# Patient Record
Sex: FEMALE | Race: ASIAN | NOT HISPANIC OR LATINO | URBAN - METROPOLITAN AREA
[De-identification: names, ages, dates, MRNs, and addresses within clinical notes are randomized per-mention and may not be internally consistent; named-entity substitution may affect disease eponyms.]

---

## 2017-02-03 ENCOUNTER — APPOINTMENT (OUTPATIENT)
Age: 47
Setting detail: DERMATOLOGY
End: 2017-02-15

## 2017-02-03 DIAGNOSIS — L81.8 OTHER SPECIFIED DISORDERS OF PIGMENTATION: ICD-10-CM

## 2017-02-03 PROBLEM — L81.9 DISORDER OF PIGMENTATION, UNSPECIFIED: Status: ACTIVE | Noted: 2017-02-03

## 2017-02-03 PROCEDURE — OTHER COUNSELING: OTHER

## 2017-02-03 PROCEDURE — 99201: CPT | Mod: 25

## 2017-02-03 PROCEDURE — 11100: CPT

## 2017-02-03 PROCEDURE — OTHER BIOPSY BY PUNCH METHOD: OTHER

## 2017-02-03 ASSESSMENT — LOCATION SIMPLE DESCRIPTION DERM: LOCATION SIMPLE: LEFT UPPER BACK

## 2017-02-03 ASSESSMENT — LOCATION ZONE DERM: LOCATION ZONE: TRUNK

## 2017-02-03 ASSESSMENT — LOCATION DETAILED DESCRIPTION DERM: LOCATION DETAILED: LEFT SUPERIOR UPPER BACK

## 2017-02-03 NOTE — PROCEDURE: BIOPSY BY PUNCH METHOD
Notification Instructions: Patient will be notified of biopsy results. However, patient instructed to call the office if not contacted within 2 weeks.
Billing Type: Third-Party Bill
Bill 68249 For Specimen Handling/Conveyance To Laboratory?: no
Epidermal Sutures: 5-0 Monosof
Additional Anesthesia Volume In Cc (Will Not Render If 0): 0
Suture Removal: 14 days
Dressing: bandage
Post-Care Instructions: I reviewed with the patient in detail post-care instructions. Patient is to keep the biopsy site dry overnight, and then apply bacitracin twice daily until healed. Patient may apply hydrogen peroxide soaks to remove any crusting.
Hemostasis: None
Wound Care: Vaseline
Body Location Override (Optional - Billing Will Still Be Based On Selected Body Map Location If Applicable): left upper back/posterior neck
Detail Level: Detailed
Home Suture Removal Text: Patient was provided a home suture removal kit and will remove their sutures at home.  If they have any questions or difficulties they will call the office.
Anesthesia Type: 1% lidocaine with epinephrine and a 1:10 solution of 8.4% sodium bicarbonate
Consent: Written consent was obtained and risks were reviewed including but not limited to scarring, infection, bleeding, scabbing, incomplete removal, nerve damage and allergy to anesthesia.
Biopsy Type: H and E
Punch Size In Mm: 3
Anesthesia Volume In Cc (Will Not Render If 0): 0.5

## 2017-02-15 ENCOUNTER — APPOINTMENT (OUTPATIENT)
Age: 47
Setting detail: DERMATOLOGY
End: 2017-02-16

## 2017-02-15 DIAGNOSIS — Z48.02 ENCOUNTER FOR REMOVAL OF SUTURES: ICD-10-CM

## 2017-02-15 DIAGNOSIS — L81.0 POSTINFLAMMATORY HYPERPIGMENTATION: ICD-10-CM

## 2017-02-15 PROCEDURE — OTHER SUTURE REMOVAL (GLOBAL PERIOD): OTHER

## 2017-02-15 PROCEDURE — 99213 OFFICE O/P EST LOW 20 MIN: CPT

## 2017-02-15 PROCEDURE — OTHER COUNSELING: OTHER

## 2017-02-15 ASSESSMENT — LOCATION DETAILED DESCRIPTION DERM
LOCATION DETAILED: LEFT SUPERIOR MEDIAL UPPER BACK
LOCATION DETAILED: LEFT SUPERIOR UPPER BACK
LOCATION DETAILED: LEFT MEDIAL TRAPEZIAL NECK

## 2017-02-15 ASSESSMENT — LOCATION SIMPLE DESCRIPTION DERM
LOCATION SIMPLE: LEFT UPPER BACK
LOCATION SIMPLE: POSTERIOR NECK

## 2017-02-15 ASSESSMENT — LOCATION ZONE DERM
LOCATION ZONE: NECK
LOCATION ZONE: TRUNK

## 2017-02-15 NOTE — PROCEDURE: SUTURE REMOVAL (GLOBAL PERIOD)
Body Location Override (Optional - Billing Will Still Be Based On Selected Body Map Location If Applicable): Left upper back
Detail Level: Detailed
Add 43994 Cpt? (Important Note: In 2017 The Use Of 10330 Is Being Tracked By Cms To Determine Future Global Period Reimbursement For Global Periods): no

## 2018-12-05 ENCOUNTER — TRANSFERRED RECORDS (OUTPATIENT)
Dept: HEALTH INFORMATION MANAGEMENT | Facility: CLINIC | Age: 48
End: 2018-12-05

## 2019-01-05 ENCOUNTER — TRANSFERRED RECORDS (OUTPATIENT)
Dept: HEALTH INFORMATION MANAGEMENT | Facility: CLINIC | Age: 49
End: 2019-01-05

## 2019-01-31 ENCOUNTER — TRANSFERRED RECORDS (OUTPATIENT)
Dept: HEALTH INFORMATION MANAGEMENT | Facility: CLINIC | Age: 49
End: 2019-01-31

## 2019-03-01 ENCOUNTER — TRANSFERRED RECORDS (OUTPATIENT)
Dept: HEALTH INFORMATION MANAGEMENT | Facility: CLINIC | Age: 49
End: 2019-03-01

## 2019-03-06 ENCOUNTER — TRANSFERRED RECORDS (OUTPATIENT)
Dept: HEALTH INFORMATION MANAGEMENT | Facility: CLINIC | Age: 49
End: 2019-03-06

## 2019-03-15 ENCOUNTER — TRANSFERRED RECORDS (OUTPATIENT)
Dept: HEALTH INFORMATION MANAGEMENT | Facility: CLINIC | Age: 49
End: 2019-03-15

## 2019-03-22 ENCOUNTER — TRANSFERRED RECORDS (OUTPATIENT)
Dept: HEALTH INFORMATION MANAGEMENT | Facility: CLINIC | Age: 49
End: 2019-03-22

## 2019-04-25 NOTE — TELEPHONE ENCOUNTER
RECORDS RECEIVED FROM: External - [facility]   DATE RECEIVED: 5/20/19   NOTES (FOR ALL VISITS) STATUS DETAILS   OFFICE NOTE from referring provider In process    OFFICE NOTE from other specialist N/A    DISCHARGE SUMMARY from hospital N/A    DISCHARGE REPORT from the ER N/A    OPERATIVE REPORT N/A    MEDICATION LIST In process    IMAGING  (FOR ALL VISITS)     EMG N/A    EEG N/A    ECT N/A    MRI (HEAD, NECK, SPINE) N/A    CT (HEAD, NECK, SPINE) N/A      Action    Action Taken Records request faxed to [facility]

## 2019-05-10 ENCOUNTER — TRANSFERRED RECORDS (OUTPATIENT)
Dept: HEALTH INFORMATION MANAGEMENT | Facility: CLINIC | Age: 49
End: 2019-05-10

## 2019-05-13 NOTE — TELEPHONE ENCOUNTER
Action    Action Taken Records received from [facility] via fax.     Images were sent on 5/8/19 per [facility], however I do not have documentation that the CD was received. Will request to re-send imaging.          [facility]

## 2019-05-20 ENCOUNTER — PRE VISIT (OUTPATIENT)
Dept: NEUROLOGY | Facility: CLINIC | Age: 49
End: 2019-05-20

## 2019-06-12 ENCOUNTER — TRANSFERRED RECORDS (OUTPATIENT)
Dept: HEALTH INFORMATION MANAGEMENT | Facility: CLINIC | Age: 49
End: 2019-06-12

## 2019-08-19 ENCOUNTER — TRANSFERRED RECORDS (OUTPATIENT)
Dept: HEALTH INFORMATION MANAGEMENT | Facility: CLINIC | Age: 49
End: 2019-08-19

## 2019-09-18 ENCOUNTER — TRANSFERRED RECORDS (OUTPATIENT)
Dept: HEALTH INFORMATION MANAGEMENT | Facility: CLINIC | Age: 49
End: 2019-09-18

## 2019-11-12 ENCOUNTER — TRANSFERRED RECORDS (OUTPATIENT)
Dept: HEALTH INFORMATION MANAGEMENT | Facility: CLINIC | Age: 49
End: 2019-11-12

## 2019-12-09 ENCOUNTER — TRANSFERRED RECORDS (OUTPATIENT)
Dept: HEALTH INFORMATION MANAGEMENT | Facility: CLINIC | Age: 49
End: 2019-12-09

## 2019-12-12 ENCOUNTER — TRANSFERRED RECORDS (OUTPATIENT)
Dept: HEALTH INFORMATION MANAGEMENT | Facility: CLINIC | Age: 49
End: 2019-12-12

## 2019-12-12 LAB
ALT SERPL-CCNC: 17 U/L (ref 0–55)
AST SERPL-CCNC: 26 U/L (ref 10–40)
CHOLEST SERPL-MCNC: 272 MG/DL (ref 0–199)
HDLC SERPL-MCNC: 64 MG/DL
LDLC SERPL CALC-MCNC: 179 MG/DL
NONHDLC SERPL-MCNC: 208 MG/DL
TRIGL SERPL-MCNC: 144 MG/DL
TSH SERPL-ACNC: 1.44 UIU/ML (ref 0.3–4.5)

## 2019-12-18 ENCOUNTER — TRANSFERRED RECORDS (OUTPATIENT)
Dept: HEALTH INFORMATION MANAGEMENT | Facility: CLINIC | Age: 49
End: 2019-12-18

## 2019-12-18 LAB
EJECTION FRACTION: 60 %
GLUCOSE SERPL-MCNC: 87 MG/DL (ref 70–100)
POTASSIUM SERPL-SCNC: 4.3 MMOL/L (ref 3.5–5.1)

## 2019-12-20 ENCOUNTER — TRANSFERRED RECORDS (OUTPATIENT)
Dept: HEALTH INFORMATION MANAGEMENT | Facility: CLINIC | Age: 49
End: 2019-12-20

## 2019-12-20 LAB
HPV ABSTRACT: NORMAL
PAP-ABSTRACT: NORMAL

## 2020-01-10 ENCOUNTER — TRANSFERRED RECORDS (OUTPATIENT)
Dept: HEALTH INFORMATION MANAGEMENT | Facility: CLINIC | Age: 50
End: 2020-01-10

## 2020-02-06 ENCOUNTER — TRANSFERRED RECORDS (OUTPATIENT)
Dept: HEALTH INFORMATION MANAGEMENT | Facility: CLINIC | Age: 50
End: 2020-02-06

## 2020-02-06 ENCOUNTER — TRANSFERRED RECORDS (OUTPATIENT)
Dept: MULTI SPECIALTY CLINIC | Facility: CLINIC | Age: 50
End: 2020-02-06

## 2020-02-06 LAB
CREAT SERPL-MCNC: 0.4 MG/DL (ref 0.55–1.02)
EJECTION FRACTION: 59 %
GFR SERPL CREATININE-BSD FRML MDRD: >60 ML/MIN/1.73M2

## 2020-02-21 NOTE — TELEPHONE ENCOUNTER
RECORDS RECEIVED FROM: Self   Date of Appt: 6/8/20   NOTES (FOR ALL VISITS) STATUS DETAILS   OFFICE NOTE from referring provider N/A    OFFICE NOTE from other specialist Received Dr Daniel Arroyo @ [facility]:  2/5/20 1/9/20 12/18/19 11/4/19    Rod Vivas JEREMIE @ [facility]:  11/4/19    Dr Vlad Leos @ [facility]:  3/15/19  2/20/19    Dr Austin Norris @ [facility]:  2/4/19 12/20/19 12/18/18   DISCHARGE SUMMARY from hospital N/A    DISCHARGE REPORT from the ER N/A    OPERATIVE REPORT N/A [facility]:  Muscle Bx R Arm 3/1/19   MEDICATION LIST Received    IMAGING  (FOR ALL VISITS)     EMG Received [facility]:  12/9/19    [facility]:  1/31/19   EEG N/A    MRI (HEAD, NECK, SPINE) Received [facility]:  MRI Cervical Spine 1/5/19   LUMBAR PUNCTURE N/A    [facility] Scan N/A    CT (HEAD, NECK, SPINE) N/A       Action 2/21/20 MV 12.08pm   Action Taken EMG request faxed to [facility]     Action 2/25/20 MV 2.27pm   Action Taken EMG report received via fax from      Action 06/01/20 2:04 PM - Talya   Action Taken  Imaging disc and reports received from [facility] and sent to [facility] to be uploaded into PACs.  Reports sent to [facility] to be scanned into the chart.

## 2020-06-08 ENCOUNTER — PRE VISIT (OUTPATIENT)
Dept: NEUROLOGY | Facility: CLINIC | Age: 50
End: 2020-06-08

## 2020-06-08 ENCOUNTER — OFFICE VISIT (OUTPATIENT)
Dept: NEUROLOGY | Facility: CLINIC | Age: 50
End: 2020-06-08
Payer: COMMERCIAL

## 2020-06-08 VITALS
RESPIRATION RATE: 16 BRPM | OXYGEN SATURATION: 98 % | DIASTOLIC BLOOD PRESSURE: 83 MMHG | HEART RATE: 88 BPM | SYSTOLIC BLOOD PRESSURE: 126 MMHG

## 2020-06-08 DIAGNOSIS — G72.9 MYOPATHY: Primary | ICD-10-CM

## 2020-06-08 DIAGNOSIS — G24.8 FOCAL DYSTONIA: ICD-10-CM

## 2020-06-08 RX ORDER — DOXYCYCLINE 50 MG/1
50 CAPSULE ORAL
COMMUNITY
Start: 2020-01-03 | End: 2021-09-22 | Stop reason: ALTCHOICE

## 2020-06-08 RX ORDER — MOMETASONE FUROATE MONOHYDRATE 50 UG/1
2 SPRAY, METERED NASAL DAILY
COMMUNITY
Start: 2020-04-13 | End: 2023-05-04

## 2020-06-08 RX ORDER — MIRABEGRON 25 MG/1
25 TABLET, FILM COATED, EXTENDED RELEASE ORAL
COMMUNITY
Start: 2020-03-12 | End: 2021-09-22

## 2020-06-08 RX ORDER — GABAPENTIN 300 MG/1
300 CAPSULE ORAL PRN
COMMUNITY
Start: 2018-12-05 | End: 2022-06-27

## 2020-06-08 RX ORDER — NAPROXEN 500 MG/1
TABLET ORAL
COMMUNITY
Start: 2020-02-10 | End: 2023-02-13

## 2020-06-08 RX ORDER — VALACYCLOVIR HYDROCHLORIDE 1 G/1
2000 TABLET, FILM COATED ORAL PRN
COMMUNITY
Start: 2019-12-20

## 2020-06-08 RX ORDER — LIFITEGRAST 50 MG/ML
SOLUTION/ DROPS OPHTHALMIC
COMMUNITY
Start: 2020-03-14 | End: 2022-01-04

## 2020-06-08 RX ORDER — NALTREXONE HYDROCHLORIDE 50 MG/1
TABLET, FILM COATED ORAL
COMMUNITY
Start: 2020-05-18 | End: 2021-09-22

## 2020-06-08 ASSESSMENT — PAIN SCALES - GENERAL: PAINLEVEL: MILD PAIN (3)

## 2020-06-08 NOTE — LETTER
6/8/2020     RE: Britt Park  [address] [facility] [address]     Dear Colleague,    Thank you for referring your patient, Britt Park, to the [facility] NEUROLOGY at [facility]. Please see a copy of my visit note below.    Referral:   Vlad Leos MD  [facility]  [address] [address]    Chief Complaint:  Myopathy 3rd opinion    History of Present Illness:    51yo RH F female with a past medical history of peripheral neuropathy, anemia, chronically dry eyes, anxiety, PVCs (sees cardiology), MAYA on cpap, chronic neck pain (see pain/palliative care at [facility]) who was referred to the pain clinic for evaluation of myopathy.     History was obtained by patient and family interview, chart [facility] and [facility]. >60 pages of records were reviewed.     Ms. Park has a somewhat difficulty past couple of years.   She has been utilizing physical therapy for 4+ years for neck pain and stiffness and in the last few years she has had more difficulty with weakness in her shoulders and neck, developing to the point of tautness which had become so uncomfortable that her physical therapist looked at her more closely, noticed scapular winging and suggested she find referral to a neurologist.  The timeline is below but essentially she began a workup thru [facility] for myopath.  Her disease process progressed to the point in the last year that she could no longer lift her arms above 90 degrees and could no longer perform ADLs.  She also began to have numbness along the medial aspects of her arms when she would lie down for sleep.  Additionally she has been seen several times for left jaw numbness.  She has seen multiple providers for these problems including pain/neurology/physical therapy/orthopedics/PNB.  Her workup has included biopsy, EMG, and extensive genetic testing, much of this is shown below.  She had a 99 gene LGMD  panel which was negative except for hetero PLEC gene She then had FSHD  Panel - negative She then had SMA panel - negative She then had whole exome sequencing which was not remarkable    Does have falls, last in December, when holding backpack, couldn't get up from that position.  Fell at steps getting into house within last 6 months.    In the last 6-8 months she has begun to right hand deformity essentially 5th digit hyper extension, somewhat involuntary, and slowed movements of the right hand.  She believes here is loss of bulk in both of her UE including in the hands.    Myositis Functional Scale 28  1. Swallowing  - 4 Normal  - 3 Early eating problems-occasional choking  - 2 Dietary consistency changes  - 1 Frequent choking  - 0 Needs tube feeding    2. Handwriting (with dominant hand prior to IBM onset)  - 4 Normal  - 3 Slow or sloppy; all words are legible  - 2 Not all words are legible  - 1 Able to  pen but unable to write  - 0 unable to  pen    3. Cutting food and handling utensils  - 4 Normal  - 3 Somewhat slow and clumsy, but no help needed  - 2 Can cut most foods, although clumsy and slow; some help needed  - 1 Food must be cut by someone, but can still feed slowly  - 0 Needs to be fed    4. Fine motor tasks (opening doors, using keys, picking up small objects)  - 4 Independent  - 3 Slow or clumsy in completing task  - 2 Independent but requires modified techniques or assistive devices  - 1 Frequently requires assistance from caregiver  - 0 Unable    5. Dressing  - 4 Normal  - 3 Independent but with increased effort or decreased efficiency  - 2 Independent but requires assistive devices or modified techniques (Velcro snaps, shirts without buttons, etc)  - 1 Requires assistance from caregiver for some clothing items  - 0 total dependence    6. Hygiene (bathing and toileting)  - 4 Normal  - 3 Independent but with increased effort or decreased activity  - 2 Independent but requires use of assistive  "devices (shower chair, raised toilet seat, etc)  - 1 Requires occasional assistance from caregiver  - 0 Completely dependent    7. Turning in bed and adjusting covers  - 4 Normal  - 3 Somewhat slow and clumsy but no help needed  - 2 Can turn alone or adjust sheets, but with great difficulty  - 1 Can initiate, but not turn or adjust sheets alone  - 0 Unable or requires total assistance    8. Sit to stand  - 4 Independent (without use of arms)  - 3 Performs with substitute motions (leaning forward, rocking) but without use of arms  - 2 Requires use of arms  - 1 requires assistance from a device or person  - 0 Unable to stand    9. Walking  - 4 Normal  - 3 Slow or mild unsteadiness  - 2 Intermittent use of an assistive device (ankle-foot orthosis, cane, walker)  - 1 Dependent on assistive device  - 0 Wheelchair dependent    10. Climbing stairs  - 4 Normal  - 3 Slow with hesitation or increased effort; uses hand rail intermittently  - 2 Dependent on hand rail  - 1 Dependent on hand rail and additional support (cane or person)  - 0 Cannot climb stairs        Summary of prior neurology visits:  2016: Muscle spasms and cramps in left trapezius and shoulders and neck. Initially saw primary care, sports medicine and underwent physical and occupational therapy - did not seem to improve that much. Burst of steroids initially helped. Per Dr. Cruz at [facility]: \"Some neck perispinous muscle spasm and trapezius also and possible nerve impingement. \"    [id number]: CK reported to be elevated less than 1000. Started integrative therapies. Rheumatology did not find systemic inflammation. She continued intermittent PT. Some of these therapies hurt; occupational therapy hurt \"body work.\" Saw pain doctor at [facility]. Noted to have some shoulder weakness, particularly on LEFT by physical therapy in 2018, and then seen by Dr. Norris of neurology. Initial thought neuropathy or myopathy.    Feb 2019: CK elevated to 300. Aldolase normal. B12 low. " "Inflammatory myopathy considered. Saw Dr. Leos for subspecialty consultation. Proximal arm weakness noted - difficulty raising mm above head.    March 2019: Saw Dr. Leos. CK elevated to 434. Aldolase elevated 9.7. Muscle biopsy abnormal with endomysial infiltration.     Spring-Fall 2019:  1. only sleep on her left side. Feels that have damaged neck muscles that have never healed. \"Trapezoid on left side stiff.\" Did massage last week that helped neck.   2. Headaches - feels from neck. Chronic. Worse this year.  3. Tingling and numbness along left jaw. 2-3 months.  4. Right thigh - burning sensation underneath skin - feels under muscles. 4-6 weeks.  5. Tough reaching down on floor. Cannot get up herself. Sept 2019 - at [employer] [facility]awn - could not lock locker.  6. \"Brain Fog\" - difficulty concentrating. Memory loss. Not getting lost going to places. Affecting productivity at work. Cannot get in the zone.    Robaxin - not as helpful. Early on took Ibuprofen - did not work as well. Naproxen - 500 mg bid as needed. Meloxicam - out of it - did help at first. Taking Gabapentin 300 mg once a day to twice a day.    2/5/2020 States she continues to experience neck pain, but is trying a new medication to address this. Reports she is having significant difficulty getting off of the floor, which is required at times for tasks around the house. States she struggles with bending over to  objects. States she is having difficulty standing from low surfaces such as seats in the car, the couch, or the airplane. Reports she knows what the strategy is for standing up from the floor but it is more an issue of strength. Has been riding a stationary bike at the gym when able; is walking when she has time. Continues to go to [facility] for independent pool therapy. Reports she has limited time to attend outpatient visits.     Reports L sided neck pain that is now causing some tingling in the L side of her jaw, pins and " "needles. States she was seen at [facility] and therapy was recommended but it was a vigorous program and the MD wanted cardiac clearance before moving forward. Pt has not heard back on this yet.     Right hand  difficult.  - Cervical facet joint injection was helpful C2/C3 on 01/30/20 - left-sided.  - Memory is average. Difficulty concentrating, remembering things with her job. Still feels like \"fog\" - not on her A game at work and has demanding job. Some anxiety but feels this is not the issue.  - Taking Gabapentin 300 mg in AM for nerve/mm pain. Stopped sertraline - didn't like SE. Meloxicam stopped due to heart. Does feel low dose naltrexone is helpful.        She denies hypesthesias, parashesias, dysesthesias, or allodynia aside from what was mentioned.  She has not experienced changes in sweating, anhidrosis, lightheadedness, syncope, or early satiety and has not noticed changes in bowel or bladder function. She denies frequent cramps, fasciculations, she does have difficulty with muscle relaxation as described above.  Speech and swallowing are normal. Appetite is good and weight is relatively stable. She endorses more recently breathing difficulties with exertion but not necessarily when lying flat. Mood and affect are appropriate. He is independent, has no assistive devices, able to ambulate independently, and is not falling. She endorses being diagnosed with MAYA and is now on CPAP    Dr Daniel Arroyo @ [facility]:  2/5/20 1/9/20 12/18/19 11/4/19     Rod Vivas GC @ [facility]:  11/4/19     Dr Vlad Leos @ [facility]:  3/15/19  2/20/19     Dr Austin Norris @ [facility]:  2/4/19 12/20/19 12/18/18    [facility]:  Muscle Bx R Arm 3/1/19    EMG  [facility]:  12/9/19     [facility]:  1/31/19    MRI C   [facility]:  MRI Cervical Spine 1/5/19      Prior pertinent laboratory work-up:  No results found for: A1C    3/2019  Myositis AB panel negative.    3/2019    Aldolase 8.7  Glucose tolerence test " normal  IF normal    Gluc 77    3/22/2019  Anti Sm Ab neg  RNP Ab neg  MMA 0.26  Homocysteine 10.2  ANCA neg    EMG 1/31/2019  1. Fibrillations left deltoid and left leg muscles consistent with inflammatory myopathy.   2. Mild generalized polyneuropathy.     1/31/2019 Lab workup  ESR neg  CBC normal  SARAH, CRP, BRITNEY 1 ab, SM Ab, TSH unremarkable  Aldolase unremarkable    Ach Ab normal    Electron Microscopy 3/1/2019   Ultrastructural study unremarkable. Myofibrils and sarcomere patterns normal. Mitochondria morphologically intact. Rare myelin noted.     [facility] Neuromuscular Panel 8/2019  FSHD 1 or 2 8/2019           Prior pertinent radiology work-up:  Interface, In Rad Results - 11/12/2019  1:34 PM CST  [facility]  MR CERVICAL SPINE WO IV CONT, MR BRAIN WO IV CONT  11/12/2019 7:56 AM    INDICATION: Altered mental status. Memory changes. Unknown muscular dystrophy/myopathy (adult onset). Cervical radiculopathy. Myopathy. Idiopathic peripheral neuropathy. Neck pain.  TECHNIQUE: Performed without IV contrast.  SEDATION: None.  COMPARISON: None.    FINDINGS:  MRI brain:  INTRACRANIAL CONTENTS: No areas of abnormally restricted diffusion to suggest acute or subacute infarct. No areas of abnormal parenchymal susceptibility.    Ventricles are normal in size for age. Mild prominence of the sulci. There are a few very small foci of FLAIR hyperintensity within the frontal white matter, nonspecific but compatible with small foci of gliosis and/or chronic myelin loss of doubtful clinical significance. Otherwise normal gray-white matter differentiation. No mass effect or midline shift.    Cerebellar tonsils are normally positioned. Sella is unremarkable. Corpus callosum is normally formed. Major skull base vascular flow-voids are preserved.    OSSEOUS STRUCTURES/SOFT TISSUES: Visualized marrow space is unremarkable.     ORBITS: Orbits are normal in appearance.     SINUSES/MASTOIDS: Paranasal sinuses, middle  ear cavities, and mastoid air cells are essentially clear.    MRI cervical spine:   Lateral alignment is normal. There is nonspecific straightening of the normal cervical lordosis. Craniocervical junction is intact. Anterior C1-C2 articulation is unremarkable.    There is mild chronic inferior endplate height loss at C3-C7. Vertebral body heights are otherwise preserved. Visualized marrow space is unremarkable.    Dorsal paraspinal soft tissues are unremarkable. No prevertebral soft tissue swelling. Cervical vertebral artery flow voids are preserved.    Cervical and visualized upper thoracic cord demonstrate grossly normal signal characteristics and morphology.    C2-C3: Normal disc height. Shallow posterior disc bulge. Moderate left-sided facet arthropathy with shallow left-sided facet effusion. No spinal canal stenosis. No right neural foraminal stenosis. Mild left neural foraminal stenosis.    C3-C4: Normal disc height. Shallow posterior disc bulge. No facet arthropathy. No spinal canal stenosis. No right neural foraminal stenosis. No left neural foraminal stenosis.    C4-C5: Normal disc height. Trace posterior disc bulge. No facet arthropathy. No spinal canal stenosis. No right neural foraminal stenosis. No left neural foraminal stenosis.    C5-C6: Normal disc height. Shallow posterior disc bulge. No facet arthropathy. No spinal canal stenosis. No right neural foraminal stenosis. No left neural foraminal stenosis.    C6-C7: Normal disc height. Trace posterior disc bulge. No facet arthropathy. No spinal canal stenosis. No right neural foraminal stenosis. No left neural foraminal stenosis.    C7-T1: Normal disc height. No herniation. Mild facet arthropathy. No spinal canal stenosis. No right neural foraminal stenosis. No left neural foraminal stenosis.    Visualized upper thoracic levels (T1-T2 through T3-T4) are without significant disc bulge, facet arthropathy or canal or foraminal  stenosis.    IMPRESSION:    MRI BRAIN:  1. No findings for intracranial hemorrhage, mass, or acute infarct.  2. There are a few scattered small foci of FLAIR hyperintensity within the frontal white matter, nonspecific but compatible with small foci of gliosis and/or chronic myelin loss of doubtful clinical significance.    MRI cervical spine:  1. Nonspecific straightening of the normal cervical lordosis.  2. Mild cervical spondylosis characterized by shallow posterior disc herniations at C2-C3 through C6-C7. No significant canal stenosis.  3. Moderate left-sided hypertrophic facet arthropathy at C2-C3 with associated facet effusion. Mild associated left C2-C3 foraminal stenosis.    Prior electrophysiologic work-up:  Nerve conduction studies/EMG           Past Medical History:  High Cholesterol    Social History: reports that she has never smoked. She has never used smokeless tobacco. She reports that she does not drink alcohol or use drugs. 3 children. Lives in [facility]. .  There is no known exposure to toxins or heavy metals.     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 She is required to complete 8 steps to enter building and large step in the parking garage.   Patient is limited in daily activities of: Bathing, increased difficulty with washing hair   Dressing: Overhead shirt and Jacket, patient will tip head down and pull jacket over   Household chores: Vacuum, Sweeping, Cooking, Laundry (can complete at counter leave), Dishes, Yard work, Snow removal and Making bed  Patient is limited in recreational activities: Health club activities  Patient is limited to walking 60 minutes  Patient is limited in static postures: sitting 120 minutes and standing 60 minutes  Patient is limited in office activities, patient did work with ergonomic team  for desk set up   Patient is limited in stair negotiation: Step - to pattern and Uses 1 railing(s)   Sleep is limited by 25 %  Regular exercise is limited to walking, 3 x per week 30 minutes each   She does go to [employer] for gentle mobility and neck range of motion once weekly     Previous Therapy for This Condition: No      Family History: family history includes Cancer in her birth father; Cancer (age of onset: 67) in her birth mother. There is no history of Cataract, Glaucoma, Macular Degeneration, Amblyopia/Strabismus, Blindness, Diabetes, Hypertension, Retinal Detachment, Stroke, or Thyroid Disorder. She was adopted. Do not know family history. [place] descent.            There is no known family history of hereditary neuropathies or other neuromuscular disorders.        Medical Allergies:       Allergies   Allergen Reactions     Influenza Vaccines Hives     Diphenhydramine Other (See Comments)     SHAKY       Sulfa Drugs Swelling       Current Medications:      Current Outpatient Medications   Medication     doxycycline monohydrate (MONODOX) 50 MG capsule     gabapentin (NEURONTIN) 300 MG capsule     mirabegron (MYRBETRIQ) 25 MG 24 hr tablet     mometasone (NASONEX) 50 MCG/ACT nasal spray     naltrexone (DEPADE/REVIA) 50 MG tablet     naproxen (NAPROSYN) 500 MG tablet     sertraline (ZOLOFT) 50 MG tablet     valACYclovir (VALTREX) 1000 mg tablet     XIIDRA 5 % opthalmic solution     No current facility-administered medications for this visit.        Review of Systems:   CONSTITUTIONAL REVIEW OF SYSTEMS:  Negative across 10 systems except for HPI and:  Dry eyes and dry mouth  Chronic neck pain  Some chronic back pain  No ptosis or double vision    Physical examination:    There were no vitals taken for this visit.    There are no carotid bruits.       General Appearance: NAD    Skin: There are no rashes or other skin lesions.    Musculoskeletal:  There is no scoliosis, but some modest lordosis,  and  significant kyphosis, no pes cavus, or hammertoes.  She has very marked scapular winging with extended arms on both sides, maybe more on the right but both very impressive    Neurologic examination:    Mental status:  Patient is alert, attentive, and oriented x 3.  Language is coherent and fluent without dysarthria or aphasia.  Memory, comprehension and ability to follow commands were intact.       Cranial nerves:  Optic discs were sharp.  Pupils were round and reacted to light.  Extraocular movements were full. There was no face, jaw, palate or tongue weakness or atrophy. Hearing was grossly intact.  Shoulder shrug was normal.       Motor exam: Some atrophy in upper extremities, no fasciculations.  No action or percussion myotonia or paramyotonia.    Manual muscle testing revealed the following MRC grade muscle power:         Right Left   Neck flexion 4    Neck extension: 4    Shoulder external rot 3- 3-   Shoulder abduction:  3 3   Elbow Flexion: 4 4+   Elbow Extension:  3 3   Wrist Extension:  4- 4+   Finger Extension:  3, dystonic? 4+   FDI 3 5   APB 4 5   Finger Flexion 5- 5-   Wrist Flexion 4- 4+   Hip abduction 4 4   Hip Flexion 4 4   Hip Extension 5* 5*   Knee Extension 5 5   Knee Flexion 5 5   Dorsiflexion 5 5   Plantar flexion 5 5   Foot Inversion 5 5   Foot Eversion 5 5     * Patient is able to rise out of the chair with ease and without using arms.     Complex motor skills revealed normal coordination.  Finger-nose- finger and heel to shin were intact.  However, she has a very odd posture of her right hand.  She has slowed finger tapping on both sides but much more on the right and very slow hand open/close.  She cannot quickly do alternating movements of the right.  Involuntary spontaneous extension of right digit V was observed.     Sensory exam revealed normal perception of vibration, proprioception, light touch, pin, and temperature proximally and distally in the arms and legs bilaterally. Romberg  sign was absent.    Gait was slow and posture was a bit stooped but a bit of this is the kyphosis.  Was able to walk on his heels, toes and tandem with modest difficulty.       Deep tendon reflexes:   Right Left   Triceps 0 0   Biceps 0 1   Brachioradialis 0 1   Knee jerk 0 0   Ankle jerk 0 0   Plantar responses were flexor bilaterally.       [Reflex Spread] negative  Starks's Response] negative  [Jaw Jerk] negative        Assessment:      51yo RH F female with a past medical history of peripheral neuropathy, anemia, chronically dry eyes, anxiety, PVCs (sees cardiology), MAYA on cpap, chronic neck pain (see pain/palliative care at [facility]) who was referred to our Clinic for evaluation of myopathy.     She undoubtedly has a myopathy clinically, with predominant affliction of the shoulder girdle and periscapular muscles, along with pelvic muscles. 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 Her CK has been elevated in the past, up to 1000.  She has had muscle biopsy revealing endomysial inflammation with normal EM. Her genetic testing, which has been extensive and includes whole exome and FSHD deletion screening, has been negative. 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 Myositis antibodies were tested and were negative, but HMGCR IgG antibodies were not tested. A few months ago, I saw a similar case of a middle aged  woman with almost identical pattern of weakness, including scapular winging, who was HMGCR IgG positive; therefore I feel this should be tested, because positive results can have important therapeutic implications.     At this point patient is having significant ADL issues. Patient reporting  increased difficulty with daily mobility, over head tasks, functional strength, and gait at this time. She is frustrated with recent changes in her strength and range of motion with reports of multiple falls. When patient does fall she is unable to get off floor independently, requires assist. She certainly needs more physical therapy services. Patient demonstrates impaired lower extremity strength, impaired lower extremity range of motion, impaired gait, and impaired posture contributing to functional limitations with mobility.    The dysfunction of her right hand is more difficult to explain. I don't think this can be explained purely by muscle weakness; my impression is that there is superimposed dystonia/movement disorder.      Overall her case is difficult and perplexing.      Plan:      1. Labs:  HMGCR IgG test  2. Nerve conduction studies/EMG: not indicated currently, it might be interesting to see in future though given there has been somewhat fast progression of her process  3. Physical therapy can continue thru multi-disciplinary clinic she sees  4. Symptomatic management of pain: continue with pain clinic and likely would benefit from further injections into facet  5. Consultation, in person, with movement disorder specialist, re: possible right upper extremity dystonia and etiology  6. Follow up in 2-3 months    Patient was seen and evaluated with Neuromuscular Attending, Dr. Kelsie Lloyd MD/PhD  [facility]  [phone]    ATTENDING ADDENDUM: Patient seen and examined with resident Dr Lloyd at the [facility]. Agree with his assessment and plan as above which I personally reviewed and edited. TT spent for patient care 45 minutes; more than half was counseling. Ramiro Iglesias MD    Again, thank you for allowing me to participate in the care of your patient.      Sincerely,    Ramiro Iglesias MD

## 2020-06-08 NOTE — PROGRESS NOTES
Referral:   [facility]  [facility] [address] [place] [address]    Chief Complaint:  Myopathy 3rd opinion    History of Present Illness:    51yo RH F female with a past medical history of peripheral neuropathy, anemia, chronically dry eyes, anxiety, PVCs (sees cardiology), MAYA on cpap, chronic neck pain (see pain/palliative care at [facility]) who was referred to the pain clinic for evaluation of myopathy.     History was obtained by patient and family interview, chart review and CareEverywhere review. >60 pages of records were reviewed.     Ms. Park has a somewhat difficulty past couple of years.   She has been utilizing physical therapy for 4+ years for neck pain and stiffness and in the last few years she has had more difficulty with weakness in her shoulders and neck, developing to the point of tautness which had become so uncomfortable that her physical therapist looked at her more closely, noticed scapular winging and suggested she find referral to a neurologist.  The timeline is below but essentially she began a workup thru [facility] for myopath.  Her disease process progressed to the point in the last year that she could no longer lift her arms above 90 degrees and could no longer perform ADLs.  She also began to have numbness along the medial aspects of her arms when she would lie down for sleep.  Additionally she has been seen several times for left jaw numbness.  She has seen multiple providers for these problems including pain/neurology/physical therapy/orthopedics/PNB.  Her workup has included biopsy, EMG, and extensive genetic testing, much of this is shown below.  She had a 99 gene LGMD panel which was negative except for hetero PLEC gene She then had FSHD  Panel - negative She then had SMA panel - negative She then had whole exome sequencing which was not remarkable    Does have falls, last in December, when holding backpack, couldn't get up from  that position.  Fell at steps getting into house within last 6 months.    In the last 6-8 months she has begun to right hand deformity essentially 5th digit hyper extension, somewhat involuntary, and slowed movements of the right hand.  She believes here is loss of bulk in both of her UE including in the hands.    Myositis Functional Scale 28  1. Swallowing  - 4 Normal  - 3 Early eating problems-occasional choking  - 2 Dietary consistency changes  - 1 Frequent choking  - 0 Needs tube feeding    2. Handwriting (with dominant hand prior to IBM onset)  - 4 Normal  - 3 Slow or sloppy; all words are legible  - 2 Not all words are legible  - 1 Able to  pen but unable to write  - 0 unable to  pen    3. Cutting food and handling utensils  - 4 Normal  - 3 Somewhat slow and clumsy, but no help needed  - 2 Can cut most foods, although clumsy and slow; some help needed  - 1 Food must be cut by someone, but can still feed slowly  - 0 Needs to be fed    4. Fine motor tasks (opening doors, using keys, picking up small objects)  - 4 Independent  - 3 Slow or clumsy in completing task  - 2 Independent but requires modified techniques or assistive devices  - 1 Frequently requires assistance from caregiver  - 0 Unable    5. Dressing  - 4 Normal  - 3 Independent but with increased effort or decreased efficiency  - 2 Independent but requires assistive devices or modified techniques (Velcro snaps, shirts without buttons, etc)  - 1 Requires assistance from caregiver for some clothing items  - 0 total dependence    6. Hygiene (bathing and toileting)  - 4 Normal  - 3 Independent but with increased effort or decreased activity  - 2 Independent but requires use of assistive devices (shower chair, raised toilet seat, etc)  - 1 Requires occasional assistance from caregiver  - 0 Completely dependent    7. Turning in bed and adjusting covers  - 4 Normal  - 3 Somewhat slow and clumsy but no help needed  - 2 Can turn alone or adjust sheets,  "but with great difficulty  - 1 Can initiate, but not turn or adjust sheets alone  - 0 Unable or requires total assistance    8. Sit to stand  - 4 Independent (without use of arms)  - 3 Performs with substitute motions (leaning forward, rocking) but without use of arms  - 2 Requires use of arms  - 1 requires assistance from a device or person  - 0 Unable to stand    9. Walking  - 4 Normal  - 3 Slow or mild unsteadiness  - 2 Intermittent use of an assistive device (ankle-foot orthosis, cane, walker)  - 1 Dependent on assistive device  - 0 Wheelchair dependent    10. Climbing stairs  - 4 Normal  - 3 Slow with hesitation or increased effort; uses hand rail intermittently  - 2 Dependent on hand rail  - 1 Dependent on hand rail and additional support (cane or person)  - 0 Cannot climb stairs        Summary of prior neurology visits:  2016: Muscle spasms and cramps in left trapezius and shoulders and neck. Initially saw primary care, sports medicine and underwent physical and occupational therapy - did not seem to improve that much. Burst of steroids initially helped. Per Dr. Cruz at [facility]: \"Some neck perispinous muscle spasm and trapezius also and possible nerve impingement. \"    9248-2051: CK reported to be elevated less than 1000. Started integrative therapies. Rheumatology did not find systemic inflammation. She continued intermittent PT. Some of these therapies hurt; occupational therapy hurt \"body work.\" Saw pain doctor at [facility]. Noted to have some shoulder weakness, particularly on LEFT by physical therapy in 2018, and then seen by Dr. Norris of neurology. Initial thought neuropathy or myopathy.    Feb 2019: CK elevated to 300. Aldolase normal. B12 low. 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 Saw Dr. Leos for subspecialty consultation. Proximal arm weakness noted - difficulty raising mm above head.    March 2019: Saw Dr. Leos. CK elevated to 434. Aldolase elevated 9.7. Muscle biopsy abnormal with endomysial infiltration. " "    Spring-Fall 2019:  1. only sleep on her left side. Feels that have damaged neck muscles that have never healed. \"Trapezoid on left side stiff.\" Did massage last week that helped neck.   2. Headaches - feels from neck. Chronic. Worse this year.  3. Tingling and numbness along left jaw. 2-3 months.  4. Right thigh - burning sensation underneath skin - feels under muscles. 4-6 weeks.  5. Tough reaching down on floor. Cannot get up herself. Sept 2019 - at [employer] [facility]awn - could not lock locker.  6. \"Brain Fog\" - difficulty concentrating. Memory loss. Not getting lost going to places. Affecting productivity at work. Cannot get in the zone.    Robaxin - not as helpful. Early on took Ibuprofen - did not work as well. Naproxen - 500 mg bid as needed. Meloxicam - out of it - did help at first. Taking Gabapentin 300 mg once a day to twice a day.    2/5/2020 States she continues to experience neck pain, but is trying a new medication to address this. Reports she is having significant difficulty getting off of the floor, which is required at times for tasks around the house. States she struggles with bending over to  objects. States she is having difficulty standing from low surfaces such as seats in the car, the couch, or the airplane. Reports she knows what the strategy is for standing up from the floor but it is more an issue of strength. Has been riding a stationary bike at the gym when able; is walking when she has time. Continues to go to [facility] for independent pool therapy. Reports she has limited time to attend outpatient visits.     Reports L sided neck pain that is now causing some tingling in the L side of her jaw, pins and needles. States she was seen at [facility] and therapy was recommended but it was a vigorous program and the MD wanted cardiac clearance before moving forward. Pt has not heard back on this yet.     Right hand  difficult.  - Cervical facet joint injection was helpful " "C2/C3 on 01/30/20 - left-sided.  - Memory is average. Difficulty concentrating, remembering things with her job. Still feels like \"fog\" - not on her A game at work and has demanding job. Some anxiety but feels this is not the issue.  - Taking Gabapentin 300 mg in AM for nerve/mm pain. Stopped sertraline - didn't like SE. Meloxicam stopped due to heart. Does feel low dose naltrexone is helpful.        She denies hypesthesias, parashesias, dysesthesias, or allodynia aside from what was mentioned.  She has not experienced changes in sweating, anhidrosis, lightheadedness, syncope, or early satiety and has not noticed changes in bowel or bladder function. She denies frequent cramps, fasciculations, she does have difficulty with muscle relaxation as described above.  Speech and swallowing are normal. Appetite is good and weight is relatively stable. She endorses more recently breathing difficulties with exertion but not necessarily when lying flat. Mood and affect are appropriate. He is independent, has no assistive devices, able to ambulate independently, and is not falling. She endorses being diagnosed with MAYA and is now on CPAP    Dr Daniel Arroyo @ [facility]:  2/5/20 1/9/20 12/18/19 11/4/19     Rod Vivas GC @ [facility]:  11/4/19     Dr Vlad Leos @ [facility]:  3/15/19  2/20/19     Dr Austin Norris @ [facility]:  2/4/19 12/20/19 12/18/18    [facility]:  Muscle Bx R Arm 3/1/19    EMG  [facility]:  12/9/19     [facility]:  1/31/19    MRI C   [facility]:  MRI Cervical Spine 1/5/19      Prior pertinent laboratory work-up:  No results found for: A1C    3/2019  Myositis AB panel negative.    3/2019    Aldolase 8.7  Glucose tolerence test normal  IF normal    Gluc 77    3/22/2019  Anti Sm Ab neg  RNP Ab neg  MMA 0.26  Homocysteine 10.2  ANCA neg    EMG 1/31/2019  1. Fibrillations left deltoid and left leg muscles consistent with inflammatory myopathy.   2. Mild generalized polyneuropathy. "     1/31/2019 Lab workup  ESR neg  CBC normal  SARAH, CRP, BRITNEY 1 ab, SM Ab, TSH unremarkable  Aldolase unremarkable    Ach Ab normal    Electron Microscopy 3/1/2019   Ultrastructural study unremarkable. Myofibrils and sarcomere patterns normal. Mitochondria morphologically intact. Rare myelin noted.     [facility] Neuromuscular Panel 8/2019  FSHD 1 or 2 8/2019           Prior pertinent radiology work-up:  Interface, In Rad Results - 11/12/2019  1:34 PM CST  [facility]  MR CERVICAL SPINE WO IV CONT, MR BRAIN WO IV CONT  11/12/2019 7:56 AM    INDICATION: Altered mental status. Memory changes. Unknown muscular dystrophy/myopathy (adult onset). Cervical radiculopathy. Myopathy. Idiopathic peripheral neuropathy. Neck pain.  TECHNIQUE: Performed without IV contrast.  SEDATION: None.  COMPARISON: None.    FINDINGS:  MRI brain:  INTRACRANIAL CONTENTS: No areas of abnormally restricted diffusion to suggest acute or subacute infarct. No areas of abnormal parenchymal susceptibility.    Ventricles are normal in size for age. Mild prominence of the sulci. There are a few very small foci of FLAIR hyperintensity within the frontal white matter, nonspecific but compatible with small foci of gliosis and/or chronic myelin loss of doubtful clinical significance. Otherwise normal gray-white matter differentiation. No mass effect or midline shift.    Cerebellar tonsils are normally positioned. Sella is unremarkable. Corpus callosum is normally formed. Major skull base vascular flow-voids are preserved.    OSSEOUS STRUCTURES/SOFT TISSUES: Visualized marrow space is unremarkable.     ORBITS: Orbits are normal in appearance.     SINUSES/MASTOIDS: Paranasal sinuses, middle ear cavities, and mastoid air cells are essentially clear.    MRI cervical spine:   Lateral alignment is normal. There is nonspecific straightening of the normal cervical lordosis. Craniocervical junction is intact. Anterior C1-C2 articulation is  unremarkable.    There is mild chronic inferior endplate height loss at C3-C7. Vertebral body heights are otherwise preserved. Visualized marrow space is unremarkable.    Dorsal paraspinal soft tissues are unremarkable. No prevertebral soft tissue swelling. Cervical vertebral artery flow voids are preserved.    Cervical and visualized upper thoracic cord demonstrate grossly normal signal characteristics and morphology.    C2-C3: Normal disc height. Shallow posterior disc bulge. Moderate left-sided facet arthropathy with shallow left-sided facet effusion. No spinal canal stenosis. No right neural foraminal stenosis. Mild left neural foraminal stenosis.    C3-C4: Normal disc height. Shallow posterior disc bulge. No facet arthropathy. No spinal canal stenosis. No right neural foraminal stenosis. No left neural foraminal stenosis.    C4-C5: Normal disc height. Trace posterior disc bulge. No facet arthropathy. No spinal canal stenosis. No right neural foraminal stenosis. No left neural foraminal stenosis.    C5-C6: Normal disc height. Shallow posterior disc bulge. No facet arthropathy. No spinal canal stenosis. No right neural foraminal stenosis. No left neural foraminal stenosis.    C6-C7: Normal disc height. Trace posterior disc bulge. No facet arthropathy. No spinal canal stenosis. No right neural foraminal stenosis. No left neural foraminal stenosis.    C7-T1: Normal disc height. No herniation. Mild facet arthropathy. No spinal canal stenosis. No right neural foraminal stenosis. No left neural foraminal stenosis.    Visualized upper thoracic levels (T1-T2 through T3-T4) are without significant disc bulge, facet arthropathy or canal or foraminal stenosis.    IMPRESSION:    MRI BRAIN:  1. No findings for intracranial hemorrhage, mass, or acute infarct.  2. There are a few scattered small foci of FLAIR hyperintensity within the frontal white matter, nonspecific but compatible with small foci of gliosis and/or chronic  myelin loss of doubtful clinical significance.    MRI cervical spine:  1. Nonspecific straightening of the normal cervical lordosis.  2. Mild cervical spondylosis characterized by shallow posterior disc herniations at C2-C3 through C6-C7. No significant canal stenosis.  3. Moderate left-sided hypertrophic facet arthropathy at C2-C3 with associated facet effusion. Mild associated left C2-C3 foraminal stenosis.    Prior electrophysiologic work-up:  Nerve conduction studies/EMG           Past Medical History:  High Cholesterol    Social History: reports that she has never smoked. She has never used smokeless tobacco. She reports that she does not drink alcohol or use drugs. 3 children. Lives in [place] [place]. .  There is no known exposure to toxins or heavy metals.     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 She is required to complete 8 steps to enter building and large step in the parking garage.   Patient is limited in daily activities of: Bathing, increased difficulty with washing hair   Dressing: Overhead shirt and Jacket, patient will tip head down and pull jacket over   Household chores: Vacuum, Sweeping, Cooking, Laundry (can complete at counter leave), Dishes, Yard work, Snow removal and Making bed  Patient is limited in recreational activities: Health club activities  Patient is limited to walking 60 minutes  Patient is limited in static postures: sitting 120 minutes and standing 60 minutes  Patient is limited in office activities, patient did work with ergonomic team for desk set up   Patient is limited in stair negotiation: Step - to pattern and Uses 1 railing(s)   Sleep is limited by 25 %  Regular exercise is limited to walking, 3 x per week 30 minutes each   She does go to [facility] for gentle mobility and neck range of  motion once weekly     Previous Therapy for This Condition: No      Family History: family history includes Cancer in her birth father; Cancer (age of onset: 67) in her birth mother. There is no history of Cataract, Glaucoma, Macular Degeneration, Amblyopia/Strabismus, Blindness, Diabetes, Hypertension, Retinal Detachment, Stroke, or Thyroid Disorder. She was adopted. Do not know family history. [place] descent.            There is no known family history of hereditary neuropathies or other neuromuscular disorders.        Medical Allergies:       Allergies   Allergen Reactions     Influenza Vaccines Hives     Diphenhydramine Other (See Comments)     SHAKY       Sulfa Drugs Swelling       Current Medications:      Current Outpatient Medications   Medication     doxycycline monohydrate (MONODOX) 50 MG capsule     gabapentin (NEURONTIN) 300 MG capsule     mirabegron (MYRBETRIQ) 25 MG 24 hr tablet     mometasone (NASONEX) 50 MCG/ACT nasal spray     naltrexone (DEPADE/REVIA) 50 MG tablet     naproxen (NAPROSYN) 500 MG tablet     sertraline (ZOLOFT) 50 MG tablet     valACYclovir (VALTREX) 1000 mg tablet     XIIDRA 5 % opthalmic solution     No current facility-administered medications for this visit.        Review of Systems:   CONSTITUTIONAL REVIEW OF SYSTEMS:  Negative across 10 systems except for HPI and:  Dry eyes and dry mouth  Chronic neck pain  Some chronic back pain  No ptosis or double vision    Physical examination:    There were no vitals taken for this visit.    There are no carotid bruits.       General Appearance: NAD    Skin: There are no rashes or other skin lesions.    Musculoskeletal:  There is no scoliosis, but some modest lordosis,  and significant kyphosis, no pes cavus, or hammertoes.  She has very marked scapular winging with extended arms on both sides, maybe more on the right but both very impressive    Neurologic examination:    Mental status:  Patient is alert, attentive, and oriented x 3.   Language is coherent and fluent without dysarthria or aphasia.  Memory, comprehension and ability to follow commands were intact.       Cranial nerves:  Optic discs were sharp.  Pupils were round and reacted to light.  Extraocular movements were full. There was no face, jaw, palate or tongue weakness or atrophy. Hearing was grossly intact.  Shoulder shrug was normal.       Motor exam: Some atrophy in upper extremities, no fasciculations.  No action or percussion myotonia or paramyotonia.    Manual muscle testing revealed the following MRC grade muscle power:         Right Left   Neck flexion 4    Neck extension: 4    Shoulder external rot 3- 3-   Shoulder abduction:  3 3   Elbow Flexion: 4 4+   Elbow Extension:  3 3   Wrist Extension:  4- 4+   Finger Extension:  3, dystonic? 4+   FDI 3 5   APB 4 5   Finger Flexion 5- 5-   Wrist Flexion 4- 4+   Hip abduction 4 4   Hip Flexion 4 4   Hip Extension 5* 5*   Knee Extension 5 5   Knee Flexion 5 5   Dorsiflexion 5 5   Plantar flexion 5 5   Foot Inversion 5 5   Foot Eversion 5 5     * Patient is able to rise out of the chair with ease and without using arms.     Complex motor skills revealed normal coordination.  Finger-nose- finger and heel to shin were intact.  However, she has a very odd posture of her right hand.  She has slowed finger tapping on both sides but much more on the right and very slow hand open/close.  She cannot quickly do alternating movements of the right.  Involuntary spontaneous extension of right digit V was observed.     Sensory exam revealed normal perception of vibration, proprioception, light touch, pin, and temperature proximally and distally in the arms and legs bilaterally. Romberg sign was absent.    Gait was slow and posture was a bit stooped but a bit of this is the kyphosis.  Was able to walk on his heels, toes and tandem with modest difficulty.       Deep tendon reflexes:   Right Left   Triceps 0 0   Biceps 0 1   Brachioradialis 0 1   Knee  jerk 0 0   Ankle jerk 0 0   Plantar responses were flexor bilaterally.       [Reflex Spread] negative  Starks's Response] negative  [Jaw Jerk] negative        Assessment:      51yo RH F female with a past medical history of peripheral neuropathy, anemia, chronically dry eyes, anxiety, PVCs (sees cardiology), MAYA on cpap, chronic neck pain (see pain/palliative care at [facility]) who was referred to our Clinic for evaluation of myopathy.     She undoubtedly has a myopathy clinically, with predominant affliction of the shoulder girdle and periscapular muscles, along with pelvic muscles. 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 Her CK has been elevated in the past, up to 1000.  She has had muscle biopsy revealing endomysial inflammation with normal EM. Her genetic testing, which has been extensive and includes whole exome and FSHD deletion screening, has been negative. 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 Myositis antibodies were tested and were negative, but HMGCR IgG antibodies were not tested. A few months ago, I saw a similar case of a middle aged  woman with almost identical pattern of weakness, including scapular winging, who was HMGCR IgG positive; therefore I feel this should be tested, because positive results can have important therapeutic implications.     At this point patient is having significant ADL issues. Patient reporting increased difficulty with daily mobility, over head tasks, functional strength, and gait at this time. She is frustrated with recent changes in her strength and range of motion with reports of multiple falls. When patient does fall she is unable to get off floor  independently, requires assist. She certainly needs more physical therapy services. Patient demonstrates impaired lower extremity strength, impaired lower extremity range of motion, impaired gait, and impaired posture contributing to functional limitations with mobility.    The dysfunction of her right hand is more difficult to explain. I don't think this can be explained purely by muscle weakness; my impression is that there is superimposed dystonia/movement disorder.      Overall her case is difficult and perplexing.      Plan:      1. Labs:  HMGCR IgG test  2. Nerve conduction studies/EMG: not indicated currently, it might be interesting to see in future though given there has been somewhat fast progression of her process  3. Physical therapy can continue thru multi-disciplinary clinic she sees  4. Symptomatic management of pain: continue with pain clinic and likely would benefit from further injections into facet  5. Consultation, in person, with movement disorder specialist, re: possible right upper extremity dystonia and etiology  6. Follow up in 2-3 months    Patient was seen and evaluated with Neuromuscular Attending, Dr. Kelsie Lloyd MD/PhD  [facility]  [phone]    ATTENDING ADDENDUM: Patient seen and examined with resident Dr Lloyd at the [facility]. Agree with his assessment and plan as above which I personally reviewed and edited. TT spent for patient care 45 minutes; more than half was counseling. Ramiro Iglesias MD

## 2020-06-08 NOTE — NURSING NOTE
Chief Complaint   Patient presents with     Consult     P NEW MUSCULAR DYSTROPHY       Brett Heath

## 2020-06-09 ENCOUNTER — HOSPITAL ENCOUNTER (OUTPATIENT)
Dept: LAB | Facility: CLINIC | Age: 50
Discharge: HOME | End: 2020-06-09
Attending: PSYCHIATRY & NEUROLOGY | Admitting: PSYCHIATRY & NEUROLOGY
Payer: COMMERCIAL

## 2020-06-09 DIAGNOSIS — G72.9 MYOPATHY: ICD-10-CM

## 2020-06-09 LAB — MISCELLANEOUS TEST: NORMAL

## 2020-06-09 PROCEDURE — 83516 IMMUNOASSAY NONANTIBODY: CPT | Performed by: PSYCHIATRY & NEUROLOGY

## 2020-06-09 PROCEDURE — 36415 COLL VENOUS BLD VENIPUNCTURE: CPT | Performed by: PSYCHIATRY & NEUROLOGY

## 2020-06-09 PROCEDURE — 84999 UNLISTED CHEMISTRY PROCEDURE: CPT | Performed by: PSYCHIATRY & NEUROLOGY

## 2020-06-12 LAB
RESULT: NORMAL
SEND OUTS MISC TEST CODE: NORMAL
SEND OUTS MISC TEST SPECIMEN: NORMAL
TEST NAME: NORMAL

## 2020-06-19 ASSESSMENT — ENCOUNTER SYMPTOMS
SEIZURES: 0
TREMORS: 1
PARALYSIS: 0
LOSS OF CONSCIOUSNESS: 0
ARTHRALGIAS: 0
HEADACHES: 1
MUSCLE CRAMPS: 0
DISTURBANCES IN COORDINATION: 1
BACK PAIN: 0
EYE PAIN: 1
MUSCLE WEAKNESS: 1
NECK PAIN: 1
SPEECH CHANGE: 0
JOINT SWELLING: 1
EYE REDNESS: 0
STIFFNESS: 0
MEMORY LOSS: 1
EYE WATERING: 0
DOUBLE VISION: 0
TINGLING: 1
MYALGIAS: 1
NUMBNESS: 1
EYE IRRITATION: 1
WEAKNESS: 1
DIZZINESS: 0

## 2020-06-19 NOTE — TELEPHONE ENCOUNTER
RECORDS RECEIVED FROM: Internal   Date of Appt: 6/25/20   NOTES (FOR ALL VISITS) STATUS DETAILS   OFFICE NOTE from referring provider Internal Dr Iglesias @ [facility]:  6/8/20   OFFICE NOTE from other specialist Received Dr Daniel Arroyo @ [facility]:  2/5/20 1/9/20 12/18/19 11/4/19     Rod Vivas GC @ [facility]:  11/4/19     Dr Vlad Leos @ [facility]:  3/15/19  2/20/19     [facility]  2/4/19 12/20/19 12/18/18   DISCHARGE SUMMARY from hospital N/A    DISCHARGE REPORT from the ER N/A    OPERATIVE REPORT Received [facility] [place]:  Muscle Bx R Arm 3/1/19   MEDICATION LIST Internal    IMAGING  (FOR ALL VISITS)     EMG Received [facility]:  12/9/19     [facility]:  1/31/19   EEG N/A    MRI (HEAD, NECK, SPINE) Received [facility]:  MRI Cervical Spine 1/5/19   LUMBAR PUNCTURE N/A    [facility] Scan N/A    CT (HEAD, NECK, SPINE) N/A

## 2020-06-23 ENCOUNTER — MYC MEDICAL ADVICE (OUTPATIENT)
Dept: NEUROLOGY | Facility: CLINIC | Age: 50
End: 2020-06-23

## 2020-06-23 DIAGNOSIS — G72.9 MYOPATHY: Primary | ICD-10-CM

## 2020-06-25 ENCOUNTER — PRE VISIT (OUTPATIENT)
Dept: NEUROLOGY | Facility: CLINIC | Age: 50
End: 2020-06-25

## 2020-06-25 ENCOUNTER — OFFICE VISIT (OUTPATIENT)
Dept: NEUROLOGY | Facility: CLINIC | Age: 50
End: 2020-06-25
Attending: PSYCHIATRY & NEUROLOGY
Payer: COMMERCIAL

## 2020-06-25 VITALS — DIASTOLIC BLOOD PRESSURE: 79 MMHG | HEART RATE: 80 BPM | SYSTOLIC BLOOD PRESSURE: 117 MMHG | OXYGEN SATURATION: 99 %

## 2020-06-25 DIAGNOSIS — G24.9 DYSTONIA: Primary | ICD-10-CM

## 2020-06-25 DIAGNOSIS — G24.9 DYSTONIA: ICD-10-CM

## 2020-06-25 LAB
BASOPHILS # BLD AUTO: 0 10E9/L (ref 0–0.2)
BASOPHILS NFR BLD AUTO: 0.8 %
CERULOPLASMIN SERPL-MCNC: 35 MG/DL (ref 20–60)
COPATH REPORT: NORMAL
DIFFERENTIAL METHOD BLD: ABNORMAL
EOSINOPHIL # BLD AUTO: 0.1 10E9/L (ref 0–0.7)
EOSINOPHIL NFR BLD AUTO: 1.6 %
ERYTHROCYTE [DISTWIDTH] IN BLOOD BY AUTOMATED COUNT: 13 % (ref 10–15)
HCT VFR BLD AUTO: 40.9 % (ref 35–47)
HGB BLD-MCNC: 13.2 G/DL (ref 11.7–15.7)
IMM GRANULOCYTES # BLD: 0 10E9/L (ref 0–0.4)
IMM GRANULOCYTES NFR BLD: 0 %
LACTATE BLD-SCNC: 0.5 MMOL/L (ref 0.7–2)
LYMPHOCYTES # BLD AUTO: 2.1 10E9/L (ref 0.8–5.3)
LYMPHOCYTES NFR BLD AUTO: 56.2 %
MCH RBC QN AUTO: 31.9 PG (ref 26.5–33)
MCHC RBC AUTO-ENTMCNC: 32.3 G/DL (ref 31.5–36.5)
MCV RBC AUTO: 99 FL (ref 78–100)
MONOCYTES # BLD AUTO: 0.4 10E9/L (ref 0–1.3)
MONOCYTES NFR BLD AUTO: 10.2 %
NEUTROPHILS # BLD AUTO: 1.2 10E9/L (ref 1.6–8.3)
NEUTROPHILS NFR BLD AUTO: 31.2 %
NRBC # BLD AUTO: 0 10*3/UL
NRBC BLD AUTO-RTO: 0 /100
PLATELET # BLD AUTO: 278 10E9/L (ref 150–450)
RBC # BLD AUTO: 4.14 10E12/L (ref 3.8–5.2)
RETICS # AUTO: 46 10E9/L (ref 25–95)
RETICS/RBC NFR AUTO: 1.1 % (ref 0.5–2)
WBC # BLD AUTO: 3.7 10E9/L (ref 4–11)

## 2020-06-25 ASSESSMENT — PAIN SCALES - GENERAL: PAINLEVEL: NO PAIN (0)

## 2020-06-25 NOTE — NURSING NOTE
Chief Complaint   Patient presents with     Consult     [facility] NEW - MOVEMENT DISORDER      Chrissy Juarez, EMT

## 2020-06-25 NOTE — PATIENT INSTRUCTIONS
1.  You have dystonia in your arms.  This is an abnormal, involuntary contraction reflecting abnormal brain signals.  I do not know the cause of this.  There are many causes and some are benign  2.  We will check bloodwork to see if we can identify a cause of your dystonia  3.  I think your dystonia must be related to your muscle problem, but I am not sure how.  4.  I will let you know about your blood tests.  Results may take 2 weeks

## 2020-06-25 NOTE — LETTER
2020       RE: Britt Park  [address] [facility] [address]     Dear Colleague,    Thank you for referring your patient, Britt Park, to the [facility] NEUROLOGY at [facility]. Please see a copy of my visit note below.    Department of Neurology  Movement Disorders Division   Initial Clinic Evaluation     Patient: Britt Park   MRN: [id number]   : 1970   Date of Visit: 2020     Referring Physician: Kelsie    Reason for Referral: Possible dystonia    Impression:  1.  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  Progressive weakness, cause uncertain.  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  Chronic left neck pain    I agree with Dr. Iglesias that the patient's posturing is consistent with dystonia.  I believe it is also present in the left arm taking it out of the range of focal arm dystonia or writer's cramp.  She also has some dystonia of her neck.  I suspect that she has a mild generalized dystonia.  One would have to think that the dystonia is related to the muscle weakness and presumed myopathy as they have occurred concurrently.  The only abnormality that I can think of that would commonly cause myopathy and dystonia would be a mitochondrial disorder.  She has no identified mitochondrial genetic defects either in mitochondrial or somatic DNA.  This would not rule out a mitochondrial cytopathy but would make it less likely.    Recommendations:  1.  Review MRI brain scan.  She will have this entered into our records.  2.  Blood work for ceruloplasmin, smear for acanthocytes, whole blood lactate level and paraneoplastic panel  3.  I will let the patient know of the blood work results in 2 weeks when they return through my chart  4.  I will continue to search the literature for causes of both myopathy and dystonia    I appreciate the opportunity to see this most pleasant lady.      Please call or write with questions or  concerns,    Sincerely yours,    Myron Kay MD , MD      History of Present Illness  Ms. Park is a 50 year old female who is right-handed.  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  She has 3 healthy children in their teens.    She was adopted from [place] at the age of 11 months.  She has no knowledge of her pregnancy or delivery.  She had normal developmental milestones.  She was active and played tennis and did dance.    She has a long history of chronic dry eyes.  SSA and SSB antibodies are negative.    In about 2015 she developed left trapezius spasm.  She did not notice any abnormal head position with this.  This was treated with gabapentin and now naltrexone with some relief.    In October 2018 she noticed that she had reduced range of motion.  She could not raise her arms up above her head.  She could not use a hair dryer.  She had difficulty using her arms and often had to push one arm with the other to make it do things.  She went to occupational therapy and during that session noticed that she could not push herself up from the ground.  Her arms and neck were weak.  This weakness has been progressive.  She saw several neurologists and 1 noticed scapular winging.  The patient states that she had never noticed this previously.  Serial ascertainment's of her muscle enzymes indicated elevations of the CK in the  range.  Aldolase was also elevated on 1 ascertainment.  She underwent a muscle biopsy of her left biceps I believe.  This showed mild endomysial inflammatory infrafiltration.  This was thought to be nonspecific and nondiagnostic.  A number of diagnostic possibilities were raised.  The patient has had extensive testing predominantly genetic testing.  She is negative for the FSH 1 or FSH 2 genetic abnormalities.  She had mitochondrial DNA testing which was negative.  She has had a 99 gene LG MD panel all negative.  She has had an SMA panel negative.  She has had a whole exam sequencing which  is negative.  MRI scans of the head and neck are reported essentially normal.  We do not have the MRI scan of the head here to review.  EMGs have shown some fibrillation in proximal muscles with short duration polyphasic MUPs.  Her examination shows progressive proximal muscle weakness without marked atrophy.    More recently she is noticed this spring in May 2019 that she could not walk up steps.  She is now having trouble getting out of a chair.  She cannot raise her head off the bed.  She is noticed some hand weakness and finding it hard to open jars or pill bottles.  Her hands are numb at nighttime.  An EMG did not show evidence of carpal tunnel syndrome.  The patient denies any ptosis or diplopia.  There is no speech or swallowing difficulties.  She did have migraine with pregnancy but none since.  She has had no seizures or confusional spells.  She does not have ataxia.  She does complain of brain fog but this seems better after taking gabapentin at bedtime rather than during the daytime.    On a recent exam Dr. Mayberry and Dr. Iglesias noted that the patient was posturing her right hand.  The patient states that this is been present since February 2020.  When she moves her right hand especially with writing or waving the right fifth digit extends involuntarily and may twitch.  When she holds a pen the right fifth digit extends.  When she types it does so as well.  She does not notice in the left hand.  She does not notice posturing of the legs.  She has no blepharospasm jaw dystonia or lingual dystonia.  She has not noticed marked posturing of her neck.    Answers for HPI/ROS submitted by the patient on 6/19/2020   General Symptoms: No  Skin Symptoms: No  HENT Symptoms: No  EYE SYMPTOMS: Yes  HEART SYMPTOMS: No  LUNG SYMPTOMS: No  INTESTINAL SYMPTOMS: No  URINARY SYMPTOMS: No  GYNECOLOGIC SYMPTOMS: No  BREAST SYMPTOMS: No  SKELETAL SYMPTOMS: Yes  BLOOD SYMPTOMS: No  NERVOUS SYSTEM SYMPTOMS: Yes  MENTAL HEALTH  SYMPTOMS: No  Eye pain: Yes  Vision loss: Yes  Dry eyes: Yes  Watery eyes: No  Eye bulging: No  Double vision: No  Flashing of lights: No  Spots: No  Floaters: Yes  Redness: No  Crossed eyes: No  Tunnel Vision: No  Yellowing of eyes: No  Eye irritation: Yes  Back pain: No  Muscle aches: Yes  Neck pain: Yes  Swollen joints: Yes  Joint pain: No  Bone pain: No  Muscle cramps: No  Muscle weakness: Yes  Joint stiffness: No  Bone fracture: No  Trouble with coordination: Yes  Dizziness or trouble with balance: No  Fainting or black-out spells: No  Memory loss: Yes  Headache: Yes  Seizures: No  Speech problems: No  Tingling: Yes  Tremor: Yes  Weakness: Yes  Difficulty walking: No  Paralysis: No  Numbness: Yes       Past Medical History:   No past medical history on file.    Past Surgical History:   No past surgical history on file.    Medications:  Current Outpatient Medications   Medication Sig Dispense Refill     doxycycline monohydrate (MONODOX) 50 MG capsule Take 50 mg by mouth       gabapentin (NEURONTIN) 300 MG capsule Take 300 mg by mouth       mirabegron (MYRBETRIQ) 25 MG 24 hr tablet Take 25 mg by mouth       mometasone (NASONEX) 50 MCG/ACT nasal spray 2 sprays       naltrexone (DEPADE/REVIA) 50 MG tablet Compounded low dose naltrexone. 4.5mg daily. OK for liquid or capsules.       naproxen (NAPROSYN) 500 MG tablet TAKE 1 TABLET BY MOUTH TWICE DAILY WITH MEALS AS NEEDED       valACYclovir (VALTREX) 1000 mg tablet Take 2,000 mg by mouth       XIIDRA 5 % opthalmic solution        sertraline (ZOLOFT) 50 MG tablet             Movement Disorder-related Medications                                                                                                                                                             Allergies: is allergic to influenza vaccines; diphenhydramine; and sulfa drugs.    Social History:   Social History     Socioeconomic History     Marital status:      Spouse name: None     Number of  children: None     Years of education: None     Highest education level: None   Occupational History     None   Social Needs     Financial resource strain: None     Food insecurity     Worry: None     Inability: None     Transportation needs     Medical: None     Non-medical: None   Tobacco Use     Smoking status: Never Smoker     Smokeless tobacco: Never Used   Substance and Sexual Activity     Alcohol use: Not Currently     Drug use: None     Sexual activity: None   Lifestyle     Physical activity     Days per week: None     Minutes per session: None     Stress: None   Relationships     Social connections     Talks on phone: None     Gets together: None     Attends Taoist service: None     Active member of club or organization: None     Attends meetings of clubs or organizations: None     Relationship status: None     Intimate partner violence     Fear of current or ex partner: None     Emotionally abused: None     Physically abused: None     Forced sexual activity: None   Other Topics Concern     None   Social History Narrative     None       Family History:  No family history on file.    ROS:  General:  Fever : no, Chills: no, Sweats: no, Fatigue: no, ,Weight loss: no  Cardiovascular  Chest Pains: no, Palpitations: YES, Edema: no, Shortness of breath: no  Respiratory  Cough: no  Gastrointestinal  Nausea: no, Vomiting: no, Diarrhea: no, Constipation: no  Genitourinary  Dysuria: no, Frequency: no, Urgency: no,  Nocturia: no, Incontinence: no Women:  Abnormal vaginal bleeding: no  Musculoskeletal  Back pain: no, Neck Pain: no  Joint pain, swelling, redness: no, Stiffness: no  Skin  Rash: no, Itching: no,  Suspicious lesions: no  Psychiatric  Depression: no, Anxiety/Panic: no  Endocrine  Cold intolerance: no, Heat intolerance: no, Excessive thirst: no  Hematologic/LymphatiAbnormal bruising, bleeding: no ,Enlarged lymph nodes: {[id number]  Allergic/Immunologic  Hives (Urticaria): no, HIV exposure:  no    Neurologic  Visual loss: no, Double vision: no, Headache: YES, Loss of consciousness:  no, Seizure: no, Fainting (syncope): no, Dysarthria: no, Dysphagia:  no, Vertigo:  no, Weakness: YES, Atrophy: no, Twitches: YES, Lhermitte's: no, Numbness or tingling: YES, Handwriting change: no, Tremors: no, Involuntary movements: YES, Imbalance: no, Abnormal gait:  no, Falls :no, Memory loss: no, RBD: no, Sleep disorder: no, Hallucination: no, Loss of concentration: no,  Behavioral change: no,  Loss of motivation: no      Comprehensive Neurologic Exam    Mental Status Exam   The patient is alert, oriented and exhibits no difficulty with cognition or memory.  A formal short test of mental status was performed.     Neurovascular         Speech and Language   Right Left     Carotid Bruit Absent Absent  Speech is normal.   Dorsalis Pedis Pulse Normal Normal  Description   Posterior Tibial Pulse Normal Normal  Language is normal.     Cranial Nerve Exam                 Right Left   Right Left   II                                        Normal Normal  Hearing (VIII) Normal Normal      III, IV, V!                   Normal Normal  Nystagmus (VIII) Normal Normal   EOM description                              Gag (IX, X) Normal Normal   Facial Sensation (V) Normal Normal  SCM (XI) Normal Normal   Muscles of Mastication (V) Normal Normal  Trapezius (XI) Normal Normal   Facial Strength (VII) Normal Normal  Tongue (XII) Normal Normal     Motor Exam  Strength (*/5 grading)  Muscle                  Right Left  Muscle Right Left   Frontalis                                           Normal Normal  Iliopsoas 4    4          Orbicularis Oculi                     Normal Normal  Quadrideps Normal    Normal        Orbicularis Oris                         Normal Normal  Anterior Tibial Normal Normal      Deltoid 3 3  Gastrocnemius Normal    Normal   Biceps 4 4  Extensor Hallucis Longus Normal    Normal   Triceps 4 4  Toe Extensors Normal     Normal   EDC Normal Normal  Toe Flexor Normal    Normal   Finger Flexors Normal Normal  Other Normal Normal   First Dorsal Interosseous 4 4  Other Normal Normal   Hypothenar 4 4  Other Normal Normal   Thenar Normal Normal  Other Normal Normal   Bilateral severe scapular winging      Reflexes      Tone   Right Left   Right Left   Biceps -2 -2  Spasticity (S)  Rigidity (R)     Triceps -2 -2  Neck     Brachioradialis -2 -2  Arm     Quadriceps -2 -2  Leg     Ankle -2 -2       Babkinski Flexor Flexor         AMR       Coordination   Right Left   Right Left   Fingers Normal Normal  Finger nose finger Normal Normal   Hand Normal Normal  Drift Normal Normal   Leg Normal Normal  Heel De La Cruz Normal Normal   Foot Normal Normal  Other     Other               Involuntary Movements    Gait and Station  None            Right Left  Stand & Sit Normal   (Movement type) Normal Normal  Gait Normal   (Movement type) Normal Normal  Tandem Normal   (Movement type) Normal Normal  Romberg Absent   Dystonic posturing of right fifth digit.  This extends with skilled movements of the right hand.  When the patient walks the left hand postures and internal rotation and finger flexion.  When she walks her neck is slightly turned to the left with some lateral Yadi.  No mild clonus seen.  No leg dystonia seen.    Sensory Exam          Right Left    Pin Normal Normal    Vibration Normal Normal    Joint position Normal Normal    Other             Coding statement:     Duration of  Services: face-to-face 90 min.   Greater than 50% of this visit was spent in counseling and coordination of care.    Medical decision making is high due to the following components:    Number of diagnoses:[id number]  Management:Diagnostic tests orders or reviewed.Yes  Medications were prescribed.No Medications were adjusted.No  Complexity of medical data:Outside records reviewed.Yes Radiology images reviewed by myself.Yes Review/order clinical lab tests. Yes  Review/order radiologyYes Discussed tests with performing physician. No Called outside records.Yes Discussed patient with another provider.No Took collateral history.NoRiskOne or more chronic illnesses with severe exacerbation, progression, or side effects of treatment.YesAcute or chronic illness or injury that poses a threat to life or bodily function.Yes  Abrupt change in neurologic status.No

## 2020-07-01 LAB — PNP ABY SERUM: NORMAL

## 2020-07-08 ENCOUNTER — HOSPITAL ENCOUNTER (OUTPATIENT)
Dept: MRI IMAGING | Facility: CLINIC | Age: 50
End: 2020-07-08
Attending: PSYCHIATRY & NEUROLOGY
Payer: COMMERCIAL

## 2020-07-08 DIAGNOSIS — G72.9 MYOPATHY: ICD-10-CM

## 2020-07-08 PROCEDURE — 25500064 ZZH RX 255 OP 636: Performed by: PSYCHIATRY & NEUROLOGY

## 2020-07-08 PROCEDURE — A9585 GADOBUTROL INJECTION: HCPCS | Performed by: PSYCHIATRY & NEUROLOGY

## 2020-07-08 PROCEDURE — 73220 MRI UPPR EXTREMITY W/O&W/DYE: CPT | Mod: RT

## 2020-07-08 PROCEDURE — 73720 MRI LWR EXTREMITY W/O&W/DYE: CPT | Mod: RT

## 2020-07-08 RX ORDER — GADOBUTROL 604.72 MG/ML
5 INJECTION INTRAVENOUS ONCE
Status: COMPLETED | OUTPATIENT
Start: 2020-07-08 | End: 2020-07-08

## 2020-07-08 RX ADMIN — GADOBUTROL 5 ML: 604.72 INJECTION INTRAVENOUS at 12:59

## 2020-07-28 ENCOUNTER — OFFICE VISIT (OUTPATIENT)
Dept: NEUROLOGY | Facility: CLINIC | Age: 50
End: 2020-07-28
Payer: COMMERCIAL

## 2020-07-28 DIAGNOSIS — R94.131 ABNORMAL SPONTANEOUS ACTIVITY ON EMG: ICD-10-CM

## 2020-07-28 DIAGNOSIS — G56.21 ULNAR NEUROPATHY AT ELBOW OF RIGHT UPPER EXTREMITY: ICD-10-CM

## 2020-07-28 DIAGNOSIS — G72.9 MYOPATHY: Primary | ICD-10-CM

## 2020-07-28 NOTE — PROGRESS NOTES
[facility]  Electrodiagnostic Laboratory    Nerve Conduction & EMG Report          Patient:       Britt Park  Patient ID:    [id number]  Gender:        Female  YOB: 1970  Age:           50 Years 3 Months      History & Examination: Ms. Park is a 50-year-old woman with a history of myopathy of unclear etiology.  NCS/EMG performed today to evaluate for progression of disease.    Techniques: Motor and sensory conduction studies were done with surface recording electrodes. EMG was done with a concentric needle electrode.     Results:    Sensory NCS    Right median, ulnar, sural, and superficial peroneal sensory nerve conduction studies were normal.    Motor NCS  Right median (APB), deep peroneal (EDB), and tibial (AH) motor nerve conduction studies were normal.   Right ulnar motor nerve conduction study (ADM) showed normal distal latency and CMAP amplitudes, normal conduction velocities at the forearm and upper arm segments, and slow conduction velocity across the elbow.     EMG    Abnormal spontaneous activity was detected at the right triceps (3+ fibs/PSWs), vastus lateralis, gluteus medius and EDC (2+), and the right FDI (1+).  Small, short duration, polyphasic MUPs were detected at the right vastus lateralis, triceps, and EDC, with early recruitment in all muscles but the vastus, where recruitment was normal. A mixture of large, with small amplitude, markedly polyphasic, and occasionally short duration, MUPs was noted at the right gluteus medius, and deltoid, with early recruitment in both muscles. The right FDI showed large, long duration, polyphasic MUPs with normal recruitment. EMG of right TA, medial gastrocnemius, and pronator teres was normal.     Interpretation:    This is an abnormal study. There is electrophysiologic evidence of the followin) An irritable myopathy, affecting proximal and distal upper, and proximal lower extremities.   2) A mild right ulnar  neuropathy at the elbow    EMG Physician:     Manan Sweeney MD  Neuromuscular Fellow    ATTENDING ADDENDUM: I was present during the entire study and directly supervised Fellow Dr Sweeney who performed it. I agree with the analysis of results and interpretation as above, which I personally reviewed and edited. Ramiro Iglesias MD       Sensory NCS      Nerve / Sites Rec. Site Onset Peak NP Amp Ref. PP Amp Dist Maverick Ref. Temp     ms ms  V  V  V cm m/s m/s  C   R MEDIAN - Dig II Anti      Wrist Dig II 2.40 3.59 27.9 10.0 42.8 14 58.4 48.0 32   R ULNAR - Dig V Anti      Wrist Dig V 2.55 3.54 17.9 8.0 35.3 12.5 49.0 48.0 32   R SURAL - Lat Mall      Calf Ankle 2.81 3.91 8.4 8.0 10.1 14 49.8 38.0 31   R SUP PERONEAL      Lat Leg Shin 2.92 3.80 7.8  10.0 12.5 42.9 38.0 31.1       Motor NCS      Nerve / Sites Rec. Site Lat Ref. Amp Ref. Rel Amp Dist Maverick Ref. Dur. Area Temp.     ms ms mV mV % cm m/s m/s ms %  C   R MEDIAN - APB      Wrist APB 3.59 4.40 8.5 5.0 100 8   5.52 100 32      Elbow APB 7.66  8.4  98.7 20 49.2 48.0 5.73 95.6 32   R ULNAR - ADM      Wrist ADM 2.92 3.50 8.4 5.0 100 8   6.61 100 32      B.Elbow ADM 6.04  7.6  90.5 17.5 56.0 48.0 7.66 105 32      A.Elbow ADM 8.96  7.1  84.5 11.5 39.4 48.0 7.55 87.9 32      Axilla ADM 10.31  7.0  83.1 8 59.1 48.0 7.40 89.4 32   R DEEP PERONEAL - EDB      Ankle EDB 4.27 6.00 3.1 2.5 100 8   5.73 100 31      FibHead EDB 10.47  3.0  95.6 25.5 41.1 38.0 6.93 99.4 31      Pop Fos EDB 12.60  2.8  90.7 10.5 49.2 38.0 7.03 98.9 31   R TIBIAL - AH      Ankle AH 4.95 6.00 10.3 4.0 100 8   4.84 100 32      Pop Fos AH 13.28  7.9  76.6 33 39.6 38.0 6.04 90.4 32       EMG Summary Table     Spontaneous MUAP Recruitment    IA Fib/PSW Fasc H.F. Amp Dur. PPP Pattern   R. TIB ANTERIOR N None None None N N N N   R. GASTROCN (MED) N None None None N N N N   R. VAST LATERALIS N 2+ None None 1- N 2+ N   R. GLUTEUS MED Decreased 2+ None None 1+ 1- 2+ Early   R. DELTOID N None None None 1+  N 2+ Early   R. TRICEPS N 3+ None None 1- 1- 2+ Early   R. EXT DIG COMM N 2+ None None 2- 2- 2+ Early   R. FIRST D INTEROSS N 1+ None None 2+ 2+ 2+ N   R. PRON TERES N None None None N N N N

## 2020-07-28 NOTE — LETTER
7/28/2020       RE: Britt Park  [address] [facility] [address]     Dear Colleague,    Thank you for referring your patient, Britt Park, to the [facility] EMG at [facility]. Please see a copy of my visit note below.        [facility]  Electrodiagnostic Laboratory    Nerve Conduction & EMG Report          Patient:       Britt Park  Patient ID:    [id number]  Gender:        Female  YOB: 1970  Age:           50 Years 3 Months      History & Examination: Ms. Park is a 50-year-old woman with a history of myopathy of unclear etiology.  NCS/EMG performed today to evaluate for progression of disease.    Techniques: Motor and sensory conduction studies were done with surface recording electrodes. EMG was done with a concentric needle electrode.     Results:    Sensory NCS    Right median, ulnar, sural, and superficial peroneal sensory nerve conduction studies were normal.    Motor NCS  Right median (APB), deep peroneal (EDB), and tibial (AH) motor nerve conduction studies were normal.   Right ulnar motor nerve conduction study (ADM) showed normal distal latency and CMAP amplitudes, normal conduction velocities at the forearm and upper arm segments, and slow conduction velocity across the elbow.     EMG    Abnormal spontaneous activity was detected at the right triceps (3+ fibs/PSWs), vastus lateralis, gluteus medius and EDC (2+), and the right FDI (1+).  Small, short duration, polyphasic MUPs were detected at the right vastus lateralis, triceps, and EDC, with early recruitment in all muscles but the vastus, where recruitment was normal. A mixture of large, with small amplitude, markedly polyphasic, and occasionally short duration, MUPs was noted at the right gluteus medius, and deltoid, with early recruitment in both muscles. The right FDI showed large, long duration, polyphasic MUPs with normal recruitment. EMG of right TA, medial gastrocnemius,  and pronator teres was normal.     Interpretation:    This is an abnormal study. There is electrophysiologic evidence of the followin) An irritable myopathy, affecting proximal and distal upper, and proximal lower extremities.   2) A mild right ulnar neuropathy at the elbow    EMG Physician:     Manan Sweeney MD  Neuromuscular Fellow    ATTENDING ADDENDUM: I was present during the entire study and directly supervised Fellow Dr Sweeney who performed it. I agree with the analysis of results and interpretation as above, which I personally reviewed and edited. Ramiro Iglesias MD       Sensory NCS      Nerve / Sites Rec. Site Onset Peak NP Amp Ref. PP Amp Dist Maverick Ref. Temp     ms ms  V  V  V cm m/s m/s  C   R MEDIAN - Dig II Anti      Wrist Dig II 2.40 3.59 27.9 10.0 42.8 14 58.4 48.0 32   R ULNAR - Dig V Anti      Wrist Dig V 2.55 3.54 17.9 8.0 35.3 12.5 49.0 48.0 32   R SURAL - Lat Mall      Calf Ankle 2.81 3.91 8.4 8.0 10.1 14 49.8 38.0 31   R SUP PERONEAL      Lat Leg Shin 2.92 3.80 7.8  10.0 12.5 42.9 38.0 31.1       Motor NCS      Nerve / Sites Rec. Site Lat Ref. Amp Ref. Rel Amp Dist Maverick Ref. Dur. Area Temp.     ms ms mV mV % cm m/s m/s ms %  C   R MEDIAN - APB      Wrist APB 3.59 4.40 8.5 5.0 100 8   5.52 100 32      Elbow APB 7.66  8.4  98.7 20 49.2 48.0 5.73 95.6 32   R ULNAR - ADM      Wrist ADM 2.92 3.50 8.4 5.0 100 8   6.61 100 32      B.Elbow ADM 6.04  7.6  90.5 17.5 56.0 48.0 7.66 105 32      A.Elbow ADM 8.96  7.1  84.5 11.5 39.4 48.0 7.55 87.9 32      Axilla ADM 10.31  7.0  83.1 8 59.1 48.0 7.40 89.4 32   R DEEP PERONEAL - EDB      Ankle EDB 4.27 6.00 3.1 2.5 100 8   5.73 100 31      FibHead EDB 10.47  3.0  95.6 25.5 41.1 38.0 6.93 99.4 31      Pop Fos EDB 12.60  2.8  90.7 10.5 49.2 38.0 7.03 98.9 31   R TIBIAL - AH      Ankle AH 4.95 6.00 10.3 4.0 100 8   4.84 100 32      Pop Fos AH 13.28  7.9  76.6 33 39.6 38.0 6.04 90.4 32       EMG Summary Table     Spontaneous MUAP Recruitment    IA  Fib/PSW Fasc H.F. Amp Dur. PPP Pattern   R. TIB ANTERIOR N None None None N N N N   R. GASTROCN (MED) N None None None N N N N   R. VAST LATERALIS N 2+ None None 1- N 2+ N   R. GLUTEUS MED Decreased 2+ None None 1+ 1- 2+ Early   R. DELTOID N None None None 1+ N 2+ Early   R. TRICEPS N 3+ None None 1- 1- 2+ Early   R. EXT DIG COMM N 2+ None None 2- 2- 2+ Early   R. FIRST D INTEROSS N 1+ None None 2+ 2+ 2+ N   R. PRON TERES N None None None N N N N                            Again, thank you for allowing me to participate in the care of your patient.      Sincerely,    Ramiro Iglesias MD

## 2020-08-06 NOTE — PROGRESS NOTES
Department of Neurology  Movement Disorders Division   Initial Clinic Evaluation     Patient: Britt Park   MRN: [id number]   : 1970   Date of Visit: 2020     Referring Physician: Kelsie    Reason for Referral: Possible dystonia    Impression:  1.  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  Progressive weakness, cause uncertain.  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  Chronic left neck pain    I agree with Dr. Iglesias that the patient's posturing is consistent with dystonia.  I believe it is also present in the left arm taking it out of the range of focal arm dystonia or writer's cramp.  She also has some dystonia of her neck.  I suspect that she has a mild generalized dystonia.  One would have to think that the dystonia is related to the muscle weakness and presumed myopathy as they have occurred concurrently.  The only abnormality that I can think of that would commonly cause myopathy and dystonia would be a mitochondrial disorder.  She has no identified mitochondrial genetic defects either in mitochondrial or somatic DNA.  This would not rule out a mitochondrial cytopathy but would make it less likely.    Recommendations:  1.  Review MRI brain scan.  She will have this entered into our records.  2.  Blood work for ceruloplasmin, smear for acanthocytes, whole blood lactate level and paraneoplastic panel  3.  I will let the patient know of the blood work results in 2 weeks when they return through my chart  4.  I will continue to search the literature for causes of both myopathy and dystonia    I appreciate the opportunity to see this most pleasant lady.      Please call or write with questions or concerns,    Sincerely yours,    Myron Kay MD , MD      History of Present Illness  Ms. Park is a 50 year old female who is right-handed.  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  She has 3 healthy children in their teens.    She was adopted from [place] at the age of 12  RF sent 7/29/2020. Will sent ZOZI message to patient. months.  She has no knowledge of her pregnancy or delivery.  She had normal developmental milestones.  She was active and played tennis and did dance.    She has a long history of chronic dry eyes.  SSA and SSB antibodies are negative.    In about 2015 she developed left trapezius spasm.  She did not notice any abnormal head position with this.  This was treated with gabapentin and now naltrexone with some relief.    In October 2018 she noticed that she had reduced range of motion.  She could not raise her arms up above her head.  She could not use a hair dryer.  She had difficulty using her arms and often had to push one arm with the other to make it do things.  She went to occupational therapy and during that session noticed that she could not push herself up from the ground.  Her arms and neck were weak.  This weakness has been progressive.  She saw several neurologists and 1 noticed scapular winging.  The patient states that she had never noticed this previously.  Serial ascertainment's of her muscle enzymes indicated elevations of the CK in the  range.  Aldolase was also elevated on 1 ascertainment.  She underwent a muscle biopsy of her left biceps I believe.  This showed mild endomysial inflammatory infrafiltration.  This was thought to be nonspecific and nondiagnostic.  A number of diagnostic possibilities were raised.  The patient has had extensive testing predominantly genetic testing.  She is negative for the FSH 1 or FSH 2 genetic abnormalities.  She had mitochondrial DNA testing which was negative.  She has had a 99 gene LG MD panel all negative.  She has had an SMA panel negative.  She has had a whole exam sequencing which is negative.  MRI scans of the head and neck are reported essentially normal.  We do not have the MRI scan of the head here to review.  EMGs have shown some fibrillation in proximal muscles with short duration polyphasic MUPs.  Her examination shows progressive proximal muscle  weakness without marked atrophy.    More recently she is noticed this spring in May 2019 that she could not walk up steps.  She is now having trouble getting out of a chair.  She cannot raise her head off the bed.  She is noticed some hand weakness and finding it hard to open jars or pill bottles.  Her hands are numb at nighttime.  An EMG did not show evidence of carpal tunnel syndrome.  The patient denies any ptosis or diplopia.  There is no speech or swallowing difficulties.  She did have migraine with pregnancy but none since.  She has had no seizures or confusional spells.  She does not have ataxia.  She does complain of brain fog but this seems better after taking gabapentin at bedtime rather than during the daytime.    On a recent exam Dr. Mayberry and Dr. Iglesias noted that the patient was posturing her right hand.  The patient states that this is been present since February 2020.  When she moves her right hand especially with writing or waving the right fifth digit extends involuntarily and may twitch.  When she holds a pen the right fifth digit extends.  When she types it does so as well.  She does not notice in the left hand.  She does not notice posturing of the legs.  She has no blepharospasm jaw dystonia or lingual dystonia.  She has not noticed marked posturing of her neck.    Answers for HPI/ROS submitted by the patient on 6/19/2020   General Symptoms: No  Skin Symptoms: No  HENT Symptoms: No  EYE SYMPTOMS: Yes  HEART SYMPTOMS: No  LUNG SYMPTOMS: No  INTESTINAL SYMPTOMS: No  URINARY SYMPTOMS: No  GYNECOLOGIC SYMPTOMS: No  BREAST SYMPTOMS: No  SKELETAL SYMPTOMS: Yes  BLOOD SYMPTOMS: No  NERVOUS SYSTEM SYMPTOMS: Yes  MENTAL HEALTH SYMPTOMS: No  Eye pain: Yes  Vision loss: Yes  Dry eyes: Yes  Watery eyes: No  Eye bulging: No  Double vision: No  Flashing of lights: No  Spots: No  Floaters: Yes  Redness: No  Crossed eyes: No  Tunnel Vision: No  Yellowing of eyes: No  Eye irritation: Yes  Back pain:  No  Muscle aches: Yes  Neck pain: Yes  Swollen joints: Yes  Joint pain: No  Bone pain: No  Muscle cramps: No  Muscle weakness: Yes  Joint stiffness: No  Bone fracture: No  Trouble with coordination: Yes  Dizziness or trouble with balance: No  Fainting or black-out spells: No  Memory loss: Yes  Headache: Yes  Seizures: No  Speech problems: No  Tingling: Yes  Tremor: Yes  Weakness: Yes  Difficulty walking: No  Paralysis: No  Numbness: Yes       Past Medical History:   No past medical history on file.    Past Surgical History:   No past surgical history on file.    Medications:  Current Outpatient Medications   Medication Sig Dispense Refill     doxycycline monohydrate (MONODOX) 50 MG capsule Take 50 mg by mouth       gabapentin (NEURONTIN) 300 MG capsule Take 300 mg by mouth       mirabegron (MYRBETRIQ) 25 MG 24 hr tablet Take 25 mg by mouth       mometasone (NASONEX) 50 MCG/ACT nasal spray 2 sprays       naltrexone (DEPADE/REVIA) 50 MG tablet Compounded low dose naltrexone. 4.5mg daily. OK for liquid or capsules.       naproxen (NAPROSYN) 500 MG tablet TAKE 1 TABLET BY MOUTH TWICE DAILY WITH MEALS AS NEEDED       valACYclovir (VALTREX) 1000 mg tablet Take 2,000 mg by mouth       XIIDRA 5 % opthalmic solution        sertraline (ZOLOFT) 50 MG tablet             Movement Disorder-related Medications                                                                                                                                                             Allergies: is allergic to influenza vaccines; diphenhydramine; and sulfa drugs.    Social History:   Social History     Socioeconomic History     Marital status:      Spouse name: None     Number of children: None     Years of education: None     Highest education level: None   Occupational History     None   Social Needs     Financial resource strain: None     Food insecurity     Worry: None     Inability: None     Transportation needs     Medical: None      Non-medical: None   Tobacco Use     Smoking status: Never Smoker     Smokeless tobacco: Never Used   Substance and Sexual Activity     Alcohol use: Not Currently     Drug use: None     Sexual activity: None   Lifestyle     Physical activity     Days per week: None     Minutes per session: None     Stress: None   Relationships     Social connections     Talks on phone: None     Gets together: None     Attends Sikh service: None     Active member of club or organization: None     Attends meetings of clubs or organizations: None     Relationship status: None     Intimate partner violence     Fear of current or ex partner: None     Emotionally abused: None     Physically abused: None     Forced sexual activity: None   Other Topics Concern     None   Social History Narrative     None       Family History:  No family history on file.    ROS:  General:  Fever : no, Chills: no, Sweats: no, Fatigue: no, ,Weight loss: no  Cardiovascular  Chest Pains: no, Palpitations: YES, Edema: no, Shortness of breath: no  Respiratory  Cough: no  Gastrointestinal  Nausea: no, Vomiting: no, Diarrhea: no, Constipation: no  Genitourinary  Dysuria: no, Frequency: no, Urgency: no,  Nocturia: no, Incontinence: no Women:  Abnormal vaginal bleeding: no  Musculoskeletal  Back pain: no, Neck Pain: no  Joint pain, swelling, redness: no, Stiffness: no  Skin  Rash: no, Itching: no,  Suspicious lesions: no  Psychiatric  Depression: no, Anxiety/Panic: no  Endocrine  Cold intolerance: no, Heat intolerance: no, Excessive thirst: no  Hematologic/LymphatiAbnormal bruising, bleeding: no ,Enlarged lymph nodes: {[id number]  Allergic/Immunologic  Hives (Urticaria): no, HIV exposure: no    Neurologic  Visual loss: no, Double vision: no, Headache: YES, Loss of consciousness:  no, Seizure: no, Fainting (syncope): no, Dysarthria: no, Dysphagia:  no, Vertigo:  no, Weakness: YES, Atrophy: no, Twitches: YES, Lhermitte's: no, Numbness or tingling: YES, Handwriting  change: no, Tremors: no, Involuntary movements: YES, Imbalance: no, Abnormal gait:  no, Falls :no, Memory loss: no, RBD: no, Sleep disorder: no, Hallucination: no, Loss of concentration: no,  Behavioral change: no,  Loss of motivation: no      Comprehensive Neurologic Exam    Mental Status Exam   The patient is alert, oriented and exhibits no difficulty with cognition or memory.  A formal short test of mental status was performed.     Neurovascular         Speech and Language   Right Left     Carotid Bruit Absent Absent  Speech is normal.   Dorsalis Pedis Pulse Normal Normal  Description   Posterior Tibial Pulse Normal Normal  Language is normal.     Cranial Nerve Exam                 Right Left   Right Left   II                                        Normal Normal  Hearing (VIII) Normal Normal      III, IV, V!                   Normal Normal  Nystagmus (VIII) Normal Normal   EOM description                              Gag (IX, X) Normal Normal   Facial Sensation (V) Normal Normal  SCM (XI) Normal Normal   Muscles of Mastication (V) Normal Normal  Trapezius (XI) Normal Normal   Facial Strength (VII) Normal Normal  Tongue (XII) Normal Normal     Motor Exam  Strength (*/5 grading)  Muscle                  Right Left  Muscle Right Left   Frontalis                                           Normal Normal  Iliopsoas 4    4          Orbicularis Oculi                     Normal Normal  Quadrideps Normal    Normal        Orbicularis Oris                         Normal Normal  Anterior Tibial Normal Normal      Deltoid 3 3  Gastrocnemius Normal    Normal   Biceps 4 4  Extensor Hallucis Longus Normal    Normal   Triceps 4 4  Toe Extensors Normal    Normal   EDC Normal Normal  Toe Flexor Normal    Normal   Finger Flexors Normal Normal  Other Normal Normal   First Dorsal Interosseous 4 4  Other Normal Normal   Hypothenar 4 4  Other Normal Normal   Thenar Normal Normal  Other Normal Normal   Bilateral severe scapular  winging      Reflexes      Tone   Right Left   Right Left   Biceps -2 -2  Spasticity (S)  Rigidity (R)     Triceps -2 -2  Neck     Brachioradialis -2 -2  Arm     Quadriceps -2 -2  Leg     Ankle -2 -2       Babkinski Flexor Flexor         AMR       Coordination   Right Left   Right Left   Fingers Normal Normal  Finger nose finger Normal Normal   Hand Normal Normal  Drift Normal Normal   Leg Normal Normal  Heel De La Cruz Normal Normal   Foot Normal Normal  Other     Other               Involuntary Movements    Gait and Station  None            Right Left  Stand & Sit Normal   (Movement type) Normal Normal  Gait Normal   (Movement type) Normal Normal  Tandem Normal   (Movement type) Normal Normal  Romberg Absent   Dystonic posturing of right fifth digit.  This extends with skilled movements of the right hand.  When the patient walks the left hand postures and internal rotation and finger flexion.  When she walks her neck is slightly turned to the left with some lateral Yadi.  No mild clonus seen.  No leg dystonia seen.    Sensory Exam          Right Left    Pin Normal Normal    Vibration Normal Normal    Joint position Normal Normal    Other             Coding statement:     Duration of  Services: face-to-face 90 min.   Greater than 50% of this visit was spent in counseling and coordination of care.    Medical decision making is high due to the following components:    Number of diagnoses:[id number]  Management:Diagnostic tests orders or reviewed.Yes  Medications were prescribed.No Medications were adjusted.No  Complexity of medical data:Outside records reviewed.Yes Radiology images reviewed by myself.Yes Review/order clinical lab tests. Yes Review/order radiologyYes Discussed tests with performing physician. No Called outside records.Yes Discussed patient with another provider.No Took collateral history.NoRiskOne or more chronic illnesses with severe exacerbation, progression, or side effects of treatment.YesAcute  or chronic illness or injury that poses a threat to life or bodily function.Yes  Abrupt change in neurologic status.No

## 2020-08-14 NOTE — PROGRESS NOTES
"Hand Therapy Initial Evaluation  Current Date:  8/17/2020  \"[place]\"    Diagnosis: Right Hand Weakness  DOI: MD order 8/7/2020   Post:  2 years since initial onset    Next MD follow up: As needed, patient may be part of a research study at [place], vs muscle biopsy of right leg mm.    Precautions: Myopathy of unclear etiology    Subjective:  Britt Park is a 50 year old female.    Patient reports symptoms of the bilateral hands (R>L) which occurred due to unknown. In October 2018 patient noticed it was hard to lift her arms to wash hair, etc... and started OT to help with daily tasks. They referred her to neurology based on her symptomology where they completed genetic testing, MRI, and EMG. Patient reports in 05/2019 she noticed that it was getting hard to walk up stairs and now she struggles with standing from chairs, getting out of bed. She is referred to hand therapy today due to dystonia of the hands, primarily the right. Patient has been working with multiple doctors to find out cause of weakness, numbness and tingling but does not have clear diagnosis yet. Since onset symptoms are Gradually getting worse.  General health as reported by patient is fair.  Pertinent medical history includes:Heart Problems, Incontinence, Numbness/Tingling, Sleep Disorder/Apnea, Pain at Night/Rest, Significant Weakness  Medical allergies:sulfa, benedryl, flu shot.  Surgical history: none.  Medication history: Naproxen as needed.    Current occupation is  at [employer]  Job Tasks: Computer Work, Prolonged Sitting    Occupational Profile Information:  Right hand dominant  Prior functional level:  independent-have help in some areas  Patient reports symptoms of pain, weakness/loss of strength, numbness and tingling   Special tests:  EMG (1) An irritable myopathy, affecting proximal and distal upper, and proximal lower extremities.   2) A mild right ulnar neuropathy at the elbow), MRI and genetic testing.    Previous " treatment: OT/PT  Barriers include:none  Mobility: Fall risk (concerned about falling, recent history of falling)  Transportation: drives  Currently working in normal job without restrictions  Other: Lives with  and 3 teenagers who are available to help     Functional Outcome Measure:   Upper Extremity Functional Index Score:  SCORE:   Column Totals: /80: 34   (A lower score indicates greater disability.)    Objective:    Pain Level (Scale 0-10):   8/17/2020   At Rest 0   With Use 5     Pain Description:  Date 8/17/2020   Location B Forearms/hands   patient reports pain in left upper trap    Pain Quality Intense numbness/tingling, Aching in upper trap   Frequency Intermittent with forearms, constant in upper trap   Pain is worst At night when trying to sleep, Same all the time in upper trap   Exacerbated by Sleeping position for forearms, Same all the time in upper trap    Relieved by N/A   Progression Gradually worsening with forearm/hand discomfort     ROM: Shoulder:   Patient able to abduct shoulders bilaterally to 60 degrees    ROM  Pain Report: - none  + mild    ++ moderate    +++ severe   Elbow 8/17/20 8/17/20   AROM (PROM) Left Right   Extension -15 -15   Flexion 140 140   Supination 85 85   Pronation 85 85     ROM  Pain Report: - none  + mild    ++ moderate    +++ severe   Wrist 8/17/20 8/17/20   AROM (PROM) Left Right   Extension 80 65   Flexion 75 79   RD 20 20   UD 45 45     ROM  Hand 8/17/2020 8/17/2020   AROM(PROM) Left Right*   Index MP /92 -75/75   PIP /104 -15/95   DIP /77 -5/60   MONTEIRO     Long MP /97 -67/85   PIP /105 -30/100   DIP /70 -5/50   MONTEIRO     Ring MP /90 -40/85   PIP /103 -30/105   DIP /50 -5/40   MONTEIRO     Small MP /85 -5/30   PIP /100 -20/96   DIP /50 0/60   MONTEIRO     *Fulll passive finger extension IF-SF, apparent dystonia of R hand - SF positions in extreme hyper extension with most hand movements    Strength   (Measured in pounds)  Pain Report: - none  + mild    ++ moderate    +++  "severe    8/17/2020 8/17/2020   Trials Left Right*   1  2  3 23- 18-   Average 23 18   * Wrist moved into position of flexion with exerted     Lat Pinch 8/17/2020 8/17/2020   Trials Left Right   1  2  3 11- 9-   Average 11 9     3 Pt Pinch 8/17/2020 8/17/2020   Trials Left Right   1  2  3 11- 8-   Average 11 8     Edema:  None    Scar/Wound:  None    Sensation: Intact light touch sensation thumb through SF bilaterally, ulnar/radial hand and volar/dorsal forearm    Appearance: Apparent dystonia vs MM imbalance noted with movement of the right hand. She states that it feels like the \"hand is deformed and has a mind of its own.\" With most movements SF positions in extreme hyperextension and abduction which gets in the way of functional tasks.    Special Tests  Pain Report: - none  + mild    ++ moderate    +++ severe    8/17/20   Tinels at cubital tunnel R: -  L: -   Tinel's at [place] R: -  L: -   Elbow Flexion Test R: -  L: -   Ulnar nerve subluxation at elbow R:   L:    Froment's Test R: -  L: -     Functional Position:  Patient laid supine on plynth with pillow from home for 10 minutes when onset of bilateral numbness/tingling in hands up through forearms to elbow occurred. Patient reports this happens nightly and impacts ability to sleep.    Special Tests:  Cyriax Release: Neg but difficult to maintain proper position due to shoulder tightness/discomfort    Assessment:  Patient presents with symptoms consistent with diagnosis as listed above with non-surgical intervention.     Patient's limitations or Problem List includes:  Pain, Decreased ROM/motion, Weakness, Sensory disturbance, Decreased , Decreased pinch, Decreased coordination and Decreased dexterity of the bilateral elbow, wrist and hand (R>L) which interferes with the patient's ability to perform Self Care Tasks (dressing, eating, bathing, hygiene/toileting), Work Tasks, Sleep Patterns, Recreational Activities and Household Chores as " compared to previous level of function.    Rehab Potential:  Good - Return to full activity, some limitations    Patient will benefit from skilled Occupational Therapy to increase  strength, pinch strength, coordination, dexterity and sensation and decrease pain to return to previous activity level and resume normal daily tasks and to reach their rehab potential.    Barriers to Learning:  No barrier    Communication Issues:  Patient appears to be able to clearly communicate and understand verbal and written communication and follow directions correctly.    Chart Review: Chart Review and Detailed history review with patient    Identified Performance Deficits: bathing/showering, toileting, dressing, hygiene and grooming, home establishment and management, meal preparation and cleanup, sleep and work    Assessment of Occupational Performance:  5 or more Performance Deficits    Clinical Decision Making (Complexity): Moderate complexity    Treatment Explanation:  The following has been discussed with the patient:  RX ordered/plan of care  Anticipated outcomes  Possible risks and side effects    Plan:  Frequency:  1 X week, once daily  Duration:  for 8 weeks    Treatment Plan:  Therapeutic Exercise:  AROM, AAROM, PROM, Tendon Gliding, Isotonics and Isometrics  Neuromuscular re-education:  Coordination/Dexterity, Sensory re-education and Kinesiotaping  Orthotic Fabrication:  Finger based orthosis, Hand based orthosis and Forearm based orthosis  Self Care:  Self Care Tasks    Home Program:  HB relative motion orthosis to position SF in line with all other fingers/to prevent hyper extension/abduction with functional tasks    Next visit:   Check splint  See if PT responded regarding sleeping position/numbness in arms  Focal Dystonia tasks - modified  on pen, other suggestions per Latonya    Discharge Plan:  Achieve all LTG.  Independent in home treatment program.  Reach maximal therapeutic benefit.    Please see daily  flow sheet for treatment and 1:1 time provided today.

## 2020-08-17 ENCOUNTER — THERAPY VISIT (OUTPATIENT)
Dept: OCCUPATIONAL THERAPY | Facility: CLINIC | Age: 50
End: 2020-08-17
Payer: COMMERCIAL

## 2020-08-17 DIAGNOSIS — G72.9 MYOPATHY: ICD-10-CM

## 2020-08-17 DIAGNOSIS — R29.898 RIGHT HAND WEAKNESS: ICD-10-CM

## 2020-08-17 PROCEDURE — 97760 ORTHOTIC MGMT&TRAING 1ST ENC: CPT | Mod: GO | Performed by: OCCUPATIONAL THERAPIST

## 2020-08-17 PROCEDURE — 97166 OT EVAL MOD COMPLEX 45 MIN: CPT | Mod: GO | Performed by: OCCUPATIONAL THERAPIST

## 2020-08-20 NOTE — PROGRESS NOTES
Please refer to the daily flowsheet for treatment today, total treatment time and time spent performing 1:1 timed codes.       Home Program:  HB relative motion orthosis to position SF in line with all other fingers/to prevent hyper extension/abduction with functional tasks  Cem strap for functional use  JAIME resting hand splint for night  STM to FA extensors  FA extensor stretch  Trial of towel roll under neck while sleeping    Next visit:   Check splint and response to towel roll under neck  Focal Dystonia tasks?  Set up with PT for cervical spine

## 2020-08-24 ENCOUNTER — THERAPY VISIT (OUTPATIENT)
Dept: OCCUPATIONAL THERAPY | Facility: CLINIC | Age: 50
End: 2020-08-24
Payer: COMMERCIAL

## 2020-08-24 DIAGNOSIS — R29.898 RIGHT HAND WEAKNESS: Primary | ICD-10-CM

## 2020-08-24 DIAGNOSIS — G72.9 MYOPATHY: ICD-10-CM

## 2020-08-24 PROCEDURE — 97763 ORTHC/PROSTC MGMT SBSQ ENC: CPT | Mod: GO | Performed by: OCCUPATIONAL THERAPIST

## 2020-08-24 PROCEDURE — 97110 THERAPEUTIC EXERCISES: CPT | Mod: GO | Performed by: OCCUPATIONAL THERAPIST

## 2020-08-28 NOTE — PROGRESS NOTES
Please refer to the daily flowsheet for treatment today, total treatment time and time spent performing 1:1 timed codes.       Home Program:  HB relative motion orthosis to position SF in line with all other fingers/to prevent hyper extension/abduction with functional tasks  Cem strap for functional use  JAIME resting hand splint for night  STM to FA extensors  FA extensor stretch  Trial of towel roll under neck while sleeping   strengthening with foam wedge  Table top intrinsic strength    Next visit:   Check response to strengthening - progress intrinsic strengthening  See if patient was able to schedule to see PT

## 2020-08-31 ENCOUNTER — THERAPY VISIT (OUTPATIENT)
Dept: OCCUPATIONAL THERAPY | Facility: CLINIC | Age: 50
End: 2020-08-31
Payer: COMMERCIAL

## 2020-08-31 DIAGNOSIS — R29.898 RIGHT HAND WEAKNESS: ICD-10-CM

## 2020-08-31 DIAGNOSIS — G72.9 MYOPATHY: ICD-10-CM

## 2020-08-31 PROCEDURE — 97140 MANUAL THERAPY 1/> REGIONS: CPT | Mod: GO | Performed by: OCCUPATIONAL THERAPIST

## 2020-08-31 PROCEDURE — 97110 THERAPEUTIC EXERCISES: CPT | Mod: GO | Performed by: OCCUPATIONAL THERAPIST

## 2020-09-04 ENCOUNTER — THERAPY VISIT (OUTPATIENT)
Dept: PHYSICAL THERAPY | Facility: CLINIC | Age: 50
End: 2020-09-04
Payer: COMMERCIAL

## 2020-09-04 DIAGNOSIS — M54.2 CERVICAL PAIN: ICD-10-CM

## 2020-09-04 PROCEDURE — 97162 PT EVAL MOD COMPLEX 30 MIN: CPT | Mod: GP | Performed by: PHYSICAL THERAPIST

## 2020-09-04 PROCEDURE — 97140 MANUAL THERAPY 1/> REGIONS: CPT | Mod: GP | Performed by: PHYSICAL THERAPIST

## 2020-09-04 PROCEDURE — 97112 NEUROMUSCULAR REEDUCATION: CPT | Mod: GP | Performed by: PHYSICAL THERAPIST

## 2020-09-05 PROBLEM — M54.2 CERVICAL PAIN: Status: ACTIVE | Noted: 2020-09-05

## 2020-09-05 NOTE — PROGRESS NOTES
[facility] Initial Evaluation    Physical Therapy Initial Examination/Evaluation  September 4, 2020    Britt Park  is a 50 year old  female referred to physical therapy as a self referral for treatment of cervical pain.      DOI/onset 5-4-20  Mechanism of injury Patient has had an onset of L sided neck pain as well as an onset of B UE numbness and tingling with sleeping on her back starting in May.  DOS n/a  Prior treatment is currently in hand therapy. Effect of prior treatment helpful    Chief Complaint:   Numbness and tingling in her UE's.   Pain location: L cervical area/UT along with frequent tingling in her L jaw and the B UE numbness and tingling into her hands.  Quality: aching pain along with numbness and tingling  Constant/Intermittent: intermittent  Time of day: the UE numbness and tingling is only at night with sleeping supine  Symptoms have worsened since onset.    Current pain 3/10.  Pain at best 1/10.  Pain at worst 4/10.    Symptoms aggravated by sleeping supine, prolonged computer work.    Symptoms improved with movement, walking.     Social history:  .    Occupation: desk job.  Job duties:  Computer work.    Patient having difficulty with ADLs: hair grooming, washing dishes and other household tasks.    Patient's goals are to reduce the neck pain and B UE sx's.    Patient reports general health as fair.  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    Surgical History:  none.    Imaging: MRI of neck in June showed small disc herniations from C2-3 to C6-7.    Medications:  None for pain.           Clinical Impression: Patient presents with decreased cervical ROM and increased MF tightness in L cervical/scapular musculature. She seemed to respond well to some manual cervical traction today. We will progress with cervical traction, manual therapy, and  exercise in PT. If there is little or no progress after 4-6 weeks we will have check in with her MD.    Subjective:  HPI                    Objective:  Standing Alignment:    Cervical/Thoracic:  Forward head and cervical lordosis decreased                    Flexibility/Screens:   Positive screens:  Cervical      Spine:  Decreased left spine flexibility:  Upper Trap and Levator                    Cervical/Thoracic Evaluation    AROM:  AROM Cervical:    Flexion:            Normal  Extension:       90%  Rotation:         Left: normal     Right: 60%  Side Bend:      Left: normal     Right:  75%    Strength: Marked B shoulder weakness due to myopathy  Headaches: cervical  Cervical Myotomes:  Cervical myotomes: difficult to assess due to myopathy.                  DTR's:    C5 (Biceps):  Left:  1  Right:  1     C6 (Brachioradialis):  Left:  1  Right:  1     C7 (Triceps):  Left:  1  Right:  1    Cervical Dermatomes:  normal                    Cervical Palpation:  : MF tightness/tenderness in L cervical/scapular musculature.        Cervical Stability/Joint Clearing:      Left negative at: VAT    Right negative at:  VAT  Negative:ALAR Ligament and TLA AP  Spinal Segmental Conclusions:  No pain with P/A glide cervical spine  Level:  Hypo at C5, C6, C7 and T1                                                General     ROS    Assessment/Plan:    Patient is a 50 year old female with cervical complaints.    Patient has the following significant findings with corresponding treatment plan.                Diagnosis 1:  Cervical pain  Pain -  mechanical traction, manual therapy, self management and education  Decreased ROM/flexibility - manual therapy and therapeutic exercise  Decreased strength - therapeutic exercise and therapeutic activities  Impaired muscle performance - neuro re-education  Decreased function - therapeutic activities  Impaired posture - neuro re-education    Therapy Evaluation Codes:   1) History comprised  of:   Personal factors that impact the plan of care:      None.    Comorbidity factors that impact the plan of care are:      Weakness and myopathy.     Medications impacting care: None.  2) Examination of Body Systems comprised of:   Body structures and functions that impact the plan of care:      Cervical spine.   Activity limitations that impact the plan of care are:      Cooking and Reading/Computer work.  3) Clinical presentation characteristics are:   Evolving/Changing.  4) Decision-Making    Moderate complexity using standardized patient assessment instrument and/or measureable assessment of functional outcome.  Cumulative Therapy Evaluation is: Moderate complexity.    Previous and current functional limitations:  (See Goal Flow Sheet for this information)    Short term and Long term goals: (See Goal Flow Sheet for this information)     Communication ability:  Patient appears to be able to clearly communicate and understand verbal and written communication and follow directions correctly.  Treatment Explanation - The following has been discussed with the patient:   RX ordered/plan of care  Anticipated outcomes  Possible risks and side effects  This patient would benefit from PT intervention to resume normal activities.   Rehab potential is good.    Frequency:  1 X week, once daily  Duration:  for 6 weeks  Discharge Plan:  Achieve all LTG.  Independent in home treatment program.  Reach maximal therapeutic benefit.    Please refer to the daily flowsheet for treatment today, total treatment time and time spent performing 1:1 timed codes.

## 2020-09-10 NOTE — PROGRESS NOTES
Please refer to the daily flowsheet for treatment today, total treatment time and time spent performing 1:1 timed codes.       Home Program:  HB relative motion orthosis to position SF in line with all other fingers/to prevent hyper extension/abduction with functional tasks  Cem strap for functional use  JAIME resting hand splint for night  STM to FA extensors  FA extensor stretch  Trial of towel roll under neck while sleeping   strengthening with foam wedge  Table top intrinsic strength - hold for now   Finger ADD     Next visit:   See if MD responded to inbasket  Check response to strengthening - progress intrinsic strengthening

## 2020-09-11 ENCOUNTER — THERAPY VISIT (OUTPATIENT)
Dept: PHYSICAL THERAPY | Facility: CLINIC | Age: 50
End: 2020-09-11
Payer: COMMERCIAL

## 2020-09-11 DIAGNOSIS — M54.2 CERVICAL PAIN: ICD-10-CM

## 2020-09-11 PROCEDURE — 97110 THERAPEUTIC EXERCISES: CPT | Mod: GP | Performed by: PHYSICAL THERAPIST

## 2020-09-11 PROCEDURE — 97140 MANUAL THERAPY 1/> REGIONS: CPT | Mod: GP | Performed by: PHYSICAL THERAPIST

## 2020-09-11 PROCEDURE — 97112 NEUROMUSCULAR REEDUCATION: CPT | Mod: GP | Performed by: PHYSICAL THERAPIST

## 2020-09-14 ENCOUNTER — THERAPY VISIT (OUTPATIENT)
Dept: OCCUPATIONAL THERAPY | Facility: CLINIC | Age: 50
End: 2020-09-14
Payer: COMMERCIAL

## 2020-09-14 DIAGNOSIS — G72.9 MYOPATHY: ICD-10-CM

## 2020-09-14 DIAGNOSIS — R29.898 RIGHT HAND WEAKNESS: ICD-10-CM

## 2020-09-14 PROCEDURE — 97110 THERAPEUTIC EXERCISES: CPT | Mod: GO | Performed by: OCCUPATIONAL THERAPIST

## 2020-09-14 PROCEDURE — 97140 MANUAL THERAPY 1/> REGIONS: CPT | Mod: GO | Performed by: OCCUPATIONAL THERAPIST

## 2020-09-18 ENCOUNTER — THERAPY VISIT (OUTPATIENT)
Dept: PHYSICAL THERAPY | Facility: CLINIC | Age: 50
End: 2020-09-18
Payer: COMMERCIAL

## 2020-09-18 DIAGNOSIS — M54.2 CERVICAL PAIN: ICD-10-CM

## 2020-09-18 PROCEDURE — 97140 MANUAL THERAPY 1/> REGIONS: CPT | Mod: GP | Performed by: PHYSICAL THERAPIST

## 2020-09-18 PROCEDURE — 97530 THERAPEUTIC ACTIVITIES: CPT | Mod: GP | Performed by: PHYSICAL THERAPIST

## 2020-09-18 PROCEDURE — 97110 THERAPEUTIC EXERCISES: CPT | Mod: GP | Performed by: PHYSICAL THERAPIST

## 2020-09-18 NOTE — PROGRESS NOTES
Subjective:  HPI  Physical Exam                    Objective:  System    Physical Exam    General     ROS    Assessment/Plan:    SUBJECTIVE  Subjective changes as noted by pt:   Pt. has had only 1 night of B UE numbness/tingling into hands with sleeping in the past 10 days. She still experiences intermittent L jaw/head tingling some days but not all. She has had 3-4 days with this the past week. It usually comes on in the afternoon while working on her computer. It goes away upon getting up and moving around.    Current pain level:  3/10   Changes in function:  Yes (See Goal flowsheet attached for changes in current functional level)     Adverse reaction to treatment or activity:  None    OBJECTIVE  Changes in objective findings:  ROM cerv ext 80%; R SB 80%; R Rot 90%.      ASSESSMENT  Sun continues to require intervention to meet STG and LTG's: PT  Patient is progressing as expected.  Response to therapy has shown an improvement in  pain level, radicular symptoms and ROM   Progress made towards STG/LTG?  Yes (See Goal flowsheet attached for updates on achievement of STG and LTG)    PLAN  Current treatment program is being advanced to more complex exercises.    PTA/ATC plan:  N/A    Please refer to the daily flowsheet for treatment today, total treatment time and time spent performing 1:1 timed codes.

## 2020-09-22 NOTE — PROGRESS NOTES
Hand Therapy Discharge Note  Reporting Period:  8/17/2020 through 9/28/2020    Diagnosis: Right Hand Weakness  DOI: MD order 8/7/2020   Post:  2 years since initial onset    Next MD follow up: As needed, patient may be part of a research study at [place], vs muscle biopsy of right leg mm.    Precautions: Myopathy of unclear etiology    Initial History:  Patient reports symptoms of the bilateral hands (R>L) which occurred due to unknown. In October 2018 patient noticed it was hard to lift her arms to wash hair, etc... and started OT to help with daily tasks. They referred her to neurology based on her symptomology where they completed genetic testing, MRI, and EMG. Patient reports in 05/2019 she noticed that it was getting hard to walk up stairs and now she struggles with standing from chairs, getting out of bed. She is referred to hand therapy today due to dystonia of the hands, primarily the right. Patient has been working with multiple doctors to find out cause of weakness, numbness and tingling but does not have clear diagnosis yet. Since onset symptoms are Gradually getting worse.  General health as reported by patient is fair.  Pertinent medical history includes:Heart Problems, Incontinence, Numbness/Tingling, Sleep Disorder/Apnea, Pain at Night/Rest, Significant Weakness  Medical allergies:sulfa, benedryl, flu shot.  Surgical history: none.  Medication history: Naproxen as needed.    Current occupation is  at [employer]  Job Tasks: Computer Work, Prolonged Sitting    Occupational Profile Information:  Right hand dominant  Prior functional level:  independent-have help in some areas  Patient reports symptoms of pain, weakness/loss of strength, numbness and tingling   Special tests:  EMG (1) An irritable myopathy, affecting proximal and distal upper, and proximal lower extremities.   2) A mild right ulnar neuropathy at the elbow), MRI and genetic testing.    Previous treatment: OT/PT  Barriers  include:none  Mobility: Fall risk (concerned about falling, recent history of falling)  Transportation: drives  Currently working in normal job without restrictions  Other: Lives with  and 3 teenagers who are available to help     Subjective:  Subjective changes as noted by patient: Things are feeling pretty much the same, although things are getting more difficult for me to do.   Functional changes noted by patient: increased difficulty drinking from a cup, opening gas cap  Response to previous treatment: good  Patient has noted adverse reaction to: None    Upper Extremity Functional Index Score:  SCORE:   Column Totals: /80: 41   (A lower score indicates greater disability.)    Objective:    Pain Level (Scale 0-10):   8/17/2020 9/28/20   At Rest 0 0   With Use 5 3     Pain Description:  Date 8/17/2020 9/28/20   Location B Forearms/hands   patient reports pain in left upper trap  R dorsal hand   Pain Quality Intense numbness/tingling, Aching in upper trap aching   Frequency Intermittent with forearms, constant in upper trap intermittent   Pain is worst At night when trying to sleep, Same all the time in upper trap With overuse   Exacerbated by Sleeping position for forearms, Same all the time in upper trap  Overuse of hand   Relieved by N/A rest   Progression Gradually worsening with forearm/hand discomfort fluctuating     ROM: Shoulder:   Patient able to abduct shoulders bilaterally to 60 degrees    ROM  Pain Report: - none  + mild    ++ moderate    +++ severe   Elbow 8/17/20 8/17/20   AROM (PROM) Left Right   Extension -15 -15   Flexion 140 140   Supination 85 85   Pronation 85 85     ROM  Pain Report: - none  + mild    ++ moderate    +++ severe   Wrist 8/17/20 8/17/20   AROM (PROM) Left Right   Extension 80 65   Flexion 75 79   RD 20 20   UD 45 45     ROM  Hand 8/17/2020 8/17/2020 9/28/20   AROM(PROM) Left Right* Right   Index MP /92 -75/75 -75/   PIP /104 -15/95 -15/   DIP /77 -5/60 -10/   MONTEIRO      Long  "MP /97 -67/85 -70   PIP /105 -30/100 -20/   DIP /70 -5/50 -10/   MONTEIRO      Ring MP /90 -40/85 -50/   PIP /103 -30/105 -35/   DIP /50 -5/40 -5/   MONTEIRO      Small MP /85 -5/30 -20/   PIP /100 -20/96 0/   DIP /50 0/60 0/   MONTEIRO      *Fulll passive finger extension IF-SF, apparent dystonia of R hand - SF positions in extreme hyper extension with most hand movements    Strength   (Measured in pounds)  Pain Report: - none  + mild    ++ moderate    +++ severe    8/17/2020 8/17/2020 9/28/20   Trials Left Right* Right   1  2  3 23- 18- 18-   Average 23 18 18   * Wrist moved into position of flexion with exerted     Lat Pinch 8/17/2020 8/17/2020 9/28/20   Trials Left Right Right   1  2  3 11- 9- 9-   Average 11 9 9     3 Pt Pinch 8/17/2020 8/17/2020 9/28/20   Trials Left Right Right   1  2  3 11- 8- 8-   Average 11 8 8     Edema:  None    Scar/Wound:  None    Sensation: Intact light touch sensation thumb through SF bilaterally, ulnar/radial hand and volar/dorsal forearm    Appearance: Apparent dystonia vs MM imbalance noted with movement of the right hand. She states that it feels like the \"hand is deformed and has a mind of its own.\" With most movements SF positions in extreme hyperextension and abduction which gets in the way of functional tasks.    Special Tests  Pain Report: - none  + mild    ++ moderate    +++ severe    8/17/20   Tinels at cubital tunnel R: -  L: -   Tinel's at [place] R: -  L: -   Elbow Flexion Test R: -  L: -   Ulnar nerve subluxation at elbow R:   L:    Froment's Test R: -  L: -     Functional Position:  8/17/20 - Patient laid supine on plynth with pillow from home for 10 minutes when onset of bilateral numbness/tingling in hands up through forearms to elbow occurred. Patient reports this happens nightly and impacts ability to sleep.  9/28/20 - Neck/sleeping position being addressed by PT    Special Tests:  8/17/20 - Cyriax Release: Neg but difficult to maintain proper position due to " shoulder tightness/discomfort    Assessment:  Response to therapy has been lack of progress in:  Strength:   and pinch  Self Care Skills:  Reduced performance of self care tasks and IADL's due to muscle weakness.  Overall Assessment:  Patient is ready to discharge to independent home exercise program.  STG/LTG:  See goal sheet for details and updates of remaining functional limitations.     Plan:  Discharge to home exercise program    Home Program:  HB relative motion orthosis to position SF in line with all other fingers/to prevent hyper extension/abduction with functional tasks  Cem strap for functional use  JAIME resting hand splint for night  STM to FA extensors  FA extensor stretch   and pinch strengthening with foam wedge  Table top intrinsic strength - hold for now   Finger intrinsic strengthening with foam wedge  AE recommendations: jar openers, electric can opener, gas cap opener, long handled sponge for bathing, modified handled knife, zipper pulls    Please refer to the daily flowsheet for treatment today, total treatment time and time spent performing 1:1 timed codes.

## 2020-09-25 ENCOUNTER — THERAPY VISIT (OUTPATIENT)
Dept: PHYSICAL THERAPY | Facility: CLINIC | Age: 50
End: 2020-09-25
Payer: COMMERCIAL

## 2020-09-25 DIAGNOSIS — M54.2 CERVICAL PAIN: ICD-10-CM

## 2020-09-25 PROCEDURE — 97530 THERAPEUTIC ACTIVITIES: CPT | Mod: GP | Performed by: PHYSICAL THERAPIST

## 2020-09-25 PROCEDURE — 97110 THERAPEUTIC EXERCISES: CPT | Mod: GP | Performed by: PHYSICAL THERAPIST

## 2020-09-25 PROCEDURE — 97140 MANUAL THERAPY 1/> REGIONS: CPT | Mod: GP | Performed by: PHYSICAL THERAPIST

## 2020-09-28 ENCOUNTER — THERAPY VISIT (OUTPATIENT)
Dept: OCCUPATIONAL THERAPY | Facility: CLINIC | Age: 50
End: 2020-09-28
Payer: COMMERCIAL

## 2020-09-28 DIAGNOSIS — R29.898 RIGHT HAND WEAKNESS: ICD-10-CM

## 2020-09-28 DIAGNOSIS — G72.9 MYOPATHY: ICD-10-CM

## 2020-09-28 PROCEDURE — 97110 THERAPEUTIC EXERCISES: CPT | Mod: GO | Performed by: OCCUPATIONAL THERAPIST

## 2020-09-28 PROCEDURE — 97530 THERAPEUTIC ACTIVITIES: CPT | Mod: GO | Performed by: OCCUPATIONAL THERAPIST

## 2020-10-02 ENCOUNTER — THERAPY VISIT (OUTPATIENT)
Dept: PHYSICAL THERAPY | Facility: CLINIC | Age: 50
End: 2020-10-02
Payer: COMMERCIAL

## 2020-10-02 DIAGNOSIS — M54.2 CERVICAL PAIN: ICD-10-CM

## 2020-10-02 PROCEDURE — 97140 MANUAL THERAPY 1/> REGIONS: CPT | Mod: GP | Performed by: PHYSICAL THERAPIST

## 2020-10-02 PROCEDURE — 97110 THERAPEUTIC EXERCISES: CPT | Mod: GP | Performed by: PHYSICAL THERAPIST

## 2020-10-02 PROCEDURE — 97530 THERAPEUTIC ACTIVITIES: CPT | Mod: GP | Performed by: PHYSICAL THERAPIST

## 2020-10-02 NOTE — PROGRESS NOTES
Subjective:  HPI  Physical Exam                    Objective:  System    Physical Exam    General     ROS    Assessment/Plan:    SUBJECTIVE  Subjective changes as noted by pt:   Pt. cont to have frequent numbness and tingling intermittently on the L side of her head/face but has little to no neck/upper back pain anymore as well as waking with UE tingling. She has only awoke with the UE tingling 1 x in the past 3 weeks.   Current pain level: {PA 1/10   Changes in function:  Yes (See Goal flowsheet attached for changes in current functional level)     Adverse reaction to treatment or activity:  None    OBJECTIVE  Changes in objective findings:  ROM cerv ext 90%; R SB normal; R Rot normal. Palpation - significant atrophy in her UT's and scap musculature due to myopathy     ASSESSMENT  Sun continues to require intervention to meet STG and LTG's: PT  Patient's symptoms are resolving.  Response to therapy has shown an improvement in  pain level, ROM  and posture  Progress made towards STG/LTG?  Yes (See Goal flowsheet attached for updates on achievement of STG and LTG)    PLAN  Current treatment program is being advanced to more complex exercises.    PTA/ATC plan:  N/A    Please refer to the daily flowsheet for treatment today, total treatment time and time spent performing 1:1 timed codes.

## 2020-10-19 ENCOUNTER — THERAPY VISIT (OUTPATIENT)
Dept: PHYSICAL THERAPY | Facility: CLINIC | Age: 50
End: 2020-10-19
Payer: COMMERCIAL

## 2020-10-19 DIAGNOSIS — M54.2 CERVICAL PAIN: ICD-10-CM

## 2020-10-19 PROCEDURE — 97140 MANUAL THERAPY 1/> REGIONS: CPT | Mod: GP | Performed by: PHYSICAL THERAPIST

## 2020-10-19 PROCEDURE — 97530 THERAPEUTIC ACTIVITIES: CPT | Mod: GP | Performed by: PHYSICAL THERAPIST

## 2020-10-19 PROCEDURE — 97110 THERAPEUTIC EXERCISES: CPT | Mod: GP | Performed by: PHYSICAL THERAPIST

## 2020-10-19 NOTE — PROGRESS NOTES
Subjective:  HPI  Physical Exam                    Objective:  System    Physical Exam    General     ROS    Assessment/Plan:    PROGRESS  REPORT    Progress reporting period is from 9-4-20 to 10-19-20.       SUBJECTIVE  Subjective changes noted by patient:   Pt. has made good progress in PT with her original sx's os neck pain and B UE numbness and tingling at night with sleeping. She no longer has these sx's anymore. However, the one sx that still persists is L facial pain and tingling intermittently throughout the day. She plans to pursue this further with her neurologist.       Current pain level is  1/10.     Previous pain level was  4/10.   Changes in function:  Yes (See Goal flowsheet attached for changes in current functional level)  Adverse reaction to treatment or activity: None    OBJECTIVE  Changes noted in objective findings:  ROM cervical ext 90%. B SB normal; B Rot normal.      ASSESSMENT/PLAN  Updated problem list and treatment plan: Diagnosis 1:  Cervical radiculopathy  Pain -  manual therapy, self management, education  Decreased ROM/flexibility - manual therapy and therapeutic exercise  Decreased strength - therapeutic exercise and therapeutic activities  Impaired muscle performance - neuro re-education  Decreased function - therapeutic activities  Impaired posture - neuro re-education  STG/LTGs have been met or progress has been made towards goals:  Yes (See Goal flow sheet completed today.)  Assessment of Progress: The patient has met all of their long term goals.  Self Management Plans:  Patient has been instructed in a home treatment program.  Patient  has been instructed in self management of symptoms.  I have re-evaluated this patient and find that the nature, scope, duration and intensity of the therapy is appropriate for the medical condition of the patient.  Sun continues to require the following intervention to meet STG and LTG's:  PT intervention is no longer required to meet  STG/LTG.    Recommendations:  This patient is ready to be discharged from therapy and continue their home treatment program.    Please refer to the daily flowsheet for treatment today, total treatment time and time spent performing 1:1 timed codes.

## 2021-01-03 ENCOUNTER — HEALTH MAINTENANCE LETTER (OUTPATIENT)
Age: 51
End: 2021-01-03

## 2021-03-07 ENCOUNTER — HEALTH MAINTENANCE LETTER (OUTPATIENT)
Age: 51
End: 2021-03-07

## 2021-03-11 ENCOUNTER — IMMUNIZATION (OUTPATIENT)
Dept: NURSING | Facility: CLINIC | Age: 51
End: 2021-03-11
Payer: COMMERCIAL

## 2021-03-11 PROCEDURE — 0001A PR COVID VAC PFIZER DIL RECON 30 MCG/0.3 ML IM: CPT

## 2021-03-11 PROCEDURE — 91300 PR COVID VAC PFIZER DIL RECON 30 MCG/0.3 ML IM: CPT

## 2021-04-01 ENCOUNTER — IMMUNIZATION (OUTPATIENT)
Dept: NURSING | Facility: CLINIC | Age: 51
End: 2021-04-01
Attending: FAMILY MEDICINE
Payer: COMMERCIAL

## 2021-04-01 PROCEDURE — 0002A PR COVID VAC PFIZER DIL RECON 30 MCG/0.3 ML IM: CPT

## 2021-04-01 PROCEDURE — 91300 PR COVID VAC PFIZER DIL RECON 30 MCG/0.3 ML IM: CPT

## 2021-06-18 PROBLEM — M54.2 CERVICAL PAIN: Status: RESOLVED | Noted: 2020-09-05 | Resolved: 2021-06-18

## 2021-07-20 PROBLEM — R29.898 RIGHT HAND WEAKNESS: Status: RESOLVED | Noted: 2020-08-17 | Resolved: 2021-07-20

## 2021-07-20 PROBLEM — G72.9 MYOPATHY: Status: RESOLVED | Noted: 2020-08-17 | Resolved: 2021-07-20

## 2021-09-22 ENCOUNTER — TELEPHONE (OUTPATIENT)
Dept: FAMILY MEDICINE | Facility: CLINIC | Age: 51
End: 2021-09-22

## 2021-09-22 ENCOUNTER — OFFICE VISIT (OUTPATIENT)
Dept: FAMILY MEDICINE | Facility: CLINIC | Age: 51
End: 2021-09-22
Payer: COMMERCIAL

## 2021-09-22 VITALS
WEIGHT: 113 LBS | TEMPERATURE: 99 F | DIASTOLIC BLOOD PRESSURE: 83 MMHG | HEART RATE: 78 BPM | SYSTOLIC BLOOD PRESSURE: 118 MMHG | OXYGEN SATURATION: 99 % | RESPIRATION RATE: 16 BRPM

## 2021-09-22 DIAGNOSIS — Z79.52 ENCOUNTER FOR MONITORING OF SYSTEMIC STEROID THERAPY: ICD-10-CM

## 2021-09-22 DIAGNOSIS — G71.3 MYOPATHY, MITOCHONDRIAL: Primary | ICD-10-CM

## 2021-09-22 DIAGNOSIS — N32.81 OVERACTIVE BLADDER: ICD-10-CM

## 2021-09-22 DIAGNOSIS — H04.123 DRY EYES: ICD-10-CM

## 2021-09-22 DIAGNOSIS — Z51.81 ENCOUNTER FOR MONITORING OF SYSTEMIC STEROID THERAPY: ICD-10-CM

## 2021-09-22 DIAGNOSIS — Z23 NEED FOR VACCINATION: ICD-10-CM

## 2021-09-22 PROCEDURE — 90750 HZV VACC RECOMBINANT IM: CPT | Performed by: INTERNAL MEDICINE

## 2021-09-22 PROCEDURE — 90472 IMMUNIZATION ADMIN EACH ADD: CPT | Performed by: INTERNAL MEDICINE

## 2021-09-22 PROCEDURE — 90471 IMMUNIZATION ADMIN: CPT | Performed by: INTERNAL MEDICINE

## 2021-09-22 PROCEDURE — 90682 RIV4 VACC RECOMBINANT DNA IM: CPT | Performed by: INTERNAL MEDICINE

## 2021-09-22 PROCEDURE — 99205 OFFICE O/P NEW HI 60 MIN: CPT | Mod: 25 | Performed by: INTERNAL MEDICINE

## 2021-09-22 RX ORDER — PREDNISONE 50 MG/1
50 TABLET ORAL DAILY
Qty: 60 TABLET | Refills: 3 | Status: SHIPPED | OUTPATIENT
Start: 2021-09-22 | End: 2021-12-01

## 2021-09-22 RX ORDER — NALTREXONE HYDROCHLORIDE 50 MG/1
50 TABLET, FILM COATED ORAL DAILY
Qty: 60 TABLET | Refills: 3 | Status: SHIPPED | OUTPATIENT
Start: 2021-09-22 | End: 2021-09-22

## 2021-09-22 RX ORDER — MIRABEGRON 25 MG/1
25 TABLET, FILM COATED, EXTENDED RELEASE ORAL DAILY
Qty: 60 TABLET | Refills: 4 | Status: SHIPPED | OUTPATIENT
Start: 2021-09-22 | End: 2022-06-27

## 2021-09-22 RX ORDER — NALTREXONE HYDROCHLORIDE 50 MG/1
50 TABLET, FILM COATED ORAL DAILY
Qty: 60 TABLET | Refills: 3 | Status: SHIPPED | OUTPATIENT
Start: 2021-09-22 | End: 2023-02-02 | Stop reason: SINTOL

## 2021-09-22 ASSESSMENT — ENCOUNTER SYMPTOMS: MYALGIAS: 1

## 2021-09-22 NOTE — TELEPHONE ENCOUNTER
PCP: Need to send the compound  medication  Naltrexone to compound pharmacy per pharmacist.     Does the [facility] pharmacy compound medications?     Edel Qiu RN  [facility]

## 2021-09-22 NOTE — PROGRESS NOTES
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  She has symptoms in all her upper and lower extremities.  The muscle specialist at [place] Dr. Albarran recommended prednisone.  I will prescribe prednisone 50 mg daily for 1 month, taper to 40 mg for the second month and 30 mg for the third month.  She takes naltrexone 4.5 mg compound daily for muscle pain.  - predniSONE (DELTASONE) 50 MG tablet; Take 1 tablet (50 mg) by mouth daily  - naltrexone (DEPADE/REVIA) 50 MG tablet; Take 1 tablet (50 mg) by mouth daily    Overactive bladder  - mirabegron (MYRBETRIQ) 25 MG 24 hr tablet; Take 1 tablet (25 mg) by mouth daily    Dry eyes  She has history of dry eyes and currently takes doxycycline 50 mg daily.  I discussed with her to stop this medication and use eyedrops.  We will get an opinion from ophthalmologist for treatment of dry eyes.  - Adult Eye Referral; Future    Encounter for monitoring of systemic steroid therapy  As part of monitoring systemic steroid therapy, I will prescribe PPI daily.  We will check fasting glucose 2 weeks after starting steroids.  Discussed with her to start taking vitamin D and calcium supplements.  Patient will follow up in 4 weeks and will go over screening for osteoporosis, screening or treatment for opportunistic infection, blood pressure.  - omeprazole (PRILOSEC) 20 MG DR capsule; Take 1 capsule (20 mg) by mouth daily  - Glucose; Future    Need for vaccination  She will receive second dose of Shingrix today.  First dose was given on July 15, 2021.  - SHINGRIX [[id number]]  - INFLUENZA QUAD, RECOMBINANT, P-FREE (RIV4) (FLUBLOK)       See Patient Instructions    > 70 min spent on the date of this encounter doing chart review, documentation, history/exam, and further activities as noted above    Return in about 4 weeks (around 10/20/2021) for Follow up, with me.    REYNOLD RAMOS MD  [facility]  CLINIC [facility]    Catrina Whitehead is a 51 year old who presents for the following health issues     HPI     New Patient/Transfer of Care  Chief Complaint   Patient presents with     Establish Care     discuss treatment plan suggested by [facility] doctor; discuss getting flu shot; overall health     Ventura is a 51-year-old lady who presented to the clinic to establish care.  In 2019 she developed difficulty walking up stairs, raising arms overhead and getting up from seated positions.  She was started on OT and saw multiple neurologists.  She had genetic testing which was negative and muscle biopsy of the right biceps with out getting a diagnosis.  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  She was given the option to choose between IVIG and high-dose prednisone  and she wants to try prednisone 50 mg daily for a month and decrease to 40 mg daily for second month and 30 mg daily for the third month.  She will follow up with Dr. Albarran at 3 months.    She is due for shingles vaccine dose to and flu shot.  She gets B12 injections every week and will help her set up an appointment with nurse.      Review of Systems   Musculoskeletal: Positive for myalgias.            Objective    /83 (BP Location: Right arm, Cuff Size: Adult Regular)   Pulse 78   Temp 99  F (37.2  C) (Tympanic)   Resp 16   Wt 51.3 kg (113 lb)   LMP 07/22/2021 (Approximate)   SpO2 99%   There is no height or weight on file to calculate BMI.  Physical Exam

## 2021-09-22 NOTE — NURSING NOTE
Prior to immunization administration, verified patients identity using patient s name and date of birth. Please see Immunization Activity for additional information.     Screening Questionnaire for Adult Immunization    Are you sick today?   No   Do you have allergies to medications, food, a vaccine component or latex?   No   Have you ever had a serious reaction after receiving a vaccination?   No   Do you have a long-term health problem with heart, lung, kidney, or metabolic disease (e.g., diabetes), asthma, a blood disorder, no spleen, complement component deficiency, a cochlear implant, or a spinal fluid leak?  Are you on long-term aspirin therapy?   No   Do you have cancer, leukemia, HIV/AIDS, or any other immune system problem?   No   Do you have a parent, brother, or sister with an immune system problem?   No   In the past 3 months, have you taken medications that affect  your immune system, such as prednisone, other steroids, or anticancer drugs; drugs for the treatment of rheumatoid arthritis, Crohn s disease, or psoriasis; or have you had radiation treatments?   No   Have you had a seizure, or a brain or other nervous system problem?   No   During the past year, have you received a transfusion of blood or blood    products, or been given immune (gamma) globulin or antiviral drug?   No   For women: Are you pregnant or is there a chance you could become       pregnant during the next month?   No   Have you received any vaccinations in the past 4 weeks?   No     Immunization questionnaire answers were all negative.        Per orders of Dr. Metz, injection of Shingrix given by Shauna Coon CMA. Patient instructed to remain in clinic for 15 minutes afterwards, and to report any adverse reaction to me immediately.       Screening performed by Shauna Coon CMA on 9/22/2021 at 11:29 AM.

## 2021-09-23 NOTE — TELEPHONE ENCOUNTER
I sent naltrexone to [facility]. Patient has been getting this compound medication from this pharmacy.

## 2021-09-23 NOTE — TELEPHONE ENCOUNTER
Called patient to notify her - no answer. Left message asking patient to call back or review MyChart     Sent message on MyChart     Darya LEE RN

## 2021-09-28 ENCOUNTER — ALLIED HEALTH/NURSE VISIT (OUTPATIENT)
Dept: FAMILY MEDICINE | Facility: CLINIC | Age: 51
End: 2021-09-28
Payer: COMMERCIAL

## 2021-09-28 DIAGNOSIS — E53.8 VITAMIN B12 DEFICIENCY (NON ANEMIC): Primary | ICD-10-CM

## 2021-09-28 PROCEDURE — 99207 PR NO CHARGE NURSE ONLY: CPT

## 2021-09-28 PROCEDURE — 96372 THER/PROPH/DIAG INJ SC/IM: CPT | Performed by: INTERNAL MEDICINE

## 2021-09-28 RX ORDER — CYANOCOBALAMIN 1000 UG/ML
1000 INJECTION, SOLUTION INTRAMUSCULAR; SUBCUTANEOUS ONCE
Status: COMPLETED | OUTPATIENT
Start: 2021-09-28 | End: 2021-09-28

## 2021-09-28 RX ADMIN — CYANOCOBALAMIN 1000 MCG: 1000 INJECTION, SOLUTION INTRAMUSCULAR; SUBCUTANEOUS at 11:11

## 2021-09-28 NOTE — PROGRESS NOTES
Prednisone prescription needs to be done as a taper over the next couple of months, decreasing by 10 mg monthly.

## 2021-09-28 NOTE — PROGRESS NOTES
Clinic Administered Medication Documentation    Administrations This Visit     cyanocobalamin injection 1,000 mcg     Admin Date  09/28/2021 Action  Given Dose  1,000 mcg Route  Intramuscular Site  Left Deltoid Administered By  Vidal Andrade CMA    Ordering Provider: Gina Lunsford, DO    Patient Supplied?: No                  Injectable Medication Documentation    Patient was given Cyanocobalamin (B-12). Prior to medication administration, verified patients identity using patient s name and date of birth. Please see MAR and medication order for additional information. Patient instructed to report any adverse reaction to staff immediately .      Was entire vial of medication used? Yes  Vial/Syringe: Single dose vial  Expiration Date:  03/23  Was this medication supplied by the patient? No    Vidal Andrade CMA on 9/28/2021 at 5:09 PM

## 2021-10-08 ENCOUNTER — LAB (OUTPATIENT)
Dept: LAB | Facility: CLINIC | Age: 51
End: 2021-10-08
Payer: COMMERCIAL

## 2021-10-08 DIAGNOSIS — Z11.59 NEED FOR HEPATITIS C SCREENING TEST: ICD-10-CM

## 2021-10-08 DIAGNOSIS — Z51.81 ENCOUNTER FOR MONITORING OF SYSTEMIC STEROID THERAPY: ICD-10-CM

## 2021-10-08 DIAGNOSIS — Z79.52 ENCOUNTER FOR MONITORING OF SYSTEMIC STEROID THERAPY: ICD-10-CM

## 2021-10-08 DIAGNOSIS — Z11.4 SCREENING FOR HIV (HUMAN IMMUNODEFICIENCY VIRUS): ICD-10-CM

## 2021-10-08 PROCEDURE — 86803 HEPATITIS C AB TEST: CPT

## 2021-10-08 PROCEDURE — 82947 ASSAY GLUCOSE BLOOD QUANT: CPT

## 2021-10-08 PROCEDURE — 36415 COLL VENOUS BLD VENIPUNCTURE: CPT

## 2021-10-08 PROCEDURE — 87389 HIV-1 AG W/HIV-1&-2 AB AG IA: CPT

## 2021-10-09 LAB
FASTING STATUS PATIENT QL REPORTED: NO
GLUCOSE BLD-MCNC: 112 MG/DL (ref 70–99)
HCV AB SERPL QL IA: NONREACTIVE
HIV 1+2 AB+HIV1 P24 AG SERPL QL IA: NONREACTIVE

## 2021-10-19 ENCOUNTER — OFFICE VISIT (OUTPATIENT)
Dept: URGENT CARE | Facility: URGENT CARE | Age: 51
End: 2021-10-19
Payer: COMMERCIAL

## 2021-10-19 VITALS
OXYGEN SATURATION: 98 % | DIASTOLIC BLOOD PRESSURE: 96 MMHG | TEMPERATURE: 97.6 F | HEART RATE: 80 BPM | SYSTOLIC BLOOD PRESSURE: 148 MMHG | WEIGHT: 119 LBS

## 2021-10-19 DIAGNOSIS — L01.00 IMPETIGO: Primary | ICD-10-CM

## 2021-10-19 PROCEDURE — 99213 OFFICE O/P EST LOW 20 MIN: CPT

## 2021-10-19 RX ORDER — MUPIROCIN 20 MG/G
OINTMENT TOPICAL 3 TIMES DAILY
Qty: 15 G | Refills: 0 | Status: SHIPPED | OUTPATIENT
Start: 2021-10-19 | End: 2021-11-02

## 2021-10-19 RX ORDER — CEPHALEXIN 500 MG/1
500 CAPSULE ORAL 3 TIMES DAILY
Qty: 30 CAPSULE | Refills: 0 | Status: SHIPPED | OUTPATIENT
Start: 2021-10-19 | End: 2021-11-02

## 2021-10-20 ENCOUNTER — NURSE TRIAGE (OUTPATIENT)
Dept: NURSING | Facility: CLINIC | Age: 51
End: 2021-10-20

## 2021-10-20 NOTE — PROGRESS NOTES
SUBJECTIVE:  Britt Park is a 51 year old female who presents to the clinic today for a rash.Has cpap machine and developed sores on nose and upper lip.  Associated symptoms include: nothing.    History reviewed. No pertinent past medical history.  Current Outpatient Medications   Medication Sig Dispense Refill     cephALEXin (KEFLEX) 500 MG capsule Take 1 capsule (500 mg) by mouth 3 times daily 30 capsule 0     gabapentin (NEURONTIN) 300 MG capsule Take 300 mg by mouth       mirabegron (MYRBETRIQ) 25 MG 24 hr tablet Take 1 tablet (25 mg) by mouth daily 60 tablet 4     mometasone (NASONEX) 50 MCG/ACT nasal spray 2 sprays       mupirocin (BACTROBAN) 2 % external ointment Apply topically 3 times daily 15 g 0     naltrexone (DEPADE/REVIA) 50 MG tablet Take 1 tablet (50 mg) by mouth daily 60 tablet 3     naproxen (NAPROSYN) 500 MG tablet TAKE 1 TABLET BY MOUTH TWICE DAILY WITH MEALS AS NEEDED       omeprazole (PRILOSEC) 20 MG DR capsule Take 1 capsule (20 mg) by mouth daily 60 capsule 3     predniSONE (DELTASONE) 50 MG tablet Take 1 tablet (50 mg) by mouth daily 60 tablet 3     valACYclovir (VALTREX) 1000 mg tablet Take 2,000 mg by mouth       XIIDRA 5 % opthalmic solution        History   Smoking Status     Never Smoker   Smokeless Tobacco     Never Used       ROS:  Review of systems negative except as stated above.    EXAM:   BP (!) 148/96 (BP Location: Right arm, Patient Position: Sitting, Cuff Size: Adult Regular)   Pulse 80   Temp 97.6  F (36.4  C) (Oral)   Wt 54 kg (119 lb)   SpO2 98%   Breastfeeding No   GENERAL: alert, no acute distress.  SKIN: Rash description:    Distribution: sore red lesion both nostrils with mild tenderness and scale  ASSESSMENT:  Impetigo    PLAN:  Cephalexin and bactroban  Follow up with primary care provider if no improvement.

## 2021-10-21 NOTE — TELEPHONE ENCOUNTER
"Seen in  yesterday. \"After having a cold, I developed an infection outside of my nose and above my lip. I use a CPAP. The Dr prescribed an antibiotic Cephalexin and an ointment Mupirocin to apply 3 times per day. I started the medications last night. I have an appointment for PT in the pool tomorrow. I was diagnosed with impetigo. Should I cancel my pool appointment tomorrow?. I have new raised blisters that are really uncomfortable. How long before I should start to feel better? I have been on the antibiotic for 1 day. I am on a steroid x 3+1/2 weeks that reduces my immunity (high dose of 50mg x 30 days of prednisone).   The spots look like whitish pus filled blisters in a cluster, x4, and a few scabs under the nose that are dried.   Hx of cold sores, this looks different. It does not hurt like a cold sore does.\" She states the sores have gotten worse than yesterday when she was seen.   \"I feel super congested and it is super painful to blow my nose. I just took some Advil (400mg). Yesterday I took Sudafed for the nasal congestion. I take Zyrtec antihistamine for seasonal allergies\".   [facility]. Dr Metz.   Triaged to a disposition of see PCP within 24 hrs. Patient will send message and photos to Dr Metz via My Chart tonight. She will find out if provider would like her to be seen again, and any change in treatment, or ?    Zuri Torres RN Triage Nurse Advisor 10:07 PM 10/20/2021    Reason for Disposition    Several sores (3 or more)    Additional Information    Negative: Looks like ringworm    Negative: Doesn't match the SYMPTOMS of impetigo    Negative: Impetigo is part of a wound infection    Negative: [1] Red streak runs from impetigo AND [2] fever    Negative: [1] Red spreading area around 1 sore AND [2] fever    Negative: [1] Red streak or bright red area around 1 sore AND [2] no fever    Protocols used: IMPETIGO (INFECTED SORE)-A-  COVID 19 Nurse Triage Plan/Patient Instructions    Please be aware " that novel coronavirus (COVID-19) may be circulating in the community. If you develop symptoms such as fever, cough, or SOB or if you have concerns about the presence of another infection including coronavirus (COVID-19), please contact your health care provider or visit [email or web address].     Disposition/Instructions    In-Person Visit with provider recommended. Reference Visit Selection Guide.    Thank you for taking steps to prevent the spread of this virus.  o Limit your contact with others.  o Wear a simple mask to cover your cough.  o Wash your hands well and often.    Resources    [facility]: About COVID-19: [email or web address]    CDC: What to Do If You're Sick: www.cdc.gov/coronavirus/2019-ncov/about/steps-when-sick.html    CDC: Ending Home Isolation: www.cdc.gov/coronavirus/2019-ncov/hcp/disposition-in-home-patients.html     CDC: Caring for Someone: www.cdc.gov/coronavirus/2019-ncov/if-you-are-sick/care-for-someone.html     [facility]: Interim Guidance for Hospital Discharge to Home: www.health.[facility].[place]/diseases/coronavirus/hcp/hospdischarge.pdf    [facility] clinical trials (COVID-19 research studies): [email or web address]-trials     Below are the COVID-19 hotlines at the [place] Department of [facility] ([facility]). Interpreters are available.   o For health questions: Call 916-200-2260 or 1-531.541.7420 (7 a.m. to 7 p.m.)  o For questions about schools and childcare: Call 037-261-3992 or 1-854.282.6496 (7 a.m. to 7 p.m.)

## 2021-11-02 ENCOUNTER — OFFICE VISIT (OUTPATIENT)
Dept: FAMILY MEDICINE | Facility: CLINIC | Age: 51
End: 2021-11-02
Payer: COMMERCIAL

## 2021-11-02 VITALS
SYSTOLIC BLOOD PRESSURE: 147 MMHG | HEART RATE: 86 BPM | RESPIRATION RATE: 14 BRPM | BODY MASS INDEX: 22.63 KG/M2 | DIASTOLIC BLOOD PRESSURE: 89 MMHG | WEIGHT: 123 LBS | OXYGEN SATURATION: 100 % | HEIGHT: 62 IN | TEMPERATURE: 97.8 F

## 2021-11-02 DIAGNOSIS — N32.81 OVERACTIVE BLADDER: ICD-10-CM

## 2021-11-02 DIAGNOSIS — Z79.52 ON PREDNISONE THERAPY: ICD-10-CM

## 2021-11-02 DIAGNOSIS — H04.123 DRY EYES: ICD-10-CM

## 2021-11-02 DIAGNOSIS — Z79.52 ENCOUNTER FOR MONITORING OF SYSTEMIC STEROID THERAPY: ICD-10-CM

## 2021-11-02 DIAGNOSIS — Z23 NEED FOR VACCINATION: ICD-10-CM

## 2021-11-02 DIAGNOSIS — E53.8 VITAMIN B 12 DEFICIENCY: ICD-10-CM

## 2021-11-02 DIAGNOSIS — G71.3 MYOPATHY, MITOCHONDRIAL: Primary | ICD-10-CM

## 2021-11-02 DIAGNOSIS — L01.00 IMPETIGO: ICD-10-CM

## 2021-11-02 DIAGNOSIS — I15.9 SECONDARY HYPERTENSION: ICD-10-CM

## 2021-11-02 DIAGNOSIS — Z51.81 ENCOUNTER FOR MONITORING OF SYSTEMIC STEROID THERAPY: ICD-10-CM

## 2021-11-02 LAB — VIT B12 SERPL-MCNC: >6000 PG/ML (ref 193–986)

## 2021-11-02 PROCEDURE — 82607 VITAMIN B-12: CPT | Performed by: INTERNAL MEDICINE

## 2021-11-02 PROCEDURE — 80048 BASIC METABOLIC PNL TOTAL CA: CPT | Performed by: INTERNAL MEDICINE

## 2021-11-02 PROCEDURE — 99215 OFFICE O/P EST HI 40 MIN: CPT | Mod: 25 | Performed by: INTERNAL MEDICINE

## 2021-11-02 PROCEDURE — 90670 PCV13 VACCINE IM: CPT | Performed by: INTERNAL MEDICINE

## 2021-11-02 PROCEDURE — 96372 THER/PROPH/DIAG INJ SC/IM: CPT | Performed by: INTERNAL MEDICINE

## 2021-11-02 PROCEDURE — 90471 IMMUNIZATION ADMIN: CPT | Performed by: INTERNAL MEDICINE

## 2021-11-02 PROCEDURE — 36415 COLL VENOUS BLD VENIPUNCTURE: CPT | Performed by: INTERNAL MEDICINE

## 2021-11-02 RX ORDER — MUPIROCIN 20 MG/G
OINTMENT TOPICAL 3 TIMES DAILY
Qty: 15 G | Refills: 0 | Status: SHIPPED | OUTPATIENT
Start: 2021-11-02 | End: 2023-08-03

## 2021-11-02 RX ORDER — LOSARTAN POTASSIUM 25 MG/1
25 TABLET ORAL DAILY
Qty: 60 TABLET | Refills: 0 | Status: SHIPPED | OUTPATIENT
Start: 2021-11-02 | End: 2021-12-21

## 2021-11-02 RX ORDER — CYANOCOBALAMIN 1000 UG/ML
1000 INJECTION, SOLUTION INTRAMUSCULAR; SUBCUTANEOUS
Status: DISCONTINUED | OUTPATIENT
Start: 2021-11-02 | End: 2021-12-01

## 2021-11-02 RX ORDER — PREDNISONE 20 MG/1
40 TABLET ORAL DAILY
Qty: 40 TABLET | Refills: 0 | Status: SHIPPED | OUTPATIENT
Start: 2021-11-02 | End: 2021-11-17

## 2021-11-02 RX ADMIN — CYANOCOBALAMIN 1000 MCG: 1000 INJECTION, SOLUTION INTRAMUSCULAR; SUBCUTANEOUS at 08:27

## 2021-11-02 ASSESSMENT — MIFFLIN-ST. JEOR: SCORE: 1131.17

## 2021-11-02 ASSESSMENT — ENCOUNTER SYMPTOMS
FEVER: 0
CHILLS: 0

## 2021-11-02 NOTE — PROGRESS NOTES
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  Currently sees Dr. Albarran at [facility].  Second month of therapy with prednisone.  We will decrease the dose to 40 mg daily.  - predniSONE (DELTASONE) 20 MG tablet; Take 2 tablets (40 mg) by mouth daily    Impetigo  Impetigo on skin between nose and upper lip.  From CPAP use.  Completed 10 days of cephalexin.  Continue to use mupirocin cream.  Discussed with the patient to avoid CPAP, she is having disturbed sleep because of that.  If the lesion does not improve in 1 week she will inform me.  - mupirocin (BACTROBAN) 2 % external ointment; Apply topically 3 times daily    Secondary hypertension  Elevated blood pressure readings at home and in office due to prednisone therapy.  Will check electrolytes and kidney function.  Start losartan 25 mg daily.  Recheck electrolytes and kidney function in 2 weeks.  Continue to check blood pressure at home.  - losartan (COZAAR) 25 MG tablet; Take 1 tablet (25 mg) by mouth daily  - Basic metabolic panel  (Ca, Cl, CO2, Creat, Gluc, K, Na, BUN); Future    On prednisone therapy  Continue medication at 40 mg daily.    Vitamin B 12 deficiency  Will check B12 levels. B12 shot given today.  - Vitamin B12; Future  - cyanocobalamin injection 1,000 mcg    Need for vaccination  For immunocompromised, no vaccination before, PCV 13 followed by PPSV 23 after > 8 weeks.   - Pneumococcal vaccine 13 valent PCV13 IM (Prevnar) [[id number]]    Encounter for monitoring of systemic steroid therapy  Pneumonia shot: PCV13 and 8 weeks later PPSV23  Referral to ophthalmology for dry eyes and checking for glaucoma/cataract  Dexa scan to screen for osteoporosis from high-dose prednisone  Elevated blood pressure: start taking Losartan 25 mg daily       Dry eyes  Continue to use artificial tears.  Referral to ophthalmology    Overactive bladder  Stable.  Continue to use mirabegron      See  Patient Instructions    Return in about 4 weeks (around 11/30/2021) for Follow up, with me, in person.    > 45 min spent on the date of this encounter doing chart review, documentation, history/exam, and further activities as noted above    REYNOLD RAMOS MD  [facility] ELO Whitehead is a 51 year old who presents for the following health issues     HPI   Ventura is a 51-year-old lady who presented to the clinic for follow-up.  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  She is on prednisone, initially started with 50 mg daily for 1 month, this is second month of therapy and will decrease the dose to 40 mg daily.  She is reporting high blood pressure readings at home.  In clinic blood pressure is 147/89.  She is due for a B12 shot.  She wants to talk about her bladder medication, she takes mirabegron 25 mg daily and wants to know if this is the right medication for overactive bladder.  She has history of dry eyes and was previously on doxycycline 50 mg daily.  We stopped this medication and she has been doing well without artificial tears as well.  10 days ago she developed a sore between her nose and lips.  She was diagnosed to have impetigo and was started on cephalexin for 10 days.  The sore has almost healed, but some pink tissue remaining.  She is also using mupirocin ointment.  About her muscle condition, since starting prednisone her neck pain has resolved, she is still not able to raise her arms more than 120 degrees in abduction.  She does have muscle cramps since starting prednisone.  She is asking for applying for disability for parking spot.  She recently started a new job 1 month ago.      Review of Systems   Constitutional: Negative for chills and fever.   Genitourinary: Positive for urgency.   Skin: Positive for rash.         Objective    BP (!) 147/89 (BP Location: Right arm, Cuff Size: Adult Regular)   Pulse 86   Temp 97.8  F (36.6  C)  "(Temporal)   Resp 14   Ht 1.575 m (5' 2\")   Wt 55.8 kg (123 lb)   SpO2 100%   Breastfeeding No   BMI 22.50 kg/m    Body mass index is 22.5 kg/m .  Physical Exam  HENT:      Head:     Musculoskeletal:      Comments: Upper arms: Not able to raise arms more than 120 degrees in abduction.            "

## 2021-11-02 NOTE — NURSING NOTE
Prior to immunization administration, verified patients identity using patient s name and date of birth. Please see Immunization Activity for additional information.     Screening Questionnaire for Adult Immunization    Are you sick today?   No   Do you have allergies to medications, food, a vaccine component or latex?   No   Have you ever had a serious reaction after receiving a vaccination?   No   Do you have a long-term health problem with heart, lung, kidney, or metabolic disease (e.g., diabetes), asthma, a blood disorder, no spleen, complement component deficiency, a cochlear implant, or a spinal fluid leak?  Are you on long-term aspirin therapy?   No   Do you have cancer, leukemia, HIV/AIDS, or any other immune system problem?   No   Do you have a parent, brother, or sister with an immune system problem?   No   In the past 3 months, have you taken medications that affect  your immune system, such as prednisone, other steroids, or anticancer drugs; drugs for the treatment of rheumatoid arthritis, Crohn s disease, or psoriasis; or have you had radiation treatments?   Yes   Have you had a seizure, or a brain or other nervous system problem?   No   During the past year, have you received a transfusion of blood or blood    products, or been given immune (gamma) globulin or antiviral drug?   No   For women: Are you pregnant or is there a chance you could become       pregnant during the next month?   No   Have you received any vaccinations in the past 4 weeks?   No     Immunization questionnaire was positive for at least one answer.  Notified Dr. Metz.        Per orders of Dr. Metz, injection of Prevnar given by Shauna Coon CMA. Patient instructed to remain in clinic for 15 minutes afterwards, and to report any adverse reaction to me immediately.       Screening performed by Shauna Coon CMA on 11/2/2021 at 8:28 AM.Clinic Administered Medication Documentation    Administrations This Visit      cyanocobalamin injection 1,000 mcg     Admin Date  11/02/2021 Action  Given Dose  1,000 mcg Route  Intramuscular Site  Right Deltoid Administered By  Shauna Ken CMA    Ordering Provider: Vi Metz MD    Patient Supplied?: No                  Injectable Medication Documentation    Patient was given Cyanocobalamin (B-12). Prior to medication administration, verified patients identity using patient s name and date of birth. Please see MAR and medication order for additional information. Patient instructed to remain in clinic for 15 minutes.      Was entire vial of medication used? Yes  Vial/Syringe: Single dose vial  Expiration Date:  05/2023  Was this medication supplied by the patient? No     Shauna Ken MA

## 2021-11-02 NOTE — PATIENT INSTRUCTIONS
Vitamin B12 shot     Pneumonia shot: PCV13 and 8 weeks later PPSV23    See Eye doctor for dry eyes and checking for glaucoma/cataract    Dexa scan today: Please call the following number to make appointment : [phone]. It is located in [address]    Elevated blood pressure: start taking Losartan 25 mg daily     Lab work: B 12 levels, BMP    Refill: mupirocin and prednisone 40 gm daily for 1 month

## 2021-11-03 LAB
ANION GAP SERPL CALCULATED.3IONS-SCNC: 8 MMOL/L (ref 3–14)
BUN SERPL-MCNC: 22 MG/DL (ref 7–30)
CALCIUM SERPL-MCNC: 9.4 MG/DL (ref 8.5–10.1)
CHLORIDE BLD-SCNC: 104 MMOL/L (ref 94–109)
CO2 SERPL-SCNC: 27 MMOL/L (ref 20–32)
CREAT SERPL-MCNC: 0.44 MG/DL (ref 0.52–1.04)
GFR SERPL CREATININE-BSD FRML MDRD: >90 ML/MIN/1.73M2
GLUCOSE BLD-MCNC: 72 MG/DL (ref 70–99)
POTASSIUM BLD-SCNC: 4.1 MMOL/L (ref 3.4–5.3)
SODIUM SERPL-SCNC: 139 MMOL/L (ref 133–144)

## 2021-11-09 ENCOUNTER — E-VISIT (OUTPATIENT)
Dept: URGENT CARE | Facility: URGENT CARE | Age: 51
End: 2021-11-09
Payer: COMMERCIAL

## 2021-11-09 DIAGNOSIS — J01.90 ACUTE SINUSITIS, RECURRENCE NOT SPECIFIED, UNSPECIFIED LOCATION: Primary | ICD-10-CM

## 2021-11-09 PROCEDURE — 99421 OL DIG E/M SVC 5-10 MIN: CPT | Performed by: NURSE PRACTITIONER

## 2021-11-09 NOTE — PATIENT INSTRUCTIONS
The symptoms you describe suggest a viral cause, which is much more common than a bacterial cause. Antibiotics will treat bacterial infections, but have no effect on viral infections. If possible, especially if improving, start with symptom care for the first 7-10 days, then consider seeking further treatment or taking an antibiotic. Bacterial infections generally are more severe, including symptoms such as pus, fever over 101degrees F, or rapidly worsening.    When to Use Antibiotics    Antibiotics are medicines used to treat infections caused by bacteria. They don t work for an illness caused by a virus. And they don't work for an allergic reaction. In fact, taking antibiotics for reasons other than an infection by bacteria can cause problems. You may have side effects from the medicine. And if you need an antibiotic in the future, it may not work well. This is because the bacteria can become immune to the medicine. You can also get a type of diarrhea that's hard to treat. This diarrhea is called C. diff.   When antibiotics likely won t help  Your healthcare provider won t usually give you antibiotics for the conditions listed below. You can help by not asking for them if you have:     A cold. This type of illness is caused by a virus. It can cause a runny nose, stuffed-up nose, sneezing, coughing, and headache. You may also have mild body aches and low fever. A cold gets better on its own in a few days to a week.    The flu (influenza). This is a respiratory illness caused by a virus. The flu usually goes away on its own in a week or so. It can cause fever, body aches, sore throat, and tiredness.    Bronchitis. This is an infection in the lungs. It is most often caused by a virus. You may have coughing, phlegm, body aches, and a low fever. A common type of bronchitis is known as a chest cold. This is called acute bronchitis. This often happens after you have a respiratory infection like a cold. Bronchitis can  take weeks to go away. Antibiotics often don t help.    Most sore throats. Sore throats are most often caused by viruses. Your throat may feel scratchy or achy. It may hurt to swallow. You may also have a low fever and body aches. A sore throat usually gets better in a few days.    Most outer ear infections. An ear infection may be caused by a virus or bacteria. It causes pain in the ear. Antibiotics by mouth usually don t help. Low-dose antibiotic ear drops work much better.    Some inner ear infections. An inner ear infection (otitis media) can be caused by a virus in the ear. It can also cause pain and a high fever. Most older children with low-grade fever don't need to be treated with antibiotics.    Most sinus infections. This is also known as sinusitis. This kind of infection causes sinus pain and swelling, and a runny nose. In most cases, it goes away on its own. Antibiotics don t make recovery quicker.    Allergic rhinitis. This is a set of symptoms caused by an allergic reaction. You may have sneezing, a runny nose, itchy or watery eyes, or a sore throat. Allergies are not treated with antibiotics.    Low fever. A mild fever that s less than 100.4 F (38 C) most likely doesn t need to be treated with antibiotics.   When antibiotics can help  Antibiotics can be used to treat:                                                       Strep throat. This is a throat infection caused by a certain type of bacteria. Symptoms of strep throat include a sore throat, white patches on the tonsils, red spots on the roof of the mouth, fever, body aches, and nausea and vomiting. Strep throat almost never causes a cough.    Urinary tract infection (UTI). This is an infection of the bladder and the tube that takes urine out of the body. It is caused by bacteria. It can cause burning pain and urine that s cloudy or tinted with blood. UTIs are very common. Antibiotics usually help treat them.    Some outer ear infections. In some  cases, a healthcare provider may prescribe antibiotics by mouth for an ear infection. You may need a test to show the cause of the ear infection.    Some sinus infections. In some cases, your healthcare provider may give you antibiotics. He or she may first need to make sure your symptoms aren t caused by something else. This may be a virus, fungus, allergies, or air pollutants such as smoke.   Your healthcare provider may give you antibiotics if you have a condition that can affect your immune system. This includes diabetes or cancer.  Self-care at home  If your infection can t be treated with antibiotics, you can take other steps to feel better. Try the remedies below. In general:     Rest and sleep as much as needed.    Drink water and other clear fluids.    Don t smoke. Stay away from smoke from other people.    Use over-the-counter medicine such as acetaminophen or ibuprofen to ease pain or fever, as directed by your healthcare provider.  To treat sinus pain or nasal stuffiness:    Put a warm, moist cloth on your face where you feel sinus pain or pressure.    Try a nasal spray with medicine or saline. Use as directed by your healthcare provider.    Breathe in steam from a hot shower.    Use a humidifier or cool mist vaporizer.   To quiet a cough:     Use a humidifier or cool mist vaporizer.    Breathe in steam from a hot shower.    Suck on cough lozenges.   To sooth a sore throat:     Suck on ice chips, frozen ice pops, or lozenges.    Use a sore throat spray.    Use a humidifier or cool mist vaporizer.    Gargle with saltwater.    Drink warm liquids.    Take ibuprofen to reduce swelling and pain.  To ease ear pain:     Hold a warm, moist washcloth on the ear for 10 minutes at a time.  [employer] last reviewed this educational content on 12/1/2019 2000-2021 [employer]. All rights reserved. This information is not intended as a substitute for professional medical care. Always follow your  healthcare professional's instructions.        Dear Britt Park    After reviewing your responses, I've been able to diagnose you with?a sinus infection caused by a virus.?     It is also important to stay well hydrated, get lots of rest and take over-the-counter decongestants,?tylenol?or ibuprofen if you?are able to?take those medications per your primary care provider to help relieve discomfort.?     If your symptoms worsen, you develop severe headache, vomiting, high fever (>102), or are not improving in 7 days, please contact your primary care provider for an appointment or visit any of our convenient Walk-in Care or Urgent Care Centers to be seen which can be found on our website?here.?     Thanks again for choosing?us?as your health care partner,?   ?  ROSANNE Glez CNP?

## 2021-11-10 ENCOUNTER — IMMUNIZATION (OUTPATIENT)
Dept: NURSING | Facility: CLINIC | Age: 51
End: 2021-11-10
Payer: COMMERCIAL

## 2021-11-10 PROCEDURE — 0004A PR COVID VAC PFIZER DIL RECON 30 MCG/0.3 ML IM: CPT

## 2021-11-10 PROCEDURE — 91300 PR COVID VAC PFIZER DIL RECON 30 MCG/0.3 ML IM: CPT

## 2021-11-16 ENCOUNTER — MYC MEDICAL ADVICE (OUTPATIENT)
Dept: FAMILY MEDICINE | Facility: CLINIC | Age: 51
End: 2021-11-16
Payer: COMMERCIAL

## 2021-11-16 DIAGNOSIS — G71.3 MYOPATHY, MITOCHONDRIAL: ICD-10-CM

## 2021-11-16 NOTE — LETTER
[facility]  [address] [facility], [address]  [place] [address] [phone]      To Whom It May Concern      Miss Britt Park is under my care at [facility]. She needs over the counter calcium supplements for her health condition. She has an autoimmune disease which requires her to take prednisone. This medication can cause bone thinning and Britt needs to take calcium supplements for good bone health.       Let me know if you have any questions           REYNOLDMARVA RAMOS MD  [facility]

## 2021-11-17 ENCOUNTER — LAB (OUTPATIENT)
Dept: LAB | Facility: CLINIC | Age: 51
End: 2021-11-17
Payer: COMMERCIAL

## 2021-11-17 DIAGNOSIS — I15.9 SECONDARY HYPERTENSION: ICD-10-CM

## 2021-11-17 LAB
ANION GAP SERPL CALCULATED.3IONS-SCNC: 4 MMOL/L (ref 3–14)
BUN SERPL-MCNC: 15 MG/DL (ref 7–30)
CALCIUM SERPL-MCNC: 8.9 MG/DL (ref 8.5–10.1)
CHLORIDE BLD-SCNC: 105 MMOL/L (ref 94–109)
CO2 SERPL-SCNC: 31 MMOL/L (ref 20–32)
CREAT SERPL-MCNC: 0.42 MG/DL (ref 0.52–1.04)
GFR SERPL CREATININE-BSD FRML MDRD: >90 ML/MIN/1.73M2
GLUCOSE BLD-MCNC: 84 MG/DL (ref 70–99)
POTASSIUM BLD-SCNC: 3.5 MMOL/L (ref 3.4–5.3)
SODIUM SERPL-SCNC: 140 MMOL/L (ref 133–144)

## 2021-11-17 PROCEDURE — 36415 COLL VENOUS BLD VENIPUNCTURE: CPT

## 2021-11-17 PROCEDURE — 80048 BASIC METABOLIC PNL TOTAL CA: CPT

## 2021-11-17 RX ORDER — PREDNISONE 20 MG/1
40 TABLET ORAL DAILY
Qty: 40 TABLET | Refills: 0 | Status: SHIPPED | OUTPATIENT
Start: 2021-11-17 | End: 2021-12-07

## 2021-11-17 NOTE — TELEPHONE ENCOUNTER
predniSONE (DELTASONE) 20 MG tablet 40 tablet 0 11/2/2021  --   Sig - Route: Take 2 tablets (40 mg) by mouth daily - Oral   Sent to pharmacy as: predniSONE 20 MG Oral Tablet (DELTASONE)   Class: E-Prescribe   Order: [id number]   E-Prescribing Status: Receipt confirmed by pharmacy (11/2/2021  8:09 AM CDT)       Printout Tracking    External Result Report     Pharmacy    [id number] [place], [place] - [address]     Associated Diagnoses    Myopathy, mitochondrial [G71.3]  - Primary         TO PCP:     See below message from patient - requesting additional refill on Prednisone     Please advise     Vane Pappas RN  [facility]

## 2021-12-01 ENCOUNTER — OFFICE VISIT (OUTPATIENT)
Dept: FAMILY MEDICINE | Facility: CLINIC | Age: 51
End: 2021-12-01
Payer: COMMERCIAL

## 2021-12-01 VITALS
SYSTOLIC BLOOD PRESSURE: 121 MMHG | BODY MASS INDEX: 23.23 KG/M2 | DIASTOLIC BLOOD PRESSURE: 86 MMHG | WEIGHT: 127 LBS | TEMPERATURE: 97.1 F | HEART RATE: 91 BPM | OXYGEN SATURATION: 98 %

## 2021-12-01 DIAGNOSIS — Z79.52 ON PREDNISONE THERAPY: ICD-10-CM

## 2021-12-01 DIAGNOSIS — Z79.52 ENCOUNTER FOR MONITORING OF SYSTEMIC STEROID THERAPY: ICD-10-CM

## 2021-12-01 DIAGNOSIS — H04.123 DRY EYES: ICD-10-CM

## 2021-12-01 DIAGNOSIS — G71.3 MYOPATHY, MITOCHONDRIAL: Primary | ICD-10-CM

## 2021-12-01 DIAGNOSIS — I15.9 SECONDARY HYPERTENSION: ICD-10-CM

## 2021-12-01 DIAGNOSIS — R07.89 CHEST DISCOMFORT: ICD-10-CM

## 2021-12-01 DIAGNOSIS — Z51.81 ENCOUNTER FOR MONITORING OF SYSTEMIC STEROID THERAPY: ICD-10-CM

## 2021-12-01 PROCEDURE — 99215 OFFICE O/P EST HI 40 MIN: CPT | Performed by: INTERNAL MEDICINE

## 2021-12-01 RX ORDER — PREDNISONE 10 MG/1
30 TABLET ORAL DAILY
Qty: 90 TABLET | Refills: 0 | Status: SHIPPED | OUTPATIENT
Start: 2021-12-01 | End: 2021-12-31

## 2021-12-01 ASSESSMENT — PAIN SCALES - GENERAL: PAINLEVEL: NO PAIN (0)

## 2021-12-01 NOTE — PATIENT INSTRUCTIONS
Continue blood pressure medication.  Continue to check blood pressure two times a day  Vaccine: pneumonia vaccine PPSV23 after January 2nd

## 2021-12-01 NOTE — PROGRESS NOTES
Assessment & Plan     Myopathy, mitochondrial  Start 30 mg prednisone.   - predniSONE (DELTASONE) 10 MG tablet; Take 3 tablets (30 mg) by mouth daily    Secondary hypertension  Continue losartan. Check BP daily.    On prednisone therapy  Decreased dose to 30 mg.    Dry eyes  Continue artificial tears.    Encounter for monitoring of systemic steroid therapy  Screen for osteoporosis: Dexa - scheduled for next week  Pneumonia vaccine, done with PCV13 on 11/02. PPSV23 due after 1/2/2022.      Return if symptoms worsen or fail to improve.    REYNOLD RAMOS MD  [facility]AMIL[facility] Whitehead is a 51 year old who presents for the following health issues     HPI     Medication Followup of Prednisone    Taking Medication as prescribed: yes    Side Effects:  Muscle cramps in feet and hands at night    Medication Helping Symptoms:  Yes, but having the tingling again since starting the lower dose     Ventura is a very pleasant 51 year old lady who presented to the clinic for follow up.  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  She is on prednisone, initially started with 50 mg daily for 1 month, 40 mg for 2nd month, this is the 3rd month of therapy and will decrease the dose to 30 mg daily. She will be following up with Dr Albarran in a few weeks. Her symptoms have slightly improved since prednisone was started. She will discuss the next steps about continuing prednisone vs IVIG.  2 weeks ago, she had chest discomfort at night time, one time episdoe, she drank water, took gaviscon and prilosec and the pain went away after a while, no cp on exertion. She does pool therapy, stretches/PT.  She is not checking blood pressure at home, continues to take losartan.   She visited ophthalmologist and was prescribed artificial tears.   Mammogram in July 2021 normal  Pap smear in 2019 normal  She started a new job 2 months ago    Review of Systems         Objective    /86 (BP  Location: Left arm, Cuff Size: Adult Regular)   Pulse 91   Temp 97.1  F (36.2  C) (Temporal)   Wt 57.6 kg (127 lb)   LMP  (LMP Unknown)   SpO2 98%   BMI 23.23 kg/m    Body mass index is 23.23 kg/m .  Physical Exam  Vitals reviewed.

## 2021-12-08 ENCOUNTER — HOSPITAL ENCOUNTER (OUTPATIENT)
Dept: BONE DENSITY | Facility: CLINIC | Age: 51
Discharge: HOME | End: 2021-12-08
Attending: INTERNAL MEDICINE | Admitting: INTERNAL MEDICINE
Payer: COMMERCIAL

## 2021-12-08 DIAGNOSIS — Z79.52 ON PREDNISONE THERAPY: ICD-10-CM

## 2021-12-08 PROCEDURE — 77080 DXA BONE DENSITY AXIAL: CPT

## 2021-12-14 ENCOUNTER — MYC MEDICAL ADVICE (OUTPATIENT)
Dept: FAMILY MEDICINE | Facility: CLINIC | Age: 51
End: 2021-12-14
Payer: COMMERCIAL

## 2021-12-14 DIAGNOSIS — G71.3 MYOPATHY, MITOCHONDRIAL: Primary | ICD-10-CM

## 2021-12-15 NOTE — TELEPHONE ENCOUNTER
Please see patient's mychart.    Please reply back to patient, route to triage follow up, or route to team coordinators to have patient schedule an appointment.     Ranjan Elizabeth RN  [facility]

## 2021-12-20 DIAGNOSIS — I15.9 SECONDARY HYPERTENSION: ICD-10-CM

## 2021-12-22 RX ORDER — LOSARTAN POTASSIUM 25 MG/1
25 TABLET ORAL DAILY
Qty: 90 TABLET | Refills: 1 | Status: SHIPPED | OUTPATIENT
Start: 2021-12-22 | End: 2022-06-17

## 2021-12-22 NOTE — TELEPHONE ENCOUNTER
Routing refill request to provider for review/approval because:  Labs out of range:  TSH  Radha RAMOS RN

## 2021-12-29 ENCOUNTER — MYC MEDICAL ADVICE (OUTPATIENT)
Dept: FAMILY MEDICINE | Facility: CLINIC | Age: 51
End: 2021-12-29
Payer: COMMERCIAL

## 2021-12-29 NOTE — LETTER
[facility]  [address], [address]  [place] [address]  Phone: [phone]           Britt Park  1970      To Whom It May Concern    Miss Britt Park is under my care at [facility].     She will benefit from an independent pool pass for independent pool therapy.    She has a muscle condition and it helps with her symptoms.     Please do not hesitate to call me if you have any questions or concerns          Dr Vi Metz    [facility]  [phone]

## 2021-12-29 NOTE — TELEPHONE ENCOUNTER
Please see patient's [employer] message.  She will be able to access the letter on mychart when you sign it.    Shauna eKn MA

## 2022-01-04 ENCOUNTER — VIRTUAL VISIT (OUTPATIENT)
Dept: FAMILY MEDICINE | Facility: CLINIC | Age: 52
End: 2022-01-04
Payer: COMMERCIAL

## 2022-01-04 DIAGNOSIS — G71.3 MYOPATHY, MITOCHONDRIAL: Primary | ICD-10-CM

## 2022-01-04 DIAGNOSIS — Z29.89 NEED FOR PNEUMOCYSTIS PROPHYLAXIS: ICD-10-CM

## 2022-01-04 DIAGNOSIS — I15.9 SECONDARY HYPERTENSION: ICD-10-CM

## 2022-01-04 DIAGNOSIS — Z79.52 ON PREDNISONE THERAPY: ICD-10-CM

## 2022-01-04 DIAGNOSIS — Z79.52 ENCOUNTER FOR MONITORING OF SYSTEMIC STEROID THERAPY: ICD-10-CM

## 2022-01-04 DIAGNOSIS — Z51.81 ENCOUNTER FOR MONITORING OF SYSTEMIC STEROID THERAPY: ICD-10-CM

## 2022-01-04 PROCEDURE — 99215 OFFICE O/P EST HI 40 MIN: CPT | Mod: GT | Performed by: INTERNAL MEDICINE

## 2022-01-04 RX ORDER — DAPSONE 100 MG/1
100 TABLET ORAL DAILY
Qty: 60 TABLET | Refills: 1 | Status: SHIPPED | OUTPATIENT
Start: 2022-01-04 | End: 2022-04-19

## 2022-01-04 RX ORDER — PREDNISONE 10 MG/1
30 TABLET ORAL DAILY
Qty: 270 TABLET | Refills: 0 | Status: SHIPPED | OUTPATIENT
Start: 2022-01-04 | End: 2022-04-04

## 2022-01-04 NOTE — PROGRESS NOTES
Ventura is a 51 year old who is being evaluated via a billable video visit.      How would you like to obtain your AVS? Bailey  If the video visit is dropped, the invitation should be resent by: Text to cell phone: BAILEY  Will anyone else be joining your video visit? No      Video Start Time: 11:30 am    Assessment & Plan     Myopathy, mitochondrial  Refer to Rheum for methotrexate management.   Continue prednisone 30 mg daily.   - Rheumatology Referral; Future  - predniSONE (DELTASONE) 10 MG tablet; Take 3 tablets (30 mg) by mouth daily    On prednisone therapy  On prednisone and starting methotrexate, which is the criteria for starting prophylaxis for pneumocystis pneumonia.   - dapsone (ACZONE) 100 MG tablet; Take 1 tablet (100 mg) by mouth daily    Encounter for monitoring of systemic steroid therapy  Takes omeprazole  On BP medication  Takes calcium 1500 gm daily  Up to date with vaccination      Secondary hypertension  BP readings stable. Continue medication.    Need for pneumocystis prophylaxis:  Starting dapsone 100 gm daily   Will check cbc and LFT now and every month.    > 45 min spent on the date of this encounter doing chart review, documentation, history/exam, and further activities as noted above      No follow-ups on file.    REYNOLD RAMOS MD  [facility] ELO Whitehead is a 51 year old who presents for the following health issues   HPI   Ventura is a very pleasant 51 year old lady who had a video visit for follow up.  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   She is currently on prednisone 30 mg daily.  She had a recent follow-up with Dr Albarran at [facility] who manages her myositis.  After discussion with her she decided to start methotrexate therapy.  He prescribed methotrexate 15 mg subcutaneously once a week along with monitoring of CBC, liver enzymes and  I discussed with the patient that we will refer her to rheumatology for methotrexate  management as this is not available in our clinic.  Starting her on dapsone for pneumocystic pneumonia prophylaxis: indication for prophylaxis is. patients receiving a glucocorticoid dose equivalent to ?20 mg of prednisone daily for one month or longer who also have another cause of immunocompromise (eg, certain hematologic malignancies or a second immunosuppressive drug). As she is going to start methotrexate. This is per uptodate.   Her BP readings are within normal range.       Chief Complaint   Patient presents with     hospitals Care     Review of Systems         Objective         Vitals:  No vitals were obtained today due to virtual visit.    Physical Exam   GEN: No acute distress  RESP: No audible increased work of breathing. Patient speaking in full sentences without distress.  PSYCH: pleasant  Exam otherwise limited due to virtual platform          Video-Visit Details    Type of service:  Video Visit    Video End Time:12:10    Originating Location (pt. Location): Home    Distant Location (provider location):  [facility]     Platform used for Video Visit: [place]

## 2022-01-04 NOTE — TELEPHONE ENCOUNTER
This mychart is being routed as an FYI to provider.  Please let triage know if any follow up is needed.  Jhonathan Phililps RN

## 2022-01-06 ENCOUNTER — ALLIED HEALTH/NURSE VISIT (OUTPATIENT)
Dept: FAMILY MEDICINE | Facility: CLINIC | Age: 52
End: 2022-01-06
Payer: COMMERCIAL

## 2022-01-06 ENCOUNTER — MYC MEDICAL ADVICE (OUTPATIENT)
Dept: RHEUMATOLOGY | Facility: CLINIC | Age: 52
End: 2022-01-06

## 2022-01-06 DIAGNOSIS — Z23 NEED FOR VACCINATION: Primary | ICD-10-CM

## 2022-01-06 PROCEDURE — 90471 IMMUNIZATION ADMIN: CPT

## 2022-01-06 PROCEDURE — 99207 PR NO CHARGE NURSE ONLY: CPT

## 2022-01-06 PROCEDURE — 90732 PPSV23 VACC 2 YRS+ SUBQ/IM: CPT

## 2022-01-07 ENCOUNTER — E-VISIT (OUTPATIENT)
Dept: URGENT CARE | Facility: CLINIC | Age: 52
End: 2022-01-07

## 2022-01-07 ENCOUNTER — LAB (OUTPATIENT)
Dept: LAB | Facility: CLINIC | Age: 52
End: 2022-01-07
Payer: COMMERCIAL

## 2022-01-07 DIAGNOSIS — L01.00 IMPETIGO: Primary | ICD-10-CM

## 2022-01-07 DIAGNOSIS — Z29.89 NEED FOR PNEUMOCYSTIS PROPHYLAXIS: ICD-10-CM

## 2022-01-07 LAB
ERYTHROCYTE [DISTWIDTH] IN BLOOD BY AUTOMATED COUNT: 14.5 % (ref 10–15)
HCT VFR BLD AUTO: 42.8 % (ref 35–47)
HGB BLD-MCNC: 13.4 G/DL (ref 11.7–15.7)
MCH RBC QN AUTO: 32.9 PG (ref 26.5–33)
MCHC RBC AUTO-ENTMCNC: 31.3 G/DL (ref 31.5–36.5)
MCV RBC AUTO: 105 FL (ref 78–100)
PLATELET # BLD AUTO: 339 10E3/UL (ref 150–450)
RBC # BLD AUTO: 4.07 10E6/UL (ref 3.8–5.2)
WBC # BLD AUTO: 10.9 10E3/UL (ref 4–11)

## 2022-01-07 PROCEDURE — 85027 COMPLETE CBC AUTOMATED: CPT

## 2022-01-07 PROCEDURE — 80076 HEPATIC FUNCTION PANEL: CPT

## 2022-01-07 PROCEDURE — 36415 COLL VENOUS BLD VENIPUNCTURE: CPT

## 2022-01-07 PROCEDURE — 99421 OL DIG E/M SVC 5-10 MIN: CPT | Performed by: PHYSICIAN ASSISTANT

## 2022-01-07 RX ORDER — MUPIROCIN 20 MG/G
OINTMENT TOPICAL 3 TIMES DAILY
Qty: 15 G | Refills: 0 | Status: SHIPPED | OUTPATIENT
Start: 2022-01-07 | End: 2022-01-14

## 2022-01-07 NOTE — PATIENT INSTRUCTIONS
Dear Britt Park    I will treat for impetigo with a topical antibiotic. Follow up if worsening or not improving.    Thanks for choosing us as your health care partner,    Jinny Pierre PA-C

## 2022-01-08 LAB
ALBUMIN SERPL-MCNC: 4 G/DL (ref 3.4–5)
ALP SERPL-CCNC: 54 U/L (ref 40–150)
ALT SERPL W P-5'-P-CCNC: 20 U/L (ref 0–50)
AST SERPL W P-5'-P-CCNC: 10 U/L (ref 0–45)
BILIRUB DIRECT SERPL-MCNC: <0.1 MG/DL (ref 0–0.2)
BILIRUB SERPL-MCNC: 0.4 MG/DL (ref 0.2–1.3)
PROT SERPL-MCNC: 7.7 G/DL (ref 6.8–8.8)

## 2022-01-11 ENCOUNTER — TELEPHONE (OUTPATIENT)
Dept: RHEUMATOLOGY | Facility: CLINIC | Age: 52
End: 2022-01-11
Payer: COMMERCIAL

## 2022-01-11 DIAGNOSIS — Z79.52 ENCOUNTER FOR MONITORING OF SYSTEMIC STEROID THERAPY: Primary | ICD-10-CM

## 2022-01-11 DIAGNOSIS — G71.3 MYOPATHY, MITOCHONDRIAL: ICD-10-CM

## 2022-01-11 DIAGNOSIS — Z51.81 ENCOUNTER FOR MONITORING OF SYSTEMIC STEROID THERAPY: Primary | ICD-10-CM

## 2022-01-11 DIAGNOSIS — Z79.631 LONG TERM METHOTREXATE USER: ICD-10-CM

## 2022-01-11 NOTE — TELEPHONE ENCOUNTER
[facility]    Phone Message    May a detailed message be left on voicemail: yes     Reason for Call: Other: Pt would like to talk to someone as she has some questions about the methotrexate that was prescribed. Please call pt at [phone]    Action Taken: Message routed to:  [facility]     Travel Screening: Not Applicable

## 2022-01-11 NOTE — TELEPHONE ENCOUNTER
Called and spoke with the patient and discuss the methotrexate and the side effects and would prefer not to take the oral methotrexate . Pt wondering if she can get the injection on Thursday , recommended to start the Folic Acid and the Dapsone...Effie Kuo RN

## 2022-01-12 RX ORDER — METHOTREXATE 25 MG/ML
20 INJECTION, SOLUTION INTRA-ARTERIAL; INTRAMUSCULAR; INTRAVENOUS
Qty: 4 ML | Refills: 4 | Status: SHIPPED | OUTPATIENT
Start: 2022-01-12 | End: 2022-01-20

## 2022-01-12 NOTE — TELEPHONE ENCOUNTER
Mary, I was laboring under the impression that Dr. Metz's office had requested Rheum assistance with prescribing and monitoring a new methotrexate prescription. I have never seen the patient, and I did not realize that patient had already started injectable methotrexate, as Ana's brief message implies.    If she is taking injectable methotrexate, then she may have 20 mg (0.8 ml) subcutaneous once weekly X 4, 3 RF. I sent this Rx to the pharmacy at the top of patient's list of pharmacies, since I could not find direction about where to send it in the request for medication. While taking the drug:    --q 3 mo AST/ALT, Albumin, CBC with plts  --Limit EtOH intake to 2 drinks weekly; use folate 1 mg daily.  --Tylenol 500 mg tid prn nausea/HA associated with dosing.    The Rheumatology appointment can go ahead as scheduled.

## 2022-01-12 NOTE — TELEPHONE ENCOUNTER
"My understanding is that the reason for consultation with Rheumatology is that another provider (Neurology) had recommended methotrexate to treat myositis. Primary care was not comfortable prescribing and monitoring the drug, and Dr Metz requested that Rheumatology carry out the monitoring.  If patient will not use the recommended medication, I urge her to return to the treating provider (Neurology at [place], and discuss alternative medications. Rheumatology will be happy to assist with monitoring immunomodulatory medications if another drug is identified, but the upcoming appointment in Rheumatology to discuss methotrexate can be cancelled.    Please identify the \"injection\" that is mentioned in the message.  thanks  "

## 2022-01-12 NOTE — TELEPHONE ENCOUNTER
Yes patient can plan to start subcutaneous methotrexate after the planned appointment, and yes an education session at the time of the appoitnment would be fine. Does that answer the questions?

## 2022-01-18 ENCOUNTER — MYC MEDICAL ADVICE (OUTPATIENT)
Dept: RHEUMATOLOGY | Facility: CLINIC | Age: 52
End: 2022-01-18
Payer: COMMERCIAL

## 2022-01-18 NOTE — PROGRESS NOTES
"Rheumatology Clinic Visit  [facility]  Husam Barnett M.D.     Britt Park MRN# [id number]   YOB: 1970 Age: 51 year old     Date of Visit: 01/20/2022  Primary care provider: Vi Metz          Assessment and Plan:     # Idiopathic inflammatory myositis: Diagnosis of \"granulomatous myositis\" established by [place] neurologist Dr. Albarran.  Recommendation given in December 2021 was to institute steroid sparing therapy with either methotrexate or with IVIG.  I agree with the diagnostic construct of a systemic inflammatory disease partially responsive to corticosteroid treatment, and with the proposal to expand immunomodulatory therapy, now 3 months since the start of moderate dose corticosteroids.    Patient's primary provider requests assistance with instituting and monitoring immunomodulatory treatment.    We had a good discussion about risks and benefits of intravenous immunoglobulin and methotrexate.  Patient expressed significant concerns about safety, risk of malignancy, risk of infection, and side effects associated with methotrexate.  I explained that immunosuppression associated with low-dose methotrexate use is mild, and of less significance than is moderate dose corticosteroids (patient current dose is 30 mg prednisone daily) with respect to immune responses protective against agents such as coronavirus SARS Co. V2.  Greater than 90% of methotrexate users report mild side effects including mucositis and transient dyspepsia/nausea.  Such side effects are typically counteracted with use of folic acid and with antiemetics/acetaminophen.    After thorough discussion, patient and spouse (participating by telephone during the clinic visit) indicated a decision to begin methotrexate treatment.  I am comfortable with use of oral methotrexate, but also endorse patient's preference for methotrexate injectable preparation, in order to minimize any potential loss of bioavailability " associated with differential absorption through the gut.    Plan:  1.  Start subcutaneous methotrexate by injection: Inject 0.4 mL (10 mg) today.  If medication is well-tolerated, inject 0.8 mL (20 mg) starting 1 week from now, and every week for 3 months.  Expect gradual effect of the medication on myositis over 3 to 6 weeks.    While on methotrexate:   -- Check labs every 3 months (AST/ALT, Albumin, CBC with platelets). First labs about 1 month after starting methotrexate  -- Limit alcohol intake to 2 drinks weekly; use folate 1 mg daily.  --Tylenol 500-1000 mg can be used as needed up to three times daily for nausea/headache associated with dosing.    I recommended that patient follow-up with [facility], as recommended, for all diagnostic assessments designed to  medication effectiveness.  Rheumatolgoy service will focus on medication dosing and monitoring, and on implementation of recommended adjustment to complex immunomodulatory therapy.    RTC 3 mos    Husam Barnett MD  [facility] [place]         Active Problem List:     Patient Active Problem List    Diagnosis Date Noted     Need for pneumocystis prophylaxis 01/04/2022     Priority: Medium     Impetigo 11/02/2021     Priority: Medium     Secondary hypertension 11/02/2021     Priority: Medium     On prednisone therapy 11/02/2021     Priority: Medium     Vitamin B 12 deficiency 11/02/2021     Priority: Medium     Myopathy, mitochondrial 09/22/2021     Priority: Medium     Overactive bladder 09/22/2021     Priority: Medium     Dry eyes 09/22/2021     Priority: Medium     Encounter for monitoring of systemic steroid therapy 09/22/2021     Priority: Medium     Need for vaccination 09/22/2021     Priority: Medium            History of Present Illness:   Britt Park presents for establishment of care for medication monitoring.  She is referred by Dr. Vega in family practice for assistance with prescribing and monitoring  "methotrexate.    Patient has been following with Dr. Albarran in neurology at [facility] since mid 2021.  I excerpt historical information included in Dr. Albarran's note from December 13, 2021    \" ...Last seen was on September 16, 2021. At that time, we establish the diagnosis of granulomatous myositis and recommended treatment with prednisone. The patient started treatment toward the end of September. She was originally of 50 mg of prednisone for a month, then 40 mg for a month, then last week she decrease the dose to 30 mg per day. She was also started on omeprazole and vitamin-D/calcium. She was not started on Bactrim.  The patient noticed immediate improvement in her neck pain. Her muscle falling and stiffness resolved so she stopped naltrexone. Her range of motion has not changed much. She still unable to fully abduct her shoulders. She still struggles a standing up from a low seat. She still has difficulty with chairs. But she felt overall her performance has improved. One time she was able to get into her 's truck without any help which is unusual for her. She also used to have intermittent tingling and numbness in her left lower face, along her jaw line, which has resolved on prednisone. Once she cut back down on prednisone to 30 mg, she has been feeling as if her muscle stiffness is coming back, she is again having some intermittent face numbness and tingling, and she feels some discomfort and instability in her right ankle. Overall, she feels her strength itself has not changed after cutting down on prednisone.    New test results  NCS/EMG: There continues to be an electrodiagnostic evidence of a proximal upper limb predominant myopathy with electrophysiologic correlates of necrotic fibers, fiber splitting or vacuolization of muscle fibers. Compared to the prior study in July 2021, there is an interval improvement of the myopathic process based on: 1. less fibrillation potentials and milder degree of " motor unit potential changes observed in proximal upper limb muscles and 2. absence of myopathic motor unit potentials in tensor fascia latae.     Muscle MRI: no significant change in the diffuse inflammatory intramuscular edema throughout the bilateral pelvis and thigh musculature since 07/08/2020 suggestive of myositis. There has been mild interval progression of areas of moderate and advanced volume loss about both thighs. Areas of advanced atrophy include the bilateral vastus lateralis, bilateral adductor Rogelio and the right upper hamstring musculature....    ....ASSESSMENT / PLAN   #1 Granulomatous myositis, mildly positive mitochondrial antibodies  After three months of oral prednisone treatment, Mrs. Park has experienced some improvement in muscle pain and overall function with relatively unchanged strength. Neurological examination is relatively unchanged. There is some improvement in the electrodiagnostic findings but as they were mostly confined to the tensor fascia corry, they could also be due to sampling error. Muscle MRI compared to the one from August 2020 showed relatively unchanged T2 hyperintensity with more advanced muscle atrophy.  Given the lack of clear improvement in 's strength, it is possible that her myositis is indeed responsive to prednisone but we just need to give it more time, or that is how far we going to get with prednisone. It is also possible that her underlying myositis is not responsive to immunotherapy as can be seen in inclusion body myositis (IBM) or sometimes sarcoidosis, however, we cannot determine that at this time especially that the muscle biopsy showed no features of IBM.  The patient was seen by Dr. Martinez from GI regarding her positive AMA. I appreciate his help. There is no evidence of liver disease at this time. The plan is to follow up in a year.  After discussing several options (including waiting to see if we get more improvement on prednisone  "or add methotrexate), we decided the followin. Start IVIG 2g/kg over 5 days on week 1, then start 0.4 g/kg once a week on week 2. The patient should remain on this weekly dose for at least 3 months.   2. Continue prednisone 30 mg per day for now. Once IVIG is started, a month later, the patient will let me know how she is doing at that time. If she is doing ok, prednisone dose can be decreased to 25 mg per day for a month, then 20 mg per day and stay on that dose until follow up.   3. Please start Trimethoprim/sulfamethoxazole (Bactrim) DS one tablet three times per week for Pneumocystis carinii prophylaxis.  For patients who are allergic to sulfa, alternatives include dapsone 50 mg PO daily plus pyrimethamine 50 mg PO weekly plus leucovorin 25 mg PO weekly, or aerosolized pentamidine 300 mg every four weeks. The patient should remain in that until she is on less than 20 mg of prednisone per day.   4. We will add TDP43 and p62 immunostains on her muscle biopsy.\"      Today 2022, she confirms muscle weakness going on since 2019. +difficulty with drying hair, grooming, going upstairs. She was eventually diagnosed vith myositis (although Noran-rec'd muscle biopsy did not show characteristic findings) by [facility] in 2021. L brii biopsy showed \"granulomatous myositis\". She was treated with prednisone 50 mg starting in . She has noted improvement in chronic neck pain, and has noted slight improvement in weakness since then. However, she still has difficculty getting up from chairs and from the floor.She has tapered prednisone to 30 mg daily.     F/u MRI and EMG at [facility] in  showed \"little improvement\" by report. Dr. Albarran recommended either methotrexate or IVIg as a steroid-sparing agent.     She reports that she is lenaning toward use methotrexate due to its lower expense and tolerability, compared with IVIg.  She has questions about safety of methotrexate, and of IVIG.  She has " "acquired liquid methotrexate, but has not started the drug yet.    She reports having sleep apnea, and has frequent nasal skin irritation due to CPAP. She has been given bacterial cream for \"Impetigo\", and skin is improved, but she is concerned about having a \"cold\" right now.           Review of Systems:       Constitutional: negative  Skin: negative  Eyes: negative  Ears/Nose/Throat: negative  Respiratory: No shortness of breath, dyspnea on exertion, cough, or hemoptysis  Cardiovascular: negative  Gastrointestinal: negative  Genitourinary: negative  Musculoskeletal: negative  Neurologic: negative  Psychiatric: negative  Hematologic/Lymphatic/Immunologic: negative  Endocrine: negative          Past Medical History:     Past Medical History:   Diagnosis Date     Secondary hypertension 2021     Past Surgical History:   Procedure Laterality Date     ABDOMEN SURGERY  2007         BIOPSY  2019 & 2021    Right bicep, result abnormal results, & left tricep biopsy     COLONOSCOPY  21     GYN SURGERY  07     for twins     # Positive AMA antibodies : The patient was seen by Dr. Martinez from [facility] . There is no evidence of liver disease; annual f/u in GI planned.       Social History:     Social History     Occupational History     Not on file   Tobacco Use     Smoking status: Never Smoker     Smokeless tobacco: Never Used   Substance and Sexual Activity     Alcohol use: Not Currently     Drug use: Never     Sexual activity: Yes     Partners: Male     Birth control/protection: Male Surgical      she started a new job in [employer] at the [employer] in 2021       Family History:     Family History   Problem Relation Age of Onset     Unknown/Adopted Other             Allergies:     Allergies   Allergen Reactions     Influenza Vaccines Hives     Diphenhydramine Other (See Comments)     SHAKY       Sulfa Drugs Swelling            Medications:     Current Outpatient " "Medications   Medication Sig Dispense Refill     dapsone (ACZONE) 100 MG tablet Take 1 tablet (100 mg) by mouth daily 60 tablet 1     folic acid (FOLVITE) 1 MG tablet Take 1 tablet (1 mg) by mouth daily 90 tablet 0     gabapentin (NEURONTIN) 300 MG capsule Take 300 mg by mouth       losartan (COZAAR) 25 MG tablet Take 1 tablet (25 mg) by mouth daily 90 tablet 1     methotrexate 50 MG/2ML injection Inject 0.8 mLs (20 mg) Subcutaneous every 7 days 4 mL 4     mirabegron (MYRBETRIQ) 25 MG 24 hr tablet Take 1 tablet (25 mg) by mouth daily 60 tablet 4     mometasone (NASONEX) 50 MCG/ACT nasal spray 2 sprays       mupirocin (BACTROBAN) 2 % external ointment Apply topically 3 times daily 15 g 0     naltrexone (DEPADE/REVIA) 50 MG tablet Take 1 tablet (50 mg) by mouth daily 60 tablet 3     naproxen (NAPROSYN) 500 MG tablet TAKE 1 TABLET BY MOUTH TWICE DAILY WITH MEALS AS NEEDED       omeprazole (PRILOSEC) 20 MG DR capsule Take 1 capsule (20 mg) by mouth daily 60 capsule 3     predniSONE (DELTASONE) 10 MG tablet Take 3 tablets (30 mg) by mouth daily 270 tablet 0     valACYclovir (VALTREX) 1000 mg tablet Take 2,000 mg by mouth              Physical Exam:   Blood pressure 124/71, pulse 96, temperature 98.1  F (36.7  C), temperature source Oral, height 1.575 m (5' 2\"), weight 59.6 kg (131 lb 4.8 oz), SpO2 100 %, not currently breastfeeding.  Wt Readings from Last 6 Encounters:   01/20/22 59.6 kg (131 lb 4.8 oz)   12/01/21 57.6 kg (127 lb)   11/02/21 55.8 kg (123 lb)   10/19/21 54 kg (119 lb)   09/22/21 51.3 kg (113 lb)       Constitutional: well-developed, appearing stated age; cooperative  Psych: nl judgement, orientation, memory, affect.         Data:     @  RHEUM RESULTS Latest Ref Rng & Units 12/12/2019 2/6/2020 6/25/2020   ALBUMIN 3.4 - 5.0 g/dL - - -   ALT 0 - 50 U/L 17 - -   AST 0 - 45 U/L 26 - -   CREATININE 0.52 - 1.04 mg/dL - 0.40(L) -   GFR ESTIMATE, IF BLACK >60 ml/min/1.73m2 - >60 -   GFR ESTIMATE >60 mL/min/1.73m2 " - >60 -   HEMATOCRIT 35.0 - 47.0 % - - 40.9   HEMOGLOBIN 11.7 - 15.7 g/dL - - 13.2   HCVAB Nonreactive - - -   WBC 4.0 - 11.0 10e3/uL - - 3.7(L)   RBC 3.80 - 5.20 10e6/uL - - 4.14   RDW 10.0 - 15.0 % - - 13.0   MCHC 31.5 - 36.5 g/dL - - 32.3   MCV 78 - 100 fL - - 99   PLATELET COUNT 150 - 450 10e3/uL - - 278

## 2022-01-19 NOTE — TELEPHONE ENCOUNTER
Thank you for the update.  Here is a recent science study finding that methotrexate does not impair ability to fight COVID19:    Https://pubmed.ncbi.nlm.nih.gov/27650726/    Looking forward to 1-20 visit.    to prevent further admission Follow up with your PMD

## 2022-01-20 ENCOUNTER — OFFICE VISIT (OUTPATIENT)
Dept: RHEUMATOLOGY | Facility: CLINIC | Age: 52
End: 2022-01-20
Payer: COMMERCIAL

## 2022-01-20 VITALS
HEART RATE: 96 BPM | WEIGHT: 131.3 LBS | TEMPERATURE: 98.1 F | DIASTOLIC BLOOD PRESSURE: 71 MMHG | BODY MASS INDEX: 24.16 KG/M2 | OXYGEN SATURATION: 100 % | SYSTOLIC BLOOD PRESSURE: 124 MMHG | HEIGHT: 62 IN

## 2022-01-20 DIAGNOSIS — Z79.631 LONG TERM METHOTREXATE USER: ICD-10-CM

## 2022-01-20 DIAGNOSIS — G71.3 MYOPATHY, MITOCHONDRIAL: ICD-10-CM

## 2022-01-20 PROCEDURE — 99203 OFFICE O/P NEW LOW 30 MIN: CPT | Performed by: INTERNAL MEDICINE

## 2022-01-20 RX ORDER — METHOTREXATE 25 MG/ML
20 INJECTION, SOLUTION INTRA-ARTERIAL; INTRAMUSCULAR; INTRAVENOUS
Qty: 4 ML | Refills: 4 | Status: SHIPPED | OUTPATIENT
Start: 2022-01-20 | End: 2022-05-19

## 2022-01-20 ASSESSMENT — MIFFLIN-ST. JEOR: SCORE: 1168.82

## 2022-01-20 ASSESSMENT — PAIN SCALES - GENERAL: PAINLEVEL: NO PAIN (1)

## 2022-01-20 NOTE — PATIENT INSTRUCTIONS
Dx:  1. 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 Either medication has a chance of reducing need for prednisone and reducing myositis activity over time.    Plan:  1.  Start subcutaneous methotrexate by injection: Inject 0.4 mL (10 mg) today.  If medication is well-tolerated, inject 0.8 mL (20 mg) starting 1 week from now, and every week for 3 months.  Expect gradual effect of the medication on myositis over 3 to 6 weeks.    While on methotrexate:   -- Check labs every 3 months (AST/ALT, Albumin, CBC with platelets). First labs about 1 month after starting methotrexate  -- Limit alcohol intake to 2 drinks weekly; use folate 1 mg daily.  --Tylenol 500-1000 mg can be used as needed up to three times daily for nausea/headache associated with dosing.

## 2022-01-20 NOTE — NURSING NOTE
"Chief Complaint   Patient presents with     Myopathy, mitochondrial       Vitals:    01/20/22 0921   BP: 124/71   BP Location: Left arm   Patient Position: Sitting   Cuff Size: Adult Regular   Pulse: 96   Temp: 98.1  F (36.7  C)   TempSrc: Oral   SpO2: 100%   Weight: 59.6 kg (131 lb 4.8 oz)   Height: 1.575 m (5' 2\")       Body mass index is 24.02 kg/m .    Gregoria Farmer MA    "

## 2022-01-29 ENCOUNTER — HEALTH MAINTENANCE LETTER (OUTPATIENT)
Age: 52
End: 2022-01-29

## 2022-02-02 ENCOUNTER — MYC MEDICAL ADVICE (OUTPATIENT)
Dept: RHEUMATOLOGY | Facility: CLINIC | Age: 52
End: 2022-02-02
Payer: COMMERCIAL

## 2022-02-02 DIAGNOSIS — G71.3 MYOPATHY, MITOCHONDRIAL: Primary | ICD-10-CM

## 2022-02-04 NOTE — TELEPHONE ENCOUNTER
The pt wanted these added on for her [facility] MD do you want to sign these to be added onto the existing labs ?.Effie Kuo RN

## 2022-02-09 ENCOUNTER — MYC MEDICAL ADVICE (OUTPATIENT)
Dept: RHEUMATOLOGY | Facility: CLINIC | Age: 52
End: 2022-02-09
Payer: COMMERCIAL

## 2022-02-09 NOTE — TELEPHONE ENCOUNTER
Dr. Barnett can you please look at the my chart message and recommend if pt should hold her injection.Effie Kuo RN

## 2022-02-11 NOTE — TELEPHONE ENCOUNTER
No, I do not recommend holding methotrexate unless fever greater than 100.5, or worsening cough with shortness of breath.

## 2022-02-14 ENCOUNTER — MYC MEDICAL ADVICE (OUTPATIENT)
Dept: FAMILY MEDICINE | Facility: CLINIC | Age: 52
End: 2022-02-14
Payer: COMMERCIAL

## 2022-02-14 DIAGNOSIS — E53.8 VITAMIN B 12 DEFICIENCY: Primary | ICD-10-CM

## 2022-02-15 NOTE — TELEPHONE ENCOUNTER
[place]se[place] message sent to patient     Will need order for B12 lab- pended    Darya LEE, Triage RN  [facility]

## 2022-02-21 ENCOUNTER — LAB (OUTPATIENT)
Dept: LAB | Facility: CLINIC | Age: 52
End: 2022-02-21
Payer: COMMERCIAL

## 2022-02-21 DIAGNOSIS — Z79.631 LONG TERM METHOTREXATE USER: ICD-10-CM

## 2022-02-21 DIAGNOSIS — E53.8 VITAMIN B 12 DEFICIENCY: ICD-10-CM

## 2022-02-21 DIAGNOSIS — G71.3 MYOPATHY, MITOCHONDRIAL: ICD-10-CM

## 2022-02-21 LAB
ALBUMIN SERPL-MCNC: 3.5 G/DL (ref 3.4–5)
ALT SERPL W P-5'-P-CCNC: 20 U/L (ref 0–50)
ANION GAP SERPL CALCULATED.3IONS-SCNC: 9 MMOL/L (ref 3–14)
AST SERPL W P-5'-P-CCNC: 13 U/L (ref 0–45)
BUN SERPL-MCNC: 17 MG/DL (ref 7–30)
CALCIUM SERPL-MCNC: 8.5 MG/DL (ref 8.5–10.1)
CHLORIDE BLD-SCNC: 108 MMOL/L (ref 94–109)
CK SERPL-CCNC: 111 U/L (ref 30–225)
CO2 SERPL-SCNC: 24 MMOL/L (ref 20–32)
CREAT SERPL-MCNC: 0.48 MG/DL (ref 0.52–1.04)
ERYTHROCYTE [DISTWIDTH] IN BLOOD BY AUTOMATED COUNT: 20.2 % (ref 10–15)
GFR SERPL CREATININE-BSD FRML MDRD: >90 ML/MIN/1.73M2
GLUCOSE BLD-MCNC: 86 MG/DL (ref 70–99)
HCT VFR BLD AUTO: 30.8 % (ref 35–47)
HGB BLD-MCNC: 9.4 G/DL (ref 11.7–15.7)
MCH RBC QN AUTO: 36.6 PG (ref 26.5–33)
MCHC RBC AUTO-ENTMCNC: 30.5 G/DL (ref 31.5–36.5)
MCV RBC AUTO: 120 FL (ref 78–100)
PLATELET # BLD AUTO: 473 10E3/UL (ref 150–450)
POTASSIUM BLD-SCNC: 3.8 MMOL/L (ref 3.4–5.3)
RBC # BLD AUTO: 2.57 10E6/UL (ref 3.8–5.2)
SODIUM SERPL-SCNC: 141 MMOL/L (ref 133–144)
VIT B12 SERPL-MCNC: 401 PG/ML (ref 193–986)
WBC # BLD AUTO: 10.7 10E3/UL (ref 4–11)

## 2022-02-21 PROCEDURE — 84450 TRANSFERASE (AST) (SGOT): CPT

## 2022-02-21 PROCEDURE — 82040 ASSAY OF SERUM ALBUMIN: CPT

## 2022-02-21 PROCEDURE — 80048 BASIC METABOLIC PNL TOTAL CA: CPT

## 2022-02-21 PROCEDURE — 82607 VITAMIN B-12: CPT

## 2022-02-21 PROCEDURE — 36415 COLL VENOUS BLD VENIPUNCTURE: CPT

## 2022-02-21 PROCEDURE — 82550 ASSAY OF CK (CPK): CPT

## 2022-02-21 PROCEDURE — 84460 ALANINE AMINO (ALT) (SGPT): CPT

## 2022-02-21 PROCEDURE — 85027 COMPLETE CBC AUTOMATED: CPT

## 2022-02-26 ENCOUNTER — MYC MEDICAL ADVICE (OUTPATIENT)
Dept: FAMILY MEDICINE | Facility: CLINIC | Age: 52
End: 2022-02-26
Payer: COMMERCIAL

## 2022-03-26 ENCOUNTER — HEALTH MAINTENANCE LETTER (OUTPATIENT)
Age: 52
End: 2022-03-26

## 2022-04-08 ENCOUNTER — IMMUNIZATION (OUTPATIENT)
Dept: NURSING | Facility: CLINIC | Age: 52
End: 2022-04-08
Payer: COMMERCIAL

## 2022-04-08 PROCEDURE — 91305 COVID-19,PF,PFIZER (12+ YRS): CPT

## 2022-04-08 PROCEDURE — 0054A COVID-19,PF,PFIZER (12+ YRS): CPT

## 2022-04-15 ENCOUNTER — MYC MEDICAL ADVICE (OUTPATIENT)
Dept: FAMILY MEDICINE | Facility: CLINIC | Age: 52
End: 2022-04-15
Payer: COMMERCIAL

## 2022-04-17 DIAGNOSIS — G71.3 MYOPATHY, MITOCHONDRIAL: ICD-10-CM

## 2022-04-18 ENCOUNTER — OFFICE VISIT (OUTPATIENT)
Dept: FAMILY MEDICINE | Facility: CLINIC | Age: 52
End: 2022-04-18
Payer: COMMERCIAL

## 2022-04-18 VITALS
SYSTOLIC BLOOD PRESSURE: 117 MMHG | RESPIRATION RATE: 16 BRPM | BODY MASS INDEX: 24.66 KG/M2 | TEMPERATURE: 97.8 F | HEART RATE: 78 BPM | DIASTOLIC BLOOD PRESSURE: 73 MMHG | WEIGHT: 134 LBS | HEIGHT: 62 IN | OXYGEN SATURATION: 99 %

## 2022-04-18 DIAGNOSIS — J30.2 SEASONAL ALLERGIC RHINITIS, UNSPECIFIED TRIGGER: ICD-10-CM

## 2022-04-18 DIAGNOSIS — D64.9 ANEMIA, UNSPECIFIED TYPE: Primary | ICD-10-CM

## 2022-04-18 DIAGNOSIS — Z12.31 VISIT FOR SCREENING MAMMOGRAM: ICD-10-CM

## 2022-04-18 DIAGNOSIS — Z79.52 ON PREDNISONE THERAPY: ICD-10-CM

## 2022-04-18 DIAGNOSIS — G71.3 MYOPATHY, MITOCHONDRIAL: ICD-10-CM

## 2022-04-18 LAB
ERYTHROCYTE [DISTWIDTH] IN BLOOD BY AUTOMATED COUNT: 17.7 % (ref 10–15)
FERRITIN SERPL-MCNC: 189 NG/ML (ref 8–252)
FOLATE SERPL-MCNC: 12.4 NG/ML
HCT VFR BLD AUTO: 31.3 % (ref 35–47)
HGB BLD-MCNC: 9.6 G/DL (ref 11.7–15.7)
IRON SATN MFR SERPL: 37 % (ref 15–46)
IRON SERPL-MCNC: 100 UG/DL (ref 35–180)
MCH RBC QN AUTO: 36.8 PG (ref 26.5–33)
MCHC RBC AUTO-ENTMCNC: 30.7 G/DL (ref 31.5–36.5)
MCV RBC AUTO: 120 FL (ref 78–100)
PLATELET # BLD AUTO: 484 10E3/UL (ref 150–450)
RBC # BLD AUTO: 2.61 10E6/UL (ref 3.8–5.2)
TIBC SERPL-MCNC: 270 UG/DL (ref 240–430)
WBC # BLD AUTO: 6.9 10E3/UL (ref 4–11)

## 2022-04-18 PROCEDURE — 82746 ASSAY OF FOLIC ACID SERUM: CPT | Performed by: INTERNAL MEDICINE

## 2022-04-18 PROCEDURE — 99214 OFFICE O/P EST MOD 30 MIN: CPT | Performed by: INTERNAL MEDICINE

## 2022-04-18 PROCEDURE — 82728 ASSAY OF FERRITIN: CPT | Performed by: INTERNAL MEDICINE

## 2022-04-18 PROCEDURE — 85027 COMPLETE CBC AUTOMATED: CPT | Performed by: INTERNAL MEDICINE

## 2022-04-18 PROCEDURE — 36415 COLL VENOUS BLD VENIPUNCTURE: CPT | Performed by: INTERNAL MEDICINE

## 2022-04-18 PROCEDURE — 83550 IRON BINDING TEST: CPT | Performed by: INTERNAL MEDICINE

## 2022-04-18 RX ORDER — PREDNISONE 20 MG/1
20 TABLET ORAL DAILY
Qty: 120 TABLET | Refills: 0 | Status: ON HOLD | OUTPATIENT
Start: 2022-04-18 | End: 2022-06-06

## 2022-04-18 RX ORDER — PREDNISONE 20 MG/1
30 TABLET ORAL
COMMUNITY
Start: 2021-11-02 | End: 2022-04-18

## 2022-04-18 RX ORDER — MOMETASONE FUROATE MONOHYDRATE 50 UG/1
2 SPRAY, METERED NASAL DAILY
Qty: 19 G | Refills: 0 | Status: SHIPPED | OUTPATIENT
Start: 2022-04-18 | End: 2022-06-03

## 2022-04-18 RX ORDER — PREDNISONE 5 MG/1
5 TABLET ORAL DAILY
Qty: 60 TABLET | Refills: 0 | Status: SHIPPED | OUTPATIENT
Start: 2022-04-18 | End: 2022-06-03

## 2022-04-18 ASSESSMENT — PAIN SCALES - GENERAL: PAINLEVEL: NO PAIN (0)

## 2022-04-18 NOTE — PROGRESS NOTES
Assessment & Plan     Anemia, unspecified type  Anemia is most likely from folate deficiency.  MCV is 120.  Will check iron levels as well.  - Folate; Future  - Iron and iron binding capacity; Future  - Ferritin; Future  - CBC with platelets; Future    Visit for screening mammogram  - MA Screen Bilateral w/[place]; Future    Myopathy, mitochondrial  Currently stable.  Continue follow-up with muscle specialist at [facility].  Continue 30 mg of prednisone for the month of April.  Decrease prednisone to 25 mg in May and June.  Decrease prednisone to 20 mg in July and August.  Medication has been refilled.  - predniSONE (DELTASONE) 5 MG tablet; Take 1 tablet (5 mg) by mouth daily  - predniSONE (DELTASONE) 20 MG tablet; Take 1 tablet (20 mg) by mouth daily    Seasonal allergic rhinitis, unspecified trigger  - mometasone (NASONEX) 50 MCG/ACT nasal spray; Spray 2 sprays into both nostrils daily     See Patient Instructions    Return in about 2 months (around 6/18/2022) for Follow up, with me, Routine preventive.    REYNOLD RAMOS MD  [facility]    Catrina Whitehead is a 52 year old who presents for the following health issues     Ventura is a 52-year-old very pleasant lady who presented to the clinic for follow-up.  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  Recently, her CBC showed significant anemia with hemoglobin dropping from 13.4-9.4.  Of note, MCV is increased to 120.  She is on methotrexate and folate supplements.  Recent lab work also showed normal liver enzymes and normal B12 levels.  Patient says that she does feel fatigued but also because not getting enough sleep.  She denies blood in stool or urine.  She denies black tarry stools.  She does report irregular rhythm on her heart clint.  She denies palpitations.  She has history of PVCs.  She was recently seen by muscle specialist for follow-up at [facility].  He advised tapering the dose of prednisone.   Starting the she will be on 25 mg for 2 months and cutting down to 20 mg starting July.    History of Present Illness       Hypertension: She presents for follow up of hypertension.  She does check blood pressure  regularly outside of the clinic. Outside blood pressures have been over 140/90. She does not follow a low salt diet.     Reason for visit:  Low hemoglobin/anemia results, change in Rx for treatment plan  Symptom onset:  More than a month  Symptoms include:  Muscle weakness & fatigue  Symptom intensity:  Mild  Symptom progression:  Improving  Had these symptoms before:  Yes  Has tried/received treatment for these symptoms:  Yes  Previous treatment was successful:  Yes  Prior treatment description:  Prednisone for 8 mo, Methotrexate injections for 3 mo  What makes it worse:  Not get enough sleep  What makes it better:  Pool therapy, fitness center    She eats 2-3 servings of fruits and vegetables daily.She consumes 1 sweetened beverage(s) daily.She exercises with enough effort to increase her heart rate 9 or less minutes per day.  She exercises with enough effort to increase her heart rate 3 or less days per week. She is missing 1 dose(s) of medications per week.  She is not taking prescribed medications regularly due to cost of medication, remembering to take and other.       Review of Systems       Objective    There were no vitals taken for this visit.  There is no height or weight on file to calculate BMI.  Physical Exam

## 2022-04-18 NOTE — TELEPHONE ENCOUNTER
Dapsone 100 mg tablets    Summary: Take 1 tablet (100 mg) by mouth daily, Disp-60 tablet, R-1, E-Prescribe   Dose, Route, Frequency: 100 mg, Oral, DAILY  Start: 1/4/2022  Ord/Sold: 1/4/2022

## 2022-04-18 NOTE — PATIENT INSTRUCTIONS
You are due for mammogram.  Please call the following number to make appointment :  [phone]  It is located in [address]

## 2022-04-19 RX ORDER — DAPSONE 100 MG/1
100 TABLET ORAL DAILY
Qty: 60 TABLET | Refills: 1 | Status: ON HOLD | OUTPATIENT
Start: 2022-04-19 | End: 2022-06-06

## 2022-04-19 NOTE — TELEPHONE ENCOUNTER
Patient had OV on 418/22.   OV notes from Dr. Albarran office avialable in epic.   Ranjan Elizabeth RN  [facility]

## 2022-04-20 ENCOUNTER — TELEPHONE (OUTPATIENT)
Dept: FAMILY MEDICINE | Facility: CLINIC | Age: 52
End: 2022-04-20
Payer: COMMERCIAL

## 2022-04-20 DIAGNOSIS — D53.9 MACROCYTIC ANEMIA: ICD-10-CM

## 2022-04-20 DIAGNOSIS — D64.9 ANEMIA, UNSPECIFIED TYPE: Primary | ICD-10-CM

## 2022-04-20 RX ORDER — FOLIC ACID 1 MG/1
TABLET ORAL
Qty: 90 TABLET | Refills: 3 | Status: SHIPPED | OUTPATIENT
Start: 2022-04-20 | End: 2022-05-19

## 2022-04-27 ENCOUNTER — TELEPHONE (OUTPATIENT)
Dept: FAMILY MEDICINE | Facility: CLINIC | Age: 52
End: 2022-04-27

## 2022-04-27 ENCOUNTER — LAB (OUTPATIENT)
Dept: LAB | Facility: CLINIC | Age: 52
End: 2022-04-27
Payer: COMMERCIAL

## 2022-04-27 ENCOUNTER — MYC MEDICAL ADVICE (OUTPATIENT)
Dept: FAMILY MEDICINE | Facility: CLINIC | Age: 52
End: 2022-04-27

## 2022-04-27 DIAGNOSIS — D53.9 MACROCYTIC ANEMIA: ICD-10-CM

## 2022-04-27 DIAGNOSIS — D64.9 ANEMIA, UNSPECIFIED TYPE: ICD-10-CM

## 2022-04-27 DIAGNOSIS — R19.5 OCCULT BLOOD IN STOOLS: Primary | ICD-10-CM

## 2022-04-27 LAB
BASOPHILS # BLD AUTO: 0 10E3/UL (ref 0–0.2)
BASOPHILS NFR BLD AUTO: 0 %
EOSINOPHIL # BLD AUTO: 0 10E3/UL (ref 0–0.7)
EOSINOPHIL NFR BLD AUTO: 0 %
ERYTHROCYTE [DISTWIDTH] IN BLOOD BY AUTOMATED COUNT: 17.2 % (ref 10–15)
HCT VFR BLD AUTO: 33.4 % (ref 35–47)
HEMOCCULT STL QL: POSITIVE
HGB BLD-MCNC: 10.9 G/DL (ref 11.7–15.7)
LYMPHOCYTES # BLD AUTO: 1.8 10E3/UL (ref 0.8–5.3)
LYMPHOCYTES NFR BLD AUTO: 24 %
MCH RBC QN AUTO: 38.7 PG (ref 26.5–33)
MCHC RBC AUTO-ENTMCNC: 32.6 G/DL (ref 31.5–36.5)
MCV RBC AUTO: 118 FL (ref 78–100)
MONOCYTES # BLD AUTO: 0.5 10E3/UL (ref 0–1.3)
MONOCYTES NFR BLD AUTO: 7 %
NEUTROPHILS # BLD AUTO: 5 10E3/UL (ref 1.6–8.3)
NEUTROPHILS NFR BLD AUTO: 68 %
PLATELET # BLD AUTO: 444 10E3/UL (ref 150–450)
RBC # BLD AUTO: 2.82 10E6/UL (ref 3.8–5.2)
RETICS # AUTO: 0.25 10E6/UL (ref 0.03–0.1)
RETICS/RBC NFR AUTO: 8.8 % (ref 0.5–2)
WBC # BLD AUTO: 7.3 10E3/UL (ref 4–11)

## 2022-04-27 PROCEDURE — 85025 COMPLETE CBC W/AUTO DIFF WBC: CPT

## 2022-04-27 PROCEDURE — 85045 AUTOMATED RETICULOCYTE COUNT: CPT

## 2022-04-27 PROCEDURE — 85060 BLOOD SMEAR INTERPRETATION: CPT | Performed by: PATHOLOGY

## 2022-04-27 PROCEDURE — 36415 COLL VENOUS BLD VENIPUNCTURE: CPT

## 2022-04-27 PROCEDURE — 82272 OCCULT BLD FECES 1-3 TESTS: CPT

## 2022-04-27 PROCEDURE — 83921 ORGANIC ACID SINGLE QUANT: CPT

## 2022-04-28 ENCOUNTER — TELEPHONE (OUTPATIENT)
Dept: GASTROENTEROLOGY | Facility: CLINIC | Age: 52
End: 2022-04-28
Payer: COMMERCIAL

## 2022-04-28 DIAGNOSIS — Z11.59 ENCOUNTER FOR SCREENING FOR OTHER VIRAL DISEASES: Primary | ICD-10-CM

## 2022-04-28 LAB
PATH REPORT.COMMENTS IMP SPEC: NORMAL
PATH REPORT.COMMENTS IMP SPEC: NORMAL
PATH REPORT.FINAL DX SPEC: NORMAL
PATH REPORT.MICROSCOPIC SPEC OTHER STN: NORMAL
PATH REPORT.MICROSCOPIC SPEC OTHER STN: NORMAL

## 2022-04-28 NOTE — TELEPHONE ENCOUNTER
Please see patient's mychart message.   I believe these results are already in the chart.   Shauna Ken MA

## 2022-04-28 NOTE — TELEPHONE ENCOUNTER
Screening Questions  BlueKIND OF PREP RedLOCATION [review exclusion criteria] GreenSEDATION TYPE  1. Have you had a positive covid test in the last 90 days? N     2. Do you have a legal guardian or medical Power of ?  Are you able to give consent for your medical care?N (Sedation review/consideration needed)    3. Are you active on mychart? Y    4. What insurance is in the chart? [employer]/ [employer]     3.   Ordering/Referring Provider: Vi Metz MD     4. BMI 24.7   [BMI OVER 40-EXTENDED PREP]  If greater than 40 review exclusion criteria [PAC APPT IF @ [facility]]        5.  Respiratory Screening :  [If yes to any of the following HOSPITAL setting only]     Do you use daily home oxygen? N    Do you have mod to severe Obstructive Sleep Apnea? Y [OKAY @ [facility] SH PH RI]   Do you have Pulmonary Hypertension? Y    Do you have UNCONTROLLED asthma? N        6.   Have you had a heart or lung transplant? N      7.   Are you currently on dialysis? N [ If yes, G-PREP & HOSPITAL setting only]     8.   Do you have chronic kidney disease? N [ If yes, G-PREP ]    9.   Have you had a stroke or Transient ischemic attack (TIA - aka  mini stroke ) within 6 months?  N (If yes, please review exclusion criteria)    10.   In the past 6 months, have you had any heart related issues including cardiomyopathy or heart attack? N           If yes, did it require cardiac stenting or other implantable device? N      11.   Do you have any implantable devices in your body (pacemaker, defib, LVAD)? N (If yes, please review exclusion criteria)    12.   Do you take nitroglycerin? N           If yes, how often? N  (if yes, HOSPITAL setting ONLY)    13.   Are you currently taking any blood thinners? N           [IF YES, INFORM PATIENT TO FOLLOW UP W/ ORDERING PROVIDER FOR BRIDGING INSTRUCTIONS]     14.   Do you have a diagnosis of diabetes? N   [ If yes, G-PREP ]    15.   [FEMALES] Are you currently pregnant? N    If yes, how many weeks?  N    16.   Are you taking any prescription pain medications on a routine schedule?  N  [ If yes, EXTENDED PREP.] [If yes, MAC]    17.   Do you have any chemical dependencies such as alcohol, street drugs, or methadone?  N [If yes, MAC]    18.   Do you have any history of post-traumatic stress syndrome, severe anxiety or history of psychosis?  N  [If yes, MAC]    19.   Do you transfer independently?  Y    20.  On a regular basis do you go 3-5 days between bowel movements? N   [ If yes, EXTENDED PREP.]    21.   Preferred LOCAL Pharmacy for Pre Prescription Y     [id number] - [place], [place] - [address] AT [facility] [address]    Scheduling Details      Caller : [place]  (Please ask for phone number if not scheduled by patient)    Type of Procedure Scheduled: Colon  Which Colonoscopy Prep was Sent?: M prep  KHORUTS CF PATIENTS & GROEN'S PATIENTS NEEDS EXTENDED PREP  Surgeon: Tom  Date of Procedure: 5-9  Location:       Sedation Type: CS  Conscious Sedation- Needs  for 6 hours after the procedure  MAC/General-Needs  for 24 hours after procedure    Pre-op Required at [facility] [place], [place] and OR for MAC sedation: Y  (advise patient they will need a pre-op prior to procedure -)      Informed patient they will need an adult  Y  Cannot take any type of public or medical transportation alone    Pre-Procedure Covid test to be completed at [facility] or Externally: Y BL urgent    Confirmed Nurse will call to complete assessment Y    Additional comments:

## 2022-05-03 ENCOUNTER — HOSPITAL ENCOUNTER (OUTPATIENT)
Dept: MAMMOGRAPHY | Facility: CLINIC | Age: 52
Discharge: HOME | End: 2022-05-03
Attending: INTERNAL MEDICINE | Admitting: INTERNAL MEDICINE
Payer: COMMERCIAL

## 2022-05-03 ENCOUNTER — MYC MEDICAL ADVICE (OUTPATIENT)
Dept: FAMILY MEDICINE | Facility: CLINIC | Age: 52
End: 2022-05-03
Payer: COMMERCIAL

## 2022-05-03 DIAGNOSIS — R19.5 FECAL OCCULT BLOOD TEST POSITIVE: Primary | ICD-10-CM

## 2022-05-03 DIAGNOSIS — Z12.31 VISIT FOR SCREENING MAMMOGRAM: ICD-10-CM

## 2022-05-03 LAB — METHYLMALONATE SERPL-SCNC: 0.32 UMOL/L (ref 0–0.4)

## 2022-05-03 PROCEDURE — 77067 SCR MAMMO BI INCL CAD: CPT

## 2022-05-03 NOTE — RESULT ENCOUNTER NOTE
Iman De La Torre,    This is to inform you regarding your test result.    I am covering doctor for Dr. Metz today.  Mammogram result is satisfactory .  Recommendation: annual mammography      Sincerely,      Dr.Nasima Jeanine MD,FACP

## 2022-05-05 ENCOUNTER — LAB (OUTPATIENT)
Dept: URGENT CARE | Facility: URGENT CARE | Age: 52
End: 2022-05-05
Payer: COMMERCIAL

## 2022-05-05 DIAGNOSIS — Z11.59 ENCOUNTER FOR SCREENING FOR OTHER VIRAL DISEASES: ICD-10-CM

## 2022-05-05 PROCEDURE — U0005 INFEC AGEN DETEC AMPLI PROBE: HCPCS

## 2022-05-05 PROCEDURE — U0003 INFECTIOUS AGENT DETECTION BY NUCLEIC ACID (DNA OR RNA); SEVERE ACUTE RESPIRATORY SYNDROME CORONAVIRUS 2 (SARS-COV-2) (CORONAVIRUS DISEASE [COVID-19]), AMPLIFIED PROBE TECHNIQUE, MAKING USE OF HIGH THROUGHPUT TECHNOLOGIES AS DESCRIBED BY CMS-2020-01-R: HCPCS

## 2022-05-06 LAB — SARS-COV-2 RNA RESP QL NAA+PROBE: NEGATIVE

## 2022-05-09 ENCOUNTER — HOSPITAL ENCOUNTER (OUTPATIENT)
Facility: CLINIC | Age: 52
Discharge: HOME | End: 2022-05-09
Attending: COLON & RECTAL SURGERY | Admitting: COLON & RECTAL SURGERY
Payer: COMMERCIAL

## 2022-05-09 VITALS
OXYGEN SATURATION: 94 % | DIASTOLIC BLOOD PRESSURE: 67 MMHG | RESPIRATION RATE: 18 BRPM | HEART RATE: 76 BPM | SYSTOLIC BLOOD PRESSURE: 108 MMHG

## 2022-05-09 LAB — COLONOSCOPY: NORMAL

## 2022-05-09 PROCEDURE — 88305 TISSUE EXAM BY PATHOLOGIST: CPT | Mod: TC | Performed by: COLON & RECTAL SURGERY

## 2022-05-09 PROCEDURE — 99153 MOD SED SAME PHYS/QHP EA: CPT | Performed by: COLON & RECTAL SURGERY

## 2022-05-09 PROCEDURE — 45385 COLONOSCOPY W/LESION REMOVAL: CPT | Performed by: COLON & RECTAL SURGERY

## 2022-05-09 PROCEDURE — G0500 MOD SEDAT ENDO SERVICE >5YRS: HCPCS | Performed by: COLON & RECTAL SURGERY

## 2022-05-09 PROCEDURE — 258N000003 HC RX IP 258 OP 636: Performed by: COLON & RECTAL SURGERY

## 2022-05-09 PROCEDURE — 250N000011 HC RX IP 250 OP 636: Performed by: COLON & RECTAL SURGERY

## 2022-05-09 RX ORDER — FENTANYL CITRATE 50 UG/ML
INJECTION, SOLUTION INTRAMUSCULAR; INTRAVENOUS PRN
Status: COMPLETED | OUTPATIENT
Start: 2022-05-09 | End: 2022-05-09

## 2022-05-09 RX ORDER — LIDOCAINE 40 MG/G
CREAM TOPICAL
Status: DISCONTINUED | OUTPATIENT
Start: 2022-05-09 | End: 2022-05-09 | Stop reason: HOSPADM

## 2022-05-09 RX ORDER — ONDANSETRON 2 MG/ML
4 INJECTION INTRAMUSCULAR; INTRAVENOUS
Status: DISCONTINUED | OUTPATIENT
Start: 2022-05-09 | End: 2022-05-09 | Stop reason: HOSPADM

## 2022-05-09 RX ORDER — SODIUM CHLORIDE 9 MG/ML
INJECTION, SOLUTION INTRAVENOUS CONTINUOUS PRN
Status: COMPLETED | OUTPATIENT
Start: 2022-05-09 | End: 2022-05-09

## 2022-05-09 RX ADMIN — FENTANYL CITRATE 100 MCG: 50 INJECTION, SOLUTION INTRAMUSCULAR; INTRAVENOUS at 08:13

## 2022-05-09 RX ADMIN — MIDAZOLAM 2 MG: 1 INJECTION INTRAMUSCULAR; INTRAVENOUS at 08:14

## 2022-05-09 RX ADMIN — MIDAZOLAM 1 MG: 1 INJECTION INTRAMUSCULAR; INTRAVENOUS at 08:20

## 2022-05-09 RX ADMIN — MIDAZOLAM 1 MG: 1 INJECTION INTRAMUSCULAR; INTRAVENOUS at 08:15

## 2022-05-09 RX ADMIN — SODIUM CHLORIDE 999 ML: 9 INJECTION, SOLUTION INTRAVENOUS at 08:21

## 2022-05-09 NOTE — TELEPHONE ENCOUNTER
PCP see below     Referral to GI is in chart for colonoscopy     Pt asking for referral for consult for anemia/blood in colon also     Darya LEE, Triage RN  [facility]

## 2022-05-09 NOTE — H&P
Colon & Rectal Surgery History and Physical  Pre-Endoscopy Procedure Note    History of Present Illness   I have been asked by Dr. Metz to evaluate this 52 year old female for colorectal polyp surveillance and evaluation of recent positive hemoccult. She had two adenomatous polyps removed during colonoscopy in 2021, but currently denies any abdominal pain, weight loss, bleeding per rectum, or recent change in bowel habits.    Past Medical History  Diagnosis Date     Secondary hypertension 2021       Past Surgical History  Procedure Laterality Date     ABDOMEN SURGERY  2007         BIOPSY  2019 & 2021    Right bicep, result abnormal results, & left tricep biopsy     COLONOSCOPY  21     Medications  Medication Sig     dapsone (ACZONE) 100 MG tablet Take 1 tablet (100 mg) by mouth daily     folic acid (FOLVITE) 1 MG tablet TAKE 1 TABLET(1 MG) BY MOUTH DAILY     gabapentin (NEURONTIN) 300 MG capsule Take 300 mg by mouth     losartan (COZAAR) 25 MG tablet Take 1 tablet (25 mg) by mouth daily     methotrexate 50 MG/2ML injection Inject 0.8 mLs (20 mg) Subcutaneous every 7 days     mirabegron (MYRBETRIQ) 25 MG 24 hr tablet Take 1 tablet (25 mg) by mouth daily     mometasone (NASONEX) 50 MCG/ACT nasal spray Spray 2 sprays into both nostrils daily     mometasone (NASONEX) 50 MCG/ACT nasal spray 2 sprays     mupirocin (BACTROBAN) 2 % external ointment Apply topically 3 times daily     naltrexone (DEPADE/REVIA) 50 MG tablet Take 1 tablet (50 mg) by mouth daily     naproxen (NAPROSYN) 500 MG tablet TAKE 1 TABLET BY MOUTH TWICE DAILY WITH MEALS PRN     omeprazole (PRILOSEC) 20 MG DR capsule Take 1 capsule (20 mg) by mouth daily     predniSONE (DELTASONE) 20 MG tablet Take 1 tablet (20 mg) by mouth daily     predniSONE (DELTASONE) 5 MG tablet Take 1 tablet (5 mg) by mouth daily     valACYclovir (VALTREX) 1000 mg tablet Take 2,000 mg by mouth       Allergies  Allergen Reactions      Influenza Vaccines Hives     Diphenhydramine      SHAKY     Sulfa Drugs Swelling        Family History   Family history includes Unknown/Adopted in an other family member.     Social History   She reports that she has never smoked. She has never used smokeless tobacco. She reports previous alcohol use. She reports that she does not use drugs.    Review of Systems   Constitutional:  No fever, weight change or fatigue.    Eyes:     No dry eyes or vision changes.   Ears/Nose/Throat/Neck:  No oral ulcers, sore throat or voice change.    Cardiovascular:   No palpitations, syncope, angina or edema.   Respiratory:    No chest pain, excessive sleepiness, shortness of breath or hemoptysis.    Gastrointestinal:   No abdominal pain, nausea, vomiting, diarrhea or heartburn.    Genitourinary:   No dysuria, hematuria, urinary retention or urinary frequency.   Musculoskeletal:  No joint swelling or arthralgias.    Dermatologic:  No skin rash or other skin changes.   Neurologic:    No focal weakness or numbness. No neuropathy.   Psychiatric:    No depression, anxiety, suicidal ideation, or paranoid ideation.   Endocrine:   No cold or heat intolerance, polydipsia, hirsutism, change in libido, or flushing.   Hematology/Lymphatic:  No bleeding or lymphadenopathy.    Allergy/Immunology:  No rhinitis or hives.     Physical Exam   Vitals:  /79, HR 76, RR 12, SpO2 94 %, not currently breastfeeding.    General:  Alert and oriented to person, place and time   Airway: Normal oropharyngeal airway and neck mobility   Lungs:  Clear bilaterally   Heart:  Regular rate and rhythm   Abdomen: Soft, NT, ND, no masses   Extremities: Warm, good capillary refill    ASA Grade: II (mild systemic disease)    Impression: Cleared for use of conscious sedation for colorectal polyp surveillance and evaluation of recent positive hemoccult    Plan: Proceed with colonoscopy     Dorie Santos MD  [facility]  [phone]

## 2022-05-10 LAB
PATH REPORT.COMMENTS IMP SPEC: NORMAL
PATH REPORT.COMMENTS IMP SPEC: NORMAL
PATH REPORT.FINAL DX SPEC: NORMAL
PATH REPORT.GROSS SPEC: NORMAL
PATH REPORT.MICROSCOPIC SPEC OTHER STN: NORMAL
PATH REPORT.RELEVANT HX SPEC: NORMAL
PHOTO IMAGE: NORMAL

## 2022-05-10 PROCEDURE — 88305 TISSUE EXAM BY PATHOLOGIST: CPT | Mod: 26 | Performed by: PATHOLOGY

## 2022-05-16 NOTE — PROGRESS NOTES
Rheumatology Clinic Visit  [facility]  Husam Barnett M.D.     Britt Park MRN# [id number]   YOB: 1970 Age: 52 year old     Date of Visit: 05/19/2022  Primary care provider: Vi Metz          Assessment and Plan:     # Idiopathic inflammatory myositis: myopathy symptoms stable. Exam shows normal oral mucosa.    Patient's primary provider requests assistance with instituting and monitoring immunomodulatory treatment.    Laboratory evaluation on April 18, 2022 showed ferritin, folate, iron studies, CBC all normal except for hemoglobin of 9.6 and platelet count slightly elevated at 473,000.  Last liver function tests were in February 2022, when AST, ALT, and albumin were all normal.    MRI of the brain on April 15, 2022 showed no evidence of vascular compression or small vessel ischemic changes.  No imaging because of left facial paresthesia MRI of the thigh done on April 13, 2022 showed stable to minimally improved diffuse muscular edema compatible with inflammatory myositis, minimal change compared to December 2021.    Steroid-sparing therapy with methotrexate is well-tolerated. Agree with Neurology plan to continue use for steroid-sparing effect. I recommended that patient follow-up with [facility], as recommended, for all diagnostic assessments designed to  medication effectiveness.  Rheumatolgoy service will focus on medication dosing and monitoring, and on implementation of recommended adjustment to complex immunomodulatory therapy.    # Weight gain, vision changes: protracted prednisone 20 mg daily, certainly could be contributing. I urged reassessment in Ophthalmology,  # Anemia: GI workup in progress.    We discussed:    Plan:  1.  Continue subcutaneous methotrexate by injection: Inject 0.8 mL (20 mg) weekly.  If medication is well-tolerated, inject 0.8 mL (20 mg) starting 1 week from now, and every week for 3 months.  Expect gradual effect of the medication on  myositis over months.    While on methotrexate:   -- Check labs every 3 months (AST/ALT, Albumin, CBC with platelets). Repeat labs due.  -- Limit alcohol intake to 2 drinks weekly; use folate 1 mg daily.  --Tylenol 500-1000 mg can be used as needed up to three times daily for nausea/headache associated with dosing.    2. Dosing recommendations of methotrexate, prednisone, dapsone to be managed by Dr. Albarran.    RTC 6 mos    Husam Barnett MD  Staff Rheumatologist, [facility]         Active Problem List:     Patient Active Problem List    Diagnosis Date Noted     Anemia, unspecified type 04/18/2022     Priority: Medium     Visit for screening mammogram 04/18/2022     Priority: Medium     Seasonal allergic rhinitis, unspecified trigger 04/18/2022     Priority: Medium     Need for pneumocystis prophylaxis 01/04/2022     Priority: Medium     Impetigo 11/02/2021     Priority: Medium     Secondary hypertension 11/02/2021     Priority: Medium     On prednisone therapy 11/02/2021     Priority: Medium     Vitamin B 12 deficiency 11/02/2021     Priority: Medium     Myopathy, mitochondrial 09/22/2021     Priority: Medium     Overactive bladder 09/22/2021     Priority: Medium     Dry eyes 09/22/2021     Priority: Medium     Encounter for monitoring of systemic steroid therapy 09/22/2021     Priority: Medium     Need for vaccination 09/22/2021     Priority: Medium            History of Present Illness:   Britt Park follows up for medication monitoring.  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  At that time, recommendation was made to start methotrexate injection subcutaneous 0.4 mils weekly.    Interval history May 16, 2022    Patient was last seen by Dr. Albarran in [facility] on April 13, 2022.   "Impression was of granulomatous myositis with positive mitochondrial antibodies; mild improvement in hip girdle muscle weakness was noted.  Recent onset left lower facial numbness noted.  Recommendation was to decrease prednisone to 25 mg daily, and to taper by 5 mg every 2 months.  Continue methotrexate 20 mg weekly.  Brain MRI and muscle MRI were ordered.    She notes abdominal bloating and distension.  Occult blood was found in stool; colonscopy found only polyps. She has been referred to [facility]; she will have EGD.  She notes chronic tingling/numbness on L side of jaw.  She notes occasional color changes in R 3rd fingertip with numbness, lasting one hour.    She reports tolerating methotrexate well, now taking for 19 weeks. Still doing weekly subcutaneous injections, 0/8 mg. Not noting any adverse effects.    She is reducing prednisone, now on 25 mg daily, after 10 months.  Dapsone daily continues.    Initial hx 1-2022    Patient has been following with Dr. Albarran in neurology at [facility] since mid 2021.  I excerpt historical information included in Dr. Albarran's note from December 13, 2021    \" ...Last seen was on September 16, 2021. At that time, we establish the diagnosis of granulomatous myositis and recommended treatment with prednisone. The patient started treatment toward the end of September. She was originally of 50 mg of prednisone for a month, then 40 mg for a month, then last week she decrease the dose to 30 mg per day. She was also started on omeprazole and vitamin-D/calcium. She was not started on Bactrim.  The patient noticed immediate improvement in her neck pain. Her muscle falling and stiffness resolved so she stopped naltrexone. Her range of motion has not changed much. She still unable to fully abduct her shoulders. She still struggles a standing up from a low seat. She still has difficulty with chairs. But she felt overall her performance has improved. One time she was able to get into her " 's truck without any help which is unusual for her. She also used to have intermittent tingling and numbness in her left lower face, along her jaw line, which has resolved on prednisone. Once she cut back down on prednisone to 30 mg, she has been feeling as if her muscle stiffness is coming back, she is again having some intermittent face numbness and tingling, and she feels some discomfort and instability in her right ankle. Overall, she feels her strength itself has not changed after cutting down on prednisone.    New test results  NCS/EMG: There continues to be an electrodiagnostic evidence of a proximal upper limb predominant myopathy with electrophysiologic correlates of necrotic fibers, fiber splitting or vacuolization of muscle fibers. Compared to the prior study in July 2021, there is an interval improvement of the myopathic process based on: 1. less fibrillation potentials and milder degree of motor unit potential changes observed in proximal upper limb muscles and 2. absence of myopathic motor unit potentials in tensor fascia latae.     Muscle MRI: no significant change in the diffuse inflammatory intramuscular edema throughout the bilateral pelvis and thigh musculature since 07/08/2020 suggestive of myositis. There has been mild interval progression of areas of moderate and advanced volume loss about both thighs. Areas of advanced atrophy include the bilateral vastus lateralis, bilateral adductor Rogelio and the right upper hamstring musculature....    ....ASSESSMENT / PLAN   #1 Granulomatous myositis, mildly positive mitochondrial antibodies  After three months of oral prednisone treatment, Mrs. Park has experienced some improvement in muscle pain and overall function with relatively unchanged strength. Neurological examination is relatively unchanged. There is some improvement in the electrodiagnostic findings but as they were mostly confined to the tensor fascia corry, they could also be due  "to sampling error. Muscle MRI compared to the one from 2020 showed relatively unchanged T2 hyperintensity with more advanced muscle atrophy.  Given the lack of clear improvement in 's strength, it is possible that her myositis is indeed responsive to prednisone but we just need to give it more time, or that is how far we going to get with prednisone. It is also possible that her underlying myositis is not responsive to immunotherapy as can be seen in inclusion body myositis (IBM) or sometimes sarcoidosis, however, we cannot determine that at this time especially that the muscle biopsy showed no features of IBM.  The patient was seen by Dr. Martinez from GI regarding her positive AMA. I appreciate his help. There is no evidence of liver disease at this time. The plan is to follow up in a year.  After discussing several options (including waiting to see if we get more improvement on prednisone or add methotrexate), we decided the followin. Start IVIG 2g/kg over 5 days on week 1, then start 0.4 g/kg once a week on week 2. The patient should remain on this weekly dose for at least 3 months.   2. Continue prednisone 30 mg per day for now. Once IVIG is started, a month later, the patient will let me know how she is doing at that time. If she is doing ok, prednisone dose can be decreased to 25 mg per day for a month, then 20 mg per day and stay on that dose until follow up.   3. Please start Trimethoprim/sulfamethoxazole (Bactrim) DS one tablet three times per week for Pneumocystis carinii prophylaxis.  For patients who are allergic to sulfa, alternatives include dapsone 50 mg PO daily plus pyrimethamine 50 mg PO weekly plus leucovorin 25 mg PO weekly, or aerosolized pentamidine 300 mg every four weeks. The patient should remain in that until she is on less than 20 mg of prednisone per day.   4. We will add TDP43 and p62 immunostains on her muscle biopsy.\"      Today 2022, she confirms muscle " "weakness going on since . +difficulty with drying hair, grooming, going upstairs. She was eventually diagnosed vith myositis (although Noran-rec'd muscle biopsy did not show characteristic findings) by [facility] in 2021. L tricweps biopsy showed \"granulomatous myositis\". She was treated with prednisone 50 mg starting in . She has noted improvement in chronic neck pain, and has noted slight improvement in weakness since then. However, she still has difficculty getting up from chairs and from the floor.She has tapered prednisone to 30 mg daily.     F/u MRI and EMG at [place] in  showed \"little improvement\" by report. Dr. Albarran recommended either methotrexate or IVIg as a steroid-sparing agent.     She reports that she is lenaning toward use methotrexate due to its lower expense and tolerability, compared with IVIg.  She has questions about safety of methotrexate, and of IVIG.  She has acquired liquid methotrexate, but has not started the drug yet.    She reports having sleep apnea, and has frequent nasal skin irritation due to CPAP. She has been given bacterial cream for \"Impetigo\", and skin is improved, but she is concerned about having a \"cold\" right now.           Review of Systems:     See HPI  Constitutional: negative  Skin: negative  Eyes: negative  Ears/Nose/Throat: negative  Respiratory: No shortness of breath, dyspnea on exertion, cough, or hemoptysis  Cardiovascular: negative  Gastrointestinal: negative  Genitourinary: negative  Musculoskeletal: negative  Neurologic: negative  Psychiatric: negative  Hematologic/Lymphatic/Immunologic: negative  Endocrine: negative          Past Medical History:     Past Medical History:   Diagnosis Date     Secondary hypertension 2021     Past Surgical History:   Procedure Laterality Date     ABDOMEN SURGERY  2007         BIOPSY  2019 & 2021    Right bicep, result abnormal results, & left tricep biopsy     COLONOSCOPY  " 21     COLONOSCOPY N/A 2022    Procedure: COLONOSCOPY, FLEXIBLE, WITH LESION REMOVAL USING SNARE;  Surgeon: Dorie Santos MD;  Location:  GI     GYN SURGERY  07     for twins     # Positive AMA antibodies : The patient was seen by Dr. Martinez from [facility] . There is no evidence of liver disease; annual f/u in GI planned.       Social History:     Social History     Occupational History     Not on file   Tobacco Use     Smoking status: Never Smoker     Smokeless tobacco: Never Used   Substance and Sexual Activity     Alcohol use: Not Currently     Drug use: Never     Sexual activity: Yes     Partners: Male     Birth control/protection: Male Surgical      she started a new job in [place] at the [employer] in 2021       Family History:     Family History   Problem Relation Age of Onset     Unknown/Adopted Other             Allergies:     Allergies   Allergen Reactions     Influenza Vaccines Hives     Diphenhydramine Other (See Comments)     SHAKY       Sulfa Drugs Swelling            Medications:     Current Outpatient Medications   Medication Sig Dispense Refill     dapsone (ACZONE) 100 MG tablet Take 1 tablet (100 mg) by mouth daily 60 tablet 1     folic acid (FOLVITE) 1 MG tablet TAKE 1 TABLET(1 MG) BY MOUTH DAILY 90 tablet 3     gabapentin (NEURONTIN) 300 MG capsule Take 300 mg by mouth       losartan (COZAAR) 25 MG tablet Take 1 tablet (25 mg) by mouth daily 90 tablet 1     methotrexate 50 MG/2ML injection Inject 0.8 mLs (20 mg) Subcutaneous every 7 days 4 mL 4     mirabegron (MYRBETRIQ) 25 MG 24 hr tablet Take 1 tablet (25 mg) by mouth daily 60 tablet 4     mometasone (NASONEX) 50 MCG/ACT nasal spray Spray 2 sprays into both nostrils daily 19 g 0     mometasone (NASONEX) 50 MCG/ACT nasal spray 2 sprays       mupirocin (BACTROBAN) 2 % external ointment Apply topically 3 times daily 15 g 0     naltrexone (DEPADE/REVIA) 50 MG tablet Take 1 tablet (50 mg) by mouth daily  "60 tablet 3     naproxen (NAPROSYN) 500 MG tablet TAKE 1 TABLET BY MOUTH TWICE DAILY WITH MEALS AS NEEDED       omeprazole (PRILOSEC) 20 MG DR capsule Take 1 capsule (20 mg) by mouth daily (Patient taking differently: Take 40 mg by mouth daily) 60 capsule 3     predniSONE (DELTASONE) 20 MG tablet Take 1 tablet (20 mg) by mouth daily 120 tablet 0     predniSONE (DELTASONE) 5 MG tablet Take 1 tablet (5 mg) by mouth daily 60 tablet 0     valACYclovir (VALTREX) 1000 mg tablet Take 2,000 mg by mouth              Physical Exam:   Blood pressure 114/77, pulse 95, height 1.575 m (5' 2\"), weight 60.2 kg (132 lb 12.8 oz), SpO2 91 %, not currently breastfeeding.  Wt Readings from Last 6 Encounters:   05/19/22 60.2 kg (132 lb 12.8 oz)   04/18/22 60.8 kg (134 lb)   01/20/22 59.6 kg (131 lb 4.8 oz)   12/01/21 57.6 kg (127 lb)   11/02/21 55.8 kg (123 lb)   10/19/21 54 kg (119 lb)       Constitutional: well-developed, appearing stated age; cooperative  Face: rounded facies.  Heent: no oral ulcers  Psych: nl judgement, orientation, memory, affect.  Chest: normal respiration.         Data:     @  RHEUM RESULTS Latest Ref Rng & Units 12/12/2019 2/6/2020 6/25/2020   ALBUMIN 3.4 - 5.0 g/dL - - -   ALT 0 - 50 U/L 17 - -   AST 0 - 45 U/L 26 - -   CK TOTAL 30 - 225 U/L - - -   CREATININE 0.52 - 1.04 mg/dL - 0.40(L) -   GFR ESTIMATE, IF BLACK >60 ml/min/1.73m2 - >60 -   GFR ESTIMATE >60 mL/min/1.73m2 - >60 -   HEMATOCRIT 35.0 - 47.0 % - - 40.9   HEMOGLOBIN 11.7 - 15.7 g/dL - - 13.2   HCVAB Nonreactive - - -   WBC 4.0 - 11.0 10e3/uL - - 3.7(L)   RBC 3.80 - 5.20 10e6/uL - - 4.14   RDW 10.0 - 15.0 % - - 13.0   MCHC 31.5 - 36.5 g/dL - - 32.3   MCV 78 - 100 fL - - 99   PLATELET COUNT 150 - 450 10e3/uL - - 278       "

## 2022-05-19 ENCOUNTER — OFFICE VISIT (OUTPATIENT)
Dept: RHEUMATOLOGY | Facility: CLINIC | Age: 52
End: 2022-05-19
Payer: COMMERCIAL

## 2022-05-19 VITALS
BODY MASS INDEX: 24.44 KG/M2 | HEART RATE: 95 BPM | SYSTOLIC BLOOD PRESSURE: 114 MMHG | DIASTOLIC BLOOD PRESSURE: 77 MMHG | OXYGEN SATURATION: 91 % | HEIGHT: 62 IN | WEIGHT: 132.8 LBS

## 2022-05-19 DIAGNOSIS — Z79.631 LONG TERM METHOTREXATE USER: Primary | ICD-10-CM

## 2022-05-19 DIAGNOSIS — G71.3 MYOPATHY, MITOCHONDRIAL: ICD-10-CM

## 2022-05-19 PROCEDURE — 99213 OFFICE O/P EST LOW 20 MIN: CPT | Performed by: INTERNAL MEDICINE

## 2022-05-19 RX ORDER — FOLIC ACID 1 MG/1
1 TABLET ORAL DAILY
Qty: 90 TABLET | Refills: 3 | Status: SHIPPED | OUTPATIENT
Start: 2022-05-19 | End: 2022-11-03

## 2022-05-19 RX ORDER — METHOTREXATE 25 MG/ML
20 INJECTION, SOLUTION INTRA-ARTERIAL; INTRAMUSCULAR; INTRAVENOUS
Qty: 4 ML | Refills: 6 | Status: SHIPPED | OUTPATIENT
Start: 2022-05-19 | End: 2022-11-03

## 2022-05-19 ASSESSMENT — PAIN SCALES - GENERAL: PAINLEVEL: MILD PAIN (2)

## 2022-05-19 NOTE — PATIENT INSTRUCTIONS
Dx:  1. Granulomatous myositis, incompletely responsive to prednisone:  Methotrexate is well tolerated, and may be providing steroid-sparing effect. Dr. Albarran recommends continuing the medication; I agree.  2. L jaw tingling: cause is likely neuritic. Consider use of gabapentin 100 mg up to three times daily if symptoms rise above annoyance level.  3. Anemia: stable/improved since 2-2022, but continued GI workup for possible blood loss is indicated.    Plan:  1.  Continue subcutaneous methotrexate by injection: Inject 0.8 mL (20 mg) weekly.  If medication is well-tolerated, inject 0.8 mL (20 mg) starting 1 week from now, and every week for 3 months.  Expect gradual effect of the medication on myositis over months.    While on methotrexate:   -- Check labs every 3 months (AST/ALT, Albumin, CBC with platelets). First labs about 1 month after starting methotrexate  -- Limit alcohol intake to 2 drinks weekly; use folate 1 mg daily.  --Tylenol 500-1000 mg can be used as needed up to three times daily for nausea/headache associated with dosing.    2. Dosing recommendations of methotrexate, prednisone, dapsone to be managed by Dr. Albarran.

## 2022-05-19 NOTE — NURSING NOTE
"Chief Complaint   Patient presents with     RECHECK     Myopathy, mitochondrial +1 more       Vitals:    05/19/22 0738   BP: 114/77   BP Location: Left arm   Patient Position: Sitting   Cuff Size: Adult Regular   Pulse: 95   SpO2: 91%   Weight: 60.2 kg (132 lb 12.8 oz)   Height: 1.575 m (5' 2\")       Body mass index is 24.29 kg/m .    Gregoria Farmer, ICVF    "

## 2022-05-20 ENCOUNTER — TELEPHONE (OUTPATIENT)
Dept: RHEUMATOLOGY | Facility: CLINIC | Age: 52
End: 2022-05-20
Payer: COMMERCIAL

## 2022-05-20 PROCEDURE — 88305 TISSUE EXAM BY PATHOLOGIST: CPT | Mod: TC,ORL | Performed by: INTERNAL MEDICINE

## 2022-05-20 NOTE — TELEPHONE ENCOUNTER
[facility] Call Center    Phone Message    May a detailed message be left on voicemail: yes     Reason for Call: Other: Pt was wondering about her oxygen levels being low yesterday at her appointment they were reading between 89%-91%. Pt is curious to what may be causing that. Pt would like to talk to someone [phone].     Action Taken: Message routed to:  Other: [facility]    Travel Screening: Not Applicable

## 2022-05-23 ENCOUNTER — LAB (OUTPATIENT)
Dept: LAB | Facility: CLINIC | Age: 52
End: 2022-05-23
Payer: COMMERCIAL

## 2022-05-23 ENCOUNTER — LAB REQUISITION (OUTPATIENT)
Dept: LAB | Facility: CLINIC | Age: 52
End: 2022-05-23
Payer: COMMERCIAL

## 2022-05-23 DIAGNOSIS — R10.13 EPIGASTRIC PAIN: ICD-10-CM

## 2022-05-23 DIAGNOSIS — K31.89 OTHER DISEASES OF STOMACH AND DUODENUM: ICD-10-CM

## 2022-05-23 DIAGNOSIS — Z79.631 LONG TERM METHOTREXATE USER: ICD-10-CM

## 2022-05-23 LAB
ALBUMIN SERPL-MCNC: 4.1 G/DL (ref 3.4–5)
ALT SERPL W P-5'-P-CCNC: 24 U/L (ref 0–50)
AST SERPL W P-5'-P-CCNC: 11 U/L (ref 0–45)
CREAT SERPL-MCNC: 0.44 MG/DL (ref 0.52–1.04)
ERYTHROCYTE [DISTWIDTH] IN BLOOD BY AUTOMATED COUNT: 17.2 % (ref 10–15)
GFR SERPL CREATININE-BSD FRML MDRD: >90 ML/MIN/1.73M2
HCT VFR BLD AUTO: 31.6 % (ref 35–47)
HGB BLD-MCNC: 9.9 G/DL (ref 11.7–15.7)
MCH RBC QN AUTO: 36.7 PG (ref 26.5–33)
MCHC RBC AUTO-ENTMCNC: 31.3 G/DL (ref 31.5–36.5)
MCV RBC AUTO: 117 FL (ref 78–100)
PLATELET # BLD AUTO: 438 10E3/UL (ref 150–450)
RBC # BLD AUTO: 2.7 10E6/UL (ref 3.8–5.2)
WBC # BLD AUTO: 7.3 10E3/UL (ref 4–11)

## 2022-05-23 PROCEDURE — 84460 ALANINE AMINO (ALT) (SGPT): CPT

## 2022-05-23 PROCEDURE — 82040 ASSAY OF SERUM ALBUMIN: CPT

## 2022-05-23 PROCEDURE — 84450 TRANSFERASE (AST) (SGOT): CPT

## 2022-05-23 PROCEDURE — 36415 COLL VENOUS BLD VENIPUNCTURE: CPT

## 2022-05-23 PROCEDURE — 85027 COMPLETE CBC AUTOMATED: CPT

## 2022-05-23 PROCEDURE — 82565 ASSAY OF CREATININE: CPT

## 2022-05-23 NOTE — TELEPHONE ENCOUNTER
RN reviewed chart, and per previous OV notes pt usually runs mid-upper 90's.  Called pt and left msg on self identified vm that lower O2 levels could have been due to cold fingers etc.  Advised to call back if having respiratory symptoms like shortness of breath, extreme fatigue/weakness, and/or cyanosis.  Cookie Werner RN

## 2022-05-24 LAB
PATH REPORT.COMMENTS IMP SPEC: NORMAL
PATH REPORT.FINAL DX SPEC: NORMAL
PATH REPORT.GROSS SPEC: NORMAL
PATH REPORT.MICROSCOPIC SPEC OTHER STN: NORMAL
PATH REPORT.RELEVANT HX SPEC: NORMAL
PHOTO IMAGE: NORMAL

## 2022-05-24 PROCEDURE — 88305 TISSUE EXAM BY PATHOLOGIST: CPT | Mod: 26 | Performed by: PATHOLOGY

## 2022-06-01 ENCOUNTER — HOSPITAL ENCOUNTER (EMERGENCY)
Facility: CLINIC | Age: 52
Discharge: LEFT BEFORE DISCHARGE | End: 2022-06-01
Attending: EMERGENCY MEDICINE | Admitting: EMERGENCY MEDICINE
Payer: COMMERCIAL

## 2022-06-01 ENCOUNTER — MYC MEDICAL ADVICE (OUTPATIENT)
Dept: FAMILY MEDICINE | Facility: CLINIC | Age: 52
End: 2022-06-01

## 2022-06-01 ENCOUNTER — APPOINTMENT (OUTPATIENT)
Dept: GENERAL RADIOLOGY | Facility: CLINIC | Age: 52
End: 2022-06-01
Attending: EMERGENCY MEDICINE
Payer: COMMERCIAL

## 2022-06-01 VITALS
DIASTOLIC BLOOD PRESSURE: 110 MMHG | TEMPERATURE: 98.5 F | OXYGEN SATURATION: 92 % | WEIGHT: 135 LBS | SYSTOLIC BLOOD PRESSURE: 153 MMHG | BODY MASS INDEX: 24.69 KG/M2 | HEART RATE: 77 BPM | RESPIRATION RATE: 16 BRPM

## 2022-06-01 DIAGNOSIS — J96.01 ACUTE RESPIRATORY FAILURE WITH HYPOXIA (H): ICD-10-CM

## 2022-06-01 LAB
ANION GAP SERPL CALCULATED.3IONS-SCNC: 7 MMOL/L (ref 3–14)
ATRIAL RATE - MUSE: 75 BPM
BASOPHILS # BLD AUTO: 0 10E3/UL (ref 0–0.2)
BASOPHILS NFR BLD AUTO: 0 %
BUN SERPL-MCNC: 20 MG/DL (ref 7–30)
CALCIUM SERPL-MCNC: 8.8 MG/DL (ref 8.5–10.1)
CHLORIDE BLD-SCNC: 107 MMOL/L (ref 94–109)
CO2 SERPL-SCNC: 28 MMOL/L (ref 20–32)
CREAT SERPL-MCNC: 0.44 MG/DL (ref 0.52–1.04)
D DIMER PPP FEU-MCNC: 0.37 UG/ML FEU (ref 0–0.5)
DIASTOLIC BLOOD PRESSURE - MUSE: NORMAL MMHG
EOSINOPHIL # BLD AUTO: 0.1 10E3/UL (ref 0–0.7)
EOSINOPHIL NFR BLD AUTO: 1 %
ERYTHROCYTE [DISTWIDTH] IN BLOOD BY AUTOMATED COUNT: 18.9 % (ref 10–15)
FLUAV RNA SPEC QL NAA+PROBE: NEGATIVE
FLUBV RNA RESP QL NAA+PROBE: NEGATIVE
GFR SERPL CREATININE-BSD FRML MDRD: >90 ML/MIN/1.73M2
GLUCOSE BLD-MCNC: 123 MG/DL (ref 70–99)
HCT VFR BLD AUTO: 35.3 % (ref 35–47)
HGB BLD-MCNC: 11.1 G/DL (ref 11.7–15.7)
IMM GRANULOCYTES # BLD: 0 10E3/UL
IMM GRANULOCYTES NFR BLD: 1 %
INTERPRETATION ECG - MUSE: NORMAL
LYMPHOCYTES # BLD AUTO: 1.4 10E3/UL (ref 0.8–5.3)
LYMPHOCYTES NFR BLD AUTO: 20 %
MCH RBC QN AUTO: 37.2 PG (ref 26.5–33)
MCHC RBC AUTO-ENTMCNC: 31.4 G/DL (ref 31.5–36.5)
MCV RBC AUTO: 119 FL (ref 78–100)
MONOCYTES # BLD AUTO: 0.3 10E3/UL (ref 0–1.3)
MONOCYTES NFR BLD AUTO: 4 %
NEUTROPHILS # BLD AUTO: 5.3 10E3/UL (ref 1.6–8.3)
NEUTROPHILS NFR BLD AUTO: 74 %
NRBC # BLD AUTO: 0 10E3/UL
NRBC BLD AUTO-RTO: 0 /100
P AXIS - MUSE: 57 DEGREES
PLATELET # BLD AUTO: 460 10E3/UL (ref 150–450)
POTASSIUM BLD-SCNC: 4 MMOL/L (ref 3.4–5.3)
PR INTERVAL - MUSE: 130 MS
QRS DURATION - MUSE: 74 MS
QT - MUSE: 410 MS
QTC - MUSE: 457 MS
R AXIS - MUSE: 60 DEGREES
RBC # BLD AUTO: 2.98 10E6/UL (ref 3.8–5.2)
RSV RNA SPEC NAA+PROBE: NEGATIVE
SARS-COV-2 RNA RESP QL NAA+PROBE: NEGATIVE
SODIUM SERPL-SCNC: 142 MMOL/L (ref 133–144)
SYSTOLIC BLOOD PRESSURE - MUSE: NORMAL MMHG
T AXIS - MUSE: 51 DEGREES
TROPONIN I SERPL HS-MCNC: 3 NG/L
VENTRICULAR RATE- MUSE: 75 BPM
WBC # BLD AUTO: 7.1 10E3/UL (ref 4–11)

## 2022-06-01 PROCEDURE — 36415 COLL VENOUS BLD VENIPUNCTURE: CPT | Performed by: EMERGENCY MEDICINE

## 2022-06-01 PROCEDURE — 85025 COMPLETE CBC W/AUTO DIFF WBC: CPT | Performed by: EMERGENCY MEDICINE

## 2022-06-01 PROCEDURE — 93005 ELECTROCARDIOGRAM TRACING: CPT

## 2022-06-01 PROCEDURE — 84484 ASSAY OF TROPONIN QUANT: CPT | Performed by: EMERGENCY MEDICINE

## 2022-06-01 PROCEDURE — 99285 EMERGENCY DEPT VISIT HI MDM: CPT | Mod: 25

## 2022-06-01 PROCEDURE — 87637 SARSCOV2&INF A&B&RSV AMP PRB: CPT | Performed by: EMERGENCY MEDICINE

## 2022-06-01 PROCEDURE — 82310 ASSAY OF CALCIUM: CPT | Performed by: EMERGENCY MEDICINE

## 2022-06-01 PROCEDURE — C9803 HOPD COVID-19 SPEC COLLECT: HCPCS

## 2022-06-01 PROCEDURE — 85379 FIBRIN DEGRADATION QUANT: CPT | Performed by: EMERGENCY MEDICINE

## 2022-06-01 PROCEDURE — 71046 X-RAY EXAM CHEST 2 VIEWS: CPT

## 2022-06-01 ASSESSMENT — ENCOUNTER SYMPTOMS
BLOOD IN STOOL: 0
SHORTNESS OF BREATH: 1
ABDOMINAL PAIN: 0
FEVER: 0

## 2022-06-01 NOTE — ED TRIAGE NOTES
Pt has been feeling short of breath and bought an oximeter for home, she reports O2 saturation was 79% at home.  Went to [facility] urgent care today and they sent her here for an O2 saturation 85%.  Pt work of breathing is normal.     Triage Assessment     Row Name 06/01/22 8127       Triage Assessment (Adult)    Airway WDL WDL       Respiratory WDL    Respiratory WDL X  low O2/SOB       Skin Circulation/Temperature WDL    Skin Circulation/Temperature WDL WDL       Cardiac WDL    Cardiac WDL WDL       Peripheral/Neurovascular WDL    Peripheral Neurovascular WDL WDL       Cognitive/Neuro/Behavioral WDL    Cognitive/Neuro/Behavioral WDL WDL

## 2022-06-01 NOTE — ED PROVIDER NOTES
History     Chief Complaint:  Shortness of Breath       HPI   Britt Park is a 52 year old female who presents with shortness of breath.  For the last 2 weeks patient is noted progressively worsening shortness of breath.  Patient reports that she has a home oxygen monitor and she is typically been in the low 90s though at times is dipped down into the upper 70s per report.  Exertion worsens shortness of breath.  Denies chest pain, cough, and fever.  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  Also reports history of past GI bleed; has undergone recent EGD and colonoscopy.  Patient satting 87% on arrival and is now on 4 L nasal cannula oxygen with normalization; this is new oxygen requirement.    Colonoscopy, 5/9/2022  Impression:               - One 5 mm polyp at the hepatic flexure, removed                             with a cold snare. Resected and retrieved.                             - One 2 mm polyp in the cecum, removed with a cold                             snare. Resected and retrieved.   ROS:  Review of Systems   Constitutional: Negative for fever.   Respiratory: Positive for shortness of breath.    Cardiovascular: Negative for chest pain.   Gastrointestinal: Negative for abdominal pain and blood in stool.   All other systems reviewed and are negative.      Allergies:  Influenza Vaccines  Diphenhydramine  Sulfa Drugs     Medications:    dapsone (ACZONE) 100 MG tablet  folic acid (FOLVITE) 1 MG tablet  gabapentin (NEURONTIN) 300 MG capsule  losartan (COZAAR) 25 MG tablet  methotrexate 50 MG/2ML injection  mirabegron (MYRBETRIQ) 25 MG 24 hr tablet  mometasone (NASONEX) 50 MCG/ACT nasal spray  mometasone (NASONEX) 50 MCG/ACT nasal spray  mupirocin (BACTROBAN) 2 % external ointment  naltrexone (DEPADE/REVIA) 50 MG tablet  naproxen (NAPROSYN) 500 MG tablet  omeprazole (PRILOSEC) 20 MG DR capsule  predniSONE (DELTASONE) 20 MG tablet  predniSONE (DELTASONE) 5 MG  tablet  valACYclovir (VALTREX) 1000 mg tablet        Past Medical History:    Past Medical History:   Diagnosis Date     Secondary hypertension 2021   Myopathy  Myositis  Hyperlipidemia    Past Surgical History:    Past Surgical History:   Procedure Laterality Date     ABDOMEN SURGERY  2007         BIOPSY  2019 & 2021    Right bicep, result abnormal results, & left tricep biopsy     COLONOSCOPY  21     COLONOSCOPY N/A 2022    Procedure: COLONOSCOPY, FLEXIBLE, WITH LESION REMOVAL USING SNARE;  Surgeon: Dorie Santos MD;  Location:  GI     GYN SURGERY  07     for twins        Family History:    family history includes Unknown/Adopted in an other family member.      Social History:   reports that she has never smoked. She has never used smokeless tobacco. She reports previous alcohol use. She reports that she does not use drugs.  PCP: Vi Metz     Physical Exam     Patient Vitals for the past 24 hrs:   BP Temp Temp src Pulse Resp SpO2 Weight   22 2300 -- -- -- 77 -- 92 % --   22 2230 -- -- -- 89 -- 92 % --   22 2200 (!) 153/110 -- -- 92 -- 93 % --   22 2145 -- -- -- -- -- 94 % --   22 2100 137/71 -- -- 97 -- 91 % --   22 2030 (!) 133/103 -- -- 82 -- 90 % --   22 129/84 -- -- 79 -- 91 % --   22 1900 127/70 -- -- 75 -- 92 % --   22 1712 -- -- -- -- -- 94 % --   22 1710 131/71 -- -- -- 16 (!) 87 % --   22 1330 127/58 98.5  F (36.9  C) Oral 94 16 91 % 61.2 kg (135 lb)        Physical Exam  General: Alert and cooperative with exam. Patient in mild distress. Normal mentation.  Head:  Scalp is NC/AT  Eyes:  No scleral icterus, PERRL  ENT:  The external nose and ears are normal. The oropharynx is normal and without erythema; mucus membranes are moist. Uvula midline, no evidence of deep space infection.  Neck:  Normal range of motion without rigidity.  CV:  Regular rate and rhythm    No  pathologic murmur   Resp:  Breath sounds are clear bilaterally    Non-labored, no retractions or accessory muscle use  GI:  Abdomen is soft, no distension, no tenderness. No peritoneal signs  MS:  No lower extremity edema   Skin:  Warm and dry, No rash or lesions noted.  Neuro: Oriented x 3. No gross motor deficits.        Emergency Department Course   ECG:  ECG results from 06/01/22   EKG 12-lead, tracing only     Value    Systolic Blood Pressure     Diastolic Blood Pressure     Ventricular Rate 75    Atrial Rate 75    MI Interval 130    QRS Duration 74        QTc 457    P Axis 57    R AXIS 60    T Axis 51    Interpretation ECG      Sinus rhythm  Possible Left atrial enlargement  Borderline ECG  No previous ECGs available  Confirmed by GENERATED REPORT, COMPUTER ([id number]),  MARIELY CARMICHAEL ([id number]) on 6/1/2022 6:03:12 PM         Imaging:  Chest XR,  PA & LAT   Final Result   IMPRESSION: Negative chest.         Report per radiology    Laboratory:  Labs Ordered and Resulted from Time of ED Arrival to Time of ED Departure   BASIC METABOLIC PANEL - Abnormal       Result Value    Sodium 142      Potassium 4.0      Chloride 107      Carbon Dioxide (CO2) 28      Anion Gap 7      Urea Nitrogen 20      Creatinine 0.44 (*)     Calcium 8.8      Glucose 123 (*)     GFR Estimate >90     CBC WITH PLATELETS AND DIFFERENTIAL - Abnormal    WBC Count 7.1      RBC Count 2.98 (*)     Hemoglobin 11.1 (*)     Hematocrit 35.3       (*)     MCH 37.2 (*)     MCHC 31.4 (*)     RDW 18.9 (*)     Platelet Count 460 (*)     % Neutrophils 74      % Lymphocytes 20      % Monocytes 4      % Eosinophils 1      % Basophils 0      % Immature Granulocytes 1      NRBCs per 100 WBC 0      Absolute Neutrophils 5.3      Absolute Lymphocytes 1.4      Absolute Monocytes 0.3      Absolute Eosinophils 0.1      Absolute Basophils 0.0      Absolute Immature Granulocytes 0.0      Absolute NRBCs 0.0     D DIMER QUANTITATIVE - Normal    D-Dimer  Quantitative 0.37     TROPONIN I - Normal    Troponin I High Sensitivity 3     INFLUENZA A/B & SARS-COV2 PCR MULTIPLEX - Normal    Influenza A PCR Negative      Influenza B PCR Negative      RSV PCR Negative      SARS CoV2 PCR Negative          Emergency Department Course:    Reviewed:  I reviewed nursing notes, vitals and past medical history    Interventions:  Medications - No data to display     Disposition:  The patient left AMA.     Impression & Plan      Medical Decision Making:  Patient is a 52-year-old female who presents with 2-week history of progressively worsening exertional shortness of breath.  Patient's medical history and records were reviewed.  Initial consideration for, not limited to, ACS/MI, infectious process, PE, pneumothorax, metabolic disturbance, arrhythmia, anemia, symptoms secondary to myositis/myopathy, among others.  Labs, EKG, and imaging was obtained.  Chest x-ray unremarkable.  Patient without fever or cough or other infectious symptoms.  EKG demonstrates normal sinus rhythm without evidence of acute ischemia, infarction, or significant arrhythmia.  Patient's troponin is not significantly elevated; ACS felt to be unlikely.  D-dimer is normal; PE felt to be unlikely.  There is no sign of volume overload on exam.  COVID and influenza testing are negative.  Patient was noted to be hypoxic on room air requiring supplemental oxygen; 4L.  I discussed admitting patient to [facility] with Dr. Mckeon of our hospitalist service who was uncomfortable excepting the admission due to history of myositis/myopathy and concerns that this may be contributing factor to current respiratory failure.  She reportedly discussed the patient's presentation with the neurology service who also did not have experience treating these conditions.  Recommendation from hospitalist was to transfer to [place] or hospital with rheumatology/neurology coverage.  I contacted both [facility] and [place], both of whom  "had no beds available for transfer.  I offered hospital observation at [facility] until appropriate transfer could be arranged.  Patient then requested discharge from the ED against medical advice stating that they had contacted their  and would \"like to take it from here\".  Patient was informed of risks of discharge including, but not limited to, increased risk of morbidity/mortality.  Patient had decision-making capacity at time of discharge.  Oxygen saturations were 88-90% on room air at time of discharge.  She was informed that she could return to the emergency department at any time if she changed her mind or would like to be reevaluated; recommended that she seek reevaluation as soon as possible.    Diagnosis:    ICD-10-CM    1. Acute respiratory failure with hypoxia (H)  J96.01          Remi Turpin, DO  06/02/22 0028    "

## 2022-06-02 ENCOUNTER — APPOINTMENT (OUTPATIENT)
Dept: CT IMAGING | Facility: CLINIC | Age: 52
DRG: 205 | End: 2022-06-02
Attending: EMERGENCY MEDICINE
Payer: COMMERCIAL

## 2022-06-02 ENCOUNTER — HOSPITAL ENCOUNTER (INPATIENT)
Facility: CLINIC | Age: 52
LOS: 4 days | Discharge: HOME | DRG: 205 | End: 2022-06-06
Attending: EMERGENCY MEDICINE | Admitting: STUDENT IN AN ORGANIZED HEALTH CARE EDUCATION/TRAINING PROGRAM
Payer: COMMERCIAL

## 2022-06-02 ENCOUNTER — APPOINTMENT (OUTPATIENT)
Dept: GENERAL RADIOLOGY | Facility: CLINIC | Age: 52
DRG: 205 | End: 2022-06-02
Attending: EMERGENCY MEDICINE
Payer: COMMERCIAL

## 2022-06-02 DIAGNOSIS — Z29.89 NEED FOR PNEUMOCYSTIS PROPHYLAXIS: Primary | ICD-10-CM

## 2022-06-02 DIAGNOSIS — M60.80 NEUROMYOSITIS: ICD-10-CM

## 2022-06-02 DIAGNOSIS — Z11.52 ENCOUNTER FOR SCREENING LABORATORY TESTING FOR SEVERE ACUTE RESPIRATORY SYNDROME CORONAVIRUS 2 (SARS-COV-2): ICD-10-CM

## 2022-06-02 DIAGNOSIS — M60.80 OTHER MYOSITIS, UNSPECIFIED SITE: ICD-10-CM

## 2022-06-02 DIAGNOSIS — J96.01 ACUTE RESPIRATORY FAILURE WITH HYPOXIA (H): ICD-10-CM

## 2022-06-02 LAB
ALBUMIN SERPL-MCNC: 4.6 G/DL (ref 3.4–5)
ALBUMIN UR-MCNC: NEGATIVE MG/DL
ALP SERPL-CCNC: 49 U/L (ref 40–150)
ALT SERPL W P-5'-P-CCNC: 29 U/L (ref 0–50)
ANION GAP SERPL CALCULATED.3IONS-SCNC: 8 MMOL/L (ref 3–14)
APPEARANCE UR: CLEAR
AST SERPL W P-5'-P-CCNC: 21 U/L (ref 0–45)
BASE EXCESS BLDV CALC-SCNC: 3.9 MMOL/L (ref -7.7–1.9)
BASOPHILS # BLD MANUAL: 0 10E3/UL (ref 0–0.2)
BASOPHILS NFR BLD MANUAL: 0 %
BILIRUB SERPL-MCNC: 1.1 MG/DL (ref 0.2–1.3)
BILIRUB UR QL STRIP: NEGATIVE
BUN SERPL-MCNC: 21 MG/DL (ref 7–30)
CALCIUM SERPL-MCNC: 9.7 MG/DL (ref 8.5–10.1)
CHLORIDE BLD-SCNC: 105 MMOL/L (ref 94–109)
CK SERPL-CCNC: 155 U/L (ref 30–225)
CO2 SERPL-SCNC: 28 MMOL/L (ref 20–32)
COLOR UR AUTO: NORMAL
CREAT SERPL-MCNC: 0.46 MG/DL (ref 0.52–1.04)
EOSINOPHIL # BLD MANUAL: 0 10E3/UL (ref 0–0.7)
EOSINOPHIL NFR BLD MANUAL: 0 %
ERYTHROCYTE [DISTWIDTH] IN BLOOD BY AUTOMATED COUNT: 19 % (ref 10–15)
FLUAV RNA SPEC QL NAA+PROBE: NEGATIVE
FLUBV RNA RESP QL NAA+PROBE: NEGATIVE
GFR SERPL CREATININE-BSD FRML MDRD: >90 ML/MIN/1.73M2
GLUCOSE BLD-MCNC: 85 MG/DL (ref 70–99)
GLUCOSE UR STRIP-MCNC: NEGATIVE MG/DL
HCO3 BLDV-SCNC: 29 MMOL/L (ref 21–28)
HCT VFR BLD AUTO: 36.1 % (ref 35–47)
HGB BLD-MCNC: 11.7 G/DL (ref 11.7–15.7)
HGB UR QL STRIP: NEGATIVE
HOLD SPECIMEN: NORMAL
KETONES UR STRIP-MCNC: NEGATIVE MG/DL
LEUKOCYTE ESTERASE UR QL STRIP: NEGATIVE
LYMPHOCYTES # BLD MANUAL: 2.2 10E3/UL (ref 0.8–5.3)
LYMPHOCYTES NFR BLD MANUAL: 29 %
MCH RBC QN AUTO: 37.5 PG (ref 26.5–33)
MCHC RBC AUTO-ENTMCNC: 32.4 G/DL (ref 31.5–36.5)
MCV RBC AUTO: 116 FL (ref 78–100)
MONOCYTES # BLD MANUAL: 0.4 10E3/UL (ref 0–1.3)
MONOCYTES NFR BLD MANUAL: 5 %
NEUTROPHILS # BLD MANUAL: 5 10E3/UL (ref 1.6–8.3)
NEUTROPHILS NFR BLD MANUAL: 66 %
NITRATE UR QL: NEGATIVE
NRBC # BLD AUTO: 0.1 10E3/UL
NRBC BLD MANUAL-RTO: 1 %
O2/TOTAL GAS SETTING VFR VENT: 30 %
PCO2 BLDV: 46 MM HG (ref 40–50)
PH BLDV: 7.41 [PH] (ref 7.32–7.43)
PH UR STRIP: 7 [PH] (ref 5–7)
PLAT MORPH BLD: ABNORMAL
PLATELET # BLD AUTO: 419 10E3/UL (ref 150–450)
PO2 BLDV: 23 MM HG (ref 25–47)
POLYCHROMASIA BLD QL SMEAR: SLIGHT
POTASSIUM BLD-SCNC: 3.9 MMOL/L (ref 3.4–5.3)
PROT SERPL-MCNC: 8.1 G/DL (ref 6.8–8.8)
RBC # BLD AUTO: 3.12 10E6/UL (ref 3.8–5.2)
RBC MORPH BLD: ABNORMAL
RBC URINE: <1 /HPF
RSV RNA SPEC NAA+PROBE: NEGATIVE
SARS-COV-2 RNA RESP QL NAA+PROBE: NEGATIVE
SODIUM SERPL-SCNC: 141 MMOL/L (ref 133–144)
SP GR UR STRIP: 1.01 (ref 1–1.03)
TROPONIN I SERPL HS-MCNC: 4 NG/L
UROBILINOGEN UR STRIP-MCNC: NORMAL MG/DL
WBC # BLD AUTO: 7.6 10E3/UL (ref 4–11)
WBC URINE: <1 /HPF

## 2022-06-02 PROCEDURE — 71275 CT ANGIOGRAPHY CHEST: CPT | Mod: 26 | Performed by: STUDENT IN AN ORGANIZED HEALTH CARE EDUCATION/TRAINING PROGRAM

## 2022-06-02 PROCEDURE — 120N000002 HC R&B MED SURG/OB UMMC

## 2022-06-02 PROCEDURE — 87637 SARSCOV2&INF A&B&RSV AMP PRB: CPT | Performed by: EMERGENCY MEDICINE

## 2022-06-02 PROCEDURE — 36415 COLL VENOUS BLD VENIPUNCTURE: CPT | Performed by: EMERGENCY MEDICINE

## 2022-06-02 PROCEDURE — 71275 CT ANGIOGRAPHY CHEST: CPT

## 2022-06-02 PROCEDURE — 93010 ELECTROCARDIOGRAM REPORT: CPT | Performed by: EMERGENCY MEDICINE

## 2022-06-02 PROCEDURE — 85007 BL SMEAR W/DIFF WBC COUNT: CPT | Performed by: EMERGENCY MEDICINE

## 2022-06-02 PROCEDURE — 250N000011 HC RX IP 250 OP 636: Performed by: EMERGENCY MEDICINE

## 2022-06-02 PROCEDURE — 93005 ELECTROCARDIOGRAM TRACING: CPT | Performed by: EMERGENCY MEDICINE

## 2022-06-02 PROCEDURE — 84484 ASSAY OF TROPONIN QUANT: CPT | Performed by: EMERGENCY MEDICINE

## 2022-06-02 PROCEDURE — 999N000157 HC STATISTIC RCP TIME EA 10 MIN

## 2022-06-02 PROCEDURE — 80053 COMPREHEN METABOLIC PANEL: CPT | Performed by: EMERGENCY MEDICINE

## 2022-06-02 PROCEDURE — 99285 EMERGENCY DEPT VISIT HI MDM: CPT | Mod: CS,25 | Performed by: EMERGENCY MEDICINE

## 2022-06-02 PROCEDURE — 71046 X-RAY EXAM CHEST 2 VIEWS: CPT

## 2022-06-02 PROCEDURE — 85027 COMPLETE CBC AUTOMATED: CPT | Performed by: EMERGENCY MEDICINE

## 2022-06-02 PROCEDURE — 71046 X-RAY EXAM CHEST 2 VIEWS: CPT | Mod: 26 | Performed by: STUDENT IN AN ORGANIZED HEALTH CARE EDUCATION/TRAINING PROGRAM

## 2022-06-02 PROCEDURE — 81001 URINALYSIS AUTO W/SCOPE: CPT | Performed by: EMERGENCY MEDICINE

## 2022-06-02 PROCEDURE — 94150 VITAL CAPACITY TEST: CPT

## 2022-06-02 PROCEDURE — 82550 ASSAY OF CK (CPK): CPT | Performed by: EMERGENCY MEDICINE

## 2022-06-02 PROCEDURE — 82803 BLOOD GASES ANY COMBINATION: CPT | Performed by: EMERGENCY MEDICINE

## 2022-06-02 PROCEDURE — C9803 HOPD COVID-19 SPEC COLLECT: HCPCS | Performed by: EMERGENCY MEDICINE

## 2022-06-02 PROCEDURE — 99285 EMERGENCY DEPT VISIT HI MDM: CPT | Mod: CS | Performed by: EMERGENCY MEDICINE

## 2022-06-02 PROCEDURE — 83880 ASSAY OF NATRIURETIC PEPTIDE: CPT

## 2022-06-02 RX ORDER — IOPAMIDOL 755 MG/ML
55 INJECTION, SOLUTION INTRAVASCULAR ONCE
Status: COMPLETED | OUTPATIENT
Start: 2022-06-02 | End: 2022-06-02

## 2022-06-02 RX ADMIN — IOPAMIDOL 55 ML: 755 INJECTION, SOLUTION INTRAVENOUS at 19:24

## 2022-06-02 ASSESSMENT — ENCOUNTER SYMPTOMS
VOMITING: 0
DIFFICULTY URINATING: 0
EYE REDNESS: 0
BACK PAIN: 0
CONFUSION: 0
COUGH: 0
ABDOMINAL PAIN: 0
SEIZURES: 0
NAUSEA: 0
DIARRHEA: 0
HEADACHES: 0
SHORTNESS OF BREATH: 1
WEAKNESS: 1
FEVER: 0

## 2022-06-02 ASSESSMENT — ACTIVITIES OF DAILY LIVING (ADL): ADLS_ACUITY_SCORE: 35

## 2022-06-02 NOTE — ED NOTES
[facility]  ED Nurse Handoff Report    ED Chief complaint: Shortness of Breath      ED Diagnosis:   Final diagnoses:   None       Code Status: To be determined by admitting provider.    Allergies:   Allergies   Allergen Reactions     Influenza Vaccines Hives     Diphenhydramine Other (See Comments)     SHAKY       Sulfa Drugs Swelling       Patient Story:  Patient had been having shortness of breath at home, bough and oximeter and it was 79%% at home, 85% at urgent care. Covid negative, no increased WOB.     Focused Assessment:    Patient is A&Ox4. Patient denies chest pain or cardiac concern. Patient states SOB. O2 sats are 92% on 6L NC. Patient states SOB. No increased WOB.     Labs Ordered and Resulted from Time of ED Arrival to Time of ED Departure   BASIC METABOLIC PANEL - Abnormal       Result Value    Sodium 142      Potassium 4.0      Chloride 107      Carbon Dioxide (CO2) 28      Anion Gap 7      Urea Nitrogen 20      Creatinine 0.44 (*)     Calcium 8.8      Glucose 123 (*)     GFR Estimate >90     CBC WITH PLATELETS AND DIFFERENTIAL - Abnormal    WBC Count 7.1      RBC Count 2.98 (*)     Hemoglobin 11.1 (*)     Hematocrit 35.3       (*)     MCH 37.2 (*)     MCHC 31.4 (*)     RDW 18.9 (*)     Platelet Count 460 (*)     % Neutrophils 74      % Lymphocytes 20      % Monocytes 4      % Eosinophils 1      % Basophils 0      % Immature Granulocytes 1      NRBCs per 100 WBC 0      Absolute Neutrophils 5.3      Absolute Lymphocytes 1.4      Absolute Monocytes 0.3      Absolute Eosinophils 0.1      Absolute Basophils 0.0      Absolute Immature Granulocytes 0.0      Absolute NRBCs 0.0     D DIMER QUANTITATIVE - Normal    D-Dimer Quantitative 0.37     TROPONIN I - Normal    Troponin I High Sensitivity 3     INFLUENZA A/B & SARS-COV2 PCR MULTIPLEX - Normal    Influenza A PCR Negative      Influenza B PCR Negative      RSV PCR Negative      SARS CoV2 PCR Negative         Chest XR,  PA & LAT    Final Result   IMPRESSION: Negative chest.            Treatments and/or interventions provided:  Medications - No data to display    Patient's response to treatments and/or interventions:  Patient remains stable on 6L NC oxygen.    To be done/followed up on inpatient unit:   See any in-patient orders. Monitor oxygen sats.    Does this patient have any cognitive concerns?: N/A    Activity level - Baseline/Home:    Independent    Activity Level - Current:    Stand with Assist    Patient's Preferred language: English     Needed?: No    Isolation: None  Infection: Not Applicable  Patient tested for COVID 19 prior to admission: YES    Bariatric?: No    Vital Signs:   Vitals:    06/01/22 1330 06/01/22 1710 06/01/22 1712   BP: 127/58 131/71    Pulse: 94     Resp: 16 16    Temp: 98.5  F (36.9  C)     TempSrc: Oral     SpO2: 91% (!) 87% 94%   Weight: 61.2 kg (135 lb)         Cardiac Rhythm:     Was the PSS-3 completed:   Yes  What interventions are required if any?                 Family Comments: Daughter at bedside.    OBS brochure/video discussed/provided to patient/family: N/A              Name of person given brochure if not patient: N/A              Relationship to patient: N/A    For the majority of the shift this patient's behavior was Green.  Behavioral interventions performed were N/A.    ED NURSE PHONE NUMBER: *[phone]

## 2022-06-02 NOTE — TELEPHONE ENCOUNTER
"Dr Metz,    Please see MyChart message from patient and advise. Per ED notes, \"Oxygen saturations were 88-90% on room air at time of discharge.\"    LAINA PlummerN, RN  [facility]        "

## 2022-06-02 NOTE — TELEPHONE ENCOUNTER
Called [facility] to discuss ER visit and the reason for leaving AMA.     Discussed with her to go to [facility] ER so she can get O2 for low saturation and get a bed for further work up.  She is interested in going to [facility].     Agree with her. Discussed with her to keep checking O2 saturation.   She was asked to stop Dapsone per her rheumatologist at [facility] as it can cause hemolysis.

## 2022-06-02 NOTE — ED TRIAGE NOTES
Pt was at [facility] last night for SOB where she was on 4 L NC for 5 hours. Pt states they were going to admit here, but did not have the providers there to give her the care she needed, so they told her to come to [facility] for her myopathy.     She has been experiencing SOB and dizziness x 2 weeks.    BP (!) 159/104   Pulse 82   Temp 99.3  F (37.4  C) (Oral)   Resp 16   Wt 61.2 kg (135 lb)   SpO2 94%   BMI 24.69 kg/m         Triage Assessment     Row Name 06/02/22 1517       Triage Assessment (Adult)    Airway WDL WDL       Respiratory WDL    Respiratory WDL WDL       Skin Circulation/Temperature WDL    Skin Circulation/Temperature WDL WDL       Cardiac WDL    Cardiac WDL WDL       Peripheral/Neurovascular WDL    Peripheral Neurovascular WDL WDL       Cognitive/Neuro/Behavioral WDL    Cognitive/Neuro/Behavioral WDL WDL

## 2022-06-02 NOTE — PLAN OF CARE
I was called to admit Britt Park who has a history of granulomatous myositis, recently started on therapy with methotrexate and prednisone, who presents with SOB / hypoxic respiratory failure currently requiring 6L of O2. No clear cause identified by thorough workup of cardiopulmonary cause in the ED. Therefore, I am concerned this could be related to her myositis. We do not have rheumatology at [facility], so I discussed with neurology on call. She recommended transfer to a [facility] or other facility with speciality service familiar with her condition.

## 2022-06-03 ENCOUNTER — APPOINTMENT (OUTPATIENT)
Dept: GENERAL RADIOLOGY | Facility: CLINIC | Age: 52
DRG: 205 | End: 2022-06-03
Payer: COMMERCIAL

## 2022-06-03 ENCOUNTER — APPOINTMENT (OUTPATIENT)
Dept: CARDIOLOGY | Facility: CLINIC | Age: 52
DRG: 205 | End: 2022-06-03
Payer: COMMERCIAL

## 2022-06-03 LAB
ANION GAP SERPL CALCULATED.3IONS-SCNC: 5 MMOL/L (ref 3–14)
ATRIAL RATE - MUSE: 77 BPM
BUN SERPL-MCNC: 19 MG/DL (ref 7–30)
CALCIUM SERPL-MCNC: 8.3 MG/DL (ref 8.5–10.1)
CHLORIDE BLD-SCNC: 109 MMOL/L (ref 94–109)
CO2 SERPL-SCNC: 28 MMOL/L (ref 20–32)
CREAT SERPL-MCNC: 0.46 MG/DL (ref 0.52–1.04)
DIASTOLIC BLOOD PRESSURE - MUSE: NORMAL MMHG
ERYTHROCYTE [DISTWIDTH] IN BLOOD BY AUTOMATED COUNT: 18.7 % (ref 10–15)
GFR SERPL CREATININE-BSD FRML MDRD: >90 ML/MIN/1.73M2
GLUCOSE BLD-MCNC: 100 MG/DL (ref 70–99)
HCT VFR BLD AUTO: 31.5 % (ref 35–47)
HGB BLD-MCNC: 9.9 G/DL (ref 11.7–15.7)
INTERPRETATION ECG - MUSE: NORMAL
LVEF ECHO: NORMAL
MCH RBC QN AUTO: 37.1 PG (ref 26.5–33)
MCHC RBC AUTO-ENTMCNC: 31.4 G/DL (ref 31.5–36.5)
MCV RBC AUTO: 118 FL (ref 78–100)
NT-PROBNP SERPL-MCNC: 25 PG/ML (ref 0–900)
P AXIS - MUSE: 47 DEGREES
PLATELET # BLD AUTO: 399 10E3/UL (ref 150–450)
POTASSIUM BLD-SCNC: 3.9 MMOL/L (ref 3.4–5.3)
PR INTERVAL - MUSE: 136 MS
QRS DURATION - MUSE: 68 MS
QT - MUSE: 376 MS
QTC - MUSE: 425 MS
R AXIS - MUSE: 42 DEGREES
RBC # BLD AUTO: 2.67 10E6/UL (ref 3.8–5.2)
SODIUM SERPL-SCNC: 142 MMOL/L (ref 133–144)
SYSTOLIC BLOOD PRESSURE - MUSE: NORMAL MMHG
T AXIS - MUSE: 36 DEGREES
VENTRICULAR RATE- MUSE: 77 BPM
WBC # BLD AUTO: 5.1 10E3/UL (ref 4–11)

## 2022-06-03 PROCEDURE — 74018 RADEX ABDOMEN 1 VIEW: CPT | Mod: 26 | Performed by: RADIOLOGY

## 2022-06-03 PROCEDURE — 250N000013 HC RX MED GY IP 250 OP 250 PS 637

## 2022-06-03 PROCEDURE — 93306 TTE W/DOPPLER COMPLETE: CPT | Mod: 26 | Performed by: INTERNAL MEDICINE

## 2022-06-03 PROCEDURE — 93306 TTE W/DOPPLER COMPLETE: CPT

## 2022-06-03 PROCEDURE — 99207 PR APP CREDIT; MD BILLING SHARED VISIT: CPT | Performed by: HOSPITALIST

## 2022-06-03 PROCEDURE — 99222 1ST HOSP IP/OBS MODERATE 55: CPT | Performed by: PSYCHIATRY & NEUROLOGY

## 2022-06-03 PROCEDURE — 85014 HEMATOCRIT: CPT

## 2022-06-03 PROCEDURE — 120N000002 HC R&B MED SURG/OB UMMC

## 2022-06-03 PROCEDURE — 36415 COLL VENOUS BLD VENIPUNCTURE: CPT

## 2022-06-03 PROCEDURE — 250N000012 HC RX MED GY IP 250 OP 636 PS 637

## 2022-06-03 PROCEDURE — 80048 BASIC METABOLIC PNL TOTAL CA: CPT

## 2022-06-03 PROCEDURE — 99223 1ST HOSP IP/OBS HIGH 75: CPT | Mod: AI | Performed by: STUDENT IN AN ORGANIZED HEALTH CARE EDUCATION/TRAINING PROGRAM

## 2022-06-03 PROCEDURE — 74018 RADEX ABDOMEN 1 VIEW: CPT

## 2022-06-03 PROCEDURE — 999N000128 HC STATISTIC PERIPHERAL IV START W/O US GUIDANCE

## 2022-06-03 PROCEDURE — 999N000157 HC STATISTIC RCP TIME EA 10 MIN

## 2022-06-03 PROCEDURE — 94060 EVALUATION OF WHEEZING: CPT

## 2022-06-03 PROCEDURE — 99222 1ST HOSP IP/OBS MODERATE 55: CPT | Mod: GC | Performed by: INTERNAL MEDICINE

## 2022-06-03 PROCEDURE — 5A09357 ASSISTANCE WITH RESPIRATORY VENTILATION, LESS THAN 24 CONSECUTIVE HOURS, CONTINUOUS POSITIVE AIRWAY PRESSURE: ICD-10-PCS | Performed by: HOSPITALIST

## 2022-06-03 RX ORDER — FOLIC ACID 1 MG/1
1 TABLET ORAL DAILY
Status: DISCONTINUED | OUTPATIENT
Start: 2022-06-03 | End: 2022-06-06 | Stop reason: HOSPADM

## 2022-06-03 RX ORDER — AMOXICILLIN 250 MG
1 CAPSULE ORAL 2 TIMES DAILY PRN
Status: DISCONTINUED | OUTPATIENT
Start: 2022-06-03 | End: 2022-06-06 | Stop reason: HOSPADM

## 2022-06-03 RX ORDER — GABAPENTIN 300 MG/1
300 CAPSULE ORAL DAILY PRN
Status: DISCONTINUED | OUTPATIENT
Start: 2022-06-03 | End: 2022-06-06 | Stop reason: HOSPADM

## 2022-06-03 RX ORDER — FLUTICASONE PROPIONATE 50 MCG
2 SPRAY, SUSPENSION (ML) NASAL DAILY
Status: DISCONTINUED | OUTPATIENT
Start: 2022-06-03 | End: 2022-06-06 | Stop reason: HOSPADM

## 2022-06-03 RX ORDER — ACETAMINOPHEN 325 MG/1
650 TABLET ORAL EVERY 4 HOURS PRN
Status: DISCONTINUED | OUTPATIENT
Start: 2022-06-03 | End: 2022-06-06 | Stop reason: HOSPADM

## 2022-06-03 RX ORDER — AMOXICILLIN 250 MG
2 CAPSULE ORAL 2 TIMES DAILY PRN
Status: DISCONTINUED | OUTPATIENT
Start: 2022-06-03 | End: 2022-06-06 | Stop reason: HOSPADM

## 2022-06-03 RX ORDER — PANTOPRAZOLE SODIUM 40 MG/1
40 TABLET, DELAYED RELEASE ORAL DAILY
Status: DISCONTINUED | OUTPATIENT
Start: 2022-06-03 | End: 2022-06-06 | Stop reason: HOSPADM

## 2022-06-03 RX ORDER — POLYETHYLENE GLYCOL 3350 17 G/17G
17 POWDER, FOR SOLUTION ORAL DAILY
Status: DISCONTINUED | OUTPATIENT
Start: 2022-06-04 | End: 2022-06-06 | Stop reason: HOSPADM

## 2022-06-03 RX ORDER — GABAPENTIN 300 MG/1
300 CAPSULE ORAL AT BEDTIME
Status: DISCONTINUED | OUTPATIENT
Start: 2022-06-03 | End: 2022-06-03

## 2022-06-03 RX ORDER — SENNOSIDES 8.6 MG
8.6 TABLET ORAL 2 TIMES DAILY PRN
Status: DISCONTINUED | OUTPATIENT
Start: 2022-06-03 | End: 2022-06-03

## 2022-06-03 RX ORDER — LOSARTAN POTASSIUM 25 MG/1
25 TABLET ORAL DAILY
Status: DISCONTINUED | OUTPATIENT
Start: 2022-06-03 | End: 2022-06-06 | Stop reason: HOSPADM

## 2022-06-03 RX ORDER — MIRABEGRON 25 MG/1
25 TABLET, FILM COATED, EXTENDED RELEASE ORAL DAILY
Status: DISCONTINUED | OUTPATIENT
Start: 2022-06-03 | End: 2022-06-03

## 2022-06-03 RX ORDER — LIDOCAINE 40 MG/G
CREAM TOPICAL
Status: DISCONTINUED | OUTPATIENT
Start: 2022-06-03 | End: 2022-06-06 | Stop reason: HOSPADM

## 2022-06-03 RX ADMIN — FLUTICASONE PROPIONATE 2 SPRAY: 50 SPRAY, METERED NASAL at 09:44

## 2022-06-03 RX ADMIN — LOSARTAN POTASSIUM 25 MG: 25 TABLET, FILM COATED ORAL at 09:43

## 2022-06-03 RX ADMIN — PREDNISONE 25 MG: 20 TABLET ORAL at 09:43

## 2022-06-03 RX ADMIN — GABAPENTIN 300 MG: 300 CAPSULE ORAL at 00:56

## 2022-06-03 RX ADMIN — FOLIC ACID 1 MG: 1 TABLET ORAL at 09:43

## 2022-06-03 RX ADMIN — PANTOPRAZOLE SODIUM 40 MG: 40 TABLET, DELAYED RELEASE ORAL at 09:42

## 2022-06-03 ASSESSMENT — ACTIVITIES OF DAILY LIVING (ADL)
ADLS_ACUITY_SCORE: 35
TRANSFERRING: 0-->INDEPENDENT
ADLS_ACUITY_SCORE: 24
WEAR_GLASSES_OR_BLIND: YES
DRESSING/BATHING_DIFFICULTY: NO
DIFFICULTY_EATING/SWALLOWING: NO
FALL_HISTORY_WITHIN_LAST_SIX_MONTHS: NO
WALKING_OR_CLIMBING_STAIRS: OTHER (SEE COMMENTS)
WALKING_OR_CLIMBING_STAIRS_DIFFICULTY: YES
ADLS_ACUITY_SCORE: 21
CONCENTRATING,_REMEMBERING_OR_MAKING_DECISIONS_DIFFICULTY: YES
ADLS_ACUITY_SCORE: 35
TRANSFERRING: 0-->INDEPENDENT
ADLS_ACUITY_SCORE: 35
ADLS_ACUITY_SCORE: 35
CHANGE_IN_FUNCTIONAL_STATUS_SINCE_ONSET_OF_CURRENT_ILLNESS/INJURY: YES
ADLS_ACUITY_SCORE: 24
ADLS_ACUITY_SCORE: 35
TOILETING_ISSUES: NO
ADLS_ACUITY_SCORE: 35
ADLS_ACUITY_SCORE: 24
DOING_ERRANDS_INDEPENDENTLY_DIFFICULTY: NO
ADLS_ACUITY_SCORE: 35
ADLS_ACUITY_SCORE: 35

## 2022-06-03 NOTE — CONSULTS
[facility]   Pulmonary   Consult Note 6/3/2022  Britt Park MRN: [id number]    We were consulted for evaluation of hypoxia and possible ILD in the setting of known myositis.     Assessment & Plan      Ms. 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    While there are subtle ground glass changes on her bilateral posterior lung fields sparing the subpleural space, this constellation of findings is not consistent with interstitial lung disease as the etiology of her symptoms or hypoxia.    Diagnostic considerations include respiratory neuromuscular disease (increased risk due to her myositis), methemoglobinemia (given symptom onset after starting dapsone), bronchiolitis obliterans (could explain evidence of air trapping with high RV), pulmonary HTN, or shunt physiology with its own list of differential diagnoses.    Recommendations:    - Check methemoglobin levels.  If elevated, patient will need to switch from dapsone to an alternative PJP prophylactic agent   - Ambulatory oxygen testing   - Echo with bubble study   - ABG on room air   - MIP/MEP to evaluate for respiratory neuromuscular compromise   - High Res CT with inspiratory and expiratory films    Thank you for allowing us to care for this patient.  We will continue to follow.  Please don't hesitate to page or call with any questions or concerns.    Patient seen & discussed w/  Dr. Perlman, who agrees with the above assessment and plan.    Catrina Burton M.D.  Pulmonary and Critical Care Medicine Fellow  6/3/2022           History of Present Illness:   Britt Park is a 52 year old female who presented to [facility] on 6/2/2022 with hypoxia on home oxygen monitoring.  On admission patient was diagnosed w/ acute hypoxia of  "unclear etiology.    Patient reports that for the past several weeks, her  has noticed her work of breathing increased when going for walks together.  She would occasionally notice difficulty getting a deep breath, but didn't feel like it was overt dyspnea.  She has had frequent occasions at doctor's offices and procedures where she has been told that her oxygen levels were low.  In fact, her recent endoscopy was nearly aborted due to it, but she states that she went forward with the procedure without difficulty.    She has noticed her oxygen sats getting into the low 80s at home while working.  She works at a computer doing HR work, so it isn't strenuous.    She endorses increased abdominal girth and tightness over the past several weeks, increased indigiestion, \"brain fog\" with unclear thinking on occasion, intermittent lightheadedness.  She denies syncope.    She is currently on 25 mg prednisone.  She has been on higher doses since before January and has been tapering very slowly.  She started methotrexate in January and will be stepping down her prednisone by 5 mg each month until this fall when they will make decisions on whether she can tolerate being off entirely.    Patient is a never smoker.          Review of Symptoms:   10-point ROS reviewed, & found negative w/ exceptions noted in the HPI.          Past Medical History:     Past Medical History:   Diagnosis Date     Secondary hypertension 2021       Past Surgical History:   Procedure Laterality Date     ABDOMEN SURGERY  2007         BIOPSY  2019 & 2021    Right bicep, result abnormal results, & left tricep biopsy     COLONOSCOPY  21     COLONOSCOPY N/A 2022    Procedure: COLONOSCOPY, FLEXIBLE, WITH LESION REMOVAL USING SNARE;  Surgeon: Dorie Santos MD;  Location:  GI     GYN SURGERY  07     for twins            Allergies:     Allergies   Allergen Reactions     Influenza Vaccines " Hives     Diphenhydramine Other (See Comments)     SHAKY       Sulfa Drugs Swelling             Outpatient Medications:     No current facility-administered medications on file prior to encounter.  dapsone (ACZONE) 100 MG tablet, Take 1 tablet (100 mg) by mouth daily  folic acid (FOLVITE) 1 MG tablet, Take 1 tablet (1 mg) by mouth daily  gabapentin (NEURONTIN) 300 MG capsule, Take 300 mg by mouth as needed (neck numbness and tingling)  losartan (COZAAR) 25 MG tablet, Take 1 tablet (25 mg) by mouth daily  methotrexate 50 MG/2ML injection, Inject 0.8 mLs (20 mg) Subcutaneous every 7 days (Patient taking differently: Inject 20 mg Subcutaneous every 7 days On Wednesdays)  mirabegron (MYRBETRIQ) 25 MG 24 hr tablet, Take 1 tablet (25 mg) by mouth daily  mometasone (NASONEX) 50 MCG/ACT nasal spray, 2 sprays daily  mupirocin (BACTROBAN) 2 % external ointment, Apply topically 3 times daily (Patient taking differently: Apply topically 3 times daily as needed (for sores associated with CPAP use))  naltrexone (DEPADE/REVIA) 50 MG tablet, Take 1 tablet (50 mg) by mouth daily (Patient taking differently: Take 50 mg by mouth daily Compounded to 4.5mg)  naproxen (NAPROSYN) 500 MG tablet, TAKE 1 TABLET BY MOUTH TWICE DAILY WITH MEALS AS NEEDED  omeprazole (PRILOSEC) 20 MG DR capsule, Take 1 capsule (20 mg) by mouth daily (Patient taking differently: Take 40 mg by mouth daily)  predniSONE (DELTASONE) 20 MG tablet, Take 1 tablet (20 mg) by mouth daily (Patient taking differently: Take 25 mg by mouth daily)  valACYclovir (VALTREX) 1000 mg tablet, Take 2,000 mg by mouth as needed              Family History:     Family History   Problem Relation Age of Onset     Unknown/Adopted Other                Social History:     Social History     Tobacco Use     Smoking status: Never Smoker     Smokeless tobacco: Never Used   Substance Use Topics     Alcohol use: Not Currently     Drug use: Never             Physical Exam:   /76   Pulse 88    Temp 97.9  F (36.6  C) (Axillary)   Resp 18   Wt 61.2 kg (135 lb)   SpO2 93%   BMI 24.69 kg/m      General: middle age female in no acute distress, appears younger than stated age  HENT: external ears without visible abnormalities, no rhinorrhea, no epistaxis  Lungs: CTAB, on room air, some accessory muscle use with belly breathing  Heart: RRR, no murmurs  Abdomen: firm, distended, bowel tones present, non-tympanic, no rebound or guarding  Extremities: no pitting edema, no clubbing or cyanosis  Skin: no visible rashes, no mottling  Neurologic: moving all 4 extremities spontaneously, awake and alert, gait without overt deficit  Psych: full affect, appropriate insight, appropriate judgment          Data:   Labs (all laboratory studies reviewed by me): notable labs in HPI above.    Imaging and other diagnostic testing (all imaging studies reviewed by me)    CT chest 6/2: no PE, faint ground glass opacities in bilateral posterior lower lobes sparing the subpleural space    PFTs 7/2021: F/F 0.88, FEV1 2.23 (89%), FVC 2.53 (82%), DLCOc 18.39 (95%), TLC 5.41 (119%), RV 3.12 (191%)    Transthoracic echocardiogram 6/3: EF 65%, normal, RVSP not reported

## 2022-06-03 NOTE — PROGRESS NOTES
Admission/Transfer from: ED  2 RN skin assessment completed. Yes janey BLOUNT, RN  Significant findings include: none  WOC Nurse Consult Ordered? No

## 2022-06-03 NOTE — CONSULTS
[facility]  Neurology Consultation    Patient Name:  Britt Park  MRN:  [id number]    :  1970  Date of Service:  2022  Primary care provider:  Vi Metz      Neurology consultation service was asked to see Britt Park by Dr. Bernal to evaluate hx of granulomatosis myositis with new SOB.    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    Patient reports that over the last two week she has been experiencing shortness of breathing in the setting of activity. Patient notes that prior to 2 week ago she was able to walk 1-2 miles daily without SOB, however more recently is only able to tolerate walks up to 15 minutes before she feels as though she cannot catch her breath. She notes that it takes increased effort to take deep breaths at that time. Reports that she has been checking her O2 at home and has been running in the high 80's to low 90's recently, when previously she would always be at 100%. No chest pain, palpitations, dizziness, lightheadedness.     She does not report any new muscle weakness, pain, or sensory changes. Reports that at baseline she is weak at her proximal muscles. Notable it is difficult for her to reach her arms above her head, as well as step up on curbs or stairs without assistance.     Patient was also evaluated at [facility] yesterday with similar symptoms, and noted to be requiring 6L NC to maintain oxygen saturations in the 90's. Workup for cardiopulmonary causes was completed without identifiable source. Admission was offered for observation and possible transfer to [facility] when open beds were  available, however patient declined. Today she is speaking in full sentences without SOB and is currently on RA.     Patient currently follows with Dr. Albarran at the [facility] for management of her granulomatosis myositis.     Data from [facility]:  NCS/EMG: There continues to be an electrodiagnostic evidence of a proximal upper limb predominant myopathy with electrophysiologic correlates of necrotic fibers, fiber splitting or vacuolization of muscle fibers. Compared to the prior study in 2021, there is an interval improvement of the myopathic process based on: 1. less fibrillation potentials and milder degree of motor unit potential changes observed in proximal upper limb muscles and 2. absence of myopathic motor unit potentials in tensor fascia latae.     Muscle MRI: no significant change in the diffuse inflammatory intramuscular edema throughout the bilateral pelvis and thigh musculature since 2020 suggestive of myositis. There has been mild interval progression of areas of moderate and advanced volume loss about both thighs. Areas of advanced atrophy include the bilateral vastus lateralis, bilateral adductor Rogelio and the right upper hamstring musculature.      ROS  A comprehensive ROS was performed and pertinent findings were included in HPI.     PMH  Past Medical History:   Diagnosis Date     Secondary hypertension 2021     Past Surgical History:   Procedure Laterality Date     ABDOMEN SURGERY  2007         BIOPSY  2019 & 2021    Right bicep, result abnormal results, & left tricep biopsy     COLONOSCOPY  21     COLONOSCOPY N/A 2022    Procedure: COLONOSCOPY, FLEXIBLE, WITH LESION REMOVAL USING SNARE;  Surgeon: Dorie Santos MD;  Location:  GI     GYN SURGERY  07     for twins       Medications   I have personally reviewed the patient's medication list.     Allergies  I have personally reviewed the patient's allergy list.     Social  History  Social History     Socioeconomic History     Marital status:    Tobacco Use     Smoking status: Never Smoker     Smokeless tobacco: Never Used   Substance and Sexual Activity     Alcohol use: Not Currently     Drug use: Never     Sexual activity: Yes     Partners: Male     Birth control/protection: Male Surgical   Other Topics Concern     Parent/sibling w/ CABG, MI or angioplasty before 65F 55M? No       Family History    Family History   Problem Relation Age of Onset     Unknown/Adopted Other        Physical Examination   Vitals: /63   Pulse 81   Temp 99.3  F (37.4  C) (Oral)   Resp 16   Wt 61.2 kg (135 lb)   SpO2 90%   BMI 24.69 kg/m    General: sitting up in bed, no acute distress  Head: normocephalic  Eyes: no icterus, op pink and moist  Cardiac: regular rate  Respiratory: non-labored on RA, no increased work of breathing, speaking in full sentances without SOB  GI: soft, non-tender  Skin: No rash or lesion on exposed skin  Psych: Mood pleasant, affect congruent    Neuro:  Mental status: Awake, alert, attentive, oriented to self, time, place, and circumstance. Language is fluent and coherent with intact comprehension of complex commands, naming and repetition.  Cranial nerves: VFF, PERRL, conjugate gaze, EOMI, facial sensation intact, face symmetric, shoulder shrug strong, tongue/uvula midline, no dysarthria.   Motor: Normal bulk. Dystonic movements in bilateral hands R>L.      Right Left   Arm abduction 4-/5 4-/5   Elbow Flex 4+/5 4+/5   Elbow Extension 4-/5 4-/5   Wrist Extension 4+/5 4+/5   FDI  4/5 4/5   APB 4/5 4/5     5/5 5/5   Hip Flexion 4-/5 4-/5   Knee Flexion 4/5 4/5   Knee Extension 4/5 4/5   Dorsiflexion  5/5 5/5   Plantarflexion 5/5 5/5   Neck flexion and extension both 5/5.    Reflexes: Normo-reflexic and symmetric biceps, brachioradialis, triceps, patellae, and achilles.   Sensory: Intact to light touch in proximal and distal aspects of all 4 extremities    Coordination: FNF without ataxia or dysmetria.    Gait: deferred    Investigations   I have personally reviewed pertinent labs, tests, and radiological imaging. Discussion of notable findings is included under Impression.     CT chest PE w contrast  Impression:  1. No pulmonary embolism identified.  2. No other worrisome findings in the chest and upper abdomen.  3. Single sub-6 mm pulmonary nodules as described above. According to  the Fleischner Society criteria none of these nodules require  follow-up although if the patient is considered high risk for lung  cancer can consider a optional low-dose CT of the chest in 12 months.    Was patient transferred from outside hospital?   No    Impression  Britt Park is a 52 year old female with history of HTN, granulomatosis myositis diagnosed at [facility] currently on weekly Methotrexate and daily prednisone 25 mg. Presented to the ED with shortness of breathing and decreased O2 sats, for which neurology was consulted to comment on whether this was related to myositis diagnosis. Neurologic exam is with proximal muscles weakness although patient notes this is at her baseline. Able to speak in full sentences without shortness of breath, without increased work of breathing on room air. NIF/FVC was -35 and 3450 mL respectively. Blood gas was with normal pH and CO2. PO2 was low however taken from VBG therefore not totally reliable marker of oxygenation. CK was normal.    I have a low suspicion for patients shortness of breath and low SpO2 being a primary muscle process. In the event this was a flare of underlying myositis, would expect her to have elevated CK. In addition there would be evidence of CO2 retention on blood gas in neuromuscular weakness rather than low oxygenation. Her NIF is very mildly reduced however with reassuring exam there is low concern for this value worsening. Would consider alternative process at this time.      Recommendations  -  Consideration for further pulmonary work up as etiology of symptoms.  - Would be reasonable to repeat NIF/FVC to monitor stability.  - Continue on pta dosing of methotrexate and prednisone as this does not appear to be an acute flare.  - Follow up with Neurologist at [facility].    Thank you for involving Neurology in the care of Britt Park.  Please do not hesitate to call with questions/concerns (consult pager [phone]).      Patient was discussed with Dr. Velazco.    Gregoria Gay MD  Neurology Resident

## 2022-06-03 NOTE — PROGRESS NOTES
"[facility]  Neurology Consultation - Progress Note    Patient Name:  Britt Park  Date of Service:  Mitra 3, 2022    Subjective:    Pt is unsure when respiratory problems began. During clinic visits was told that O2 sats were \"low\" because they were in the high 80s and low 90s. In the past few weeks, new onset difficulty with walking (can only walk 15 minutes) whereas used to be able to walk for a mile. Hard to take a deep breath. Dizzy and light headed spells. Feels like some improvement with strength after she started doing some leg presses. Was having difficulty getting up from sitting position which has improved.      Objective:    Vitals: BP 99/65   Pulse 69   Temp 99.3  F (37.4  C) (Oral)   Resp 18   Wt 61.2 kg (135 lb)   SpO2 93%   BMI 24.69 kg/m    General: Lying in bed, NAD  Head: Atraumatic, normocephalic   Cardiac: no lower extremity edema  Neurologic:  Mental Status: Fully alert, attentive and oriented. Speech clear and fluent.   Cranial Nerves: EOM intact, without nystagmus. Facial movements symmetric at rest and with activation.   Motor: No abnormal movements.  Moving all 4 extremities antigravity.   Sensory: intact to light touch  Station/Gait: able to sit upright      Pertinent Investigations:    I have personally reviewed most recent and pertinent labs, tests, and radiological images.     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 She was found to have neurologic exam is with proximal muscles weakness although patient notes this is at her baseline. Able to speak in full sentences without shortness of breath, without increased work of breathing on room air. NIF/FVC was -35 and 3450 mL respectively. Blood gas " was with normal pH and CO2. At this time, her granulomatosis myositis appears stable and unlikely to be the cause of her recent respiratory issues. There were some concerns that there might be some vagal nerve pathology contributing though that is less likely because that is usually associated with widespread autonomic dysfunction (which is not seen) and is more likely to cause orthostatic hypotension. Recommend workup by pulmonology.    Recommendations:  - granulomatosis myositis appears stable; low index of suspicion that current respiratory issues are of neurologic origin  - pulmonology consult  - neuro will sign off    Thank you for involving Neurology in the care of Britt Park.  Please do not hesitate to call with questions/concerns (consult pager [phone]).      Patient was seen and discussed with Dr. Franchesca Hooks.    Nilay Saul, MS4    Resident/Fellow Attestation   I,  Franchesca Hooks, was present with the medical/LUIS student who participated in the service and in the documentation of the note.  I have verified the history and personally performed the physical exam and medical decision making.  I agree with the assessment and plan of care as documented in the note.

## 2022-06-03 NOTE — PROGRESS NOTES
[facility]    Progress Note - Medicine Service, MARIELLA TEAM 5       Date of Admission:  6/2/2022    Assessment & Plan          Britt Park is a 52 year old female with a history of granulomatous myositis and hypertension admitted for workup of acute hypoxemic respiratory failure and dyspnea on exertion.     Updates:  - Pulmonology consult: not c/w ILD               > Check methemoglobin given dapsone use               > Walking test               > ABG, MIP/MEP, High Res CT, TTE with bubble    - Neuro: stable granulomatosis myositis, low suspicion for neuro cause of TORO. Signed off  - TTE: normal  - Incentive spirometry  - FEV1/FVC testing: normal  - Abd Xray: moderate stool burden, no obstruction  - Schedule senna/Miralax      # Dyspnea on exertion  # New hypoxia  # Hx of MAYA  Progressive TORO for 1-2 weeks. No O2 need at baseline. Not cardiac cause given no h/o CAD, no signs of ischemia or CHF with normal TTE. Not PNA or PE per CT PE. PFT wnl. Low suspicion for granulomatous myositis flare causing current TORO given normal CK, no CO2 retention, unremarkable NIF/FVC and exam per Neurology. Not ILD per pulm based on CT findings. Anemia may contribute, however, Hb is at baseline (9.5-11.7 since 2/2022). Dapsone for PJP ppx stopped on 6/2 per pt's neurologist at [place] given the risk of hemolysis and methemoglobinemia contributing to TORO though normal bili not c/w hemolysis. Constipation may contribute.  - Pulmonology consult: not c/w ILD               > Check methemoglobin given dapsone use               > Walking test               > ABG, MIP/MEP, High Res CT, TTE with bubble    - Neuro: stable granulomatosis myositis, low suspicion for neuro cause of TORO. Signed off  - Incentive spirometry  - PFT  - Supplemental oxygen via NC, wean as tolerated  - Continue CPAP at night  - Consider hemolysis w/u  - Schedule senna/Miralax     # Granulomatous myositis,  "stable  Diagnosed on biopsy 9/2021, follows with [facility]. Pt has some proximal muscle weakness at baseline, feels this has not changed since symptoms began. CK normal. Outpatient regimen includes prednisone and weekly methotrexate.   - Continue prednisone 25mg daily  - Methotrexate once weekly (next due 6/8)  - PT/OT  - Dapsone discontinued 6/2 per rheumatologist at [place] for anemia  - Bactrim contraindicated due to sulfa allergy  - Continue PPI    # Constipation  Pt reports \"hard\" feeling of abdomen over the last 2 weeks since TORO began. Cannot remember when she had her last bowel movement. Has gained 15 pounds in the last several months. No evidence of ascites, fluid overload with no evidence of HF as above. Weight gain could be related to extended prednisone use w/ hx of granulomatous myositis. Abdominal XR showed at least moderate stool burden.   - Schedule senna/Miralax    # Macrocytic anemia  Hb 11.7, down to 9.9 when rechecked later in the am. Recent baseline 9-10 per chart review. . Pt's neurologist at [place] held dapsone for PJP prophylaxis for concern of hemolytic anemia, though bilirubin was normal, macrocytic anemia less likely w/ hemolyis. Outpatient workup in 4/2022 negative for LOUIE, B12/folate deficiency, fecal occult was positive but no melena or signs of bleeding, endoscopy negative. Methotrexate could contribute to macrocytic anemia. Will continue to monitor.  - continue folic acid  - daily CBC  - Consider hemolysis w/u     # R calf cramping with exertion  Symptoms seem consistent with claudication, consider outpatient workup at discharge.     # HTN   - Continue PTA losartan 25mg      # Neuropathic pain  Continue PRN gabapentin 300mg      Diet: Combination Diet Regular Diet Adult    DVT Prophylaxis: Low Risk/Ambulatory with no VTE prophylaxis indicated  Mercer Catheter: Not present  Central Lines: None  Cardiac Monitoring: None  Code Status: Full Code      Disposition Plan   Expected " Discharge: 06/04/2022     [facility]tic[facility]  Delays:         The patient's care was discussed with the Patient.    Navid Mo  Medical Student  Medicine Service, MAROON TEAM 5  [facility]  Securely message with the Vocera Web Console (learn more here)  Text page via [facility] Paging/Directory   Please see signed in provider for up to date coverage information      Clinically Significant Risk Factors Present on Admission          # Hypocalcemia: Ca = 8.3 mg/dL (Ref range: 8.5 - 10.1 mg/dL) and/or iCa = N/A on admission, will replace as needed             Resident/Fellow Attestation   I, Carola Lemus MD, was present with the medical/LUIS student who participated in the service and in the documentation of the note.  I have verified the history and personally performed the physical exam and medical decision making.  I agree with the assessment and plan of care as documented in the note.      Carola Lemus MD  PGY2  Date of Service (when I saw the patient): 06/03/22  ______________________________________________________________________    Interval History   Pt reports symptoms have been stable since arriving to the ED. Feels like she cannot get a full breath. This has been going on for the last two weeks. Generally associated with exertion, though she notes one instance where she felt profoundly short of breath while driving. Denies cough, orthopnea, LE swelling. SOB has impacted her ability to walk around, noting that she feels short of breath after walking for ~15, which is significantly lower than baseline. Has weakness in the extremities at baseline but thinks this has not changed over the last 2 weeks since sx began. No new concerns since arriving to the ED.     Data reviewed today: I reviewed all medications, new labs and imaging results over the last 24 hours. I personally reviewed the abdominal x-ray image(s) showing mild  to moderate stool burden and the chest CT image(s) showing No evidence of PE, several likely benign pulmonary nodules per radiology.    Physical Exam   Vital Signs: Temp: 97.9  F (36.6  C) Temp src: Axillary (pt drining coffee) BP: 112/76 Pulse: 88   Resp: 18 SpO2: 93 % O2 Device: Nasal cannula Oxygen Delivery: 2 LPM  Weight: 135 lbs 0 oz  General Appearance: Comfortable appearing, not in acute distress  Respiratory: CTAB, no wheezes, rales, rhonchi, no increased WOB  Cardiovascular: RRR, no murmurs, rubs, gallops  GI: Abdomen soft, nontender, nondistended, no guarding, rigidity, bowel sounds normoactive.   Neuro: A&Ox3, no focal deficits      Data   Recent Labs   Lab 06/03/22  0642 06/02/22  1724 06/01/22  1731   WBC 5.1 7.6 7.1   HGB 9.9* 11.7 11.1*   * 116* 119*    419 460*    141 142   POTASSIUM 3.9 3.9 4.0   CHLORIDE 109 105 107   CO2 28 28 28   BUN 19 21 20   CR 0.46* 0.46* 0.44*   ANIONGAP 5 8 7   LASHONDA 8.3* 9.7 8.8   * 85 123*   ALBUMIN  --  4.6  --    PROTTOTAL  --  8.1  --    BILITOTAL  --  1.1  --    ALKPHOS  --  49  --    ALT  --  29  --    AST  --  21  --

## 2022-06-03 NOTE — ED PROVIDER NOTES
ED Provider Note  [facility]      History     Chief Complaint   Patient presents with     Shortness of Breath     X 2 weeks     Dizziness     HPI  Britt Park is a 52 year old female who presents to the emergency department with shortness of breath, borderline hypoxia to the low 90s, at times into the 80s over the past approximately 2 weeks.  She reports that she has noticed over that timeframe more difficulty with activities of daily living.  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  She has been on prednisone and has been tapering down, currently at 25 mg daily as well as methotrexate.  She has previously been managed by physicians at the [facility].  She states that she was noted to have borderline hypoxic oxygen saturations within the past couple of weeks during a screening by anesthesia to undergo an upper endoscopy.  They proceeded with the procedure without complication.  He denies any chest pain, cough.  She reports some muscle group weakness and some difficulty standing up from chairs.  States that she was recently at an outside hospital and was advised to be admitted but then transferred because they would not be able to care for her there.    Past Medical History  Past Medical History:   Diagnosis Date     Secondary hypertension 2021     Past Surgical History:   Procedure Laterality Date     ABDOMEN SURGERY  2007         BIOPSY  2019 & 2021    Right bicep, result abnormal results, & left tricep biopsy     COLONOSCOPY  21     COLONOSCOPY N/A 2022    Procedure: COLONOSCOPY, FLEXIBLE, WITH LESION REMOVAL USING SNARE;  Surgeon: Dorie Santos MD;  Location:  GI     GYN SURGERY  07     for twins     dapsone (ACZONE) 100 MG tablet  folic acid (FOLVITE) 1 MG tablet  gabapentin (NEURONTIN) 300 MG capsule  losartan (COZAAR) 25 MG tablet  methotrexate 50 MG/2ML  injection  mirabegron (MYRBETRIQ) 25 MG 24 hr tablet  mometasone (NASONEX) 50 MCG/ACT nasal spray  mometasone (NASONEX) 50 MCG/ACT nasal spray  mupirocin (BACTROBAN) 2 % external ointment  naltrexone (DEPADE/REVIA) 50 MG tablet  naproxen (NAPROSYN) 500 MG tablet  omeprazole (PRILOSEC) 20 MG DR capsule  predniSONE (DELTASONE) 20 MG tablet  predniSONE (DELTASONE) 5 MG tablet  valACYclovir (VALTREX) 1000 mg tablet      Allergies   Allergen Reactions     Influenza Vaccines Hives     Diphenhydramine Other (See Comments)     SHAKY       Sulfa Drugs Swelling     Family History  Family History   Problem Relation Age of Onset     Unknown/Adopted Other      Social History   Social History     Tobacco Use     Smoking status: Never Smoker     Smokeless tobacco: Never Used   Substance Use Topics     Alcohol use: Not Currently     Drug use: Never      Past medical history, past surgical history, medications, allergies, family history, and social history were reviewed with the patient. No additional pertinent items.       Review of Systems   Constitutional: Negative for fever.   HENT: Negative for congestion.    Eyes: Negative for redness.   Respiratory: Positive for shortness of breath. Negative for cough.    Cardiovascular: Negative for chest pain and leg swelling.   Gastrointestinal: Negative for abdominal pain, diarrhea, nausea and vomiting.   Genitourinary: Negative for difficulty urinating.   Musculoskeletal: Negative for back pain.   Skin: Negative for rash.   Neurological: Positive for weakness. Negative for seizures and headaches.   Psychiatric/Behavioral: Negative for confusion.     A complete review of systems was performed with pertinent positives and negatives noted in the HPI, and all other systems negative.    Physical Exam   BP: (!) 159/104  Pulse: 82  Temp: 99.3  F (37.4  C)  Resp: 16  Weight: 61.2 kg (135 lb)  SpO2: 94 %  Physical Exam  Constitutional:       General: She is awake. She is not in acute distress.      Appearance: Normal appearance. She is well-developed. She is not ill-appearing or toxic-appearing.   HENT:      Head: Atraumatic.      Mouth/Throat:      Pharynx: No oropharyngeal exudate.   Eyes:      General: No scleral icterus.     Pupils: Pupils are equal, round, and reactive to light.   Cardiovascular:      Rate and Rhythm: Normal rate and regular rhythm.      Heart sounds: Normal heart sounds, S1 normal and S2 normal.   Pulmonary:      Effort: No respiratory distress.      Breath sounds: Normal breath sounds.   Abdominal:      General: Bowel sounds are normal.      Palpations: Abdomen is soft.      Tenderness: There is no abdominal tenderness.   Musculoskeletal:         General: No tenderness.   Skin:     General: Skin is warm.      Findings: No rash.   Neurological:      Mental Status: She is alert and oriented to person, place, and time.   Psychiatric:         Attention and Perception: Attention normal.         Mood and Affect: Mood normal.         Speech: Speech normal.         Behavior: Behavior normal. Behavior is cooperative.         ED Course      Procedures            EKG Interpretation:      Interpreted by BRANDON SCHUSTER MD  Time reviewed: 2028  Symptoms at time of EKG: Dyspnea   Rhythm: normal sinus   Rate: normal  Axis: normal  Ectopy: none  Conduction: normal  ST Segments/ T Waves: No ST-T wave changes  Q Waves: none  Comparison to prior: Unchanged    Clinical Impression: normal EKG                    Results for orders placed or performed during the hospital encounter of 06/02/22   XR Chest 2 Views     Status: None (Preliminary result)    Impression    RESIDENT PRELIMINARY INTERPRETATION  Impression: Clear chest.   CT Chest Pulmonary Embolism w Contrast     Status: None (Preliminary result)    Impression    RESIDENT PRELIMINARY INTERPRETATION  Impression:  1. No pulmonary embolism identified.  2. No other worrisome findings in the chest and upper abdomen.  3. Single sub-6 mm pulmonary nodules as  described above. According to  the Fleischner Society criteria none of these nodules require  follow-up although if the patient is considered high risk for lung  cancer can consider a optional low-dose CT of the chest in 12 months.     [place] Draw     Status: None    Narrative    The following orders were created for panel order [place] Draw.  Procedure                               Abnormality         Status                     ---------                               -----------         ------                     Extra Blue Top Tube[[id number]]                              Final result               Extra Red Top Tube[[id number]]                               Final result               Extra Green Top (Lithium...[[id number]]                      Final result               Extra Purple Top Tube[[id number]]                            Final result                 Please view results for these tests on the individual orders.   Comprehensive metabolic panel     Status: Abnormal   Result Value Ref Range    Sodium 141 133 - 144 mmol/L    Potassium 3.9 3.4 - 5.3 mmol/L    Chloride 105 94 - 109 mmol/L    Carbon Dioxide (CO2) 28 20 - 32 mmol/L    Anion Gap 8 3 - 14 mmol/L    Urea Nitrogen 21 7 - 30 mg/dL    Creatinine 0.46 (L) 0.52 - 1.04 mg/dL    Calcium 9.7 8.5 - 10.1 mg/dL    Glucose 85 70 - 99 mg/dL    Alkaline Phosphatase 49 40 - 150 U/L    AST 21 0 - 45 U/L    ALT 29 0 - 50 U/L    Protein Total 8.1 6.8 - 8.8 g/dL    Albumin 4.6 3.4 - 5.0 g/dL    Bilirubin Total 1.1 0.2 - 1.3 mg/dL    GFR Estimate >90 >60 mL/min/1.73m2   Blood gas venous     Status: Abnormal   Result Value Ref Range    pH Venous 7.41 7.32 - 7.43    pCO2 Venous 46 40 - 50 mm Hg    pO2 Venous 23 (L) 25 - 47 mm Hg    Bicarbonate Venous 29 (H) 21 - 28 mmol/L    Base Excess/Deficit (+/-) 3.9 (H) -7.7 - 1.9 mmol/L    FIO2 30    Symptomatic; Unknown Influenza A/B & SARS-CoV2 (COVID-19) Virus PCR Multiplex Nasopharyngeal     Status: Normal    Specimen:  Nasopharyngeal; Swab   Result Value Ref Range    Influenza A PCR Negative Negative    Influenza B PCR Negative Negative    RSV PCR Negative Negative    SARS CoV2 PCR Negative Negative, Testing sent to reference lab. Results will be returned via unsolicited result    Narrative    Testing was performed using the Xpert Xpress CoV2/Flu/RSV Assay on the [employer] Gene[employer]pert Instrument. This test should be ordered for the detection of SARS-CoV-2 and influenza viruses in individuals who meet clinical and/or epidemiological criteria. Test performance is unknown in asymptomatic patients. This test is for in vitro diagnostic use under the FDA EUA for laboratories certified under CLIA to perform high or moderate complexity testing. This test has not been FDA cleared or approved. A negative result does not rule out the presence of PCR inhibitors in the specimen or target RNA in concentration below the limit of detection for the assay. If only one viral target is positive but coinfection with multiple targets is suspected, the sample should be re-tested with another FDA cleared, approved, or authorized test, if coinfection would change clinical management. This test was validated by the [facility]. These laboratories are certified under the Clinical  Laboratory Improvement Amendments of 1988 (CLIA-88) as qualified to perform high complexity laboratory testing.   Troponin I     Status: Normal   Result Value Ref Range    Troponin I High Sensitivity 4 <54 ng/L   Extra Blue Top Tube     Status: None   Result Value Ref Range    Hold Specimen JIC    Extra Red Top Tube     Status: None   Result Value Ref Range    Hold Specimen JIC    Extra Green Top (Lithium Heparin) Tube     Status: None   Result Value Ref Range    Hold Specimen JIC    Extra Purple Top Tube     Status: None   Result Value Ref Range    Hold Specimen JIC    CBC with platelets and differential     Status: Abnormal   Result Value Ref Range    WBC Count  7.6 4.0 - 11.0 10e3/uL    RBC Count 3.12 (L) 3.80 - 5.20 10e6/uL    Hemoglobin 11.7 11.7 - 15.7 g/dL    Hematocrit 36.1 35.0 - 47.0 %     (H) 78 - 100 fL    MCH 37.5 (H) 26.5 - 33.0 pg    MCHC 32.4 31.5 - 36.5 g/dL    RDW 19.0 (H) 10.0 - 15.0 %    Platelet Count 419 150 - 450 10e3/uL   CK total     Status: Normal   Result Value Ref Range     30 - 225 U/L   UA with Microscopic reflex to Culture     Status: Normal    Specimen: Urine, Clean Catch   Result Value Ref Range    Color Urine Light Yellow Colorless, Straw, Light Yellow, Yellow    Appearance Urine Clear Clear    Glucose Urine Negative Negative mg/dL    Bilirubin Urine Negative Negative    Ketones Urine Negative Negative mg/dL    Specific Gravity Urine 1.014 1.003 - 1.035    Blood Urine Negative Negative    pH Urine 7.0 5.0 - 7.0    Protein Albumin Urine Negative Negative mg/dL    Urobilinogen Urine Normal Normal, 2.0 mg/dL    Nitrite Urine Negative Negative    Leukocyte Esterase Urine Negative Negative    RBC Urine <1 <=2 /HPF    WBC Urine <1 <=5 /HPF    Narrative    Urine Culture not indicated   Manual Differential     Status: Abnormal   Result Value Ref Range    % Neutrophils 66 %    % Lymphocytes 29 %    % Monocytes 5 %    % Eosinophils 0 %    % Basophils 0 %    NRBCs per 100 WBC 1 (H) <=0 %    Absolute Neutrophils 5.0 1.6 - 8.3 10e3/uL    Absolute Lymphocytes 2.2 0.8 - 5.3 10e3/uL    Absolute Monocytes 0.4 0.0 - 1.3 10e3/uL    Absolute Eosinophils 0.0 0.0 - 0.7 10e3/uL    Absolute Basophils 0.0 0.0 - 0.2 10e3/uL    Absolute NRBCs 0.1 (H) <=0.0 10e3/uL    RBC Morphology Confirmed RBC Indices     Platelet Assessment  Automated Count Confirmed. Platelet morphology is normal.     Automated Count Confirmed. Platelet morphology is normal.    Polychromasia Slight (A) None Seen   EKG 12-lead, tracing only     Status: None (Preliminary result)   Result Value Ref Range    Systolic Blood Pressure  mmHg    Diastolic Blood Pressure  mmHg    Ventricular  Rate 77 BPM    Atrial Rate 77 BPM    NC Interval 136 ms    QRS Duration 68 ms     ms    QTc 425 ms    P Axis 47 degrees    R AXIS 42 degrees    T Axis 36 degrees    Interpretation ECG       Sinus rhythm  Possible Left atrial enlargement  Borderline ECG     CBC with platelets differential     Status: Abnormal    Narrative    The following orders were created for panel order CBC with platelets differential.  Procedure                               Abnormality         Status                     ---------                               -----------         ------                     CBC with platelets and d...[[id number]]  Abnormal            Final result               Manual Differential[[id number]]          Abnormal            Final result                 Please view results for these tests on the individual orders.     Medications   iopamidol (ISOVUE-370) solution 55 mL (55 mLs Intravenous Given 6/2/22 1924)   sodium chloride (PF) 0.9% PF flush 84 mL (84 mLs Intravenous Given 6/2/22 1924)        Assessments & Plan (with Medical Decision Making)   Britt Park is a 52 year old female who presents to the emergency department with shortness of breath, borderline hypoxia to the low 90s, at times into the 80s over the past approximately 2 weeks.  Most concerning for respiratory muscle weakness leading to hypoxia, however differential for hypoxia includes anemia, PE, lung disease although my suspicion for diagnoses other than muscle weakness is rather low.  We will send screening labs, obtain chest x-ray, CT PE as well.    I have reviewed the nursing notes. I have reviewed the findings, diagnosis, plan and need for follow up with the patient.  Labs unrevealing and reassuring including CT PE.  Nif and forced vital capacity obtained demonstrating that the patient's NIF is borderline at -35.  I suspect that this is the etiology of the patient's symptoms.  Neurology consulted, patient will require admission.   Appreciate their recommendations.        New Prescriptions    No medications on file       Final diagnoses:   Acute respiratory failure with hypoxia (H)   Other myositis, unspecified site       --  Humberto Bernal MD PhD  [facility] EMERGENCY DEPARTMENT  6/2/2022     Humberto Bernal MD  06/02/22 1140

## 2022-06-03 NOTE — H&P
[facility]    History and Physical - Medicine Service, MAROON TEAM        Date of Admission:  6/2/2022    Assessment & Plan    Britt Park is a 52 year old female with a history of granulomatous myositis and hypertension admitted for workup of acute hypoxemic respiratory failure and dyspnea on exertion.     #Hypoxemic Respiratory Failure  Cause of hypoxemia unclear, but PE ruled out, less likely infectious without signs/symptoms of infection and no consolidation on CXR, patient on dapsone for PJP prophylaxis. Possibly some component of hypoventilation with known granulomatous myositis and sensation of not being able to take a deep breath, but neurology consulted, felt less likely to be a neuromuscular weakness without CO2 retention, NIF/FVC mildly reduced, no evidence of flare of her myositis. No clear evidence of ILD on CT PE, but with granulomatous myositis this raises the possibility of sarcoidosis, could consider further evaluating with high resolution CT. Considered heart failure with dyspnea and weight gain, though this may be related to prednisone, but no pulmonary edema on CXR, will obtain TTE to evaluate for heart failure and possible pulmonary hypertension with history of MAYA. No evidence of ACS with negative troponin and no ischemic EKG changes.   - Neurology consulted, appreciate recs   - F/u final CT chest   - TTE  - Add on BNP  - Repeat NIF/FVC to monitor stability  - supplemental O2 wean as able  - Continue CPAP at night    #Granulomatous myositis  Diagnosed on biopsy 9/2021, follows with [facility]. No current change in symptoms of upper extremity weakness, CK normal. Outpatient regimen includes prednisone and weekly methotrexate.   - Continue prednisone 25mg daily  - Methotrexate once weekly (next due 6/8)  - Dapsone discontinued today per rheumatologist at [place] for anemia  - Continue PPI for GI protection  - Discuss starting bactrim PJP  prophylaxis on discharge now that dapsone has been discontinued     #R calf cramping with exertion  Symptoms seem consistent with claudication, consider outpatient workup at discharge.     #Macrocytic anemia  Hb normal today 11.7 but recent baseline 9-10. Unlikely source of dyspnea or hypoxemia. Outpatient workup in 4/2022 negative for iron deficiency anemia, B12/folate deficiency, fecal occult was positive but no melena or signs of bleeding, endoscopy negative. Patient on methotrexate and dapsone (just discontinued) which may be contributing. Will continue to monitor, if worsening would consider further evaluate for hemolysis.   - continue folic acid  - daily CBC    #HTN   - Continue PTA losartan 25mg     #Neuropathic pain  Continue PRN gabapentin 300mg        Diet: Combination Diet Regular Diet Adult    DVT Prophylaxis: Low Risk/Ambulatory with no VTE prophylaxis indicated  Mercer Catheter: Not present  Central Lines: None  Cardiac Monitoring: None  Code Status: Full Code         The patient's care was discussed with the Attending Physician, Dr. Price.    Aicha Strong MD PGY1  Medicine Service, [facility]  Securely message with the Vocera Web Console (learn more here)  Text page via [facility] Paging/Directory   Please see signed in provider for up to date coverage information  ______________________________________________________________________    Chief Complaint   Shortness of breath    History is obtained from the patient    History of Present Illness   Britt Park is a 52 year old female with a history of granulomatous myositis, hypertension, MAYA on CPAP who presents with progressive dyspnea on exertion and hypoxemia on home O2 reading. Describes O2 readings at home for the last several days in 80s occasionally 70s, was evaluated in ED 6/2 at [facility] ER requiring up to 4L O2 but left after no beds available for admission, now back with  persistent low O2 sats. Also reports 2 weeks ago O2 was 89-91% when she went for her upper endoscopy and they considered postponing her procedure. Describes dyspnea on exertion that has been gradual but worsening over several months, she used to be able to walk up to 3 miles limited only by ankle pain but this week could only walk a half mile. Also reports a 15lb weight gain in the last 3 months mostly noticed in her abdomen with distention, occasional lower extremity edema, denies orthopnea. Is compliant with CPAP for MAYA.    Also has had days to one week of light headedness/dizziness and describes feeling woozy and not quite right. Had one episode of chest pain about 2-3 week ago when climbing the stairs that was a sharp central chest pain that felt like heart burn and resolved. Reports eating and drinking well, and eating perhaps more than usual. Also reports some R calf cramping with prolonged walking that resolves with rest. Denies any other chest pain, fevers/chills, cough, palpitations, syncope, focal numbness/weakness of arms legs (chronic weakness with myositis unchanged), nausea/vomiting, abdominal pain, diarrhea, but has had occasional dysuria, denies new oral ulcers, skin rashes.     ED Course:   VS: AF 99.3, /104-> 120/60s, HR 80-90s, RR 16, SpO2 90 RA 95% on 1.5L NC  Workup: CTPE negative, EKG with TWI in V1 unchanged from yesterday, no anemia or leukocytosis, COVID/Flu negative, CXR negative, BMP unremarkable, VBG 7.41/46/23  Interventions: Supplemental O2      Review of Systems    The 10 point Review of Systems is negative other than noted in the HPI     Past Medical History    I have reviewed this patient's medical history and updated it with pertinent information if needed.   Past Medical History:   Diagnosis Date     Secondary hypertension 11/2/2021       Past Surgical History   I have reviewed this patient's surgical history and updated it with pertinent information if needed.  Past  Surgical History:   Procedure Laterality Date     ABDOMEN SURGERY  2007         BIOPSY  2019 & 2021    Right bicep, result abnormal results, & left tricep biopsy     COLONOSCOPY  21     COLONOSCOPY N/A 2022    Procedure: COLONOSCOPY, FLEXIBLE, WITH LESION REMOVAL USING SNARE;  Surgeon: Dorie Santos MD;  Location:  GI     GYN SURGERY  07     for twins       Social History    Tobacco: denies  Alcohol: 1 glass of wine per week  Illicit drugs: none  Herbal supplements: none  Living situation: lives in house in [place] with  and kids     Family History   I have reviewed this patient's family history and updated it with pertinent information if needed.  Family History   Problem Relation Age of Onset     Unknown/Adopted Other        Prior to Admission Medications   Prior to Admission Medications   Prescriptions Last Dose Informant Patient Reported? Taking?   dapsone (ACZONE) 100 MG tablet   No No   Sig: Take 1 tablet (100 mg) by mouth daily   folic acid (FOLVITE) 1 MG tablet   No No   Sig: Take 1 tablet (1 mg) by mouth daily   gabapentin (NEURONTIN) 300 MG capsule   Yes No   Sig: Take 300 mg by mouth   losartan (COZAAR) 25 MG tablet   No No   Sig: Take 1 tablet (25 mg) by mouth daily   methotrexate 50 MG/2ML injection   No No   Sig: Inject 0.8 mLs (20 mg) Subcutaneous every 7 days   mirabegron (MYRBETRIQ) 25 MG 24 hr tablet   No No   Sig: Take 1 tablet (25 mg) by mouth daily   mometasone (NASONEX) 50 MCG/ACT nasal spray   Yes No   Si sprays   mometasone (NASONEX) 50 MCG/ACT nasal spray   No No   Sig: Spray 2 sprays into both nostrils daily   mupirocin (BACTROBAN) 2 % external ointment   No No   Sig: Apply topically 3 times daily   naltrexone (DEPADE/REVIA) 50 MG tablet   No No   Sig: Take 1 tablet (50 mg) by mouth daily   naproxen (NAPROSYN) 500 MG tablet   Yes No   Sig: TAKE 1 TABLET BY MOUTH TWICE DAILY WITH MEALS AS NEEDED   omeprazole (PRILOSEC) 20  MG DR capsule   No No   Sig: Take 1 capsule (20 mg) by mouth daily   Patient taking differently: Take 40 mg by mouth daily   predniSONE (DELTASONE) 20 MG tablet   No No   Sig: Take 1 tablet (20 mg) by mouth daily   predniSONE (DELTASONE) 5 MG tablet   No No   Sig: Take 1 tablet (5 mg) by mouth daily   valACYclovir (VALTREX) 1000 mg tablet   Yes No   Sig: Take 2,000 mg by mouth      Facility-Administered Medications: None     Allergies   Allergies   Allergen Reactions     Influenza Vaccines Hives     Diphenhydramine Other (See Comments)     SHAKY       Sulfa Drugs Swelling       Physical Exam   Vital Signs: Temp: 99.3  F (37.4  C) Temp src: Oral BP: 122/63 Pulse: 81   Resp: 16 SpO2: 90 % O2 Device: None (Room air)    Weight: 135 lbs 0 oz    Constitutional: awake, alert, cooperative, NAD  HEENT: normocephalic, anicteric sclerae, MMM   Pulmonary: no increased work of breathing on 1.5L O2 NC, lungs clear to auscultation bilaterally without wheezes or rales  Cardiovascular: RRR, normal S1 and S2, no murmur appreciated   GI: BS+, soft, non-tender, non-distended, without guarding or rigidity  Skin: warm, dry  MSK: no peripheral edema bilaterally   Neuro: AAOx3, no gross focal neurologic deficits     Data   Data reviewed today: I reviewed all medications, new labs and imaging results over the last 24 hours.     Recent Labs   Lab 06/02/22  1724 06/01/22  1731   WBC 7.6 7.1   HGB 11.7 11.1*   * 119*    460*    142   POTASSIUM 3.9 4.0   CHLORIDE 105 107   CO2 28 28   BUN 21 20   CR 0.46* 0.44*   ANIONGAP 8 7   LASHONDA 9.7 8.8   GLC 85 123*   ALBUMIN 4.6  --    PROTTOTAL 8.1  --    BILITOTAL 1.1  --    ALKPHOS 49  --    ALT 29  --    AST 21  --

## 2022-06-03 NOTE — PROGRESS NOTES
Patient YOB: 1970    Patient age: 52    Patient gender :F    Patient height: 157.5          Predicted values:     FEV1 :2.33  FVC:2.74    PEF: 6.0    FEV1/FVC: 85%    Patient values:    FEV1: 2.19    FVC: 2.48    PEF: 5.54    FEV1/FVC: 88%          Patient effort:  Good

## 2022-06-03 NOTE — PHARMACY-ADMISSION MEDICATION HISTORY
Admission Medication History Completed by Pharmacy    See [facility] Admission Navigator for allergy information, preferred outpatient pharmacy, prior to admission medications and immunization status.     Medication History Sources:     Patient     Dispense History    Changes made to PTA medication list (reason):    Added: None    Deleted: Duplicate mometasone    Changed: Prednisone 20mg > 25mg    Additional Information:    Patient reported the following  o Instructed to stop taking dapsone yesterday  o Takes gabapentin as needed for neck tingling  o Methotrexate injection administered on Wednesdays  o Not taking mirabegron due to financial reasons but would like to leave on list  o Uses mupirocin for sores that develop with CPAP use  o Not currently using naltrexone but takes 4.5mg daily when using for neck pain  o Currently takes prednisone 25mg right now, this dose will be decreased to 20mg starting July 1st    Prior to Admission medications    Medication Sig Last Dose Taking? Auth Provider   dapsone (ACZONE) 100 MG tablet Take 1 tablet (100 mg) by mouth daily   Vi Metz MD   folic acid (FOLVITE) 1 MG tablet Take 1 tablet (1 mg) by mouth daily   Husam Barnett MD   gabapentin (NEURONTIN) 300 MG capsule Take 300 mg by mouth as needed (neck numbness and tingling)   Reported, Patient   losartan (COZAAR) 25 MG tablet Take 1 tablet (25 mg) by mouth daily   Vi Metz MD   methotrexate 50 MG/2ML injection Inject 0.8 mLs (20 mg) Subcutaneous every 7 days  Patient taking differently: Inject 20 mg Subcutaneous every 7 days On Wednesdays   Husam Barnett MD   mirabegron (MYRBETRIQ) 25 MG 24 hr tablet Take 1 tablet (25 mg) by mouth daily   Vi Metz MD   mometasone (NASONEX) 50 MCG/ACT nasal spray 2 sprays daily   Reported, Patient   mupirocin (BACTROBAN) 2 % external ointment Apply topically 3 times daily  Patient taking differently: Apply topically 3 times daily as needed (for sores associated with  CPAP use)   Vi Metz MD   naltrexone (DEPADE/REVIA) 50 MG tablet Take 1 tablet (50 mg) by mouth daily  Patient taking differently: Take 50 mg by mouth daily Compounded to 4.5mg   Vi Metz MD   naproxen (NAPROSYN) 500 MG tablet TAKE 1 TABLET BY MOUTH TWICE DAILY WITH MEALS AS NEEDED   Reported, Patient   omeprazole (PRILOSEC) 20 MG DR capsule Take 1 capsule (20 mg) by mouth daily  Patient taking differently: Take 40 mg by mouth daily   Vi Metz MD   predniSONE (DELTASONE) 20 MG tablet Take 1 tablet (20 mg) by mouth daily  Patient taking differently: Take 25 mg by mouth daily   Vi Metz MD   valACYclovir (VALTREX) 1000 mg tablet Take 2,000 mg by mouth as needed   Reported, Patient       Date completed: 06/03/22    Medication history completed by: EBER BOYD [facility]

## 2022-06-04 ENCOUNTER — APPOINTMENT (OUTPATIENT)
Dept: PHYSICAL THERAPY | Facility: CLINIC | Age: 52
DRG: 205 | End: 2022-06-04
Payer: COMMERCIAL

## 2022-06-04 ENCOUNTER — APPOINTMENT (OUTPATIENT)
Dept: CT IMAGING | Facility: CLINIC | Age: 52
DRG: 205 | End: 2022-06-04
Attending: HOSPITALIST
Payer: COMMERCIAL

## 2022-06-04 LAB
ALBUMIN SERPL-MCNC: 4.2 G/DL (ref 3.4–5)
ALP SERPL-CCNC: 48 U/L (ref 40–150)
ALT SERPL W P-5'-P-CCNC: 26 U/L (ref 0–50)
ANION GAP SERPL CALCULATED.3IONS-SCNC: 8 MMOL/L (ref 3–14)
AST SERPL W P-5'-P-CCNC: 21 U/L (ref 0–45)
BILIRUB SERPL-MCNC: 1.5 MG/DL (ref 0.2–1.3)
BUN SERPL-MCNC: 20 MG/DL (ref 7–30)
CALCIUM SERPL-MCNC: 9.2 MG/DL (ref 8.5–10.1)
CHLORIDE BLD-SCNC: 106 MMOL/L (ref 94–109)
CO2 SERPL-SCNC: 26 MMOL/L (ref 20–32)
CREAT SERPL-MCNC: 0.49 MG/DL (ref 0.52–1.04)
ERYTHROCYTE [DISTWIDTH] IN BLOOD BY AUTOMATED COUNT: 18.1 % (ref 10–15)
GFR SERPL CREATININE-BSD FRML MDRD: >90 ML/MIN/1.73M2
GLUCOSE BLD-MCNC: 94 MG/DL (ref 70–99)
HCT VFR BLD AUTO: 36.7 % (ref 35–47)
HGB BLD-MCNC: 11.8 G/DL (ref 11.7–15.7)
MCH RBC QN AUTO: 37.8 PG (ref 26.5–33)
MCHC RBC AUTO-ENTMCNC: 32.2 G/DL (ref 31.5–36.5)
MCV RBC AUTO: 118 FL (ref 78–100)
METHGB MFR BLD: 7.5 % (ref 0–3)
PLATELET # BLD AUTO: 439 10E3/UL (ref 150–450)
POTASSIUM BLD-SCNC: 3.8 MMOL/L (ref 3.4–5.3)
PROT SERPL-MCNC: 7.7 G/DL (ref 6.8–8.8)
RBC # BLD AUTO: 3.12 10E6/UL (ref 3.8–5.2)
SODIUM SERPL-SCNC: 140 MMOL/L (ref 133–144)
WBC # BLD AUTO: 7.9 10E3/UL (ref 4–11)

## 2022-06-04 PROCEDURE — 250N000013 HC RX MED GY IP 250 OP 250 PS 637

## 2022-06-04 PROCEDURE — 85027 COMPLETE CBC AUTOMATED: CPT | Performed by: STUDENT IN AN ORGANIZED HEALTH CARE EDUCATION/TRAINING PROGRAM

## 2022-06-04 PROCEDURE — 97161 PT EVAL LOW COMPLEX 20 MIN: CPT | Mod: GP | Performed by: PHYSICAL THERAPIST

## 2022-06-04 PROCEDURE — 250N000013 HC RX MED GY IP 250 OP 250 PS 637: Performed by: STUDENT IN AN ORGANIZED HEALTH CARE EDUCATION/TRAINING PROGRAM

## 2022-06-04 PROCEDURE — 36415 COLL VENOUS BLD VENIPUNCTURE: CPT | Performed by: STUDENT IN AN ORGANIZED HEALTH CARE EDUCATION/TRAINING PROGRAM

## 2022-06-04 PROCEDURE — 999N000157 HC STATISTIC RCP TIME EA 10 MIN

## 2022-06-04 PROCEDURE — 99233 SBSQ HOSP IP/OBS HIGH 50: CPT | Performed by: HOSPITALIST

## 2022-06-04 PROCEDURE — 86140 C-REACTIVE PROTEIN: CPT | Performed by: STUDENT IN AN ORGANIZED HEALTH CARE EDUCATION/TRAINING PROGRAM

## 2022-06-04 PROCEDURE — 80053 COMPREHEN METABOLIC PANEL: CPT | Performed by: STUDENT IN AN ORGANIZED HEALTH CARE EDUCATION/TRAINING PROGRAM

## 2022-06-04 PROCEDURE — 82040 ASSAY OF SERUM ALBUMIN: CPT | Performed by: STUDENT IN AN ORGANIZED HEALTH CARE EDUCATION/TRAINING PROGRAM

## 2022-06-04 PROCEDURE — 94150 VITAL CAPACITY TEST: CPT

## 2022-06-04 PROCEDURE — 71250 CT THORAX DX C-: CPT | Mod: 26 | Performed by: RADIOLOGY

## 2022-06-04 PROCEDURE — 71250 CT THORAX DX C-: CPT

## 2022-06-04 PROCEDURE — 250N000012 HC RX MED GY IP 250 OP 636 PS 637

## 2022-06-04 PROCEDURE — 97530 THERAPEUTIC ACTIVITIES: CPT | Mod: GP | Performed by: PHYSICAL THERAPIST

## 2022-06-04 PROCEDURE — 83050 HGB METHEMOGLOBIN QUAN: CPT | Performed by: STUDENT IN AN ORGANIZED HEALTH CARE EDUCATION/TRAINING PROGRAM

## 2022-06-04 PROCEDURE — 120N000002 HC R&B MED SURG/OB UMMC

## 2022-06-04 PROCEDURE — 97110 THERAPEUTIC EXERCISES: CPT | Mod: GP | Performed by: PHYSICAL THERAPIST

## 2022-06-04 PROCEDURE — 97116 GAIT TRAINING THERAPY: CPT | Mod: GP | Performed by: PHYSICAL THERAPIST

## 2022-06-04 PROCEDURE — 82955 ASSAY OF G6PD ENZYME: CPT | Performed by: STUDENT IN AN ORGANIZED HEALTH CARE EDUCATION/TRAINING PROGRAM

## 2022-06-04 RX ADMIN — POLYETHYLENE GLYCOL 3350 17 G: 17 POWDER, FOR SOLUTION ORAL at 07:53

## 2022-06-04 RX ADMIN — FOLIC ACID 1 MG: 1 TABLET ORAL at 08:13

## 2022-06-04 RX ADMIN — PANTOPRAZOLE SODIUM 40 MG: 40 TABLET, DELAYED RELEASE ORAL at 08:13

## 2022-06-04 RX ADMIN — ACETAMINOPHEN 650 MG: 325 TABLET, FILM COATED ORAL at 08:13

## 2022-06-04 RX ADMIN — FLUTICASONE PROPIONATE 2 SPRAY: 50 SPRAY, METERED NASAL at 13:36

## 2022-06-04 RX ADMIN — PREDNISONE 25 MG: 20 TABLET ORAL at 08:13

## 2022-06-04 RX ADMIN — LOSARTAN POTASSIUM 25 MG: 25 TABLET, FILM COATED ORAL at 08:13

## 2022-06-04 ASSESSMENT — ACTIVITIES OF DAILY LIVING (ADL)
ADLS_ACUITY_SCORE: 23
ADLS_ACUITY_SCORE: 24
ADLS_ACUITY_SCORE: 23
ADLS_ACUITY_SCORE: 23
ADLS_ACUITY_SCORE: 24
ADLS_ACUITY_SCORE: 23
ADLS_ACUITY_SCORE: 24
ADLS_ACUITY_SCORE: 23

## 2022-06-04 NOTE — PLAN OF CARE
Goal Outcome Evaluation:    Plan of Care Reviewed With: patient     Overall Patient Progress: improving    Outcome Evaluation: A&Ox4.  VSS on 2L when awake & 3L while sleeping.  Up SBA to bathroom.  [place]y on feet.  PIV SL.  Denied N/V. Continue to monitor & follow POC.

## 2022-06-04 NOTE — PLAN OF CARE
Goal Outcome Evaluation: no change    Plan of Care Reviewed With: patient     Overall Patient Progress: no change    Temp: 98  F (36.7  C) Temp src: Oral BP: 107/66 Pulse: 83   Resp: 16 SpO2: 91 % O2 Device: Nasal cannula Oxygen Delivery: 1.5 LPM     AVSS A &O x4. TORO. On 1.5- 2L NC sating 91-94%. Tylenol given for headache pain with relief. Tolerating diet with fair appetite. Ambulates in halls with Therapy. Voiding, not saving. Report 2 BM's today. PIV SL. Will continue to monitor.

## 2022-06-04 NOTE — PLAN OF CARE
Goal Outcome Evaluation:    Plan of Care Reviewed With: patient     Overall Patient Progress: no change    Outcome Evaluation: Arrived on 5A this afternoon from ED.  Pt is pleasant and cooperative with cares.  A&O x 4.  Admitted for increasing SOB.  Currently on 2L via NC.  Continuous pulse ox.  L PIV saline locked.  Denies pain, nausea/vomiting.  Independent with ambulation.  Limited ROM in upper extremities but this is baseline for pt.  Skin assessment done and no concerns.  Continue to follow POC.

## 2022-06-04 NOTE — PROGRESS NOTES
Physician Attestation   I, Roel Hutson MD, was present with the medical/LUIS student who participated in the service and in the documentation of the note.  I have verified the history and personally performed the physical exam and medical decision making.  I agree with the assessment and plan of care as documented in the note.      I personally reviewed vital signs, medications, labs and imaging.    Alert, awake, mild tachypnea, mild fatigue, no edema  On suppl oxygen  Elevated Methemoglobin, pulm updated  Continue full workup as may not fully explain the level of dyspnea  Continue to HOLD Dapsone and workup as ordered.  Pt aware of the assessment and plan.  Saw and discussed patient with medical student.     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    Updates:  - Methemoglobin elevated at 7.5  - Pulmonology consult: agree methemoglobin contributing to SOB. Still would like to proceed with prior recs of further workup to r/o other contributing factors.               > Hold dapsone               > Walking test               > ABG, MIP/MEP, High Res CT, TTE with bubble    - Continuing scheduled bowel regimen    #Methemoglobinemia  # Dyspnea on exertion  #Acute hypoxic respiratory failure, stable  # Hx of MAYA  Progressive TORO for 1-2 weeks. No O2 need at baseline. Not cardiac cause given no h/o CAD, no signs of ischemia or CHF with normal TTE. Not PNA or PE per CT PE. PFT wnl. Low suspicion for granulomatous myositis flare causing current TORO given normal CK,  "no CO2 retention, unremarkable NIF/FVC and exam per Neurology. Not ILD per pulm based on CT findings. Anemia may contribute, however, Hb is at baseline (9.5-11.7 since 2/2022). PT on dapsone for PJP ppx, stopped on 6/2 by pt's neurologist at [place] due to c/f methemoglobinemia, now found to have elevated methemoglobin at 7.5%. No signs of hemolysis. Pulm agrees this is contributing to dyspnea, still would like to continue with prior recs to r/o other contributing etiologies.   - Pulmonology consult, appreciate recs               > Hold dapsone due to methemoglobinemia               > Walking test               > ABG, MIP/MEP, High Res CT, TTE with bubble    - Incentive spirometry  - Supplemental oxygen via NC, wean as tolerated  - Continue CPAP at night     # Granulomatous myositis, stable  Diagnosed on biopsy 9/2021, follows with [facility]. Pt has some proximal muscle weakness at baseline, feels this has not changed since symptoms began. CK normal. Outpatient regimen includes prednisone and weekly methotrexate.   - Continue prednisone 25mg daily  - Methotrexate once weekly (next due 6/8)  - PT/OT  - Hold dapsone d/t methemoglobinemia  - Bactrim contraindicated due to sulfa allergy  - Continue PPI    # Constipation, improving  Pt reports \"hard\" feeling of abdomen over the last 2 weeks since TORO began. Has gained 15 pounds in the last several months. Abdominal XR 6/3 shows moderate stool burden, had BM later that day. Will continue w/ bowel regimen. Constipation possibly contributing to SOB (pt reports difficulty taking in full breath), but above etiologies (methemoglobinemia) likely more responsible.   - Continue Miralax    # Macrocytic anemia, improving  Recent baseline 9-10 per chart review. . No evidence of hemolysis on labwork since admission with normal bilirubin. Dapsone held for concern of methemoglobinemia 6/2. Outpatient workup in 4/2022 negative for LOUIE, B12/folate deficiency, fecal occult was " positive but no melena or signs of bleeding, endoscopy negative. Methotrexate could contribute to macrocytic anemia. Hgb increase to 11.7 on 6/4.  - continue folic acid  - daily CBC     # R calf cramping with exertion  Symptoms seem consistent with claudication, consider outpatient workup at discharge.     # HTN   - Continue PTA losartan 25mg      # Neuropathic pain  Continue PRN gabapentin 300mg      Diet: Combination Diet Regular Diet Adult    DVT Prophylaxis: Low Risk/Ambulatory with no VTE prophylaxis indicated  Mercer Catheter: Not present  Central Lines: None  Cardiac Monitoring: None  Code Status: Full Code      Disposition Plan   Expected Discharge: 06/04/2022     Anticipated discharge location:  Awaiting care coordination huddle  Delays:         The patient's care was discussed with the Patient.    Navid Mo  Medical Student  Medicine Service, [facility] TEAM [address] [place] [address]  Securely message with the Vocera Web Console (learn more here)  Text page via [facility] Paging/Directory   Please see signed in provider for up to date coverage information      Clinically Significant Risk Factors Present on Admission                  ______________________________________________________________________    Interval History   Pt's oxygen was increased to 3L NC overnight. This morning she feels about the same as yesterday. Still feels unable to take full breath, currently feeling slightly short of breath at rest while standing stationary in her room. Worse with exertion with some associated dizziness, lightheadedness. Had a BM yesterday. Slept reasonably well through the night, was unaware of her oxygen being increased overnight. No changes from her perspective.     Data reviewed today: I reviewed all medications, new labs and imaging results over the last 24 hours. I personally reviewed the abdominal x-ray image(s) showing mild to moderate stool burden and the chest CT image(s)  showing No evidence of PE, several likely benign pulmonary nodules per radiology.    Physical Exam   Vital Signs: Temp: 98.3  F (36.8  C) Temp src: Oral BP: 115/63 Pulse: 72   Resp: 16 SpO2: 92 % O2 Device: Nasal cannula Oxygen Delivery: 2 LPM  Weight: 135 lbs 8 oz  General Appearance: Comfortable appearing, not in acute distress, standing up in room.  Respiratory: CTAB, no wheezes, rales, rhonchi, no increased WOB  Cardiovascular: RRR, no murmurs, rubs, gallops  GI: Abdomen soft, nontender, nondistended, no guarding, no rigidity, bowel sounds normoactive.   Neuro: A&Ox3, no focal deficits      Data   Recent Labs   Lab 06/04/22  0830 06/03/22  0642 06/02/22  1724   WBC 7.9 5.1 7.6   HGB 11.8 9.9* 11.7   * 118* 116*    399 419    142 141   POTASSIUM 3.8 3.9 3.9   CHLORIDE 106 109 105   CO2 26 28 28   BUN 20 19 21   CR 0.49* 0.46* 0.46*   ANIONGAP 8 5 8   LASHONDA 9.2 8.3* 9.7   GLC 94 100* 85   ALBUMIN 4.2  --  4.6   PROTTOTAL 7.7  --  8.1   BILITOTAL 1.5*  --  1.1   ALKPHOS 48  --  49   ALT 26  --  29   AST 21  --  21

## 2022-06-04 NOTE — PROGRESS NOTES
Predicted values:   FEV1: 2.33   FVC: 2.74   PEF: 6.00   FEV1/FVC: 85%    Patient Values:   FEV1: 2.17   FVC: 2.64   PEF: 5.68   FEV1/FVC: 82%    Patient Effort: Good

## 2022-06-04 NOTE — PROGRESS NOTES
06/04/22 0910   Quick Adds   Type of Visit Initial PT Evaluation   Living Environment   People in Home spouse  (children)   Current Living Arrangements house   Home Accessibility stairs within home   Number of Stairs, Within Home, Primary greater than 10 stairs  (14 stairs to bedroom; pt does not use basement stairs)   Living Environment Comments stairs to reach bedrooms, has tub/shower combo   Self-Care   Usual Activity Tolerance moderate   Current Activity Tolerance moderate   Equipment Currently Used at Home grab bar, tub/shower   Activity/Exercise/Self-Care Comment notes proximal UE and LE baseline weakness related to granulomatous myositis. Pt can have a hard time standing up, needs to adjust stair technique (1 step at a time) to be safe at home at baseline. Also has some concerns of R ankle weakess and injuries walking longer distances. Was provided a brace but does not use much. Notes history of scapular winging, needs to stabilize under elbow to use her arms because it is hard to lift them up for ADLs. Has seen OT and PT in the past, feels well prepped by prior therapies.   General Information   Onset of Illness/Injury or Date of Surgery 06/02/22   Referring Physician Carola Lemus MD   Patient/Family Therapy Goals Statement (PT) To go home.   Pertinent History of Current Problem (include personal factors and/or comorbidities that impact the POC) Britt Park is a 52 year old female with a history of granulomatous myositis and hypertension admitted for workup of acute hypoxemic respiratory failure and dyspnea on exertion.   General Observations activity: ambulate   Cognition   Cognitive Status Comments no concerns   Posture    Posture Comments intact   Range of Motion (ROM)   ROM Comment unable to raise UEs flex shoulders > 80 deg, reduced hip flexion L LE AROM   Strength (Manual Muscle Testing)   Strength Comments hip flex R 4-/5 L 3-/5, knee flex 5/5, ankle DF 5/5; shoulder flex 3-/5, elbow  flex/ext 4/5 B,  4/5   Bed Mobility   Comment, (Bed Mobility) IND   Transfers   Comment, (Transfers) IND sit<>stand   Gait/Stairs (Locomotion)   Comment, (Gait/Stairs) overall stable on gait, stairs. needs step-to gait on stairs and B rail support. Some deficits noted due to proximal UE and LE weakness which is baseline   Balance   Balance Comments stable tub/shower transfer with grab bar use per home   Clinical Impression   Criteria for Skilled Therapeutic Intervention Yes, treatment indicated   PT Diagnosis (PT) mildly deconditioned   Influenced by the following impairments reduced recent activity   Functional limitations due to impairments more fatigue and SOB with walking, stairs   Clinical Presentation (PT Evaluation Complexity) Stable/Uncomplicated   Clinical Presentation Rationale clinical judgement, [facility]   Clinical Decision Making (Complexity) low complexity   Planned Therapy Interventions (PT) balance training;bed mobility training;gait training;home exercise program;ROM (range of motion);postural re-education;neuromuscular re-education;strengthening;stair training;stretching;transfer training;risk factor education;home program guidelines   Risk & Benefits of therapy have been explained evaluation/treatment results reviewed;care plan/treatment goals reviewed;risks/benefits reviewed;current/potential barriers reviewed;participants voiced agreement with care plan;participants included;patient   PT Discharge Planning   PT Discharge Recommendation (DC Rec) home with assist   PT Rationale for DC Rec Pt appears at/near baseline functional status with baseline deficits to proximal UE and LE strength pt has learned to adjust around. Educated pt on means to reduce hospital related deconditioning and improve safety here and at home.   Total Evaluation Time   Total Evaluation Time (Minutes) 7   Physical Therapy Goals   PT Frequency One time eval and treatment only   PT Predicted Duration/Target Date for Goal  Attainment 06/04/22   PT Goals PT Goal 1;PT Goal 2;Stairs   PT: Stairs Modified independent;Greater than 10 stairs  (goal met)   PT: Goal 1 Pt will be educated on fall prevention.  (goal met)   PT: Goal 2 Pt will achieve > or = 10 min aerobic exercise to reduce deconditioning.  (goal met)

## 2022-06-05 ENCOUNTER — APPOINTMENT (OUTPATIENT)
Dept: CARDIOLOGY | Facility: CLINIC | Age: 52
DRG: 205 | End: 2022-06-05
Attending: HOSPITALIST
Payer: COMMERCIAL

## 2022-06-05 LAB
CRP SERPL-MCNC: <2.9 MG/L (ref 0–8)
LVEF ECHO: NORMAL
METHGB MFR BLD: 4.8 % (ref 0–3)

## 2022-06-05 PROCEDURE — 999N000157 HC STATISTIC RCP TIME EA 10 MIN

## 2022-06-05 PROCEDURE — 93308 TTE F-UP OR LMTD: CPT | Mod: 26 | Performed by: INTERNAL MEDICINE

## 2022-06-05 PROCEDURE — 93321 DOPPLER ECHO F-UP/LMTD STD: CPT | Mod: 26 | Performed by: INTERNAL MEDICINE

## 2022-06-05 PROCEDURE — 94150 VITAL CAPACITY TEST: CPT

## 2022-06-05 PROCEDURE — 93325 DOPPLER ECHO COLOR FLOW MAPG: CPT | Mod: 26 | Performed by: INTERNAL MEDICINE

## 2022-06-05 PROCEDURE — 36415 COLL VENOUS BLD VENIPUNCTURE: CPT | Performed by: INTERNAL MEDICINE

## 2022-06-05 PROCEDURE — 83050 HGB METHEMOGLOBIN QUAN: CPT | Performed by: INTERNAL MEDICINE

## 2022-06-05 PROCEDURE — 94642 AEROSOL INHALATION TREATMENT: CPT

## 2022-06-05 PROCEDURE — 93325 DOPPLER ECHO COLOR FLOW MAPG: CPT

## 2022-06-05 PROCEDURE — 250N000013 HC RX MED GY IP 250 OP 250 PS 637

## 2022-06-05 PROCEDURE — 120N000002 HC R&B MED SURG/OB UMMC

## 2022-06-05 PROCEDURE — 250N000009 HC RX 250: Performed by: STUDENT IN AN ORGANIZED HEALTH CARE EDUCATION/TRAINING PROGRAM

## 2022-06-05 PROCEDURE — 94640 AIRWAY INHALATION TREATMENT: CPT

## 2022-06-05 PROCEDURE — 99233 SBSQ HOSP IP/OBS HIGH 50: CPT | Mod: GC | Performed by: INTERNAL MEDICINE

## 2022-06-05 PROCEDURE — 250N000013 HC RX MED GY IP 250 OP 250 PS 637: Performed by: STUDENT IN AN ORGANIZED HEALTH CARE EDUCATION/TRAINING PROGRAM

## 2022-06-05 PROCEDURE — 250N000009 HC RX 250

## 2022-06-05 PROCEDURE — 99233 SBSQ HOSP IP/OBS HIGH 50: CPT | Mod: GC | Performed by: HOSPITALIST

## 2022-06-05 PROCEDURE — 999N000147 HC STATISTIC PT IP EVAL DEFER: Performed by: PHYSICAL THERAPIST

## 2022-06-05 PROCEDURE — 93308 TTE F-UP OR LMTD: CPT

## 2022-06-05 PROCEDURE — 250N000012 HC RX MED GY IP 250 OP 636 PS 637

## 2022-06-05 RX ORDER — PENTAMIDINE ISETHIONATE 300 MG/300MG
300 INHALANT RESPIRATORY (INHALATION)
Status: DISCONTINUED | OUTPATIENT
Start: 2022-06-05 | End: 2022-06-06 | Stop reason: HOSPADM

## 2022-06-05 RX ORDER — ALBUTEROL SULFATE 0.83 MG/ML
SOLUTION RESPIRATORY (INHALATION)
Status: COMPLETED
Start: 2022-06-05 | End: 2022-06-05

## 2022-06-05 RX ADMIN — FOLIC ACID 1 MG: 1 TABLET ORAL at 09:02

## 2022-06-05 RX ADMIN — FLUTICASONE PROPIONATE 2 SPRAY: 50 SPRAY, METERED NASAL at 09:02

## 2022-06-05 RX ADMIN — PANTOPRAZOLE SODIUM 40 MG: 40 TABLET, DELAYED RELEASE ORAL at 09:02

## 2022-06-05 RX ADMIN — PENTAMIDINE ISETHIONATE 300 MG: 300 INHALANT RESPIRATORY (INHALATION) at 17:05

## 2022-06-05 RX ADMIN — LOSARTAN POTASSIUM 25 MG: 25 TABLET, FILM COATED ORAL at 09:02

## 2022-06-05 RX ADMIN — POLYETHYLENE GLYCOL 3350 17 G: 17 POWDER, FOR SOLUTION ORAL at 09:02

## 2022-06-05 RX ADMIN — ALBUTEROL SULFATE 2.5 MG: 2.5 SOLUTION RESPIRATORY (INHALATION) at 17:06

## 2022-06-05 RX ADMIN — PREDNISONE 25 MG: 20 TABLET ORAL at 09:02

## 2022-06-05 ASSESSMENT — ACTIVITIES OF DAILY LIVING (ADL)
ADLS_ACUITY_SCORE: 23

## 2022-06-05 NOTE — PROGRESS NOTES
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    Updates:  - Patient on RA today  - Repeat Methemoglobin still elevated at 4.8  - Discussed with pulmonary, plan for methylene blue treatment today  - PCP prophylaxis with pentamidine nebs    # Methemoglobinemia  # Acute hypoxic respiratory failure, stable  # Hx of MAYA  Progressive TORO for 1-2 weeks. No O2 need at baseline. Not cardiac cause given no h/o CAD, no signs of ischemia or CHF with normal TTE. Not PNA or PE per CT PE. PFT wnl. Low suspicion for granulomatous myositis flare causing current TORO given normal CK, no CO2 retention, unremarkable NIF/FVC and exam per Neurology. Not ILD per pulm based on CT findings. Anemia may contribute, however, Hb is at baseline (9.5-11.7 since 2/2022). PT on dapsone for PJP ppx, stopped on 6/2 by pt's neurologist at [place] due to c/f methemoglobinemia, now found to have elevated methemoglobin at 7.5%. No signs of hemolysis. HRCT (6/4) with likely atelectasis, no other acute findings. TTE with bubble study (6/5) unremarkable. Weaned to RA as of 6/5.  - Pulmonology consult, appreciate recs               > Hold dapsone due to methemoglobinemia. Switched to pentamidine neb for PCP prophylaxis (6/5)  - Incentive spirometry  - Supplemental oxygen via NC, wean as tolerated  - Continue CPAP at night     # Granulomatous myositis, stable  Diagnosed on biopsy 9/2021, follows with [facility]. Pt has some proximal muscle weakness at baseline, feels this has not changed since symptoms began. CK normal. Outpatient regimen includes prednisone and weekly methotrexate.  "  - Continue prednisone 25mg daily  - Methotrexate once weekly (next due 6/8)  - PT/OT  - Pentamidine neb for PCP prophylaxis (6/5)    # Constipation, improving  Pt reports \"hard\" feeling of abdomen over the last 2 weeks since TORO began. Has gained 15 pounds in the last several months. Abdominal XR 6/3 shows moderate stool burden, had BM later that day. Will continue w/ bowel regimen. Constipation possibly contributing to SOB (pt reports difficulty taking in full breath), but above etiologies (methemoglobinemia) likely more responsible.   - Continue Miralax    # Macrocytic anemia, improving  Recent baseline 9-10 per chart review. . No evidence of hemolysis on labwork since admission with normal bilirubin. Dapsone held for concern of methemoglobinemia 6/2. Outpatient workup in 4/2022 negative for LOUIE, B12/folate deficiency, fecal occult was positive but no melena or signs of bleeding, endoscopy negative. Methotrexate could contribute to macrocytic anemia. Hgb increase to 11.7 on 6/4.  - continue folic acid  - daily CBC     # R calf cramping with exertion  Symptoms seem consistent with claudication, consider outpatient workup at discharge.     # HTN   - Continue PTA losartan 25mg      # Neuropathic pain  Continue PRN gabapentin 300mg      Diet: Combination Diet Regular Diet Adult    DVT Prophylaxis: Low Risk/Ambulatory with no VTE prophylaxis indicated  Mercer Catheter: Not present  Central Lines: None  Cardiac Monitoring: None  Code Status: Full Code      Disposition Plan   Expected Discharge: 6/6/2022   Anticipated discharge location: Home  Delays: None     The patient's care was discussed with the Attending Physician, Dr. Hutson, Bedside Nurse and Patient.    Patria Brown MD  Internal Medicine PGY-3  P: [phone]  Medicine Service, [facility] TEAM [address] [place] [address]  Securely message with the Vocera Web Console (learn more here)  Text page via [employer] " Paging/Directory   Please see signed in provider for up to date coverage information    Clinically Significant Risk Factors Present on Admission                ____________________________________________________________________    Interval History   Feeling better this morning. SOB with activities but can tolerate off of O2. No cough or sputum production.    Data reviewed today: I reviewed all medications, new labs and imaging results over the last 24 hours.    Physical Exam   Vital Signs: Temp: 97.3  F (36.3  C) Temp src: Oral BP: 100/62 Pulse: 69   Resp: 16 SpO2: 93 % O2 Device: BiPAP/CPAP Oxygen Delivery: 1.5 LPM  Weight: 135 lbs 8 oz  General Appearance: Comfortable appearing, not in acute distress, standing up in room.  Respiratory: CTAB, no wheezes, rales, rhonchi, no increased WOB  Cardiovascular: RRR, no murmurs, rubs, gallops  GI: Abdomen soft, nontender, nondistended, no guarding, no rigidity, bowel sounds normoactive.   Neuro: A&Ox3, no focal deficits      Data   Recent Labs   Lab 06/04/22  0830 06/03/22  0642 06/02/22  1724   WBC 7.9 5.1 7.6   HGB 11.8 9.9* 11.7   * 118* 116*    399 419    142 141   POTASSIUM 3.8 3.9 3.9   CHLORIDE 106 109 105   CO2 26 28 28   BUN 20 19 21   CR 0.49* 0.46* 0.46*   ANIONGAP 8 5 8   LASHONDA 9.2 8.3* 9.7   GLC 94 100* 85   ALBUMIN 4.2  --  4.6   PROTTOTAL 7.7  --  8.1   BILITOTAL 1.5*  --  1.1   ALKPHOS 48  --  49   ALT 26  --  29   AST 21  --  21

## 2022-06-05 NOTE — CARE PLAN
19:30 - 23:30    Afebrile, VSS on 2L NC.  Pt. Currently has C-pap and 2L 02 for sleeping.  Alert and oriented x4.  Denies pain and nausea.  Voiding but not saving.  Up independently in room.  Continue plan of care.

## 2022-06-05 NOTE — PLAN OF CARE
PT: Orders received for 6MWT. Chart reviewed and discussed with care team.  Talked with pt's RN, paged team. Home O2 testing complete yesterday by RN. O2 sats with ambulation on O2 and RA also documented also by PT in vitals flowsheet. Anticipate no need for 6MWT/order error. Paged team regarding. Plan to complete order. Also no additional therapy (PT or OT) needs present.

## 2022-06-05 NOTE — PROGRESS NOTES
[facility]   Pulmonary Progress Note  Britt Park MRN: [id number]  1970  Date of Admission:6/2/2022  Date of Service: 06/05/2022  ___________________________________  Main Plans for Today    -Recommend methylene blue treatment 1 sanna per keg IV once, can check at hemoglobin tomorrow.   -Recommend to stop the dapsone-start patient on pentamadine for PJP prophylaxis, this will be done monthly, can start before discharge.   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  The patient underlying myositis is under good control per the patient while on methotrexate and prednisone so she is unlikely to have significant interstitial lung disease related to the, also her myositis is unlikely to explain her symptoms given stable muscle strength on exam.  Other work-up included methemoglobin level which was elevated at 7.5, this is most likely from the dapsone and likely to explain her shortness of breath and symptoms.    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 Mac M.D., who is in agreement.    Shravan Matta MD  Pulmonary  & Critical Care Fellow  [phone]    Interval History    - Nursing notes reviewed.   - Patient is feeling better today but she is still on O2 supplement with activity    - No cough or fever     Physical Exam Temp:  [97.3  F (36.3  C)-98.3  F (36.8  C)] 97.3  F (36.3  C)  Pulse:  [] 69  Resp:  [16-18] 16  BP: (100-131)/(53-85) 100/62  SpO2:  [89 %-94 %] 93 %  I/O last 3 completed shifts:  In: 240 [P.O.:240]  Out: 600 [Urine:600]  Wt Readings from Last 1 Encounters:   06/03/22 61.5 kg (135 lb 8 oz)    Body mass index is 24.78 kg/m . Resp: 16      Exam:  General: NAD  HEENT: MMM  CV: RRR, no murmur, click, rub  Resp: Equal b/l air entry, clear with no wheezing/crackles, no cough  Abd: Soft, ND  Extremities: WWP, no LE edema  Neuro: AAOx3, no focal deficits    Data   Labs: reviewed in EMR       Imaging: reviewed in EMR and notable imaging listed below.  HRCT 6/4/2022  IMPRESSION:    1. Mild subpleural groundglass opacities and reticulation in the lung  bases is most likely due to dependent atelectasis but early  interstitial lung disease cannot be completely excluded. Prone imaging  would be helpful to differentiate between the two.   2. Unchanged 4 mm pleural-based solid nodule in the posterior aspect  of the left upper lobe. Consider follow-up chest CT at 12 months if  patient is at high risk for lung cancer.  3. Diffuse heterogeneous sclerotic appearance of the vertebrae is  nonspecific, but can be seen in red marrow reconversion due to anemia,  or marrow infiltrative diseases.    TTE 6/5/2022  Interpretation Summary  There was no shunt at the atrial septal level as assessed by color Doppler and  agitated saline bubble study at rest and with Valsalva maneuver.  Global and regional left ventricular function is normal with an EF of 60-65%.  Left ventricular diastolic function is normal.  Global right ventricular function is normal.  The inferior vena cava was normal in size with preserved  respiratory  variability.  No significant valvular abnormalities present.    Medications     fluticasone  2 spray Both Nostrils Daily     folic acid  1 mg Oral Daily     losartan  25 mg Oral Daily     pantoprazole  40 mg Oral Daily     polyethylene glycol  17 g Oral Daily     predniSONE  25 mg Oral Daily     sodium chloride (PF)  3 mL Intracatheter Q8H

## 2022-06-05 NOTE — PROGRESS NOTES
Patient has been assessed for Home Oxygen needs.  Oxygen readings:    RA - at rest  Pulse oximetry SPO2 90 %   RA - during activity/with exercise SPO2 87 %   O2 at 2 liters/minute (at rest) ...SPO2 92 %   O2 at 2 liters/minute (during activity/with exercise) ...SPO2 94 %

## 2022-06-05 NOTE — PLAN OF CARE
VSS. Tmax 99.5. O2 sating mid 90s on room air. Some TORO still present. Alert & oriented x4. Up independently in room. Denies pain, nausea, vomiting, or diarrhea. Showered & linens changed. Eating well. Will continue plan of care.    Goal: Absence of Hospital-Acquired Illness or Injury  6/5/2022 1423 by Robbi Aguilar, RN  Outcome: Ongoing, Progressing  Goal: Optimal Comfort and Wellbeing  6/5/2022 1423 by Robbi Aguilar, RN  Outcome: Ongoing, Progressing  Goal: Readiness for Transition of Care  6/5/2022 1423 by Robbi Aguilar, RN  Outcome: Ongoing, Progressing    Problem: Gas Exchange Impaired  Goal: Optimal Gas Exchange  6/5/2022 1423 by Robbi Aguilar, RN  Outcome: Ongoing, Progressing    Goal Outcome Evaluation:

## 2022-06-05 NOTE — PLAN OF CARE
Problem: Plan of Care - These are the overarching goals to be used throughout the patient stay.    Goal: Plan of Care Review/Shift Note  Description: The Plan of Care Review/Shift note should be completed every shift.  The Outcome Evaluation is a brief statement about your assessment that the patient is improving, declining, or no change.  This information will be displayed automatically on your shift note.  Outcome: Ongoing, Progressing  Flowsheets (Taken 6/5/2022 0519)  Plan of Care Reviewed With: patient  Overall Patient Progress: improving    /53 (BP Location: Right arm)   Pulse 74   Temp 98.2  F (36.8  C) (Oral)   Resp 18   Wt 61.5 kg (135 lb 8 oz)   SpO2 94%   BMI 24.78 kg/m      Neuro: A&Ox4.   Cardiac: Afebrile, VSS.   Respiratory: home cpap on during hs at 1.5 lpm. TORO present   GI/: Voiding spontaneously. No BM this shift.   Diet/appetite: Tolerating diet. Denies nausea   Activity: Up independently  Pain: . Denies   Skin: No new deficits noted.  Lines: piv sl'd    Rested btwn cares. Able to make needs known. Continue to monitor. Notify MD of changes/concerns.    Problem: Gas Exchange Impaired  Goal: Optimal Gas Exchange  Outcome: Ongoing, Progressing

## 2022-06-05 NOTE — PROVIDER NOTIFICATION
Pt requiring ICU for methylene blue medication d/t increased monitoring needs, per resource RN & ICU pharmacist.

## 2022-06-06 VITALS
RESPIRATION RATE: 18 BRPM | HEART RATE: 80 BPM | DIASTOLIC BLOOD PRESSURE: 67 MMHG | SYSTOLIC BLOOD PRESSURE: 130 MMHG | OXYGEN SATURATION: 95 % | WEIGHT: 135.5 LBS | TEMPERATURE: 99.1 F | BODY MASS INDEX: 24.78 KG/M2

## 2022-06-06 LAB
ANION GAP SERPL CALCULATED.3IONS-SCNC: 6 MMOL/L (ref 3–14)
BUN SERPL-MCNC: 16 MG/DL (ref 7–30)
CALCIUM SERPL-MCNC: 8.5 MG/DL (ref 8.5–10.1)
CHLORIDE BLD-SCNC: 108 MMOL/L (ref 94–109)
CO2 SERPL-SCNC: 28 MMOL/L (ref 20–32)
CREAT SERPL-MCNC: 0.47 MG/DL (ref 0.52–1.04)
CRP SERPL-MCNC: <2.9 MG/L (ref 0–8)
ERYTHROCYTE [DISTWIDTH] IN BLOOD BY AUTOMATED COUNT: 17.4 % (ref 10–15)
ERYTHROCYTE [SEDIMENTATION RATE] IN BLOOD BY WESTERGREN METHOD: 10 MM/HR (ref 0–30)
GFR SERPL CREATININE-BSD FRML MDRD: >90 ML/MIN/1.73M2
GLUCOSE BLD-MCNC: 93 MG/DL (ref 70–99)
HCT VFR BLD AUTO: 29.8 % (ref 35–47)
HGB BLD-MCNC: 9.3 G/DL (ref 11.7–15.7)
MCH RBC QN AUTO: 36.9 PG (ref 26.5–33)
MCHC RBC AUTO-ENTMCNC: 31.2 G/DL (ref 31.5–36.5)
MCV RBC AUTO: 118 FL (ref 78–100)
METHGB MFR BLD: 3.7 % (ref 0–3)
PLATELET # BLD AUTO: 411 10E3/UL (ref 150–450)
POTASSIUM BLD-SCNC: 4.2 MMOL/L (ref 3.4–5.3)
RBC # BLD AUTO: 2.52 10E6/UL (ref 3.8–5.2)
SODIUM SERPL-SCNC: 142 MMOL/L (ref 133–144)
WBC # BLD AUTO: 7.9 10E3/UL (ref 4–11)

## 2022-06-06 PROCEDURE — 250N000012 HC RX MED GY IP 250 OP 636 PS 637

## 2022-06-06 PROCEDURE — 83050 HGB METHEMOGLOBIN QUAN: CPT | Performed by: STUDENT IN AN ORGANIZED HEALTH CARE EDUCATION/TRAINING PROGRAM

## 2022-06-06 PROCEDURE — 250N000013 HC RX MED GY IP 250 OP 250 PS 637

## 2022-06-06 PROCEDURE — 85652 RBC SED RATE AUTOMATED: CPT | Performed by: STUDENT IN AN ORGANIZED HEALTH CARE EDUCATION/TRAINING PROGRAM

## 2022-06-06 PROCEDURE — 85027 COMPLETE CBC AUTOMATED: CPT | Performed by: STUDENT IN AN ORGANIZED HEALTH CARE EDUCATION/TRAINING PROGRAM

## 2022-06-06 PROCEDURE — 99239 HOSP IP/OBS DSCHRG MGMT >30: CPT | Mod: GC | Performed by: HOSPITALIST

## 2022-06-06 PROCEDURE — 82310 ASSAY OF CALCIUM: CPT | Performed by: STUDENT IN AN ORGANIZED HEALTH CARE EDUCATION/TRAINING PROGRAM

## 2022-06-06 PROCEDURE — 36415 COLL VENOUS BLD VENIPUNCTURE: CPT | Performed by: STUDENT IN AN ORGANIZED HEALTH CARE EDUCATION/TRAINING PROGRAM

## 2022-06-06 PROCEDURE — 86140 C-REACTIVE PROTEIN: CPT | Performed by: STUDENT IN AN ORGANIZED HEALTH CARE EDUCATION/TRAINING PROGRAM

## 2022-06-06 RX ORDER — PENTAMIDINE ISETHIONATE 300 MG/300MG
300 INHALANT RESPIRATORY (INHALATION)
Start: 2022-07-03 | End: 2023-01-04

## 2022-06-06 RX ORDER — PREDNISONE 5 MG/1
25 TABLET ORAL DAILY
COMMUNITY
Start: 2022-06-07 | End: 2022-08-25

## 2022-06-06 RX ADMIN — PREDNISONE 25 MG: 20 TABLET ORAL at 08:05

## 2022-06-06 RX ADMIN — FLUTICASONE PROPIONATE 2 SPRAY: 50 SPRAY, METERED NASAL at 08:11

## 2022-06-06 RX ADMIN — LOSARTAN POTASSIUM 25 MG: 25 TABLET, FILM COATED ORAL at 08:05

## 2022-06-06 RX ADMIN — PANTOPRAZOLE SODIUM 40 MG: 40 TABLET, DELAYED RELEASE ORAL at 08:05

## 2022-06-06 RX ADMIN — FOLIC ACID 1 MG: 1 TABLET ORAL at 08:05

## 2022-06-06 ASSESSMENT — ACTIVITIES OF DAILY LIVING (ADL)
ADLS_ACUITY_SCORE: 23

## 2022-06-06 NOTE — DISCHARGE SUMMARY
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 The following labs/tests are recommended: None.  - Rheumatology follow-up scheduled 11/3/2022  - Continue to follow-up with primary neurologist at [facility] for myositis  - Serial PFT at 6 to 12 months    Unresulted Labs Ordered in the Past 30 Days of this Admission     Date and Time Order Name Status Description    6/5/2022  2:07 PM Glucose 6 phosphate dehydrogenase In process       These results will be followed up by PCP    Discharge Disposition   Discharged to home  Condition at discharge: Stable    Hospital Course   Britt Park is a 52 year old female with a history of granulomatous myositis on prednisone, methotrexate, dapsone for PJP prophylaxis and HTN admitted for workup of acute hypoxemic respiratory failure and dyspnea on exertion, FTH methemoglobinemia.    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 Work-up negative for cardiac etiology with unremarkable TTE with bubble study. Normal PFT. Negative for PE on CTA. HRCT (6/4) with atelectasis, no other acute or  "chronic findings. Unremarkable NIF/FVC and exam per Neurology. Finally found to have elevated methemoglobin at 7.5%, most likely secondary to dapsone. Able to wean-off O2 to RA (on exertion) prior to discharge with MetHb level 3.7% at discharge. Did not receive treatment with methylene blue.  - Discontinue dapsone due to methemoglobinemia. 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 Pt has some proximal muscle weakness at baseline, feels this has not changed since symptoms began. CK normal. Outpatient regimen includes prednisone and weekly methotrexate.   - Continue prednisone 25mg daily  - Methotrexate once weekly (next due 6/8)  - Rheumatology follow-up scheduled 11/3/2022     Constipation  Pt reports \"hard\" feeling of abdomen over the last 2 weeks since TORO began. Abdominal XR 6/3 shows moderate stool burden, improved with BM regimen.     Macrocytic anemia secondary to methotrexate  Could contribute to TORO and respiratory symptoms. Recent baseline 9-10 per chart review. . No evidence of hemolysis on labwork since admission with normal bilirubin. Outpatient workup in 4/2022 negative for LOUIE, B12/folate deficiency, fecal occult was positive but no melena or signs of bleeding, endoscopy negative. Methotrexate likely a contributor to macrocytic anemia.   - Continue folic acid     HTN   - Continue PTA losartan 25mg      Neuropathic pain  - Continue PRN gabapentin 300mg     Consultations This Hospital Stay   NEUROLOGY GENERAL ADULT IP CONSULT  PHYSICAL THERAPY ADULT IP CONSULT  OCCUPATIONAL THERAPY ADULT IP CONSULT  PULMONARY GENERAL ADULT IP CONSULT  NURSING TO CONSULT FOR VASCULAR ACCESS CARE IP CONSULT    Code Status   Full Code     The patient was discussed with Dr. Hutson.    Patria " MD Cristian Brown [address] [place] [address] [address] [address]  Phone: [phone]  ______________________________________________________________________    Physical Exam   Vital Signs: Temp: 99.1  F (37.3  C) Temp src: Oral BP: 130/67 Pulse: 80   Resp: 18 SpO2: 95 % O2 Device: None (Room air) Oxygen Delivery: RA.  Weight: 135 lbs 8 oz  Constitutional: Awake, alert, cooperative, no apparent distress  Eyes: Lids and lashes normal, pupils equal, round and reactive to light, extra ocular muscles intact, sclera clear, conjunctiva normal  Respiratory: No increased work of breathing, good air exchange, clear to auscultation bilaterally, no crackles or wheezing  Cardiovascular: Regular rate and rhythm, normal S1 and S2, and no murmur noted  GI: Normal bowel sounds, soft, non-distended, non-tender  Skin: No bruising or bleeding  Musculoskeletal: No lower extremity pitting edema present  Neurologic: Awake, alert, oriented to name, place and time. No focal deficit.      Primary Care Physician   REYNOLD RAMOS    Discharge Orders      Primary Care - Care Coordination Referral      Reason for your hospital stay    Methemoglobinemia as a side-effect from dapsone     Activity    Your activity upon discharge: activity as tolerated     Adult [facility] Follow-up and recommended labs and tests    Follow up with primary care provider, REYNOLD RAMOS, within 7 days for hospital follow- up.  The following labs/tests are recommended: None.      Appointments on [place] and/or [facility] (with [facility] or [facility] provider or service). Call [phone] if you haven't heard regarding these appointments within 7 days of discharge.     Monitor and record    Oxygen saturation     When to contact your care team    Call your primary doctor if you have any of the following: temperature greater than 100.4F, increased shortness of breath, or oxygen saturation persistently below 88%..     Discharge  Instructions    You were admitted for     Full Code     General PFT Lab (Please always keep checked)     Diet    Follow this diet upon discharge: Orders Placed This Encounter      Combination Diet Regular Diet Adult     Significant Results and Procedures   Most Recent 3 CBC's:Recent Labs   Lab Test 06/06/22  0607 06/04/22  0830 06/03/22  0642   WBC 7.9 7.9 5.1   HGB 9.3* 11.8 9.9*   * 118* 118*    439 399     Most Recent 3 BMP's:Recent Labs   Lab Test 06/06/22  0607 06/04/22  0830 06/03/22  0642    140 142   POTASSIUM 4.2 3.8 3.9   CHLORIDE 108 106 109   CO2 28 26 28   BUN 16 20 19   CR 0.47* 0.49* 0.46*   ANIONGAP 6 8 5   LASHONDA 8.5 9.2 8.3*   GLC 93 94 100*     Most Recent 2 LFT's:Recent Labs   Lab Test 06/04/22  0830 06/02/22  1724   AST 21 21   ALT 26 29   ALKPHOS 48 49   BILITOTAL 1.5* 1.1   ,   Results for orders placed or performed during the hospital encounter of 06/02/22   XR Chest 2 Views    Narrative    Exam: XR CHEST 2 VW, 6/2/2022 5:10 PM    Comparison: 6/1/22    History: shortness of breath, hypoxia    Findings:  Upper PA and lateral views of the chest are obtained.    Trachea is midline. Mediastinum is within normal limits.  Cardiopulmonary silhouette is within normal limits. No acute airspace  disease. There is no pneumothorax or pleural effusion. The upper  abdomen is unremarkable.      Impression    Impression: No acute airspace opacity.    I have personally reviewed the examination and initial interpretation  and I agree with the findings.    PRIYANK BOLAÑOS MD         SYSTEM ID:  [id number]   CT Chest Pulmonary Embolism w Contrast    Narrative    CT CHEST PULMONARY EMBOLISM W CONTRAST, 6/2/2022 7:39 PM    History: PE suspected, high prob    Comparison: Chest x-ray same date    Technique: Helical acquisition of CT images of the chest from the lung  apices to the kidneys were acquired after the administration of  intravenous contrast according to the CT pulmonary angiogram  protocol.  Axial images were reconstructed in 1 and 3 mm slice thickness. Coronal  reconstructions performed. Three-dimensional (3D) post-processed  angiographic images were reconstructed, archived to PACS and used in  the interpretation of this study.    Contrast dose: 55 mL of Isovue-370    Findings:   The contrast bolus is adequate.  There is no pulmonary embolism.  Nonspecific posterior mild groundglass density, posterior subpleural,  possibly subsegmental atelectasis. 4 mm subpleural nodule within the  left upper lobe (series 7 image 46).  No pleural effusion or  pneumothorax.    Mediastinum: Heart size is normal. No pericardial effusion. Normal  caliber the great vessels. Soft tissue is unremarkable. Thyroid is  within normal limits. There is no mediastinal or hilar lymphadenopathy  by size criteria.    Upper abdomen: Sliding-type hiatal hernia. Indeterminate subcentimeter  splenic hypodensity (series 4, image 89), may possibly represent cyst,  hemangioma versus perfusion related pseudolesion. Otherwise, the  appearance of the upper abdomen is unremarkable.    Bones and soft tissues: No suspicious osseous lesion       Impression    Impression:    1. No pulmonary embolism identified.  2. Sub-6 mm pulmonary nodules as described above. Consider follow-up  CT chest at 12 months, if patient is at risk for lung cancer.  3. Indeterminate subcentimeter splenic hypodensity, may possibly  represent cyst, hemangioma, inflammatory/infective sequelae versus  perfusion related pseudolesion.  May consider correlation with prior imaging.    I have personally reviewed the examination and initial interpretation  and I agree with the findings.    PRIYANK BOLAÑOS MD         SYSTEM ID:  [id number]   XR Abdomen Port 1 View    Narrative    XR ABDOMEN PORT 1 VIEWS  6/3/2022 10:42 AM      HISTORY: STOOL BURDEN. TORO, bloated abd    COMPARISON: CT chest PE 6/2/2022    FINDINGS:   Supine views of the abdomen and pelvis. No cardiomegaly.  Clear lung  fields. Nonobstructive bowel gas pattern. At least moderate colonic  stool burden. No pneumatosis or portal venous gas. No acute osseous  abnormality.      Impression    IMPRESSION:   Nonobstructive bowel gas pattern with at least moderate colonic stool  burden.    I have personally reviewed the examination and initial interpretation  and I agree with the findings.    COREY RAINEY MD (Joe)         SYSTEM ID:  [id number]   CT Chest Hi-Resolution wo Contrast    Narrative    EXAMINATION: CT CHEST HI-RESOLUTION WO CONTRAST  6/4/2022 5:50 PM      CLINICAL HISTORY: 53 y/o female, hx of granulomatous myositis, here w/  worsening TORO for 2 weeks    COMPARISON: CT 6/2/2022    PROCEDURE COMMENTS: CT imaging obtained through the chest  withoutintravenous contrast. Coronal and axial MIP reformatted images  produced.  Both inspiratory and expiratory images obtained.    FINDINGS:   Support devices: None.    Mild subpleural groundglass opacities and reticulation in the lung  bases. Mild biapical scarring. No suspicious pulmonary nodules.  Unchanged pleural-based solid 4 mm nodule in the left upper lobe  (series 7 image 38).  The trachea and central airways are clear. The peripheral bronchi are  normal. No significant air trapping on expiratory views.    The thyroid is normal. There are no abnormally sized axillary, hilar,  or mediastinal lymph nodes.  The heart and great vessels are normal in appearance. No pericardial  effusion.    Osseous structures: Diffuse heterogeneous sclerotic appearance of the  vertebrae. Upper abdomen: Unchanged nonspecific hypoattenuation of the  anterior superior aspect of the spleen (series 3 image 55).      Impression    IMPRESSION:    1. Mild subpleural groundglass opacities and reticulation in the lung  bases is most likely due to dependent atelectasis but early  interstitial lung disease cannot be completely excluded. Prone imaging  would be helpful to differentiate between the  two.   2. Unchanged 4 mm pleural-based solid nodule in the posterior aspect  of the left upper lobe. Consider follow-up chest CT at 12 months if  patient is at high risk for lung cancer.  3. Diffuse heterogeneous sclerotic appearance of the vertebrae is  nonspecific, but can be seen in red marrow reconversion due to anemia,  or marrow infiltrative diseases.    I have personally reviewed the examination and initial interpretation  and I agree with the findings.    FRANCI YOUNG MD         SYSTEM ID:  [id number]   Echo Complete     Value    LVEF  60-65%    Narrative    [id number]  [id number]  [id number]  [id number]^ROCIO^DADA     [facility],[place]  Echocardiography Laboratory  [address] [place] [address]     Name: JACQUELYN LANGE  MRN: [id number]  : 1970  Study Date: 2022 08:41 AM  Age: 52 yrs  Gender: Female  Patient Location: [facility]  Reason For Study: Dyspnea  Ordering Physician: DADA CHAN  Performed By: Jeanna Holley RDCS     BSA: 1.6 m2  Height: 62 in  Weight: 135 lb  HR: 71  BP: 101/58 mmHg  ______________________________________________________________________________  Procedure  Complete Portable Echo Adult.  ______________________________________________________________________________  Interpretation Summary  Global and regional left ventricular function is normal with an EF of 60-65%.  Left ventricular diastolic function is normal.  Global right ventricular function is normal.  The inferior vena cava was normal in size with preserved respiratory  variability.  No significant valvular abnormalities present.     Previous study not available for comparison.     Based on this study filling pressure of both chmabers are normal. No cardiac  cause of dyspnea identified.  ______________________________________________________________________________  Left Ventricle  Left ventricular size is normal. Global and regional left ventricular function  is normal  with an EF of 60-65%. Relative wall thickness is increased  consistent with concentric remodeling. Left ventricular diastolic function is  normal.     Right Ventricle  The right ventricle is normal size. Global right ventricular function is  normal.     Atria  Both atria appear normal.     Mitral Valve  The mitral valve is normal. Trace mitral insufficiency is present.     Aortic Valve  Aortic valve is normal in structure and function.     Tricuspid Valve  The tricuspid valve is normal.     Pulmonic Valve  The pulmonic valve is normal.     Vessels  The aorta root is normal. The thoracic aorta is normal. The inferior vena cava  was normal in size with preserved respiratory variability.     Pericardium  No pericardial effusion is present.     Miscellaneous  No significant valvular abnormalities present.     Compared to Previous Study  Previous study not available for comparison.  ______________________________________________________________________________  MMode/2D Measurements & Calculations  IVSd: 0.75 cm  LVIDd: 4.6 cm  LVIDs: 3.0 cm  LVPWd: 1.0 cm  FS: 34.3 %  LV mass(C)d: 138.5 grams  LV mass(C)dI: 85.6 grams/m2  Ao root diam: 3.3 cm  asc Aorta Diam: 3.2 cm  LVOT diam: 2.0 cm  LVOT area: 3.1 cm2  LA Volume (BP): 27.9 ml     LA Volume Index (BP): 17.2 ml/m2  RWT: 0.45     Doppler Measurements & Calculations  MV E max adelina: 91.3 cm/sec  MV A max adelina: 72.4 cm/sec  MV E/A: 1.3  MV dec slope: 508.0 cm/sec2  MV dec time: 0.18 sec  PA acc time: 0.09 sec  E/E' av.7  Lateral E/e': 7.7  Medial E/e': 7.8     ______________________________________________________________________________  Report approved by: Apryl TOLEDO 2022 10:22 AM         Echo Limited with Bubble Study     Value    LVEF  60-65%    Narrative    [id number]  [id number]  [id number]  [id number]^DIAN^JOHANNA     [facility],[place]  Echocardiography Laboratory  [address] [place] [address]     Name: JACQUELYN LANGE  LORENZO  MRN: [id number]  : 1970  Study Date: 2022 10:00 AM  Age: 52 yrs  Gender: Female  [facility]  Reason For Study: Dyspnea  Ordering Physician: JOHANNA BIGGS  Performed By: Leia Argueta     BSA: 1.6 m2  Height: 62 in  Weight: 135 lb  HR: 71  BP: 107/66 mmHg  ______________________________________________________________________________  Procedure  Limited Portable Echo Adult. Bubble Echocardiogram with two-dimensional, color  and spectral Doppler performed. Adequate quality two-dimensional was performed  and interpreted.  ______________________________________________________________________________  Interpretation Summary  There was no shunt at the atrial septal level as assessed by color Doppler and  agitated saline bubble study at rest and with Valsalva maneuver.  ______________________________________________________________________________  Left Ventricle  Global and regional left ventricular function is normal with an EF of 60-65%.  Left ventricular size is normal.     Right Ventricle  Global right ventricular function is normal.     Atria  There was no shunt at the atrial septal level as assessed by color Doppler and  agitated saline bubble study at rest and with Valsalva maneuver.     Pericardium  No pericardial effusion is present.     ______________________________________________________________________________  Report approved by: Apryl TOLEDO 2022 12:20 PM     ______________________________________________________________________________        Discharge Medications   Discharge Medication List as of 2022 11:21 AM      START taking these medications    Details   pentamidine (NEBUPENT) 300 MG neb solution Inhale 300 mg into the lungs every 28 days, No Print Out         CONTINUE these medications which have CHANGED    Details   predniSONE (DELTASONE) 5 MG tablet Take 5 tablets (25 mg) by mouth daily, Historical         CONTINUE these medications which have  NOT CHANGED    Details   folic acid (FOLVITE) 1 MG tablet Take 1 tablet (1 mg) by mouth daily, Disp-90 tablet, R-3, E-Prescribe      gabapentin (NEURONTIN) 300 MG capsule Take 300 mg by mouth as needed (neck numbness and tingling), Historical      losartan (COZAAR) 25 MG tablet Take 1 tablet (25 mg) by mouth daily, Disp-90 tablet, R-1, E-Prescribe      methotrexate 50 MG/2ML injection Inject 0.8 mLs (20 mg) Subcutaneous every 7 days, Disp-4 mL, R-6, E-Prescribe      mirabegron (MYRBETRIQ) 25 MG 24 hr tablet Take 1 tablet (25 mg) by mouth daily, Disp-60 tablet, R-4, E-Prescribe      mometasone (NASONEX) 50 MCG/ACT nasal spray 2 sprays daily, Historical      mupirocin (BACTROBAN) 2 % external ointment Apply topically 3 times dailyDisp-15 g, [id number]      naltrexone (DEPADE/REVIA) 50 MG tablet Take 1 tablet (50 mg) by mouth daily, Disp-60 tablet, R-3, E-PrescribeCompounded low dose naltrexone. 4.5 mg daily. OK for liquid or capsules      naproxen (NAPROSYN) 500 MG tablet TAKE 1 TABLET BY MOUTH TWICE DAILY WITH MEALS AS NEEDED, Historical      omeprazole (PRILOSEC) 20 MG DR capsule Take 1 capsule (20 mg) by mouth daily, Disp-60 capsule, R-3, E-Prescribe      valACYclovir (VALTREX) 1000 mg tablet Take 2,000 mg by mouth as needed, Historical         STOP taking these medications       dapsone (ACZONE) 100 MG tablet Comments:   Reason for Stopping:             Allergies   Allergies   Allergen Reactions     Influenza Vaccines Hives     Diphenhydramine Other (See Comments)     SHAKY       Sulfa Drugs Swelling

## 2022-06-06 NOTE — PLAN OF CARE
Goal Outcome Evaluation:    Plan of Care Reviewed With: patient     Overall Patient Progress: improving    Outcome Evaluation: A&Ox4. VSS  on room air. up independent. regular diet.  PIV removed. discharged to home at 1135.

## 2022-06-06 NOTE — CARE PLAN
Patient has been assessed for Home Oxygen needs. Oxygen readings:    *Pulse oximetry (SpO2) = 96% on room air at rest while awake.    *SpO2 improved to N/A% on N/Aliters/minute at rest.    *SpO2 = 90-95% on room air during activity/with exercise.    *SpO2 improved to N/A % on N/A liters/minute during activity/with exercise.

## 2022-06-06 NOTE — PLAN OF CARE
Goal Outcome Evaluation:    Plan of Care Reviewed With: patient     Overall Patient Progress: improving    Outcome Evaluation: A&O x 4.  VSS and maintaining sats on room air.  Ambulates independently with no assistive device.  PIV SL.  Denies pain, nausea and vomiting.  CPAP at [facility].  Plan is for discharge possibly tomorrow per pt.

## 2022-06-06 NOTE — PLAN OF CARE
Problem: Plan of Care - These are the overarching goals to be used throughout the patient stay.    Goal: Plan of Care Review/Shift Note  Description: The Plan of Care Review/Shift note should be completed every shift.  The Outcome Evaluation is a brief statement about your assessment that the patient is improving, declining, or no change.  This information will be displayed automatically on your shift note.  Outcome: Ongoing, Progressing  Flowsheets (Taken 6/6/2022 0553)  Plan of Care Reviewed With: patient  Overall Patient Progress: improving      Problem: Gas Exchange Impaired  Goal: Optimal Gas Exchange  Outcome: Ongoing, Progressing      /72 (BP Location: Right arm)   Pulse 85   Temp 98.5  F (36.9  C) (Oral)   Resp 18   Wt 61.5 kg (135 lb 8 oz)   SpO2 97%   BMI 24.78 kg/m      Neuro: A&Ox4.   Cardiac: Afebrile, VSS.   Respiratory: RA when awake; intermittent desats to upper 80% with activty. Home cpap at 2 lpm nc during sleep with sats > 96%  GI/: Voiding spontaneously. No BM this shift.   Diet/appetite: Tolerating diet. Denies nausea   Activity: Up independently   Pain: . Denies   Skin: No new deficits noted.  Lines: piv sl'd    Rested btwn cares. Able to make needs known. Continue to monitor. Notify MD of changes/concerns. Hopeful can discontinue today.

## 2022-06-07 ENCOUNTER — PATIENT OUTREACH (OUTPATIENT)
Dept: CARE COORDINATION | Facility: CLINIC | Age: 52
End: 2022-06-07
Payer: COMMERCIAL

## 2022-06-07 ENCOUNTER — PATIENT OUTREACH (OUTPATIENT)
Dept: FAMILY MEDICINE | Facility: CLINIC | Age: 52
End: 2022-06-07
Payer: COMMERCIAL

## 2022-06-07 NOTE — PROGRESS NOTES
Clinic Care Coordination Contact  [facility]/Voicemail    Referral Source: IP Report  Clinical Data: Care Coordinator Outreach  Outreach attempted x 1.  Left message on patient's voicemail with call back information and requested return call.    Plan: Care Coordinator will try to reach patient again in 1-2 business days.     Anabell Chaidez RN, BSN, PHN  Primary Care / Care Coordinator   [facility]  E-mail [email or web address]   [phone]

## 2022-06-07 NOTE — TELEPHONE ENCOUNTER
What type of discharge? Inpatient  Risk of Hospital admission or ED visit: 9%  Is a TCM episode required? Yes  When should the patient follow up with PCP? 14 days of discharge.    Darya Villegas RN  [facility]

## 2022-06-08 ENCOUNTER — PATIENT OUTREACH (OUTPATIENT)
Dept: NURSING | Facility: CLINIC | Age: 52
End: 2022-06-08
Payer: COMMERCIAL

## 2022-06-08 LAB — G6PD RBC-CCNT: 17 U/G HB

## 2022-06-08 ASSESSMENT — ACTIVITIES OF DAILY LIVING (ADL): DEPENDENT_IADLS:: INDEPENDENT

## 2022-06-08 NOTE — PROGRESS NOTES
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, acute hypoxic respiratory failure secondary to pulmonary atelectasis)    Chief Complaint   Patient presents with     Clinic Care Coordination - Initial     Clinic Care Coordination - Post Hospital      [place] Utilization:   [facility]  Clinic Utilization:  [facility]   Difficulty keeping appointments:: No  Compliance Concerns: No  No-Show Concerns: No  No PCP office visit in Past Year: No  Utilization    Hospital Admissions  2             ED Visits  2             No Show Count (past year)  0                Current as of: 6/7/2022  7:03 PM            Clinical Concerns:  Current Medical Concerns:  Recent discharge from hospital    Current Behavioral Concerns: None    Education Provided to patient:   RNCC called and spoke with patient; introduced self, discussed role of Care Coordination and explained reason for call     Pain  Pain (GOAL):: No  Health Maintenance Reviewed: Due/Overdue   Health Maintenance Due   Topic Date Due     PREVENTIVE CARE VISIT  Never done     ADVANCE CARE PLANNING  Never done     Clinical Pathway: None    Admission:    Admission Date: 06/02/22   Reason for Admission: 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 (Red Flag symptoms escalate to triage hotline per guidelines): Improved  Do you feel your condition is stable enough to be safe at home until your provider visit?: Yes  Does the patient have their discharge instructions? : Yes  Does the patient have questions regarding their discharge instructions? : No  Were you started on any new medications or were there changes to any of your previous medications? : Yes  Does the patient have all of their medications?: Yes  Do you have questions regarding any of your medications? : No  Do you have all of your needed medical supplies or equipment (DME)?  (i.e. oxygen tank, CPAP, cane, etc.): Yes  Discharge follow-up appointment scheduled within 14 calendar days? : Yes  Discharge Follow Up Appointment Date: 06/27/22  Discharge Follow Up Appointment Scheduled with?: Primary Care Provider    Post-op (Clinicians Only)  Did the patient have surgery or a procedure: No  Fever: No  Chills: No  Eating & Drinking: eating and drinking without complaints/concerns  PO Intake: regular diet  Additional Symptoms:  (Denies)  Bowel Function: normal  Date of last BM: 06/08/22  Urinary Status: voiding without complaint/concerns    Medication Management:  Medication review status: Medications reviewed and no changes reported per patient.        Current Outpatient Medications   Medication     folic acid (FOLVITE) 1 MG tablet     gabapentin (NEURONTIN) 300 MG capsule     losartan (COZAAR) 25 MG tablet     methotrexate 50 MG/2ML injection     mirabegron (MYRBETRIQ) 25 MG 24 hr tablet     mometasone (NASONEX) 50 MCG/ACT nasal spray     mupirocin (BACTROBAN) 2 % external ointment     naltrexone (DEPADE/REVIA) 50 MG tablet     naproxen (NAPROSYN) 500 MG tablet     omeprazole (PRILOSEC) 20 MG DR capsule     [START ON 7/3/2022] pentamidine (NEBUPENT) 300 MG neb solution     predniSONE (DELTASONE) 5 MG tablet     valACYclovir (VALTREX) 1000 mg tablet     No current " facility-administered medications for this visit.     Functional Status:  Dependent ADLs:: Independent  Dependent IADLs:: Independent  Bed or wheelchair confined:: No  Mobility Status: Independent    Living Situation:  Current living arrangement:: I live in a private home with spouse  Type of residence:: Private home - stairs    Lifestyle & Psychosocial Needs:    Social Determinants of Health     Tobacco Use: Low Risk      Smoking Tobacco Use: Never Smoker     Smokeless Tobacco Use: Never Used   Alcohol Use: Not on file   Financial Resource Strain: Not on file   Food Insecurity: Not on file   Transportation Needs: Not on file   Physical Activity: Not on file   Stress: Not on file   Social Connections: Not on file   Intimate Partner Violence: Not on file   Depression: Not at risk     PHQ-2 Score: 1   Housing Stability: Not on file     Diet:: Regular  Inadequate nutrition (GOAL):: No  Tube Feeding: No  Inadequate activity/exercise (GOAL):: No  Significant changes in sleep pattern (GOAL): No  Transportation means:: Regular car     Judaism or spiritual beliefs that impact treatment:: No  Mental health DX:: No  Mental health management concern (GOAL):: No  Chemical Dependency Status: No Current Concerns  Chemical Dependency Management:  (NA)  Informal Support system:: Children, Spouse      Resources and Interventions:  Current Resources:   Community Resources: None  Supplies Currently Used at Home: None  Equipment Currently Used at Home: grab bar, tub/shower  Employment Status: employed full-time    Advance Care Plan/Directive  Advanced Care Plans/Directives on file:: No  Type Advanced Care Plans/Directives:  (Full Code)  Advanced Care Plan/Directive Status: Considering Options    Referrals Placed: None     Goals:     Patient/Caregiver understanding: Yes    Future Appointments              In 2 days EMILY [facility]    In 2 weeks Vi Metz MD [facility]  [place]    In 4 months Husam Barnett MD [facility] [place]        Plan:   -Patient will contact the care team with questions, concerns, support needs   -Patient will use the clinic as a resource and understands (s)he can contact the [place] clinic with 24/7 after hours services available  -Care Coordinator will remain available as needed  -No further care coordination outreaches at this time     Anabell Chaidez RN, BSN, PHN  Primary Care / Care Coordinator   [facility]  E-mail [email or web address]   [phone]

## 2022-06-08 NOTE — LETTER
[facility] CARE COORDINATION  [address]  [facility] [address]    June 8, 2022    Britt Gaspare Vivian  [address] [facility] [address]      Dear Britt,    I am a clinic care coordinator who works with REYNOLD RAMOS MD with the [facility]. I wanted to thank you for spending the time to talk with me.  Below is a description of clinic care coordination and how I can further assist you.       The clinic care coordination team is made up of a registered nurse, , financial resource worker and community health worker who understand the health care system. The goal of clinic care coordination is to help you manage your health and improve access to the health care system. Our team works alongside your provider to assist you in determining your health and social needs. We can help you obtain health care and community resources, providing you with necessary information and education. We can work with you through any barriers and develop a care plan that helps coordinate and strengthen the communication between you and your care team.    Please feel free to contact me with any questions or concerns regarding care coordination and what we can offer.      We are focused on providing you with the highest-quality healthcare experience possible.    Sincerely,      Anabell Chaidez RN, BSN, PHN  Primary Care / Care Coordinator   [facility]  E-mail [email or web address]   [phone]

## 2022-06-16 DIAGNOSIS — I15.9 SECONDARY HYPERTENSION: ICD-10-CM

## 2022-06-17 RX ORDER — LOSARTAN POTASSIUM 25 MG/1
25 TABLET ORAL DAILY
Qty: 90 TABLET | Refills: 2 | Status: SHIPPED | OUTPATIENT
Start: 2022-06-17 | End: 2022-12-19

## 2022-06-17 NOTE — TELEPHONE ENCOUNTER
Routing refill request to provider for review/approval because:  Creatinine   Date Value Ref Range Status   06/06/2022 0.47 (L) 0.52 - 1.04 mg/dL Final   02/06/2020 0.40 (L) 0.55 - 1.02 mg/dL Final         Keshawn Sanderson RN  [facility] Triage Nurse

## 2022-06-23 ENCOUNTER — TELEPHONE (OUTPATIENT)
Dept: RHEUMATOLOGY | Facility: CLINIC | Age: 52
End: 2022-06-23

## 2022-06-24 RX ORDER — DIPHENHYDRAMINE HYDROCHLORIDE 50 MG/ML
50 INJECTION INTRAMUSCULAR; INTRAVENOUS
Status: CANCELLED
Start: 2022-06-24

## 2022-06-24 RX ORDER — METHYLPREDNISOLONE SODIUM SUCCINATE 125 MG/2ML
125 INJECTION, POWDER, LYOPHILIZED, FOR SOLUTION INTRAMUSCULAR; INTRAVENOUS
Status: CANCELLED
Start: 2022-06-24

## 2022-06-24 RX ORDER — ALBUTEROL SULFATE 0.83 MG/ML
2.5 SOLUTION RESPIRATORY (INHALATION) ONCE
Status: CANCELLED
Start: 2022-06-24 | End: 2022-06-24

## 2022-06-24 RX ORDER — NALOXONE HYDROCHLORIDE 0.4 MG/ML
0.2 INJECTION, SOLUTION INTRAMUSCULAR; INTRAVENOUS; SUBCUTANEOUS
Status: CANCELLED | OUTPATIENT
Start: 2022-06-24

## 2022-06-24 RX ORDER — ALBUTEROL SULFATE 0.83 MG/ML
2.5 SOLUTION RESPIRATORY (INHALATION)
Status: CANCELLED | OUTPATIENT
Start: 2022-06-24

## 2022-06-24 RX ORDER — ALBUTEROL SULFATE 90 UG/1
1-2 AEROSOL, METERED RESPIRATORY (INHALATION)
Status: CANCELLED
Start: 2022-06-24

## 2022-06-24 RX ORDER — MEPERIDINE HYDROCHLORIDE 25 MG/ML
25 INJECTION INTRAMUSCULAR; INTRAVENOUS; SUBCUTANEOUS EVERY 30 MIN PRN
Status: CANCELLED | OUTPATIENT
Start: 2022-06-24

## 2022-06-24 RX ORDER — EPINEPHRINE 1 MG/ML
0.3 INJECTION, SOLUTION, CONCENTRATE INTRAVENOUS EVERY 5 MIN PRN
Status: CANCELLED | OUTPATIENT
Start: 2022-06-24

## 2022-06-24 RX ORDER — PENTAMIDINE ISETHIONATE 300 MG/300MG
300 INHALANT RESPIRATORY (INHALATION) ONCE
Status: CANCELLED
Start: 2022-06-24 | End: 2022-06-24

## 2022-06-24 ASSESSMENT — ENCOUNTER SYMPTOMS
FREQUENCY: 1
SORE THROAT: 0
ARTHRALGIAS: 1
PALPITATIONS: 0
BREAST MASS: 0
NERVOUS/ANXIOUS: 1
WEAKNESS: 1
PARESTHESIAS: 0
HEADACHES: 0
NAUSEA: 0
JOINT SWELLING: 0
EYE PAIN: 0
MYALGIAS: 1
DYSURIA: 0
HEMATOCHEZIA: 0
COUGH: 0
FEVER: 0
SHORTNESS OF BREATH: 0
CONSTIPATION: 0
DIZZINESS: 0
ABDOMINAL PAIN: 0
CHILLS: 0
HEARTBURN: 1
HEMATURIA: 0
DIARRHEA: 0

## 2022-06-27 ENCOUNTER — OFFICE VISIT (OUTPATIENT)
Dept: FAMILY MEDICINE | Facility: CLINIC | Age: 52
End: 2022-06-27
Payer: COMMERCIAL

## 2022-06-27 VITALS
OXYGEN SATURATION: 99 % | WEIGHT: 138 LBS | BODY MASS INDEX: 25.4 KG/M2 | SYSTOLIC BLOOD PRESSURE: 124 MMHG | DIASTOLIC BLOOD PRESSURE: 83 MMHG | TEMPERATURE: 96.8 F | HEIGHT: 62 IN | HEART RATE: 79 BPM | RESPIRATION RATE: 18 BRPM

## 2022-06-27 DIAGNOSIS — N32.81 OVERACTIVE BLADDER: ICD-10-CM

## 2022-06-27 DIAGNOSIS — R20.2 FACIAL PARESTHESIA: Primary | ICD-10-CM

## 2022-06-27 DIAGNOSIS — N89.8 VAGINAL DRYNESS: ICD-10-CM

## 2022-06-27 DIAGNOSIS — D74.9 METHEMOGLOBINEMIA: ICD-10-CM

## 2022-06-27 DIAGNOSIS — R63.5 WEIGHT GAIN: ICD-10-CM

## 2022-06-27 DIAGNOSIS — E78.5 HYPERLIPIDEMIA LDL GOAL <100: ICD-10-CM

## 2022-06-27 DIAGNOSIS — Z00.00 ANNUAL PHYSICAL EXAM: ICD-10-CM

## 2022-06-27 LAB
CHOLEST SERPL-MCNC: 280 MG/DL
FASTING STATUS PATIENT QL REPORTED: YES
HDLC SERPL-MCNC: 80 MG/DL
LDLC SERPL CALC-MCNC: 155 MG/DL
METHGB MFR BLD: 0.8 % (ref 0–3)
NONHDLC SERPL-MCNC: 200 MG/DL
TRIGL SERPL-MCNC: 224 MG/DL
TSH SERPL DL<=0.005 MIU/L-ACNC: 0.64 MU/L (ref 0.4–4)
VIT B12 SERPL-MCNC: 250 PG/ML (ref 232–1245)

## 2022-06-27 PROCEDURE — 99214 OFFICE O/P EST MOD 30 MIN: CPT | Mod: 25 | Performed by: INTERNAL MEDICINE

## 2022-06-27 PROCEDURE — 82607 VITAMIN B-12: CPT | Performed by: INTERNAL MEDICINE

## 2022-06-27 PROCEDURE — 36415 COLL VENOUS BLD VENIPUNCTURE: CPT | Performed by: INTERNAL MEDICINE

## 2022-06-27 PROCEDURE — 99396 PREV VISIT EST AGE 40-64: CPT | Performed by: INTERNAL MEDICINE

## 2022-06-27 PROCEDURE — 84443 ASSAY THYROID STIM HORMONE: CPT | Performed by: INTERNAL MEDICINE

## 2022-06-27 PROCEDURE — 83050 HGB METHEMOGLOBIN QUAN: CPT | Performed by: INTERNAL MEDICINE

## 2022-06-27 PROCEDURE — 80061 LIPID PANEL: CPT | Performed by: INTERNAL MEDICINE

## 2022-06-27 RX ORDER — GABAPENTIN 300 MG/1
300 CAPSULE ORAL PRN
Qty: 60 CAPSULE | Refills: 1 | Status: SHIPPED | OUTPATIENT
Start: 2022-06-27 | End: 2023-01-04

## 2022-06-27 RX ORDER — ESTRADIOL 0.1 MG/G
2 CREAM VAGINAL
Qty: 42.5 G | Refills: 1 | Status: SHIPPED | OUTPATIENT
Start: 2022-06-27 | End: 2022-12-12

## 2022-06-27 RX ORDER — PANTOPRAZOLE SODIUM 40 MG/1
40 TABLET, DELAYED RELEASE ORAL DAILY
COMMUNITY
End: 2023-01-04

## 2022-06-27 RX ORDER — ATORVASTATIN CALCIUM 10 MG/1
10 TABLET, FILM COATED ORAL DAILY
Qty: 60 TABLET | Refills: 1 | Status: SHIPPED | OUTPATIENT
Start: 2022-06-27 | End: 2022-10-14

## 2022-06-27 ASSESSMENT — ENCOUNTER SYMPTOMS
BREAST MASS: 0
WEAKNESS: 1
NAUSEA: 0
CONSTIPATION: 0
DIZZINESS: 0
JOINT SWELLING: 0
ABDOMINAL PAIN: 0
HEMATOCHEZIA: 0
PALPITATIONS: 0
COUGH: 0
NERVOUS/ANXIOUS: 1
DYSURIA: 0
SHORTNESS OF BREATH: 0
HEARTBURN: 1
HEMATURIA: 0
DIARRHEA: 0
PARESTHESIAS: 0
EYE PAIN: 0
CHILLS: 0
MYALGIAS: 1
SORE THROAT: 0
ARTHRALGIAS: 1
HEADACHES: 0
FREQUENCY: 1
FEVER: 0

## 2022-06-27 ASSESSMENT — PAIN SCALES - GENERAL: PAINLEVEL: NO PAIN (0)

## 2022-06-27 NOTE — LETTER
July 1, 2022      [place]  [address] [place] [id number]        Dear ,    We are writing to inform you of your test results.    Enclosed are your results.    Resulted Orders   Vitamin B12   Result Value Ref Range    Vitamin B12 250 232 - 1,245 pg/mL       If you have any questions or concerns, please call the clinic at the number listed above.       Sincerely,      Vi Metz MD

## 2022-06-27 NOTE — PROGRESS NOTES
SUBJECTIVE:   CC: Britt Park is an 52 year old woman who presents for preventive health visit.       Patient has been advised of split billing requirements and indicates understanding: Yes  Healthy Habits:     Getting at least 3 servings of Calcium per day:  Yes    Bi-annual eye exam:  Yes    Dental care twice a year:  Yes    Sleep apnea or symptoms of sleep apnea:  Sleep apnea    Diet:  Other    Frequency of exercise:  4-5 days/week    Duration of exercise:  30-45 minutes    Taking medications regularly:  Yes    PHQ-2 Total Score: 0    Additional concerns today:  Yes      {Outside tests to abstract? :068970}    {additional problems to add (Optional):038513}    Today's PHQ-2 Score:   PHQ-2 ( 1999 Pfizer) 6/24/2022   Q1: Little interest or pleasure in doing things 0   Q2: Feeling down, depressed or hopeless 0   PHQ-2 Score 0   PHQ-2 Total Score (12-17 Years)- Positive if 3 or more points; Administer PHQ-A if positive -   Q1: Little interest or pleasure in doing things Not at all   Q2: Feeling down, depressed or hopeless Not at all   PHQ-2 Score 0       Abuse: Current or Past (Physical, Sexual or Emotional) - { :899784}  Do you feel safe in your environment? { :144218}    Have you ever done Advance Care Planning? (For example, a Health Directive, POLST, or a discussion with a medical provider or your loved ones about your wishes): { :308324}    Social History     Tobacco Use     Smoking status: Never Smoker     Smokeless tobacco: Never Used   Substance Use Topics     Alcohol use: Not Currently     {Rooming Staff- Complete this question if Prescreen response is not shown below for today's visit. If you drink alcohol do you typically have >3 drinks per day or >7 drinks per week? (Optional):249140}    Alcohol Use 6/27/2022   Prescreen: >3 drinks/day or >7 drinks/week? -   Prescreen: >3 drinks/day or >7 drinks/week? {AUDIT responses all:TXT,82960}   {add AUDIT responses (Optional) (A score of 7 for adult men  "is an indication of hazardous drinking; a score of 8 or more is an indication of an alcohol use disorder.  A score of 7 or more for adult women is an indication of hazardous drinking or an alchohol use disorder):036455}    Reviewed orders with patient.  Reviewed health maintenance and updated orders accordingly - { :957554::\"Yes\"}  {Chronicprobdata (optional):669037}    Breast Cancer Screening:    FHS-7:   Breast CA Risk Assessment (FHS-7) 5/3/2022   Did any of your first-degree relatives have breast or ovarian cancer? No   Did any of your relatives have bilateral breast cancer? No   Did any man in your family have breast cancer? No   Did any woman in your family have breast and ovarian cancer? No   Did any woman in your family have breast cancer before age 50 y? No   Do you have 2 or more relatives with breast and/or ovarian cancer? No   Do you have 2 or more relatives with breast and/or bowel cancer? No     {If any of the questions to the BCRA (FHS-7) are answered yes, consider ordering referral for genetic counseling (Optional) :109741::\"click delete button to remove this line now\"}  {AMB Mammogram Decision Support (Optional) :251219}  Pertinent mammograms are reviewed under the imaging tab.    History of abnormal Pap smear: { :719055}     Reviewed and updated as needed this visit by clinical staff                    Reviewed and updated as needed this visit by Provider                   {HISTORY OPTIONS (Optional):214654}    Review of Systems   Constitutional: Negative for chills and fever.   HENT: Negative for congestion, ear pain, hearing loss and sore throat.    Eyes: Positive for visual disturbance. Negative for pain.   Respiratory: Negative for cough and shortness of breath.    Cardiovascular: Negative for chest pain, palpitations and peripheral edema.   Gastrointestinal: Positive for heartburn. Negative for abdominal pain, constipation, diarrhea, hematochezia and nausea.   Breasts:  Negative for " "tenderness, breast mass and discharge.   Genitourinary: Positive for frequency, pelvic pain and urgency. Negative for dysuria, genital sores, hematuria, vaginal bleeding and vaginal discharge.   Musculoskeletal: Positive for arthralgias and myalgias. Negative for joint swelling.   Skin: Negative for rash.   Neurological: Positive for weakness. Negative for dizziness, headaches and paresthesias.   Psychiatric/Behavioral: Negative for mood changes. The patient is nervous/anxious.      {FEMALE ROS (Optional):211708}     OBJECTIVE:   There were no vitals taken for this visit.  Physical Exam  {Exam Choices (Optional):157816}    {Diagnostic Test Results (Optional):594392::\"Diagnostic Test Results:\",\"Labs reviewed in Epic\"}    ASSESSMENT/PLAN:   {Diag Picklist:549828}    {Patient advised of split billing (Optional):735023}    COUNSELING:  {FEMALE COUNSELING MESSAGES:716475::\"Reviewed preventive health counseling, as reflected in patient instructions\"}    Estimated body mass index is 24.78 kg/m  as calculated from the following:    Height as of 5/19/22: 1.575 m (5' 2\").    Weight as of 6/3/22: 61.5 kg (135 lb 8 oz).    {Weight Management Plan (ACO) Complete if BMI is abnormal-  Ages 18-64  BMI >24.9.  Age 65+ with BMI <23 or >30 (Optional):981564}    She reports that she has never smoked. She has never used smokeless tobacco.      Counseling Resources:  ATP IV Guidelines  Pooled Cohorts Equation Calculator  Breast Cancer Risk Calculator  BRCA-Related Cancer Risk Assessment: FHS-7 Tool  FRAX Risk Assessment  ICSI Preventive Guidelines  Dietary Guidelines for Americans, 2010  USDA's MyPlate  ASA Prophylaxis  Lung CA Screening    REYNOLD RAMOS MD  [facility]  "

## 2022-06-27 NOTE — PATIENT INSTRUCTIONS
You were seen in [facility] today for an annual physical (preventive) visit. I ordered blood work which includes blood count, electrolytes, kidney function, thyroid function, lipid profile and some screening tests. 8-12 hours of fasting is required for checking lipids. You will get a mychart message or a call about test results.

## 2022-06-27 NOTE — PROGRESS NOTES
SUBJECTIVE:   CC: Britt Park is an 52 year old woman who presents for preventive health visit.       Patient has been advised of split billing requirements and indicates understanding: Yes  Healthy Habits:     Getting at least 3 servings of Calcium per day:  Yes    Bi-annual eye exam:  Yes    Dental care twice a year:  Yes    Sleep apnea or symptoms of sleep apnea:  Sleep apnea    Diet:  Other    Frequency of exercise:  4-5 days/week    Duration of exercise:  30-45 minutes    Taking medications regularly:  Yes    Barriers to taking medications:  None    Medication side effects:  None    PHQ-2 Total Score: 0    Additional concerns today:  Yes    {Add if <65 person on Medicare  - Required Questions (Optional):156284}  {Outside tests to abstract? :672992}    {additional problems to add (Optional):143000}    Today's PHQ-2 Score:   PHQ-2 ( 1999 Pfizer) 6/24/2022   Q1: Little interest or pleasure in doing things 0   Q2: Feeling down, depressed or hopeless 0   PHQ-2 Score 0   PHQ-2 Total Score (12-17 Years)- Positive if 3 or more points; Administer PHQ-A if positive -   Q1: Little interest or pleasure in doing things Not at all   Q2: Feeling down, depressed or hopeless Not at all   PHQ-2 Score 0       Abuse: Current or Past (Physical, Sexual or Emotional) - { :880947}  Do you feel safe in your environment? { :309056}    Have you ever done Advance Care Planning? (For example, a Health Directive, POLST, or a discussion with a medical provider or your loved ones about your wishes): { :923649}    Social History     Tobacco Use     Smoking status: Never Smoker     Smokeless tobacco: Never Used   Substance Use Topics     Alcohol use: Not Currently     If you drink alcohol do you typically have >3 drinks per day or >7 drinks per week? No    Alcohol Use 6/24/2022   Prescreen: >3 drinks/day or >7 drinks/week? No     Reviewed orders with patient.  Reviewed health maintenance and updated orders accordingly - {  ":388459::\"Yes\"}  {Chronicprobdata (optional):830853}    Breast Cancer Screening:    FHS-7:   Breast CA Risk Assessment (FHS-7) 5/3/2022   Did any of your first-degree relatives have breast or ovarian cancer? No   Did any of your relatives have bilateral breast cancer? No   Did any man in your family have breast cancer? No   Did any woman in your family have breast and ovarian cancer? No   Did any woman in your family have breast cancer before age 50 y? No   Do you have 2 or more relatives with breast and/or ovarian cancer? No   Do you have 2 or more relatives with breast and/or bowel cancer? No     {If any of the questions to the BCRA (FHS-7) are answered yes, consider ordering referral for genetic counseling (Optional) :768908::\"click delete button to remove this line now\"}  {AMB Mammogram Decision Support (Optional) :149506}  Pertinent mammograms are reviewed under the imaging tab.    History of abnormal Pap smear: { :111648}     Reviewed and updated as needed this visit by clinical staff                    Reviewed and updated as needed this visit by Provider                   {HISTORY OPTIONS (Optional):339723}    Review of Systems  {FEMALE ROS (Optional):205409}     OBJECTIVE:   There were no vitals taken for this visit.  Physical Exam  {Exam Choices (Optional):334094}    {Diagnostic Test Results (Optional):406795::\"Diagnostic Test Results:\",\"Labs reviewed in Epic\"}    ASSESSMENT/PLAN:   {Diag Picklist:735569}    {Patient advised of split billing (Optional):941915}    COUNSELING:  {FEMALE COUNSELING MESSAGES:573960::\"Reviewed preventive health counseling, as reflected in patient instructions\"}    Estimated body mass index is 24.78 kg/m  as calculated from the following:    Height as of 5/19/22: 1.575 m (5' 2\").    Weight as of 6/3/22: 61.5 kg (135 lb 8 oz).    {Weight Management Plan (ACO) Complete if BMI is abnormal-  Ages 18-64  BMI >24.9.  Age 65+ with BMI <23 or >30 (Optional):676080}    She reports that " she has never smoked. She has never used smokeless tobacco.      Counseling Resources:  ATP IV Guidelines  Pooled Cohorts Equation Calculator  Breast Cancer Risk Calculator  BRCA-Related Cancer Risk Assessment: FHS-7 Tool  FRAX Risk Assessment  ICSI Preventive Guidelines  Dietary Guidelines for Americans, 2010  USDA's MyPlate  ASA Prophylaxis  Lung CA Screening    REYNOLD RAMOS MD  [facility]

## 2022-06-27 NOTE — PROGRESS NOTES
SUBJECTIVE:   CC: Britt Park is an 52 year old woman who presents for preventive health visit.     Gaining weight, neck hump, moon face  Post menopausal - July 2021 no period.  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       Patient has been advised of split billing requirements and indicates understanding: Yes  Healthy Habits:     Getting at least 3 servings of Calcium per day:  Yes    Bi-annual eye exam:  Yes    Dental care twice a year:  Yes    Sleep apnea or symptoms of sleep apnea:  Sleep apnea    Diet:  Other    Frequency of exercise:  4-5 days/week    Duration of exercise:  30-45 minutes    Taking medications regularly:  Yes    Barriers to taking medications:  None    Medication side effects:  None    PHQ-2 Total Score: 0    Additional concerns today:  Yes    Ability to successfully perform activities of daily living: Yes, no assistance needed  Home safety:  none identified   Hearing impairment: Yes    Today's PHQ-2 Score:   PHQ-2 ( 1999 [employer]) 6/24/2022   Q1: Little interest or pleasure in doing things 0   Q2: Feeling down, depressed or hopeless 0   PHQ-2 Score 0   PHQ-2 Total Score (12-17 Years)- Positive if 3 or more points; Administer PHQ-A if positive -   Q1: Little interest or pleasure in doing things Not at all   Q2: Feeling down, depressed or hopeless Not at all   PHQ-2 Score 0     Abuse: Current or Past (Physical, Sexual or Emotional) - No  Do you feel safe in your environment? Yes    Have you ever done Advance Care Planning? (For example, a Health Directive, POLST, or a discussion with a medical provider or your loved ones about your wishes): No, advance care planning information given to patient to review.  Patient plans to discuss their wishes with loved ones or provider.      Social History     Tobacco Use     Smoking status: Never Smoker     Smokeless tobacco: Never Used   Substance Use Topics     Alcohol use: Not Currently     If you drink alcohol do you typically have >3  drinks per day or >7 drinks per week? No    Alcohol Use 6/24/2022   Prescreen: >3 drinks/day or >7 drinks/week? No     Reviewed orders with patient.  Reviewed health maintenance and updated orders accordingly - Yes  Lab work is in process    Breast Cancer Screening:    FHS-7:   Breast CA Risk Assessment (FHS-7) 5/3/2022   Did any of your first-degree relatives have breast or ovarian cancer? No   Did any of your relatives have bilateral breast cancer? No   Did any man in your family have breast cancer? No   Did any woman in your family have breast and ovarian cancer? No   Did any woman in your family have breast cancer before age 50 y? No   Do you have 2 or more relatives with breast and/or ovarian cancer? No   Do you have 2 or more relatives with breast and/or bowel cancer? No     Mammogram Screening: Recommended annual mammography  Pertinent mammograms are reviewed under the imaging tab.    History of abnormal Pap smear: NO - age 30-65 PAP every 5 years with negative HPV co-testing recommended     Reviewed and updated as needed this visit by clinical staff     Meds              Reviewed and updated as needed this visit by Provider                   Review of Systems   Constitutional: Negative for chills and fever.   HENT: Negative for congestion, ear pain, hearing loss and sore throat.    Eyes: Positive for visual disturbance. Negative for pain.   Respiratory: Negative for cough and shortness of breath.    Cardiovascular: Negative for chest pain, palpitations and peripheral edema.   Gastrointestinal: Positive for heartburn. Negative for abdominal pain, constipation, diarrhea, hematochezia and nausea.   Breasts:  Negative for tenderness, breast mass and discharge.   Genitourinary: Positive for frequency, pelvic pain and urgency. Negative for dysuria, genital sores, hematuria, vaginal bleeding and vaginal discharge.   Musculoskeletal: Positive for arthralgias and myalgias. Negative for joint swelling.   Skin:  Negative for rash.   Neurological: Positive for weakness. Negative for dizziness, headaches and paresthesias.   Psychiatric/Behavioral: Negative for mood changes. The patient is nervous/anxious.        OBJECTIVE:   There were no vitals taken for this visit.  Physical Exam  Vitals reviewed.   Constitutional:       Appearance: Normal appearance.   HENT:      Right Ear: Tympanic membrane normal. There is no impacted cerumen.      Left Ear: Tympanic membrane normal.      Mouth/Throat:      Mouth: Mucous membranes are moist.      Pharynx: Oropharynx is clear. No oropharyngeal exudate or posterior oropharyngeal erythema.   Cardiovascular:      Rate and Rhythm: Normal rate and regular rhythm.      Heart sounds: Normal heart sounds. No murmur heard.    No gallop.   Pulmonary:      Effort: Pulmonary effort is normal. No respiratory distress.      Breath sounds: Normal breath sounds. No stridor. No wheezing, rhonchi or rales.   Chest:   Breasts: Breasts are symmetrical.      Right: No swelling, bleeding, inverted nipple, mass, nipple discharge, skin change or tenderness.      Left: No swelling, bleeding, inverted nipple, mass, nipple discharge, skin change or tenderness.       Abdominal:      General: Abdomen is flat. Bowel sounds are normal. There is no distension.      Palpations: Abdomen is soft. There is no mass.      Tenderness: There is no abdominal tenderness. There is no guarding.      Hernia: No hernia is present.   Musculoskeletal:         General: Normal range of motion.      Cervical back: Normal range of motion and neck supple. No rigidity or tenderness.      Right lower leg: No edema.      Left lower leg: No edema.   Lymphadenopathy:      Cervical: No cervical adenopathy.   Skin:     General: Skin is warm and dry.   Neurological:      General: No focal deficit present.      Mental Status: She is alert.   Psychiatric:         Mood and Affect: Mood normal.         Behavior: Behavior normal.       ASSESSMENT/PLAN:  "  Sun was seen today for physical.    Diagnoses and all orders for this visit:    Facial paresthesia  Follows with neurology, no formal diagnosis, MRI brain normal.   -     gabapentin (NEURONTIN) 300 MG capsule; Take 1 capsule (300 mg) by mouth as needed (neck numbness and tingling)    Weight gain  This is most likely from prednisone. Will check thyroid function.  -     TSH with free T4 reflex    Overactive bladder  She is not able to afford Mirabegron due to very high cost ($1400 for 3 month supply) discussed about scheduled voiding every 2-3 hours. If symptoms are bothersome, will refer to urology.    Vaginal dryness  Postmenopausal. Estrace 2 times weekly, can increase to 3 times a week if needed.   -     estradiol (ESTRACE) 0.1 MG/GM vaginal cream; Place 2 g vaginally twice a week    Methemoglobinemia  -     Methemoglobin    Annual physical exam  Up to date with all screening tests and vaccinations.  -     Lipid Profile  -     Vitamin B12    Patient has been advised of split billing requirements and indicates understanding: Yes    COUNSELING:  Reviewed preventive health counseling, as reflected in patient instructions       Healthy diet/nutrition    Estimated body mass index is 24.78 kg/m  as calculated from the following:    Height as of 5/19/22: 1.575 m (5' 2\").    Weight as of 6/3/22: 61.5 kg (135 lb 8 oz).    She reports that she has never smoked. She has never used smokeless tobacco.      REYNOLD RAMOS MD  [facility]  "

## 2022-07-05 ENCOUNTER — OFFICE VISIT (OUTPATIENT)
Dept: PULMONOLOGY | Facility: CLINIC | Age: 52
End: 2022-07-05
Payer: COMMERCIAL

## 2022-07-05 DIAGNOSIS — Z29.89 NEED FOR PNEUMOCYSTIS PROPHYLAXIS: Primary | ICD-10-CM

## 2022-07-05 PROCEDURE — 99207 PR PENTAMIDINE TREATMENT: CPT | Performed by: INTERNAL MEDICINE

## 2022-07-05 RX ORDER — METHYLPREDNISOLONE SODIUM SUCCINATE 125 MG/2ML
125 INJECTION, POWDER, LYOPHILIZED, FOR SOLUTION INTRAMUSCULAR; INTRAVENOUS
Status: CANCELLED
Start: 2022-08-02

## 2022-07-05 RX ORDER — ALBUTEROL SULFATE 90 UG/1
1-2 AEROSOL, METERED RESPIRATORY (INHALATION)
Status: CANCELLED
Start: 2022-08-02

## 2022-07-05 RX ORDER — PENTAMIDINE ISETHIONATE 300 MG/300MG
300 INHALANT RESPIRATORY (INHALATION) ONCE
Status: CANCELLED
Start: 2022-08-02 | End: 2022-08-02

## 2022-07-05 RX ORDER — EPINEPHRINE 1 MG/ML
0.3 INJECTION, SOLUTION, CONCENTRATE INTRAVENOUS EVERY 5 MIN PRN
Status: CANCELLED | OUTPATIENT
Start: 2022-08-02

## 2022-07-05 RX ORDER — ALBUTEROL SULFATE 0.83 MG/ML
2.5 SOLUTION RESPIRATORY (INHALATION) ONCE
Status: CANCELLED
Start: 2022-08-02 | End: 2022-08-02

## 2022-07-05 RX ORDER — ALBUTEROL SULFATE 0.83 MG/ML
2.5 SOLUTION RESPIRATORY (INHALATION)
Status: CANCELLED | OUTPATIENT
Start: 2022-08-02

## 2022-07-05 RX ORDER — PENTAMIDINE ISETHIONATE 300 MG/300MG
300 INHALANT RESPIRATORY (INHALATION) ONCE
Status: COMPLETED | OUTPATIENT
Start: 2022-07-05 | End: 2022-07-05

## 2022-07-05 RX ORDER — ALBUTEROL SULFATE 0.83 MG/ML
2.5 SOLUTION RESPIRATORY (INHALATION) ONCE
Status: COMPLETED | OUTPATIENT
Start: 2022-07-05 | End: 2022-07-05

## 2022-07-05 RX ORDER — NALOXONE HYDROCHLORIDE 0.4 MG/ML
0.2 INJECTION, SOLUTION INTRAMUSCULAR; INTRAVENOUS; SUBCUTANEOUS
Status: CANCELLED | OUTPATIENT
Start: 2022-08-02

## 2022-07-05 RX ORDER — DIPHENHYDRAMINE HYDROCHLORIDE 50 MG/ML
50 INJECTION INTRAMUSCULAR; INTRAVENOUS
Status: CANCELLED
Start: 2022-08-02

## 2022-07-05 RX ORDER — MEPERIDINE HYDROCHLORIDE 25 MG/ML
25 INJECTION INTRAMUSCULAR; INTRAVENOUS; SUBCUTANEOUS EVERY 30 MIN PRN
Status: CANCELLED | OUTPATIENT
Start: 2022-08-02

## 2022-07-05 RX ADMIN — ALBUTEROL SULFATE 2.5 MG: 0.83 SOLUTION RESPIRATORY (INHALATION) at 07:23

## 2022-07-05 RX ADMIN — PENTAMIDINE ISETHIONATE 300 MG: 300 INHALANT RESPIRATORY (INHALATION) at 07:36

## 2022-07-05 NOTE — PROGRESS NOTES
Britt Park Patient was seen today for a Pentamidine nebulizer tx ordered by Dr. Chad Barnett.    Patient was first given 2.5 mg of Albuterol nebulizer, after which Pentamidine 300 mg (Lot # [id number]) mixed with 6cc Sterile H20 was administered through a filtered nebulizer.    Pre-treatment: SpO2 = 100%   HR = 89   BBS = clear   Post-treatment: SpO2 = 100%  HR = 91  BBS = clear    No adverse side effects noted by the patient.      Procedure was completed by Juliana Valencia.

## 2022-08-15 ENCOUNTER — TELEPHONE (OUTPATIENT)
Dept: FAMILY MEDICINE | Facility: CLINIC | Age: 52
End: 2022-08-15

## 2022-08-15 NOTE — TELEPHONE ENCOUNTER
Fax from pharmacy - requesting refill of:  Prednisone 5mg, SIG take 5mg daily    Chart has historical entry of 25mg daily    Unclear what dose patient is taking, last entry is historical from hospital stay.    Needs triage.  Dose taking?    LOV 6- Isaías    Appointments in Next Year    Nov 03, 2022  7:30 AM  Return Visit with Husam Barnett MD  [facility] [phone]        RT Tyrell (R)

## 2022-08-18 NOTE — TELEPHONE ENCOUNTER
Triage Patient Outreach    Attempt # 1    Was call answered?  No.  Left voicemail to return call to Triage at [facility]    Kimmy Johnson RN

## 2022-08-23 NOTE — TELEPHONE ENCOUNTER
Patient Contact     Attempt #: 2     Was call answered?  No.  Left message on voicemail with information to call clinic back or to review and respond to [email or web address] message.     Writer sent [email or web address] message to verify what dose of prednisone patient is taking.      Ranjan Elizabeth RN  [facility]

## 2022-09-09 ENCOUNTER — HOSPITAL ENCOUNTER (OUTPATIENT)
Dept: CT IMAGING | Facility: CLINIC | Age: 52
Discharge: HOME | End: 2022-09-09
Attending: INTERNAL MEDICINE | Admitting: INTERNAL MEDICINE
Payer: COMMERCIAL

## 2022-09-09 DIAGNOSIS — R10.9 ABDOMINAL PAIN: ICD-10-CM

## 2022-09-09 PROCEDURE — 250N000009 HC RX 250: Performed by: INTERNAL MEDICINE

## 2022-09-09 PROCEDURE — 74177 CT ABD & PELVIS W/CONTRAST: CPT

## 2022-09-09 PROCEDURE — 250N000011 HC RX IP 250 OP 636: Performed by: INTERNAL MEDICINE

## 2022-09-09 RX ORDER — IOPAMIDOL 755 MG/ML
68 INJECTION, SOLUTION INTRAVASCULAR ONCE
Status: COMPLETED | OUTPATIENT
Start: 2022-09-09 | End: 2022-09-09

## 2022-09-09 RX ADMIN — IOPAMIDOL 68 ML: 755 INJECTION, SOLUTION INTRAVENOUS at 07:42

## 2022-09-09 RX ADMIN — SODIUM CHLORIDE 59 ML: 9 INJECTION, SOLUTION INTRAVENOUS at 07:42

## 2022-09-14 ENCOUNTER — MYC MEDICAL ADVICE (OUTPATIENT)
Dept: FAMILY MEDICINE | Facility: CLINIC | Age: 52
End: 2022-09-14

## 2022-09-15 ENCOUNTER — MYC MEDICAL ADVICE (OUTPATIENT)
Dept: FAMILY MEDICINE | Facility: CLINIC | Age: 52
End: 2022-09-15

## 2022-09-15 NOTE — LETTER
Britt Park  [id number]      To Whom It May Concern      Britt Park is under my care at [facility]. 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           Dr Vi Metz  [facility]    October 10, 2022

## 2022-09-16 NOTE — TELEPHONE ENCOUNTER
Can you please  Help her schedule a virtual appt on one of these days 9/19, 9/20 or 9/21 I have many openings

## 2022-09-16 NOTE — TELEPHONE ENCOUNTER
PCP- please see mychart:    Please see patient update     Please respond back to patient or send to triage to follow up. If patient needs to be scheduled, please route to team coordinators.    Leanna Calles RN  [facility]

## 2022-09-18 ENCOUNTER — HEALTH MAINTENANCE LETTER (OUTPATIENT)
Age: 52
End: 2022-09-18

## 2022-09-21 ENCOUNTER — VIRTUAL VISIT (OUTPATIENT)
Dept: FAMILY MEDICINE | Facility: CLINIC | Age: 52
End: 2022-09-21
Payer: COMMERCIAL

## 2022-09-21 DIAGNOSIS — K76.0 HEPATIC STEATOSIS: ICD-10-CM

## 2022-09-21 DIAGNOSIS — G71.3 MYOPATHY, MITOCHONDRIAL: ICD-10-CM

## 2022-09-21 DIAGNOSIS — N83.201 CYST OF RIGHT OVARY: Primary | ICD-10-CM

## 2022-09-21 PROCEDURE — 99214 OFFICE O/P EST MOD 30 MIN: CPT | Mod: GT | Performed by: INTERNAL MEDICINE

## 2022-09-21 NOTE — PROGRESS NOTES
"Ventura is a 52 year old who is being evaluated via a billable video visit.      How would you like to obtain your AVS? MyChart  If the video visit is dropped, the invitation should be resent by: Text to cell phone: [phone]  Will anyone else be joining your video visit? No          Assessment & Plan     Cyst of right ovary  - US Pelvic Complete with Transvaginal; Future    Hepatic steatosis  This could be from methotrexate   Also a possibility that its her diet or increased BMI.   Discussed with patient about switching to another medication for myopathy. She will contact Dr Ablarran at [place] about it. She does see a rheumatologist in .   - Nutrition Referral; Future    Myopathy, mitochondrial  Follow up with Dr Albarran at [place] and discuss about IVIG. This could be via rheum at .   Miscellaneous order for pillows done.   - Miscellaneous Order for DME - ONLY FOR DME  - Miscellaneous Order for DME - ONLY FOR DME  - Miscellaneous Order for DME - ONLY FOR DME       See Patient Instructions    Return in about 6 months (around 3/21/2023) for Follow up, with me.    REYNOLD RAMOS MD  [facility]    Catrina Whitehead is a 52 year old, presenting for the following health issues:  Results    HPI   Ventura is a very pleasant 52 year old female who had a video visit for follow up.     She has been on FODMAP diet for 2 weeks since appointment with GI. She feels bloated. She says \"I look pregnant\". She was found to have hepatic steatosis on CT abdomen.   She also wants to discuss about incidental cyst in right adnexa.       Review of Systems       Objective       Vitals:  No vitals were obtained today due to virtual visit.    Physical Exam   GEN: No acute distress  RESP: No audible increased work of breathing. Patient speaking in full sentences without distress.  PSYCH: pleasant  Exam otherwise limited due to virtual platform          Video-Visit Details    Video Start Time: 4:30 pm    Type of service:  Video " Visit    Video End Time:5:00 pm    Originating Location (pt. Location): Home    Distant Location (provider location):  [facility]     Platform used for Video Visit: Leonarda

## 2022-09-22 ENCOUNTER — MYC MEDICAL ADVICE (OUTPATIENT)
Dept: RHEUMATOLOGY | Facility: CLINIC | Age: 52
End: 2022-09-22

## 2022-09-23 ENCOUNTER — TELEPHONE (OUTPATIENT)
Dept: FAMILY MEDICINE | Facility: CLINIC | Age: 52
End: 2022-09-23

## 2022-09-23 NOTE — TELEPHONE ENCOUNTER
Nutrition Education Scheduling Outreach #1:    Call to patient to schedule. Left message with phone number to call to schedule.    Plan for 2nd outreach attempt within 1 week.    Dawn Mendez OnCall  Diabetes and Nutrition Scheduling

## 2022-09-25 ENCOUNTER — MYC MEDICAL ADVICE (OUTPATIENT)
Dept: RHEUMATOLOGY | Facility: CLINIC | Age: 52
End: 2022-09-25

## 2022-09-25 DIAGNOSIS — Z79.631 LONG TERM METHOTREXATE USER: Primary | ICD-10-CM

## 2022-09-27 PROBLEM — N83.201 CYST OF RIGHT OVARY: Status: ACTIVE | Noted: 2022-09-27

## 2022-09-27 PROBLEM — K76.0 HEPATIC STEATOSIS: Status: ACTIVE | Noted: 2022-09-27

## 2022-09-27 NOTE — TELEPHONE ENCOUNTER
Thank you for the questions.  Methotrexate does not cause fatty liver or weight gain or ovarian cysts.  However, presence of fatty liver can sometimes complicate interpretation of liver function test results when someone is taking methotrexate.  If [facility] neurology is impressed that methotrexate is beneficial, I recommend continuing the drug.    I will order new LFTs to monitor for methotrexate.

## 2022-09-28 ENCOUNTER — ANCILLARY PROCEDURE (OUTPATIENT)
Dept: ULTRASOUND IMAGING | Facility: CLINIC | Age: 52
End: 2022-09-28
Attending: INTERNAL MEDICINE
Payer: COMMERCIAL

## 2022-09-28 DIAGNOSIS — N83.201 CYST OF RIGHT OVARY: ICD-10-CM

## 2022-09-28 PROCEDURE — 76830 TRANSVAGINAL US NON-OB: CPT

## 2022-09-28 NOTE — PROGRESS NOTES
OUTPATIENT CLINICAL NUTRITION SERVICES ASSESSMENT    REASON FOR ASSESSMENT  Britt Park referred by Vi Cooney MD for MNT related to Heart Health (cholesterol, triglycerides), Hepatic steatosis [K76.0]     Patient accompanied by: N/A    ASSESSMENT   -PMH: hepatic steatosis, mitochondrial myopathy, secondary HTN, anemia, acute respiratory failure with hypoxia, facial paraesthesia, cyst of right ovary, impetigo, vitamin b12 deficiency, myositis, methemoglobinemia    What brought you in to the appointment? Have you ever been to see a RD before?  Pt reports never seeing a dietitian before.   Recently had a CT scan done of abdomen, hepatic steatosis - liver, fatty liver    Health-related issues: have a rare muscle condition - diagnosed about 1-1 1/2 years ago, doing injections of methotrexate. A high dose of prednisone previously - 50 mg then 40 then 30 mg to see if that would help to strengthen muscles. Didn't see much progress so started methotrexate, weaning off of prednisone. Noticed and told doctors that she has gained significant weight - was 150 lbs on 9/1, used to be 117 lbs. Feels she used to eat smaller portions naturally than now, was told appetite would increase. 15 mg of prednisone currently, will reduce to 10 mg for 2 months. Hopes to stop it soon. GI issues - did have heital hernia    Endoscopy: Fri morning 11/4 to evaluate GI issues    GI physician instructed her to follow a low FODMAP diet. Currently on week 4 of 6 weeks, recommended she see a RD. Hasn't seen any changes, maybe just slightly. Feels it as worsened the past 2 months. She is really bloated, is not able to button pants that usually fit her. Face has gained weight.    Completed a food diary for three days - no dairy, no gluten free. Did not track calories, just foods. Hard change so substitutes for food she used to eat.     What are you looking to learn from our session today or specific things you would like to work on  together?  Interested in fixing the fatty infiltrates that are in her liver (hepatic steatosis)  Haven't seen much improvement following low FODMAP diet. Wonders if she should continue.    Previously completed an education class - general - low fat and low cholesterol diet. Information broadly covered.     Past dietary changes/weight loss attempts?  Doesn't follow a specific diet previously, maintained lower weight and gained recently.     Nutritional Details:   -Food allergies: N/A  -Food sensitivities: N/A  -GI concerns: Bloating  -Appetite: N/A  -Pace of eating: N/A  -Role of cooking: lives with family, , N/A  -Role of food shopping: lives with family, , N/A    Any recent weight changes? Gained weight overall  UBW? 117 lbs  Desire to lose weight? If so, goal weight? Discussed losing 5-10 lbs to help with weight loss, did not discuss weight loss goals but pt appears uncomfortable at current weight    GI Symptoms: Started in February, past 2 months has gotten worse. Can't button pants, sit on edge of bed at night and see stomach bloated, feels defeated. Has cut out ice cream and Reeces, follows diet. No high fructose corn syrup, reduced calorie intake, feels her snacks seem healthier, work from home 3 days a week but doesn't snack as much as she used to.     Ground beef: 85%  Eggs: white from a carton  Yogurt: dairy free, silk almond milk brand  Oats: oatmeal    Diet Recall: pt will send me 3 day dietary recall via email of her low FODMAP food diary  Breakfast: oatmeal, sweet potato crackers, coffee with vanilla almond milk  Lunch: chicken pad tie: gluten free, salad with chicken, mixed greens  Dinner: baked cod fillet, typically has quesadillas with taco meat and cheese, corn tortilla shells  Snack: vanilla sandwich cookies, banana, yogurt, starbursts, iqra chips, skinny pop pop corn, kettle chips  Beverages: mostly water?  Dining out: Valeria, got a gluten free, dairy free salad    Clear liquid diet,  "jello and broth and white grape juice for endoscopy starting soon    Physical Activity: did not discuss  Days per week: N/A  Duration: N/A  Activity type: N/A  Limitations: N/A    NUTRITION FOCUSED PHYSICAL ASSESSMENT (NFPA) FOR DIAGNOSING MALNUTRITION  No: video visit         Observed:   No nutrition-related physical findings observed    Obtained from Chart/Interdisciplinary Team: none noted    LABS  Labs reviewed   Latest Reference Range & Units 06/27/22 08:24   Cholesterol <200 mg/dL 280 (H)   Patient Fasting > 8hrs?  Yes   HDL Cholesterol >=50 mg/dL 80   LDL Cholesterol Calculated <=100 mg/dL 155 (H)   Non HDL Cholesterol <130 mg/dL 200 (H)   Triglycerides <150 mg/dL 224 (H)   (H): Data is abnormally high    Bilirubin is slightly elevated    Latest Reference Range & Units 06/04/22 08:30   Bilirubin Total 0.2 - 1.3 mg/dL 1.5 (H)   (H): Data is abnormally high    MEDICATIONS  Medications reviewed    ANTHROPOMETRICS   Height: 5' 1.7\"   Weight: 138 lbs (62.6 kg)  BMI (kg/m2): 25.5  Weight Status:  Overweight BMI 25-29.9  IBW: 109 lbs  ADJ BW: 116 lbs  %IBW: 127  Weight History:  Wt Readings from Last 10 Encounters:   06/27/22 62.6 kg (138 lb)   06/03/22 61.5 kg (135 lb 8 oz)   06/01/22 61.2 kg (135 lb)   05/19/22 60.2 kg (132 lb 12.8 oz)   04/18/22 60.8 kg (134 lb)   01/20/22 59.6 kg (131 lb 4.8 oz)   12/01/21 57.6 kg (127 lb)   11/02/21 55.8 kg (123 lb)   10/19/21 54 kg (119 lb)   09/22/21 51.3 kg (113 lb)   Slowly been gaining weight since 2021, has been in the 130 lb range for the past year    Dosing weight: 52.7 kg - ADJ BW    ASSESSED NUTRITION NEEDS  Estimated Energy Needs: 1,054-1,318 kcals/day (20-25 Kcal/Kg)  Justification: (overweight)  [place] St. Jeor: 1,546 kcals per day to maintain current weight, ambulatory/mod active  Estimated Protein Needs: 42-53 grams protein/day (0.8-1 g pro/Kg)  Justification: (preservation of lean body mass)  Estimated Fluid Needs: 1,581 mL/day (30 mL/kg)    ASSESSED " MALNUTRITION STATUS  % Weight Loss: None noted  % Intake: Decreased intake does not meet criteria for malnutrition - any decrease would be intentional from nutrition changes desired  Subcutaneous Fat Loss: None observed  Loss of Muscle Mass: None observed  Fluid Retention: None noted    Malnutrition Diagnosis:  Patient does not meet two of the above criteria necessary for diagnosing malnutrition    DIAGNOSIS   Nutrition Diagnosis:  Food and nutrition-related knowledge deficit related to lack of previous formal education on FODMAP and heart healthy diet as evidenced by pt self report    INTERVENTIONS   Nutrition Prescription: general, healthy eating. Low fat, low FODMAP    IMPLEMENTATION   Assessed learning needs and learning preference: N/A  Teaching Method(s) used: Booklet / Handout  Explanation    Nutrition Education (Content):              a)  Discussed: general, healthy eating following a heart healthy diet plan and MyPlate guidance. Limiting overall fat. Consuming plenty of fruits, vegetables, lean meats, fish. Healthy oils or margarine instead of butter. Avoiding high FODMAP foods, choosing foods low FODMAP. Swapping out current choices for foods low in FODMAP, lower in fat.              b)  Provided the following handouts: Eat Right With MyPlate, Low-FODMAP Nutrition Therapy (2017), Heart-Healthy Nutrition Therapy, High Triglycerides Nutrition Therapy    Nutrition Education (Application):              a)  Discussed current eating plans / recommended alternative food choices              b)  Patient verbalizes understanding of diet by offering no further questions or concerns and creating goals tailored towards diet specifics.     Anticipate good compliance   Stage of Change: action  Additional: confusion surrounding diet plan and how low fat, heart healthy and low FODMAP go together    GOALS  1. Eat salmon twice per week, start purchasing lower fat ground beef  2. Add more fruits and vegetables to meals - to  one meal a day. Corn, tomatoes, peppers possibly, salad - lettuce with spinach, zucchini, carrots, cooked usually     FOLLOW UP/MONITORING   Progress towards goals will be monitored and evaluated per protocol and Practice Guidelines    Time Spent with Patient  Pt was slightly late so went over approx. 15 minutes.  70 minutes total approx. spent with pt    Ladonna Holloway RD, LD  Clinical Dietitian  Office: [phone]  Weekend pager: [phone]

## 2022-09-29 ENCOUNTER — HOSPITAL ENCOUNTER (OUTPATIENT)
Dept: NUTRITION | Facility: CLINIC | Age: 52
Discharge: HOME | End: 2022-09-29
Attending: INTERNAL MEDICINE | Admitting: INTERNAL MEDICINE
Payer: COMMERCIAL

## 2022-09-29 DIAGNOSIS — K76.0 HEPATIC STEATOSIS: ICD-10-CM

## 2022-09-29 PROCEDURE — 97802 MEDICAL NUTRITION INDIV IN: CPT | Mod: 95

## 2022-10-06 NOTE — TELEPHONE ENCOUNTER
PCP patient had VV 9/21/22, please advise on mychart.    Ranjan Elizabeth RN  [employer]       Pt aaox3, abd. Dsg intact, moderate bleeding. Poc reviewed, family at bedside, questions answered and understanding verbalized

## 2022-10-10 ENCOUNTER — LAB (OUTPATIENT)
Dept: LAB | Facility: CLINIC | Age: 52
End: 2022-10-10
Payer: COMMERCIAL

## 2022-10-10 DIAGNOSIS — E78.5 HYPERLIPIDEMIA LDL GOAL <100: ICD-10-CM

## 2022-10-10 DIAGNOSIS — Z79.631 LONG TERM METHOTREXATE USER: ICD-10-CM

## 2022-10-10 LAB
ALBUMIN SERPL-MCNC: 3.8 G/DL (ref 3.4–5)
ALT SERPL W P-5'-P-CCNC: 26 U/L (ref 0–50)
AST SERPL W P-5'-P-CCNC: 17 U/L (ref 0–45)
CHOLEST SERPL-MCNC: 176 MG/DL
CREAT SERPL-MCNC: 0.5 MG/DL (ref 0.52–1.04)
CRP SERPL-MCNC: <3 MG/L
ERYTHROCYTE [DISTWIDTH] IN BLOOD BY AUTOMATED COUNT: 15.2 % (ref 10–15)
FASTING STATUS PATIENT QL REPORTED: YES
GFR SERPL CREATININE-BSD FRML MDRD: >90 ML/MIN/1.73M2
HCT VFR BLD AUTO: 35.7 % (ref 35–47)
HDLC SERPL-MCNC: 69 MG/DL
HGB BLD-MCNC: 11.4 G/DL (ref 11.7–15.7)
LDLC SERPL CALC-MCNC: 66 MG/DL
MCH RBC QN AUTO: 33.8 PG (ref 26.5–33)
MCHC RBC AUTO-ENTMCNC: 31.9 G/DL (ref 31.5–36.5)
MCV RBC AUTO: 106 FL (ref 78–100)
NONHDLC SERPL-MCNC: 107 MG/DL
PLATELET # BLD AUTO: 341 10E3/UL (ref 150–450)
RBC # BLD AUTO: 3.37 10E6/UL (ref 3.8–5.2)
TRIGL SERPL-MCNC: 206 MG/DL
WBC # BLD AUTO: 5.8 10E3/UL (ref 4–11)

## 2022-10-10 PROCEDURE — 86140 C-REACTIVE PROTEIN: CPT

## 2022-10-10 PROCEDURE — 84450 TRANSFERASE (AST) (SGOT): CPT

## 2022-10-10 PROCEDURE — 82565 ASSAY OF CREATININE: CPT

## 2022-10-10 PROCEDURE — 84460 ALANINE AMINO (ALT) (SGPT): CPT

## 2022-10-10 PROCEDURE — 85027 COMPLETE CBC AUTOMATED: CPT

## 2022-10-10 PROCEDURE — 36415 COLL VENOUS BLD VENIPUNCTURE: CPT

## 2022-10-10 PROCEDURE — 82040 ASSAY OF SERUM ALBUMIN: CPT

## 2022-10-10 PROCEDURE — 80061 LIPID PANEL: CPT

## 2022-10-11 DIAGNOSIS — E78.5 HYPERLIPIDEMIA LDL GOAL <100: ICD-10-CM

## 2022-10-14 RX ORDER — ATORVASTATIN CALCIUM 10 MG/1
TABLET, FILM COATED ORAL
Qty: 90 TABLET | Refills: 3 | Status: SHIPPED | OUTPATIENT
Start: 2022-10-14 | End: 2023-08-03

## 2022-11-01 ENCOUNTER — MYC MEDICAL ADVICE (OUTPATIENT)
Dept: FAMILY MEDICINE | Facility: CLINIC | Age: 52
End: 2022-11-01

## 2022-11-01 DIAGNOSIS — R68.82 DECREASED LIBIDO: Primary | ICD-10-CM

## 2022-11-02 NOTE — PROGRESS NOTES
"Rheumatology Clinic Visit  [facility]  Husam Barnett M.D.     Britt Park MRN# [id number]   YOB: 1970 Age: 52 year old     Date of Visit: 11/03/2022  Primary care provider: Vi Metz          Assessment and Plan:     # \"Granulomatous myositis\" (+anti-Mi2, dx 2021, started prednisone 2021, methotrexate 4-22)\": myopathy symptoms stable. Exam shows normal oral mucosa, 15# wt gain since 4-22.  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    Laboratory evaluation on October 10, 2022 showed creatinine, albumin, transaminases, CRP all normal; CBC showed hemoglobin of 11.4, improved from 9.3 noted in September 2022.  CT of the abdomen and pelvis on September 9 showed 5 cm right ovarian mass and mild hepatic steatosis.  In August 2021, antimitochondrial M2 antibodies were positive; myomarker panel, SARAH, and extractable nuclear antigen panel were negative.    MRI of the brain on April 15, 2022 showed no evidence of vascular compression or small vessel ischemic changes.  No imaging because of left facial paresthesia MRI of the thigh done on April 13, 2022 showed stable to minimally improved diffuse muscular edema compatible with inflammatory myositis, minimal change compared to December 2021.    Steroid-sparing therapy with methotrexate is well-tolerated. Agree with Neurology plan to continue use for steroid-sparing effect. I recommended that patient follow-up with [facility], as recommended, for all diagnostic assessments designed to  medication effectiveness.  Rheumatologgy service will focus on medication dosing and monitoring, and on implementation of recommended adjustment to complex immunomodulatory therapy.    # Weight gain, vision changes: protracted prednisone 20 mg daily, certainly could be contributing. I urged reassessment in Ophthalmology,      We discussed:    Dx:  1. Granulomatous myositis, symptomatically " improved:  Methotrexate is well tolerated, and may be providing steroid-sparing effect. Dr. Albarran recommends continuing the medication; I agree.  2. 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 Anemia: stable/improved since 2-2022.    Plan:  1.  Continue subcutaneous methotrexate by injection: Inject 0.8 mL (20 mg) weekly.  While on methotrexate:   -- Check labs every 3 months (AST/ALT, Albumin, CBC with platelets). First labs about 1 month after starting methotrexate  -- Limit alcohol intake to 2 drinks weekly; use folate 1 mg daily.  --Tylenol 500-1000 mg can be used as needed up to three times daily for nausea/headache associated with dosing.    2. Dosing recommendations of methotrexate, prednisone, dapsone to be managed by Dr. Albarran, [facility]. Rheumatology role is monitoring of long-term medication use.    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     Priority: Medium     Acute respiratory failure with hypoxia (H) 06/02/2022     Priority: Medium     Other myositis, unspecified site 06/02/2022     Priority: Medium     Anemia, unspecified type 04/18/2022     Priority: Medium     Visit for screening mammogram 04/18/2022     Priority: Medium     Seasonal allergic rhinitis, unspecified trigger 04/18/2022     Priority: Medium     Need for pneumocystis prophylaxis 01/04/2022     Priority: Medium     Impetigo 11/02/2021     Priority: Medium     Secondary hypertension 11/02/2021  "    Priority: Medium     On prednisone therapy 11/02/2021     Priority: Medium     Vitamin B 12 deficiency 11/02/2021     Priority: Medium     Myopathy, mitochondrial 09/22/2021     Priority: Medium     Overactive bladder 09/22/2021     Priority: Medium     Dry eyes 09/22/2021     Priority: Medium     Encounter for monitoring of systemic steroid therapy 09/22/2021     Priority: Medium     Need for vaccination 09/22/2021     Priority: Medium            History of Present Illness:   Britt Park follows up for medication monitoring.  She was last seen in May 2022, when she presented for followup with goal of prescribing and monitoring immunomodulatory therapy associated with a diagnosis of granulomatous myositis established by neurology at [facility].  At that time, recommendation was made to continue methotrexate injection subcutaneous 0.8 mils weekly.    Interval history November 3, 2022    Today, she notes overall improved muscle weakness. Getting out of chair is easier, moving shoulders for dressing/lifting activities is not.  She is tolerating subcutaneous methotrexate without difficulty; she thinks it is helping her.  She is tapering prednisone per [place] suggestions: she moved from 15 mg daily to 10 mg as of 11-1.  She noted recurrence of neck pain after lowering prednisone dose several days. Pain is only on the left, 8/10, constant. Pain formerly worked up in 2019. S/p cervical \"injections\"    She is doing weekly resistance training for the legs.  She is concerned about CT findings of fatty liver. She does note \"digestion\" issues; she relates to weight gain.    Patient contacted Dr. Albarran in early June 2022 with reports of low oxygen saturation and respiratory symptoms, subacute to chronic.  Recommendation was to come to the emergency room or see internal medicine if home oxygen saturations continue.  Macrocytic anemia was considered possible effect of methotrexate; patient was recommended to stop " "dapsone and continue methotrexate and prednisone. She has not restarted dapsone.    Patient was seen by primary care on September 21, 2022.  Impression was of newly recognized right ovarian cyst and hepatic steatosis.  Consideration of alternative to methotrexate for treatment of myopathy was deferred to [facility].  Pelvic ultrasound was ordered.    Interval history May 16, 2022    Patient was last seen by Dr. Albarran in [facility] on April 13, 2022.  Impression was of granulomatous myositis with positive mitochondrial antibodies; mild improvement in hip girdle muscle weakness was noted.  Recent onset left lower facial numbness noted.  Recommendation was to decrease prednisone to 25 mg daily, and to taper by 5 mg every 2 months.  Continue methotrexate 20 mg weekly.  Brain MRI and muscle MRI were ordered.    She notes abdominal bloating and distension.  Occult blood was found in stool; colonscopy found only polyps. She has been referred to [facility]; she will have EGD.  She notes chronic tingling/numbness on L side of jaw.  She notes occasional color changes in R 3rd fingertip with numbness, lasting one hour.    She reports tolerating methotrexate well, now taking for 19 weeks. Still doing weekly subcutaneous injections, 0/8 mg. Not noting any adverse effects.    She is reducing prednisone, now on 25 mg daily, after 10 months.  Dapsone daily continues.    Initial hx 1-2022    Patient has been following with Dr. Albarran in neurology at [facility] since mid 2021.  I excerpt historical information included in Dr. Albarran's note from December 13, 2021    \" ...Last seen was on September 16, 2021. At that time, we establish the diagnosis of granulomatous myositis and recommended treatment with prednisone. The patient started treatment toward the end of September. She was originally of 50 mg of prednisone for a month, then 40 mg for a month, then last week she decrease the dose to 30 mg per day. She was also started on " "omeprazole and vitamin-D/calcium. She was not started on Bactrim.  The patient noticed immediate improvement in her neck pain. Her muscle falling and stiffness resolved so she stopped naltrexone. Her range of motion has not changed much. She still unable to fully abduct her shoulders. She still struggles a standing up from a low seat. She still has difficulty with chairs. But she felt overall her performance has improved. One time she was able to get into her 's truck without any help which is unusual for her. She also used to have intermittent tingling and numbness in her left lower face, along her jaw line, which has resolved on prednisone. Once she cut back down on prednisone to 30 mg, she has been feeling as if her muscle stiffness is coming back, she is again having some intermittent face numbness and tingling, and she feels some discomfort and instability in her right ankle. Overall, she feels her strength itself has not changed after cutting down on prednisone.\"    New test results  NCS/EMG: There continues to be an electrodiagnostic evidence of a proximal upper limb predominant myopathy with electrophysiologic correlates of necrotic fibers, fiber splitting or vacuolization of muscle fibers. Compared to the prior study in July 2021, there is an interval improvement of the myopathic process based on: 1. less fibrillation potentials and milder degree of motor unit potential changes observed in proximal upper limb muscles and 2. absence of myopathic motor unit potentials in tensor fascia latae.     Muscle MRI: no significant change in the diffuse inflammatory intramuscular edema throughout the bilateral pelvis and thigh musculature since 07/08/2020 suggestive of myositis. There has been mild interval progression of areas of moderate and advanced volume loss about both thighs. Areas of advanced atrophy include the bilateral vastus lateralis, bilateral adductor Rogelio and the right upper hamstring " musculature....    ....ASSESSMENT / PLAN   #1 Granulomatous myositis, mildly positive mitochondrial antibodies  After three months of oral prednisone treatment, Mrs. Park has experienced some improvement in muscle pain and overall function with relatively unchanged strength. Neurological examination is relatively unchanged. There is some improvement in the electrodiagnostic findings but as they were mostly confined to the tensor fascia corry, they could also be due to sampling error. Muscle MRI compared to the one from 2020 showed relatively unchanged T2 hyperintensity with more advanced muscle atrophy.  Given the lack of clear improvement in 's strength, it is possible that her myositis is indeed responsive to prednisone but we just need to give it more time, or that is how far we going to get with prednisone. It is also possible that her underlying myositis is not responsive to immunotherapy as can be seen in inclusion body myositis (IBM) or sometimes sarcoidosis, however, we cannot determine that at this time especially that the muscle biopsy showed no features of IBM.  The patient was seen by Dr. Martinez from GI regarding her positive AMA. I appreciate his help. There is no evidence of liver disease at this time. The plan is to follow up in a year.  After discussing several options (including waiting to see if we get more improvement on prednisone or add methotrexate), we decided the followin. Start IVIG 2g/kg over 5 days on week 1, then start 0.4 g/kg once a week on week 2. The patient should remain on this weekly dose for at least 3 months.   2. Continue prednisone 30 mg per day for now. Once IVIG is started, a month later, the patient will let me know how she is doing at that time. If she is doing ok, prednisone dose can be decreased to 25 mg per day for a month, then 20 mg per day and stay on that dose until follow up.   3. Please start Trimethoprim/sulfamethoxazole (Bactrim) DS  "one tablet three times per week for Pneumocystis carinii prophylaxis.  For patients who are allergic to sulfa, alternatives include dapsone 50 mg PO daily plus pyrimethamine 50 mg PO weekly plus leucovorin 25 mg PO weekly, or aerosolized pentamidine 300 mg every four weeks. The patient should remain in that until she is on less than 20 mg of prednisone per day.   4. We will add TDP43 and p62 immunostains on her muscle biopsy.\"      Today 1-, she confirms muscle weakness going on since 2019. +difficulty with drying hair, grooming, going upstairs. She was eventually diagnosed vith myositis (although Noran-rec'd muscle biopsy did not show characteristic findings) by [facility] in July 2021. L tricweps biopsy showed \"granulomatous myositis\". She was treated with prednisone 50 mg starting in . She has noted improvement in chronic neck pain, and has noted slight improvement in weakness since then. However, she still has difficculty getting up from chairs and from the floor.She has tapered prednisone to 30 mg daily.     F/u MRI and EMG at [facility] in  showed \"little improvement\" by report. Dr. Albarran recommended either methotrexate or IVIg as a steroid-sparing agent.     She reports that she is lenaning toward use methotrexate due to its lower expense and tolerability, compared with IVIg.  She has questions about safety of methotrexate, and of IVIG.  She has acquired liquid methotrexate, but has not started the drug yet.    She reports having sleep apnea, and has frequent nasal skin irritation due to CPAP. She has been given bacterial cream for \"Impetigo\", and skin is improved, but she is concerned about having a \"cold\" right now.           Review of Systems:     See HPI  Constitutional: negative  Skin: negative  Eyes: negative  Ears/Nose/Throat: negative  Respiratory: No shortness of breath, dyspnea on exertion, cough, or hemoptysis  Cardiovascular: negative  Gastrointestinal: negative  Genitourinary: " negative  Musculoskeletal: negative  Neurologic: negative  Psychiatric: negative  Hematologic/Lymphatic/Immunologic: negative  Endocrine: negative          Past Medical History:     Past Medical History:   Diagnosis Date     Secondary hypertension 2021     Past Surgical History:   Procedure Laterality Date     ABDOMEN SURGERY  2007         BIOPSY  2019 & 2021    Right bicep, result abnormal results, & left tricep biopsy     COLONOSCOPY  21     COLONOSCOPY N/A 2022    Procedure: COLONOSCOPY, FLEXIBLE, WITH LESION REMOVAL USING SNARE;  Surgeon: Dorie Santos MD;  Location:  GI     GYN SURGERY  07     for twins     # Positive AMA antibodies : The patient was seen by Dr. Martinez from [facility] . There is no evidence of liver disease; annual f/u in GI planned.       Social History:     Social History     Occupational History     Not on file   Tobacco Use     Smoking status: Never     Smokeless tobacco: Never   Substance and Sexual Activity     Alcohol use: Not Currently     Drug use: Never     Sexual activity: Yes     Partners: Male     Birth control/protection: Male Surgical      she started a new job in [employer] at the [employer] in 2021       Family History:     Family History   Problem Relation Age of Onset     Unknown/Adopted Other             Allergies:     Allergies   Allergen Reactions     Influenza Vaccines Hives     Diphenhydramine Other (See Comments)     SHAKY       Sulfa Drugs Swelling            Medications:     Current Outpatient Medications   Medication Sig Dispense Refill     atorvastatin (LIPITOR) 10 MG tablet TAKE 1 TABLET(10 MG) BY MOUTH DAILY 90 tablet 3     estradiol (ESTRACE) 0.1 MG/GM vaginal cream Place 2 g vaginally twice a week 42.5 g 1     folic acid (FOLVITE) 1 MG tablet Take 1 tablet (1 mg) by mouth daily 90 tablet 3     gabapentin (NEURONTIN) 300 MG capsule Take 1 capsule (300 mg) by mouth as needed (neck numbness  "and tingling) 60 capsule 1     losartan (COZAAR) 25 MG tablet Take 1 tablet (25 mg) by mouth daily 90 tablet 2     methotrexate 50 MG/2ML injection Inject 0.8 mLs (20 mg) Subcutaneous every 7 days (Patient taking differently: Inject 20 mg Subcutaneous every 7 days On Wednesdays) 4 mL 6     naltrexone (DEPADE/REVIA) 50 MG tablet Take 1 tablet (50 mg) by mouth daily (Patient taking differently: Take 50 mg by mouth daily Compounded to 4.5mg) 60 tablet 3     naproxen (NAPROSYN) 500 MG tablet TAKE 1 TABLET BY MOUTH TWICE DAILY WITH MEALS AS NEEDED       pantoprazole (PROTONIX) 40 MG EC tablet Take 40 mg by mouth daily       predniSONE (DELTASONE) 5 MG tablet Take 4 tablets (20 mg) by mouth daily for 90 days (Patient taking differently: Take 20 mg by mouth daily Patient currently taking 10 mg as of Nov 1, 2022) 360 tablet 0     valACYclovir (VALTREX) 1000 mg tablet Take 2,000 mg by mouth as needed       mometasone (NASONEX) 50 MCG/ACT nasal spray 2 sprays daily (Patient not taking: Reported on 11/3/2022)       mupirocin (BACTROBAN) 2 % external ointment Apply topically 3 times daily (Patient not taking: Reported on 11/3/2022) 15 g 0     pentamidine (NEBUPENT) 300 MG neb solution Inhale 300 mg into the lungs every 28 days (Patient not taking: Reported on 11/3/2022)              Physical Exam:   Blood pressure 117/59, pulse 77, height 1.567 m (5' 1.7\"), weight 66.7 kg (147 lb), SpO2 100 %, not currently breastfeeding.  Wt Readings from Last 6 Encounters:   11/03/22 66.7 kg (147 lb)   06/27/22 62.6 kg (138 lb)   06/03/22 61.5 kg (135 lb 8 oz)   06/01/22 61.2 kg (135 lb)   05/19/22 60.2 kg (132 lb 12.8 oz)   04/18/22 60.8 kg (134 lb)       Constitutional: well-developed, appearing stated age; cooperative  Face: rounded facies.  Heent: no oral ulcers  Psych: nl judgement, orientation, memory, affect.  Neck ROM normal, dose not elicit pain.  Neuro: DTRs symmetrcal at biceps  Chest: normal respiration.         Data:     @  RHEUM " RESULTS Latest Ref Rng & Units 12/12/2019 2/6/2020 6/25/2020   ALBUMIN 3.4 - 5.0 g/dL - - -   ALT 0 - 50 U/L 17 - -   AST 0 - 45 U/L 26 - -   CK TOTAL 30 - 225 U/L - - -   CREATININE 0.52 - 1.04 mg/dL - 0.40(L) -   CRP <5.00 mg/L - - -   GFR ESTIMATE, IF BLACK >60 ml/min/1.73m2 - >60 -   GFR ESTIMATE >60 mL/min/1.73m2 - >60 -   HEMATOCRIT 35.0 - 47.0 % - - 40.9   HEMOGLOBIN 11.7 - 15.7 g/dL - - 13.2   HCVAB Nonreactive - - -   WBC 4.0 - 11.0 10e3/uL - - 3.7(L)   RBC 3.80 - 5.20 10e6/uL - - 4.14   RDW 10.0 - 15.0 % - - 13.0   MCHC 31.5 - 36.5 g/dL - - 32.3   MCV 78 - 100 fL - - 99   PLATELET COUNT 150 - 450 10e3/uL - - 278   ESR 0 - 30 mm/hr - - -

## 2022-11-03 ENCOUNTER — OFFICE VISIT (OUTPATIENT)
Dept: RHEUMATOLOGY | Facility: CLINIC | Age: 52
End: 2022-11-03
Payer: COMMERCIAL

## 2022-11-03 VITALS
HEIGHT: 62 IN | DIASTOLIC BLOOD PRESSURE: 59 MMHG | BODY MASS INDEX: 27.05 KG/M2 | WEIGHT: 147 LBS | SYSTOLIC BLOOD PRESSURE: 117 MMHG | HEART RATE: 77 BPM | OXYGEN SATURATION: 100 %

## 2022-11-03 DIAGNOSIS — Z79.631 LONG TERM METHOTREXATE USER: ICD-10-CM

## 2022-11-03 DIAGNOSIS — G71.3 MYOPATHY, MITOCHONDRIAL: ICD-10-CM

## 2022-11-03 PROCEDURE — 99214 OFFICE O/P EST MOD 30 MIN: CPT | Performed by: INTERNAL MEDICINE

## 2022-11-03 RX ORDER — METHOTREXATE 25 MG/ML
20 INJECTION, SOLUTION INTRA-ARTERIAL; INTRAMUSCULAR; INTRAVENOUS
Qty: 4 ML | Refills: 6 | Status: SHIPPED | OUTPATIENT
Start: 2022-11-03 | End: 2023-08-03

## 2022-11-03 RX ORDER — FOLIC ACID 1 MG/1
1 TABLET ORAL DAILY
Qty: 90 TABLET | Refills: 3 | Status: SHIPPED | OUTPATIENT
Start: 2022-11-03 | End: 2023-12-18

## 2022-11-03 ASSESSMENT — PAIN SCALES - GENERAL: PAINLEVEL: MODERATE PAIN (4)

## 2022-11-03 NOTE — PATIENT INSTRUCTIONS
Dx:  1. Granulomatous myositis, incompletely responsive to prednisone:  Methotrexate is well tolerated, and may be providing steroid-sparing effect. Dr. Albarran recommends continuing the medication; I agree.  2. L neck pain: likely neck arthritis (2019 MRI) caused; plan return to Pain service versus Ortho Spine service  3. Anemia: stable/improved since 2-2022.    Plan:  1.  Continue subcutaneous methotrexate by injection: Inject 0.8 mL (20 mg) weekly.  While on methotrexate:   -- Check labs every 3 months (AST/ALT, Albumin, CBC with platelets). First labs about 1 month after starting methotrexate  -- Limit alcohol intake to 2 drinks weekly; use folate 1 mg daily.  --Tylenol 500-1000 mg can be used as needed up to three times daily for nausea/headache associated with dosing.    2. Dosing recommendations of methotrexate, prednisone, dapsone to be managed by Dr. Albarran.

## 2022-11-03 NOTE — NURSING NOTE
"Chief Complaint   Patient presents with     RECHECK     Long term methotrexate user +1 more       Vitals:    11/03/22 0727   BP: 117/59   BP Location: Left arm   Patient Position: Sitting   Cuff Size: Adult Regular   Pulse: 77   SpO2: 100%   Weight: 66.7 kg (147 lb)   Height: 1.567 m (5' 1.7\")       Body mass index is 27.15 kg/m .    Gregoria Farmer, AMINTAF    "

## 2022-11-03 NOTE — TELEPHONE ENCOUNTER
I generally do not recommend these types of supplements as they're not regulated by the FDA and there can be a lot of issues with purity and drug interactions.  Will defer to Dr. Metz as she knows the patient/previous conversations.    Cornelia FontenotD, [facility]  Medication Therapy Management Provider  Pager: [phone]

## 2022-11-04 NOTE — TELEPHONE ENCOUNTER
PCP- please see mychart:    Please advise - would appointment be appropriate to further discuss?    Please respond back to patient or send to triage to follow up. If patient needs to be scheduled, please route to team coordinators.    Leanna Calles RN  [facility]

## 2022-12-10 ENCOUNTER — APPOINTMENT (OUTPATIENT)
Dept: GENERAL RADIOLOGY | Facility: CLINIC | Age: 52
DRG: 481 | End: 2022-12-10
Attending: EMERGENCY MEDICINE
Payer: COMMERCIAL

## 2022-12-10 ENCOUNTER — MEDICAL CORRESPONDENCE (OUTPATIENT)
Dept: MEDSURG UNIT | Facility: CLINIC | Age: 52
End: 2022-12-10

## 2022-12-10 ENCOUNTER — HOSPITAL ENCOUNTER (INPATIENT)
Facility: CLINIC | Age: 52
LOS: 7 days | Discharge: NURSING, REHAB OR LONG-TERM CARE | DRG: 481 | End: 2022-12-17
Attending: EMERGENCY MEDICINE | Admitting: INTERNAL MEDICINE
Payer: COMMERCIAL

## 2022-12-10 DIAGNOSIS — S72.91XA CLOSED FRACTURE OF RIGHT FEMUR, UNSPECIFIED FRACTURE MORPHOLOGY, UNSPECIFIED PORTION OF FEMUR, INITIAL ENCOUNTER (H): ICD-10-CM

## 2022-12-10 DIAGNOSIS — N89.8 VAGINAL DRYNESS: ICD-10-CM

## 2022-12-10 LAB
ABO/RH(D): NORMAL
ANION GAP SERPL CALCULATED.3IONS-SCNC: 4 MMOL/L (ref 3–14)
ANTIBODY SCREEN: NEGATIVE
BASOPHILS # BLD AUTO: 0.1 10E3/UL (ref 0–0.2)
BASOPHILS NFR BLD AUTO: 1 %
BUN SERPL-MCNC: 19 MG/DL (ref 7–30)
CALCIUM SERPL-MCNC: 8.7 MG/DL (ref 8.5–10.1)
CHLORIDE BLD-SCNC: 104 MMOL/L (ref 94–109)
CO2 SERPL-SCNC: 30 MMOL/L (ref 20–32)
CREAT SERPL-MCNC: 0.59 MG/DL (ref 0.52–1.04)
EOSINOPHIL # BLD AUTO: 0 10E3/UL (ref 0–0.7)
EOSINOPHIL NFR BLD AUTO: 0 %
ERYTHROCYTE [DISTWIDTH] IN BLOOD BY AUTOMATED COUNT: 14.1 % (ref 10–15)
GFR SERPL CREATININE-BSD FRML MDRD: >90 ML/MIN/1.73M2
GLUCOSE BLD-MCNC: 89 MG/DL (ref 70–99)
HCT VFR BLD AUTO: 37.3 % (ref 35–47)
HGB BLD-MCNC: 12 G/DL (ref 11.7–15.7)
IMM GRANULOCYTES # BLD: 0 10E3/UL
IMM GRANULOCYTES NFR BLD: 0 %
INR PPP: 0.97 (ref 0.85–1.15)
LYMPHOCYTES # BLD AUTO: 3.5 10E3/UL (ref 0.8–5.3)
LYMPHOCYTES NFR BLD AUTO: 47 %
MCH RBC QN AUTO: 34.3 PG (ref 26.5–33)
MCHC RBC AUTO-ENTMCNC: 32.2 G/DL (ref 31.5–36.5)
MCV RBC AUTO: 107 FL (ref 78–100)
MONOCYTES # BLD AUTO: 0.6 10E3/UL (ref 0–1.3)
MONOCYTES NFR BLD AUTO: 9 %
NEUTROPHILS # BLD AUTO: 3.2 10E3/UL (ref 1.6–8.3)
NEUTROPHILS NFR BLD AUTO: 43 %
NRBC # BLD AUTO: 0 10E3/UL
NRBC BLD AUTO-RTO: 0 /100
PLATELET # BLD AUTO: 314 10E3/UL (ref 150–450)
POTASSIUM BLD-SCNC: 3.6 MMOL/L (ref 3.4–5.3)
RBC # BLD AUTO: 3.5 10E6/UL (ref 3.8–5.2)
SARS-COV-2 RNA RESP QL NAA+PROBE: NEGATIVE
SODIUM SERPL-SCNC: 138 MMOL/L (ref 133–144)
SPECIMEN EXPIRATION DATE: NORMAL
WBC # BLD AUTO: 7.4 10E3/UL (ref 4–11)

## 2022-12-10 PROCEDURE — 96376 TX/PRO/DX INJ SAME DRUG ADON: CPT

## 2022-12-10 PROCEDURE — C9803 HOPD COVID-19 SPEC COLLECT: HCPCS

## 2022-12-10 PROCEDURE — 82248 BILIRUBIN DIRECT: CPT | Performed by: INTERNAL MEDICINE

## 2022-12-10 PROCEDURE — 250N000011 HC RX IP 250 OP 636: Performed by: INTERNAL MEDICINE

## 2022-12-10 PROCEDURE — 99285 EMERGENCY DEPT VISIT HI MDM: CPT | Mod: 25

## 2022-12-10 PROCEDURE — 80053 COMPREHEN METABOLIC PANEL: CPT | Performed by: EMERGENCY MEDICINE

## 2022-12-10 PROCEDURE — 36415 COLL VENOUS BLD VENIPUNCTURE: CPT | Performed by: EMERGENCY MEDICINE

## 2022-12-10 PROCEDURE — 85610 PROTHROMBIN TIME: CPT | Performed by: EMERGENCY MEDICINE

## 2022-12-10 PROCEDURE — 73560 X-RAY EXAM OF KNEE 1 OR 2: CPT | Mod: RT

## 2022-12-10 PROCEDURE — 29505 APPLICATION LONG LEG SPLINT: CPT | Mod: RT

## 2022-12-10 PROCEDURE — U0005 INFEC AGEN DETEC AMPLI PROBE: HCPCS | Performed by: EMERGENCY MEDICINE

## 2022-12-10 PROCEDURE — 120N000001 HC R&B MED SURG/OB

## 2022-12-10 PROCEDURE — 73552 X-RAY EXAM OF FEMUR 2/>: CPT | Mod: RT

## 2022-12-10 PROCEDURE — 96374 THER/PROPH/DIAG INJ IV PUSH: CPT

## 2022-12-10 PROCEDURE — 85025 COMPLETE CBC W/AUTO DIFF WBC: CPT | Performed by: EMERGENCY MEDICINE

## 2022-12-10 PROCEDURE — 86850 RBC ANTIBODY SCREEN: CPT | Performed by: EMERGENCY MEDICINE

## 2022-12-10 PROCEDURE — 250N000011 HC RX IP 250 OP 636: Performed by: EMERGENCY MEDICINE

## 2022-12-10 PROCEDURE — 86901 BLOOD TYPING SEROLOGIC RH(D): CPT | Performed by: EMERGENCY MEDICINE

## 2022-12-10 PROCEDURE — 99222 1ST HOSP IP/OBS MODERATE 55: CPT | Mod: AI | Performed by: INTERNAL MEDICINE

## 2022-12-10 RX ORDER — PANTOPRAZOLE SODIUM 40 MG/1
40 TABLET, DELAYED RELEASE ORAL DAILY
COMMUNITY
End: 2023-09-19

## 2022-12-10 RX ORDER — FOLIC ACID 1 MG/1
1 TABLET ORAL DAILY
COMMUNITY
End: 2023-01-04

## 2022-12-10 RX ORDER — HYDROMORPHONE HYDROCHLORIDE 1 MG/ML
.5-1 INJECTION, SOLUTION INTRAMUSCULAR; INTRAVENOUS; SUBCUTANEOUS
Status: DISCONTINUED | OUTPATIENT
Start: 2022-12-10 | End: 2022-12-10

## 2022-12-10 RX ORDER — ACETAMINOPHEN 500 MG
1000 TABLET ORAL EVERY 8 HOURS PRN
Status: DISCONTINUED | OUTPATIENT
Start: 2022-12-10 | End: 2022-12-11 | Stop reason: DRUGHIGH

## 2022-12-10 RX ORDER — MORPHINE SULFATE 4 MG/ML
4-8 INJECTION, SOLUTION INTRAMUSCULAR; INTRAVENOUS
Status: DISCONTINUED | OUTPATIENT
Start: 2022-12-10 | End: 2022-12-11 | Stop reason: ALTCHOICE

## 2022-12-10 RX ORDER — ATORVASTATIN CALCIUM 10 MG/1
10 TABLET, FILM COATED ORAL DAILY
COMMUNITY
End: 2023-01-04

## 2022-12-10 RX ORDER — PREDNISONE 5 MG/1
10 TABLET ORAL DAILY
Status: DISCONTINUED | OUTPATIENT
Start: 2022-12-12 | End: 2022-12-11

## 2022-12-10 RX ORDER — ONDANSETRON 2 MG/ML
4 INJECTION INTRAMUSCULAR; INTRAVENOUS ONCE
Status: COMPLETED | OUTPATIENT
Start: 2022-12-10 | End: 2022-12-10

## 2022-12-10 RX ORDER — MORPHINE SULFATE 4 MG/ML
4 INJECTION, SOLUTION INTRAMUSCULAR; INTRAVENOUS
Status: DISCONTINUED | OUTPATIENT
Start: 2022-12-10 | End: 2022-12-11

## 2022-12-10 RX ORDER — PREDNISONE 5 MG/1
10 TABLET ORAL DAILY
COMMUNITY
End: 2023-08-03

## 2022-12-10 RX ORDER — SODIUM CHLORIDE 9 MG/ML
INJECTION, SOLUTION INTRAVENOUS CONTINUOUS
Status: DISCONTINUED | OUTPATIENT
Start: 2022-12-10 | End: 2022-12-11

## 2022-12-10 RX ORDER — LOSARTAN POTASSIUM 25 MG/1
25 TABLET ORAL DAILY
Status: ON HOLD | COMMUNITY
End: 2022-12-30

## 2022-12-10 RX ORDER — ESTRADIOL 0.1 MG/G
2 CREAM VAGINAL
COMMUNITY
End: 2023-01-04

## 2022-12-10 RX ADMIN — MORPHINE SULFATE 8 MG: 4 INJECTION, SOLUTION INTRAMUSCULAR; INTRAVENOUS at 23:26

## 2022-12-10 RX ADMIN — ONDANSETRON 4 MG: 2 INJECTION INTRAMUSCULAR; INTRAVENOUS at 22:59

## 2022-12-10 RX ADMIN — MORPHINE SULFATE 4 MG: 4 INJECTION, SOLUTION INTRAMUSCULAR; INTRAVENOUS at 22:14

## 2022-12-10 RX ADMIN — HYDROMORPHONE HYDROCHLORIDE 1 MG: 1 INJECTION, SOLUTION INTRAMUSCULAR; INTRAVENOUS; SUBCUTANEOUS at 21:04

## 2022-12-10 RX ADMIN — HYDROMORPHONE HYDROCHLORIDE 1 MG: 1 INJECTION, SOLUTION INTRAMUSCULAR; INTRAVENOUS; SUBCUTANEOUS at 21:59

## 2022-12-10 RX ADMIN — HYDROMORPHONE HYDROCHLORIDE 1 MG: 1 INJECTION, SOLUTION INTRAMUSCULAR; INTRAVENOUS; SUBCUTANEOUS at 21:21

## 2022-12-10 ASSESSMENT — ACTIVITIES OF DAILY LIVING (ADL)
ADLS_ACUITY_SCORE: 35
ADLS_ACUITY_SCORE: 35

## 2022-12-11 ENCOUNTER — MYC MEDICAL ADVICE (OUTPATIENT)
Dept: FAMILY MEDICINE | Facility: CLINIC | Age: 52
End: 2022-12-11

## 2022-12-11 ENCOUNTER — TRANSFERRED RECORDS (OUTPATIENT)
Dept: HEALTH INFORMATION MANAGEMENT | Facility: CLINIC | Age: 52
End: 2022-12-11

## 2022-12-11 ENCOUNTER — APPOINTMENT (OUTPATIENT)
Dept: GENERAL RADIOLOGY | Facility: CLINIC | Age: 52
DRG: 481 | End: 2022-12-11
Attending: ORTHOPAEDIC SURGERY
Payer: COMMERCIAL

## 2022-12-11 ENCOUNTER — ANESTHESIA (OUTPATIENT)
Dept: SURGERY | Facility: CLINIC | Age: 52
DRG: 481 | End: 2022-12-11
Payer: COMMERCIAL

## 2022-12-11 ENCOUNTER — ANESTHESIA EVENT (OUTPATIENT)
Dept: SURGERY | Facility: CLINIC | Age: 52
DRG: 481 | End: 2022-12-11
Payer: COMMERCIAL

## 2022-12-11 ENCOUNTER — APPOINTMENT (OUTPATIENT)
Dept: CT IMAGING | Facility: CLINIC | Age: 52
DRG: 481 | End: 2022-12-11
Attending: ORTHOPAEDIC SURGERY
Payer: COMMERCIAL

## 2022-12-11 DIAGNOSIS — M80.859D: Primary | ICD-10-CM

## 2022-12-11 LAB
ALBUMIN SERPL-MCNC: 3.7 G/DL (ref 3.4–5)
ALP SERPL-CCNC: 45 U/L (ref 40–150)
ALT SERPL W P-5'-P-CCNC: 25 U/L (ref 0–50)
AST SERPL W P-5'-P-CCNC: 20 U/L (ref 0–45)
BILIRUB DIRECT SERPL-MCNC: <0.1 MG/DL (ref 0–0.2)
BILIRUB SERPL-MCNC: 0.3 MG/DL (ref 0.2–1.3)
PROT SERPL-MCNC: 6.6 G/DL (ref 6.8–8.8)

## 2022-12-11 PROCEDURE — 250N000013 HC RX MED GY IP 250 OP 250 PS 637: Performed by: PHYSICIAN ASSISTANT

## 2022-12-11 PROCEDURE — 258N000003 HC RX IP 258 OP 636: Performed by: PHYSICIAN ASSISTANT

## 2022-12-11 PROCEDURE — 360N000084 HC SURGERY LEVEL 4 W/ FLUORO, PER MIN: Performed by: ORTHOPAEDIC SURGERY

## 2022-12-11 PROCEDURE — 93005 ELECTROCARDIOGRAM TRACING: CPT

## 2022-12-11 PROCEDURE — 710N000009 HC RECOVERY PHASE 1, LEVEL 1, PER MIN: Performed by: ORTHOPAEDIC SURGERY

## 2022-12-11 PROCEDURE — 73700 CT LOWER EXTREMITY W/O DYE: CPT | Mod: RT

## 2022-12-11 PROCEDURE — 250N000009 HC RX 250: Performed by: NURSE ANESTHETIST, CERTIFIED REGISTERED

## 2022-12-11 PROCEDURE — 258N000003 HC RX IP 258 OP 636: Performed by: ANESTHESIOLOGY

## 2022-12-11 PROCEDURE — C1713 ANCHOR/SCREW BN/BN,TIS/BN: HCPCS | Performed by: ORTHOPAEDIC SURGERY

## 2022-12-11 PROCEDURE — 250N000025 HC SEVOFLURANE, PER MIN: Performed by: ORTHOPAEDIC SURGERY

## 2022-12-11 PROCEDURE — 250N000011 HC RX IP 250 OP 636: Performed by: ANESTHESIOLOGY

## 2022-12-11 PROCEDURE — 999N000179 XR SURGERY CARM FLUORO LESS THAN 5 MIN W STILLS

## 2022-12-11 PROCEDURE — 120N000001 HC R&B MED SURG/OB

## 2022-12-11 PROCEDURE — 250N000009 HC RX 250: Performed by: ANESTHESIOLOGY

## 2022-12-11 PROCEDURE — 272N000001 HC OR GENERAL SUPPLY STERILE: Performed by: ORTHOPAEDIC SURGERY

## 2022-12-11 PROCEDURE — 250N000011 HC RX IP 250 OP 636: Performed by: NURSE ANESTHETIST, CERTIFIED REGISTERED

## 2022-12-11 PROCEDURE — 999N000141 HC STATISTIC PRE-PROCEDURE NURSING ASSESSMENT: Performed by: ORTHOPAEDIC SURGERY

## 2022-12-11 PROCEDURE — 99233 SBSQ HOSP IP/OBS HIGH 50: CPT | Performed by: INTERNAL MEDICINE

## 2022-12-11 PROCEDURE — 0QSB06Z REPOSITION RIGHT LOWER FEMUR WITH INTRAMEDULLARY INTERNAL FIXATION DEVICE, OPEN APPROACH: ICD-10-PCS | Performed by: ORTHOPAEDIC SURGERY

## 2022-12-11 PROCEDURE — 250N000009 HC RX 250: Performed by: ORTHOPAEDIC SURGERY

## 2022-12-11 PROCEDURE — 250N000011 HC RX IP 250 OP 636: Performed by: INTERNAL MEDICINE

## 2022-12-11 PROCEDURE — 93010 ELECTROCARDIOGRAM REPORT: CPT | Performed by: INTERNAL MEDICINE

## 2022-12-11 PROCEDURE — 258N000003 HC RX IP 258 OP 636: Performed by: NURSE ANESTHETIST, CERTIFIED REGISTERED

## 2022-12-11 PROCEDURE — 250N000011 HC RX IP 250 OP 636: Performed by: PHYSICIAN ASSISTANT

## 2022-12-11 PROCEDURE — C1769 GUIDE WIRE: HCPCS | Performed by: ORTHOPAEDIC SURGERY

## 2022-12-11 PROCEDURE — 250N000011 HC RX IP 250 OP 636: Performed by: ORTHOPAEDIC SURGERY

## 2022-12-11 PROCEDURE — 258N000003 HC RX IP 258 OP 636: Performed by: INTERNAL MEDICINE

## 2022-12-11 PROCEDURE — 370N000017 HC ANESTHESIA TECHNICAL FEE, PER MIN: Performed by: ORTHOPAEDIC SURGERY

## 2022-12-11 DEVICE — IMPLANTABLE DEVICE: Type: IMPLANTABLE DEVICE | Site: LEG | Status: FUNCTIONAL

## 2022-12-11 DEVICE — SCREW BN 32MM 5MM LCK X25 STRL IM NL 04.045.032S: Type: IMPLANTABLE DEVICE | Site: LEG | Status: FUNCTIONAL

## 2022-12-11 DEVICE — LOCKING SCREW FOR IM NAIL Ø 5MM/ 74MM/ XL25/ STERILE: Type: IMPLANTABLE DEVICE | Site: LEG | Status: FUNCTIONAL

## 2022-12-11 RX ORDER — PROCHLORPERAZINE 25 MG
25 SUPPOSITORY, RECTAL RECTAL EVERY 12 HOURS PRN
Status: DISCONTINUED | OUTPATIENT
Start: 2022-12-11 | End: 2022-12-17 | Stop reason: HOSPADM

## 2022-12-11 RX ORDER — FENTANYL CITRATE 50 UG/ML
INJECTION, SOLUTION INTRAMUSCULAR; INTRAVENOUS PRN
Status: DISCONTINUED | OUTPATIENT
Start: 2022-12-11 | End: 2022-12-11

## 2022-12-11 RX ORDER — LABETALOL HYDROCHLORIDE 5 MG/ML
5 INJECTION, SOLUTION INTRAVENOUS
Status: DISCONTINUED | OUTPATIENT
Start: 2022-12-11 | End: 2022-12-11 | Stop reason: HOSPADM

## 2022-12-11 RX ORDER — PANTOPRAZOLE SODIUM 40 MG/1
40 TABLET, DELAYED RELEASE ORAL DAILY
Status: DISCONTINUED | OUTPATIENT
Start: 2022-12-11 | End: 2022-12-17 | Stop reason: HOSPADM

## 2022-12-11 RX ORDER — PROCHLORPERAZINE MALEATE 10 MG
10 TABLET ORAL EVERY 6 HOURS PRN
Status: DISCONTINUED | OUTPATIENT
Start: 2022-12-11 | End: 2022-12-11

## 2022-12-11 RX ORDER — HYDROMORPHONE HCL IN WATER/PF 6 MG/30 ML
0.2 PATIENT CONTROLLED ANALGESIA SYRINGE INTRAVENOUS
Status: DISCONTINUED | OUTPATIENT
Start: 2022-12-11 | End: 2022-12-17 | Stop reason: HOSPADM

## 2022-12-11 RX ORDER — TRANEXAMIC ACID 10 MG/ML
1 INJECTION, SOLUTION INTRAVENOUS ONCE
Status: DISCONTINUED | OUTPATIENT
Start: 2022-12-11 | End: 2022-12-11 | Stop reason: CLARIF

## 2022-12-11 RX ORDER — NALOXONE HYDROCHLORIDE 0.4 MG/ML
0.4 INJECTION, SOLUTION INTRAMUSCULAR; INTRAVENOUS; SUBCUTANEOUS
Status: DISCONTINUED | OUTPATIENT
Start: 2022-12-11 | End: 2022-12-17 | Stop reason: HOSPADM

## 2022-12-11 RX ORDER — PROCHLORPERAZINE MALEATE 10 MG
10 TABLET ORAL EVERY 6 HOURS PRN
Status: DISCONTINUED | OUTPATIENT
Start: 2022-12-11 | End: 2022-12-17 | Stop reason: HOSPADM

## 2022-12-11 RX ORDER — POLYETHYLENE GLYCOL 3350 17 G/17G
17 POWDER, FOR SOLUTION ORAL DAILY PRN
Status: DISCONTINUED | OUTPATIENT
Start: 2022-12-11 | End: 2022-12-17 | Stop reason: HOSPADM

## 2022-12-11 RX ORDER — ONDANSETRON 4 MG/1
4 TABLET, ORALLY DISINTEGRATING ORAL EVERY 6 HOURS PRN
Status: DISCONTINUED | OUTPATIENT
Start: 2022-12-11 | End: 2022-12-17 | Stop reason: HOSPADM

## 2022-12-11 RX ORDER — HYDROMORPHONE HCL IN WATER/PF 6 MG/30 ML
0.4 PATIENT CONTROLLED ANALGESIA SYRINGE INTRAVENOUS EVERY 5 MIN PRN
Status: DISCONTINUED | OUTPATIENT
Start: 2022-12-11 | End: 2022-12-11 | Stop reason: HOSPADM

## 2022-12-11 RX ORDER — SODIUM CHLORIDE, SODIUM LACTATE, POTASSIUM CHLORIDE, CALCIUM CHLORIDE 600; 310; 30; 20 MG/100ML; MG/100ML; MG/100ML; MG/100ML
INJECTION, SOLUTION INTRAVENOUS CONTINUOUS
Status: DISCONTINUED | OUTPATIENT
Start: 2022-12-11 | End: 2022-12-11

## 2022-12-11 RX ORDER — ACETAMINOPHEN 325 MG/1
650 TABLET ORAL EVERY 4 HOURS PRN
Status: DISCONTINUED | OUTPATIENT
Start: 2022-12-14 | End: 2022-12-17 | Stop reason: HOSPADM

## 2022-12-11 RX ORDER — FENTANYL CITRATE 50 UG/ML
25 INJECTION, SOLUTION INTRAMUSCULAR; INTRAVENOUS EVERY 5 MIN PRN
Status: DISCONTINUED | OUTPATIENT
Start: 2022-12-11 | End: 2022-12-11 | Stop reason: HOSPADM

## 2022-12-11 RX ORDER — ONDANSETRON 2 MG/ML
INJECTION INTRAMUSCULAR; INTRAVENOUS PRN
Status: DISCONTINUED | OUTPATIENT
Start: 2022-12-11 | End: 2022-12-11

## 2022-12-11 RX ORDER — CEFAZOLIN SODIUM/WATER 2 G/20 ML
2 SYRINGE (ML) INTRAVENOUS SEE ADMIN INSTRUCTIONS
Status: DISCONTINUED | OUTPATIENT
Start: 2022-12-11 | End: 2022-12-11

## 2022-12-11 RX ORDER — FOLIC ACID 1 MG/1
1 TABLET ORAL DAILY
Status: DISCONTINUED | OUTPATIENT
Start: 2022-12-11 | End: 2022-12-17 | Stop reason: HOSPADM

## 2022-12-11 RX ORDER — ONDANSETRON 2 MG/ML
4 INJECTION INTRAMUSCULAR; INTRAVENOUS EVERY 6 HOURS PRN
Status: DISCONTINUED | OUTPATIENT
Start: 2022-12-11 | End: 2022-12-11

## 2022-12-11 RX ORDER — CEFAZOLIN SODIUM/WATER 2 G/20 ML
2 SYRINGE (ML) INTRAVENOUS
Status: COMPLETED | OUTPATIENT
Start: 2022-12-11 | End: 2022-12-11

## 2022-12-11 RX ORDER — ONDANSETRON 4 MG/1
4 TABLET, ORALLY DISINTEGRATING ORAL EVERY 30 MIN PRN
Status: DISCONTINUED | OUTPATIENT
Start: 2022-12-11 | End: 2022-12-11 | Stop reason: HOSPADM

## 2022-12-11 RX ORDER — LIDOCAINE HYDROCHLORIDE 20 MG/ML
INJECTION, SOLUTION INFILTRATION; PERINEURAL PRN
Status: DISCONTINUED | OUTPATIENT
Start: 2022-12-11 | End: 2022-12-11

## 2022-12-11 RX ORDER — AMOXICILLIN 250 MG
1 CAPSULE ORAL 2 TIMES DAILY
Status: DISCONTINUED | OUTPATIENT
Start: 2022-12-11 | End: 2022-12-17 | Stop reason: HOSPADM

## 2022-12-11 RX ORDER — NEOSTIGMINE METHYLSULFATE 1 MG/ML
VIAL (ML) INJECTION PRN
Status: DISCONTINUED | OUTPATIENT
Start: 2022-12-11 | End: 2022-12-11

## 2022-12-11 RX ORDER — CEFAZOLIN SODIUM 1 G/3ML
1 INJECTION, POWDER, FOR SOLUTION INTRAMUSCULAR; INTRAVENOUS EVERY 8 HOURS
Status: COMPLETED | OUTPATIENT
Start: 2022-12-11 | End: 2022-12-12

## 2022-12-11 RX ORDER — GABAPENTIN 100 MG/1
100 CAPSULE ORAL 3 TIMES DAILY
Status: DISCONTINUED | OUTPATIENT
Start: 2022-12-11 | End: 2022-12-17 | Stop reason: HOSPADM

## 2022-12-11 RX ORDER — NALOXONE HYDROCHLORIDE 0.4 MG/ML
0.2 INJECTION, SOLUTION INTRAMUSCULAR; INTRAVENOUS; SUBCUTANEOUS
Status: DISCONTINUED | OUTPATIENT
Start: 2022-12-11 | End: 2022-12-17 | Stop reason: HOSPADM

## 2022-12-11 RX ORDER — ATORVASTATIN CALCIUM 10 MG/1
10 TABLET, FILM COATED ORAL DAILY
Status: DISCONTINUED | OUTPATIENT
Start: 2022-12-12 | End: 2022-12-17 | Stop reason: HOSPADM

## 2022-12-11 RX ORDER — ONDANSETRON 4 MG/1
4 TABLET, ORALLY DISINTEGRATING ORAL EVERY 6 HOURS PRN
Status: DISCONTINUED | OUTPATIENT
Start: 2022-12-11 | End: 2022-12-11

## 2022-12-11 RX ORDER — GLYCOPYRROLATE 0.2 MG/ML
INJECTION, SOLUTION INTRAMUSCULAR; INTRAVENOUS PRN
Status: DISCONTINUED | OUTPATIENT
Start: 2022-12-11 | End: 2022-12-11

## 2022-12-11 RX ORDER — AMOXICILLIN 250 MG
1 CAPSULE ORAL 2 TIMES DAILY PRN
Status: DISCONTINUED | OUTPATIENT
Start: 2022-12-11 | End: 2022-12-17 | Stop reason: HOSPADM

## 2022-12-11 RX ORDER — LIDOCAINE 40 MG/G
CREAM TOPICAL
Status: DISCONTINUED | OUTPATIENT
Start: 2022-12-11 | End: 2022-12-11

## 2022-12-11 RX ORDER — OXYCODONE HYDROCHLORIDE 5 MG/1
10 TABLET ORAL EVERY 4 HOURS PRN
Status: DISCONTINUED | OUTPATIENT
Start: 2022-12-11 | End: 2022-12-17 | Stop reason: HOSPADM

## 2022-12-11 RX ORDER — OXYCODONE HYDROCHLORIDE 5 MG/1
5 TABLET ORAL EVERY 4 HOURS PRN
Status: DISCONTINUED | OUTPATIENT
Start: 2022-12-11 | End: 2022-12-17 | Stop reason: HOSPADM

## 2022-12-11 RX ORDER — HYDRALAZINE HYDROCHLORIDE 20 MG/ML
2.5-5 INJECTION INTRAMUSCULAR; INTRAVENOUS
Status: DISCONTINUED | OUTPATIENT
Start: 2022-12-11 | End: 2022-12-11 | Stop reason: HOSPADM

## 2022-12-11 RX ORDER — IBUPROFEN 600 MG/1
600 TABLET, FILM COATED ORAL EVERY 6 HOURS PRN
Status: DISCONTINUED | OUTPATIENT
Start: 2022-12-11 | End: 2022-12-17 | Stop reason: HOSPADM

## 2022-12-11 RX ORDER — PREDNISONE 5 MG/1
10 TABLET ORAL DAILY
Status: DISCONTINUED | OUTPATIENT
Start: 2022-12-12 | End: 2022-12-17 | Stop reason: HOSPADM

## 2022-12-11 RX ORDER — SODIUM CHLORIDE, SODIUM LACTATE, POTASSIUM CHLORIDE, CALCIUM CHLORIDE 600; 310; 30; 20 MG/100ML; MG/100ML; MG/100ML; MG/100ML
INJECTION, SOLUTION INTRAVENOUS CONTINUOUS
Status: DISCONTINUED | OUTPATIENT
Start: 2022-12-11 | End: 2022-12-11 | Stop reason: HOSPADM

## 2022-12-11 RX ORDER — HYDROMORPHONE HCL IN WATER/PF 6 MG/30 ML
0.4 PATIENT CONTROLLED ANALGESIA SYRINGE INTRAVENOUS
Status: DISCONTINUED | OUTPATIENT
Start: 2022-12-11 | End: 2022-12-17 | Stop reason: HOSPADM

## 2022-12-11 RX ORDER — AMOXICILLIN 250 MG
2 CAPSULE ORAL 2 TIMES DAILY PRN
Status: DISCONTINUED | OUTPATIENT
Start: 2022-12-11 | End: 2022-12-17 | Stop reason: HOSPADM

## 2022-12-11 RX ORDER — ACETAMINOPHEN 325 MG/1
975 TABLET ORAL EVERY 8 HOURS
Status: COMPLETED | OUTPATIENT
Start: 2022-12-11 | End: 2022-12-13

## 2022-12-11 RX ORDER — PROPOFOL 10 MG/ML
INJECTION, EMULSION INTRAVENOUS CONTINUOUS PRN
Status: DISCONTINUED | OUTPATIENT
Start: 2022-12-11 | End: 2022-12-11

## 2022-12-11 RX ORDER — PROPOFOL 10 MG/ML
INJECTION, EMULSION INTRAVENOUS PRN
Status: DISCONTINUED | OUTPATIENT
Start: 2022-12-11 | End: 2022-12-11

## 2022-12-11 RX ORDER — IBUPROFEN 600 MG/1
600 TABLET, FILM COATED ORAL EVERY 6 HOURS PRN
Status: DISCONTINUED | OUTPATIENT
Start: 2022-12-11 | End: 2022-12-11

## 2022-12-11 RX ORDER — BISACODYL 10 MG
10 SUPPOSITORY, RECTAL RECTAL DAILY PRN
Status: DISCONTINUED | OUTPATIENT
Start: 2022-12-11 | End: 2022-12-17 | Stop reason: HOSPADM

## 2022-12-11 RX ORDER — LIDOCAINE 40 MG/G
CREAM TOPICAL
Status: DISCONTINUED | OUTPATIENT
Start: 2022-12-11 | End: 2022-12-17 | Stop reason: HOSPADM

## 2022-12-11 RX ORDER — ONDANSETRON 2 MG/ML
4 INJECTION INTRAMUSCULAR; INTRAVENOUS EVERY 6 HOURS PRN
Status: DISCONTINUED | OUTPATIENT
Start: 2022-12-11 | End: 2022-12-17 | Stop reason: HOSPADM

## 2022-12-11 RX ORDER — HYDROXYZINE HYDROCHLORIDE 25 MG/1
25 TABLET, FILM COATED ORAL EVERY 6 HOURS PRN
Status: DISCONTINUED | OUTPATIENT
Start: 2022-12-11 | End: 2022-12-17 | Stop reason: HOSPADM

## 2022-12-11 RX ORDER — SODIUM CHLORIDE, SODIUM LACTATE, POTASSIUM CHLORIDE, CALCIUM CHLORIDE 600; 310; 30; 20 MG/100ML; MG/100ML; MG/100ML; MG/100ML
INJECTION, SOLUTION INTRAVENOUS CONTINUOUS
Status: DISCONTINUED | OUTPATIENT
Start: 2022-12-11 | End: 2022-12-17 | Stop reason: HOSPADM

## 2022-12-11 RX ORDER — POLYETHYLENE GLYCOL 3350 17 G/17G
17 POWDER, FOR SOLUTION ORAL DAILY
Status: DISCONTINUED | OUTPATIENT
Start: 2022-12-12 | End: 2022-12-17 | Stop reason: HOSPADM

## 2022-12-11 RX ORDER — HYDROMORPHONE HCL IN WATER/PF 6 MG/30 ML
0.2 PATIENT CONTROLLED ANALGESIA SYRINGE INTRAVENOUS EVERY 5 MIN PRN
Status: DISCONTINUED | OUTPATIENT
Start: 2022-12-11 | End: 2022-12-11 | Stop reason: HOSPADM

## 2022-12-11 RX ORDER — FENTANYL CITRATE 50 UG/ML
50 INJECTION, SOLUTION INTRAMUSCULAR; INTRAVENOUS EVERY 5 MIN PRN
Status: DISCONTINUED | OUTPATIENT
Start: 2022-12-11 | End: 2022-12-11 | Stop reason: HOSPADM

## 2022-12-11 RX ORDER — TRANEXAMIC ACID 10 MG/ML
1 INJECTION, SOLUTION INTRAVENOUS ONCE
Status: COMPLETED | OUTPATIENT
Start: 2022-12-11 | End: 2022-12-11

## 2022-12-11 RX ORDER — MEPERIDINE HYDROCHLORIDE 25 MG/ML
12.5 INJECTION INTRAMUSCULAR; INTRAVENOUS; SUBCUTANEOUS EVERY 5 MIN PRN
Status: DISCONTINUED | OUTPATIENT
Start: 2022-12-11 | End: 2022-12-11 | Stop reason: HOSPADM

## 2022-12-11 RX ORDER — ONDANSETRON 2 MG/ML
4 INJECTION INTRAMUSCULAR; INTRAVENOUS EVERY 30 MIN PRN
Status: DISCONTINUED | OUTPATIENT
Start: 2022-12-11 | End: 2022-12-11 | Stop reason: HOSPADM

## 2022-12-11 RX ADMIN — TRANEXAMIC ACID 1 G: 10 INJECTION, SOLUTION INTRAVENOUS at 09:42

## 2022-12-11 RX ADMIN — GLYCOPYRROLATE 0.6 MG: 0.2 INJECTION, SOLUTION INTRAMUSCULAR; INTRAVENOUS at 09:50

## 2022-12-11 RX ADMIN — MORPHINE SULFATE 4 MG: 4 INJECTION, SOLUTION INTRAMUSCULAR; INTRAVENOUS at 03:05

## 2022-12-11 RX ADMIN — HYDROCORTISONE SODIUM SUCCINATE 25 MG: 100 INJECTION, POWDER, FOR SOLUTION INTRAMUSCULAR; INTRAVENOUS at 16:12

## 2022-12-11 RX ADMIN — PROPOFOL 25 MCG/KG/MIN: 10 INJECTION, EMULSION INTRAVENOUS at 08:33

## 2022-12-11 RX ADMIN — PHENYLEPHRINE HYDROCHLORIDE 100 MCG: 10 INJECTION INTRAVENOUS at 09:43

## 2022-12-11 RX ADMIN — PHENYLEPHRINE HYDROCHLORIDE 100 MCG: 10 INJECTION INTRAVENOUS at 09:34

## 2022-12-11 RX ADMIN — PHENYLEPHRINE HYDROCHLORIDE 100 MCG: 10 INJECTION INTRAVENOUS at 09:25

## 2022-12-11 RX ADMIN — NEOSTIGMINE METHYLSULFATE 4 MG: 1 INJECTION, SOLUTION INTRAVENOUS at 09:50

## 2022-12-11 RX ADMIN — PHENYLEPHRINE HYDROCHLORIDE 100 MCG: 10 INJECTION INTRAVENOUS at 08:45

## 2022-12-11 RX ADMIN — SODIUM CHLORIDE, POTASSIUM CHLORIDE, SODIUM LACTATE AND CALCIUM CHLORIDE: 600; 310; 30; 20 INJECTION, SOLUTION INTRAVENOUS at 08:18

## 2022-12-11 RX ADMIN — MORPHINE SULFATE 4 MG: 4 INJECTION, SOLUTION INTRAMUSCULAR; INTRAVENOUS at 06:31

## 2022-12-11 RX ADMIN — MIDAZOLAM HYDROCHLORIDE 2 MG: 1 INJECTION, SOLUTION INTRAMUSCULAR; INTRAVENOUS at 08:05

## 2022-12-11 RX ADMIN — ONDANSETRON 4 MG: 2 INJECTION INTRAMUSCULAR; INTRAVENOUS at 01:36

## 2022-12-11 RX ADMIN — GABAPENTIN 100 MG: 100 CAPSULE ORAL at 16:06

## 2022-12-11 RX ADMIN — PHENYLEPHRINE HYDROCHLORIDE 100 MCG: 10 INJECTION INTRAVENOUS at 08:32

## 2022-12-11 RX ADMIN — ROCURONIUM BROMIDE 40 MG: 50 INJECTION, SOLUTION INTRAVENOUS at 08:25

## 2022-12-11 RX ADMIN — OXYCODONE HYDROCHLORIDE 5 MG: 5 TABLET ORAL at 20:17

## 2022-12-11 RX ADMIN — ACETAMINOPHEN 975 MG: 325 TABLET, FILM COATED ORAL at 16:05

## 2022-12-11 RX ADMIN — Medication 2 G: at 08:18

## 2022-12-11 RX ADMIN — ONDANSETRON 4 MG: 2 INJECTION INTRAMUSCULAR; INTRAVENOUS at 10:20

## 2022-12-11 RX ADMIN — SODIUM CHLORIDE, POTASSIUM CHLORIDE, SODIUM LACTATE AND CALCIUM CHLORIDE: 600; 310; 30; 20 INJECTION, SOLUTION INTRAVENOUS at 16:07

## 2022-12-11 RX ADMIN — SODIUM CHLORIDE, POTASSIUM CHLORIDE, SODIUM LACTATE AND CALCIUM CHLORIDE: 600; 310; 30; 20 INJECTION, SOLUTION INTRAVENOUS at 09:03

## 2022-12-11 RX ADMIN — GABAPENTIN 100 MG: 100 CAPSULE ORAL at 22:04

## 2022-12-11 RX ADMIN — PHENYLEPHRINE HYDROCHLORIDE 50 MCG: 10 INJECTION INTRAVENOUS at 08:29

## 2022-12-11 RX ADMIN — FENTANYL CITRATE 50 MCG: 50 INJECTION, SOLUTION INTRAMUSCULAR; INTRAVENOUS at 08:25

## 2022-12-11 RX ADMIN — PROCHLORPERAZINE EDISYLATE 2.5 MG: 5 INJECTION INTRAMUSCULAR; INTRAVENOUS at 10:52

## 2022-12-11 RX ADMIN — ONDANSETRON 4 MG: 2 INJECTION INTRAMUSCULAR; INTRAVENOUS at 09:40

## 2022-12-11 RX ADMIN — CEFAZOLIN 1 G: 1 INJECTION, POWDER, FOR SOLUTION INTRAMUSCULAR; INTRAVENOUS at 16:08

## 2022-12-11 RX ADMIN — SODIUM CHLORIDE: 9 INJECTION, SOLUTION INTRAVENOUS at 01:14

## 2022-12-11 RX ADMIN — Medication 30 ML: at 08:11

## 2022-12-11 RX ADMIN — TRANEXAMIC ACID 1 G: 10 INJECTION, SOLUTION INTRAVENOUS at 08:28

## 2022-12-11 RX ADMIN — HYDROCORTISONE SODIUM SUCCINATE 50 MG: 100 INJECTION, POWDER, FOR SOLUTION INTRAMUSCULAR; INTRAVENOUS at 07:57

## 2022-12-11 RX ADMIN — PHENYLEPHRINE HYDROCHLORIDE 100 MCG: 10 INJECTION INTRAVENOUS at 09:40

## 2022-12-11 RX ADMIN — PROPOFOL 150 MG: 10 INJECTION, EMULSION INTRAVENOUS at 08:25

## 2022-12-11 RX ADMIN — PHENYLEPHRINE HYDROCHLORIDE 100 MCG: 10 INJECTION INTRAVENOUS at 08:52

## 2022-12-11 RX ADMIN — LIDOCAINE HYDROCHLORIDE 60 MG: 20 INJECTION, SOLUTION INFILTRATION; PERINEURAL at 08:25

## 2022-12-11 RX ADMIN — SENNOSIDES AND DOCUSATE SODIUM 1 TABLET: 50; 8.6 TABLET ORAL at 20:17

## 2022-12-11 RX ADMIN — FAMOTIDINE 20 MG: 10 INJECTION INTRAVENOUS at 08:00

## 2022-12-11 ASSESSMENT — ACTIVITIES OF DAILY LIVING (ADL)
TOILETING_ISSUES: NO
ADLS_ACUITY_SCORE: 20
ADLS_ACUITY_SCORE: 20
ADLS_ACUITY_SCORE: 37
DIFFICULTY_EATING/SWALLOWING: NO
ADLS_ACUITY_SCORE: 37
DOING_ERRANDS_INDEPENDENTLY_DIFFICULTY: NO
CONCENTRATING,_REMEMBERING_OR_MAKING_DECISIONS_DIFFICULTY: NO
ADLS_ACUITY_SCORE: 35
ADLS_ACUITY_SCORE: 37
DRESSING/BATHING_DIFFICULTY: NO
WEAR_GLASSES_OR_BLIND: NO
CHANGE_IN_FUNCTIONAL_STATUS_SINCE_ONSET_OF_CURRENT_ILLNESS/INJURY: YES
ADLS_ACUITY_SCORE: 20
WALKING_OR_CLIMBING_STAIRS_DIFFICULTY: NO
ADLS_ACUITY_SCORE: 37
NUMBER_OF_TIMES_PATIENT_HAS_FALLEN_WITHIN_LAST_SIX_MONTHS: 1
ADLS_ACUITY_SCORE: 37
ADLS_ACUITY_SCORE: 37
FALL_HISTORY_WITHIN_LAST_SIX_MONTHS: YES
ADLS_ACUITY_SCORE: 20
ADLS_ACUITY_SCORE: 37
DIFFICULTY_COMMUNICATING: NO

## 2022-12-11 ASSESSMENT — LIFESTYLE VARIABLES: TOBACCO_USE: 0

## 2022-12-11 ASSESSMENT — ENCOUNTER SYMPTOMS
DYSRHYTHMIAS: 0
SEIZURES: 0

## 2022-12-11 ASSESSMENT — COPD QUESTIONNAIRES: COPD: 0

## 2022-12-11 NOTE — H&P
[facility]    History and Physical  Hospitalist       Date of Admission:  12/10/2022  Date of Service (when I saw the patient): 12/10/22    Assessment & Plan   Britt Park is a 52 year old female who presents with a leg pain after a fall    Mechanical fall  R spiral comminuted fracture of distal femur  Was at [facility] when she slipped on some water, twisting her R knee and falling. Was unable to get up and had severe pain to R leg/ knee. No head or other trauma. No dizzy/lightheaded, no cp. In ED VSS. Labs normal. XR R leg with spiral comminuted fx of the distal femur, other findings see report. Reports has  in the past, tolerated this.   - NPO, IV fluids  - prn morphine IV, oxycodone PO, acetaminophen (limit 3g/ day per outpatient notes)  - orthopedic surgery consult  - bedrest  - defer further imaging to ortho  - pre-op EKG  - hx methemoglobinemia with dapsone    Granulomatous myositis  [folic acid 1 mg daily, methotrexate 20 mg weekly SubQ, prednisone 10 mg daily]  Follow with Rheumatology and [place]. On prednisone/methotx. Received methotrexate dosing today (normally ).   - resume prednisone; dose 50 mg hydrocortisone just before surgery and 25 mg q8 hours x 24 hours after surgery for SDS (50 mg ordered, 25 mg q8 will need to be ordered for after surgery)  - continue folic acid  - will need next methotrexate next  (usual day)    HTN  HLD  - resume atorvastatin 10 mg daily  - hold ARB perioperatively     Hepatic steatosis  10/2022 LFTs normal. Thought 2/2 methotx vs weight.   - routine LFTs    MAYA  - continue home CPAP    GERD  - resume pantoprazole daily    Hx methemoglobinemia with dapsone  - noted     Clinically Significant Risk Factors Present on Admission                  # Hypertension: home medication list includes antihypertensive(s)              DVT Prophylaxis: Pneumatic Compression Devices  Code Status: Full Code    Disposition: Expected  discharge in 3-4 days     Chad Das MD, MD  [phone] (P)  Text Page     Primary Care Physician   REYNOLD RAMOS    Chief Complaint   Fall with R leg pain    History is obtained from the patient and medical records    History of Present Illness   Britt Park is a 52 year old female who presents with leg pain after a fall.  She was at the [facility] and, while she was walking, stepped into a puddle of water.  She felt her knee and right leg twist and she fell.  She had immediate severe pain.  Her  heard a loud pop.  She was unable to get up.  She denies any head trauma.  Denies any other trauma in her body.  No dizziness or lightheadedness no chest pain or shortness of breath.  She does have a history of granulomatous myositis and is on chronic steroids and methotrexate.  However her fall appears mechanical.  At the time of visit she is still having pain although it is improved.  She currently denies any chest pain or shortness of breath.  She notes that if she moves she has severe pain.    Past Medical History    I have reviewed this patient's medical history and updated it with pertinent information if needed.   Granulomatous myositis  HTN  HLD  MAYA  Methemoglobinemia  Fatty liver    Past Surgical History   I have reviewed this patient's surgical history and updated it with pertinent information if needed.  c section      Prior to Admission Medications   Prior to Admission Medications   Prescriptions Last Dose Informant Patient Reported? Taking?   Methotrexate 20 MG/0.8ML SOSY 12/10/2022 at am Self Yes Yes   Sig: Inject 0.8 mLs Subcutaneous once a week On Wednesdays.   atorvastatin (LIPITOR) 10 MG tablet 12/10/2022 at am Self Yes Yes   Sig: Take 10 mg by mouth daily   estradiol (ESTRACE) 0.1 MG/GM vaginal cream Past Week Self Yes Yes   Sig: Place 2 g vaginally twice a week   folic acid (FOLVITE) 1 MG tablet 12/10/2022 at am Self Yes Yes   Sig: Take 1 mg by mouth daily   losartan (COZAAR)  25 MG tablet 12/10/2022 at am Self Yes Yes   Sig: Take 25 mg by mouth daily   pantoprazole (PROTONIX) 40 MG EC tablet 12/10/2022 at am Self Yes Yes   Sig: Take 40 mg by mouth daily   predniSONE (DELTASONE) 5 MG tablet 12/10/2022 at am Self Yes Yes   Sig: Take 10 mg by mouth daily      Facility-Administered Medications: None     Allergies   Allergies   Allergen Reactions     Benadryl [Diphenhydramine]      Sulfa Drugs        Social History   I have reviewed this patient's social history and updated it with pertinent information if needed. Britt Park   occ Etoh, no tobacco.     Family History   I have reviewed this patient's family history and updated it with pertinent information if needed.   adopted    Review of Systems   The 10 point Review of Systems is negative other than noted in the HPI or here.     Physical Exam   Temp: 98.6  F (37  C) Temp src: Oral BP: 126/64 Pulse: 73     SpO2: 99 % O2 Device: Nasal cannula Oxygen Delivery: 2 LPM  Vital Signs with Ranges  0 lbs 0 oz    Constitutional: alert, oriented and in no acute distress  Eyes: EOMI, PERRL  HEENT: OP clear  Respiratory: CTA B without w/c anteriorly  Cardiovascular: RRR no murmur. no edema.  GI: soft, nontender, nondistended, BS present  Lymph/Hematologic: no cervical LAD  Genitourinary: deferred  Skin: no rashes or lesions grossly  Musculoskeletal: R leg in immobilizer. Warm distally   Neurologic: CN II-XII, PEÑA  Psychiatric: mood and affect wnl    Data   Data reviewed today:  I personally reviewed the XR knee image(s) showing R femoral fracture.  Recent Labs   Lab 12/10/22  2110   WBC 7.4   HGB 12.0   *      INR 0.97      POTASSIUM 3.6   CHLORIDE 104   CO2 30   BUN 19   CR 0.59   ANIONGAP 4   LASHONDA 8.7   GLC 89       Recent Results (from the past 24 hour(s))   XR Femur Right 2 Views    Narrative    EXAM: XR FEMUR RIGHT 2 VIEWS, XR KNEE RIGHT 1/2 VIEWS  LOCATION: [facility]  DATE/TIME: 12/10/2022 9:36  PM    INDICATION: trauma, pain  COMPARISON: None.      Impression    IMPRESSION: The bones are well-mineralized. There is a spiral comminuted fracture of the distal femur which may extend into the intercondylar aspect of the femur anteriorly. There is a small spur patellar joint effusion. The tibia and fibula are intact.   Further evaluation with dedicated CT of the knee may be warranted to better assess for intra-articular extension.   XR Knee Right 1/2 Views    Narrative    EXAM: XR FEMUR RIGHT 2 VIEWS, XR KNEE RIGHT 1/2 VIEWS  LOCATION: [facility]  DATE/TIME: 12/10/2022 9:36 PM    INDICATION: trauma, pain  COMPARISON: None.      Impression    IMPRESSION: The bones are well-mineralized. There is a spiral comminuted fracture of the distal femur which may extend into the intercondylar aspect of the femur anteriorly. There is a small spur patellar joint effusion. The tibia and fibula are intact.   Further evaluation with dedicated CT of the knee may be warranted to better assess for intra-articular extension.

## 2022-12-11 NOTE — ED PROVIDER NOTES
History   Chief Complaint:  Fall (R knee injury)       HPI   Britt Park is a 52 year old female who presents via EMS for further evaluation of right knee pain.  She was at the [facility] when she apparently slipped on some water and twisted her right knee.  She has been unable to get up since and has severe pain to the distal right upper leg and right knee region.  She denies any numbness, tingling, or weakness to her right foot.  She denies any other injuries.    Review of Systems   All other systems reviewed and are negative.    Allergies:  Benadryl [Diphenhydramine]  Sulfa Drugs  Influenza vaccines    Medications:  Atorvastatin  Folic acid  Gabapentin  Losartan  Methotrexate  Naltrexone  Naproxen    Pantoprazole   Prednisone  Valacyclovir  Pentamidine    Past Medical History:     There is no problem list  Cyst of right ovary    Facial paresthesia  Methemoglobinemia  Anemia  Allergic rhinitis  Impetigo  Vitamin B 12 deficiency  Myopathy  Overactive bladder  Dry eyes     Past Surgical History:     x2  Right arm biopsy  Colonoscopy x2    Family History:    No family history on file.    Social History:  The patient presents to the ED with a friend or relative.  PCP: No primary care provider on file.     Physical Exam     No data found.    Physical Exam  Nursing note and vitals reviewed.  Constitutional:  Oriented to person, place, and time. Cooperative.  Appears uncomfortable.  HENT:   Nose:    Nose normal.   Mouth/Throat:   Facemask in place.   Eyes:    Conjunctivae normal and EOM are normal.      Pupils are equal, round, and reactive to light.   Neck:    Trachea normal.   Cardiovascular:  Normal rate, regular rhythm, normal heart sounds and normal pulses. No murmur heard.  Pulmonary/Chest:  Effort normal and breath sounds normal.   Abdominal:   Soft. Normal appearance and bowel sounds are normal.      There is no tenderness.      There is no rebound and no CVA tenderness.   Musculoskeletal:   Swelling with what appears to be an obvious deformity to the distal right upper leg.  Tenderness to palpation in that region and with attempts at range of motion of the right knee, but the knee itself appears normal and nontender to palpation.  Extremities otherwise atraumatic x 4.   Lymphadenopathy:  No cervical adenopathy.   Neurological:   Alert and oriented to person, place, and time. Normal strength.      No cranial nerve deficit or sensory deficit. GCS eye subscore is 4. GCS verbal subscore is 5. GCS motor subscore is 6.  Distal CMS intact in the right foot.  Skin:    Skin is intact. No rash noted.   Psychiatric:   Normal mood and affect.      Emergency Department Course     Imaging:  XR Knee Right 1/2 Views   Final Result   IMPRESSION: The bones are well-mineralized. There is a spiral comminuted fracture of the distal femur which may extend into the intercondylar aspect of the femur anteriorly. There is a small spur patellar joint effusion. The tibia and fibula are intact.    Further evaluation with dedicated CT of the knee may be warranted to better assess for intra-articular extension.      XR Femur Right 2 Views   Final Result   IMPRESSION: The bones are well-mineralized. There is a spiral comminuted fracture of the distal femur which may extend into the intercondylar aspect of the femur anteriorly. There is a small spur patellar joint effusion. The tibia and fibula are intact.    Further evaluation with dedicated CT of the knee may be warranted to better assess for intra-articular extension.        Report per radiology    Laboratory:  Labs Ordered and Resulted from Time of ED Arrival to Time of ED Departure   CBC WITH PLATELETS AND DIFFERENTIAL - Abnormal       Result Value    WBC Count 7.4      RBC Count 3.50 (*)     Hemoglobin 12.0      Hematocrit 37.3       (*)     MCH 34.3 (*)     MCHC 32.2      RDW 14.1      Platelet Count 314      % Neutrophils 43      % Lymphocytes 47      % Monocytes 9       % Eosinophils 0      % Basophils 1      % Immature Granulocytes 0      NRBCs per 100 WBC 0      Absolute Neutrophils 3.2      Absolute Lymphocytes 3.5      Absolute Monocytes 0.6      Absolute Eosinophils 0.0      Absolute Basophils 0.1      Absolute Immature Granulocytes 0.0      Absolute NRBCs 0.0     INR - Normal    INR 0.97     BASIC METABOLIC PANEL - Normal    Sodium 138      Potassium 3.6      Chloride 104      Carbon Dioxide (CO2) 30      Anion Gap 4      Urea Nitrogen 19      Creatinine 0.59      Calcium 8.7      Glucose 89      GFR Estimate >90     COVID-19 VIRUS (CORONAVIRUS) BY PCR - Normal    SARS CoV2 PCR Negative     HEPATIC FUNCTION PANEL   TYPE AND SCREEN, ADULT    ABO/RH(D) B POS      Antibody Screen Negative      SPECIMEN EXPIRATION DATE [id number]     ABO/RH TYPE AND SCREEN        Procedures      Emergency Department Course:     Reviewed:  I reviewed nursing notes, vitals, Care Everywhere, and past medical history    Assessments:  2050 I obtained history and examined the patient as noted above.       Interventions:  2104 Dilaudid 1 mg IV  2121 Dilaudid 1 mg IV      Disposition:  The patient was admitted to the hospital under the care of Dr. Das.     Impression & Plan   Medical Decision Making:  This is a 52-year-old female brought in by EMS after she slipped and fell at the [employer] and injured her right lower extremity.  She was initially complaining of knee pain, but her exam seem more consistent with a distal femur injury.  She had the above x-rays obtained, which showed the above distal femur fracture.  She also had the above blood work obtained and was provided the above pain medications.  She was also provided IV morphine, as Dilaudid did not seem to help very much.  She was placed in a knee immobilizer as well.  Clearly she needs to be admitted to the hospital for further evaluation and management and specifically surgical intervention.  I subsequently spoke with   Thiago, who will be admitting her.    Diagnosis:    ICD-10-CM    1. Closed fracture of right femur, unspecified fracture morphology, unspecified portion of femur, initial encounter (H)  S72.91XA           Scribe Disclosure:  I, Earnest Burton, am serving as a scribe at 8:59 PM on 12/10/2022 to document services personally performed by Seamus Schuler MD based on my observations and the provider's statements to me.        Seamus Schuler MD  12/10/22 8222

## 2022-12-11 NOTE — ANESTHESIA CARE TRANSFER NOTE
Patient: Britt Park    Procedure: Procedure(s):  Open reduction internal fixation of right femur fracture       Diagnosis: Closed fracture of right femur, unspecified fracture morphology, unspecified portion of femur, initial encounter (H) [S72.91XA]  Diagnosis Additional Information: No value filed.    Anesthesia Type:   General     Note:    Oropharynx: oropharynx clear of all foreign objects and spontaneously breathing  Level of Consciousness: drowsy  Oxygen Supplementation: nasal cannula  Level of Supplemental Oxygen (L/min / FiO2): 4    Dentition: dentition unchanged  Vital Signs Stable: post-procedure vital signs reviewed and stable  Report to RN Given: handoff report given  Patient transferred to: PACU    Handoff Report: Identifed the Patient, Identified the Reponsible Provider, Reviewed the pertinent medical history, Discussed the surgical course, Reviewed Intra-OP anesthesia mangement and issues during anesthesia, Set expectations for post-procedure period and Allowed opportunity for questions and acknowledgement of understanding      Vitals:  Vitals Value Taken Time   /81 12/11/22 1000   Temp     Pulse 77 12/11/22 1006   Resp 17 12/11/22 1006   SpO2 100 % 12/11/22 1006   Vitals shown include unvalidated device data.    Electronically Signed By: ROSANNE Cordero CRNA  December 11, 2022  10:07 AM

## 2022-12-11 NOTE — ANESTHESIA PREPROCEDURE EVALUATION
Anesthesia Pre-Procedure Evaluation    Patient: Britt Park   MRN: [id number] : 1970        Procedure : Procedure(s):  Open reduction internal fixation of right femur fracture          No past medical history on file.   No past surgical history on file.   Allergies   Allergen Reactions     Benadryl [Diphenhydramine]      Sulfa Drugs       Social History     Tobacco Use     Smoking status: Not on file     Smokeless tobacco: Not on file   Substance Use Topics     Alcohol use: Not on file      Wt Readings from Last 1 Encounters:   No data found for Wt        Anesthesia Evaluation            ROS/MED HX  ENT/Pulmonary:     (+) sleep apnea, uses CPAP,  (-) tobacco use, asthma and COPD   Neurologic:    (-) no seizures and no CVA   Cardiovascular:     (+) Dyslipidemia hypertension----- (-) CAD, CHF and arrhythmias   METS/Exercise Tolerance:     Hematologic:       Musculoskeletal: Comment: Granulomatous myositis  (+) fracture, Fracture location: RLE,     GI/Hepatic:     (+) GERD, liver disease (Hepatic steatosis),     Renal/Genitourinary:    (-) renal disease   Endo:     (+) Chronic steroid usage for Other.  (-) Type I DM and Type II DM   Psychiatric/Substance Use:       Infectious Disease:       Malignancy:       Other:            Physical Exam    Airway        Mallampati: III   TM distance: > 3 FB   Neck ROM: full   Mouth opening: < 3 cm    Respiratory Devices and Support         Dental  no notable dental history         Cardiovascular          Rhythm and rate: regular     Pulmonary           breath sounds clear to auscultation           OUTSIDE LABS:  CBC:   Lab Results   Component Value Date    WBC 7.4 12/10/2022    HGB 12.0 12/10/2022    HCT 37.3 12/10/2022     12/10/2022     BMP:   Lab Results   Component Value Date     12/10/2022    POTASSIUM 3.6 12/10/2022    CHLORIDE 104 12/10/2022    CO2 30 12/10/2022    BUN 19 12/10/2022    CR 0.59 12/10/2022    GLC 89 12/10/2022     COAGS:   Lab  Results   Component Value Date    INR 0.97 12/10/2022     POC: No results found for: BGM, HCG, HCGS  HEPATIC:   Lab Results   Component Value Date    ALBUMIN 3.7 12/10/2022    PROTTOTAL 6.6 (L) 12/10/2022    ALT 25 12/10/2022    AST 20 12/10/2022    ALKPHOS 45 12/10/2022    BILITOTAL 0.3 12/10/2022     OTHER:   Lab Results   Component Value Date    LASHONDA 8.7 12/10/2022       Anesthesia Plan    ASA Status:  2      Anesthesia Type: General.     - Airway: LMA   Induction: Intravenous, Propofol.   Maintenance: Balanced.        Consents    Anesthesia Plan(s) and associated risks, benefits, and realistic alternatives discussed. Questions answered and patient/representative(s) expressed understanding.    - Discussed:     - Discussed with:  Patient         Postoperative Care    Pain management: Multi-modal analgesia.   PONV prophylaxis: Ondansetron (or other 5HT-3), Dexamethasone or Solumedrol, Background Propofol Infusion     Comments:    Other Comments: Femoral nerve block            Agus Johnson MD

## 2022-12-11 NOTE — ED NOTES
Bed: ED09  Expected date: 12/10/22  Expected time: 8:40 PM  Means of arrival: Ambulance  Comments:  Josafat [phone] [id number] standing fall; knee deformity

## 2022-12-11 NOTE — ED NOTES
[facility]  ED Nurse Handoff Report    ED Chief complaint: Fall (R knee injury)      ED Diagnosis:   Final diagnoses:   Closed fracture of right femur, unspecified fracture morphology, unspecified portion of femur, initial encounter (H)       Code Status: Admitting MD to establish with patient.    Allergies:   Allergies   Allergen Reactions    Benadryl [Diphenhydramine]     Sulfa Drugs        Patient Story: Pt BIB[facility]; Pt reports slipping and falling in a puddle of water at [facility]. Pt complaining of R knee pain that extends into the R leg.    Focused Assessment:  Pain 8-9/10 in R leg    Treatments and/or interventions provided: Pain meds, imaging, labs    Patient's response to treatments and/or interventions: Pain still 8-9/10; See MAR; see results    To be done/followed up on inpatient unit:  See orders.    Does this patient have any cognitive concerns?:  N/A    Activity level - Baseline/Home:  Independent  Activity Level - Current:   Total Care    Patient's Preferred language: English   Needed?: No    Isolation: None  Infection: Not Applicable  Patient tested for COVID 19 prior to admission: YES  Bariatric?: No    Vital Signs: There were no vitals filed for this visit.    Cardiac Rhythm:     Was the PSS-3 completed:   Yes  What interventions are required if any?               Family Comments: , Maurice, at bedside  OBS brochure/video discussed/provided to patient/family: N/A              Name of person given brochure if not patient:               Relationship to patient:     For the majority of the shift this patient's behavior was Green.     No behavioral interventions performed.    ED NURSE PHONE NUMBER: [phone]          You can access the FollowMyHealth Patient Portal offered by [facility] by registering at the following website: [email or web address]. By joining [employer]’s FollowMyHealth portal, you will also be able to view your health information using other applications (apps) compatible with our system.

## 2022-12-11 NOTE — PROGRESS NOTES
.RECEIVING UNIT ED HANDOFF REVIEW    ED Nurse Handoff Report was reviewed by: Claire Fritz RN on December 11, 2022 at 12:17 AM

## 2022-12-11 NOTE — ANESTHESIA POSTPROCEDURE EVALUATION
Patient: Britt Park    Procedure: Procedure(s):  Open reduction internal fixation of right femur fracture       Anesthesia Type:  General    Note:     Postop Pain Control: Uneventful            Sign Out: Well controlled pain   PONV:    Neuro/Psych: Uneventful            Sign Out: Acceptable/Baseline neuro status   Airway/Respiratory: Uneventful            Sign Out: Acceptable/Baseline resp. status   CV/Hemodynamics: Uneventful            Sign Out: Acceptable CV status; No obvious hypovolemia; No obvious fluid overload   Other NRE:    DID A NON-ROUTINE EVENT OCCUR?            Last vitals:  Vitals:    12/11/22 0305 12/11/22 0810 12/11/22 1000   BP:  98/61 137/81   Pulse:   78   Resp: 16 14 12   Temp:  36.8  C (98.2  F) 36.4  C (97.5  F)   SpO2:  100% 100%       Electronically Signed By: Agus Johnson MD  December 11, 2022  10:15 AM

## 2022-12-11 NOTE — PLAN OF CARE
.Date/Time 12/11/22 [id number]    Trauma/Ortho/Medical (Choose one) Trauma    Diagnosis:Fall / closed fx of Right femur  POD#:plan today   Mental Status:A&Ox4  Activity/dangle : Bedrest  Diet:NPO  Pain:Morphine IV   Mercer/Voiding:Purewick  Tele/Restraints/Iso:NA  02/LDA:2L / Infusing   D/C Date:Pending   Other Info:Knee immobilizer on , pt had nausea Zofran x1, Orthopedic consulted. Possible surgery today.

## 2022-12-11 NOTE — OP NOTE
[facility]   Operative Note    Pre-operative diagnosis: 1.  Right displaced diaphyseal femur fracture  2.  Right intra-articular distal femur fracture   Post-operative diagnosis Same   Procedure: 1.  Open reduction intramedullary nailing of right femur fracture  2.  Open reduction internal fixation of right distal intra-articular femur fracture   Surgeon(s): Surgeon(s) and Role:     * Al Larson MD - Primary     * Landy Montes PA-C - Assisting the PA was present for the entirety of the case and was essential for patient positioning, soft tissue traction, wound closure, bandage placement, and patient transport.   Estimated blood loss: 100 mL    Specimens: * No specimens in log *   Findings:  Diaphyseal femur fracture and intra-articular femur fracture     Indications: This is a 52-year-old female who unfortunately slipped and [place]  She was brought into the emergency department after being unable to bear weight on the right lower extremity.  X-rays demonstrated a diaphyseal femur fracture.  We also performed a CT scan and it demonstrated a separate fracture fragment that was intra-articular in the distal femur.  I had a long discussion with Ventura regarding conservative treatment versus surgical management.  We did discuss the risks of surgery.  After discussing all these details she elected to undergo the above-mentioned procedures.    Description of procedure:   The patient was met in the preoperative area and the operative site, the right thigh, was signed by myself.  Preoperative antibiotics were given.  The patient was then brought back to the operating room and was placed in the supine position on the operating room table.  All bony prominences were padded at this time.  She then underwent general anesthesia.  She was then prepped and draped in normal sterile fashion.  A timeout was then called ensuring we are operating on the  correct site and the correct patient.  All staff concerns were addressed this time.  Following the timeout an incision was made over the lateral aspect of the distal femur and dissection was carried down through the skin and subcutaneous tissue.  Hemostasis was achieved.  We then placed a guidewire across the anterior distal femur where the fracture line was present.  Once we had placed a guidewire and confirmed the position on both AP and lateral views we did placed a 4.5 mm fully threaded screw in order to hold that fracture fragment well reduced.  Following this portion of the procedure we made a separate lateral incision near the fracture site and dissection was carried down through the skin and subcutaneous tissue.  We did split the IT band in line with our incision.  We then carried our dissection in a submuscular fashion and found our fracture site.  We then pulled traction and slight internal rotation and used a lobster claw reduction clamp in order to reduce the fracture site.  Once we had reduced the fracture we made an incision over the anterior aspect of the knee and dissection was carried down through the skin and subcutaneous tissue.  We divided the peritenon and divided the central portion of the patellar tendon.  We then placed a guidewire into the distal femur and measured for 340 mm retrograde nail.  We then reamed all the way up to a size 10.5 mm reamer.  We then placed a 9 mm x 340 millimeters nail.  We then placed 2 screws distally through the guide and 1 screw proximally using a perfect circles technique.  We then took x-rays of the entire femur confirming that we had an anatomic reduction with hardware in appropriate position.  We then thoroughly irrigated out all wounds and closed in a layered fashion using #1 Vicryl, 2-0 Vicryl, staples.  Sterile bandages were placed and the patient she was transferred off of the operating room table and brought back to the postanesthesia care unit where she  woke without any difficulty.    All counts correct at the end of the case.    Postoperative plan: Patient will be toe-touch weightbearing on the right lower extremity.  She will be seen in my clinic in approximately 2 to 3 weeks for staple removal.  If she is in a transitional care unit she can be seen by our nurse practitioner, Franchesca Perez.  She will then need follow-up at around 6 to 8 weeks following surgery.  She will begin weightbearing at 6 weeks following surgery.  She will be on aspirin 325 twice daily for DVT prophylaxis.

## 2022-12-11 NOTE — ED TRIAGE NOTES
Pt BIBA; Pt reports slipping and falling in a puddle of water at [employer]. Pt complaining of R knee pain that extends into the R leg.      Triage Assessment     Row Name 12/10/22 2049       Triage Assessment (Adult)    Airway WDL WDL       Respiratory WDL    Respiratory WDL WDL       Skin Circulation/Temperature WDL    Skin Circulation/Temperature WDL WDL       Cardiac WDL    Cardiac WDL WDL       Peripheral/Neurovascular WDL    Peripheral Neurovascular WDL WDL       Cognitive/Neuro/Behavioral WDL    Cognitive/Neuro/Behavioral WDL WDL

## 2022-12-11 NOTE — PROGRESS NOTES
[facility]  Hospitalist Progress Note    Assessment & Plan   Britt Park is a 52 year old female who presents with a leg pain after a fall     Mechanical fall  Right displaced diaphyseal femur fracture  Right intra-articular distal femur fracture  Was at [employer] when she slipped on some water, twisting her R knee and falling. Was unable to get up and had severe pain to R leg/ knee. No head or other trauma. No dizzy/lightheaded, no cp. In ED VSS. Labs normal.     Xray femur:  spiral comminuted fracture of the distal femur which may extend into the intercondylar aspect of the femur anteriorly. There is a small spur patellar joint effusion. The tibia and fibula are intact.     CT knee 12/11 preop: Acute comminuted moderately displaced and angulated fracture of the distal femur with intra-articular extension of the fracture.    NPO, IV fluids    12/11 open reduction intramedullary nailing of right femur fracture and ORIF of right distal intra-articular femur fracture    Patient is very sensitive to opioids: Nausea reported with morphine prior to surgery and emesis postsurgery.    Small dose of fentanyl given Intra-Op    Scheduled Tylenol 975 mg every 8    EKG with normal sinus rhythm with no acute ST segment changes    hx methemoglobinemia with dapsone    Reported to have prior surgery with C section     Nausea: Compazine and Zofran     Granulomatous myositis    Patient on folic acid 1 mg daily, methotrexate 20 mg weekly SubQ, prednisone 10 mg daily]    Follow with Rheumatology and [place]. On prednisone/methotx. Received methotrexate dosing 12/10/2022  (normally Wednesdays).     Patient given stress dose steroids given chronic steroid usage 50 mg hydrocortisone just before surgery and 25 mg q8 hours x 24 hours after surgery for SDS (50 mg ordered, 25 mg q 8)     Will need to resume home dosing of steroids after stress dose    Continue folic acid    will need next methotrexate next Wednesday  12/14 (usual day) >> would try to confirm with patient if she actually took on 12/10 to avoid close dosing of methotrexate but will need to confirm (in the PACU nauseous and not really able to discuss)        HTN  HLD    resume atorvastatin 10 mg daily    hold ARB perioperatively      Hepatic steatosis  10/2022 LFTs normal. Thought 2/2 methotx vs weight.   - routine LFTs     MAYA  - continue home CPAP     GERD  - resume pantoprazole daily     Hx methemoglobinemia with dapsone  - noted      Diet: NPO for Medical/Clinical Reasons Except for: Meds, Ice Chips  Mercer Catheter: Not present  DVT Prophylaxis: post operative per ortho surgery DVT prevention per orthopedics  Code Status: Full Code       Expected Discharge Date: 12/12/2022               JUVE VELASQUEZ MD,  Hospitalist Service  [facility]  ______________________________________________________________________    Interval History   Patient seen in the postanesthesia area and she was quite nauseous.  Anesthesia present reporting that she was quite sensitive to opioids.  Getting Compazine.  No other history obtainable    -Data reviewed today: I reviewed all new labs and imaging results over the last 24 hours. I personally reviewed   EKG  Sinus rhythm   Normal ECG   No previous ECGs available     Physical Exam   Temp: 98.1  F (36.7  C) Temp src: Oral BP: 130/80 Pulse: 66   Resp: 16 SpO2: 98 % O2 Device: None (Room air) Oxygen Delivery: 2 LPM  There were no vitals filed for this visit.  Constitutional: Patient somewhat sleepy but just out of anesthesia.  She is nauseated.  On 2 L nasal cannula  Respiratory: Breath sounds CTA. No increased work of breathing. (Slightly limited)   Cardiovascular: RRR, no rub or murmur. No peripheral edema.  GI: Soft, non-tender, but limited  Skin: Extremities warm with an immobilizer on her right knee/leg      Medications     sodium chloride 100 mL/hr at 12/11/22 0114       folic acid  1 mg Oral Daily      hydrocortisone sodium succinate PF  50 mg Intravenous See Admin Instructions     pantoprazole  40 mg Intravenous Daily with breakfast     [START ON 12/12/2022] predniSONE  10 mg Oral Daily     sodium chloride (PF)  3 mL Intracatheter Q8H       Data   Recent Labs   Lab 12/10/22  2110   WBC 7.4   HGB 12.0   *      INR 0.97      POTASSIUM 3.6   CHLORIDE 104   CO2 30   BUN 19   CR 0.59   ANIONGAP 4   LASHONDA 8.7   GLC 89   ALBUMIN 3.7   PROTTOTAL 6.6*   BILITOTAL 0.3   ALKPHOS 45   ALT 25   AST 20       Imaging:  Recent Results (from the past 24 hour(s))   XR Femur Right 2 Views    Narrative    EXAM: XR FEMUR RIGHT 2 VIEWS, XR KNEE RIGHT 1/2 VIEWS  LOCATION: [facility]  DATE/TIME: 12/10/2022 9:36 PM    INDICATION: trauma, pain  COMPARISON: None.      Impression    IMPRESSION: The bones are well-mineralized. There is a spiral comminuted fracture of the distal femur which may extend into the intercondylar aspect of the femur anteriorly. There is a small spur patellar joint effusion. The tibia and fibula are intact.   Further evaluation with dedicated CT of the knee may be warranted to better assess for intra-articular extension.   XR Knee Right 1/2 Views    Narrative    EXAM: XR FEMUR RIGHT 2 VIEWS, XR KNEE RIGHT 1/2 VIEWS  LOCATION: [facility]  DATE/TIME: 12/10/2022 9:36 PM    INDICATION: trauma, pain  COMPARISON: None.      Impression    IMPRESSION: The bones are well-mineralized. There is a spiral comminuted fracture of the distal femur which may extend into the intercondylar aspect of the femur anteriorly. There is a small spur patellar joint effusion. The tibia and fibula are intact.   Further evaluation with dedicated CT of the knee may be warranted to better assess for intra-articular extension.

## 2022-12-11 NOTE — CONSULTS
[facility]    Orthopedics Consultation    Date of Admission:  12/10/2022    Assessment & Plan   Britt Park is a 52 year old female who was admitted on 12/10/2022. I was asked to see the patient for right thigh pain.  She has a right displaced femoral shaft fracture with extension into the knee joint.     Discussed both operative and nonoperative management.  Risks of surgery discussed included but not limited to bleeding, infection, damage to surrounding neurovascular structures, stiffness, malunion, delayed union, nonunion, need for revision surgery, blood clots, pulmonary embolus, stroke, anesthetic complications and even death.  No guarantees given or implied. Patient thoughtfully acknowledges risks and wishes to proceed with open reduction internal fixation of right femur fracture today.     Hgb 12.0  NPO  NWB right LE  Knee immobilizer currently    Al Larson MD    Code Status    Full Code    Reason for Consult   Reason for consult: Right thigh pain    Primary Care Physician   REYNOLD RAMOS    History of Present Illness   Britt Park is a 52 year old female who presents with a chief complaint of right thigh pain.  The patient states that [place] and [place] on a substantial amount of water and noticed substantial deformity to the right lower extremity.  She was unable to bear weight and was unable to ambulate following the fall.  She was seen in the emergency department and x-rays of the femur were taken demonstrating a diaphyseal femur fracture.  A CT scan was eventually taken and demonstrated extension of of the fracture into the knee joint.  The patient complains of substantial thigh pain but it is improved when she was placed into a knee immobilizer.  She denies any numbness or tingling in the right lower extremity.  She denies any prior surgery to the right femur.  She does not take anticoagulants.  Of note she does have granulomatous  myositis.  She has been followed by rheumatology at [facility].    MEDS:   No current outpatient medications on file.       PAST MEDICAL HISTORY: No past medical history on file.    PAST SURGICAL HISTORY: History reviewed. No pertinent surgical history.    FAMILY HISTORY: History reviewed. No pertinent family history.    SOCIAL HISTORY:   Social History     Tobacco Use     Smoking status: Not on file     Smokeless tobacco: Not on file   Substance Use Topics     Alcohol use: Not on file       ALLERGIES:    Allergies   Allergen Reactions     Benadryl [Diphenhydramine]      Sulfa Drugs        ROS:  10 point ROS neg other than the symptoms noted above in the HPI.    Physical Exam   Temp: 98.2  F (36.8  C) Temp src: Oral BP: 98/61 Pulse: 66   Resp: 14 SpO2: 100 % O2 Device: Nasal cannula Oxygen Delivery: 5 LPM  Vital Signs with Ranges  Temp:  [98.1  F (36.7  C)-98.6  F (37  C)] 98.2  F (36.8  C)  Pulse:  [66-73] 66  Resp:  [14-16] 14  BP: ()/(61-80) 98/61  SpO2:  [97 %-100 %] 100 %  0 lbs 0 oz    Constitutional: Pleasant, alert, appropriate, following commands.  HEENT: Head atraumatic normocephalic. Pupils equal round and reactive to light.  Respiratory: Unlabored breathing no audible wheeze  Cardiovascular: Regular rate and rhythm  GI: Abdomen soft nontender nondistended.  Lymph/Hematologic: No lymphadenopathy in areas examined  Genitourinary:  No henry  Skin: No rashes, no cyanosis, no edema.  Musculoskeletal: Focused examination the right lower extremity demonstrates significant swelling and tenderness to palpation throughout the right thigh.  I am unable to assess knee range of motion or hip range of motion due to pain.  She is able to flex and extend at the toes and the ankle.  She has good capillary refill less than 2 seconds and normal sensation in all nerve distributions.  She has 2+ DP and PT pulses.  Neurologic: normal without focal findings, mental status, speech normal, alert and oriented x iii,  [facility]      Data   Results for orders placed or performed during the hospital encounter of 12/10/22 (from the past 24 hour(s))   CBC with platelets differential    Narrative    The following orders were created for panel order CBC with platelets differential.  Procedure                               Abnormality         Status                     ---------                               -----------         ------                     CBC with platelets and d...[[id number]]  Abnormal            Final result                 Please view results for these tests on the individual orders.   ABO/Rh type and screen    Narrative    The following orders were created for panel order ABO/Rh type and screen.  Procedure                               Abnormality         Status                     ---------                               -----------         ------                     Adult Type and Screen[[id number]]                            Final result                 Please view results for these tests on the individual orders.   INR   Result Value Ref Range    INR 0.97 0.85 - 1.15   Basic metabolic panel   Result Value Ref Range    Sodium 138 133 - 144 mmol/L    Potassium 3.6 3.4 - 5.3 mmol/L    Chloride 104 94 - 109 mmol/L    Carbon Dioxide (CO2) 30 20 - 32 mmol/L    Anion Gap 4 3 - 14 mmol/L    Urea Nitrogen 19 7 - 30 mg/dL    Creatinine 0.59 0.52 - 1.04 mg/dL    Calcium 8.7 8.5 - 10.1 mg/dL    Glucose 89 70 - 99 mg/dL    GFR Estimate >90 >60 mL/min/1.73m2   CBC with platelets and differential   Result Value Ref Range    WBC Count 7.4 4.0 - 11.0 10e3/uL    RBC Count 3.50 (L) 3.80 - 5.20 10e6/uL    Hemoglobin 12.0 11.7 - 15.7 g/dL    Hematocrit 37.3 35.0 - 47.0 %     (H) 78 - 100 fL    MCH 34.3 (H) 26.5 - 33.0 pg    MCHC 32.2 31.5 - 36.5 g/dL    RDW 14.1 10.0 - 15.0 %    Platelet Count 314 150 - 450 10e3/uL    % Neutrophils 43 %    % Lymphocytes 47 %    % Monocytes 9 %    % Eosinophils 0 %    % Basophils 1 %    % Immature  Granulocytes 0 %    NRBCs per 100 WBC 0 <1 /100    Absolute Neutrophils 3.2 1.6 - 8.3 10e3/uL    Absolute Lymphocytes 3.5 0.8 - 5.3 10e3/uL    Absolute Monocytes 0.6 0.0 - 1.3 10e3/uL    Absolute Eosinophils 0.0 0.0 - 0.7 10e3/uL    Absolute Basophils 0.1 0.0 - 0.2 10e3/uL    Absolute Immature Granulocytes 0.0 <=0.4 10e3/uL    Absolute NRBCs 0.0 10e3/uL   Adult Type and Screen   Result Value Ref Range    ABO/RH(D) B POS     Antibody Screen Negative Negative    SPECIMEN EXPIRATION DATE [id number]    Hepatic panel   Result Value Ref Range    Bilirubin Total 0.3 0.2 - 1.3 mg/dL    Bilirubin Direct <0.1 0.0 - 0.2 mg/dL    Protein Total 6.6 (L) 6.8 - 8.8 g/dL    Albumin 3.7 3.4 - 5.0 g/dL    Alkaline Phosphatase 45 40 - 150 U/L    AST 20 0 - 45 U/L    ALT 25 0 - 50 U/L   XR Femur Right 2 Views    Narrative    EXAM: XR FEMUR RIGHT 2 VIEWS, XR KNEE RIGHT 1/2 VIEWS  LOCATION: [facility]  DATE/TIME: 12/10/2022 9:36 PM    INDICATION: trauma, pain  COMPARISON: None.      Impression    IMPRESSION: The bones are well-mineralized. There is a spiral comminuted fracture of the distal femur which may extend into the intercondylar aspect of the femur anteriorly. There is a small spur patellar joint effusion. The tibia and fibula are intact.   Further evaluation with dedicated CT of the knee may be warranted to better assess for intra-articular extension.   XR Knee Right 1/2 Views    Narrative    EXAM: XR FEMUR RIGHT 2 VIEWS, XR KNEE RIGHT 1/2 VIEWS  LOCATION: [facility]  DATE/TIME: 12/10/2022 9:36 PM    INDICATION: trauma, pain  COMPARISON: None.      Impression    IMPRESSION: The bones are well-mineralized. There is a spiral comminuted fracture of the distal femur which may extend into the intercondylar aspect of the femur anteriorly. There is a small spur patellar joint effusion. The tibia and fibula are intact.   Further evaluation with dedicated CT of the knee may be  warranted to better assess for intra-articular extension.   Asymptomatic COVID-19 Virus (Coronavirus) by PCR Nasopharyngeal    Specimen: Nasopharyngeal; Swab   Result Value Ref Range    SARS CoV2 PCR Negative Negative    Narrative    Testing was performed using the Xpert Xpress SARS-CoV-2 Assay on the Cepheid Gene-Xpert Instrument Systems. Additional information about this Emergency Use Authorization (EUA) assay can be found via the Lab Guide. This test should be ordered for the detection of SARS-CoV-2 in individuals who meet SARS-CoV-2 clinical and/or epidemiological criteria as well as from individuals without symptoms or other reasons to suspect COVID-19. Test performance for asymptomatic patients has only been established in anterior nasal swab specimens. This test is for in vitro diagnostic use under the FDA EUA for laboratories certified under CLIA to perform high complexity testing. This test has not been FDA cleared or approved. A negative result does not rule out the presence of PCR inhibitors in the specimen or target RNA concentration below the limit of detection for the assay. The possibility of a false negative should be considered if the patient's recent exposure or clinical presentation suggests COVID-19. This test was validated by the [facility]. This laboratory is certified under the Clinical Laboratory Improvement Amendments (CLIA) as qualified to perform high complexity laboratory testing.     CT Knee Right w/o Contrast    Narrative    EXAM: CT KNEE RIGHT WITHOUT CONTRAST  LOCATION: [facility]  DATE/TIME: 12/11/2022, 7:07 AM    INDICATION: Knee pain. Fracture.  COMPARISON: 12/10/2022 radiographs.  TECHNIQUE: Noncontrast. Axial, sagittal and coronal thin-section reconstruction. Dose reduction techniques were used.     FINDINGS:     BONES:  -Acute comminuted moderately displaced and angulated fracture of the distal third of the femur.  Portions of this fracture extend into the joint at the level of the anterior portion of the medial femoral condyle, as seen on series 6 image 31 and series 3   image 104. There is moderate displacement of the fracture fragments proximally with the apex of the fracture angulated anteriorly. No evidence of additional fractures. Osteopenia.    SOFT TISSUES:  -Localized soft tissue stranding around the fracture site likely a combination of edema and poorly defined blood products. There is marked atrophy of the posterior thigh musculature as well as the vastus lateralis. Moderate-sized lipohemarthrosis in the   knee without evidence of intra-articular fracture fragments.      Impression    IMPRESSION:  1.  Acute comminuted moderately displaced and angulated fracture of the distal femur with intra-articular extension of the fracture.    2.  Moderate-sized lipohemarthrosis.       EKG 12-lead, tracing only   Result Value Ref Range    Systolic Blood Pressure  mmHg    Diastolic Blood Pressure  mmHg    Ventricular Rate 71 BPM    Atrial Rate 71 BPM    ID Interval 150 ms    QRS Duration 78 ms     ms    QTc 454 ms    P Axis 61 degrees    R AXIS 75 degrees    T Axis 47 degrees    Interpretation ECG       Sinus rhythm  Normal ECG  No previous ECGs available

## 2022-12-11 NOTE — ANESTHESIA PROCEDURE NOTES
Airway       Patient location during procedure: OR ([facility] - Operating Room or Procedural Area)       Procedure Start/Stop Times: 12/11/2022 8:26 AM  Staff -        Anesthesiologist:  Agus Johnson MD       CRNA: Keila Mendez APRN CRNA       Performed By: CRNAIndications and Patient Condition       Indications for airway management: tegan-procedural       Induction type:intravenous       Mask difficulty assessment: 1 - vent by mask    Final Airway Details       Final airway type: endotracheal airway       Successful airway: ETT - single and Oral  Endotracheal Airway Details        ETT size (mm): 7.0       Cuffed: yes       Successful intubation technique: video laryngoscopy       VL Blade Size: Ng 3       Grade View of Cords: 1       Adjucts: stylet       Position: Right       Measured from: lips       Secured at (cm): 21       Bite block used: None    Post intubation assessment        Placement verified by: capnometry, equal breath sounds and chest rise        Number of attempts at approach: 1       Number of other approaches attempted: 0       Secured with: pink tape       Ease of procedure: easy       Dentition: Intact and Unchanged    Medication(s) Administered   Medication Administration Time: 12/11/2022 8:26 AM

## 2022-12-11 NOTE — PLAN OF CARE
Goal Outcome Evaluation:       Diagnosis:Fall / closed fx of Right femur  POD#:0  Mental Status:A&Ox4. Lethargic   Activity/dangle: TTWB  Diet:Regular  Pain:Morphine IV   Mercer/Voiding:DTV  Tele/Restraints/Iso:NA  02/LDA:2L / Infusing   D/C Date:Pending   Other Info:Hinged knee brace pending

## 2022-12-11 NOTE — ANESTHESIA PROCEDURE NOTES
Femoral Procedure Note    Pre-Procedure   Staff -        Anesthesiologist:  Agus Johnson MD       Performed By: anesthesiologist       Location: pre-op       Pre-Anesthestic Checklist: patient identified, IV checked, site marked, risks and benefits discussed, informed consent, monitors and equipment checked, pre-op evaluation, at physician/surgeon's request and post-op pain management  Timeout:       Correct Patient: Yes        Correct Procedure: Yes        Correct Site: Yes        Correct Position: Yes        Correct Laterality: Yes        Site Marked: Yes  Procedure Documentation  Procedure: Femoral       Laterality: right       Patient Position: supine       Patient Prep/Sterile Barriers: sterile gloves, mask, patient draped       Skin prep: Chloraprep       Local skin infiltrated with mL of 1% lidocaine.        Needle Type: [employer] needle       Needle Gauge: 17.        Needle Length (Inches): 3.13        Catheter: 16 G.          Catheter threaded easily.             Ultrasound guided       1. Ultrasound was used to identify targeted nerve, plexus, vascular marker, or fascial plane and place a needle adjacent to it in real-time.       2. Ultrasound was used to visualize the spread of anesthetic in close proximity to the above referenced structure.       3. A permanent image is entered into the patient's record.       4. The visualized anatomic structures appeared normal.       5. There were no apparent abnormal pathologic findings.    Assessment/Narrative         The placement was negative for: blood aspirated, painful injection and site bleeding       Paresthesias: No.       Bolus given via needle..        Secured via.        Insertion/Infusion Method: Single Shot       Complications: none    Medication(s) Administered   Bupivacaine 0.5% w/ 1:400K Epi (Injection) - Injection   30 mL - 12/11/2022 8:11:00 AM   Comments:  Site prepped sterilely with Chlorprep and sterile drape applied to area.  Ultrasound with  "sterile sheath used to visualize femoral artery, nerve and vein.  1% lidocaine used for skin topicalization.  Pajunk needle advanced under direct ultrasound guidance.  30 ml 0.5% bupivacaine with 1:400,000 epinephrine injected incrementally, with aspiration every 5 ml negative for heme. Patient tolerated procedure well.    Ultrasound Interpretation, peripheral nerve block    1.  As noted above, under ultrasound guidance, the needle was inserted and placed in close proximity to the femoral nerve.  2. Ultrasound was also used to visualize the spread of the anesthetic in close proximity to the nerve being blocked.  3. The nerve appeared anatomically normal.  4. There were no apparent abnormal pathological findings.  5. A permanent ultrasound image was saved n the patient's record.    Agus Johnson MD   8:18 AM        FOR [facility] ([facility]) ONLY:   Pain Team Contact information: please page the Pain Team Via [employer] [place]. Search \"Pain\". During daytime hours, please page the attending first. At night please page the resident first.    "

## 2022-12-11 NOTE — PHARMACY-ADMISSION MEDICATION HISTORY
Pharmacy Medication History  Admission medication history interview status for the 12/10/2022  admission is complete. See EPIC admission navigator for prior to admission medications     Location of Interview: Patient room  Medication history sources: Patient, Surescripts and Care Everywhere    Significant changes made to the medication list:  Added all medications    In the past week, patient estimated taking medication this percent of the time: greater than 90%    Additional medication history information:   Pt reports that normally she has the methotrexate injections on Wednesdays, but this week forgot, and they administered this weeks injection today, 12/10.    Medication reconciliation completed by provider prior to medication history? No    Time spent in this activity: 20 minutes    Prior to Admission medications    Medication Sig Last Dose Taking? Auth Provider Long Term End Date   atorvastatin (LIPITOR) 10 MG tablet Take 10 mg by mouth daily 12/10/2022 at am Yes Unknown, Entered By History Yes    estradiol (ESTRACE) 0.1 MG/GM vaginal cream Place 2 g vaginally twice a week Past Week Yes Unknown, Entered By History     folic acid (FOLVITE) 1 MG tablet Take 1 mg by mouth daily 12/10/2022 at am Yes Unknown, Entered By History     losartan (COZAAR) 25 MG tablet Take 25 mg by mouth daily 12/10/2022 at am Yes Unknown, Entered By History Yes    Methotrexate 20 MG/0.8ML SOSY Inject 0.8 mLs Subcutaneous once a week On Wednesdays. 12/10/2022 at am Yes Unknown, Entered By History Yes    pantoprazole (PROTONIX) 40 MG EC tablet Take 40 mg by mouth daily 12/10/2022 at am Yes Unknown, Entered By History     predniSONE (DELTASONE) 5 MG tablet Take 10 mg by mouth daily 12/10/2022 at am Yes Unknown, Entered By History         The information provided in this note is only as accurate as the sources available at the time of update(s)

## 2022-12-12 ENCOUNTER — APPOINTMENT (OUTPATIENT)
Dept: PHYSICAL THERAPY | Facility: CLINIC | Age: 52
DRG: 481 | End: 2022-12-12
Payer: COMMERCIAL

## 2022-12-12 LAB
ANION GAP SERPL CALCULATED.3IONS-SCNC: 5 MMOL/L (ref 3–14)
BUN SERPL-MCNC: 11 MG/DL (ref 7–30)
CALCIUM SERPL-MCNC: 8.8 MG/DL (ref 8.5–10.1)
CHLORIDE BLD-SCNC: 106 MMOL/L (ref 94–109)
CO2 SERPL-SCNC: 27 MMOL/L (ref 20–32)
CREAT SERPL-MCNC: 0.4 MG/DL (ref 0.52–1.04)
GFR SERPL CREATININE-BSD FRML MDRD: >90 ML/MIN/1.73M2
GLUCOSE BLD-MCNC: 127 MG/DL (ref 70–99)
GLUCOSE BLDC GLUCOMTR-MCNC: 182 MG/DL (ref 70–99)
HGB BLD-MCNC: 10.9 G/DL (ref 11.7–15.7)
POTASSIUM BLD-SCNC: 3.8 MMOL/L (ref 3.4–5.3)
SODIUM SERPL-SCNC: 138 MMOL/L (ref 133–144)

## 2022-12-12 PROCEDURE — 250N000011 HC RX IP 250 OP 636: Performed by: INTERNAL MEDICINE

## 2022-12-12 PROCEDURE — 120N000001 HC R&B MED SURG/OB

## 2022-12-12 PROCEDURE — 250N000013 HC RX MED GY IP 250 OP 250 PS 637: Performed by: INTERNAL MEDICINE

## 2022-12-12 PROCEDURE — 97110 THERAPEUTIC EXERCISES: CPT | Mod: GP | Performed by: PHYSICAL THERAPIST

## 2022-12-12 PROCEDURE — 80048 BASIC METABOLIC PNL TOTAL CA: CPT | Performed by: INTERNAL MEDICINE

## 2022-12-12 PROCEDURE — 250N000012 HC RX MED GY IP 250 OP 636 PS 637: Performed by: INTERNAL MEDICINE

## 2022-12-12 PROCEDURE — 36415 COLL VENOUS BLD VENIPUNCTURE: CPT | Performed by: PHYSICIAN ASSISTANT

## 2022-12-12 PROCEDURE — 85018 HEMOGLOBIN: CPT | Performed by: PHYSICIAN ASSISTANT

## 2022-12-12 PROCEDURE — 97162 PT EVAL MOD COMPLEX 30 MIN: CPT | Mod: GP | Performed by: PHYSICAL THERAPIST

## 2022-12-12 PROCEDURE — 97116 GAIT TRAINING THERAPY: CPT | Mod: GP | Performed by: PHYSICAL THERAPIST

## 2022-12-12 PROCEDURE — L1832 KO ADJ JNT POS R SUP PRE CST: HCPCS

## 2022-12-12 PROCEDURE — 99232 SBSQ HOSP IP/OBS MODERATE 35: CPT | Performed by: STUDENT IN AN ORGANIZED HEALTH CARE EDUCATION/TRAINING PROGRAM

## 2022-12-12 PROCEDURE — 250N000013 HC RX MED GY IP 250 OP 250 PS 637: Performed by: PHYSICIAN ASSISTANT

## 2022-12-12 PROCEDURE — 97530 THERAPEUTIC ACTIVITIES: CPT | Mod: GP | Performed by: PHYSICAL THERAPIST

## 2022-12-12 PROCEDURE — 250N000011 HC RX IP 250 OP 636: Performed by: PHYSICIAN ASSISTANT

## 2022-12-12 RX ORDER — POLYETHYLENE GLYCOL 3350 17 G/17G
17 POWDER, FOR SOLUTION ORAL DAILY
Qty: 510 G | Refills: 0 | Status: ON HOLD | DISCHARGE
Start: 2022-12-12 | End: 2022-12-30

## 2022-12-12 RX ORDER — AMOXICILLIN 250 MG
1 CAPSULE ORAL 2 TIMES DAILY PRN
Qty: 14 TABLET | Refills: 0 | DISCHARGE
Start: 2022-12-12 | End: 2023-02-13

## 2022-12-12 RX ORDER — ASPIRIN 325 MG
325 TABLET, DELAYED RELEASE (ENTERIC COATED) ORAL 2 TIMES DAILY
Qty: 30 TABLET | Refills: 0 | Status: ON HOLD | DISCHARGE
Start: 2022-12-12 | End: 2022-12-30

## 2022-12-12 RX ORDER — ONDANSETRON 4 MG/1
4 TABLET, ORALLY DISINTEGRATING ORAL EVERY 6 HOURS PRN
Qty: 5 TABLET | Refills: 0 | Status: ON HOLD | DISCHARGE
Start: 2022-12-12 | End: 2022-12-30

## 2022-12-12 RX ORDER — ACETAMINOPHEN 325 MG/1
650 TABLET ORAL EVERY 6 HOURS PRN
Qty: 100 TABLET | Refills: 0 | DISCHARGE
Start: 2022-12-12

## 2022-12-12 RX ORDER — METHOTREXATE 25 MG/ML
20 INJECTION, SOLUTION INTRA-ARTERIAL; INTRAMUSCULAR; INTRAVENOUS WEEKLY
Status: DISCONTINUED | OUTPATIENT
Start: 2022-12-14 | End: 2022-12-17 | Stop reason: HOSPADM

## 2022-12-12 RX ORDER — LOSARTAN POTASSIUM 25 MG/1
25 TABLET ORAL DAILY
Status: DISCONTINUED | OUTPATIENT
Start: 2022-12-13 | End: 2022-12-17 | Stop reason: HOSPADM

## 2022-12-12 RX ORDER — ESTRADIOL 0.1 MG/G
CREAM VAGINAL
Qty: 42.5 G | Refills: 1 | Status: ON HOLD | OUTPATIENT
Start: 2022-12-12 | End: 2023-12-20

## 2022-12-12 RX ORDER — HYDROXYZINE HYDROCHLORIDE 25 MG/1
25 TABLET, FILM COATED ORAL EVERY 6 HOURS PRN
Qty: 15 TABLET | Refills: 0 | Status: ON HOLD | DISCHARGE
Start: 2022-12-12 | End: 2022-12-30

## 2022-12-12 RX ORDER — IBUPROFEN 600 MG/1
600 TABLET, FILM COATED ORAL EVERY 8 HOURS PRN
Qty: 90 TABLET | Refills: 0 | Status: ON HOLD | DISCHARGE
Start: 2022-12-12 | End: 2022-12-30

## 2022-12-12 RX ORDER — OXYCODONE HYDROCHLORIDE 5 MG/1
5 TABLET ORAL EVERY 4 HOURS PRN
Qty: 25 TABLET | Refills: 0 | Status: ON HOLD | OUTPATIENT
Start: 2022-12-12 | End: 2022-12-30

## 2022-12-12 RX ADMIN — SENNOSIDES AND DOCUSATE SODIUM 1 TABLET: 50; 8.6 TABLET ORAL at 08:56

## 2022-12-12 RX ADMIN — ACETAMINOPHEN 975 MG: 325 TABLET, FILM COATED ORAL at 15:43

## 2022-12-12 RX ADMIN — FOLIC ACID 1 MG: 1 TABLET ORAL at 08:56

## 2022-12-12 RX ADMIN — ASPIRIN 325 MG: 325 TABLET, COATED ORAL at 20:46

## 2022-12-12 RX ADMIN — SENNOSIDES AND DOCUSATE SODIUM 1 TABLET: 50; 8.6 TABLET ORAL at 20:46

## 2022-12-12 RX ADMIN — GABAPENTIN 100 MG: 100 CAPSULE ORAL at 15:43

## 2022-12-12 RX ADMIN — GABAPENTIN 100 MG: 100 CAPSULE ORAL at 08:56

## 2022-12-12 RX ADMIN — ATORVASTATIN CALCIUM 10 MG: 10 TABLET, FILM COATED ORAL at 08:56

## 2022-12-12 RX ADMIN — CEFAZOLIN 1 G: 1 INJECTION, POWDER, FOR SOLUTION INTRAMUSCULAR; INTRAVENOUS at 00:37

## 2022-12-12 RX ADMIN — ASPIRIN 325 MG: 325 TABLET, COATED ORAL at 08:56

## 2022-12-12 RX ADMIN — PREDNISONE 10 MG: 5 TABLET ORAL at 12:57

## 2022-12-12 RX ADMIN — HYDROCORTISONE SODIUM SUCCINATE 25 MG: 100 INJECTION, POWDER, FOR SOLUTION INTRAMUSCULAR; INTRAVENOUS at 00:37

## 2022-12-12 RX ADMIN — OXYCODONE HYDROCHLORIDE 10 MG: 5 TABLET ORAL at 21:26

## 2022-12-12 RX ADMIN — ACETAMINOPHEN 975 MG: 325 TABLET, FILM COATED ORAL at 08:56

## 2022-12-12 RX ADMIN — ACETAMINOPHEN 975 MG: 325 TABLET, FILM COATED ORAL at 00:37

## 2022-12-12 RX ADMIN — OXYCODONE HYDROCHLORIDE 5 MG: 5 TABLET ORAL at 17:17

## 2022-12-12 RX ADMIN — OXYCODONE HYDROCHLORIDE 5 MG: 5 TABLET ORAL at 00:49

## 2022-12-12 RX ADMIN — OXYCODONE HYDROCHLORIDE 5 MG: 5 TABLET ORAL at 11:20

## 2022-12-12 RX ADMIN — HYDROXYZINE HYDROCHLORIDE 25 MG: 25 TABLET, FILM COATED ORAL at 17:19

## 2022-12-12 RX ADMIN — GABAPENTIN 100 MG: 100 CAPSULE ORAL at 21:26

## 2022-12-12 RX ADMIN — OXYCODONE HYDROCHLORIDE 5 MG: 5 TABLET ORAL at 06:51

## 2022-12-12 RX ADMIN — PANTOPRAZOLE SODIUM 40 MG: 40 TABLET, DELAYED RELEASE ORAL at 08:56

## 2022-12-12 RX ADMIN — HYDROCORTISONE SODIUM SUCCINATE 25 MG: 100 INJECTION, POWDER, FOR SOLUTION INTRAMUSCULAR; INTRAVENOUS at 08:56

## 2022-12-12 ASSESSMENT — ACTIVITIES OF DAILY LIVING (ADL)
ADLS_ACUITY_SCORE: 23
ADLS_ACUITY_SCORE: 20
ADLS_ACUITY_SCORE: 23
ADLS_ACUITY_SCORE: 20
ADLS_ACUITY_SCORE: 23
ADLS_ACUITY_SCORE: 20
ADLS_ACUITY_SCORE: 20
ADLS_ACUITY_SCORE: 23
ADLS_ACUITY_SCORE: 25
ADLS_ACUITY_SCORE: 23
ADLS_ACUITY_SCORE: 20
ADLS_ACUITY_SCORE: 20

## 2022-12-12 NOTE — PROGRESS NOTES
Subjective: We received an order on EPIC for a hinged knee brace on 2306-01.  Lock brace when patient is ambulating and during tranfers.  Ok to unlock brace when patient safely seated or in bed.    Objective/G:  Fit TSCOPE     Assessment:  I fit the TSCOPE to the Pt. R leg and trimmed the straps to the correct length.  I adjusted the brace to the correct height.  I bent the bars to the shape of the leg. The brace fits the Pt. R leg adequate. I explained to the patient that the brace will keep her R knee locked in extension when pressing the red buttons distally, and unlock the knee when pressing the red buttons proximally.    Plan:  I have donned the brace and left it on the Pt leg.  Pt. was informed if any questions comments or concerns they are to contact us.    Khanh GALINDO

## 2022-12-12 NOTE — PROGRESS NOTES
12/12/22 1000   Living Environment   People in Home spouse   Current Living Arrangements house   Home Accessibility stairs to enter home;stairs within home   Number of Stairs, Main Entrance 3   Stair Railings, Main Entrance railings safe and in good condition   Number of Stairs, Within Home, Primary greater than 10 stairs   Stair Railings, Within Home, Primary railings safe and in good condition   Living Environment Comments two story home   Self-Care   Usual Activity Tolerance moderate   Current Activity Tolerance poor   Regular Exercise No   Equipment Currently Used at Home none   Fall history within last six months yes   Number of times patient has fallen within last six months 1   Activity/Exercise/Self-Care Comment indep with mobility and ADLS, 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   General Information   Onset of Illness/Injury or Date of Surgery 12/11/22   Pertinent History of Current Problem (include personal factors and/or comorbidities that impact the POC) a 52 year old female who presents with a leg pain after a fall, s/p R ORIF for femur fracture   Existing Precautions/Restrictions brace worn when out of bed;weight bearing;fall   Weight-Bearing Status - RLE toe touch weight-bearing   Cognition   Affect/Mental Status (Cognition) WFL   Orientation Status (Cognition) oriented x 4   Pain Assessment   Patient Currently in Pain Yes, see Vital Sign flowsheet   Posture    Posture Forward head position   Range of Motion (ROM)   ROM Comment LLE ROM WFL, R LE immobilized; limited active UE ROM at elbow and shoulder, no active FF past 90 lacks full elbow extension   Strength (Manual Muscle Testing)   Strength Comments L HF, KE 3+/5   Bed Mobility   Comment, (Bed Mobility) able to elevate into propped  long sitting moving LLE to EOB, min A to complete moving to EOB   Transfers   Comment, (Transfers) unable to elevate into standing w/o mod A R TTWB and WW   Gait/Stairs (Locomotion)   Comment, (Gait/Stairs)  unable   Balance   Balance Comments impaired standing TTWB   Clinical Impression   Criteria for Skilled Therapeutic Intervention Yes, treatment indicated   PT Diagnosis (PT) R femur fracture   Influenced by the following impairments impaired gait, dec indep transfers pain   Functional limitations due to impairments impaired mobility   Clinical Presentation (PT Evaluation Complexity) Evolving/Changing   Clinical Presentation Rationale clinical assessment   Clinical Decision Making (Complexity) low complexity   Planned Therapy Interventions (PT) bed mobility training;gait training;ROM (range of motion);stair training;transfer training   Anticipated Equipment Needs at Discharge (PT) walker, rolling;wheelchair   Risk & Benefits of therapy have been explained evaluation/treatment results reviewed;care plan/treatment goals reviewed;risks/benefits reviewed   Physical Therapy Goals   PT Predicted Duration/Target Date for Goal Attainment 12/14/22   PT Goals Bed Mobility;Gait;Transfers;Stairs   PT Discharge Planning   PT Discharge Recommendation (DC Rec) [facility]-Motivated patient will benefit from intensive, interdisciplinary therapy.  Anticipate will be able to tolerate 3 hours of therapy per day   PT Rationale for DC Rec Pt below her previous independent baseline for mobility and ADLS req  A x 1 and WW will benefit from PT during stay and cont PT in rehab setting, pt mod A currrently for mobility, has 2 story home and spouse who works construction, pt unable to discharge to home safely

## 2022-12-12 NOTE — PROVIDER NOTIFICATION
MD Notification    Notified Person:  Ortho PA    Notified Person Name: Kristine Yost    Notification Date/Time: 12/12/22 1600    Notification Interaction: Spoke with provider     Purpose of Notification: Per ortho note: Hinged knee brace ordered.  Have locked in extension for all transfers/ambulation.  Ok to unlock for ROM of right knee when in bed/sitting chair.    Pt complaining of pain with the brace while in bed even when unlocked     Orders Received: Okay to take brace off when pt is in bed but must reapply anytime pt gets up    Comments: Will continue to monitor

## 2022-12-12 NOTE — PROGRESS NOTES
[facility]  Hospitalist Progress Note    Date of Service: 12/12/2022    Assessment & Plan     Britt Park is a 52 year old female who presents with a leg pain after a fall     Mechanical fall  Right displaced diaphyseal femur fracture  Right intra-articular distal femur fracture  Was at [facility] when she slipped on some water, twisting her R knee and falling. Was unable to get up and had severe pain to R leg/ knee. No head or other trauma. No dizzy/lightheaded, no cp. In ED VSS. Labs normal.     Xray femur:  spiral comminuted fracture of the distal femur which may extend into the intercondylar aspect of the femur anteriorly. There is a small spur patellar joint effusion. The tibia and fibula are intact.     CT knee 12/11 preop: Acute comminuted moderately displaced and angulated fracture of the distal femur with intra-articular extension of the fracture.    NPO, IV fluids    12/11 open reduction intramedullary nailing of right femur fracture and ORIF of right distal intra-articular femur fracture    Patient is very sensitive to opioids: Nausea reported with morphine prior to surgery and emesis postsurgery.    Small dose of fentanyl given Intra-Op  Plan:    Scheduled Tylenol 975 mg every 8    hx methemoglobinemia with dapsone    Reported to have prior surgery with C section     Nausea: Compazine and Zofran     Granulomatous myositis    Patient on folic acid 1 mg daily, methotrexate 20 mg weekly SubQ, prednisone 10 mg daily]    Follow with Rheumatology and [place]. On prednisone/methotx. Received methotrexate dosing 12/10/2022  (normally Wednesdays).     Patient given stress dose steroids given chronic steroid usage 50 mg hydrocortisone just before surgery and 25 mg q8 hours x 24 hours after surgery for SDS (50 mg ordered, 25 mg q 8)     Resume home dosing Prednisone  Plan:    Continue folic acid    will need next methotrexate Wednesday 12/14 (usual day)       HTN  HLD    resume atorvastatin  10 mg daily    hold ARB perioperatively      Hepatic steatosis  10/2022 LFTs normal. Thought 2/2 methotx vs weight.   - routine LFTs     MAYA  - continue home CPAP     GERD  - resume pantoprazole daily     Hx methemoglobinemia with dapsone  - noted      Diet: Advance Diet as Tolerated: Regular Diet Adult  Mercer Catheter: Not present  DVT Prophylaxis: post operative per ortho surgery DVT prevention per orthopedics  Code Status: Full Code       Expected Discharge Date: 12/13/2022               Mahesh Rangel MD,  Hospitalist Service  [facility]  ______________________________________________________________________    Interval History      Patient reports 7/10 pain with movement, pain is otherwise controlled at rest  No CP/SOB  No fevers  No nausea / vomiting or abdominal complaints  No new complaints    -Data reviewed today: I reviewed all new labs and imaging results over the last 24 hours.    Physical Exam   Temp: 98.6  F (37  C) Temp src: Oral BP: 122/82 Pulse: 97   Resp: 16 SpO2: 95 % O2 Device: None (Room air) Oxygen Delivery: 2 LPM  There were no vitals filed for this visit.  Constitutional: Patient somewhat sleepy but just out of anesthesia.  She is nauseated.  On 2 L nasal cannula  Respiratory: Breath sounds CTA. No increased work of breathing. (Slightly limited)   Cardiovascular: RRR, no rub or murmur. No peripheral edema.  GI: Soft, non-tender, but limited  Skin: Extremities warm with an immobilizer on her right knee/leg      Medications     lactated ringers Stopped (12/11/22 2022)       acetaminophen  975 mg Oral Q8H     aspirin  325 mg Oral BID     atorvastatin  10 mg Oral Daily     folic acid  1 mg Oral Daily     gabapentin  100 mg Oral TID     pantoprazole  40 mg Oral Daily     polyethylene glycol  17 g Oral Daily     predniSONE  10 mg Oral Daily     senna-docusate  1 tablet Oral BID     sodium chloride (PF)  3 mL Intracatheter Q8H       Data   Recent Labs   Lab 12/12/22  0761  12/10/22  2110   WBC  --  7.4   HGB 10.9* 12.0   MCV  --  107*   PLT  --  314   INR  --  0.97    138   POTASSIUM 3.8 3.6   CHLORIDE 106 104   CO2 27 30   BUN 11 19   CR 0.40* 0.59   ANIONGAP 5 4   LASHONDA 8.8 8.7   * 89   ALBUMIN  --  3.7   PROTTOTAL  --  6.6*   BILITOTAL  --  0.3   ALKPHOS  --  45   ALT  --  25   AST  --  20       Imaging:  No results found for this or any previous visit (from the past 24 hour(s)).

## 2022-12-12 NOTE — PLAN OF CARE
Trauma/Ortho/Medical (Choose one) trauma     Diagnosis: R femur fx  POD#: 1  Mental Status: A&Ox4  Activity/dangle TTWB, stood next to bed with assist of 2  Diet: regular  Pain: oxycodoone  Mercer/Voiding: ext catheter  Tele/Restraints/Iso: NA  02/LDA: room air/ IV saline lock  D/C Date: TCU?  Other Info: waiting for hinged knee braced

## 2022-12-12 NOTE — PROGRESS NOTES
Trauma/Ortho/Medical (Choose one) trauma    Diagnosis: R femur fx  POD#: 0  Mental Status: A&Ox4  Activity/dangle TTWB, stood next to bed with assist of 2  Diet: regular  Pain: oxycodoone  Mercer/Voiding: ext catheter  Tele/Restraints/Iso: NA  02/LDA: room air/ IV saline lock  D/C Date: TCU?  Other Info: waiting for hinged knee braced

## 2022-12-12 NOTE — CONSULTS
Care Management Initial Consult    General Information  Assessment completed with: Patient,    Type of CM/SW Visit: Initial Assessment    Primary Care Provider verified and updated as needed: Yes   Readmission within the last 30 days: no previous admission in last 30 days      Reason for Consult: discharge planning  Advance Care Planning:            Communication Assessment  Patient's communication style: spoken language (English or Bilingual)    Hearing Difficulty or Deaf: no   Wear Glasses or Blind: no    Cognitive  Cognitive/Neuro/Behavioral: WDL  Level of Consciousness: lethargic  Arousal Level: opens eyes spontaneously  Orientation: oriented x 4        Speech: slow    Living Environment:   People in home: spouse     Current living Arrangements: house      Able to return to prior arrangements: yes       Family/Social Support:  Care provided by: spouse/significant other  Provides care for: no one  Marital Status:             Description of Support System: Supportive, Involved    Support Assessment: Adequate family and caregiver support    Current Resources:   Patient receiving home care services: No     Community Resources: None  Equipment currently used at home: none  Supplies currently used at home:      Employment/Financial:  Employment Status: employed full-time        Financial Concerns: No concerns identified           Lifestyle & Psychosocial Needs:  Social Determinants of Health     Tobacco Use: Not on file   Alcohol Use: Not on file   Financial Resource Strain: Not on file   Food Insecurity: Not on file   Transportation Needs: Not on file   Physical Activity: Not on file   Stress: Not on file   Social Connections: Not on file   Intimate Partner Violence: Not on file   Depression: Not on file   Housing Stability: Not on file       Functional Status:  Prior to admission patient needed assistance:              Mental Health Status:          Chemical Dependency Status:                 Values/Beliefs:  Spiritual, Cultural Beliefs, Anabaptism Practices, Values that affect care:                 Additional Information:  Writer met with patient in her room and introduced self  And role in discharge planning.  Patient sitting in a chair with leg elevated.  We discussed ARU and TCU.  Patient is not sure if she can take 3 hours of therapy per day.  Patient shared that she has a rare muscle disorder so is quite weak at baseline, although she is independent.  Per PT note earlier today, patient may be an ARU candidate and they will  Work with her again  Later today to further assess.  Patient is also interestedin TCU and feels that may be more  Appropriate  Due to her underlying myopathy.  Patient [facility].    Writer will  Await further rec from PT before sending a referral  To ARU.  Lonny  Referral  Sent via SYLWIA Xiao RN

## 2022-12-12 NOTE — PLAN OF CARE
Diagnosis:Fall / closed fx of Right femur  POD#:1  Mental Status:A&Ox4   Activity/dangle: TTWB, Ax2  Surgical site: CDI, hinged knee brace on  Diet: Regular, tolerating, nausea improved   Pain: Oxy PRN  Mercer/Voiding: external cath  Tele/Restraints/Iso:NA  02/LDA:SL  D/C Date:TCU    Other Info: Generalized weakness from Granulomatous myositis

## 2022-12-12 NOTE — PROGRESS NOTES
Orthopedic Surgery  Britt Park  12/12/2022     Admit Date:  12/10/2022  POD: 1 Day Post-Op   Procedure(s):  Open reduction internal fixation of right femur fracture    Alert and oriented.   Patient resting comfortably in chair.    Pain controlled at rest.  Tolerating oral intake.    Nausea improving.   Denies chest pain or shortness of breath.    Temp:  [98  F (36.7  C)-100.3  F (37.9  C)] 98.6  F (37  C)  Pulse:  [68-97] 97  Resp:  [10-16] 16  BP: (108-129)/(69-85) 122/82  SpO2:  [94 %-100 %] 95 %    Dressing is clean, dry, and intact. Knee immobilizer in place.   Minimal erythema of the surrounding skin.   Bilateral calves are soft, non-tender.  Right lower extremity is NVI.  Sensation intact bilateral lower extremities  Patient able to resist dorsi and plantar flexion bilaterally  +Dp pulse    Labs:  Recent Labs   Lab Test 12/12/22  0708 12/10/22  2110   WBC  --  7.4   HGB 10.9* 12.0   PLT  --  314     Recent Labs   Lab Test 12/10/22  2110   INR 0.97       1. PLAN:   Continue ASA 325mg bid for DVT prophylaxis.     Mobilize with PT/OT    Toe touch WB right LE with walker/crutches.     Continue current pain regiment.   Dressings: Keep intact.     Hinged knee brace ordered.  Have locked in extension for all transfers/ambulation.  Ok to unlock for ROM of right knee when in bed/sitting chair.    Follow-up: 2 weeks post-op with Dr Larson team (or by NP if in TCU)    2. Disposition   Anticipate d/c to TCU when medically cleared and progressing in PT.    Kristine Yost PA-C

## 2022-12-13 ENCOUNTER — APPOINTMENT (OUTPATIENT)
Dept: PHYSICAL THERAPY | Facility: CLINIC | Age: 52
DRG: 481 | End: 2022-12-13
Payer: COMMERCIAL

## 2022-12-13 LAB
ALBUMIN SERPL-MCNC: 2.7 G/DL (ref 3.4–5)
ALP SERPL-CCNC: 44 U/L (ref 40–150)
ALT SERPL W P-5'-P-CCNC: 18 U/L (ref 0–50)
ANION GAP SERPL CALCULATED.3IONS-SCNC: 4 MMOL/L (ref 3–14)
AST SERPL W P-5'-P-CCNC: 17 U/L (ref 0–45)
BASOPHILS # BLD AUTO: 0 10E3/UL (ref 0–0.2)
BASOPHILS NFR BLD AUTO: 0 %
BILIRUB SERPL-MCNC: 0.6 MG/DL (ref 0.2–1.3)
BUN SERPL-MCNC: 15 MG/DL (ref 7–30)
CALCIUM SERPL-MCNC: 8.5 MG/DL (ref 8.5–10.1)
CHLORIDE BLD-SCNC: 110 MMOL/L (ref 94–109)
CO2 SERPL-SCNC: 28 MMOL/L (ref 20–32)
CREAT SERPL-MCNC: 0.51 MG/DL (ref 0.52–1.04)
EOSINOPHIL # BLD AUTO: 0 10E3/UL (ref 0–0.7)
EOSINOPHIL NFR BLD AUTO: 0 %
ERYTHROCYTE [DISTWIDTH] IN BLOOD BY AUTOMATED COUNT: 14.1 % (ref 10–15)
GFR SERPL CREATININE-BSD FRML MDRD: >90 ML/MIN/1.73M2
GLUCOSE BLD-MCNC: 116 MG/DL (ref 70–99)
GLUCOSE BLD-MCNC: 116 MG/DL (ref 70–99)
GLUCOSE BLDC GLUCOMTR-MCNC: 131 MG/DL (ref 70–99)
HCT VFR BLD AUTO: 30.2 % (ref 35–47)
HGB BLD-MCNC: 9.9 G/DL (ref 11.7–15.7)
IMM GRANULOCYTES # BLD: 0 10E3/UL
IMM GRANULOCYTES NFR BLD: 0 %
LYMPHOCYTES # BLD AUTO: 2.4 10E3/UL (ref 0.8–5.3)
LYMPHOCYTES NFR BLD AUTO: 33 %
MCH RBC QN AUTO: 34 PG (ref 26.5–33)
MCHC RBC AUTO-ENTMCNC: 32.8 G/DL (ref 31.5–36.5)
MCV RBC AUTO: 104 FL (ref 78–100)
MONOCYTES # BLD AUTO: 0.8 10E3/UL (ref 0–1.3)
MONOCYTES NFR BLD AUTO: 11 %
NEUTROPHILS # BLD AUTO: 4 10E3/UL (ref 1.6–8.3)
NEUTROPHILS NFR BLD AUTO: 56 %
NRBC # BLD AUTO: 0 10E3/UL
NRBC BLD AUTO-RTO: 0 /100
PLATELET # BLD AUTO: 259 10E3/UL (ref 150–450)
POTASSIUM BLD-SCNC: 3.8 MMOL/L (ref 3.4–5.3)
PROT SERPL-MCNC: 5.8 G/DL (ref 6.8–8.8)
RBC # BLD AUTO: 2.91 10E6/UL (ref 3.8–5.2)
SODIUM SERPL-SCNC: 142 MMOL/L (ref 133–144)
WBC # BLD AUTO: 7.3 10E3/UL (ref 4–11)

## 2022-12-13 PROCEDURE — 250N000013 HC RX MED GY IP 250 OP 250 PS 637: Performed by: STUDENT IN AN ORGANIZED HEALTH CARE EDUCATION/TRAINING PROGRAM

## 2022-12-13 PROCEDURE — 85025 COMPLETE CBC W/AUTO DIFF WBC: CPT | Performed by: STUDENT IN AN ORGANIZED HEALTH CARE EDUCATION/TRAINING PROGRAM

## 2022-12-13 PROCEDURE — 120N000001 HC R&B MED SURG/OB

## 2022-12-13 PROCEDURE — 250N000012 HC RX MED GY IP 250 OP 636 PS 637: Performed by: INTERNAL MEDICINE

## 2022-12-13 PROCEDURE — 36415 COLL VENOUS BLD VENIPUNCTURE: CPT | Performed by: STUDENT IN AN ORGANIZED HEALTH CARE EDUCATION/TRAINING PROGRAM

## 2022-12-13 PROCEDURE — 82947 ASSAY GLUCOSE BLOOD QUANT: CPT | Performed by: STUDENT IN AN ORGANIZED HEALTH CARE EDUCATION/TRAINING PROGRAM

## 2022-12-13 PROCEDURE — 250N000013 HC RX MED GY IP 250 OP 250 PS 637: Performed by: INTERNAL MEDICINE

## 2022-12-13 PROCEDURE — 97110 THERAPEUTIC EXERCISES: CPT | Mod: GP

## 2022-12-13 PROCEDURE — 250N000013 HC RX MED GY IP 250 OP 250 PS 637: Performed by: PHYSICIAN ASSISTANT

## 2022-12-13 PROCEDURE — 99232 SBSQ HOSP IP/OBS MODERATE 35: CPT | Performed by: STUDENT IN AN ORGANIZED HEALTH CARE EDUCATION/TRAINING PROGRAM

## 2022-12-13 PROCEDURE — 80053 COMPREHEN METABOLIC PANEL: CPT | Performed by: STUDENT IN AN ORGANIZED HEALTH CARE EDUCATION/TRAINING PROGRAM

## 2022-12-13 PROCEDURE — 97530 THERAPEUTIC ACTIVITIES: CPT | Mod: GP

## 2022-12-13 RX ORDER — METHOCARBAMOL 500 MG/1
500 TABLET, FILM COATED ORAL 4 TIMES DAILY PRN
Status: DISCONTINUED | OUTPATIENT
Start: 2022-12-13 | End: 2022-12-17 | Stop reason: HOSPADM

## 2022-12-13 RX ADMIN — METHOCARBAMOL 500 MG: 500 TABLET ORAL at 13:50

## 2022-12-13 RX ADMIN — GABAPENTIN 100 MG: 100 CAPSULE ORAL at 16:30

## 2022-12-13 RX ADMIN — PANTOPRAZOLE SODIUM 40 MG: 40 TABLET, DELAYED RELEASE ORAL at 08:45

## 2022-12-13 RX ADMIN — LOSARTAN POTASSIUM 25 MG: 25 TABLET, FILM COATED ORAL at 08:45

## 2022-12-13 RX ADMIN — GABAPENTIN 100 MG: 100 CAPSULE ORAL at 08:44

## 2022-12-13 RX ADMIN — SENNOSIDES AND DOCUSATE SODIUM 1 TABLET: 50; 8.6 TABLET ORAL at 08:44

## 2022-12-13 RX ADMIN — OXYCODONE HYDROCHLORIDE 5 MG: 5 TABLET ORAL at 23:45

## 2022-12-13 RX ADMIN — FOLIC ACID 1 MG: 1 TABLET ORAL at 08:45

## 2022-12-13 RX ADMIN — OXYCODONE HYDROCHLORIDE 5 MG: 5 TABLET ORAL at 09:01

## 2022-12-13 RX ADMIN — ACETAMINOPHEN 975 MG: 325 TABLET, FILM COATED ORAL at 08:43

## 2022-12-13 RX ADMIN — ASPIRIN 325 MG: 325 TABLET, COATED ORAL at 08:45

## 2022-12-13 RX ADMIN — SENNOSIDES AND DOCUSATE SODIUM 1 TABLET: 50; 8.6 TABLET ORAL at 21:18

## 2022-12-13 RX ADMIN — GABAPENTIN 100 MG: 100 CAPSULE ORAL at 21:18

## 2022-12-13 RX ADMIN — OXYCODONE HYDROCHLORIDE 10 MG: 5 TABLET ORAL at 13:26

## 2022-12-13 RX ADMIN — ATORVASTATIN CALCIUM 10 MG: 10 TABLET, FILM COATED ORAL at 08:45

## 2022-12-13 RX ADMIN — ASPIRIN 325 MG: 325 TABLET, COATED ORAL at 21:18

## 2022-12-13 RX ADMIN — POLYETHYLENE GLYCOL 3350 17 G: 17 POWDER, FOR SOLUTION ORAL at 09:01

## 2022-12-13 RX ADMIN — ACETAMINOPHEN 975 MG: 325 TABLET, FILM COATED ORAL at 16:30

## 2022-12-13 RX ADMIN — ACETAMINOPHEN 975 MG: 325 TABLET, FILM COATED ORAL at 00:08

## 2022-12-13 RX ADMIN — ACETAMINOPHEN 975 MG: 325 TABLET, FILM COATED ORAL at 23:45

## 2022-12-13 RX ADMIN — PREDNISONE 10 MG: 5 TABLET ORAL at 08:44

## 2022-12-13 ASSESSMENT — ACTIVITIES OF DAILY LIVING (ADL)
ADLS_ACUITY_SCORE: 27

## 2022-12-13 NOTE — PLAN OF CARE
Goal Outcome Evaluation:      Plan of Care Reviewed With: patient    Overall Patient Progress: no change    DATE & TIME: 12/12/22 3093-1488    Cognitive Concerns/ Orientation : A&O x4, calm & cooperative   BEHAVIOR & AGGRESSION TOOL COLOR: Green   ABNL VS/O2: VSS on RA. Home CPAP overnight   MOBILITY: A x2, not OOB, ind repo. Allowed to have knee brace off while in bed per MD  PAIN MANAGMENT: RLE pain, scheduled Tylenol  DIET: Regular  BOWEL/BLADDER: Purewick in use  ABNL LAB/BG: Hgb 10.9  DRAIN/DEVICES: L PIV SL  TELEMETRY RHYTHM: NA  SKIN: RLE incision CDI  TESTS/PROCEDURES: 12/11 R intramedullary nailing and ORIF  D/C DATE: Pending  Discharge Barriers: Needs TCU vs ARU  OTHER IMPORTANT INFO: None

## 2022-12-13 NOTE — PROGRESS NOTES
[facility]  Hospitalist Progress Note    Date of Service: 12/13/2022    Assessment & Plan     Britt Park is a 52 year old female who presents with a leg pain after a fall     Mechanical fall  Right displaced diaphyseal femur fracture  Right intra-articular distal femur fracture  Was at [facility] when she slipped on some water, twisting her R knee and falling. Was unable to get up and had severe pain to R leg/ knee. No head or other trauma. No dizzy/lightheaded, no cp. In ED VSS. Labs normal.     Xray femur:  spiral comminuted fracture of the distal femur which may extend into the intercondylar aspect of the femur anteriorly. There is a small spur patellar joint effusion. The tibia and fibula are intact.     CT knee 12/11 preop: Acute comminuted moderately displaced and angulated fracture of the distal femur with intra-articular extension of the fracture.    NPO, IV fluids    12/11 open reduction intramedullary nailing of right femur fracture and ORIF of right distal intra-articular femur fracture    Patient is very sensitive to opioids: Nausea reported with morphine prior to surgery and emesis postsurgery.    Small dose of fentanyl given Intra-Op  Plan:    Scheduled Tylenol 975 mg every 8    hx methemoglobinemia with dapsone    Reported to have prior surgery with C section     Nausea: Compazine and Zofran    SW consulted for TCU vs ARU placement     Granulomatous myositis    Patient on folic acid 1 mg daily, methotrexate 20 mg weekly SubQ, prednisone 10 mg daily]    Follow with Rheumatology and [place]. On prednisone/methotx. Received methotrexate dosing 12/10/2022  (normally Wednesdays).     Patient given stress dose steroids given chronic steroid usage 50 mg hydrocortisone just before surgery and 25 mg q8 hours x 24 hours after surgery for SDS (50 mg ordered, 25 mg q 8)     Resume home dosing Prednisone  Plan:    Continue folic acid    will need next methotrexate Wednesday 12/14 (usual  day)    HTN  HLD    resume atorvastatin 10 mg daily    hold ARB perioperatively      Hepatic steatosis  10/2022 LFTs normal. Thought 2/2 methotx vs weight.   - routine LFTs     MAYA  - continue home CPAP     GERD  - resume pantoprazole daily     Hx methemoglobinemia with dapsone  - noted      Diet: Advance Diet as Tolerated: Regular Diet Adult  Mercer Catheter: Not present  DVT Prophylaxis: post operative per ortho surgery DVT prevention per orthopedics  Code Status: Full Code       Expected Discharge Date: 12/14/2022               Mahesh Rangel MD,  Hospitalist Service  [facility]  ______________________________________________________________________    Interval History      No acute events overnight  Patient reports some cramps overnight, but overall pain controlled  No CP/SOB  No fevers  No nausea / vomiting or abdominal complaints  No new complaints    -Data reviewed today: I reviewed all new labs and imaging results over the last 24 hours.    Physical Exam   Temp: 99.7  F (37.6  C) Temp src: Oral BP: 119/71 Pulse: 80   Resp: 16 SpO2: 99 % O2 Device: None (Room air)    There were no vitals filed for this visit.    Constitutional: no apparent distress  Respiratory: Breath sounds CTA. No increased work of breathing.  Cardiovascular: RRR, no rub or murmur. No peripheral edema.  GI: Soft, non-tender, but limited  Skin: Extremities warm with an immobilizer on her right knee/leg      Medications     lactated ringers Stopped (12/11/22 2022)       acetaminophen  975 mg Oral Q8H     aspirin  325 mg Oral BID     atorvastatin  10 mg Oral Daily     folic acid  1 mg Oral Daily     gabapentin  100 mg Oral TID     losartan  25 mg Oral Daily     [START ON 12/14/2022] methotrexate  20 mg Subcutaneous Weekly     pantoprazole  40 mg Oral Daily     polyethylene glycol  17 g Oral Daily     predniSONE  10 mg Oral Daily     senna-docusate  1 tablet Oral BID     sodium chloride (PF)  3 mL Intracatheter Q8H        Data   Recent Labs   Lab 12/13/22  0648 12/12/22 2128 12/12/22  0708 12/10/22  2110   WBC 7.3  --   --  7.4   HGB 9.9*  --  10.9* 12.0   *  --   --  107*     --   --  314   INR  --   --   --  0.97     --  138 138   POTASSIUM 3.8  --  3.8 3.6   CHLORIDE 110*  --  106 104   CO2 28  --  27 30   BUN 15  --  11 19   CR 0.51*  --  0.40* 0.59   ANIONGAP 4  --  5 4   LASHONDA 8.5  --  8.8 8.7   *  116* 182* 127* 89   ALBUMIN 2.7*  --   --  3.7   PROTTOTAL 5.8*  --   --  6.6*   BILITOTAL 0.6  --   --  0.3   ALKPHOS 44  --   --  45   ALT 18  --   --  25   AST 17  --   --  20       Imaging:  No results found for this or any previous visit (from the past 24 hour(s)).

## 2022-12-13 NOTE — PLAN OF CARE
Date/Time: 12/12/22 7899-0928    Trauma/Ortho/Medical (Choose one): Trauma     Diagnosis: Right Femur Fx   POD#: 1 ORIF  Mental Status: A&O  Activity/dangle: Assist of 2 to pivot, hinge brace to be placed anytime pt is up  Diet: Regular   Pain: Oxy and Atarax   Mercer/Voiding: Purewick per pt request   Tele/Restraints/Iso:   02/LDA:  D/C Date: Pending ARU vs TCU placement   Other Info: Pulsate mattress placed this evening, pt used home CPAP at night

## 2022-12-13 NOTE — PROGRESS NOTES
Orthopedic Surgery  Britt Park  12/13/2022     Admit Date:  12/10/2022  POD: 2 Days Post-Op   Procedure(s):  Open reduction internal fixation of right femur fracture    Alert and oriented.   Patient resting in bed.  Did have increase in pain following HKB application.    Pain controlled at rest.  Tolerating oral intake.    Nausea improving.   Denies chest pain or shortness of breath.    Temp:  [97.9  F (36.6  C)-98.7  F (37.1  C)] 97.9  F (36.6  C)  Pulse:  [68-82] 68  Resp:  [16] 16  BP: (119-142)/(69-90) 142/90  SpO2:  [96 %-98 %] 98 %    Dressing is clean, dry, and intact. Hinged brace not applied.   Minimal erythema of the surrounding skin.   Bilateral calves are soft, non-tender.  Right lower extremity is NVI.  Right foot swelling present.   Sensation intact bilateral lower extremities  Patient able to resist dorsi and plantar flexion bilaterally  +Dp pulse    Labs:  Recent Labs   Lab Test 12/13/22  0648 12/12/22  0708 12/10/22  2110   WBC 7.3  --  7.4   HGB 9.9* 10.9* 12.0     --  314     Recent Labs   Lab Test 12/10/22  2110   INR 0.97       1. PLAN:   Continue ASA 325mg bid for DVT prophylaxis.     Mobilize with PT/OT    Toe touch WB right LE with brace applied every time out of bed and use of walker/crutches.     Continue current pain regiment.   Dressings: Keep intact.     Hinged knee brace locked in extension for all transfers/ambulation.  Ok to have brace removed in bed.  ROM of right knee when in bed/sitting chair as tolerated.    Follow-up: 2 weeks post-op with Dr Larson team (or by NP if in TCU)    2. Disposition   Anticipate d/c to TCU vs ARU 1-2 days when pain controlled and progressing in PT.    Kristine Yost PA-C

## 2022-12-13 NOTE — PLAN OF CARE
Goal Outcome Evaluation:         Date/time: 12/12/22 0899-0799    Diagnosis: Right Femur Fx   POD#: 2 ORIF  Mental Status: A&Ox4   ABNL VS/O2: VSS on RA. Home CPAP overnight   Activity/dangle: Assist of 2 to pivot, ind repo  Diet: Regular   Pain: PRN Oxy, and Robaxin, scheduled tylenol  Mercer/Voiding: Purewick   Drains/devices: L PIV SL  Skin: RLE incision CDI, swelling in ankle and foot  D/C Date: Pending ARU vs TCU placement   Other Info: Pulsate mattress   Goal: Increase mobility and placemen

## 2022-12-13 NOTE — PROGRESS NOTES
Date/Time: 12/12/22. 1900-2300hr     Trauma/Ortho/Medical (Choose one): Trauma      Diagnosis: Right Femur Fx   POD#: 1 ORIF  Mental Status: A&Ox4  Activity/dangle: Assist of 2 to pivot, hinge brace to be placed anytime pt is up  Diet: Regular   Pain: Oxy   Mercer/Voiding: Purewick per pt request   Tele/Restraints/Iso: na  02/LDA: PIV SL  D/C Date: Pending ARU vs TCU placement   Other Info: Pulsate mattress placed this evening, pt used home CPAP at night

## 2022-12-14 ENCOUNTER — APPOINTMENT (OUTPATIENT)
Dept: PHYSICAL THERAPY | Facility: CLINIC | Age: 52
DRG: 481 | End: 2022-12-14
Payer: COMMERCIAL

## 2022-12-14 ENCOUNTER — APPOINTMENT (OUTPATIENT)
Dept: OCCUPATIONAL THERAPY | Facility: CLINIC | Age: 52
DRG: 481 | End: 2022-12-14
Payer: COMMERCIAL

## 2022-12-14 LAB
ANION GAP SERPL CALCULATED.3IONS-SCNC: 5 MMOL/L (ref 3–14)
BUN SERPL-MCNC: 17 MG/DL (ref 7–30)
CALCIUM SERPL-MCNC: 8.5 MG/DL (ref 8.5–10.1)
CHLORIDE BLD-SCNC: 107 MMOL/L (ref 94–109)
CO2 SERPL-SCNC: 29 MMOL/L (ref 20–32)
CREAT SERPL-MCNC: 0.43 MG/DL (ref 0.52–1.04)
ERYTHROCYTE [DISTWIDTH] IN BLOOD BY AUTOMATED COUNT: 14.1 % (ref 10–15)
GFR SERPL CREATININE-BSD FRML MDRD: >90 ML/MIN/1.73M2
GLUCOSE BLD-MCNC: 91 MG/DL (ref 70–99)
HCT VFR BLD AUTO: 31.9 % (ref 35–47)
HGB BLD-MCNC: 10.5 G/DL (ref 11.7–15.7)
MCH RBC QN AUTO: 34.5 PG (ref 26.5–33)
MCHC RBC AUTO-ENTMCNC: 32.9 G/DL (ref 31.5–36.5)
MCV RBC AUTO: 105 FL (ref 78–100)
PLATELET # BLD AUTO: 292 10E3/UL (ref 150–450)
POTASSIUM BLD-SCNC: 3.8 MMOL/L (ref 3.4–5.3)
RBC # BLD AUTO: 3.04 10E6/UL (ref 3.8–5.2)
SODIUM SERPL-SCNC: 141 MMOL/L (ref 133–144)
WBC # BLD AUTO: 7.8 10E3/UL (ref 4–11)

## 2022-12-14 PROCEDURE — 97535 SELF CARE MNGMENT TRAINING: CPT | Mod: GO | Performed by: OCCUPATIONAL THERAPIST

## 2022-12-14 PROCEDURE — 120N000001 HC R&B MED SURG/OB

## 2022-12-14 PROCEDURE — 36415 COLL VENOUS BLD VENIPUNCTURE: CPT | Performed by: STUDENT IN AN ORGANIZED HEALTH CARE EDUCATION/TRAINING PROGRAM

## 2022-12-14 PROCEDURE — 250N000011 HC RX IP 250 OP 636: Performed by: STUDENT IN AN ORGANIZED HEALTH CARE EDUCATION/TRAINING PROGRAM

## 2022-12-14 PROCEDURE — 85027 COMPLETE CBC AUTOMATED: CPT | Performed by: STUDENT IN AN ORGANIZED HEALTH CARE EDUCATION/TRAINING PROGRAM

## 2022-12-14 PROCEDURE — 250N000012 HC RX MED GY IP 250 OP 636 PS 637: Performed by: INTERNAL MEDICINE

## 2022-12-14 PROCEDURE — 250N000013 HC RX MED GY IP 250 OP 250 PS 637: Performed by: PHYSICIAN ASSISTANT

## 2022-12-14 PROCEDURE — 99232 SBSQ HOSP IP/OBS MODERATE 35: CPT | Performed by: STUDENT IN AN ORGANIZED HEALTH CARE EDUCATION/TRAINING PROGRAM

## 2022-12-14 PROCEDURE — 97166 OT EVAL MOD COMPLEX 45 MIN: CPT | Mod: GO | Performed by: OCCUPATIONAL THERAPIST

## 2022-12-14 PROCEDURE — 250N000013 HC RX MED GY IP 250 OP 250 PS 637: Performed by: STUDENT IN AN ORGANIZED HEALTH CARE EDUCATION/TRAINING PROGRAM

## 2022-12-14 PROCEDURE — 80048 BASIC METABOLIC PNL TOTAL CA: CPT | Performed by: STUDENT IN AN ORGANIZED HEALTH CARE EDUCATION/TRAINING PROGRAM

## 2022-12-14 PROCEDURE — 250N000013 HC RX MED GY IP 250 OP 250 PS 637: Performed by: INTERNAL MEDICINE

## 2022-12-14 PROCEDURE — 97530 THERAPEUTIC ACTIVITIES: CPT | Mod: GP

## 2022-12-14 RX ADMIN — HYDROXYZINE HYDROCHLORIDE 25 MG: 25 TABLET, FILM COATED ORAL at 02:13

## 2022-12-14 RX ADMIN — FOLIC ACID 1 MG: 1 TABLET ORAL at 09:30

## 2022-12-14 RX ADMIN — ACETAMINOPHEN 650 MG: 325 TABLET, FILM COATED ORAL at 12:19

## 2022-12-14 RX ADMIN — METHOTREXATE 20 MG: 25 SOLUTION INTRA-ARTERIAL; INTRAMUSCULAR; INTRATHECAL; INTRAVENOUS at 13:54

## 2022-12-14 RX ADMIN — HYDROXYZINE HYDROCHLORIDE 25 MG: 25 TABLET, FILM COATED ORAL at 12:19

## 2022-12-14 RX ADMIN — PANTOPRAZOLE SODIUM 40 MG: 40 TABLET, DELAYED RELEASE ORAL at 09:30

## 2022-12-14 RX ADMIN — IBUPROFEN 600 MG: 600 TABLET ORAL at 09:30

## 2022-12-14 RX ADMIN — OXYCODONE HYDROCHLORIDE 10 MG: 5 TABLET ORAL at 13:54

## 2022-12-14 RX ADMIN — ATORVASTATIN CALCIUM 10 MG: 10 TABLET, FILM COATED ORAL at 09:30

## 2022-12-14 RX ADMIN — PREDNISONE 10 MG: 5 TABLET ORAL at 09:30

## 2022-12-14 RX ADMIN — ACETAMINOPHEN 650 MG: 325 TABLET, FILM COATED ORAL at 17:01

## 2022-12-14 RX ADMIN — GABAPENTIN 100 MG: 100 CAPSULE ORAL at 09:30

## 2022-12-14 RX ADMIN — GABAPENTIN 100 MG: 100 CAPSULE ORAL at 22:08

## 2022-12-14 RX ADMIN — OXYCODONE HYDROCHLORIDE 10 MG: 5 TABLET ORAL at 09:29

## 2022-12-14 RX ADMIN — ASPIRIN 325 MG: 325 TABLET, COATED ORAL at 20:24

## 2022-12-14 RX ADMIN — POLYETHYLENE GLYCOL 3350 17 G: 17 POWDER, FOR SOLUTION ORAL at 09:30

## 2022-12-14 RX ADMIN — ASPIRIN 325 MG: 325 TABLET, COATED ORAL at 09:30

## 2022-12-14 RX ADMIN — METHOCARBAMOL 500 MG: 500 TABLET ORAL at 20:24

## 2022-12-14 RX ADMIN — GABAPENTIN 100 MG: 100 CAPSULE ORAL at 16:56

## 2022-12-14 RX ADMIN — LOSARTAN POTASSIUM 25 MG: 25 TABLET, FILM COATED ORAL at 09:30

## 2022-12-14 RX ADMIN — SENNOSIDES AND DOCUSATE SODIUM 1 TABLET: 50; 8.6 TABLET ORAL at 20:24

## 2022-12-14 RX ADMIN — SENNOSIDES AND DOCUSATE SODIUM 1 TABLET: 50; 8.6 TABLET ORAL at 09:30

## 2022-12-14 RX ADMIN — IBUPROFEN 600 MG: 600 TABLET ORAL at 15:42

## 2022-12-14 RX ADMIN — OXYCODONE HYDROCHLORIDE 10 MG: 5 TABLET ORAL at 20:25

## 2022-12-14 ASSESSMENT — ACTIVITIES OF DAILY LIVING (ADL)
ADLS_ACUITY_SCORE: 27
ADLS_ACUITY_SCORE: 26
ADLS_ACUITY_SCORE: 27
ADLS_ACUITY_SCORE: 26
ADLS_ACUITY_SCORE: 27

## 2022-12-14 NOTE — PLAN OF CARE
Goal Outcome Evaluation:      Plan of Care Reviewed With: patient    Overall Patient Progress: improvingOverall Patient Progress: improving    POD2 s/p ORIF rt femur, hinged knee brace with locked in extension when ambulating and transfers.She is A$OX4, VSS on RA, uses home CPAP overnight.Messi reg diet, PIV is s/l on lt.Up with AX2 pivot , purewick in use, good UOP, pain controlled with PO meds.PT recommends TCU or ARU, CC following,sent out TCU referral to [facility], awaits further recommendation to consider sending ARU referral if need be.

## 2022-12-14 NOTE — PROGRESS NOTES
12/14/22 1044   Appointment Info   Signing Clinician's Name / Credentials (OT) Julia Persaud OTR/L   Rehab Comments (OT) initial eval   Living Environment   People in Home spouse;child(maurice), dependent  (3 teenagers)   Current Living Arrangements house   Home Accessibility stairs to enter home;stairs within home   Number of Stairs, Main Entrance 3   Stair Railings, Main Entrance railings safe and in good condition   Number of Stairs, Within Home, Primary greater than 10 stairs   Stair Railings, Within Home, Primary railings safe and in good condition   Living Environment Comments two story home, tub shower with walk in   Self-Care   Usual Activity Tolerance moderate   Current Activity Tolerance poor   Regular Exercise No   Equipment Currently Used at Home none   Fall history within last six months yes   Number of times patient has fallen within last six months 1   Activity/Exercise/Self-Care Comment indep with mobility and ADLS, 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   Instrumental Activities of Daily Living (IADL)   Previous Responsibilities work;meal prep;medication management;driving;shopping   General Information   Onset of Illness/Injury or Date of Surgery 12/10/22   Patient/Family Therapy Goal Statement (OT) I realize that I can't go home   Additional Occupational Profile Info/Pertinent History of Current Problem a 52 year old female who presents with a leg pain after a fall, s/p R ORIF for femur fracture   Performance Patterns (Routines, Roles, Habits) Granulomatous myositis   Existing Precautions/Restrictions fall   General Observations and Info pt is agreeable to OT eval   Cognitive Status Examination   Orientation Status orientation to person, place and time   Visual Perception   Visual Impairment/Limitations WNL   Pain Assessment   Patient Currently in Pain Yes, see Vital Sign flowsheet   Range of Motion Comprehensive   Comment, General Range of Motion B shoulders are limited due to  Granulomatous myositis   Strength Comprehensive (MMT)   Comment, General Manual Muscle Testing (MMT) Assessment pt has baseline Granulomatous myositis, demo's increased proximal weakness   Coordination   Upper Extremity Coordination Left UE impaired;Right UE impaired   Gross Motor Coordination B impairment   Bed Mobility   Comment (Bed Mobility) mod A   Transfers   Transfer Comments mod A x2 sit to stand, min A x2 with FWW for pivot to chair   Balance   Balance Comments B UE  support on FWW   Activities of Daily Living   BADL Assessment/Intervention lower body dressing   Lower Body Dressing Assessment/Training   Position (Lower Body Dressing) sitting up in bed   [place] Level (Lower Body Dressing) maximum assist (25% patient effort)   Clinical Impression   Criteria for Skilled Therapeutic Interventions Met (OT) Yes, treatment indicated   OT Diagnosis decreased I with ADLs and functional mobility   OT Problem List-Impairments impacting ADL problems related to;activity tolerance impaired;balance;motor control;strength;pain;post-surgical precautions   Assessment of Occupational Performance 3-5 Performance Deficits   Identified Performance Deficits decreased I with dressing, home mgmt, functional transfers   Planned Therapy Interventions (OT) ADL retraining;IADL retraining;balance training;strengthening;transfer training;progressive activity/exercise   Clinical Decision Making Complexity (OT) moderate complexity   Risk & Benefits of therapy have been explained evaluation/treatment results reviewed;care plan/treatment goals reviewed;risks/benefits reviewed;current/potential barriers reviewed;participants voiced agreement with care plan;participants included;patient   OT Total Evaluation Time   OT Eval, Moderate Complexity Minutes (95803) 10   OT Goals   Therapy Frequency (OT) Daily   OT Predicted Duration/Target Date for Goal Attainment 12/21/22   OT Goals Hygiene/Grooming;Upper Body Dressing;Lower Body  Dressing;Toilet Transfer/Toileting;OT Goal 1   OT: Hygiene/Grooming modified independent;from wheelchair   OT: Upper Body Dressing Modified independent;from wheelchair   OT: Lower Body Dressing Modified independent   OT: Toilet Transfer/Toileting Minimal assist;toilet transfer   OT: Goal 1 demo I with UE HEP to increase strength for ADLs and functional mobility   Interventions   Interventions Quick Adds Self-Care/Home Management   Self-Care/Home Management   Self-Care/Home Mgmt/ADL, Compensatory, Meal Prep Minutes (19240) 25   Symptoms Noted During/After Treatment (Meal Preparation/Planning Training) increased pain   Treatment Detail/Skilled Intervention pt seen for eval and intiation of treatment. completed donning of brace. pt required max A to don hinged KI in long sitting. transfer to EOB with mod A. good sitting balance. answered many questions about ARU and pt is interested. completed brushing hair with set up assist, needing A to brush back of head. pt has proximal muscle weakness and uses bilat. hands to do most tasks that require reaching up.  pivot to chair. min Ax2 for sit to stand and transfer pivot to chair with min A of 2, verbal cues, increased time. difficult as fatigues with task, cues to slow down, needing mod A of 2 by end of transfer due to weakness. set up in recliner, positioned on pillows, alarm on. all needs in reach   OT Discharge Planning   OT Plan full body dressing, grooming in chair or EOB   OT Discharge Recommendation (DC Rec) [facility]-Mot[facility] patient will benefit from intensive, interdisciplinary therapy.  Anticipate will be able to tolerate 3 hours of therapy per day   OT Rationale for DC Rec pt is significantly below baseline, she is motivated and has supportive family. pt would benefit from ARU for intense daily therapy in order to progress back to baseline.   OT Brief overview of current status mod A to stand, min A x2 to pivot to chair.   Total Session Time   Timed  Code Treatment Minutes 25   Total Session Time (sum of timed and untimed services) 35

## 2022-12-14 NOTE — PLAN OF CARE
Goal Outcome Evaluation:  Trauma/Ortho/Medical (Choose one)  Trauma  Diagnosis: right femur fracture - ORIF   POD#: 3  Mental Status: A/Ox4  Activity/dangle 2 assist/GB/walker  Diet: regular  Pain: PRN Oxycodone/Tylenol/Ibuprofen/Atarax   Mercer/Voiding: purewick  Tele/Restraints/Iso: chemo precautions  02/LDA: RA/SL  D/C Date: ARU when bed available  Other Info: hinge brace when OOB

## 2022-12-14 NOTE — PLAN OF CARE
Goal Outcome Evaluation:       2300-0700    Diagnosis: R femur ORIF  POD#: 3  Mental Status: A&Ox4  Activity/dangle up  2 pivot  Diet: reg  Pain: oxycodone and atarax  Mercer/Voiding: purewick  Tele/Restraints/Iso: none  02/LDA: IV JOELLE, MAYELA  D/C Date: pending TCU or ARU  Other Info: hinge brace when out of bed. CPAP @ night. Toe touch weight bearng RLE. VSS-RA

## 2022-12-14 NOTE — PROGRESS NOTES
Orthopedic Surgery  Britt Park  12/14/2022     Admit Date:  12/10/2022  POD: 3 Days Post-Op   Procedure(s):  Open reduction internal fixation of right femur fracture    Alert and oriented.   Patient resting in chair.  Tolerating HKB    Pain controlled at rest.  Tolerating oral intake.    Nausea improving.   Denies chest pain or shortness of breath.    Temp:  [97.3  F (36.3  C)-99.7  F (37.6  C)] 98  F (36.7  C)  Pulse:  [67-86] 67  Resp:  [14-16] 16  BP: (106-121)/(71-77) 106/74  SpO2:  [97 %-99 %] 99 %    Dressing is clean, dry, and intact. Hinged brace in place.   Ace wrap in place right LE   Bilateral calves are soft, non-tender.  Right lower extremity is NVI.  Right foot swelling present but stable.   Sensation intact bilateral lower extremities  Patient able to resist dorsi and plantar flexion bilaterally  +Dp pulse    Labs:  Recent Labs   Lab Test 12/14/22  0724 12/13/22  0648 12/12/22  0708 12/10/22  2110   WBC 7.8 7.3  --  7.4   HGB 10.5* 9.9* 10.9* 12.0    259  --  314     Recent Labs   Lab Test 12/10/22  2110   INR 0.97       1. PLAN:   Continue ASA 325mg bid for DVT prophylaxis.     Mobilize with PT/OT    Toe touch WB right LE with brace applied.     Continue current pain regiment.   Dressings: Keep intact.     Hinged knee brace locked in extension for all transfers/ambulation.  Ok to have brace removed in bed.  ROM of right knee when in bed/sitting chair as tolerated.    Follow-up: 2 weeks post-op with Dr Larson team (or by NP if in TCU)    2. Disposition   Anticipate d/c to TCU vs ARU likely 1-2 days when pain controlled and progressing in PT.    Kristine Yost PA-C

## 2022-12-14 NOTE — PROGRESS NOTES
Care Management Follow Up    Length of Stay (days): 4    Expected Discharge Date: 12/15/2022     Concerns to be Addressed:  Discharge planning      Patient plan of care discussed at interdisciplinary rounds: Yes    Anticipated Discharge Disposition:  ARU     Anticipated Discharge Services:  therapy  Anticipated Discharge DME:  TBD    Patient/family educated on Medicare website which has current facility and service quality ratings:  no  Education Provided on the Discharge Plan:  yes  Patient/Family in Agreement with the Plan: yes    Referrals Placed by CM/SW:  [facility]  Private pay costs discussed: Not applicable    Additional Information:  Spoke with patient regarding discharge plans.  Explained that therapy is recommending ARU on discharge.  Patient states she wants whatever is best and wants to get better as quick as possible.  Patient states she would be in agreement with a referral to [facility].  Referral sent, via the discharge navigator, to check the bed availability.  Will follow up tomorrow as it is now too late in the day.    Will continue to follow.      MATTHWE Roldan, [facility]    [phone]  [facility]

## 2022-12-14 NOTE — PROGRESS NOTES
[facility]  Hospitalist Progress Note    Date of Service: 12/14/2022    Assessment & Plan     Britt Park is a 52 year old female who presents with a leg pain after a fall     Mechanical fall  Right displaced diaphyseal femur fracture  Right intra-articular distal femur fracture  Was at [facility] when she slipped on some water, twisting her R knee and falling. Was unable to get up and had severe pain to R leg/ knee. No head or other trauma. No dizzy/lightheaded, no cp. In ED VSS. Labs normal.     Xray femur:  spiral comminuted fracture of the distal femur which may extend into the intercondylar aspect of the femur anteriorly. There is a small spur patellar joint effusion. The tibia and fibula are intact.     CT knee 12/11 preop: Acute comminuted moderately displaced and angulated fracture of the distal femur with intra-articular extension of the fracture.    NPO, IV fluids    12/11 open reduction intramedullary nailing of right femur fracture and ORIF of right distal intra-articular femur fracture    Patient is very sensitive to opioids: Nausea reported with morphine prior to surgery and emesis postsurgery.    Small dose of fentanyl given Intra-Op  Plan:    Scheduled Tylenol 975 mg every 8    hx methemoglobinemia with dapsone    Reported to have prior surgery with C section     Nausea: Compazine and Zofran    SW consulted for TCU vs ARU placement    2 weeks post-op with Dr Larson team (or by NP if in TCU)       Granulomatous myositis    Patient on folic acid 1 mg daily, methotrexate 20 mg weekly SubQ, prednisone 10 mg daily]    Follow with Rheumatology and Levels. On prednisone/methotx. Received methotrexate dosing 12/10/2022  (normally Wednesdays).     Patient given stress dose steroids given chronic steroid usage 50 mg hydrocortisone just before surgery and 25 mg q8 hours x 24 hours after surgery for SDS (50 mg ordered, 25 mg q 8)     Resume home dosing Prednisone  Plan:    Continue  folic acid    Resume methotrexate Wednesday 12/14 (usual day)    HTN  HLD    resume atorvastatin 10 mg daily    Resume ARB      Hepatic steatosis  10/2022 LFTs normal. Thought 2/2 methotx vs weight.   - routine LFTs     MAYA  - continue home CPAP     GERD  - resume pantoprazole daily     Hx methemoglobinemia with dapsone  - noted      Diet: Advance Diet as Tolerated: Regular Diet Adult  Mercer Catheter: Not present  DVT Prophylaxis: post operative per ortho surgery DVT prevention per orthopedics  Code Status: Full Code       Expected Discharge Date: 12/15/2022               Mahesh Rangel MD,  Hospitalist Service  [facility]  ______________________________________________________________________    Interval History      No acute events overnight  Patient reports pain slightly worse today, but most part controlled  No CP/SOB  No fevers  No nausea / vomiting or abdominal complaints  No new complaints    -Data reviewed today: I reviewed all new labs and imaging results over the last 24 hours.    Physical Exam   Temp: 98  F (36.7  C) Temp src: Oral BP: 106/74 Pulse: 67   Resp: 16 SpO2: 99 % O2 Device: None (Room air)    There were no vitals filed for this visit.    Constitutional: no apparent distress  Respiratory: Breath sounds CTA. No increased work of breathing. (Slightly limited)   Cardiovascular: RRR, no rub or murmur. No peripheral edema.  GI: Soft, non-tender, but limited  Skin: Extremities warm with an immobilizer on her right knee/leg      Medications     lactated ringers Stopped (12/11/22 2022)       aspirin  325 mg Oral BID     atorvastatin  10 mg Oral Daily     folic acid  1 mg Oral Daily     gabapentin  100 mg Oral TID     losartan  25 mg Oral Daily     methotrexate  20 mg Subcutaneous Weekly     pantoprazole  40 mg Oral Daily     polyethylene glycol  17 g Oral Daily     predniSONE  10 mg Oral Daily     senna-docusate  1 tablet Oral BID     sodium chloride (PF)  3 mL Intracatheter Q8H        Data   Recent Labs   Lab 12/14/22  0724 12/13/22  1712 12/13/22  0648 12/12/22  2128 12/12/22  0708 12/10/22  2110   WBC 7.8  --  7.3  --   --  7.4   HGB 10.5*  --  9.9*  --  10.9* 12.0   *  --  104*  --   --  107*     --  259  --   --  314   INR  --   --   --   --   --  0.97     --  142  --  138 138   POTASSIUM 3.8  --  3.8  --  3.8 3.6   CHLORIDE 107  --  110*  --  106 104   CO2 29  --  28  --  27 30   BUN 17  --  15  --  11 19   CR 0.43*  --  0.51*  --  0.40* 0.59   ANIONGAP 5  --  4  --  5 4   LASHONDA 8.5  --  8.5  --  8.8 8.7   GLC 91 131* 116*  116*   < > 127* 89   ALBUMIN  --   --  2.7*  --   --  3.7   PROTTOTAL  --   --  5.8*  --   --  6.6*   BILITOTAL  --   --  0.6  --   --  0.3   ALKPHOS  --   --  44  --   --  45   ALT  --   --  18  --   --  25   AST  --   --  17  --   --  20    < > = values in this interval not displayed.       Imaging:  No results found for this or any previous visit (from the past 24 hour(s)).

## 2022-12-15 ENCOUNTER — APPOINTMENT (OUTPATIENT)
Dept: OCCUPATIONAL THERAPY | Facility: CLINIC | Age: 52
DRG: 481 | End: 2022-12-15
Payer: COMMERCIAL

## 2022-12-15 ENCOUNTER — APPOINTMENT (OUTPATIENT)
Dept: PHYSICAL THERAPY | Facility: CLINIC | Age: 52
DRG: 481 | End: 2022-12-15
Payer: COMMERCIAL

## 2022-12-15 PROCEDURE — 250N000013 HC RX MED GY IP 250 OP 250 PS 637: Performed by: STUDENT IN AN ORGANIZED HEALTH CARE EDUCATION/TRAINING PROGRAM

## 2022-12-15 PROCEDURE — 99231 SBSQ HOSP IP/OBS SF/LOW 25: CPT | Performed by: STUDENT IN AN ORGANIZED HEALTH CARE EDUCATION/TRAINING PROGRAM

## 2022-12-15 PROCEDURE — 250N000013 HC RX MED GY IP 250 OP 250 PS 637: Performed by: PHYSICIAN ASSISTANT

## 2022-12-15 PROCEDURE — 250N000013 HC RX MED GY IP 250 OP 250 PS 637: Performed by: INTERNAL MEDICINE

## 2022-12-15 PROCEDURE — 97530 THERAPEUTIC ACTIVITIES: CPT | Mod: GP

## 2022-12-15 PROCEDURE — 250N000012 HC RX MED GY IP 250 OP 636 PS 637: Performed by: INTERNAL MEDICINE

## 2022-12-15 PROCEDURE — 97535 SELF CARE MNGMENT TRAINING: CPT | Mod: GO

## 2022-12-15 PROCEDURE — 120N000001 HC R&B MED SURG/OB

## 2022-12-15 RX ADMIN — GABAPENTIN 100 MG: 100 CAPSULE ORAL at 08:58

## 2022-12-15 RX ADMIN — POLYETHYLENE GLYCOL 3350 17 G: 17 POWDER, FOR SOLUTION ORAL at 08:58

## 2022-12-15 RX ADMIN — SENNOSIDES AND DOCUSATE SODIUM 1 TABLET: 50; 8.6 TABLET ORAL at 08:58

## 2022-12-15 RX ADMIN — PREDNISONE 10 MG: 5 TABLET ORAL at 08:58

## 2022-12-15 RX ADMIN — OXYCODONE HYDROCHLORIDE 10 MG: 5 TABLET ORAL at 17:02

## 2022-12-15 RX ADMIN — OXYCODONE HYDROCHLORIDE 10 MG: 5 TABLET ORAL at 07:46

## 2022-12-15 RX ADMIN — ACETAMINOPHEN 650 MG: 325 TABLET, FILM COATED ORAL at 07:46

## 2022-12-15 RX ADMIN — PANTOPRAZOLE SODIUM 40 MG: 40 TABLET, DELAYED RELEASE ORAL at 08:58

## 2022-12-15 RX ADMIN — HYDROXYZINE HYDROCHLORIDE 25 MG: 25 TABLET, FILM COATED ORAL at 17:02

## 2022-12-15 RX ADMIN — OXYCODONE HYDROCHLORIDE 10 MG: 5 TABLET ORAL at 21:26

## 2022-12-15 RX ADMIN — METHOCARBAMOL 500 MG: 500 TABLET ORAL at 14:20

## 2022-12-15 RX ADMIN — ASPIRIN 325 MG: 325 TABLET, COATED ORAL at 08:58

## 2022-12-15 RX ADMIN — ACETAMINOPHEN 650 MG: 325 TABLET, FILM COATED ORAL at 13:17

## 2022-12-15 RX ADMIN — IBUPROFEN 600 MG: 600 TABLET ORAL at 10:27

## 2022-12-15 RX ADMIN — LOSARTAN POTASSIUM 25 MG: 25 TABLET, FILM COATED ORAL at 09:00

## 2022-12-15 RX ADMIN — ACETAMINOPHEN 650 MG: 325 TABLET, FILM COATED ORAL at 01:29

## 2022-12-15 RX ADMIN — GABAPENTIN 100 MG: 100 CAPSULE ORAL at 21:26

## 2022-12-15 RX ADMIN — OXYCODONE HYDROCHLORIDE 10 MG: 5 TABLET ORAL at 13:17

## 2022-12-15 RX ADMIN — SENNOSIDES AND DOCUSATE SODIUM 1 TABLET: 50; 8.6 TABLET ORAL at 21:26

## 2022-12-15 RX ADMIN — FOLIC ACID 1 MG: 1 TABLET ORAL at 08:58

## 2022-12-15 RX ADMIN — HYDROXYZINE HYDROCHLORIDE 25 MG: 25 TABLET, FILM COATED ORAL at 10:27

## 2022-12-15 RX ADMIN — GABAPENTIN 100 MG: 100 CAPSULE ORAL at 17:02

## 2022-12-15 RX ADMIN — ASPIRIN 325 MG: 325 TABLET, COATED ORAL at 21:26

## 2022-12-15 RX ADMIN — ACETAMINOPHEN 650 MG: 325 TABLET, FILM COATED ORAL at 17:02

## 2022-12-15 RX ADMIN — ATORVASTATIN CALCIUM 10 MG: 10 TABLET, FILM COATED ORAL at 08:58

## 2022-12-15 RX ADMIN — METHOCARBAMOL 500 MG: 500 TABLET ORAL at 07:58

## 2022-12-15 ASSESSMENT — ACTIVITIES OF DAILY LIVING (ADL)
ADLS_ACUITY_SCORE: 22
ADLS_ACUITY_SCORE: 26
ADLS_ACUITY_SCORE: 22
ADLS_ACUITY_SCORE: 26

## 2022-12-15 NOTE — PROGRESS NOTES
Orthopedic Surgery  Britt Park  12/15/2022     Admit Date:  12/10/2022  POD: 4 Days Post-Op   Procedure(s):  Open reduction internal fixation of right femur fracture    Alert and oriented.   Patient resting in chair.  Tolerating Hinged Knee Brace when OOB    Pain controlled at rest.  Tolerating oral intake.     Denies chest pain or shortness of breath.  C/o foot swelling     Temp:  [97.5  F (36.4  C)-98.6  F (37  C)] 98.2  F (36.8  C)  Pulse:  [69-86] 69  Resp:  [16-17] 17  BP: (103-131)/(67-81) 115/81  SpO2:  [94 %-99 %] 99 %    Dressing is clean, dry, and intact.  Ace wrap in place right LE   Bilateral calves are soft, mild calf tenderness.  No localized and pardeep's negative.   Right lower extremity is NVI.  Right foot swelling present but stable.   Sensation intact bilateral lower extremities  Patient able to resist dorsi and plantar flexion bilaterally  +Dp pulse    Labs:  Recent Labs   Lab Test 12/14/22  0724 12/13/22  0648 12/12/22  0708 12/10/22  2110   WBC 7.8 7.3  --  7.4   HGB 10.5* 9.9* 10.9* 12.0    259  --  314     Recent Labs   Lab Test 12/10/22  2110   INR 0.97       1. PLAN:   Continue ASA 325mg bid for DVT prophylaxis.     Mobilize with PT/OT    Toe touch WB right LE with brace applied.     Continue current pain regiment.   Dressings: Keep intact.     Hinged knee brace locked in extension for all transfers/ambulation.  Ok to have brace removed/unlocked when in bed or at rest.  ROM of right knee when at rest.    Follow-up: 2 weeks post-op with Dr Larson team (or by NP if in TCU)    2. Disposition   Anticipate d/c to ARU pending placement.  Ortho stable.    Kristine Yost PA-C

## 2022-12-15 NOTE — PLAN OF CARE
Goal Outcome Evaluation:    A&Ox4. VSS on Rm Air. CMS intact. C/o pain to RLE; PRN oxycodone, robaxin, and scheduled Tylenol effective. Weakness to BUE at baseline. Asstx2 GB/walker. Magy patent. No BM. Dressing CDI to R hip, ace wrap to RLE. Regular diet, thin liquids; tolerating well. Discharge pending; ARU recommended.

## 2022-12-15 NOTE — INTERIM SUMMARY
[facility] Pre-Admission Screen    Referral Source:  [facility] 2306-01  Admit date to referring facility: 12/10/2022    Physical Medicine and Rehab Consult Completed: No    Rehab Diagnosis:    Orthopaedic Disorders 08.2 Femur (Shaft) Fracture: right displaced femoral shaft fracture with extension into the knee joint s/p ORIF    Justification for Acute Inpatient Rehabilitation  Ventuar Park is a 52 year old female who unfortunately [place].  She was brought into the emergency department after being unable to bear weight on the right lower extremity.  X-rays demonstrated a diaphyseal femur fracture and a CT was also performed and it demonstrated a separate fracture fragment that was intra-articular in the distal femur. On 12/11, pt underwent ppen reduction intramedullary nailing of right femur fracture and open reduction internal fixation of right distal intra-articular femur fracture. Patient is very sensitive to opioids: Nausea reported with morphine prior to surgery and emesis postsurgery. Patient was fitted with a hinged knee brace with the following restrictions: Hinged knee brace locked in extension for all transfers/ambulation. Ok to have brace removed in bed. ROM of right knee when in bed/sitting chair as tolerated - TTWB on RLE. Patient is currently stable to transfer to inpatient acute rehab.     Patient requires an intensive inpatient rehab program to address the following acute impairments:impaired activity tolerance, impaired balance, impaired ROM, impaired strength, impaired weight shifting due to RLE TTWB, and pain.     Current Active Medical Management Needs/Risks for Clinical Complications  The patient requires the high level of rehabilitation physician supervision that accompanies the provision of intensive rehabilitation therapy.  The patient needs the services of the rehabilitation physician  to assess the patient medically and functionally and to modify the course of treatment as needed to maximize the patient's capacity to benefit from the rehabilitation process. Patient requires ongoing medical management and assessment of the following:     Pain: patient will need ongoing assessment of post op pain. Pt will need management of pain medications with adjustments as indicated to ensure optimal therapy participation. Patient is very sensitive to opioids: Nausea reported with morphine prior to surgery and emesis postsurgery. Patient is currently on gabapentin, tylenol 650mg Q4 PRN, atarax (25 mg Q6 PRN), Ibuprofen (600mg, Q6 PRN), robaxin (500mg Q4 PRN), and oxycodone (10mg Q4 PRN). May benefit from pain consult on Acute Rehab.     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 Received methotrexate dosing 12/10/2022 (normally Wednesdays). Patient given stress dose steroids given chronic steroid usage 50 mg hydrocortisone just before surgery and 25 mg q8 hours x 24 hours after surgery for SDS (50 mg ordered, 25 mg q 8). Resumed home dosing Prednisone. Resumed methotrexate Wednesday 12/14 (usual day). Will need ongoing management of this and chemo precautions with this medication.     Right displaced diaphyseal femur fracture and intra-articular distal femur fracture: patient will need ongoing assessment of surgical incision for signs of infection/healing; hinged knee brace when OOB; will need ongoing assessment of RLE due to right foot swelling present but stable.     DVT prophylaxis: patient is at risk for DVT/PE post operatively and will need ongoing assessment for this. Continue ASA 325mg bid.     Patient is at risk for falls with injury, worsening pain, DVT/PE, and infection.     Past Medical/Surgical History   Surgery in the past 100 days: Yes   Additional relevant past medical history:  Granulomatous myositis, HTN, HLD, hepatic steatosis, MAYA (cpap at night), GERD, and methemoglobinemia with dapsone    Level of Functioning Prior to Admission:    LIVING ENVIRONMENT   People in Home: spouse, child(maurice), dependent (3 teenagers)   Current Living Arrangements: house   Home Accessibility: stairs to enter home, stairs within home    Number of Stairs, Main Entrance: 3    Stair Railings, Main Entrance: railings safe and in good condition    Number of Stairs, Within Home, Primary: greater than 10 stairs    Stair Railings, Within Home, Primary: railings safe and in good condition       Living Environment Comments: two story home, tub shower with walk in    SELF-CARE   Usual Activity Tolerance: moderate   Regular Exercise: No   Equipment Currently Used at Home: none   Activity/Exercise/Self-Care Comment: indep with mobility and ADLS, 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    Additional Comments: Patient was responsible for meal prep, medication management, driving, and shopping.     Level of Function: GG Scale (Section GG Functional Ability and Goals; CMS's BROWN Version 3.0 Manual effective 10.1.2019):  PT Current Function Goals for Rehab   Bed Rolling 4 Supervision or touching assitance 6 Independent   Supine to Sit 3 Partial/moderate assistance 6 Independent   Sit to Stand 1 Dependent (A of 2) 6 Independent   Transfer 1 Dependent (A of 2 - pivot) 6 Independent   Ambulation 88 Not attempted due to safety 4 Supervision or touching assitance   Stairs 88 Not attempted due to safety 3 Partial/moderate assistance     OT Current Function Goals for Rehab   Feeding 5 Setup or clean-up assistance 6 Independent   Grooming 4 Supervision or touching assitance 6 Independent   Bathing Not completed 5 Setup or clean-up assistance   Upper Body Dressing Not completed 6 Independent   Lower Body Dressing 2 Substantial/maximal assistance 6 Independent   Toileting 1 Dependent - purewick 6 Independent   Toilet Transfer 1  Dependent 6 Independent   Tub/Shower Transfer 88 Not attempted due to safety 4 Supervision or touching assitance   Cognition Not Impaired Not applicable     SLP Current Function Goals for Rehab   Swallow Not Impaired Not applicable   Communication Not Impaired Not applicable       Current Diet:  0-Thin and 7-Regular    Summary Statement:  Patient is participating in daily PT and OT and is making progress. Currently, pt requires max A to don hinged KI in long sitting and transfers to EOB with mod A. Completes brushing hair with set up assist, needing A to brush back of head. Pt has proximal muscle weakness and uses bilateral hands to do most tasks that require reaching up. Patient performs sit > stand with min Ax2 and pivots to chair with min A of 2, verbal cues, and increased time. Pt stood each time for 20 seconds w/ Min A, pt relying heavily on walker. Difficult as pt fatigues with task, cues to slow down, and needing mod A of 2 by end of transfer due to weakness. Cues for hopping technique but patient's B UE too weak to unweight L LE to progress forward.     Expected Therapies and Services Required During Inpatient Rehab Admission  Intensity of Therapy: Patient requires intensive therapies not available in a lesser level of care. Patient is motivated, making gains, and can tolerate 3 hours of therapy a day.  Physical Therapy: 90 minutes per day, 7 days a week for 14 days  Occupational Therapy: 90 minutes per day, 7 days a week for 14 days  Speech and Language Therapy: Not indicated at this time.   Rehabilitation Nursing Needs: Patient requires 24 hour Rehab Nursing to manage vitals, medication education, positioning of RLE and use of hinged knee brace, carryover of new rehab techniques including TTWB, care coordination, skin integrity as pt at risk for breakdown with hinged knee brace, pain management, post-surgical incision care to promote healing and prevent infection, provide safe environment for patient at  falls risk and edema management of RLE.    Precautions/restrictions/special needs:   Precautions: fall precautions and Weight bearing precautions (TTWB RLE)   Restrictions: Hinged knee brace locked in extension for all transfers/ambulation. Ok to have brace removed in bed. ROM of right knee when in bed/sitting chair as tolerated   Special Needs: lift and isolation    Expected Level of Improvement: mod I with short distance gait, ADLs, and transfers. Anticipate pt will need assist with IADLs.   Expected Length of time to achieve: 14 days    Anticipated Discharge Needs:  Anticipated Discharge Destination: Home  Anticipated Discharge Support: Family member  24/7 support available : Unknown  Identified caregiver(s):  Spouse and teenage children  Anticipated Discharge Needs: Home with outpatient therapy    Identified challenges/barriers:  TTWB status and stairs to enter home and within home    Liaison signature/date/time:    Physician statement of review and agreement:  I have reviewed and am in agreement of the need for IRF stay to address above functional and medical needs. In addition to above statements address, Patient requires intensive active and ongoing therapeutic intervention and multiple therapies; Patient requires medical supervision; Expected to actively participate in the intensive rehab program; Sufficiently stable to actively participate; Expectation for measurable improvement in functional capacity or adaption to impairments.    MD signature/date/time:

## 2022-12-15 NOTE — PROGRESS NOTES
Care Management Follow Up    Length of Stay (days): 5    Expected Discharge Date: 12/15/2022     Concerns to be Addressed:       Patient plan of care discussed at interdisciplinary rounds: Yes    Anticipated Discharge Disposition:  Acute Rehab     Anticipated Discharge Services:  Acute Rehab  Anticipated Discharge DME:  None    Patient/family educated on Medicare website which has current facility and service quality ratings:  yes  Education Provided on the Discharge Plan:  yes  Patient/Family in Agreement with the Plan: yes    Referrals Placed by CM/SW:    Private pay costs discussed: Not applicable    Additional Information:  Writer received a call from Marya at [facility] stating that she's waiting on the auth for patient, but is hopeful so she asked for a ride to be set up for 12/16 at 1400. Writer called [facility] and scheduled a w/c ride for 12/16 at 1400. Writer called patient and let her know of the ride time for tomorrow. Writer let her know of the ride time and let her know of the transport cost.     Will continue to follow.    FABY Garcia

## 2022-12-15 NOTE — PROGRESS NOTES
Care Management Follow Up    Length of Stay (days): 5    Expected Discharge Date: 12/15/2022     Concerns to be Addressed:       Patient plan of care discussed at interdisciplinary rounds: Yes    Anticipated Discharge Disposition:       Anticipated Discharge Services:    Anticipated Discharge DME:      Patient/family educated on Medicare website which has current facility and service quality ratings:    Education Provided on the Discharge Plan:    Patient/Family in Agreement with the Plan: yes    Referrals Placed by CM/SW:    Private pay costs discussed: Not applicable    Additional Information:  Call to  ARU to confirm that patient is being followed. Writer informed that insurance was verified today so waiting for medical readiness and bed availability.       FABY Laurent

## 2022-12-15 NOTE — PLAN OF CARE
.Date/Time 12/15/22 [id number]    Trauma/Ortho/Medical (Choose one) Trauma    Diagnosis:ORIF Right femur   POD#:4  Mental Status:A&OX4  Activity/dangle A2& walker , hinge brace on OOB  Diet:Reg  Pain:Oxy, Tylenol  Mercer/Voiding:Purewick  Tele/Restraints/Iso:Chemo Precaution  02/LDA:RA/ SL  D/C Date:Pending   Other Info:CMS intact, Dressing CDI. Will continue monitor.

## 2022-12-15 NOTE — PROGRESS NOTES
[facility]  Hospitalist Progress Note    Date of Service: 12/15/2022    Assessment & Plan     Britt Park is a 52 year old female who presents with a leg pain after a fall     Mechanical fall  Right displaced diaphyseal femur fracture  Right intra-articular distal femur fracture  Was at [employer] when she slipped on some water, twisting her R knee and falling. Was unable to get up and had severe pain to R leg/ knee. No head or other trauma. No dizzy/lightheaded, no cp. In ED VSS. Labs normal.     Xray femur:  spiral comminuted fracture of the distal femur which may extend into the intercondylar aspect of the femur anteriorly. There is a small spur patellar joint effusion. The tibia and fibula are intact.     CT knee 12/11 preop: Acute comminuted moderately displaced and angulated fracture of the distal femur with intra-articular extension of the fracture.    NPO, IV fluids    12/11 open reduction intramedullary nailing of right femur fracture and ORIF of right distal intra-articular femur fracture    Patient is very sensitive to opioids: Nausea reported with morphine prior to surgery and emesis postsurgery.    Small dose of fentanyl given Intra-Op  Plan:    Scheduled Tylenol 975 mg every 8    hx methemoglobinemia with dapsone    Reported to have prior surgery with C section     Nausea: Compazine and Zofran    SW consulted for TCU vs ARU placement    2 weeks post-op with Dr Larson team (or by NP if in TCU)       Granulomatous myositis    Patient on folic acid 1 mg daily, methotrexate 20 mg weekly SubQ, prednisone 10 mg daily]    Follow with Rheumatology and [facility]. On prednisone/methotx. Received methotrexate dosing 12/10/2022  (normally Wednesdays).     Patient given stress dose steroids given chronic steroid usage 50 mg hydrocortisone just before surgery and 25 mg q8 hours x 24 hours after surgery for SDS (50 mg ordered, 25 mg q 8)     Resume home dosing Prednisone  Plan:    Continue  folic acid    Resume methotrexate    HTN  HLD    resume atorvastatin 10 mg daily    Resume ARB      Hepatic steatosis  10/2022 LFTs normal. Thought 2/2 methotx vs weight.   - routine LFTs     MAYA  - continue home CPAP     GERD  - resume pantoprazole daily     Hx methemoglobinemia with dapsone  - noted      Diet: Advance Diet as Tolerated: Regular Diet Adult  Mercer Catheter: Not present  DVT Prophylaxis: post operative per ortho surgery DVT prevention per orthopedics  Code Status: Full Code       Expected Discharge Date: 12/16/2022,  3:00 PM             Mahesh Rangel MD,  Hospitalist Service  [facility]  ______________________________________________________________________    Interval History      No acute events overnight  No CP/SOB  No fevers  No nausea / vomiting or abdominal complaints  No new complaints  Working on ARU / placement    -Data reviewed today: I reviewed all new labs and imaging results over the last 24 hours.    Physical Exam   Temp: 98.6  F (37  C) Temp src: Oral BP: 96/73 Pulse: 86   Resp: 18 SpO2: 97 % O2 Device: None (Room air)    There were no vitals filed for this visit.    Constitutional: no apparent distress  Respiratory: Breath sounds CTA. No increased work of breathing. (Slightly limited)   Cardiovascular: RRR, no rub or murmur. No peripheral edema.  GI: Soft, non-tender, but limited  Skin: Extremities warm with an immobilizer on her right knee/leg      Medications     lactated ringers Stopped (12/11/22 2022)       aspirin  325 mg Oral BID     atorvastatin  10 mg Oral Daily     folic acid  1 mg Oral Daily     gabapentin  100 mg Oral TID     losartan  25 mg Oral Daily     methotrexate  20 mg Subcutaneous Weekly     pantoprazole  40 mg Oral Daily     polyethylene glycol  17 g Oral Daily     predniSONE  10 mg Oral Daily     senna-docusate  1 tablet Oral BID     sodium chloride (PF)  3 mL Intracatheter Q8H       Data   Recent Labs   Lab 12/14/22  0724 12/13/22  7362  12/13/22  0648 12/12/22 2128 12/12/22  0708 12/10/22  2110   WBC 7.8  --  7.3  --   --  7.4   HGB 10.5*  --  9.9*  --  10.9* 12.0   *  --  104*  --   --  107*     --  259  --   --  314   INR  --   --   --   --   --  0.97     --  142  --  138 138   POTASSIUM 3.8  --  3.8  --  3.8 3.6   CHLORIDE 107  --  110*  --  106 104   CO2 29  --  28  --  27 30   BUN 17  --  15  --  11 19   CR 0.43*  --  0.51*  --  0.40* 0.59   ANIONGAP 5  --  4  --  5 4   LASHONDA 8.5  --  8.5  --  8.8 8.7   GLC 91 131* 116*  116*   < > 127* 89   ALBUMIN  --   --  2.7*  --   --  3.7   PROTTOTAL  --   --  5.8*  --   --  6.6*   BILITOTAL  --   --  0.6  --   --  0.3   ALKPHOS  --   --  44  --   --  45   ALT  --   --  18  --   --  25   AST  --   --  17  --   --  20    < > = values in this interval not displayed.       Imaging:  No results found for this or any previous visit (from the past 24 hour(s)).

## 2022-12-16 ENCOUNTER — APPOINTMENT (OUTPATIENT)
Dept: PHYSICAL THERAPY | Facility: CLINIC | Age: 52
DRG: 481 | End: 2022-12-16
Payer: COMMERCIAL

## 2022-12-16 LAB
ATRIAL RATE - MUSE: 71 BPM
DIASTOLIC BLOOD PRESSURE - MUSE: NORMAL MMHG
INTERPRETATION ECG - MUSE: NORMAL
P AXIS - MUSE: 61 DEGREES
PR INTERVAL - MUSE: 150 MS
QRS DURATION - MUSE: 78 MS
QT - MUSE: 418 MS
QTC - MUSE: 454 MS
R AXIS - MUSE: 75 DEGREES
SYSTOLIC BLOOD PRESSURE - MUSE: NORMAL MMHG
T AXIS - MUSE: 47 DEGREES
VENTRICULAR RATE- MUSE: 71 BPM

## 2022-12-16 PROCEDURE — 97530 THERAPEUTIC ACTIVITIES: CPT | Mod: GP

## 2022-12-16 PROCEDURE — 250N000013 HC RX MED GY IP 250 OP 250 PS 637: Performed by: INTERNAL MEDICINE

## 2022-12-16 PROCEDURE — 120N000001 HC R&B MED SURG/OB

## 2022-12-16 PROCEDURE — 250N000013 HC RX MED GY IP 250 OP 250 PS 637: Performed by: PHYSICIAN ASSISTANT

## 2022-12-16 PROCEDURE — 250N000013 HC RX MED GY IP 250 OP 250 PS 637: Performed by: STUDENT IN AN ORGANIZED HEALTH CARE EDUCATION/TRAINING PROGRAM

## 2022-12-16 PROCEDURE — 250N000012 HC RX MED GY IP 250 OP 636 PS 637: Performed by: INTERNAL MEDICINE

## 2022-12-16 PROCEDURE — 97110 THERAPEUTIC EXERCISES: CPT | Mod: GP

## 2022-12-16 PROCEDURE — 99231 SBSQ HOSP IP/OBS SF/LOW 25: CPT | Performed by: STUDENT IN AN ORGANIZED HEALTH CARE EDUCATION/TRAINING PROGRAM

## 2022-12-16 RX ADMIN — ASPIRIN 325 MG: 325 TABLET, COATED ORAL at 08:07

## 2022-12-16 RX ADMIN — POLYETHYLENE GLYCOL 3350 17 G: 17 POWDER, FOR SOLUTION ORAL at 08:08

## 2022-12-16 RX ADMIN — FOLIC ACID 1 MG: 1 TABLET ORAL at 08:07

## 2022-12-16 RX ADMIN — SENNOSIDES AND DOCUSATE SODIUM 1 TABLET: 50; 8.6 TABLET ORAL at 08:08

## 2022-12-16 RX ADMIN — PREDNISONE 10 MG: 5 TABLET ORAL at 08:07

## 2022-12-16 RX ADMIN — OXYCODONE HYDROCHLORIDE 10 MG: 5 TABLET ORAL at 08:07

## 2022-12-16 RX ADMIN — ACETAMINOPHEN 650 MG: 325 TABLET, FILM COATED ORAL at 13:40

## 2022-12-16 RX ADMIN — GABAPENTIN 100 MG: 100 CAPSULE ORAL at 16:30

## 2022-12-16 RX ADMIN — SENNOSIDES AND DOCUSATE SODIUM 1 TABLET: 50; 8.6 TABLET ORAL at 20:11

## 2022-12-16 RX ADMIN — ASPIRIN 325 MG: 325 TABLET, COATED ORAL at 20:11

## 2022-12-16 RX ADMIN — GABAPENTIN 100 MG: 100 CAPSULE ORAL at 22:18

## 2022-12-16 RX ADMIN — MAGNESIUM HYDROXIDE 30 ML: 400 SUSPENSION ORAL at 20:11

## 2022-12-16 RX ADMIN — HYDROXYZINE HYDROCHLORIDE 25 MG: 25 TABLET, FILM COATED ORAL at 02:08

## 2022-12-16 RX ADMIN — PANTOPRAZOLE SODIUM 40 MG: 40 TABLET, DELAYED RELEASE ORAL at 08:08

## 2022-12-16 RX ADMIN — OXYCODONE HYDROCHLORIDE 10 MG: 5 TABLET ORAL at 02:08

## 2022-12-16 RX ADMIN — OXYCODONE HYDROCHLORIDE 10 MG: 5 TABLET ORAL at 20:11

## 2022-12-16 RX ADMIN — OXYCODONE HYDROCHLORIDE 10 MG: 5 TABLET ORAL at 13:28

## 2022-12-16 RX ADMIN — ATORVASTATIN CALCIUM 10 MG: 10 TABLET, FILM COATED ORAL at 08:07

## 2022-12-16 RX ADMIN — HYDROXYZINE HYDROCHLORIDE 25 MG: 25 TABLET, FILM COATED ORAL at 08:07

## 2022-12-16 RX ADMIN — LOSARTAN POTASSIUM 25 MG: 25 TABLET, FILM COATED ORAL at 08:07

## 2022-12-16 RX ADMIN — HYDROXYZINE HYDROCHLORIDE 25 MG: 25 TABLET, FILM COATED ORAL at 20:11

## 2022-12-16 RX ADMIN — METHOCARBAMOL 500 MG: 500 TABLET ORAL at 14:17

## 2022-12-16 RX ADMIN — GABAPENTIN 100 MG: 100 CAPSULE ORAL at 08:07

## 2022-12-16 ASSESSMENT — ACTIVITIES OF DAILY LIVING (ADL)
ADLS_ACUITY_SCORE: 22
ADLS_ACUITY_SCORE: 22
ADLS_ACUITY_SCORE: 26
ADLS_ACUITY_SCORE: 26
ADLS_ACUITY_SCORE: 22
ADLS_ACUITY_SCORE: 26
ADLS_ACUITY_SCORE: 30
ADLS_ACUITY_SCORE: 22
ADLS_ACUITY_SCORE: 26
ADLS_ACUITY_SCORE: 22

## 2022-12-16 NOTE — PLAN OF CARE
Trauma/Ortho    Diagnosis:R femur fx, ORIF  POD#:5  Mental Status:AxOx4  Activity/dangle: Ax2 walker and GB, pivot, toe-touch weight bearing, hinged brace when out of bed  Diet:Reg  Pain:Oxy and atarax  Mercer/Voiding:Purewick, adequate O/P  Tele/Restraints/Iso:Chemo precaution  D/C Date:TBD pending auth, 12/16 ride with [employer] wheelchair at 2:00 pm

## 2022-12-16 NOTE — PROGRESS NOTES
Rehab Admissions:  Met with patient this am as therapy rec Acute Rehab and pt is agreeable to [facility]. Patient is extremely motivated and excited for the opportunity for Acute Rehab. Provided education on facility location, therapy expectations, items to bring, ELOS, and MD involvement. Patient has been accepted pending insurance authorization through [facility] which is currently pending.     Thank you for the referral, we will continue to follow this patient for post acute placement.     Determination of admission is based upon the patient's need for an intensive, interdisciplinary approach to rehabilitation, their ability to progress, their ability to tolerate intensive therapies, their need for daily physician supervision, their need for twenty four hour nursing assistance, and their ability and willingness to participate in such a program.    Marya Ellsworth MS, OTR/L  Rehab Liaison/ Supervisor [phone]  [facility]  12/16/2022    1:24 PM

## 2022-12-16 NOTE — PLAN OF CARE
VSS, numbness RLE. Pain managed with tylenol. Oxycodone, Atarax and robaxin. Dressing C/D/I. Up with 2 and walker. Purewick in place for incontinent. A&OX4. Possible discharge to ARU tomorrow.

## 2022-12-16 NOTE — PROGRESS NOTES
Care Management Follow Up    Length of Stay (days): 6    Expected Discharge Date: 12/16/2022     Concerns to be Addressed:       Patient plan of care discussed at interdisciplinary rounds: Yes    Anticipated Discharge Disposition:       Anticipated Discharge Services:    Anticipated Discharge DME:      Patient/family educated on Medicare website which has current facility and service quality ratings:    Education Provided on the Discharge Plan:    Patient/Family in Agreement with the Plan: yes    Referrals Placed by CM/SW:    Private pay costs discussed: Not applicable    Additional Information:  Writer updated that auth has not been received and requested that a ride be rescheduled for tomorrow at 1100. Call to [facility] and wheelchair ride rescheduled for 1300 as that was the first available. ARU updated and in agreement. Writer spoke to patient who stated that her  called their insurance and he was told that the stay was approved. Writer noted that it appears that ARU does not have what they need.     Writer updated ARU and noted that patient said that insurance did approve. Marya called the CM to see if this was true. Writer is able to reschedule ride for today if that becomes a possibility.      FABY Laurent

## 2022-12-16 NOTE — PLAN OF CARE
Goal Outcome Evaluation:    Pt A&Ox4, VSS, CMS intact with numbness/tingling/edema to right foot, dressing C,D,I, hinged knee brace when out of bed, TTWB to RLE, up with assist of 2 to chair, purewick in place in bed, tolerating regular diet, oxycodone/atarax/tylenol/robaxin/ibuprofen for pain management, chemo precautions maintained, IV SL, discharge planning to ARU.

## 2022-12-16 NOTE — PLAN OF CARE
([id number])    Pt A/O x4, CMS intact w/ some tingling in the R. Foot. VSS on room air. Dressings CDI, voding via [employer]. Up w/ 2 pivot due to TTWB. PRN Oxy and Atarax given for pain. Potential discharge tomorrow pending insurance auth, continue to monitor.

## 2022-12-16 NOTE — PROGRESS NOTES
[facility]  Hospitalist Progress Note    Date of Service: 12/16/2022    Assessment & Plan     Britt Park is a 52 year old female who presents with a leg pain after a fall     Mechanical fall  Right displaced diaphyseal femur fracture  Right intra-articular distal femur fracture  Was at [facility] when she slipped on some water, twisting her R knee and falling. Was unable to get up and had severe pain to R leg/ knee. No head or other trauma. No dizzy/lightheaded, no cp. In ED VSS. Labs normal.     Xray femur:  spiral comminuted fracture of the distal femur which may extend into the intercondylar aspect of the femur anteriorly. There is a small spur patellar joint effusion. The tibia and fibula are intact.     CT knee 12/11 preop: Acute comminuted moderately displaced and angulated fracture of the distal femur with intra-articular extension of the fracture.    NPO, IV fluids    12/11 open reduction intramedullary nailing of right femur fracture and ORIF of right distal intra-articular femur fracture    Patient is very sensitive to opioids: Nausea reported with morphine prior to surgery and emesis postsurgery.    Small dose of fentanyl given Intra-Op  Plan:    Scheduled Tylenol 975 mg every 8    hx methemoglobinemia with dapsone    Reported to have prior surgery with C section     Nausea: Compazine and Zofran    SW consulted for TCU vs ARU placement    2 weeks post-op with Dr Larson team (or by NP if in TCU)       Granulomatous myositis    Patient on folic acid 1 mg daily, methotrexate 20 mg weekly SubQ, prednisone 10 mg daily]    Follow with Rheumatology and [facility]. On prednisone/methotx. Received methotrexate dosing 12/10/2022  (normally Wednesdays).     Patient given stress dose steroids given chronic steroid usage 50 mg hydrocortisone just before surgery and 25 mg q8 hours x 24 hours after surgery for SDS (50 mg ordered, 25 mg q 8)     Resume home dosing Prednisone  Plan:    Continue  folic acid    Resume methotrexate    HTN  HLD    resume atorvastatin 10 mg daily    Resume ARB      Hepatic steatosis  10/2022 LFTs normal. Thought 2/2 methotx vs weight.   - routine LFTs     MAYA  - continue home CPAP     GERD  - resume pantoprazole daily     Hx methemoglobinemia with dapsone  - noted      Diet: Advance Diet as Tolerated: Regular Diet Adult  Diet  Mercer Catheter: Not present  DVT Prophylaxis: post operative per ortho surgery DVT prevention per orthopedics  Code Status: Full Code       Expected Discharge Date: 12/16/2022,  3:00 PM             Mahesh Rangel MD,  Hospitalist Service  [facility]  ______________________________________________________________________    Interval History      No acute events overnight  No CP/SOB  No fevers  No nausea / vomiting or abdominal complaints  No new complaints  Working on ARU / placement -> ride be rescheduled for tomorrow at 1100.     -Data reviewed today: I reviewed all new labs and imaging results over the last 24 hours.    Physical Exam   Temp: 99  F (37.2  C) Temp src: Oral BP: 111/66 Pulse: 70   Resp: 16 SpO2: 96 % O2 Device: BiPAP/CPAP    There were no vitals filed for this visit.    Constitutional: no apparent distress  Respiratory: Breath sounds CTA. No increased work of breathing. (Slightly limited)   Cardiovascular: RRR, no rub or murmur. No peripheral edema.  GI: Soft, non-tender, but limited  Skin: Extremities warm with an immobilizer on her right knee/leg      Medications     lactated ringers Stopped (12/11/22 2022)       aspirin  325 mg Oral BID     atorvastatin  10 mg Oral Daily     folic acid  1 mg Oral Daily     gabapentin  100 mg Oral TID     losartan  25 mg Oral Daily     methotrexate  20 mg Subcutaneous Weekly     pantoprazole  40 mg Oral Daily     polyethylene glycol  17 g Oral Daily     predniSONE  10 mg Oral Daily     senna-docusate  1 tablet Oral BID     sodium chloride (PF)  3 mL Intracatheter Q8H       Data    Recent Labs   Lab 12/14/22  0724 12/13/22  1712 12/13/22  0648 12/12/22  2128 12/12/22  0708 12/10/22  2110   WBC 7.8  --  7.3  --   --  7.4   HGB 10.5*  --  9.9*  --  10.9* 12.0   *  --  104*  --   --  107*     --  259  --   --  314   INR  --   --   --   --   --  0.97     --  142  --  138 138   POTASSIUM 3.8  --  3.8  --  3.8 3.6   CHLORIDE 107  --  110*  --  106 104   CO2 29  --  28  --  27 30   BUN 17  --  15  --  11 19   CR 0.43*  --  0.51*  --  0.40* 0.59   ANIONGAP 5  --  4  --  5 4   LASHONDA 8.5  --  8.5  --  8.8 8.7   GLC 91 131* 116*  116*   < > 127* 89   ALBUMIN  --   --  2.7*  --   --  3.7   PROTTOTAL  --   --  5.8*  --   --  6.6*   BILITOTAL  --   --  0.6  --   --  0.3   ALKPHOS  --   --  44  --   --  45   ALT  --   --  18  --   --  25   AST  --   --  17  --   --  20    < > = values in this interval not displayed.       Imaging:  No results found for this or any previous visit (from the past 24 hour(s)).

## 2022-12-17 ENCOUNTER — HOSPITAL ENCOUNTER (INPATIENT)
Facility: CLINIC | Age: 52
LOS: 16 days | Discharge: HOME HEALTH | DRG: 949 | End: 2023-01-02
Attending: PHYSICAL MEDICINE & REHABILITATION | Admitting: PHYSICAL MEDICINE & REHABILITATION
Payer: COMMERCIAL

## 2022-12-17 ENCOUNTER — APPOINTMENT (OUTPATIENT)
Dept: OCCUPATIONAL THERAPY | Facility: CLINIC | Age: 52
DRG: 481 | End: 2022-12-17
Payer: COMMERCIAL

## 2022-12-17 VITALS
RESPIRATION RATE: 16 BRPM | TEMPERATURE: 98.6 F | HEART RATE: 70 BPM | OXYGEN SATURATION: 95 % | SYSTOLIC BLOOD PRESSURE: 102 MMHG | DIASTOLIC BLOOD PRESSURE: 61 MMHG

## 2022-12-17 DIAGNOSIS — K21.00 GASTROESOPHAGEAL REFLUX DISEASE WITH ESOPHAGITIS, UNSPECIFIED WHETHER HEMORRHAGE: Primary | ICD-10-CM

## 2022-12-17 DIAGNOSIS — S72.91XA CLOSED FRACTURE OF RIGHT FEMUR, UNSPECIFIED FRACTURE MORPHOLOGY, UNSPECIFIED PORTION OF FEMUR, INITIAL ENCOUNTER (H): ICD-10-CM

## 2022-12-17 LAB
ERYTHROCYTE [DISTWIDTH] IN BLOOD BY AUTOMATED COUNT: 14 % (ref 10–15)
HCT VFR BLD AUTO: 35.7 % (ref 35–47)
HGB BLD-MCNC: 11.4 G/DL (ref 11.7–15.7)
HOLD SPECIMEN: NORMAL
MCH RBC QN AUTO: 34.2 PG (ref 26.5–33)
MCHC RBC AUTO-ENTMCNC: 31.9 G/DL (ref 31.5–36.5)
MCV RBC AUTO: 107 FL (ref 78–100)
PLATELET # BLD AUTO: 409 10E3/UL (ref 150–450)
RBC # BLD AUTO: 3.33 10E6/UL (ref 3.8–5.2)
WBC # BLD AUTO: 9.3 10E3/UL (ref 4–11)

## 2022-12-17 PROCEDURE — 250N000013 HC RX MED GY IP 250 OP 250 PS 637: Performed by: INTERNAL MEDICINE

## 2022-12-17 PROCEDURE — 250N000013 HC RX MED GY IP 250 OP 250 PS 637: Performed by: STUDENT IN AN ORGANIZED HEALTH CARE EDUCATION/TRAINING PROGRAM

## 2022-12-17 PROCEDURE — 99223 1ST HOSP IP/OBS HIGH 75: CPT | Performed by: PHYSICAL MEDICINE & REHABILITATION

## 2022-12-17 PROCEDURE — 97535 SELF CARE MNGMENT TRAINING: CPT | Mod: GO

## 2022-12-17 PROCEDURE — 99239 HOSP IP/OBS DSCHRG MGMT >30: CPT | Performed by: HOSPITALIST

## 2022-12-17 PROCEDURE — 36415 COLL VENOUS BLD VENIPUNCTURE: CPT | Performed by: PHYSICAL MEDICINE & REHABILITATION

## 2022-12-17 PROCEDURE — 250N000013 HC RX MED GY IP 250 OP 250 PS 637: Performed by: PHYSICIAN ASSISTANT

## 2022-12-17 PROCEDURE — 250N000013 HC RX MED GY IP 250 OP 250 PS 637: Performed by: PHYSICAL MEDICINE & REHABILITATION

## 2022-12-17 PROCEDURE — 128N000003 HC R&B REHAB

## 2022-12-17 PROCEDURE — 250N000012 HC RX MED GY IP 250 OP 636 PS 637: Performed by: INTERNAL MEDICINE

## 2022-12-17 PROCEDURE — 85018 HEMOGLOBIN: CPT | Performed by: PHYSICAL MEDICINE & REHABILITATION

## 2022-12-17 PROCEDURE — 5A09357 ASSISTANCE WITH RESPIRATORY VENTILATION, LESS THAN 24 CONSECUTIVE HOURS, CONTINUOUS POSITIVE AIRWAY PRESSURE: ICD-10-PCS | Performed by: PHYSICAL MEDICINE & REHABILITATION

## 2022-12-17 RX ORDER — NALOXONE HYDROCHLORIDE 0.4 MG/ML
0.4 INJECTION, SOLUTION INTRAMUSCULAR; INTRAVENOUS; SUBCUTANEOUS
Status: DISCONTINUED | OUTPATIENT
Start: 2022-12-17 | End: 2023-01-02 | Stop reason: HOSPADM

## 2022-12-17 RX ORDER — ACETAMINOPHEN 325 MG/1
325-975 TABLET ORAL EVERY 6 HOURS PRN
Status: DISCONTINUED | OUTPATIENT
Start: 2022-12-17 | End: 2022-12-23

## 2022-12-17 RX ORDER — AMOXICILLIN 250 MG
1 CAPSULE ORAL 2 TIMES DAILY PRN
Status: DISCONTINUED | OUTPATIENT
Start: 2022-12-17 | End: 2023-01-02 | Stop reason: HOSPADM

## 2022-12-17 RX ORDER — LOSARTAN POTASSIUM 25 MG/1
25 TABLET ORAL DAILY
Status: DISCONTINUED | OUTPATIENT
Start: 2022-12-18 | End: 2022-12-19

## 2022-12-17 RX ORDER — HYDROXYZINE HYDROCHLORIDE 25 MG/1
25 TABLET, FILM COATED ORAL EVERY 6 HOURS PRN
Status: DISCONTINUED | OUTPATIENT
Start: 2022-12-17 | End: 2023-01-02 | Stop reason: HOSPADM

## 2022-12-17 RX ORDER — ONDANSETRON 4 MG/1
4 TABLET, ORALLY DISINTEGRATING ORAL EVERY 6 HOURS PRN
Status: DISCONTINUED | OUTPATIENT
Start: 2022-12-17 | End: 2023-01-02 | Stop reason: HOSPADM

## 2022-12-17 RX ORDER — PREDNISONE 10 MG/1
10 TABLET ORAL DAILY
Status: DISCONTINUED | OUTPATIENT
Start: 2022-12-18 | End: 2023-01-02 | Stop reason: HOSPADM

## 2022-12-17 RX ORDER — ESTRADIOL 0.1 MG/G
2 CREAM VAGINAL
Status: DISCONTINUED | OUTPATIENT
Start: 2022-12-19 | End: 2023-01-02 | Stop reason: HOSPADM

## 2022-12-17 RX ORDER — ATORVASTATIN CALCIUM 10 MG/1
10 TABLET, FILM COATED ORAL DAILY
Status: DISCONTINUED | OUTPATIENT
Start: 2022-12-18 | End: 2023-01-02 | Stop reason: HOSPADM

## 2022-12-17 RX ORDER — OXYCODONE HYDROCHLORIDE 5 MG/1
5 TABLET ORAL EVERY 4 HOURS PRN
Status: DISCONTINUED | OUTPATIENT
Start: 2022-12-17 | End: 2023-01-02 | Stop reason: HOSPADM

## 2022-12-17 RX ORDER — NALOXONE HYDROCHLORIDE 0.4 MG/ML
0.2 INJECTION, SOLUTION INTRAMUSCULAR; INTRAVENOUS; SUBCUTANEOUS
Status: DISCONTINUED | OUTPATIENT
Start: 2022-12-17 | End: 2023-01-02 | Stop reason: HOSPADM

## 2022-12-17 RX ORDER — BISACODYL 10 MG
10 SUPPOSITORY, RECTAL RECTAL DAILY PRN
Status: DISCONTINUED | OUTPATIENT
Start: 2022-12-17 | End: 2023-01-02 | Stop reason: HOSPADM

## 2022-12-17 RX ORDER — IBUPROFEN 200 MG
600 TABLET ORAL EVERY 8 HOURS PRN
Status: DISCONTINUED | OUTPATIENT
Start: 2022-12-17 | End: 2023-01-02 | Stop reason: HOSPADM

## 2022-12-17 RX ORDER — FOLIC ACID 1 MG/1
1 TABLET ORAL DAILY
Status: DISCONTINUED | OUTPATIENT
Start: 2022-12-18 | End: 2023-01-02 | Stop reason: HOSPADM

## 2022-12-17 RX ORDER — PANTOPRAZOLE SODIUM 40 MG/1
40 TABLET, DELAYED RELEASE ORAL DAILY
Status: DISCONTINUED | OUTPATIENT
Start: 2022-12-18 | End: 2023-01-02 | Stop reason: HOSPADM

## 2022-12-17 RX ORDER — POLYETHYLENE GLYCOL 3350 17 G/17G
17 POWDER, FOR SOLUTION ORAL DAILY
Status: DISCONTINUED | OUTPATIENT
Start: 2022-12-18 | End: 2023-01-02 | Stop reason: HOSPADM

## 2022-12-17 RX ORDER — ACETAMINOPHEN 325 MG/1
650 TABLET ORAL EVERY 6 HOURS PRN
Status: DISCONTINUED | OUTPATIENT
Start: 2022-12-17 | End: 2022-12-17

## 2022-12-17 RX ADMIN — SENNOSIDES AND DOCUSATE SODIUM 2 TABLET: 50; 8.6 TABLET ORAL at 09:01

## 2022-12-17 RX ADMIN — FOLIC ACID 1 MG: 1 TABLET ORAL at 09:01

## 2022-12-17 RX ADMIN — IBUPROFEN 600 MG: 200 TABLET, FILM COATED ORAL at 22:44

## 2022-12-17 RX ADMIN — ASPIRIN 325 MG: 325 TABLET ORAL at 20:01

## 2022-12-17 RX ADMIN — POLYETHYLENE GLYCOL 3350 17 G: 17 POWDER, FOR SOLUTION ORAL at 09:49

## 2022-12-17 RX ADMIN — ASPIRIN 325 MG: 325 TABLET, COATED ORAL at 09:01

## 2022-12-17 RX ADMIN — LOSARTAN POTASSIUM 25 MG: 25 TABLET, FILM COATED ORAL at 09:01

## 2022-12-17 RX ADMIN — GABAPENTIN 100 MG: 100 CAPSULE ORAL at 09:01

## 2022-12-17 RX ADMIN — OXYCODONE HYDROCHLORIDE 10 MG: 5 TABLET ORAL at 09:00

## 2022-12-17 RX ADMIN — BISACODYL 10 MG: 10 SUPPOSITORY RECTAL at 20:18

## 2022-12-17 RX ADMIN — OXYCODONE HYDROCHLORIDE 5 MG: 5 TABLET ORAL at 19:04

## 2022-12-17 RX ADMIN — OXYCODONE HYDROCHLORIDE 5 MG: 5 TABLET ORAL at 22:44

## 2022-12-17 RX ADMIN — OXYCODONE HYDROCHLORIDE 10 MG: 5 TABLET ORAL at 12:45

## 2022-12-17 RX ADMIN — ACETAMINOPHEN 650 MG: 325 TABLET, FILM COATED ORAL at 12:45

## 2022-12-17 RX ADMIN — OXYCODONE HYDROCHLORIDE 10 MG: 5 TABLET ORAL at 01:36

## 2022-12-17 RX ADMIN — PANTOPRAZOLE SODIUM 40 MG: 40 TABLET, DELAYED RELEASE ORAL at 09:01

## 2022-12-17 RX ADMIN — PREDNISONE 10 MG: 5 TABLET ORAL at 09:01

## 2022-12-17 RX ADMIN — ACETAMINOPHEN 975 MG: 325 TABLET, FILM COATED ORAL at 19:04

## 2022-12-17 RX ADMIN — HYDROXYZINE HYDROCHLORIDE 25 MG: 25 TABLET, FILM COATED ORAL at 09:01

## 2022-12-17 RX ADMIN — ATORVASTATIN CALCIUM 10 MG: 10 TABLET, FILM COATED ORAL at 09:02

## 2022-12-17 ASSESSMENT — ACTIVITIES OF DAILY LIVING (ADL)
ADLS_ACUITY_SCORE: 26
DRESSING/BATHING_DIFFICULTY: NO
CONCENTRATING,_REMEMBERING_OR_MAKING_DECISIONS_DIFFICULTY: YES
DIFFICULTY_EATING/SWALLOWING: NO
ADLS_ACUITY_SCORE: 30
FALL_HISTORY_WITHIN_LAST_SIX_MONTHS: YES
NUMBER_OF_TIMES_PATIENT_HAS_FALLEN_WITHIN_LAST_SIX_MONTHS: 1
ADLS_ACUITY_SCORE: 30
ADLS_ACUITY_SCORE: 31
WEAR_GLASSES_OR_BLIND: YES
ADLS_ACUITY_SCORE: 30
ADLS_ACUITY_SCORE: 31
ADLS_ACUITY_SCORE: 30
ADLS_ACUITY_SCORE: 25
WALKING_OR_CLIMBING_STAIRS_DIFFICULTY: YES
WALKING_OR_CLIMBING_STAIRS: AMBULATION DIFFICULTY, REQUIRES EQUIPMENT
EQUIPMENT_CURRENTLY_USED_AT_HOME: GRAB BAR, TOILET
ADLS_ACUITY_SCORE: 30
CHANGE_IN_FUNCTIONAL_STATUS_SINCE_ONSET_OF_CURRENT_ILLNESS/INJURY: YES
TOILETING_ISSUES: NO
DOING_ERRANDS_INDEPENDENTLY_DIFFICULTY: NO
VISION_MANAGEMENT: GLASSES
ADLS_ACUITY_SCORE: 31
ADLS_ACUITY_SCORE: 26

## 2022-12-17 NOTE — PLAN OF CARE
Physical Therapy Discharge Summary    Reason for therapy discharge:    Discharged to acute rehabilitation facility.    Progress towards therapy goal(s). See goals on Care Plan in [facility] electronic health record for goal details.  Goals not met.  Barriers to achieving goals:   discharge from facility.    Therapy recommendation(s):    Continued therapy is recommended.  Rationale/Recommendations:  Patient would benefit from continued skilled PT to increase independence with all functional mobility.    Goal Outcome Evaluation:

## 2022-12-17 NOTE — PLAN OF CARE
VSS. Numbness RLE. Up with 2 and walker. Pain managed with oxycodone, tylenol and atarax. Dressing C/D/I. Incontinent of bladder. Offer suppository and MOM but pt. Refused for now. Senna and Miralax given. A&OX4. Discharging to ARU today. Left floor at 1428 via wheelchair.

## 2022-12-17 NOTE — PLAN OF CARE
Oriented x 4. Is painful with movement on knee but hip area has been ok. Pain managed with prn atarax and oxycodone. MOM administered along with scheduled laxatives, last BM was 5 days ago. (R) foot swollen. Up with 2 assist with GB/Walker with hinge brace on. Up in chair for dinner. Discharging to ARU tomorrow.

## 2022-12-17 NOTE — LETTER
Recipient: Intake           Sender: Nadya Urbina Ph: [phone]  Home today, Mon 01/02/2022  RN, PT, OT and HA recommended.   Please lmk if you can consider, thanks!           Date: January 2, 2023  Patient Name:  Britt Park  Patient YOB: 1970  Routing Message:          The documents accompanying this notice contain confidential information belonging to the sender.  This information is intended only for the use of the individual or entity named above.  The authorized recipient of this information is prohibited from disclosing this information to any other party and is required to destroy the information after its stated need has been fulfilled, unless otherwise required by state law.    If you are not the intended recipient, you are hereby notified that any disclosure, copy, distribution or action taken in reliance on the contents of these documents is strictly prohibited.  If you have received this document in error, please return it by fax to [phone] with a note on the cover sheet explaining why you are returning it (e.g. not your patient, not your provider, etc.).  If you need further assistance, please call .  Documents may also be returned by mail to [employer], , [facility] [address], [place], [place] [address].

## 2022-12-17 NOTE — LETTER
Recipient: Intake           Sender: Nadya Urbina PH: [phone]  Home today, Mon 01/02  RN, PT, OT, and HA   I know you are closed today, please call me back tomorrow and let me know if able to consider. Thanks!           Date: January 2, 2023  Patient Name:  Britt Park  Patient YOB: 1970  Routing Message:          The documents accompanying this notice contain confidential information belonging to the sender.  This information is intended only for the use of the individual or entity named above.  The authorized recipient of this information is prohibited from disclosing this information to any other party and is required to destroy the information after its stated need has been fulfilled, unless otherwise required by state law.    If you are not the intended recipient, you are hereby notified that any disclosure, copy, distribution or action taken in reliance on the contents of these documents is strictly prohibited.  If you have received this document in error, please return it by fax to [phone] with a note on the cover sheet explaining why you are returning it (e.g. not your patient, not your provider, etc.).  If you need further assistance, please call .  Documents may also be returned by mail to [employer], , [address], [place], [place] [address].

## 2022-12-17 NOTE — LETTER
Recipient: intake           Sender: Nadya Urbina, [facility] PH: [phone]    Home today, Mon 01/02  RN, PT, OT, and HA     I understand you are closed today, please call me back tomorrow to let me know if able to consider.   Thanks!         Date: January 2, 2023  Patient Name:  Britt Park  Patient YOB: 1970  Routing Message:          The documents accompanying this notice contain confidential information belonging to the sender.  This information is intended only for the use of the individual or entity named above.  The authorized recipient of this information is prohibited from disclosing this information to any other party and is required to destroy the information after its stated need has been fulfilled, unless otherwise required by state law.    If you are not the intended recipient, you are hereby notified that any disclosure, copy, distribution or action taken in reliance on the contents of these documents is strictly prohibited.  If you have received this document in error, please return it by fax to [phone] with a note on the cover sheet explaining why you are returning it (e.g. not your patient, not your provider, etc.).  If you need further assistance, please call .  Documents may also be returned by mail to [employer], , [address], [place], [place] [address].

## 2022-12-17 NOTE — DISCHARGE SUMMARY
Discharge Summary  Hospitalist    Date of Admission:  12/10/2022  Date of Discharge:  12/17/2022  Discharging Provider: Lizbeth Tejada MD, MD  Date of Service (when I saw the patient): 12/17/22    Discharge Diagnoses   Mechanical fall  Right displaced diaphyseal femur fracture  Right intra-articular distal femur fracture  Acute blood loss anemia, expected postop    History of Present Illness   Please refer H & P for details.      Hospital Course      Britt Park is a 52 year old female who presents with  leg pain after a fall     Mechanical fall  Right displaced diaphyseal femur fracture  Right intra-articular distal femur fracture  Acute blood loss anemia, expected postop  Was at [facility] when she slipped on some water, twisting her R knee and falling. Was unable to get up and had severe pain to R leg/ knee. No head or other trauma. No dizzy/lightheaded, no cp. In ED VSS. Labs normal.     Xray femur:  spiral comminuted fracture of the distal femur which may extend into the intercondylar aspect of the femur anteriorly. There is a small spur patellar joint effusion. The tibia and fibula are intact.     CT knee 12/11 preop: Acute comminuted moderately displaced and angulated fracture of the distal femur with intra-articular extension of the fracture.    12/11 open reduction intramedullary nailing of right femur fracture and ORIF of right distal intra-articular femur fracture    Patient is very sensitive to opioids: Nausea reported with morphine prior to surgery and emesis postsurgery.    Small dose of fentanyl given Intra-Op    Hb dropped to 9.9 postop.  Remained stable.  Plan:    Scheduled Tylenol 975 mg every 8 hrs    Nausea: Compazine and Zofran    F/u 2 weeks post-op with Dr Larson team    Discharged to ARU instable condition.        Granulomatous myositis    Patient on folic acid 1 mg daily, methotrexate 20 mg weekly SubQ, prednisone 10 mg daily]    Follow with Rheumatology and [place]. On  prednisone/methotx. Received methotrexate dosing 12/10/2022  (normally Wednesdays).     Patient given stress dose steroids given chronic steroid usage 50 mg hydrocortisone just before surgery and 25 mg q8 hours x 24 hours after surgery for SDS (50 mg ordered, 25 mg q 8)     Resumed home dosing Prednisone    Continue folic acid    Resumed weekly methotrexate     HTN  HLD    resume atorvastatin 10 mg daily    Resume ARB      Hepatic steatosis  10/2022 LFTs normal. Thought 2/2 methotx vs weight.   - routine LFTs     MAYA  - continue home CPAP     GERD  - resume pantoprazole daily     Hx methemoglobinemia with dapsone  - noted       Lizbeth Tejada MD, MD      Pending Results   These results will be followed up by Hospitalist team.  Unresulted Labs Ordered in the Past 30 Days of this Admission     No orders found from 11/10/2022 to 12/11/2022.          Code Status   Full Code       Primary Care Physician   [facility]    Follow-ups Needed After Discharge   Follow-up Appointments     Follow Up and recommended labs and tests      Follow up with Dr. Larson/Landy in 2 weeks.  No follow up labs or   test are needed.  Call for appointment or questions [phone] ([facility] Patient Coordinator   for Dr. Larson)             Physical Exam   Temp: 98.6  F (37  C) Temp src: Oral BP: 102/61 Pulse: 70   Resp: 16 SpO2: 95 % O2 Device: None (Room air)    There were no vitals filed for this visit.  Vital Signs with Ranges  Temp:  [98.6  F (37  C)-99.3  F (37.4  C)] 98.6  F (37  C)  Pulse:  [70-79] 70  Resp:  [16] 16  BP: (102-123)/(61-76) 102/61  SpO2:  [95 %-98 %] 95 %  I/O last 3 completed shifts:  In: -   Out: 1675 [Urine:1675]    Constitutional: Alert, cooperative, no apparent distress  Respiratory: Non labored breathing  Cardiovascular: Regular rate and rhythm  GI:  soft, non-distended, non-tender  Skin: No obvious rash  Neuro: Alert, engages in appropriate conversation, fluent speech, moving all extremities, no facial  asymmetry  Psych: Calm and pleasant      Discharge Disposition   Discharged to rehabilitation facility  Condition at discharge: Stable    Consultations This Hospital Stay   ORTHOPEDIC SURGERY IP CONSULT  CARDIOLOGY IP CONSULT  PHYSICAL THERAPY ADULT IP CONSULT  OCCUPATIONAL THERAPY ADULT IP CONSULT  ORTHOSIS BRACE IP CONSULT  VASCULAR ACCESS ADULT IP CONSULT  CARE MANAGEMENT / SOCIAL WORK IP CONSULT  PHYSICAL THERAPY ADULT IP CONSULT  OCCUPATIONAL THERAPY ADULT IP CONSULT  CARE MANAGEMENT / SOCIAL WORK IP CONSULT    Time Spent on this Encounter   Lizbeth CURIEL MD, personally saw the patient today and spent greater than 30 minutes discharging this patient.    Discharge Orders      Follow Up and recommended labs and tests    Follow up with Dr. Larson/Landy in 2 weeks.  No follow up labs or test are needed.  Call for appointment or questions [phone] ([facility] Patient Coordinator for Dr. Larson)     Reason for your hospital stay    Right femur fracture, s/p ORIF     Wound care    Please leave right lower extremity dressing in place until 2 weeks postop/clinic visit. Okay to remove/reinforce if falling off/peeling or saturated >50%.     Activity - Up with assistive device     Weight bearing status    Your activity upon discharge: Toe touch weight bearing right lower extremity; knee immobilizer or hinged knee brace locked in extension while ambulation     Physical Therapy Adult Consult    Evaluate and treat as clinically indicated.    Reason:  Right femur fracture, s/p ORIF     Occupational Therapy Adult Consult    Evaluate and treat as clinically indicated.    Reason: Right femur fracture, s/p ORIF     Fall precautions     Crutches DME    DME Documentation: Describe the reason for need to support medical necessity: Impaired gait status post knee surgery. I, the undersigned, certify that the above prescribed supplies are medically necessary for this patient and is both reasonable and necessary in reference to  accepted standards of medical practice in the treatment of this patient's condition and is not prescribed as a convenience.     Cane DME    DME Documentation: Describe the reason for need to support medical necessity: Impaired gait status post knee surgery. I, the undersigned, certify that the above prescribed supplies are medically necessary for this patient and is both reasonable and necessary in reference to accepted standards of medical practice in the treatment of this patient's condition and is not prescribed as a convenience.     Walker DME    DME Documentation: Describe the reason for need to support medical necessity: Impaired gait status post knee surgery. I, the undersigned, certify that the above prescribed supplies are medically necessary for this patient and is both reasonable and necessary in reference to accepted standards of medical practice in the treatment of this patient's condition and is not prescribed as a convenience.     Diet    Follow this diet upon discharge: Orders Placed This Encounter      Advance Diet as Tolerated: Regular Diet Adult     Discharge Medications   Current Discharge Medication List      START taking these medications    Details   acetaminophen (TYLENOL) 325 MG tablet Take 2 tablets (650 mg) by mouth every 6 hours as needed for other (For optimal non-opioid multimodal pain management to improve pain control.)  Qty: 100 tablet, Refills: 0    Associated Diagnoses: Closed fracture of right femur, unspecified fracture morphology, unspecified portion of femur, initial encounter (H)      aspirin (ASA) 325 MG EC tablet Take 1 tablet (325 mg) by mouth 2 times daily  Qty: 30 tablet, Refills: 0    Associated Diagnoses: Closed fracture of right femur, unspecified fracture morphology, unspecified portion of femur, initial encounter (H)      hydrOXYzine (ATARAX) 25 MG tablet Take 1 tablet (25 mg) by mouth every 6 hours as needed for other (adjuvant pain)  Qty: 15 tablet, Refills: 0     Associated Diagnoses: Closed fracture of right femur, unspecified fracture morphology, unspecified portion of femur, initial encounter (H)      ibuprofen (ADVIL/MOTRIN) 600 MG tablet Take 1 tablet (600 mg) by mouth every 8 hours as needed for other (inflammatory pain)  Qty: 90 tablet, Refills: 0    Associated Diagnoses: Closed fracture of right femur, unspecified fracture morphology, unspecified portion of femur, initial encounter (H)      ondansetron (ZOFRAN ODT) 4 MG ODT tab Take 1 tablet (4 mg) by mouth every 6 hours as needed for nausea or vomiting  Qty: 5 tablet, Refills: 0    Associated Diagnoses: Closed fracture of right femur, unspecified fracture morphology, unspecified portion of femur, initial encounter (H)      oxyCODONE (ROXICODONE) 5 MG tablet Take 1 tablet (5 mg) by mouth every 4 hours as needed for severe pain (7-10)  Qty: 25 tablet, Refills: 0    Associated Diagnoses: Closed fracture of right femur, unspecified fracture morphology, unspecified portion of femur, initial encounter (H)      polyethylene glycol (MIRALAX) 17 GM/Dose powder Take 17 g by mouth daily  Qty: 510 g, Refills: 0    Associated Diagnoses: Closed fracture of right femur, unspecified fracture morphology, unspecified portion of femur, initial encounter (H)      senna-docusate (SENOKOT-S/PERICOLACE) 8.6-50 MG tablet Take 1 tablet by mouth 2 times daily as needed for constipation  Qty: 14 tablet, Refills: 0    Associated Diagnoses: Closed fracture of right femur, unspecified fracture morphology, unspecified portion of femur, initial encounter (H)         CONTINUE these medications which have NOT CHANGED    Details   atorvastatin (LIPITOR) 10 MG tablet Take 10 mg by mouth daily      estradiol (ESTRACE) 0.1 MG/GM vaginal cream Place 2 g vaginally twice a week      folic acid (FOLVITE) 1 MG tablet Take 1 mg by mouth daily      losartan (COZAAR) 25 MG tablet Take 25 mg by mouth daily      Methotrexate 20 MG/0.8ML SOSY Inject 0.8 mLs  Subcutaneous once a week On Wednesdays.      pantoprazole (PROTONIX) 40 MG EC tablet Take 40 mg by mouth daily      predniSONE (DELTASONE) 5 MG tablet Take 10 mg by mouth daily           Allergies   Allergies   Allergen Reactions     Benadryl [Diphenhydramine]      Sulfa Drugs      Data   Most Recent 3 CBC's:  Recent Labs   Lab Test 12/14/22  0724 12/13/22  0648 12/12/22  0708 12/10/22  2110   WBC 7.8 7.3  --  7.4   HGB 10.5* 9.9* 10.9* 12.0   * 104*  --  107*    259  --  314      Most Recent 3 BMP's:  Recent Labs   Lab Test 12/14/22  0724 12/13/22  1712 12/13/22 0648 12/12/22 2128 12/12/22 0708     --  142  --  138   POTASSIUM 3.8  --  3.8  --  3.8   CHLORIDE 107  --  110*  --  106   CO2 29  --  28  --  27   BUN 17  --  15  --  11   CR 0.43*  --  0.51*  --  0.40*   ANIONGAP 5  --  4  --  5   LASHONDA 8.5  --  8.5  --  8.8   GLC 91 131* 116*  116*   < > 127*    < > = values in this interval not displayed.     Most Recent 2 LFT's:  Recent Labs   Lab Test 12/13/22 0648 12/10/22  2110   AST 17 20   ALT 18 25   ALKPHOS 44 45   BILITOTAL 0.6 0.3     Most Recent INR's and Anticoagulation Dosing History:  Anticoagulation Dose History     Recent Dosing and Labs Latest Ref Rng & Units 12/10/2022    INR 0.85 - 1.15 0.97        Most Recent 3 Troponin's:No lab results found.  Most Recent Cholesterol Panel:No lab results found.  Most Recent 6 Bacteria Isolates From Any Culture (See EPIC Reports for Culture Details):No lab results found.  Most Recent TSH, T4 and A1c Labs:No lab results found.    Results for orders placed or performed during the hospital encounter of 12/10/22   XR Femur Right 2 Views    Narrative    EXAM: XR FEMUR RIGHT 2 VIEWS, XR KNEE RIGHT 1/2 VIEWS  LOCATION: [facility]  DATE/TIME: 12/10/2022 9:36 PM    INDICATION: trauma, pain  COMPARISON: None.      Impression    IMPRESSION: The bones are well-mineralized. There is a spiral comminuted fracture of the distal femur  which may extend into the intercondylar aspect of the femur anteriorly. There is a small spur patellar joint effusion. The tibia and fibula are intact.   Further evaluation with dedicated CT of the knee may be warranted to better assess for intra-articular extension.   XR Knee Right 1/2 Views    Narrative    EXAM: XR FEMUR RIGHT 2 VIEWS, XR KNEE RIGHT 1/2 VIEWS  LOCATION: [facility]  DATE/TIME: 12/10/2022 9:36 PM    INDICATION: trauma, pain  COMPARISON: None.      Impression    IMPRESSION: The bones are well-mineralized. There is a spiral comminuted fracture of the distal femur which may extend into the intercondylar aspect of the femur anteriorly. There is a small spur patellar joint effusion. The tibia and fibula are intact.   Further evaluation with dedicated CT of the knee may be warranted to better assess for intra-articular extension.   CT Knee Right w/o Contrast    Narrative    EXAM: CT KNEE RIGHT WITHOUT CONTRAST  LOCATION: [facility]  DATE/TIME: 12/11/2022, 7:07 AM    INDICATION: Knee pain. Fracture.  COMPARISON: 12/10/2022 radiographs.  TECHNIQUE: Noncontrast. Axial, sagittal and coronal thin-section reconstruction. Dose reduction techniques were used.     FINDINGS:     BONES:  -Acute comminuted moderately displaced and angulated fracture of the distal third of the femur. Portions of this fracture extend into the joint at the level of the anterior portion of the medial femoral condyle, as seen on series 6 image 31 and series 3   image 104. There is moderate displacement of the fracture fragments proximally with the apex of the fracture angulated anteriorly. No evidence of additional fractures. Osteopenia.    SOFT TISSUES:  -Localized soft tissue stranding around the fracture site likely a combination of edema and poorly defined blood products. There is marked atrophy of the posterior thigh musculature as well as the vastus lateralis. Moderate-sized  lipohemarthrosis in the   knee without evidence of intra-articular fracture fragments.      Impression    IMPRESSION:  1.  Acute comminuted moderately displaced and angulated fracture of the distal femur with intra-articular extension of the fracture.    2.  Moderate-sized lipohemarthrosis.       XR Surgery JOSELUIS L/T 5 Min Fluoro w Stills    Narrative    EXAM: XR SURGERY C-ARM FLUORO LESS THAN 5 MIN WITH STILLS  LOCATION: [facility]  DATE/TIME: 12/11/2022, 9:59 AM    INDICATION: Right hip fracture, hip pain.  COMPARISON: None.  TECHNIQUE: Exam performed by surgeon.    FINDINGS:  C-arm used for an orthopedic procedure. 7 images obtained intraoperatively show intramedullary ronda and pedicle screw fixation across a mildly displaced distal femoral fracture.    FLUOROSCOPIC TIME: 1.9 minutes.

## 2022-12-17 NOTE — LETTER
Recipient: [facility]           Sender: Nadya Urbina PH: [phone]  Home today Mon 01/02  RN, PT, OT and HA recommended.   Please lmk if able to accept.     I'd imagine that you are closed today and would still appreciate a call tomorrow with update.   Thanks!           Date: January 2, 2023  Patient Name:  Britt Park  Patient YOB: 1970  Routing Message:          The documents accompanying this notice contain confidential information belonging to the sender.  This information is intended only for the use of the individual or entity named above.  The authorized recipient of this information is prohibited from disclosing this information to any other party and is required to destroy the information after its stated need has been fulfilled, unless otherwise required by state law.    If you are not the intended recipient, you are hereby notified that any disclosure, copy, distribution or action taken in reliance on the contents of these documents is strictly prohibited.  If you have received this document in error, please return it by fax to [phone] with a note on the cover sheet explaining why you are returning it (e.g. not your patient, not your provider, etc.).  If you need further assistance, please call .  Documents may also be returned by mail to [email or web address], , [address], [place], [place] [address].

## 2022-12-17 NOTE — PROGRESS NOTES
Pt is A&O x4. Up with 2 GB and walker. CMS intact. VSS on RA. Voiding adequately Via purewick. IV SL. Ace Wrap intact to Right lower leg. On schedule Tylenol, PRN Oxycodone and Atarax for pain management. Possible discharge today.

## 2022-12-17 NOTE — H&P
[facility]                HISTORY AND PHYSICAL NOTE         Patient Name: Britt Park   YOB: 1970  MRN: [id number]     Age / Sex: 52 year old female    Admit Date: 12/17/22    Reason for Admission: intense rehabilitation following a mechanical fall leading to Right displaced diaphyseal femur fracture and intra-articular distal femur fracture, s/p ORIF     History of Present Illness  Britt Park is a RH 52 year old female who is being admitted to the ARU on 12/17/22.  She has a history of granulomatous myositis, hypertension hyperlipidemia who fell while in multiple medical sustaining a right displaced diaphyseal femur fracture and intra-articular distal femur fracture.  She is status post ORIF on 12-, postoperative complications included postoperative anemia with a hemoglobin dropping as low as 9.9.  She is currently stable.  Pain was managed using oxycodone and hydroxyzine on a as needed basis.    Given the history of granulomatous myositis, she has difficulty extending her fingers in both the hands right worse than the left.  She last received her methotrexate dose on 12-10- 22 and resumed her prednisone at the day of discharge.  She was placed on DVT prophylaxis using aspirin 325 mg p.o. twice daily.    She is somewhat constipated with her last bowel movement being prior to surgery.  Functionally, the patient was independent with all aspects of mobility and ADLs.  She works at the [employer] in the [employer] department      Currently, the patient is medically appropriate and is assessed to have needs and will benefit from an inpatient acute rehabilitation comprehensive program working with Physical and Occupational Therapist and will benefit from supervision and management of Rehab Nursing and Rehab MD.     Allergies  Allergies   Allergen Reactions     Benadryl [Diphenhydramine]      Sulfa Drugs   "      Past Medical and Surgical History  No past medical history on file.  Past Surgical History:   Procedure Laterality Date     OPEN REDUCTION INTERNAL FIXATION RODDING INTRAMEDULLARY FEMUR Right 12/11/2022    Procedure: Open reduction internal fixation of right femur fracture;  Surgeon: Al Larson MD;  Location:  OR         Social History  The patient lives with her  who is a ernst and works long hours and tends to be out of town most of the days.  She has 3 children 17-year-old daughter and 2 twin sons at age 15.    They live in a house with 3 steps to enter and her bedroom and bathroom are located in the upper level    The patient was previously independent with ambulation without use of cane or walker, previously independent with upper and lower body self care, ADLs, and IADLs.         Review of Systems  10 point ROS neg other than the symptoms noted above in the HPI and below:  She notes pain as high as 7 out of 10 when transferring.  Pain is located in her right hip area  Current pain levels are at 5 out of 5 at rest.  She is able to state that her she is toe-touch weightbearing  She will denies any tingling or numbness  Tends to be quite deep proximally given the myositis history.  Last bowel movement was 7 days ago  Has been coming up bladder  Remainder of the review of the systems was negative.            Physical Examination  /67 (Patient Position: Semi-Cornell's, Cuff Size: Adult Regular)   Pulse 75   Temp 98.8  F (37.1  C) (Oral)   Resp 16   Ht 1.575 m (5' 2\")   Wt 67 kg (147 lb 11.3 oz)   SpO2 99%   BMI 27.02 kg/m    General Awake, alert, not in distress  HEENT EOMs intact  Cardiac Regular  Respiratory Clear breath sounds  Abdomen Soft, nontender  Skin  right hip area covered with dressing changes in the leg in a brace in extension at the knee     Neurologic   Alert and oriented x 3  Speech is clear  comprehension is functional  Insightful    Cranial Nerves:  Her " visual fields intact bilaterally  EOMI. Smooth pursuit intact, no nystagmus.  The sensation intact and symmetric over face, cheeks, and chin  Eyebrow raise, eye scrunch, and smile symmetric  Not formally tested, but hearing intact to conversation.  Her palate raise symmetric, uvula midline. No speech changes/hoarseness.  She could turn head against resistance and shrug shoulders.  Tongue protrudes midline.  Muscle Strength:   4/5 in proximally in the upper extremities.    Bilateral shoulders have decreased range of motion in abduction and flexion beyond 90 degrees  Right lower extremity is extended with the knee brace in extension.  She has no lower extremity edema.  She is able to wiggle her toes and dorsiflex and plantarflex to full range of motion           Labs  Lab Results   Component Value Date    WBC 7.8 12/14/2022         Lab Results   Component Value Date    RBC 3.04 12/14/2022     Lab Results   Component Value Date    HGB 10.5 12/14/2022     Lab Results   Component Value Date    HCT 31.9 12/14/2022     No components found for: MCT  Lab Results   Component Value Date     12/14/2022     Lab Results   Component Value Date    MCH 34.5 12/14/2022     Lab Results   Component Value Date    MCHC 32.9 12/14/2022     Lab Results   Component Value Date    RDW 14.1 12/14/2022     Lab Results   Component Value Date     12/14/2022       Lab Results   Component Value Date     12/14/2022      Lab Results   Component Value Date    POTASSIUM 3.8 12/14/2022     Lab Results   Component Value Date    CHLORIDE 107 12/14/2022     Lab Results   Component Value Date    LASHONDA 8.5 12/14/2022     Lab Results   Component Value Date    CO2 29 12/14/2022     Lab Results   Component Value Date    BUN 17 12/14/2022     Lab Results   Component Value Date    CR 0.43 12/14/2022     Lab Results   Component Value Date    GLC 91 12/14/2022     No components found for: MRI      Medications  Current Facility-Administered  Medications   Medication     acetaminophen (TYLENOL) tablet 325-975 mg     acetaminophen (TYLENOL) tablet 650 mg     aspirin (ASA) EC tablet 325 mg     atorvastatin (LIPITOR) tablet 10 mg     [START ON 12/19/2022] estradiol (ESTRACE) cream 2 g     folic acid (FOLVITE) tablet 1 mg     hydrOXYzine (ATARAX) tablet 25 mg     ibuprofen (ADVIL/MOTRIN) tablet 600 mg     losartan (COZAAR) tablet 25 mg     Methotrexate SOSY 0.8 mL     ondansetron (ZOFRAN ODT) ODT tab 4 mg     oxyCODONE (ROXICODONE) tablet 5 mg     pantoprazole (PROTONIX) EC tablet 40 mg     polyethylene glycol (MIRALAX) powder 17 g     predniSONE (DELTASONE) tablet 10 mg     senna-docusate (SENOKOT-S/PERICOLACE) 8.6-50 MG per tablet 1 tablet         ASSESSMENT / MANAGEMENT AND INITIATION OF PLAN OF CARE:      POST-ADMISSION EVALUATION:  I have evaluated the patient on admission to the Acute Rehab Center and compared with the preadmission screen, no significant differences are identified and remains appropriate for acute rehabilitation. See below for more details and specifics regarding this admission that supports requiring medical and therapy intensity in the acute rehab setting.      Patient will work with PT for 90 min daily to work on gait exercises, strengthening, endurance buildup, transfers with use of walker as needed.   Patient will work with OT for 90 min daily to work on upper and lower body self care, dressing, toileting, bathing, energy conservation techniques with use of ADs as needed.   Rehab RN to administer medication, patient education on medication taking, VS monitoring, and surgical wound dressing changes and monitoring.     Medical Management:  Mechanical fall  Right displaced diaphyseal femur fracture  Right intra-articular distal femur fracture  Acute blood loss anemia, expected postop  Was at [facility] when she slipped on some water, twisting her R knee and falling. Was unable to get up and had severe pain to R leg/ knee. No head or other  trauma. No dizzy/lightheaded, no cp. In ED VSS. Labs normal.     Xray femur:  spiral comminuted fracture of the distal femur which may extend into the intercondylar aspect of the femur anteriorly. There is a small spur patellar joint effusion. The tibia and fibula are intact.     CT knee 12/11 preop: Acute comminuted moderately displaced and angulated fracture of the distal femur with intra-articular extension of the fracture.    Underwent 12/11 open reduction intramedullary nailing of right femur fracture and ORIF of right distal intra-articular femur fracture    Surgeons lA Larson MD - Primary         Simon, Landy NARAYANAN PA-C - Assisting the PA was present for     Patient is very sensitive to opioids: Nausea reported with morphine prior to surgery and emesis postsurgery.    Small dose of fentanyl given Intra-Op    Currently on as needed Tylenol, hydroxyzine, ibuprofen, and oxycodone 5 mg every 4 hours as needed for pain.  We will closely monitor pain levels at the time of the intense rehabitation and titrate medications as needed.    Postoperative anemia    Hb dropped to 9.9 postop.  Remained stable.    Last hemoglobin was on 14 December and trending upwards at 10.9.    Will check CBC with differential on Monday to monitor the postop anemia.       History of granulomatous myositis    Patient on folic acid 1 mg daily, methotrexate 20 mg weekly SubQ, prednisone 10 mg daily]    Follow with Rheumatology at [place].     On prednisone/methotx. Received methotrexate dosing 12/10/2022  (normally Wednesdays).     Patient given stress dose steroids given chronic steroid usage 50 mg hydrocortisone just before surgery and 25 mg q8 hours x 24 hours after surgery for SDS (50 mg ordered, 25 mg q 8)     Resumed home dosing Prednisone at the time of discharge, we will continue prednisone at 10 mg daily.    Continue folic acid    Continue methotrexate q. weekly every Wednesday     HTN/HLD    resume atorvastatin 10 mg  daily    Currently on losartan for blood pressure control.    Reviewed blood pressures since admission, optimal at 127 x 67.    We will closely monitor blood pressures and titrate medications as needed     Hepatic steatosis  10/2022 LFTs normal. Thought 2/2 methotx vs weight.   Not an active issue     MAYA  - continue home CPAP    Bladder, Bowel, GI and DVT ppx   Bladder ;check PVRs ×3 straight cath for volumes more than 350 cc.  Monitor for symptoms of urinary tract infection, rehab nursing to send for a UA as needed  Bowels; place the patient on a bowel program and titrate medications as needed.  Hold the bowel program in case of loose stools.  Educated patient on Impact with fiber and fluid intake on a bowel function  DVT prophylaxis with mechanical means        Code Status full     Prognosis for medical improvement and stabilization of the medical issues for discharge to home is good.     Estimated Length of Stay: 14 days          Soco Salazar MD, A   Department of Physical Medicine and Rehabilitation  [facility]  Total time spent: 70 minutes with more than half the time was spent counseling and / or coordination of care   Includes counseling / education / discussion

## 2022-12-18 ENCOUNTER — APPOINTMENT (OUTPATIENT)
Dept: OCCUPATIONAL THERAPY | Facility: CLINIC | Age: 52
DRG: 949 | End: 2022-12-18
Attending: PHYSICAL MEDICINE & REHABILITATION
Payer: COMMERCIAL

## 2022-12-18 ENCOUNTER — APPOINTMENT (OUTPATIENT)
Dept: PHYSICAL THERAPY | Facility: CLINIC | Age: 52
DRG: 949 | End: 2022-12-18
Attending: PHYSICAL MEDICINE & REHABILITATION
Payer: COMMERCIAL

## 2022-12-18 DIAGNOSIS — I15.9 SECONDARY HYPERTENSION: ICD-10-CM

## 2022-12-18 PROCEDURE — 97166 OT EVAL MOD COMPLEX 45 MIN: CPT | Mod: GO

## 2022-12-18 PROCEDURE — 97163 PT EVAL HIGH COMPLEX 45 MIN: CPT | Mod: GP

## 2022-12-18 PROCEDURE — 128N000003 HC R&B REHAB

## 2022-12-18 PROCEDURE — 97530 THERAPEUTIC ACTIVITIES: CPT | Mod: GP

## 2022-12-18 PROCEDURE — 97535 SELF CARE MNGMENT TRAINING: CPT | Mod: GO

## 2022-12-18 PROCEDURE — 250N000012 HC RX MED GY IP 250 OP 636 PS 637: Performed by: PHYSICAL MEDICINE & REHABILITATION

## 2022-12-18 PROCEDURE — 97542 WHEELCHAIR MNGMENT TRAINING: CPT | Mod: GP

## 2022-12-18 PROCEDURE — 250N000013 HC RX MED GY IP 250 OP 250 PS 637: Performed by: PHYSICAL MEDICINE & REHABILITATION

## 2022-12-18 PROCEDURE — 99233 SBSQ HOSP IP/OBS HIGH 50: CPT | Performed by: PHYSICAL MEDICINE & REHABILITATION

## 2022-12-18 RX ORDER — METHOCARBAMOL 500 MG/1
500 TABLET, FILM COATED ORAL 4 TIMES DAILY
Status: DISCONTINUED | OUTPATIENT
Start: 2022-12-18 | End: 2023-01-02 | Stop reason: HOSPADM

## 2022-12-18 RX ADMIN — IBUPROFEN 600 MG: 200 TABLET, FILM COATED ORAL at 06:56

## 2022-12-18 RX ADMIN — OXYCODONE HYDROCHLORIDE 5 MG: 5 TABLET ORAL at 02:49

## 2022-12-18 RX ADMIN — PREDNISONE 10 MG: 10 TABLET ORAL at 09:03

## 2022-12-18 RX ADMIN — METHOCARBAMOL 500 MG: 500 TABLET ORAL at 20:20

## 2022-12-18 RX ADMIN — ASPIRIN 325 MG: 325 TABLET ORAL at 20:20

## 2022-12-18 RX ADMIN — PANTOPRAZOLE SODIUM 40 MG: 40 TABLET, DELAYED RELEASE ORAL at 09:02

## 2022-12-18 RX ADMIN — ASPIRIN 325 MG: 325 TABLET ORAL at 09:03

## 2022-12-18 RX ADMIN — METHOCARBAMOL 500 MG: 500 TABLET ORAL at 15:57

## 2022-12-18 RX ADMIN — OXYCODONE HYDROCHLORIDE 5 MG: 5 TABLET ORAL at 20:20

## 2022-12-18 RX ADMIN — METHOCARBAMOL 500 MG: 500 TABLET ORAL at 11:09

## 2022-12-18 RX ADMIN — OXYCODONE HYDROCHLORIDE 5 MG: 5 TABLET ORAL at 11:09

## 2022-12-18 RX ADMIN — ACETAMINOPHEN 975 MG: 325 TABLET, FILM COATED ORAL at 20:19

## 2022-12-18 RX ADMIN — IBUPROFEN 600 MG: 200 TABLET, FILM COATED ORAL at 14:01

## 2022-12-18 RX ADMIN — ACETAMINOPHEN 975 MG: 325 TABLET, FILM COATED ORAL at 02:48

## 2022-12-18 RX ADMIN — FOLIC ACID 1 MG: 1 TABLET ORAL at 09:02

## 2022-12-18 RX ADMIN — ATORVASTATIN CALCIUM 10 MG: 10 TABLET, FILM COATED ORAL at 09:03

## 2022-12-18 RX ADMIN — LOSARTAN POTASSIUM 25 MG: 25 TABLET, FILM COATED ORAL at 09:03

## 2022-12-18 RX ADMIN — OXYCODONE HYDROCHLORIDE 5 MG: 5 TABLET ORAL at 06:56

## 2022-12-18 RX ADMIN — ACETAMINOPHEN 975 MG: 325 TABLET, FILM COATED ORAL at 09:24

## 2022-12-18 RX ADMIN — POLYETHYLENE GLYCOL 3350 17 G: 17 POWDER, FOR SOLUTION ORAL at 09:02

## 2022-12-18 ASSESSMENT — ACTIVITIES OF DAILY LIVING (ADL)
ADLS_ACUITY_SCORE: 31
BADLS,_PREVIOUS_FUNCTIONAL_LEVEL: USES DEVICE OR EQUIPMENT
ADLS_ACUITY_SCORE: 31
ADLS_ACUITY_SCORE: 31
IADLS,_PREVIOUS_FUNCTIONAL_LEVEL: DEPENDENT
BADLS,_PREVIOUS_FUNCTIONAL_LEVEL: USES DEVICE OR EQUIPMENT
ADLS_ACUITY_SCORE: 31
ADLS_ACUITY_SCORE: 31
IADLS,_PREVIOUS_FUNCTIONAL_LEVEL: PARTIAL ASSISTANCE
ADLS_ACUITY_SCORE: 31

## 2022-12-18 NOTE — PROGRESS NOTES
"   12/18/22 1100   Appointment Info   Signing Clinician's Name / Credentials (PT) Kris Hahn DPJENNIFER   Living Environment   People in Home spouse;child(maurice), dependent  (16 y/o and 2x15 y/o)   Current Living Arrangements house   Home Accessibility stairs to enter home;stairs within home   Number of Stairs, Main Entrance 3   Stair Railings, Main Entrance other (see comments)  (B rails, only reaches 1, usually uses R rail)   Number of Stairs, Within Home, Primary greater than 10 stairs   Stair Railings, Within Home, Primary railings on both sides of stairs   Transportation Anticipated family or friend will provide  (mini van)   Living Environment Comments Per OT, \"2 story house, bedroom and office upstairs, bathroom mainfloor (1/2 bath), tub shower upstairs w/sliding door, laundry in basement (no handrail at very bottom).\" GBs in upper floor bathroom and on main floor bathroom. Bedroom - aryan bed, no moving functions for HOB.   Self-Care   Usual Activity Tolerance fair   Current Activity Tolerance poor   Regular Exercise No   Equipment Currently Used at Home grab bar, toilet;grab bar, tub/shower  (Per OT, \"gb on mainfloor bathroom, uses counter top in upstairs bathroom, has a raised toilet seat that hasn't been installed\")   Fall history within last six months yes   Number of times patient has fallen within last six months 1   Activity/Exercise/Self-Care Comment Independent with mobility and ADLs, limited strength and endurance d/t myopathy   Post-Acute Assessment Only   Post-Acute Functional Assessment See below   Previous Level of Function/Home Environm   Bathing/Grooming, Premorbid Functional Level uses device or equipment   Dressing, Premorbid Functional Level uses device or equipment   Eating/Feeding, Premorbid Functional Level independent   Toileting, Premorbid Functional Level uses device or equipment   BADLs, Premorbid Functional Level uses device or equipment   IADLs, Premorbid Functional Level dependent   Bed " Mobility, Premorbid Functional Level independent   Transfers, Premorbid Functional Level independent   Household Ambulation, Premorbid Functional Level independent   Stairs, Premorbid Functional Level uses device or equipment   Community Ambulation, Premorbid Functional Level independent   General Information   Onset of Illness/Injury or Date of Surgery 12/11/22  (date of surg)   Referring Physician Chema   Patient/Family Therapy Goals Statement (PT) To return home safely ASAP   Pertinent History of Current Problem (include personal factors and/or comorbidities that impact the POC) CMH: R femur fractures s/p ORIF 12/11/22, granulomatous mysoitis, HTN, HLD   Existing Precautions/Restrictions fall   Weight-Bearing Status - LUE full weight-bearing   Weight-Bearing Status - RUE full weight-bearing   Weight-Bearing Status - LLE full weight-bearing   Weight-Bearing Status - RLE toe touch weight-bearing   Heart Disease Risk Factors High blood pressure;Dislipidemia   Cognition   Affect/Mental Status (Cognition) WNL   Orientation Status (Cognition) oriented x 4   Follows Commands (Cognition) WNL   Pain Assessment   Patient Currently in Pain Yes, see Vital Sign flowsheet  (R knee pain)   Integumentary/Edema   Integumentary/Edema other (describe)   Integumentary/Edema Comments PT: RLE incision covered with surgical dressing   Range of Motion (ROM)   Range of Motion ROM is WNL   ROM Comment PT: must keep R knee in rigid ext with KI with movement and OOB.   Strength (Manual Muscle Testing)   Strength (Manual Muscle Testing) MMT: Shoulder;MMT: Elbow/Forearm;MMT: Wrist;MMT: Hand (General);MMT: Hip;MMT: Knee;MMT: Ankle   Left Shoulder (Manual Muscle Testing)   Shoulder Flexion - Left Side (1+/5) trace plus, left   Shoulder Extension - Left Side (1+/5) trace plus, left   Shoulder ABduction - Left Side (1+/5) trace plus, left   Right Shoulder (Manual Muscle Testing)   Shoulder Flexion - Right Side (1+/5) trace plus, right    Shoulder Extension - Right Side (1+/5) trace plus, right   Shoulder ABduction - Right Side (1+/5) trace plus, right   Left Elbow/Forearm (Manual Muscle Testing)   Elbow Flexion - Left Side (3+/5) fair plus, left   Elbow Extension - Left Side (2-/5) poor minus, left   Right Elbow/Forearm (Manual Muscle Testing)   Elbow Flexion - Right Side (3+/5) fair plus, right   Elbow Extension - Right Side (2-/5) poor minus, right   Left Wrist (Manual Muscle Testing)   Wrist Muscle Testing Results: Left Wrist Flex: 4-/5, Ext 2/5   Right Wrist (Manual Muscle Testing)   Wrist Muscle Testing Results: Right Wrist Flex: 4-/5, Ext 2/5   Hand (Manual Muscle Testing)   Hand Muscle Testing Results : L 2-/5, R 2/5   Left Hip (Manual Muscle Testing)   Hip Flexion - Left Side (3+/5) fair plus, left   Hip Extension - Left Side (2+/5) poor plus, left   Right Hip (Manual Muscle Testing)   Hip Flexion - Right Side (2/5) poor, right   Hip Extension - Right Side (2/5) poor, right   Left Knee (Manual Muscle Testing)   Knee Flexion - Left Side (4/5) good, left   Knee Extension - Left Side (4-/5) good minus, left   Right Knee (Manual Muscle Testing)   Knee Flexion - Right Side other (see comments)  (NT)   Knee Extension - Right Side other (see comments)  (NT)   Left Ankle/Foot (Manual Muscle Testing)   Ankle Dorsiflexion - Left Side (3+/5) fair plus, left   Ankle Plantarflexion - Left Side (3/5) fair, left   Right Ankle/Foot (Manual Muscle Testing)   Ankle Dorsiflexion - Right Side (2+/5) poor plus, right   Ankle Plantarflexion - Right Side (2-/5) poor minus, right   Balance   Balance other (describe)   Balance Comments PT: requires heavy use of BUE for leverage to stand and in standing   Sensory Examination   Sensory Perception WFL   Sensory Perception Comments PT: pt reporting mild tingling in R feet (possibly d/t ACE wrap) but light touch and proprioception intact   Clinical Impression   Criteria for Skilled Therapeutic Intervention Yes,  treatment indicated   PT Diagnosis (PT) Deconditioning and proximal weakness (trunk, limbs), pain 2/2 R femoral fracture   Influenced by the following impairments Strength, ROM, endurance, pain, balance   Functional limitations due to impairments Bed mob, transfers, gait, stairs   Clinical Presentation (PT Evaluation Complexity) Unstable/Unpredictable   Clinical Presentation Rationale PT: limited most by pain, ROM deficits, strength deficits both acutely from surgery and chronically from myositis with predominant prox weakness, home setup with multiple flights of stairs, uncertain assist available at d/c   Clinical Decision Making (Complexity) high complexity   Planned Therapy Interventions (PT) balance training;bed mobility training;gait training;groups;home exercise program;manual therapy techniques;patient/family education;postural re-education;ROM (range of motion);stretching;strengthening;transfer training;wheelchair management/propulsion training;progressive activity/exercise;risk factor education;home program guidelines   Anticipated Equipment Needs at Discharge (PT) wheelchair;wheelchair cushion;walker, rolling;tub bench;reacher;hospital bed   Risk & Benefits of therapy have been explained evaluation/treatment results reviewed;care plan/treatment goals reviewed;risks/benefits reviewed;current/potential barriers reviewed;participants voiced agreement with care plan;participants included;patient   Clinical Impression Comments Ventura will benefit from PT to progress her overall strength, endurance, balance, and pain management with goal to be mod I from w/c level by d/c. Will need ramps installed (debating on 2 MARISSA through garage or 3 MARISSA at front door), and  is ernst and plans to install on his own. Recommend HH PT d/t pain, weakness, and difficulty accessing OP PT and carrying on with her day safely. Will also need K4 rental w/c.   PT Total Evaluation Time   PT Eval, High Complexity Minutes (74573)  "30   Physical Therapy Goals   PT Frequency Daily   PT Predicted Duration/Target Date for Goal Attainment 01/03/23   PT Goals Bed Mobility;Transfers;Gait;Wheelchair Mobility;Aerobic Activity;PT Goal 1   PT: Bed Mobility Modified independent;Supine to/from sit;Rolling  (portable bedrail)   PT: Transfers Modified independent;Sit to/from stand;Bed to/from chair;Assistive device  (FWW + R KI)   PT: Gait Moderate assist;Rolling walker;10 feet  (therapeutic gait)   PT: Wheelchair Mobility 50 feet;Caregiver SBA;manual wheelchair   PT: Perform aerobic activity with stable cardiovascular response intermittent activity;10 minutes;NuStep  (BUE and LLE)   PT: Goal 1 Car transfer, FWW, modA for assist to place RLE into car   Interventions   Interventions Quick Adds Therapeutic Activity;Therapeutic Procedure   Therapeutic Activity   Treatment Detail/Skilled Intervention PT: fit pt with 18x16\" wheelchair with R telescoping elevating leg rest and L elevating leg rest, contoured cushion for femoral support, and rigid solid backrest for improved posture.   PT Discharge Planning   PT Plan PT: SPT with FWW, assess w/c fit, STS in // bars   Total Session Time   Total Session Time (sum of timed and untimed services) 30   Post Acute Settings Only   What unit is patient on? Acute Rehab   PT - Acute Rehab Center Time   Individual Time (minutes) - enter zero if not applicable - PT 45  (30 EVAL, 15 TA)   Group Time (minutes) - enter zero if not applicable  - PT 0   Concurrent Time (minutes) - enter zero if not applicable  - PT 0   Co-Treatment Time (minutes) - enter zero if not applicable  - PT 0   ARC Total Session Time (minutes) - PT 45   Locomotion   Locomotion Comment PT: unsafe to amb d/t R knee pain and fatigue   Walk 10 Feet   Reason if not Attempted Safety concerns   Walk 10 Ft. CARE Score 88   Walk 50 Feet with Two Turns   Reason if not Attempted Safety concerns   Walk 50 Ft. CARE Score 88   Walk 150 Feet   Reason if not Attempted " Safety concerns   Walk 150 Ft. CARE Score 88   Walking 10 Feet on Uneven Surfaces   Reason if not Attempted Safety concerns   Walking 10 Feet on Uneven Surfaces CARE Score 88   1 Step (Curb)   Reason if not Attempted Safety concerns   1 Step CARE Score 88   4 Steps   Reason if not Attempted Safety concerns   4 Steps CARE Score 88   12 Steps   Reason if not Attempted Safety concerns   12 Steps CARE Score 88   Picking Up Object   Comment PT: requires BUE support   Reason if not Attempted Safety concerns    CARE Score 88   Car Transfer   Comment PT: unable to place RLE into car d/t space restrictions with RLE in rigid ext in KI   Reason if not Attempted Safety concerns   Car Transfer CARE Score 88

## 2022-12-18 NOTE — PLAN OF CARE
Discharge Planner Post-Acute Rehab PT:     Discharge Plan: home w/c based with intermittent assist from family and HH PT    Precautions: TTWB in RLE with KI donned whenever moving or OOB, falls with heavy reliance on BUE to stand    Current Status:  Bed Mobility: maxAx1 for assist to move RLE during sup<>sit  Transfer: SPT, FWW, maxAx2 with lift assist and assist to hold FWW down steady for better leverage to stand.   Gait: unsafe  Stairs: unsafe  Balance: requires heavy BUE support on FWW to stand and maintain balance d/t RLE pain and weakness proximally.    Assessment:  Ventura will benefit from PT to progress her overall strength, endurance, balance, and pain management with goal to be mod I from w/c level by d/c. Will need ramps installed (debating on 2 MARISSA through garage or 3 MARISSA at front door), and  is ernst and plans to install on his own. Recommend HH PT d/t pain, weakness, and difficulty accessing OP PT and carrying on with her day safely. Will also need K4 rental w/c.    Other Barriers to Discharge (DME, Family Training, etc):   Family training - date TBD    DME:  K4 rental w/c - need to order w/c  FWW - needs to order from [facility] bed - TBD, hoping to be able to perform bed mob independent or mod I with portable bedrail, but may need to consider hospital bed depending on progress.

## 2022-12-18 NOTE — PLAN OF CARE
Goal Outcome Evaluation:    Overall Patient Progress: no change    Outcome Evaluation: No change in Pt progress this shift.    Pt is alert and oriented. Can take pills whole. Incontinent of bladder - purewick on this shift. Continent of bowel. LBM 12/17. C/o pain; given PRN pain medication with some relief. Denied SOB, CP, and n/t. VSS. Call light within reach and bed alarms on. Will continue with POC.

## 2022-12-18 NOTE — PLAN OF CARE
Occupational Therapy Discharge Summary    Reason for therapy discharge:    Discharged to acute rehabilitation facility.    Progress towards therapy goal(s). See goals on Care Plan in [facility] electronic health record for goal details.  Goals partially met.  Barriers to achieving goals:   discharge from facility.    Therapy recommendation(s):    Continued therapy is recommended.  Rationale/Recommendations:  to progress safety/independence w/ I/ADL tasks prior to returning home.

## 2022-12-18 NOTE — PROGRESS NOTES
"  [facility]   Acute Rehabilitation Unit  Daily progress note  CC:     Orthopaedic Disorders 08.2 Femur (Shaft) Fracture: right displaced femoral shaft fracture with extension into the knee joint s/p ORIF    S: In good spirits. Some discomfort +. Would like Hot pac and ice as needed. Wants muscle relaxer added Denies SOB, nausea or HA.      Functionally, awaiting evaluations      Medications  Scheduled meds    aspirin  325 mg Oral BID     atorvastatin  10 mg Oral Daily     [START ON 12/19/2022] estradiol  2 g Vaginal Once per day on Mon Thu     folic acid  1 mg Oral Daily     losartan  25 mg Oral Daily     methocarbamol  500 mg Oral 4x Daily     [START ON 12/21/2022] Methotrexate  0.8 mL Subcutaneous Weekly     pantoprazole  40 mg Oral Daily     polyethylene glycol  17 g Oral Daily     predniSONE  10 mg Oral Daily       PRN meds:  acetaminophen, bisacodyl, hydrOXYzine, ibuprofen, naloxone **OR** naloxone **OR** naloxone **OR** naloxone, ondansetron, oxyCODONE, senna-docusate      PHYSICAL EXAM  /77 (BP Location: Right arm)   Pulse 71   Temp 97  F (36.1  C) (Oral)   Resp 16   Ht 1.575 m (5' 2\")   Wt 67 kg (147 lb 11.3 oz)   SpO2 90%   BMI 27.02 kg/m    Gen: Alert and cooperative  HEENT: MMM  CV: S1, S2 RRR  Pulm: Clear to auscultation  Abd: Soft, non tender  Ext: Calves non tender  Neuro/MSK: Moves UE and LLE well. Moves RLE distally.   Responds to touch.    LABS  Last Comprehensive Metabolic Panel:  Sodium   Date Value Ref Range Status   12/14/2022 141 133 - 144 mmol/L Final     Potassium   Date Value Ref Range Status   12/14/2022 3.8 3.4 - 5.3 mmol/L Final     Chloride   Date Value Ref Range Status   12/14/2022 107 94 - 109 mmol/L Final     Carbon Dioxide (CO2)   Date Value Ref Range Status   12/14/2022 29 20 - 32 mmol/L Final     Anion Gap   Date Value Ref Range Status   12/14/2022 5 3 - 14 mmol/L Final     Glucose   Date Value Ref Range Status   12/14/2022 91 70 - " 99 mg/dL Final     Urea Nitrogen   Date Value Ref Range Status   12/14/2022 17 7 - 30 mg/dL Final     Creatinine   Date Value Ref Range Status   12/14/2022 0.43 (L) 0.52 - 1.04 mg/dL Final     GFR Estimate   Date Value Ref Range Status   12/14/2022 >90 >60 mL/min/1.73m2 Final     Comment:     Effective December 21, 2021 eGFRcr in adults is calculated using the 2021 CKD-EPI creatinine equation which includes age and gender (Gloria et al., [place], DOI: 10.[facility]6/WHOWob2623350)     Calcium   Date Value Ref Range Status   12/14/2022 8.5 8.5 - 10.1 mg/dL Final        CBC RESULTS:   Recent Labs   Lab Test 12/17/22  1622   WBC 9.3   RBC 3.33*   HGB 11.4*   HCT 35.7   *   MCH 34.2*   MCHC 31.9   RDW 14.0           ASSESSMENT AND PLAN    Patient will work with PT for 90 min daily to work on gait exercises, strengthening, endurance buildup, transfers with use of walker as needed.   Patient will work with OT for 90 min daily to work on upper and lower body self care, dressing, toileting, bathing, energy conservation techniques with use of ADs as needed.   Rehab RN to administer medication, patient education on medication taking, VS monitoring, and surgical wound dressing changes and monitoring.      Medical Management:  Mechanical fall  Right displaced diaphyseal femur fracture  Right intra-articular distal femur fracture  Acute blood loss anemia, expected postop  Was at [facility] when she slipped on some water, twisting her R knee and falling. Was unable to get up and had severe pain to R leg/ knee. No head or other trauma. No dizzy/lightheaded, no cp. In ED VSS. Labs normal.     Xray femur:  spiral comminuted fracture of the distal femur which may extend into the intercondylar aspect of the femur anteriorly. There is a small spur patellar joint effusion. The tibia and fibula are intact.     CT knee 12/11 preop: Acute comminuted moderately displaced and angulated fracture of the distal femur with intra-articular extension  of the fracture.    Underwent 12/11 open reduction intramedullary nailing of right femur fracture and ORIF of right distal intra-articular femur fracture    Surgeons Al Larson MD - Primary         Landy Montes PA-C - Assisting the PA was present for     Patient is very sensitive to opioids: Nausea reported with morphine prior to surgery and emesis postsurgery.    Small dose of fentanyl given Intra-Op    Currently on as needed Tylenol, hydroxyzine, ibuprofen, and oxycodone 5 mg every 4 hours as needed for pain.  We will closely monitor pain levels at the time of the intense rehabitation and titrate medications as needed.     Postoperative anemia    Hb dropped to 9.9 postop.  Remained stable.    Last hemoglobin was on 14 December and trending upwards at 10.9.    Will check CBC with differential on Monday to monitor the postop anemia.    History of granulomatous myositis    Patient on folic acid 1 mg daily, methotrexate 20 mg weekly SubQ, prednisone 10 mg daily]    Follow with Rheumatology at [place].     On prednisone/methotx. Received methotrexate dosing 12/10/2022  (normally Wednesdays).     Patient given stress dose steroids given chronic steroid usage 50 mg hydrocortisone just before surgery and 25 mg q8 hours x 24 hours after surgery for SDS (50 mg ordered, 25 mg q 8)     Resumed home dosing Prednisone at the time of discharge, we will continue prednisone at 10 mg daily.    Continue folic acid    Continue methotrexate q. weekly every Wednesday     HTN/HLD    resume atorvastatin 10 mg daily    Currently on losartan for blood pressure control.    Reviewed blood pressures since admission, optimal at 127 x 67.    We will closely monitor blood pressures and titrate medications as needed     Hepatic steatosis  10/2022 LFTs normal. Thought 2/2 methotx vs weight.   Not an active issue     MAYA  - continue home CPAP     Bladder, Bowel, GI and DVT ppx   Bladder ;check PVRs ×3 straight cath for volumes more  than 350 cc.  Monitor for symptoms of urinary tract infection, rehab nursing to send for a UA as needed  Bowels; place the patient on a bowel program and titrate medications as needed.  Hold the bowel program in case of loose stools.  Educated patient on Impact with fiber and fluid intake on a bowel function  DVT prophylaxis with mechanical means     Code Status full      Prognosis for medical improvement and stabilization of the medical issues for discharge to home is good.      Estimated Length of Stay: 14 days    Doing well. Continue cares and plans outlined.     Len Bear MD   Physical Medicine & Rehabilitation

## 2022-12-18 NOTE — PLAN OF CARE
Goal Outcome Evaluation:      Plan of Care Reviewed With: patient    Overall Patient Progress: no change    Outcome Evaluation:   Orientation: A&OX3, pleasant and able to make needs known to staff  Ambulation/Transfer: AX2 SPT with walker  Bladder: Continent  Bowel: Continent LBM 12/18/22  Pain: 7/10 right hip area, continues to utilize scheduled and PRNs  Diet: Regular, thin liquids  Meds: Whole with water  Tubes/Lines/Drains: None  Skin: right hip area covered with dressing changes in the leg in a brace in extension at the knee.  No other concerns observed/reported, PT Eval was completed. Nursing will continue with POC.

## 2022-12-18 NOTE — PLAN OF CARE
Goal Outcome Evaluation:      Plan of Care Reviewed With: patient    Overall Patient Progress: no changeOverall Patient Progress: no change    Outcome Evaluation: Pt seems knowledgable regarding scheduled medications. Using call light appropriately to make needs known. Reporting significant pain in her RLE, controlled with ice packs, PRN oxycodone, and PRN tylenol. Using purewick for incontinence care tonight. No BM per pt report for many days, suppository given with no result.

## 2022-12-18 NOTE — PHARMACY-MEDICATION REGIMEN REVIEW
Pharmacy Medication Regimen Review  Britt Park is a 52 year old female who is currently in the Acute Rehab Unit.    Assessment: Upon review of the medications and patient chart the following irregularities were found: Other Recommendations: Patient was taking gabapentin 100 mg TID during previous admission for post-operative pain. Consider reordering for this admission.     Plan:   1. Consider reordering gabapentin 100 mg TID for post-op pain (patient was receiving during previous admission).   2. Continue current medication plan.     Attending provider will be sent this note for review.  If there are any emergent issues noted above, pharmacist will contact provider directly by phone.      Pharmacy will periodically review the resident's medication regimen for any PRN medications not administered in > 72 hours and discontinue them. The pharmacist will discuss gradual dose reductions of psychopharmacologic medications with interdisciplinary team on a regular basis.    Please contact pharmacy if the above does not answer specific medication questions/concerns.  Rylee Jacob [facility] on 12/18/2022 at 1:50 PM    Background:  A pharmacist has reviewed all medications and pertinent medical history today.  Medications were reviewed for appropriate use and any irregularities found are listed with recommendations.      Current Facility-Administered Medications:      acetaminophen (TYLENOL) tablet 325-975 mg, 325-975 mg, Oral, Q6H PRN, Soco Salazar MD, 975 mg at 12/18/22 0924     aspirin (ASA) EC tablet 325 mg, 325 mg, Oral, BID, Soco Salazar MD, 325 mg at 12/18/22 0903     atorvastatin (LIPITOR) tablet 10 mg, 10 mg, Oral, Daily, Soco Salazar MD, 10 mg at 12/18/22 0903     bisacodyl (DULCOLAX) suppository 10 mg, 10 mg, Rectal, Daily PRN, Soco Salazar MD, 10 mg at 12/17/22 2018     [START ON 12/19/2022] estradiol (ESTRACE) cream 2 g, 2 g, Vaginal, Once per day on Mon Thu,  Soco Salazar MD     folic acid (FOLVITE) tablet 1 mg, 1 mg, Oral, Daily, Soco Salazar MD, 1 mg at 12/18/22 0902     hydrOXYzine (ATARAX) tablet 25 mg, 25 mg, Oral, Q6H PRN, Soco Salazar MD     ibuprofen (ADVIL/MOTRIN) tablet 600 mg, 600 mg, Oral, Q8H PRN, Soco aSlazar MD, 600 mg at 12/18/22 0656     losartan (COZAAR) tablet 25 mg, 25 mg, Oral, Daily, Soco Salazar MD, 25 mg at 12/18/22 0903     methocarbamol (ROBAXIN) tablet 500 mg, 500 mg, Oral, 4x Daily, Len Bear MD, 500 mg at 12/18/22 1109     [START ON 12/21/2022] Methotrexate 20 mg/0.8 mL, 0.8 mL, Subcutaneous, Weekly, Soco Salazar MD     naloxone (NARCAN) injection 0.2 mg, 0.2 mg, Intravenous, Q2 Min PRN **OR** naloxone (NARCAN) injection 0.4 mg, 0.4 mg, Intravenous, Q2 Min PRN **OR** naloxone (NARCAN) injection 0.2 mg, 0.2 mg, Intramuscular, Q2 Min PRN **OR** naloxone (NARCAN) injection 0.4 mg, 0.4 mg, Intramuscular, Q2 Min PRN, Len Bear MD     ondansetron (ZOFRAN ODT) ODT tab 4 mg, 4 mg, Oral, Q6H PRN, Soco Salazar MD     oxyCODONE (ROXICODONE) tablet 5 mg, 5 mg, Oral, Q4H PRN, Soco Salazar MD, 5 mg at 12/18/22 1109     pantoprazole (PROTONIX) EC tablet 40 mg, 40 mg, Oral, Daily, Soco Salazar MD, 40 mg at 12/18/22 0902     polyethylene glycol (MIRALAX) Packet 17 g, 17 g, Oral, Daily, Soco Salazar MD, 17 g at 12/18/22 0902     predniSONE (DELTASONE) tablet 10 mg, 10 mg, Oral, Daily, Soco Salazar MD, 10 mg at 12/18/22 0903     senna-docusate (SENOKOT-S/PERICOLACE) 8.6-50 MG per tablet 1 tablet, 1 tablet, Oral, BID PRN, Soco Salazar MD  No current outpatient prescriptions on file.

## 2022-12-18 NOTE — PHARMACY-ADMISSION MEDICATION HISTORY
Please see Admission Medication History completed on 12/10/22 under previous encounter at [facility] for information regarding prior to admission medications.    Rylee Jacob, PharmD

## 2022-12-18 NOTE — PLAN OF CARE
Discharge Planner Post-Acute Rehab PT:     Discharge Plan: home w/c based with intermittent assist from family and HH PT    Precautions: TTWB in RLE with KI donned whenever moving or OOB  Falls with heavy reliance on BUE to stand  Fatigue - perform submaximal strengthening to avoid over exhaustion with myositis    Current Status:  Bed Mobility: maxAx1 for assist to move RLE during sup<>sit  Transfer: SPT, FWW, maxAx2 with lift assist and assist to hold FWW down steady for better leverage to stand.   Gait: unsafe  Stairs: unsafe  Balance: requires heavy BUE support on FWW to stand and maintain balance d/t RLE pain and weakness proximally.    Assessment:  Ventura will benefit from PT to progress her overall strength, endurance, balance, and pain management with goal to be mod I from w/c level by d/c. Will need ramps installed (debating on 2 MARISSA through garage or 3 MARISSA at front door), and  is ernst and plans to install on his own. Recommend HH PT d/t pain, weakness, and difficulty accessing OP PT and carrying on with her day safely. Will also need K4 rental w/c.    Other Barriers to Discharge (DME, Family Training, etc):   Family training - date TBD    DME:  K4 rental w/c - need to order w/c  FWW - needs to order from [facility] bed - TBD, hoping to be able to perform bed mob independent or mod I with portable bedrail, but may need to consider hospital bed depending on progress.

## 2022-12-18 NOTE — CONSULTS
Social Work: Initial Assessment with Discharge Plan    Patient Name: Britt Park  : 1970  Age: 52 year old  MRN: [id number]  Completed assessment with: Chart review and interview with patient   Admitted to ARU: 22    Presenting Information   Date of SW assessment: 2022  Health Care Directive: Provided education  Primary Health Care Agent: Patient/self   Secondary Health Care Agent: JONH  Living Situation: Pt lives with her spouse in a house with dependent (3 teenagers). stairs to enter home, stairs within home  Number of Stairs, Main Entrance: 3  Stair Railings, Main Entrance: railings safe and in good condition  Number of Stairs, Within Home, Primary: greater than 10 stairs  Stair Railings, Within Home, Primary: railings safe and in good conditio.   two story home, tub shower with walk in  Previous Functional Status:  indep with mobility and ADLS, 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.  Patient was responsible for meal prep, medication management, driving, and shopping.   DME available: See therapy evaluation for more information   Patient and family understanding of hospitalization: Appropriate and Pleasant.   Cultural/Language/Spiritual Considerations: Pt is a 52 y.o. , female,  discend, English speaking Lutheran.       Physical Health  Reason for admission: Britt Park is a 52 year old female who presents via EMS for further evaluation of right knee pain.  She was at the [facility] when she apparently slipped on some water and twisted her right knee.  She has been unable to get up since and has severe pain to the distal right upper leg and right knee region.  She denies any numbness, tingling, or weakness to her right foot.  She denies any other injuries.    Provider Information   Primary Care Physician:Vi Metz   [facility] will schedule PCP apt at discharge.   : None reported.     Mental Health/Chemical Dependency:   Diagnosis:  None reported.   Alcohol/Tobacco/Narcotis: Occasional wine, no smoking and no illegal drugs.   Support/Services in Place: None reported.  Services Needed/Recommended: [place] and Health Psychology support while on ARU available.   Sexuality/Intimacy: Not discussed     Support System  Marital Status:   Family support: Brother in [place].  Other support available: Friends     Community Resources  Current in home services: None reported.   Previous services: None reported.     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     Income Source: wages and  may try and get short term disability?   Education: College at [employer].   Financial Concerns:  Getting FMLA and short term disability.  Maybe calling the [email or web address] and asking if there are any forms to fill out about accident. (lost of wages?)  Insurance: [employer]    Discharge Plan   Patient and family discharge goal: TBD, pending progress  Provided Education on discharge plan: Evaluations and discharge recommendations pending.   Patient agreeable to discharge plan:  Pending further discussion. Evaluations and discharge recommendations pending.   Provided education and attained signature for Medicare IM and IRF Patient Rights and Privacy Information provided to patient : no  Provided patient with [place] [facility] Resources: no  Barriers to discharge: TBD    Discharge Recommendations   Disposition: See above   Transportation Needs: Patient, family/friends, paid transport, insurance transport (if applicable)   They have a Mini Van.  Hope to go home in that.     Additional comments   Discharge MARTIN MUÑOZ 14 days    Patient shared that she has a rare muscle disorder so is quite weak at baseline, although she is independent.    SW will remain available and continue to follow as needs arise.     Pt asked if she could get recliner.  SW asked nursing staff  "about it.     -------------------------------------------------------------------------------------------------------------  MARY Pain Assessment    Pain Effect on Sleep  Over the past 5 days, how much of the time has pain made it hard for you to sleep at night?\"    0 - Does not apply - I have not had any pain or hurting in the past 5 days  1 - Rarely or nor at all  2 - Occasionally   When meds wear off.   3 - Frequently   4 - Almost Constantly  8 - Unable to Answer    Pain Interference with Therapy Activities  \"Over the past 5 days, how often have you limited your participation in rehabilitation therapy sessions due to pain?\"  0 - Does not apply - I have not received rehabilitation therapy in the past 5 days  1 - Rarely or nor at all  Just some discomfort.   2 - Occasionally    3 - Frequently   4 - Almost Constantly  8 - Unable to Answer    Pain Interference with Day-to-Day Activities  \"Over the past 5 days, how often have you limited your day-to-day activities (excluding rehabilitation therapy sessions) because of pain?\"  1 - Rarely or nor at all  2 - Occasionally  3 - Frequently   4 - Almost Constantly  8 - Unable to Answer  -------------------------------------------------------------------------------------------------------------    Cordelia Hernandez [facility]   Social Work   [address] [place] [address], [address] [place] [address]  () [phone]             "

## 2022-12-18 NOTE — PROGRESS NOTES
Discharge Planner Post-Acute Rehab OT:     Discharge Plan: home, wc-based, w/assist from family and homecare OT     Precautions: TTWB in RLE with KI donned whenever moving or OOB, falls with heavy reliance on BUE to stand, baseline myopathy limiting BUE ROM     Current Status:  ADLs:    Mobility:   o TBD, likely wc based    o Max A bed mobility from supine > sitting EOB, support RLE     Grooming: Seated/EOB     Dressing:   o UB: Seated/EOB, baseline limited BUE ROM at 90 degrees   o LB: Total A supine, 2 person A rolling L/R     Bathing: Anticipating bed bath for safety     Toileting: Dep, bedpan   IADLs:  and children provide assist with laundry (basement) and carry grocery bags into house   Vision/Cognition: WNL      Assessment: Pt presenting to ARU after sustainining a right displaced diaphyseal femur fracture and intra-articular distal femur fracture from a fall when slipping on water at [facility]. She is post ORIF on 12- and has a hx of granulomatous myositis, hypertension, and hyperlipidemia. At baseline she was majorly IND w/mobility and ADLs, receiving some support from family with laundry and carrying groceries. Pt reports that baseline myopathy limits her BUE and BLE strength and endurance making UB dressing/gh tasks, stairs, and STS from low surfaces difficult. Pt is expected to benefit from skilled OT services to increase functional mobility, strength, and endurance for ADLs. ELOS 15 days.     Other Barriers to Discharge (DME, Family Training, etc):   Home accessibility/stairs ( is ernst, planning to make ramp)   ETB, dressing equipment

## 2022-12-18 NOTE — CONSULTS
SPIRITUAL HEALTH SERVICES  SPIRITUAL ASSESSMENT Progress Note  [facility]   ON-CALL VISIT    REFERRAL SOURCE: [facility] consult for emotional support.     I attempted to conduct an in-person visit with Pt Ventura today. I was notified from behind the door that she was unavailable at this time. I informed her that a visit would be conducted at a later time/date.     PLAN: Unit  will be informed for follow up. [facility] is available to Ventura per request.     Shruthi Valderrama  Lead [facility]   Pager [phone]    [facility] remains available 24/7 for emergent requests/referrals, either by having the switchboard page the on-call  or by entering an ASAP/STAT consult in Epic (this will also page the on-call ).

## 2022-12-19 ENCOUNTER — APPOINTMENT (OUTPATIENT)
Dept: OCCUPATIONAL THERAPY | Facility: CLINIC | Age: 52
DRG: 949 | End: 2022-12-19
Attending: PHYSICAL MEDICINE & REHABILITATION
Payer: COMMERCIAL

## 2022-12-19 ENCOUNTER — APPOINTMENT (OUTPATIENT)
Dept: PHYSICAL THERAPY | Facility: CLINIC | Age: 52
DRG: 949 | End: 2022-12-19
Attending: PHYSICAL MEDICINE & REHABILITATION
Payer: COMMERCIAL

## 2022-12-19 PROCEDURE — 99233 SBSQ HOSP IP/OBS HIGH 50: CPT | Performed by: PHYSICAL MEDICINE & REHABILITATION

## 2022-12-19 PROCEDURE — 250N000012 HC RX MED GY IP 250 OP 636 PS 637: Performed by: PHYSICAL MEDICINE & REHABILITATION

## 2022-12-19 PROCEDURE — 250N000009 HC RX 250

## 2022-12-19 PROCEDURE — 97530 THERAPEUTIC ACTIVITIES: CPT | Mod: GP

## 2022-12-19 PROCEDURE — 97535 SELF CARE MNGMENT TRAINING: CPT | Mod: GO | Performed by: OCCUPATIONAL THERAPIST

## 2022-12-19 PROCEDURE — 250N000013 HC RX MED GY IP 250 OP 250 PS 637: Performed by: PHYSICAL MEDICINE & REHABILITATION

## 2022-12-19 PROCEDURE — 128N000003 HC R&B REHAB

## 2022-12-19 PROCEDURE — 97110 THERAPEUTIC EXERCISES: CPT | Mod: GP

## 2022-12-19 RX ORDER — MAGNESIUM HYDROXIDE 1200 MG/15ML
LIQUID ORAL
Status: COMPLETED
Start: 2022-12-19 | End: 2022-12-19

## 2022-12-19 RX ORDER — LOSARTAN POTASSIUM 25 MG/1
TABLET ORAL
Qty: 90 TABLET | Refills: 2 | Status: SHIPPED | OUTPATIENT
Start: 2022-12-19 | End: 2024-01-23

## 2022-12-19 RX ADMIN — OXYCODONE HYDROCHLORIDE 5 MG: 5 TABLET ORAL at 17:44

## 2022-12-19 RX ADMIN — OXYCODONE HYDROCHLORIDE 5 MG: 5 TABLET ORAL at 00:41

## 2022-12-19 RX ADMIN — OXYCODONE HYDROCHLORIDE 5 MG: 5 TABLET ORAL at 11:53

## 2022-12-19 RX ADMIN — ACETAMINOPHEN 975 MG: 325 TABLET, FILM COATED ORAL at 02:18

## 2022-12-19 RX ADMIN — HYDROXYZINE HYDROCHLORIDE 25 MG: 25 TABLET, FILM COATED ORAL at 23:49

## 2022-12-19 RX ADMIN — ASPIRIN 325 MG: 325 TABLET ORAL at 11:53

## 2022-12-19 RX ADMIN — ACETAMINOPHEN 650 MG: 325 TABLET, FILM COATED ORAL at 23:49

## 2022-12-19 RX ADMIN — ACETAMINOPHEN 975 MG: 325 TABLET, FILM COATED ORAL at 17:03

## 2022-12-19 RX ADMIN — OXYCODONE HYDROCHLORIDE 5 MG: 5 TABLET ORAL at 22:06

## 2022-12-19 RX ADMIN — METHOCARBAMOL 500 MG: 500 TABLET ORAL at 11:53

## 2022-12-19 RX ADMIN — METHOCARBAMOL 500 MG: 500 TABLET ORAL at 20:53

## 2022-12-19 RX ADMIN — ATORVASTATIN CALCIUM 10 MG: 10 TABLET, FILM COATED ORAL at 11:53

## 2022-12-19 RX ADMIN — IBUPROFEN 600 MG: 200 TABLET, FILM COATED ORAL at 06:24

## 2022-12-19 RX ADMIN — IBUPROFEN 600 MG: 200 TABLET, FILM COATED ORAL at 19:22

## 2022-12-19 RX ADMIN — PANTOPRAZOLE SODIUM 40 MG: 40 TABLET, DELAYED RELEASE ORAL at 11:53

## 2022-12-19 RX ADMIN — LOSARTAN POTASSIUM 25 MG: 25 TABLET, FILM COATED ORAL at 11:53

## 2022-12-19 RX ADMIN — FOLIC ACID 1 MG: 1 TABLET ORAL at 11:53

## 2022-12-19 RX ADMIN — METHOCARBAMOL 500 MG: 500 TABLET ORAL at 17:00

## 2022-12-19 RX ADMIN — SODIUM CHLORIDE 1000 ML: 900 IRRIGANT IRRIGATION at 23:48

## 2022-12-19 RX ADMIN — ASPIRIN 325 MG: 325 TABLET ORAL at 20:53

## 2022-12-19 RX ADMIN — OXYCODONE HYDROCHLORIDE 5 MG: 5 TABLET ORAL at 04:49

## 2022-12-19 RX ADMIN — PREDNISONE 10 MG: 10 TABLET ORAL at 11:53

## 2022-12-19 ASSESSMENT — ACTIVITIES OF DAILY LIVING (ADL)
ADLS_ACUITY_SCORE: 31
ADLS_ACUITY_SCORE: 33
ADLS_ACUITY_SCORE: 31
ADLS_ACUITY_SCORE: 31
ADLS_ACUITY_SCORE: 33
ADLS_ACUITY_SCORE: 41
ADLS_ACUITY_SCORE: 31
ADLS_ACUITY_SCORE: 33

## 2022-12-19 NOTE — PROGRESS NOTES
12/18/22 1008   Appointment Info   Signing Clinician's Name / Credentials (OT) LONA Villalobos   Living Environment   People in Home spouse;child(maurice), dependent  (18 y/o daughter, twin boys 15)   Current Living Arrangements house   Home Accessibility stairs to enter home;stairs within home   Number of Stairs, Main Entrance 3   Stair Railings, Main Entrance railings safe and in good condition   Number of Stairs, Within Home, Primary greater than 10 stairs   Stair Railings, Within Home, Primary railings safe and in good condition   Transportation Anticipated family or friend will provide   Living Environment Comments Pt reports having 2 story house (mentioned basement, may be 3 story), bedroom and office upstairs, bathroom mainfloor (1/2 bath), tub shower upstairs w/sliding door, laundry in basement (no handrail at very bottom)   Self-Care   Usual Activity Tolerance fair  (Could walk up to a mile during the summer)   Current Activity Tolerance poor   Regular Exercise   (Less frequent dt winter)   Equipment Currently Used at Home grab bar, toilet;grab bar, tub/shower  (gb on mainfloor bathroom, uses counter top in upstairs bathroom, has a raised toilet seat that hasn't been installed)   Fall history within last six months yes   Number of times patient has fallen within last six months 1   Activity/Exercise/Self-Care Comment Previously IND w/mobility and ADLs, limited strength and endurance dt myopathy in BUE and BLE making stairs and STS from low surfaces difficult   Instrumental Activities of Daily Living (IADL)   Previous Responsibilities finances;meal prep;driving;shopping;medication management   IADL Comments Family does laundry since it is in basement, can't carry grocery bags into house   Previous Level of Function/Home Environm   Bathing/Grooming, Premorbid Functional Level uses device or equipment  (gbs)   Dressing, Premorbid Functional Level independent   Eating/Feeding, Premorbid Functional Level  independent   Toileting, Premorbid Functional Level uses device or equipment   BADLs, Premorbid Functional Level uses device or equipment   IADLs, Premorbid Functional Level partial assistance   Bed Mobility, Premorbid Functional Level independent   Transfers, Premorbid Functional Level independent;uses device or equipment  (gbs in bathroom)   Household Ambulation, Premorbid Functional Level independent   Stairs, Premorbid Functional Level uses device or equipment   Community Ambulation, Premorbid Functional Level independent   Functional Cognition, Premorbid Functional Level WNL   Previous Level of Function, Premorbid Myopathy at baseline limiting BUE and BLE strength, endurance, and ROM   General Information   Onset of Illness/Injury or Date of Surgery 12/17/22   Referring Physician Soco Salazar MD   Patient/Family Therapy Goal Statement (OT) Be able to get out of bed and ambulate using FWW, be more self-sufficient, dc home   Additional Occupational Profile Info/Pertinent History of Current Problem Enjoys going to the therapeutic pool, [employer], walks, movies, read, spend time with family, cooking   Performance Patterns (Routines, Roles, Habits) Pt lives at home with  and 3 teenage children, 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   Existing Precautions/Restrictions fall   Limitations/Impairments other (see comments)  (Hxof granulomatous myositis, hypertension hyperlipidemia who fell while in (MOA) sustaining a right displaced diaphyseal femur fracture and intra-articular distal femur fracture.  Post ORIF on 12-.)   Left Upper Extremity (Weight-bearing Status) full weight-bearing (FWB)   Right Upper Extremity (Weight-bearing Status) full weight-bearing (FWB)   Left Lower Extremity (Weight-bearing Status) full weight-bearing (FWB)   Right Lower Extremity (Weight-bearing Status) toe touch weight-bearing (TTWB)    General Observations and Info Pt is friendly and demonstrates good communication skills throughout evaluation.   Cognitive Status Examination   Orientation Status orientation to person, place and time   Visual Perception   Visual Impairment/Limitations WNL   Pain Assessment   Patient Currently in Pain Yes, see Vital Sign flowsheet  (6/10 without movement nsg notified, has recd pain meds this AM)   Range of Motion Comprehensive   Comment, General Range of Motion Baseline limited ROM of BUE, shoulder flexion at 90 degrees   Coordination   Coordination Comments Baseline dystonia in bilateral hands, some tremors ocassionally, granulomatous myositis causes difficulty extending fingers in both hands - right worse than the left.   Bed Mobility   Comment (Bed Mobility) Max A for supine > sitting EOB   Transfers   Transfer Comments Not attempted dt safety   Activities of Daily Living   BADL Assessment/Intervention lower body dressing;upper body dressing;bathing   Upper Body Dressing Assessment/Training   Position (Upper Body Dressing) sitting up in bed   [place] Level (Upper Body Dressing) maximum assist (25% patient effort)   Lower Body Dressing Assessment/Training   Position (Lower Body Dressing) supine   [place] Level (Lower Body Dressing) assist of 2   Clinical Impression   Criteria for Skilled Therapeutic Interventions Met (OT) Yes, treatment indicated   OT Diagnosis Decreased functional mobility, strength, and endurance limiting participation in daily ADLs.   Influenced by the following impairments From chart: Britt Park is a RH 52 year old female who is being admitted to the ARU on 12/17/22. She has a history of granulomatous myositis, hypertension hyperlipidemia who fell while in (MOA) sustaining a right displaced diaphyseal femur fracture and intra-articular distal femur fracture.  She is status post ORIF on 12-, postoperative complications included postoperative anemia with a hemoglobin  dropping as low as 9.9. She is currently stable. Pain was managed using oxycodone and hydroxyzine on a as needed basis.   OT Problem List-Impairments impacting ADL problems related to;activity tolerance impaired;balance;strength;pain;post-surgical precautions;mobility   ADL comments/analysis Decreased IND w/ADLs including UB/LB dressing, bathing, functional mobility, and gh tasks.   Assessment of Occupational Performance 3-5 Performance Deficits   Identified Performance Deficits Decreased functional mobility and endurance limiting IND w/ADLs including upper and lower body self care, dressing, toileting, and bathing.   Planned Therapy Interventions (OT) ADL retraining;IADL retraining;balance training;strengthening;transfer training;progressive activity/exercise   Intervention Comments Tx focus on upper and lower body self care, dressing, toileting, bathing, energy conservation techniques with use of ADs as needed.   Clinical Decision Making Complexity (OT) moderate complexity   Anticipated Equipment Needs Upon Discharge (OT) dressing equipment;raised toilet seat;toileting equipment;wheelchair;bathing equipment   Risk & Benefits of therapy have been explained evaluation/treatment results reviewed;care plan/treatment goals reviewed;risks/benefits reviewed;current/potential barriers reviewed;participants voiced agreement with care plan;participants included;patient   OT Total Evaluation Time   OT Eval, Moderate Complexity Minutes (62052) 15   OT Goals   Therapy Frequency (OT) Daily   OT Predicted Duration/Target Date for Goal Attainment 01/02/23   OT Goals Hygiene/Grooming;Upper Body Dressing;Lower Body Dressing;Upper Body Bathing;Lower Body Bathing;Transfers;Toilet Transfer/Toileting   OT: Hygiene/Grooming modified independent   OT: Upper Body Dressing Independent   OT: Lower Body Dressing Modified independent   OT: Upper Body Bathing Modified independent   OT: Lower Body Bathing Modified independent   OT: Transfer  Modified independent   OT: Toilet Transfer/Toileting Modified independent   Interventions   Interventions Quick Adds Self-Care/Home Management;Therapeutic Activity;Therapeutic Procedures/Exercise   Self-Care/Home Management   Self-Care/Home Mgmt/ADL, Compensatory, Meal Prep Minutes (06593) 45   Symptoms Noted During/After Treatment (Meal Preparation/Planning Training) increased pain   Treatment Detail/Skilled Intervention OT: Tx focus on toileting as pt had urgent need to have BM while in bed. Bedpan used with 2 person A for cleanup. Pt able to sit EOB to increase IND w/functional mobility for ADLs, max A to support RLE.   OT Discharge Planning   OT Plan OT: Seated ADLs, bed mobility, sitting EOB w/RLE support, work towards bedside commode tsfrs. HEP for BUE strength/ROM to increase IND w/ADLs   Total Session Time   Timed Code Treatment Minutes 45   Total Session Time (sum of timed and untimed services) 60   OT - Acute Rehab Center Time   Individual Time (minutes) - enter zero if not applicable - OT 60  (1 eval, 3 ADL)   Group Time (minutes) - enter zero if not applicable  - OT 0   Concurrent Time (minutes) - enter zero if not applicable  - OT 0   Co-Treatment Time (minutes) - enter zero if not applicable  - OT 0   ARC Total Session Time (minutes) - OT 60   Bowel   Assistive Devices Bedpan   Functional Performance Assistance for positioning and placement of bedpan;Staff sets up/empties commode or bedpan;Assist with donning and/or doffing of incontinence pad   Lower Body Dressing (Pants/Undergarments)   Describe performance OT: 2 person total A from supine   Clothing Utilized Pull up incontinence pad   Toilet Hygiene   Describe performance OT: 2 person A for cleanup after bedpan   Toilet Transfer   Describe performance OT: bedpan used - total A

## 2022-12-19 NOTE — PLAN OF CARE
FOCUS/GOAL  Medical management    ASSESSMENT, INTERVENTIONS AND CONTINUING PLAN FOR GOAL:  Patient alert and oriented, able to make needs known  Slept most of the night, patient requested to be woken up for pain medication  Pain to right hip managed by scheduled and Prn medications, patient wants pain medications round the clock , woken up when its due for medication, otherwise patient denied chest pain, headache, N&V, no SOB  Compliant in wearing CPAP at night  Purewick in placed at night for incontinence of Bladder, No Bm this shift  Safety rounding checked completed, 3 side rails UP, bed alarm ON, call light in reach  Continue with POC    Goal Outcome Evaluation:

## 2022-12-19 NOTE — PLAN OF CARE
Goal Outcome Evaluation:      Plan of Care Reviewed With: patient    \Overall Patient Progress: no change    Outcome Evaluation:     Orientation: A&OX3, pleasant and able to make needs known.  Ambulation/Transfer: AX2 SPT with walker  Bladder: Continent, pure wick on for the night.  Bowel: Continent LBM 12/18/22  Pain: 7/10 right hip area, continues to utilize scheduled and PRNs  Diet: Regular, thin liquids  Meds: Whole with water  Tubes/Lines/Drains: None  Skin: right hip area covered with dressing  No other concerns observed/reported, Nursing will continue with POC.

## 2022-12-19 NOTE — PLAN OF CARE
Goal Outcome Evaluation:    Cognition: Pt is AOx4  Pain: Reported pain 8/10. PRN Oxy provided total relief.   Skin: No new concerns.  Bowel/bladder: Continent bladder. Slight incontinence of bowel today. Pt refused bowel meds. A2 bedpan.   Transfer: A2 pivot transfer.  Vital signs: VSS. Denied SOB, chest pain, numbness/tingling.

## 2022-12-19 NOTE — PROGRESS NOTES
"SPIRITUAL HEALTH SERVICES Progress Note  [facility] ([facility]) 5 ARU    Saw pt Britt Gaspare Diontecatietalia per admission request. Conducted spiritual and emotional assessment, facilitated reflective conversation, and provided prayer.    Patient/Family Understanding of Illness and Goals of Care - Ventura is admitted to the ARU for the healing of her leg. She anticipates being on the unit for about 2 weeks and needing to be off her leg for 6-8 weeks. 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    Distress and Loss - Ventura named the emotional distress of being away from her family over [place], medical/physical challenges she's experienced  in the hospital, and frustration her plans have changed. Ventura reflected on how she intended to take time off work this week to prepare for [place] and relax, but her injury forced time off early. She processed anxiety and pressure she felt regarding work. She named the disappointment of missing her daughter's half birthday and [employer] performance. I gave her space to process her thoughts and feelings and provided validation and emotional support.    Strengths, Coping, and Resources - We discussed meaningful activities Ventura can do during her stay: reading, writing, journaling, prayer/meditation, talking with friends. Her  and kids are a source of support.     Meaning, Beliefs, and Spirituality - Ventura describes herself as a spiritual person. We discussed themes of healing, rest, listening to her body, lowering expectations and self-compassion. eVntura shared about a recent diagnosis of a muscle condition and her current process of grief and acceptance. I encouraged Ventura to think about what she needs in order to heal, and how her spirituality might play a role. She thanked me for my visit and said our conversation was \"very helpful.\"    Plan of Care - No planned follow up. Ventura is aware how to request a  should she desire another visit.  remains available per pt " request.     Cookie Villavicencio, [facility]  Chaplain Resident  Pager [phone]      * [facility] remains available 24/7 for emergent requests/referrals, either by having the switchboard page the on-call  or by entering an ASAP/STAT consult in Epic (this will also page the on-call ). Routine Epic consults receive an initial response within 24 hours.*

## 2022-12-19 NOTE — PROGRESS NOTES
"  [facility]   Acute Rehabilitation Unit  Daily progress note  CC:     Orthopaedic Disorders 08.2 Femur (Shaft) Fracture: right displaced femoral shaft fracture with extension into the knee joint s/p ORIF    S: In good spirits. Some discomfort +. Robaxin helping. Has Hot pack and ice as needed.  Denies SOB, nausea or HA.      Functionally,   ADLs:    Mobility: Max A bed mobility. W/c based, A x 2 SPT FWW, RLE KI.    Grooming: Set up seated.     Dressing: UB Mod A. LB A x 2 FWW or supine.    Bathing: Mod A rolling shower chair with back and LE support, RLE covered.    Toileting: A x 2 SPT BSC, total A hygiene/clothing. Frequently using bedpan and purewick due to pain and fatigue.  IADLs: Previously IND. Spouse/children will assist with heavier IADLs.   Vision/Cognition: No concerns.    Medications  Scheduled meds    aspirin  325 mg Oral BID     atorvastatin  10 mg Oral Daily     estradiol  2 g Vaginal Once per day on Mon Thu     folic acid  1 mg Oral Daily     losartan  25 mg Oral Daily     methocarbamol  500 mg Oral 4x Daily     [START ON 12/21/2022] Methotrexate  0.8 mL Subcutaneous Weekly     pantoprazole  40 mg Oral Daily     polyethylene glycol  17 g Oral Daily     predniSONE  10 mg Oral Daily       PRN meds:  acetaminophen, bisacodyl, hydrOXYzine, ibuprofen, naloxone **OR** naloxone **OR** naloxone **OR** naloxone, ondansetron, oxyCODONE, senna-docusate      PHYSICAL EXAM  BP 90/77 (BP Location: Right arm, Patient Position: Semi-Cornell's, Cuff Size: Adult Regular)   Pulse 87   Temp 98.1  F (36.7  C) (Oral)   Resp 16   Ht 1.575 m (5' 2\")   Wt 67 kg (147 lb 11.3 oz)   SpO2 98%   BMI 27.02 kg/m    Gen: Alert and cooperative  HEENT: MMM  CV: S1, S2 RRR  Pulm: Clear to auscultation  Abd: Soft, non tender  Ext: Calves non tender  Neuro/MSK: Moves UE and LLE well. Moves RLE distally.   Responds to touch.    LABS  Last Comprehensive Metabolic Panel:  Sodium   Date Value Ref " Range Status   12/14/2022 141 133 - 144 mmol/L Final     Potassium   Date Value Ref Range Status   12/14/2022 3.8 3.4 - 5.3 mmol/L Final     Chloride   Date Value Ref Range Status   12/14/2022 107 94 - 109 mmol/L Final     Carbon Dioxide (CO2)   Date Value Ref Range Status   12/14/2022 29 20 - 32 mmol/L Final     Anion Gap   Date Value Ref Range Status   12/14/2022 5 3 - 14 mmol/L Final     Glucose   Date Value Ref Range Status   12/14/2022 91 70 - 99 mg/dL Final     Urea Nitrogen   Date Value Ref Range Status   12/14/2022 17 7 - 30 mg/dL Final     Creatinine   Date Value Ref Range Status   12/14/2022 0.43 (L) 0.52 - 1.04 mg/dL Final     GFR Estimate   Date Value Ref Range Status   12/14/2022 >90 >60 mL/min/1.73m2 Final     Comment:     Effective December 21, 2021 eGFRcr in adults is calculated using the 2021 CKD-EPI creatinine equation which includes age and gender (Gloria et al., NE, DOI: 10.1056/JKOJqv3747288)     Calcium   Date Value Ref Range Status   12/14/2022 8.5 8.5 - 10.1 mg/dL Final        CBC RESULTS:   Recent Labs   Lab Test 12/17/22  1622   WBC 9.3   RBC 3.33*   HGB 11.4*   HCT 35.7   *   MCH 34.2*   MCHC 31.9   RDW 14.0           ASSESSMENT AND PLAN    Patient will work with PT for 90 min daily to work on gait exercises, strengthening, endurance buildup, transfers with use of walker as needed.   Patient will work with OT for 90 min daily to work on upper and lower body self care, dressing, toileting, bathing, energy conservation techniques with use of ADs as needed.   Rehab RN to administer medication, patient education on medication taking, VS monitoring, and surgical wound dressing changes and monitoring.      Medical Management:  Mechanical fall  Right displaced diaphyseal femur fracture  Right intra-articular distal femur fracture  Acute blood loss anemia, expected postop  Was at [facility] when she slipped on some water, twisting her R knee and falling. Was unable to get up and had severe  pain to R leg/ knee. No head or other trauma. No dizzy/lightheaded, no cp. In ED VSS. Labs normal.     Xray femur:  spiral comminuted fracture of the distal femur which may extend into the intercondylar aspect of the femur anteriorly. There is a small spur patellar joint effusion. The tibia and fibula are intact.     CT knee 12/11 preop: Acute comminuted moderately displaced and angulated fracture of the distal femur with intra-articular extension of the fracture.    Underwent 12/11 open reduction intramedullary nailing of right femur fracture and ORIF of right distal intra-articular femur fracture    Surgeons Al Larson MD - Primary         Landy Montes PA-C - Assisting the PA was present for     Patient is very sensitive to opioids: Nausea reported with morphine prior to surgery and emesis postsurgery.    Small dose of fentanyl given Intra-Op    Currently on as needed Tylenol, hydroxyzine, ibuprofen, and oxycodone 5 mg every 4 hours as needed for pain.  We will closely monitor pain levels at the time of the intense rehabitation and titrate medications as needed.     Postoperative anemia    Hb dropped to 9.9 postop.  Remained stable.    Last hemoglobin was on 14 December and trending upwards at 10.9. 12/19/2022  HGB 11.4, stable.     History of granulomatous myositis    Patient on folic acid 1 mg daily, methotrexate 20 mg weekly SubQ, prednisone 10 mg daily]    Follow with Rheumatology at [place].     On prednisone/methotx. Received methotrexate dosing 12/10/2022  (normally Wednesdays).     Patient given stress dose steroids given chronic steroid usage 50 mg hydrocortisone just before surgery and 25 mg q8 hours x 24 hours after surgery for SDS (50 mg ordered, 25 mg q 8)     Resumed home dosing Prednisone at the time of discharge, we will continue prednisone at 10 mg daily.    Continue folic acid    Continue methotrexate q. weekly every Wednesday     HTN/HLD    resumed atorvastatin 10 mg  daily    Currently on losartan for blood pressure control. Reduce to 12.5 mg daily from 12/20/22    Reviewed blood pressures since admission    We will closely monitor blood pressures and titrate medications as needed     Hepatic steatosis  10/2022 LFTs normal. Thought 2/2 methotx vs weight.   Not an active issue     MAYA  - continue home CPAP     Bladder, Bowel, GI and DVT ppx   Bladder ;check PVRs ×3 straight cath for volumes more than 350 cc.  Monitor for symptoms of urinary tract infection, rehab nursing to send for a UA as needed  Bowels; place the patient on a bowel program and titrate medications as needed.  Hold the bowel program in case of loose stools.  Educated patient on Impact with fiber and fluid intake on a bowel function  DVT prophylaxis with mechanical means     Code Status full      Prognosis for medical improvement and stabilization of the medical issues for discharge to home is good.      Estimated Length of Stay: 14 days    Doing well. Continue cares and plans outlined.     Len Bear MD   Physical Medicine & Rehabilitation

## 2022-12-20 ENCOUNTER — APPOINTMENT (OUTPATIENT)
Dept: OCCUPATIONAL THERAPY | Facility: CLINIC | Age: 52
DRG: 949 | End: 2022-12-20
Attending: PHYSICAL MEDICINE & REHABILITATION
Payer: COMMERCIAL

## 2022-12-20 ENCOUNTER — APPOINTMENT (OUTPATIENT)
Dept: PHYSICAL THERAPY | Facility: CLINIC | Age: 52
DRG: 949 | End: 2022-12-20
Attending: PHYSICAL MEDICINE & REHABILITATION
Payer: COMMERCIAL

## 2022-12-20 PROCEDURE — 97542 WHEELCHAIR MNGMENT TRAINING: CPT | Mod: GP

## 2022-12-20 PROCEDURE — 97535 SELF CARE MNGMENT TRAINING: CPT | Mod: GO | Performed by: OCCUPATIONAL THERAPIST

## 2022-12-20 PROCEDURE — 99232 SBSQ HOSP IP/OBS MODERATE 35: CPT | Performed by: PHYSICAL MEDICINE & REHABILITATION

## 2022-12-20 PROCEDURE — 250N000013 HC RX MED GY IP 250 OP 250 PS 637: Performed by: PHYSICAL MEDICINE & REHABILITATION

## 2022-12-20 PROCEDURE — 97530 THERAPEUTIC ACTIVITIES: CPT | Mod: GP

## 2022-12-20 PROCEDURE — 250N000012 HC RX MED GY IP 250 OP 636 PS 637: Performed by: PHYSICAL MEDICINE & REHABILITATION

## 2022-12-20 PROCEDURE — 128N000003 HC R&B REHAB

## 2022-12-20 PROCEDURE — 97110 THERAPEUTIC EXERCISES: CPT | Mod: GP

## 2022-12-20 RX ADMIN — PREDNISONE 10 MG: 10 TABLET ORAL at 09:21

## 2022-12-20 RX ADMIN — ASPIRIN 325 MG: 325 TABLET ORAL at 09:21

## 2022-12-20 RX ADMIN — FOLIC ACID 1 MG: 1 TABLET ORAL at 09:20

## 2022-12-20 RX ADMIN — METHOCARBAMOL 500 MG: 500 TABLET ORAL at 16:40

## 2022-12-20 RX ADMIN — OXYCODONE HYDROCHLORIDE 5 MG: 5 TABLET ORAL at 14:32

## 2022-12-20 RX ADMIN — OXYCODONE HYDROCHLORIDE 5 MG: 5 TABLET ORAL at 18:57

## 2022-12-20 RX ADMIN — ASPIRIN 325 MG: 325 TABLET ORAL at 20:59

## 2022-12-20 RX ADMIN — ACETAMINOPHEN 975 MG: 325 TABLET, FILM COATED ORAL at 11:31

## 2022-12-20 RX ADMIN — METHOCARBAMOL 500 MG: 500 TABLET ORAL at 21:00

## 2022-12-20 RX ADMIN — IBUPROFEN 600 MG: 200 TABLET, FILM COATED ORAL at 21:01

## 2022-12-20 RX ADMIN — OXYCODONE HYDROCHLORIDE 5 MG: 5 TABLET ORAL at 06:06

## 2022-12-20 RX ADMIN — PANTOPRAZOLE SODIUM 40 MG: 40 TABLET, DELAYED RELEASE ORAL at 09:21

## 2022-12-20 RX ADMIN — OXYCODONE HYDROCHLORIDE 5 MG: 5 TABLET ORAL at 10:24

## 2022-12-20 RX ADMIN — ATORVASTATIN CALCIUM 10 MG: 10 TABLET, FILM COATED ORAL at 09:20

## 2022-12-20 RX ADMIN — METHOCARBAMOL 500 MG: 500 TABLET ORAL at 13:05

## 2022-12-20 RX ADMIN — Medication 12.5 MG: at 09:20

## 2022-12-20 RX ADMIN — ACETAMINOPHEN 975 MG: 325 TABLET, FILM COATED ORAL at 18:57

## 2022-12-20 RX ADMIN — POLYETHYLENE GLYCOL 3350 17 G: 17 POWDER, FOR SOLUTION ORAL at 09:22

## 2022-12-20 RX ADMIN — METHOCARBAMOL 500 MG: 500 TABLET ORAL at 09:21

## 2022-12-20 ASSESSMENT — ACTIVITIES OF DAILY LIVING (ADL)
ADLS_ACUITY_SCORE: 39

## 2022-12-20 NOTE — PLAN OF CARE
FOCUS/GOAL  Medical management    ASSESSMENT, INTERVENTIONS AND CONTINUING PLAN FOR GOAL:  Pt is alert and oriented. Use call light appropriately. Purewick did not work well. End up being incontinent of urine to pad and . Encourage pt to use the bed pan if she knows she needs to void. Prn tylenol given and atarax given for pain and sleep. Assisted w/ her cpap. Supported her right leg w/ pillow. Seen sleeping during safety round checks. Cont w/ POC.  Goal Outcome Evaluation:      Plan of Care Reviewed With: patient    Overall Patient Progress: no changeOverall Patient Progress: no change

## 2022-12-20 NOTE — PROGRESS NOTES
"SPIRITUAL HEALTH SERVICES Progress Note  [facility] ([facility]) Acute Rehab    Follow-up unit  visit with pt, who was seen by on-call  previously. Pt shared regarding her long-term diagnosis and events leading up to this hospitalization (\"I broke my leg [place] the [place] [employer] [place], went to the ED at [facility], and now I'm here for therapies...\") Pt said she feels motivated and very hopeful regarding her therapies. Strong family support, and good support at work. 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     Al Long M.Div (Bill)., [facility]  Staff   Pager [phone]      * [facility] remains available 24/7 for emergent requests/referrals, either by having the switchboard page the on-call  or by entering an ASAP/STAT consult in Epic (this will also page the on-call ). Routine Epic consults receive an initial response within 24 hours.*        "

## 2022-12-20 NOTE — PLAN OF CARE
Writer was informed that patient needed FMLA paperwork filled out by provider. Patient emailed FMLA paperwork to writer. Writer printed paperwork and provided to PM&R, who did complete paperwork and provided it back to writer. Writer returned completed paperwork to patient to complete her portion. She stated she wanted to review the paperwork prior to faxing it. Writer checked in with patient at the end of the day and she stated she wanted to discuss some of the information on the paperwork. She states she has the ability to work remotely and would like to discuss that portion with the provider. Writer will check back in with patient and inform LICSW.    Tiffany Ramsay RN, BSN, CRRN  [facility] Patient Care Supervisor  Acute Rehabilitation Unit  PH: [phone]  Pager: [phone]

## 2022-12-20 NOTE — PLAN OF CARE
Goal Outcome Evaluation:         Overall Patient Progress: no change    Outcome Evaluation: Pt continues to be in pain on R foot and knee. PRN pain medications given and repositioning facilitated. Continent of bladder, used the bedpan. Appetite unchanged, ate 100% for dinner. Continues to use her call light appropriately and waited for assistance.

## 2022-12-20 NOTE — PROGRESS NOTES
HC anticipated. Pt aware and in agreement. Referral for RN, PT, and OT sent to [facility]. Acceptance pending. SW suspects [facility] won't be able to accept due to insurance. Hopeful that [facility] vs partner agency may be able to accommodate. Will continue to follow, coordinate, and update pt and IDT. No other immediate needs.     HECTOR Salguero   [facility]   Direct Phone: [phone]  I   Pager: [phone]  I  Fax: [phone]

## 2022-12-20 NOTE — PLAN OF CARE
Individualized Overall Plan Of Care (IOPOC)      Rehab diagnosis/Impairment Group Code: Orthopaedic disorders 08.2 femur (shaft) fracture: right displaced femoral shaft fracture with extension into the knee joint s/p orif  History of hip replacement       Expected functional outcome: mod I with short distance gait, ADLs, and transfers. Anticipate pt will need assist with IADLs.     Clinical Impression Comments: 52 year old female who unfortunately [place]  She was brought into the emergency department after being unable to bear weight on the right lower extremity.  X-rays demonstrated a diaphyseal femur fracture and a CT was also performed and it demonstrated a separate fracture fragment that was intra-articular in the distal femur. On 12/11, pt underwent ppen reduction intramedullary nailing of right femur fracture and open reduction internal fixation of right distal intra-articular femur fracture. Patient is very sensitive to opioids: Nausea reported with morphine prior to surgery and emesis postsurgery. Patient was fitted with a hinged knee brace with the following restrictions: Hinged knee brace locked in extension for all transfers/ambulation. Ok to have brace removed in bed. ROM of right knee when in bed/sitting chair as tolerated - TTWB on RLE.    Mobility: [place] will benefit from PT to progress her overall strength, endurance, balance, and pain management with goal to be mod I from w/c level by d/c. Will need ramps installed (debating on 2 MARISSA through garage or 3 MARISSA at front door), and  is ernst and plans to install on his own. Recommend HH PT d/t pain, weakness, and difficulty accessing OP PT and carrying on with her day safely. Will also need K4 rental w/c.    OT: Discharge Plan: home, -based, w/assist from family and homecare OT      Precautions: TTWB in RLE with KI donned whenever moving or OOB  Falls with heavy reliance on BUE to  stand  Fatigue - perform submaximal strengthening to avoid over exhaustion with myositis    ADL: Mobility: Mod A bed mobility with AE. W/c based, Max A SPT LUE platform FWW, RLE KI.    Grooming: Set up seated.     Dressing: UB Min A. LB Max A FWW or supine.    Bathing: Mod A rolling shower chair with back and LE support, RLE covered.    Toileting: Max A SPT platform FWW BSC, total A hygiene/clothing. A x 2 nursing. Also using bedpan due to pain and fatigue.  IADLs: Previously IND. Spouse/children will assist with heavier IADLs.   Vision/Cognition: No concerns.    Communication/Cognition/Swallow:       Intensity of therapy:   PT 90 minutes, Daily, for 14 days  OT 90 minutes, Daily, for 14 days    Orthotics Hinged Knee Brace RLE  Education  medication education, positioning of RLE and use of hinged knee brace, carryover of new rehab techniques including TTWB, care coordination, skin integrity as pt at risk for breakdown with hinged knee brace, pain management, post-surgical incision care to promote healing and prevent infection, provide safe environment for patient at falls risk and edema management of RLE.  Neuropsychology Testing: No  Other:  None      Medical Prognosis: Fair      Physician summary statement: In addition to above statements address, Patient requires intensive active and ongoing therapeutic intervention and multiple therapies; Patient requires medical supervision; Expected to actively participate in the intensive rehab program; Sufficiently stable to actively participate; Expectation for measurable improvement in functional capacity or adaption to impairments.    Discharge destination: prior home  Discharge rehabilitation needs: home care versus OP.      Estimated length of stay: 14 days      Rehabilitation Physician Len Bear MD

## 2022-12-20 NOTE — PROGRESS NOTES
Discharge Planner Post-Acute Rehab OT:     Discharge Plan: home, wc-based, w/assist from family and homecare OT     Precautions: TTWB in RLE with KI donned whenever moving or OOB  Falls with heavy reliance on BUE to stand  Fatigue - perform submaximal strengthening to avoid over exhaustion with myositis    Current Status:  ADLs:    Mobility: Mod A bed mobility with AE. W/c based, Max A SPT LUE platform FWW, RLE KI.    Grooming: Set up seated.     Dressing: UB Min A. LB Max A FWW or supine.    Bathing: Mod A rolling shower chair with back and LE support, RLE covered.    Toileting: Max A SPT platform FWW BSC, total A hygiene/clothing. A x 2 nursing. Also using bedpan due to pain and fatigue.  IADLs: Previously IND. Spouse/children will assist with heavier IADLs.   Vision/Cognition: No concerns.    Assessment: Issued AE for bed mobility and LB dressing. Practiced toileting with BSC to decrease use of the bedpan. PT issued a LUE platform FWW which significantly improved safety with static standing tasks.    Other Barriers to Discharge (DME, Family Training, etc):   Home accessibility/stairs: Spouse is a ernst and plans to build and install ramp. Step to access main level bathroom and w/c accessibility questionable.   Equipment: Manual w/c, tub bench, commode chair, RLE KI, bedrail, reacher.  Caregiver support: Spouse and children can assist with IADLs as needed. Caregiver training to be scheduled.

## 2022-12-20 NOTE — PROGRESS NOTES
"  [facility]   Acute Rehabilitation Unit  Daily progress note  CC:     Orthopaedic Disorders 08.2 Femur (Shaft) Fracture: right displaced femoral shaft fracture with extension into the knee joint s/p ORIF    S: In good spirits. FMLA done. Has questions about returning to work early. Will review.    H/O discomfort +. Robaxin helping. Has Hot pack and ice as needed.  Denies SOB, nausea or HA.      Functionally,   Bed Mobility: maxAx1 for assist to move RLE during sup<>sit  Transfer: SPT, platform FWW, minAx1 with therapy, recommend Ax2 with RN staff.   Gait: unsafe  Stairs: unsafe  Balance: requires heavy BUE support on FWW to stand and maintain balance d/t RLE pain and weakness proximally.    Medications  Scheduled meds    aspirin  325 mg Oral BID     atorvastatin  10 mg Oral Daily     estradiol  2 g Vaginal Once per day on Mon Thu     folic acid  1 mg Oral Daily     losartan  12.5 mg Oral Daily     methocarbamol  500 mg Oral 4x Daily     [START ON 12/21/2022] Methotrexate  0.8 mL Subcutaneous Weekly     pantoprazole  40 mg Oral Daily     polyethylene glycol  17 g Oral Daily     predniSONE  10 mg Oral Daily       PRN meds:  acetaminophen, bisacodyl, hydrOXYzine, ibuprofen, naloxone **OR** naloxone **OR** naloxone **OR** naloxone, ondansetron, oxyCODONE, senna-docusate      PHYSICAL EXAM  /58 (BP Location: Right arm, Patient Position: Semi-Cornell's, Cuff Size: Adult Regular)   Pulse 74   Temp (!) 96.4  F (35.8  C) (Oral)   Resp 16   Ht 1.575 m (5' 2\")   Wt 67 kg (147 lb 11.3 oz)   SpO2 96%   BMI 27.02 kg/m    Gen: Alert and cooperative  HEENT: MMM  CV: S1, S2 RRR  Pulm: Clear to auscultation  Abd: Soft, non tender  Ext: Calves non tender  Neuro/MSK: Moves UE and LLE well. Moves RLE distally.   Responds to touch.    LABS  Last Comprehensive Metabolic Panel:  Sodium   Date Value Ref Range Status   12/14/2022 141 133 - 144 mmol/L Final     Potassium   Date Value Ref Range " Status   12/14/2022 3.8 3.4 - 5.3 mmol/L Final     Chloride   Date Value Ref Range Status   12/14/2022 107 94 - 109 mmol/L Final     Carbon Dioxide (CO2)   Date Value Ref Range Status   12/14/2022 29 20 - 32 mmol/L Final     Anion Gap   Date Value Ref Range Status   12/14/2022 5 3 - 14 mmol/L Final     Glucose   Date Value Ref Range Status   12/14/2022 91 70 - 99 mg/dL Final     Urea Nitrogen   Date Value Ref Range Status   12/14/2022 17 7 - 30 mg/dL Final     Creatinine   Date Value Ref Range Status   12/14/2022 0.43 (L) 0.52 - 1.04 mg/dL Final     GFR Estimate   Date Value Ref Range Status   12/14/2022 >90 >60 mL/min/1.73m2 Final     Comment:     Effective December 21, 2021 eGFRcr in adults is calculated using the 2021 CKD-EPI creatinine equation which includes age and gender (Gloria et al., [place], DOI: 10.1056/CYAMia0486342)     Calcium   Date Value Ref Range Status   12/14/2022 8.5 8.5 - 10.1 mg/dL Final        CBC RESULTS:   Recent Labs   Lab Test 12/17/22  1622   WBC 9.3   RBC 3.33*   HGB 11.4*   HCT 35.7   *   MCH 34.2*   MCHC 31.9   RDW 14.0           ASSESSMENT AND PLAN    Patient will work with PT for 90 min daily to work on gait exercises, strengthening, endurance buildup, transfers with use of walker as needed.   Patient will work with OT for 90 min daily to work on upper and lower body self care, dressing, toileting, bathing, energy conservation techniques with use of ADs as needed.   Rehab RN to administer medication, patient education on medication taking, VS monitoring, and surgical wound dressing changes and monitoring.      Medical Management:  Mechanical fall  Right displaced diaphyseal femur fracture  Right intra-articular distal femur fracture  Acute blood loss anemia, expected postop  Was at [facility] when she slipped on some water, twisting her R knee and falling. Was unable to get up and had severe pain to R leg/ knee. No head or other trauma. No dizzy/lightheaded, no cp. In ED VSS.  Labs normal.     Xray femur:  spiral comminuted fracture of the distal femur which may extend into the intercondylar aspect of the femur anteriorly. There is a small spur patellar joint effusion. The tibia and fibula are intact.     CT knee 12/11 preop: Acute comminuted moderately displaced and angulated fracture of the distal femur with intra-articular extension of the fracture.    Underwent 12/11 open reduction intramedullary nailing of right femur fracture and ORIF of right distal intra-articular femur fracture    Surgeons Al Larson MD - Primary         Landy Montes PA-C - Assisting the PA was present for     Patient is very sensitive to opioids: Nausea reported with morphine prior to surgery and emesis postsurgery.    Small dose of fentanyl given Intra-Op    Currently on as needed Tylenol, hydroxyzine, ibuprofen, and oxycodone 5 mg every 4 hours as needed for pain.  We will closely monitor pain levels at the time of the intense rehabitation and titrate medications as needed.     Postoperative anemia    Hb dropped to 9.9 postop.  Remained stable.    Last hemoglobin was on 14 December and trending upwards at 10.9. 12/19/2022  HGB 11.4, stable.     History of granulomatous myositis    Patient on folic acid 1 mg daily, methotrexate 20 mg weekly SubQ, prednisone 10 mg daily]    Follow with Rheumatology at [facility].     On prednisone/methotx. Received methotrexate dosing 12/10/2022  (normally Wednesdays).     Patient given stress dose steroids given chronic steroid usage 50 mg hydrocortisone just before surgery and 25 mg q8 hours x 24 hours after surgery for SDS (50 mg ordered, 25 mg q 8)     Resumed home dosing Prednisone at the time of discharge, we will continue prednisone at 10 mg daily.    Continue folic acid    Continue methotrexate q. weekly every Wednesday     HTN/HLD    resumed atorvastatin 10 mg daily    Currently on losartan for blood pressure control. Reduce to 12.5 mg daily from  12/20/22    Reviewed blood pressures since admission    We will closely monitor blood pressures and titrate medications as needed     Hepatic steatosis  10/2022 LFTs normal. Thought 2/2 methotx vs weight.   Not an active issue     MAYA  - continue home CPAP     Bladder, Bowel, GI and DVT ppx   Bladder ;check PVRs ×3 straight cath for volumes more than 350 cc.  Monitor for symptoms of urinary tract infection, rehab nursing to send for a UA as needed  Bowels; place the patient on a bowel program and titrate medications as needed.  Hold the bowel program in case of loose stools.  Educated patient on Impact with fiber and fluid intake on a bowel function  DVT prophylaxis with mechanical means     Code Status full      Prognosis for medical improvement and stabilization of the medical issues for discharge to home is good.      Estimated Length of Stay: 14 days    Doing well. Continue cares and plans outlined.     Len Bear MD   Physical Medicine & Rehabilitation

## 2022-12-20 NOTE — PLAN OF CARE
Discharge Planner Post-Acute Rehab PT:     Discharge Plan: home w/c based with intermittent assist from family and HH PT    Precautions: TTWB in RLE with KI donned whenever moving or OOB  Falls with heavy reliance on BUE to stand  Fatigue - perform submaximal strengthening to avoid over exhaustion with myositis    Current Status:  Bed Mobility: maxAx1 for assist to move RLE during sup<>sit  Transfer: SPT, platform FWW, minAx1 with therapy, recommend Ax2 with RN staff.   Gait: unsafe  Stairs: unsafe  Balance: requires heavy BUE support on FWW to stand and maintain balance d/t RLE pain and weakness proximally.    Assessment: much improved STS and SPT with platform FWW for better leverage through LUE. Pt emotional yesterday discussing anxieties around returning to work and pain management. Would recommend visitation by health psychology if possible.    Other Barriers to Discharge (DME, Family Training, etc):   Family training - date TBD    DME:  K4 rental w/c - need to order w/c  Platform FWW - needs to order from [facility] bed - TBD, hoping to be able to perform bed mob independent or mod I with portable bedrail, but may need to consider hospital bed depending on progress.

## 2022-12-20 NOTE — PLAN OF CARE
FOCUS/GOAL  Pain management and Mobility    ASSESSMENT, INTERVENTIONS AND CONTINUING PLAN FOR GOAL:  Pt alert and oriented x4. VSS. C/o right knee pain, prn tylenol, oxycodone 5mg x2 given per request. Ice pack applied to right knee. Requires Ax2 SPT, TTWB in RLE and R knee immobilizer on. LBM 12/18. Continue with POC.

## 2022-12-21 ENCOUNTER — APPOINTMENT (OUTPATIENT)
Dept: OCCUPATIONAL THERAPY | Facility: CLINIC | Age: 52
DRG: 949 | End: 2022-12-21
Attending: PHYSICAL MEDICINE & REHABILITATION
Payer: COMMERCIAL

## 2022-12-21 ENCOUNTER — APPOINTMENT (OUTPATIENT)
Dept: PHYSICAL THERAPY | Facility: CLINIC | Age: 52
DRG: 949 | End: 2022-12-21
Attending: PHYSICAL MEDICINE & REHABILITATION
Payer: COMMERCIAL

## 2022-12-21 PROCEDURE — 97530 THERAPEUTIC ACTIVITIES: CPT | Mod: GP | Performed by: STUDENT IN AN ORGANIZED HEALTH CARE EDUCATION/TRAINING PROGRAM

## 2022-12-21 PROCEDURE — 250N000011 HC RX IP 250 OP 636

## 2022-12-21 PROCEDURE — 97110 THERAPEUTIC EXERCISES: CPT | Mod: GP | Performed by: STUDENT IN AN ORGANIZED HEALTH CARE EDUCATION/TRAINING PROGRAM

## 2022-12-21 PROCEDURE — 97110 THERAPEUTIC EXERCISES: CPT | Mod: GP

## 2022-12-21 PROCEDURE — 97535 SELF CARE MNGMENT TRAINING: CPT | Mod: GO

## 2022-12-21 PROCEDURE — 97530 THERAPEUTIC ACTIVITIES: CPT | Mod: GP

## 2022-12-21 PROCEDURE — 999N000150 HC STATISTIC PT MED CONFERENCE < 30 MIN

## 2022-12-21 PROCEDURE — 250N000012 HC RX MED GY IP 250 OP 636 PS 637: Performed by: PHYSICAL MEDICINE & REHABILITATION

## 2022-12-21 PROCEDURE — 999N000125 HC STATISTIC PATIENT MED CONFERENCE < 30 MIN: Performed by: OCCUPATIONAL THERAPIST

## 2022-12-21 PROCEDURE — 128N000003 HC R&B REHAB

## 2022-12-21 PROCEDURE — 99232 SBSQ HOSP IP/OBS MODERATE 35: CPT | Performed by: PHYSICAL MEDICINE & REHABILITATION

## 2022-12-21 PROCEDURE — 250N000013 HC RX MED GY IP 250 OP 250 PS 637: Performed by: PHYSICAL MEDICINE & REHABILITATION

## 2022-12-21 PROCEDURE — 97535 SELF CARE MNGMENT TRAINING: CPT | Mod: GO | Performed by: OCCUPATIONAL THERAPIST

## 2022-12-21 RX ADMIN — PREDNISONE 10 MG: 10 TABLET ORAL at 08:21

## 2022-12-21 RX ADMIN — ACETAMINOPHEN 975 MG: 325 TABLET, FILM COATED ORAL at 21:48

## 2022-12-21 RX ADMIN — OXYCODONE HYDROCHLORIDE 5 MG: 5 TABLET ORAL at 19:33

## 2022-12-21 RX ADMIN — PANTOPRAZOLE SODIUM 40 MG: 40 TABLET, DELAYED RELEASE ORAL at 08:21

## 2022-12-21 RX ADMIN — METHOCARBAMOL 500 MG: 500 TABLET ORAL at 15:33

## 2022-12-21 RX ADMIN — METHOCARBAMOL 500 MG: 500 TABLET ORAL at 08:21

## 2022-12-21 RX ADMIN — IBUPROFEN 600 MG: 200 TABLET, FILM COATED ORAL at 11:44

## 2022-12-21 RX ADMIN — ACETAMINOPHEN 975 MG: 325 TABLET, FILM COATED ORAL at 06:55

## 2022-12-21 RX ADMIN — METHOCARBAMOL 500 MG: 500 TABLET ORAL at 11:37

## 2022-12-21 RX ADMIN — ASPIRIN 325 MG: 325 TABLET ORAL at 21:48

## 2022-12-21 RX ADMIN — Medication 12.5 MG: at 08:21

## 2022-12-21 RX ADMIN — FOLIC ACID 1 MG: 1 TABLET ORAL at 08:21

## 2022-12-21 RX ADMIN — ATORVASTATIN CALCIUM 10 MG: 10 TABLET, FILM COATED ORAL at 08:21

## 2022-12-21 RX ADMIN — ASPIRIN 325 MG: 325 TABLET ORAL at 08:21

## 2022-12-21 RX ADMIN — OXYCODONE HYDROCHLORIDE 5 MG: 5 TABLET ORAL at 06:55

## 2022-12-21 RX ADMIN — METHOCARBAMOL 500 MG: 500 TABLET ORAL at 19:33

## 2022-12-21 RX ADMIN — OXYCODONE HYDROCHLORIDE 5 MG: 5 TABLET ORAL at 14:23

## 2022-12-21 ASSESSMENT — ACTIVITIES OF DAILY LIVING (ADL)
ADLS_ACUITY_SCORE: 39
ADLS_ACUITY_SCORE: 43

## 2022-12-21 NOTE — PROGRESS NOTES
[facility] declined for RN, PT, and OT. [facility] Liaison connecting with partner agencies to see if any other agency can consider. Covering SWer updated. Will need to f/u. No immediate needs at this time.     [facility]adya [facility]   [facility]   Direct Phone: [phone]  I   Pager: [phone]  I  Fax: [phone]

## 2022-12-21 NOTE — PLAN OF CARE
Discharge Planner Post-Acute Rehab PT:     Discharge Plan: home w/c based with intermittent assist from family and HH PT    Precautions: TTWB in RLE with KI donned whenever moving or OOB  Falls with heavy reliance on BUE to stand  Fatigue - perform submaximal strengthening to avoid over exhaustion with myositis    Current Status:  Bed Mobility: maxAx1 for assist to move RLE during sup<>sit  Transfer: SPT, platform FWW, minAx1 with therapy, recommend Ax2 with RN staff.   Gait: unsafe  Stairs: unsafe  Balance: requires heavy BUE support on FWW to stand and maintain balance d/t RLE pain and weakness proximally.    Assessment: Continued practice with STS transfers with platform FWW will be beneficial. R knee pain throughout afternoon session.     Other Barriers to Discharge (DME, Family Training, etc):   Family training - date TBD    DME:  K4 rental w/c - need to order w/c  Platform FWW - needs to order from [facility] bed - TBD, hoping to be able to perform bed mob independent or mod I with portable bedrail, but may need to consider hospital bed depending on progress.

## 2022-12-21 NOTE — PROGRESS NOTES
Discharge Planner Post-Acute Rehab OT:     Discharge Plan: home, wc-based, w/assist from family and homecare OT     Precautions: TTWB in RLE with KI donned whenever moving or OOB  Falls with heavy reliance on BUE to stand  Fatigue - perform submaximal strengthening to avoid over exhaustion with myositis    Current Status:  ADLs:    Mobility: Mod A bed mobility with AE. W/c based, Max A SPT LUE platform FWW, RLE KI.    Grooming: Set up seated.     Dressing: UB Min A. LB Max A FWW or supine.    Bathing: Mod A rolling shower chair with back and LE support, RLE covered.    Toileting: Max A SPT w/c<-> toilet with commode overlay and grab bar, Min A clothing manage/tegan cares while seated on commode. A x 2 nursing. Also using bedpan due to pain and fatigue.  IADLs: Previously IND. Spouse/children will assist with heavier IADLs.   Vision/Cognition: No concerns.    Assessment: Session focused on progressing toilet transfers and toileting routine. Pt continues to be limited by fatigue and increased pain in RLE. Required assist while seated on commode d/t increased pain.     Other Barriers to Discharge (DME, Family Training, etc):   Home accessibility/stairs: Spouse is a ernst and plans to build and install ramp. Step to access main level bathroom and w/c accessibility questionable.   Equipment: Manual w/c, tub bench, commode chair, RLE KI, bedrail, reacher.  Caregiver support: Spouse and children can assist with IADLs as needed. Caregiver training to be scheduled.

## 2022-12-21 NOTE — PLAN OF CARE
VS:       Pt A/O X 4. Afebrile. VSS. Denies nausea, shortness of breath, and chest pain.     Output:     Continent of bowel and bladder to commode or bathroom.      Activity:       Pt up *w/ assist of 2, gait belt and walker. Stand and Pivot. WBTT on right leg.      Skin: Right knee incision  Right upper femur/ lower hip  incision.  Right leg wrapped w/ wraps and brace.   Pain:       Has pain in the knee and given meds     CMS   CMS and Neuro's are intact. C/O numbness and tingling in right foot.       Diet:       Pt is on a reg diet/ thin liquids/ pills whole     LDA:     No lines or drains     Equipment:     Wheelchair, commode, walker, belongings at bedside     Plan:       Pt is able to make needs known and the call light is within the pt's reach. Continue to monitor.      Pt is hoping to go home C[place] day w/ family for a few hours.  has access to van w/ wheelchair access. Charge nurse aware.

## 2022-12-21 NOTE — PROGRESS NOTES
Discharge Planner Post-Acute Rehab OT:     Discharge Plan: home, wc-based, w/assist from family and homecare OT     Precautions: TTWB in RLE with KI donned whenever moving or OOB  Falls with heavy reliance on BUE to stand  Fatigue - perform submaximal strengthening to avoid over exhaustion with myositis    Current Status:  ADLs:    Mobility: Mod A bed mobility with AE. W/c based, Max A SPT LUE platform FWW, RLE KI.    Grooming: Set up seated.     Dressing: UB Min A. LB Max A FWW or supine.    Bathing: Mod A rolling shower chair with back and LE support, RLE covered.    Toileting: Max A SPT platform FWW BSC, total A hygiene/clothing. A x 2 nursing. Also using bedpan due to pain and fatigue.  IADLs: Previously IND. Spouse/children will assist with heavier IADLs.   Vision/Cognition: No concerns.    Assessment: Rounds meeting today. Reinforced goals to progress to Mod IND w/c based ADLs. Spouse is working on home modifications including a front entry ramp and improving accessibility to the main level bathroom. Pt inquiring about a 4hr pass on [place] day, reports that a family friend has a w/c accessible van.     Other Barriers to Discharge (DME, Family Training, etc):   Home accessibility/stairs: Spouse is a ernst and plans to build and install ramp. Step to access main level bathroom and w/c accessibility questionable.   Equipment: Manual w/c, tub bench, commode chair, RLE KI, bedrail, reacher.  Caregiver support: Spouse and children can assist with IADLs as needed. Caregiver training to be scheduled.

## 2022-12-21 NOTE — PLAN OF CARE
Goal Outcome Evaluation:       Overall Patient Progress: no changeOverall Patient Progress: no change    Outcome Evaluation: Continues to have pain on R knee, PRN pain medications with repositioning helped. Continues to advocate for pain medication and other non medication interventions for pain. Continent of bladder, used the bedpan. Used her call light appropriately. Cooperative with cares.

## 2022-12-21 NOTE — PLAN OF CARE
Acute Rehab Care Conference/Team Rounds      Type: Team Rounds    Present: Dr. Len Bear, Jerica Meléndez PA, Kris Hahn PT, Annabelle Sahni OT, Nadya Urbina [facility], Jinny Tejada RN, Shalonda Durham RN, and [place] Patient.     Discharge Barriers/Treatment/Education    Rehab Diagnosis: Orthopaedic Disorders 08.2 Femur (Shaft) Fracture: right displaced femoral shaft fracture with extension into the knee joint s/p ORIF      Active Medical Co-morbidities/Prognosis:   Patient Active Problem List   Diagnosis     Closed fracture of right femur, unspecified fracture morphology, unspecified portion of femur, initial encounter (H)        Safety:Pt is alert and oriented. Use call light when need assistance.  A2 SPT wheelchair base.    Pain: C/o pain on right hip ORIF site, prn oxycodone given.    Medications, Skin, Tubes/Lines: Can take pills whole. Has aquacel dressing to right hip ORIF. Wraps to right leg d/t edema. No lines/drains.    Swallowing/Nutrition:    Bowel/Bladder: Cont of B/Bm.LBM 12/20/22.    Psychosocial: , lives with spouse and children. Independent PTA. 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 No substance abuse or financial concerns reported. Anxiety reported by staff. Good support from family.     ADLs/IADLs: Pt progressing with ADLs and mobility. Pt completing w/c based mobility and SPT with FWW/L platform walker. Pt requiring set up seated grooming, Min A UB dressing, Max A LB dressing, Mod A showering, and Total A toileting. Pt performance limited by deconditioning, baseline proximal weakness, new RLE precautions, pain, and fatigue. Goals to advance to Mod IND ADLs and w/c based IADLs. Equipment: commode overlay, shower bench, FWW, manual w/c, reacher, leg . Recommend follow up HC OT.     Mobility: Pt progressing steadily with transfers and mobility. Demo sup<>sit maxA for RLE assist to EOB, SPT min-modA with platform FWW. Propelling manual w/c  100+ ft Alex. Recommend HH PT initially. Will need K4 w/c with R elevating leg rest for supporting RLE and K4 needed for weight class d/t fatigue from pt's granulomatous myositis. W/c eval being scheduled. Will also need platform FWW for home transfers.    Cognition/Language: No concerns. Not a barrier to discharge.    Community Re-Entry: w/c based    Transportation: assist from family or public transportation    Decision maker: self    Plan of Care and goals reviewed and updated.    Discharge Plan/Recommendations    Fall Precautions: continue    Patient/Family input to goals: Yes    Anticipated rehab needs following discharge: Home with home care    Anticipated care giver support after discharge: Family    Estimated length of stay: 1/3/23    Overall plan for the patient: Continue IP Rehabilitation.       Utilization Review and Continued Stay Justification    Medical Necessity Criteria:    For any criteria that is not met, please document reason and plan for discharge, transfer, or modification of plan of care to address.    Requires intensive rehabilitation program to treat functional deficits?: Yes    Requires 3x per week or greater involvement of rehabilitation physician to oversee rehabilitation program?: Yes    Requires rehabilitation nursing interventions?: Yes    Patient is making functional progress?: Yes    There is a potential for additional functional progress? Yes    Patient is participating in therapy 3 hours per day a minimum of 5 days per week or 15 hours per week in 7 day period?:Yes    Has discharge needs that require coordinated discharge planning approach?:Yes          Final Physician Sign off    Statement of Approval: I approve the plan of care.     Patient Goals  Social Work Goals: Confirm discharge recommendations with therapy, coordinate safe discharge plan and remain available to support and assist as needed.    OT Predicted Duration/Target Date for Goal Attainment: 01/02/23  Therapy  Frequency (OT): Daily  OT: Hygiene/Grooming: modified independent  OT: Upper Body Dressing: Independent  OT: Lower Body Dressing: Modified independent  OT: Upper Body Bathing: Modified independent  OT: Lower Body Bathing: Modified independent  OT: Transfer: Modified independent  OT: Toilet Transfer/Toileting: Modified independent     PT Predicted Duration/Target Date for Goal Attainment: 01/03/23  PT Frequency: Daily  PT: Bed Mobility: Modified independent, Supine to/from sit, Rolling (portable bedrail)  PT: Transfers: Modified independent, Sit to/from stand, Bed to/from chair, Assistive device (FWW + R KI)  PT: Gait: Moderate assist, Rolling walker, 10 feet (therapeutic gait)  PT: Wheelchair Mobility: 50 feet, Caregiver SBA, manual wheelchair  PT: Perform aerobic activity with stable cardiovascular response: intermittent activity, 10 minutes, NuStep (BUE and LLE)  PT: Goal 1: Car transfer, FWW, modA for assist to place RLE into car                                                        Patient Goal:  Pain Management: Pt will report pain and advocate for interventions when necessary.                    Patient/Family Goal: Medication Management: Pt will pass MAP by discharge from ARU if appropriate.                             Goal: Safety Management: Pt will use call light to make needs known and wait for assistance with transfers until independent in room while at ARU.                          Goal Outcome Evaluation:      Plan of Care Reviewed With: patient    Overall Patient Progress: no changeOverall Patient Progress: no change

## 2022-12-21 NOTE — PROGRESS NOTES
"  [facility]   Acute Rehabilitation Unit  Daily progress note  CC:     Orthopaedic Disorders 08.2 Femur (Shaft) Fracture: right displaced femoral shaft fracture with extension into the knee joint s/p ORIF    S: TR held and reviewed. Planning 1/3/23 discharge.      May benefit from Psychology services when available.  FMLA needs to be modified. Also enquiring about STD benefits issues. Cautioned  about returning to work early. Will review.    H/O discomfort +. Robaxin helping. Has Hot pack and ice as needed.  Denies SOB, nausea or HA.      Functionally,   ADLs:    Mobility: Mod A bed mobility with AE. W/c based, Max A SPT LUE platform FWW, RLE KI.    Grooming: Set up seated.     Dressing: UB Min A. LB Max A FWW or supine.    Bathing: Mod A rolling shower chair with back and LE support, RLE covered.    Toileting: Max A SPT platform FWW BSC, total A hygiene/clothing. A x 2 nursing. Also using bedpan due to pain and fatigue.  IADLs: Previously IND. Spouse/children will assist with heavier IADLs.   Vision/Cognition: No concerns.    Medications  Scheduled meds    aspirin  325 mg Oral BID     atorvastatin  10 mg Oral Daily     estradiol  2 g Vaginal Once per day on Mon Thu     folic acid  1 mg Oral Daily     losartan  12.5 mg Oral Daily     methocarbamol  500 mg Oral 4x Daily     Methotrexate  0.8 mL Subcutaneous Weekly     pantoprazole  40 mg Oral Daily     polyethylene glycol  17 g Oral Daily     predniSONE  10 mg Oral Daily       PRN meds:  acetaminophen, bisacodyl, hydrOXYzine, ibuprofen, naloxone **OR** naloxone **OR** naloxone **OR** naloxone, ondansetron, oxyCODONE, senna-docusate      PHYSICAL EXAM  /71 (BP Location: Right arm, Patient Position: Semi-Cornell's, Cuff Size: Adult Regular)   Pulse 70   Temp (!) 96  F (35.6  C) (Oral)   Resp 16   Ht 1.575 m (5' 2\")   Wt 67 kg (147 lb 11.3 oz)   SpO2 99%   BMI 27.02 kg/m    Gen: Alert and cooperative  HEENT: MMM  CV: S1, " S2 RRR  Pulm: Clear to auscultation  Abd: Soft, non tender  Ext: Calves non tender  Neuro/MSK: Moves UE and LLE well. Moves RLE distally.   Responds to touch.    LABS  Last Comprehensive Metabolic Panel:  Sodium   Date Value Ref Range Status   12/14/2022 141 133 - 144 mmol/L Final     Potassium   Date Value Ref Range Status   12/14/2022 3.8 3.4 - 5.3 mmol/L Final     Chloride   Date Value Ref Range Status   12/14/2022 107 94 - 109 mmol/L Final     Carbon Dioxide (CO2)   Date Value Ref Range Status   12/14/2022 29 20 - 32 mmol/L Final     Anion Gap   Date Value Ref Range Status   12/14/2022 5 3 - 14 mmol/L Final     Glucose   Date Value Ref Range Status   12/14/2022 91 70 - 99 mg/dL Final     Urea Nitrogen   Date Value Ref Range Status   12/14/2022 17 7 - 30 mg/dL Final     Creatinine   Date Value Ref Range Status   12/14/2022 0.43 (L) 0.52 - 1.04 mg/dL Final     GFR Estimate   Date Value Ref Range Status   12/14/2022 >90 >60 mL/min/1.73m2 Final     Comment:     Effective December 21, 2021 eGFRcr in adults is calculated using the 2021 CKD-EPI creatinine equation which includes age and gender (Gloria et al., NE, DOI: 10.1056/VFJFaw2667443)     Calcium   Date Value Ref Range Status   12/14/2022 8.5 8.5 - 10.1 mg/dL Final        CBC RESULTS:   Recent Labs   Lab Test 12/17/22  1622   WBC 9.3   RBC 3.33*   HGB 11.4*   HCT 35.7   *   MCH 34.2*   MCHC 31.9   RDW 14.0           ASSESSMENT AND PLAN    Patient will work with PT for 90 min daily to work on gait exercises, strengthening, endurance buildup, transfers with use of walker as needed.   Patient will work with OT for 90 min daily to work on upper and lower body self care, dressing, toileting, bathing, energy conservation techniques with use of ADs as needed.   Rehab RN to administer medication, patient education on medication taking, VS monitoring, and surgical wound dressing changes and monitoring.      Medical Management:  Mechanical fall  Right  displaced diaphyseal femur fracture  Right intra-articular distal femur fracture  Acute blood loss anemia, expected postop  Was at [facility] when she slipped on some water, twisting her R knee and falling. Was unable to get up and had severe pain to R leg/ knee. No head or other trauma. No dizzy/lightheaded, no cp. In ED VSS. Labs normal.     Xray femur:  spiral comminuted fracture of the distal femur which may extend into the intercondylar aspect of the femur anteriorly. There is a small spur patellar joint effusion. The tibia and fibula are intact.     CT knee 12/11 preop: Acute comminuted moderately displaced and angulated fracture of the distal femur with intra-articular extension of the fracture.    Underwent 12/11 open reduction intramedullary nailing of right femur fracture and ORIF of right distal intra-articular femur fracture    Surgeons Al Larson MD - Primary         Landy Montes PA-C - Assisting the PA was present for     Patient is very sensitive to opioids: Nausea reported with morphine prior to surgery and emesis postsurgery.    Small dose of fentanyl given Intra-Op    Currently on as needed Tylenol, hydroxyzine, ibuprofen, and oxycodone 5 mg every 4 hours as needed for pain.  We will closely monitor pain levels at the time of the intense rehabitation and titrate medications as needed.     Postoperative anemia    Hb dropped to 9.9 postop.  Remained stable.    Last hemoglobin was on 14 December and trending upwards at 10.9. 12/19/2022  HGB 11.4, stable.     History of granulomatous myositis    Patient on folic acid 1 mg daily, methotrexate 20 mg weekly SubQ, prednisone 10 mg daily]    Follow with Rheumatology at [place].     On prednisone/methotx. Received methotrexate dosing 12/10/2022  (normally Wednesdays).     Patient given stress dose steroids given chronic steroid usage 50 mg hydrocortisone just before surgery and 25 mg q8 hours x 24 hours after surgery for SDS (50 mg ordered, 25 mg q  8)     Resumed home dosing Prednisone at the time of discharge, we will continue prednisone at 10 mg daily.    Continue folic acid    Continue methotrexate q. weekly every Wednesday     HTN/HLD    resumed atorvastatin 10 mg daily    Currently on losartan for blood pressure control. Reduce to 12.5 mg daily from 12/20/22    Reviewed blood pressures since admission    We will closely monitor blood pressures and titrate medications as needed     Hepatic steatosis  10/2022 LFTs normal. Thought 2/2 methotx vs weight.   Not an active issue     MAYA  - continue home CPAP     Bladder, Bowel, GI and DVT ppx   Bladder ;check PVRs ×3 straight cath for volumes more than 350 cc.  Monitor for symptoms of urinary tract infection, rehab nursing to send for a UA as needed  Bowels; place the patient on a bowel program and titrate medications as needed.  Hold the bowel program in case of loose stools.  Educated patient on Impact with fiber and fluid intake on a bowel function  DVT prophylaxis with mechanical means     Code Status full      Prognosis for medical improvement and stabilization of the medical issues for discharge to home is good.      Estimated Length of Stay: 14 days, 1/3/23    Doing well. Continue cares and plans outlined.     Len Bear MD   Physical Medicine & Rehabilitation

## 2022-12-22 ENCOUNTER — APPOINTMENT (OUTPATIENT)
Dept: PHYSICAL THERAPY | Facility: CLINIC | Age: 52
DRG: 949 | End: 2022-12-22
Attending: PHYSICAL MEDICINE & REHABILITATION
Payer: COMMERCIAL

## 2022-12-22 ENCOUNTER — APPOINTMENT (OUTPATIENT)
Dept: OCCUPATIONAL THERAPY | Facility: CLINIC | Age: 52
DRG: 949 | End: 2022-12-22
Attending: PHYSICAL MEDICINE & REHABILITATION
Payer: COMMERCIAL

## 2022-12-22 PROCEDURE — 128N000003 HC R&B REHAB

## 2022-12-22 PROCEDURE — 250N000013 HC RX MED GY IP 250 OP 250 PS 637: Performed by: PHYSICAL MEDICINE & REHABILITATION

## 2022-12-22 PROCEDURE — 97535 SELF CARE MNGMENT TRAINING: CPT | Mod: GO

## 2022-12-22 PROCEDURE — 97110 THERAPEUTIC EXERCISES: CPT | Mod: GP

## 2022-12-22 PROCEDURE — 99233 SBSQ HOSP IP/OBS HIGH 50: CPT | Mod: FS | Performed by: PHYSICIAN ASSISTANT

## 2022-12-22 PROCEDURE — 250N000012 HC RX MED GY IP 250 OP 636 PS 637: Performed by: PHYSICAL MEDICINE & REHABILITATION

## 2022-12-22 PROCEDURE — 97530 THERAPEUTIC ACTIVITIES: CPT | Mod: GP

## 2022-12-22 RX ORDER — METHOTREXATE 25 MG/ML
20 INJECTION, SOLUTION INTRA-ARTERIAL; INTRAMUSCULAR; INTRAVENOUS WEEKLY
Status: DISCONTINUED | OUTPATIENT
Start: 2022-12-28 | End: 2023-01-02 | Stop reason: HOSPADM

## 2022-12-22 RX ADMIN — FOLIC ACID 1 MG: 1 TABLET ORAL at 08:37

## 2022-12-22 RX ADMIN — METHOCARBAMOL 500 MG: 500 TABLET ORAL at 15:38

## 2022-12-22 RX ADMIN — ASPIRIN 325 MG: 325 TABLET ORAL at 08:37

## 2022-12-22 RX ADMIN — OXYCODONE HYDROCHLORIDE 5 MG: 5 TABLET ORAL at 22:59

## 2022-12-22 RX ADMIN — ACETAMINOPHEN 975 MG: 325 TABLET, FILM COATED ORAL at 18:57

## 2022-12-22 RX ADMIN — OXYCODONE HYDROCHLORIDE 5 MG: 5 TABLET ORAL at 13:02

## 2022-12-22 RX ADMIN — OXYCODONE HYDROCHLORIDE 5 MG: 5 TABLET ORAL at 00:25

## 2022-12-22 RX ADMIN — METHOCARBAMOL 500 MG: 500 TABLET ORAL at 08:37

## 2022-12-22 RX ADMIN — IBUPROFEN 600 MG: 200 TABLET, FILM COATED ORAL at 11:45

## 2022-12-22 RX ADMIN — PANTOPRAZOLE SODIUM 40 MG: 40 TABLET, DELAYED RELEASE ORAL at 08:37

## 2022-12-22 RX ADMIN — HYDROXYZINE HYDROCHLORIDE 25 MG: 25 TABLET, FILM COATED ORAL at 02:12

## 2022-12-22 RX ADMIN — OXYCODONE HYDROCHLORIDE 5 MG: 5 TABLET ORAL at 07:04

## 2022-12-22 RX ADMIN — ATORVASTATIN CALCIUM 10 MG: 10 TABLET, FILM COATED ORAL at 08:37

## 2022-12-22 RX ADMIN — Medication 12.5 MG: at 08:37

## 2022-12-22 RX ADMIN — METHOCARBAMOL 500 MG: 500 TABLET ORAL at 20:36

## 2022-12-22 RX ADMIN — PREDNISONE 10 MG: 10 TABLET ORAL at 08:36

## 2022-12-22 RX ADMIN — IBUPROFEN 600 MG: 200 TABLET, FILM COATED ORAL at 02:12

## 2022-12-22 RX ADMIN — OXYCODONE HYDROCHLORIDE 5 MG: 5 TABLET ORAL at 19:00

## 2022-12-22 RX ADMIN — POLYETHYLENE GLYCOL 3350 17 G: 17 POWDER, FOR SOLUTION ORAL at 08:37

## 2022-12-22 RX ADMIN — ACETAMINOPHEN 975 MG: 325 TABLET, FILM COATED ORAL at 07:03

## 2022-12-22 RX ADMIN — ACETAMINOPHEN 975 MG: 325 TABLET, FILM COATED ORAL at 13:02

## 2022-12-22 RX ADMIN — METHOCARBAMOL 500 MG: 500 TABLET ORAL at 11:44

## 2022-12-22 RX ADMIN — ASPIRIN 325 MG: 325 TABLET ORAL at 20:36

## 2022-12-22 ASSESSMENT — ACTIVITIES OF DAILY LIVING (ADL)
ADLS_ACUITY_SCORE: 43

## 2022-12-22 NOTE — PROGRESS NOTES
Received notification back from [facility] that they were unable to find home care agency for pt with partnering agencies. Partnering agencies listed below:    [facility] - declined  [facility] - declined  [facility] - declined  [facility] - declined      LANNY called pt's UMR Jeannette GARCIA (ph: [phone]) to see about getting in-network list of home care agencies for pt. Jeannette confirmed having a list and requested LANNY's email to send list to.     LANNY to f/u with home care agencies.     Fidelia Sood, CLAUDIO Jose   [facility]

## 2022-12-22 NOTE — PROGRESS NOTES
"  [facility]   Acute Rehabilitation Unit  Daily progress note  Interval History  Seen resting in bed, then with PT working on transfer.  She denies dizziness, sob, bowel or bladder concerns.  Eating ok, sleep ok.  Pain is ongoing and still feels like she has severe pain without taking oxycodone, encouraged to taper as able.  Discussed FMLA and short term disability, she is anxious about this, discussed anticipated therapy needs and follow up,     PT:   Continued practice with STS transfers with platform FWW will be beneficial. R knee pain throughout afternoon session       Medications  Scheduled meds    aspirin  325 mg Oral BID     atorvastatin  10 mg Oral Daily     estradiol  2 g Vaginal Once per day on Mon Thu     folic acid  1 mg Oral Daily     losartan  12.5 mg Oral Daily     methocarbamol  500 mg Oral 4x Daily     Methotrexate  0.8 mL Subcutaneous Weekly     pantoprazole  40 mg Oral Daily     polyethylene glycol  17 g Oral Daily     predniSONE  10 mg Oral Daily       PRN meds:  acetaminophen, bisacodyl, hydrOXYzine, ibuprofen, naloxone **OR** naloxone **OR** naloxone **OR** naloxone, ondansetron, oxyCODONE, senna-docusate      PHYSICAL EXAM  /55 (BP Location: Right arm, Patient Position: Semi-Cornell's, Cuff Size: Adult Regular)   Pulse 89   Temp 98  F (36.7  C) (Oral)   Resp 16   Ht 1.575 m (5' 2\")   Wt 67 kg (147 lb 11.3 oz)   SpO2 99%   BMI 27.02 kg/m    Gen: Alert and cooperative  HEENT: MMM  CV: S1, S2 RRR  Pulm: Clear to auscultation  Abd: Soft, non tender  Ext: Calves non tender  Neuro/MSK: Moves UE and LLE well. Moves RLE distally.   Responds to touch.    LABS  Last Comprehensive Metabolic Panel:  Sodium   Date Value Ref Range Status   12/14/2022 141 133 - 144 mmol/L Final     Potassium   Date Value Ref Range Status   12/14/2022 3.8 3.4 - 5.3 mmol/L Final     Chloride   Date Value Ref Range Status   12/14/2022 107 94 - 109 mmol/L Final     Carbon Dioxide " (CO2)   Date Value Ref Range Status   12/14/2022 29 20 - 32 mmol/L Final     Anion Gap   Date Value Ref Range Status   12/14/2022 5 3 - 14 mmol/L Final     Glucose   Date Value Ref Range Status   12/14/2022 91 70 - 99 mg/dL Final     Urea Nitrogen   Date Value Ref Range Status   12/14/2022 17 7 - 30 mg/dL Final     Creatinine   Date Value Ref Range Status   12/14/2022 0.43 (L) 0.52 - 1.04 mg/dL Final     GFR Estimate   Date Value Ref Range Status   12/14/2022 >90 >60 mL/min/1.73m2 Final     Comment:     Effective December 21, 2021 eGFRcr in adults is calculated using the 2021 CKD-EPI creatinine equation which includes age and gender (Gloria et al., NE, DOI: 10.1056/KSUAdl6984791)     Calcium   Date Value Ref Range Status   12/14/2022 8.5 8.5 - 10.1 mg/dL Final        CBC RESULTS:   Recent Labs   Lab Test 12/17/22  1622   WBC 9.3   RBC 3.33*   HGB 11.4*   HCT 35.7   *   MCH 34.2*   MCHC 31.9   RDW 14.0           ASSESSMENT AND PLAN    Patient will work with PT for 90 min daily to work on gait exercises, strengthening, endurance buildup, transfers with use of walker as needed.   Patient will work with OT for 90 min daily to work on upper and lower body self care, dressing, toileting, bathing, energy conservation techniques with use of ADs as needed.   Rehab RN to administer medication, patient education on medication taking, VS monitoring, and surgical wound dressing changes and monitoring.      Medical Management:  Mechanical fall  Right displaced diaphyseal femur fracture  Right intra-articular distal femur fracture  Was at [facility] when she slipped on some water, twisting her R knee and falling. unable to get up and had severe pain to R leg/ knee. No head or other trauma. No dizzy/lightheaded: Xray femur:  spiral comminuted fracture of the distal femur which may extend into the intercondylar aspect of the femur anteriorly. There is a small spur patellar joint effusion. The tibia and fibula are intact. CT  knee 12/11: Acute comminuted moderately displaced and angulated fracture of the distal femur with intra-articular extension of the fracture.     12/11 open reduction intramedullary nailing of right femur fracture and ORIF of right distal intra-articular femur fracture Surgeons Al Larson MD - Primary & Landy Montes PA-C     Currently on as needed Tylenol, hydroxyzine, ibuprofen, and oxycodone 5 mg every 4 hours as needed for pain.  Continue to  monitor pain levels and titrate medications as needed.     Postoperative anemia    Hb dropped to 9.9 12/13, stable at 11.4 12/17.       History of granulomatous myositis   on folic acid 1 mg daily, methotrexate 20 mg weekly SubQ, prednisone 10 mg daily]. Follows with Rheumatology at [place].     Received methotrexate dosing 12/10/2022  (normally Wednesdays).     Patient given stress dose steroids  just before surgery      continue prednisone at 10 mg daily.    Continue folic acid    Continue methotrexate every Wednesday     HTN/HLD  -continue atorvastatin 10 mg daily  Currently on losartan for blood pressure control.12.5 mg daily  -monitor bp     Hepatic steatosis  10/2022 LFTs normal. Thought 2/2 methotrexate vs fatty liver   Not an active issue     MAYA  - continue home CPAP     Bladder, Bowel, GI and DVT ppx   Bladder ;check PVRs ×3 straight cath for volumes more than 350 cc.  Monitor for symptoms of urinary tract infection, rehab nursing to send for a UA as needed  Bowels; place the patient on a bowel program and titrate medications as needed.  Hold the bowel program in case of loose stools.  Educated patient on Impact with fiber and fluid intake on a bowel function  DVT prophylaxis with mechanical means           Ashley Walker PA-C  Physical Medicine & Rehabilitation

## 2022-12-22 NOTE — PLAN OF CARE
A/Ox 4. CMS intact. Able to make needs known. Denies SOB, N/V, CP, and N/T. Continent B/B. Uses the bedpan and voids without difficulty. LBM 12/20/2022 per pt report. Ax2 stand and pivot; WBTT on R leg. C/o pain, managed PRN Oxycodone, PRN Tylenol, and ice packs that provided some relief. R knee incision wrapped, CDI. Call light within reach. Continue with POC.

## 2022-12-22 NOTE — PLAN OF CARE
"Discharge Planner Post-Acute Rehab PT:     Discharge Plan: home w/c based with intermittent assist from family and HH PT    Precautions: TTWB in RLE with KI donned whenever moving or OOB  Falls with heavy reliance on BUE to stand  Fatigue - perform submaximal strengthening to avoid over exhaustion with myositis    Current Status:  Bed Mobility: no assist for trunk, assist to place RLE over EOB in KI.  Transfer: SPT, platform FWW, minAx1 with therapy, recommend Ax1-2 with RN staff.   Gait: unsafe  Stairs: unsafe  Balance: requires heavy BUE support on FWW to stand and maintain balance d/t RLE pain and weakness proximally.    Assessment: continuing to practice STS, completed from 22\" mat today with PFWSOFIA Johnson.     Family training scheduled for tomorrow [id number] prior to Pass on Sunday for 4 hours to assess mobility at home and test ramps that pt's  is building tomorrow and Saturday.    Slowly improving strength with submaximal strengthening exercises.    Other Barriers to Discharge (DME, Family Training, etc):   Family training - initial 12/23 prior to Pass, may need to schedule additional training pending progress prior to discharge.    DME:  K4 rental w/c - w/c eval scheduled for 12/23 in PM, pursuing K4 rental with solid seat back, contoured cushion and R elevating legrest (telescoping preferable)  Platform FWW - needs to order from [facility] bed - TBD, hoping to be able to perform bed mob independent or mod I with portable bedrail, but may need to consider hospital bed depending on progress.    "

## 2022-12-22 NOTE — PLAN OF CARE
Pt aox4. A2 pivot WBAT to L leg. Continent x2, uses bedpan, last BM 12/20. Regular diet, thin liquids, takes pills whole.    Pain: PRN tylenol, ibuprofen (0212), atarax (0212) and oxy (0700) given for pain

## 2022-12-22 NOTE — PLAN OF CARE
VS: Temp: 98  F (36.7  C) Temp src: Oral BP: 116/55 Pulse: 89   Resp: 16 SpO2: 99 % O2 Device: None (Room air)     O2: >95% RA   Output: Utilizes BSC or bed bad, voids without difficulties   Last BM: 12/20/22   Activity: Assist of 2 SPT  TTWB to RLE   Skin: Has R hip incision w/ Aquacel dressing  Had ORIF surgery done to RLE, ace wrap CDI   Pain: RLE pain managed w/ scheduled meds, PRN oxycodone and tylenol   CMS: Pt is alert and oriented x4, able to make needs known, denies chest pain, light headedness, dizziness or SOB.   Dressing: RLE dressing CDI, Hip incision dressing CDI  Reported the leg is not to be unwrapped till 14days after surgery, had surgery 12/11/22   Diet: Regular thin   LDA: None    Equipment: W/c, BSC, personal belongings   Plan: TBD. Call light is in reach, continue w/ POC.   Additional Info: Knee brace on when OOB  CPAP at [facility]  Has involuntary finger movements

## 2022-12-22 NOTE — PROGRESS NOTES
"Discharge Planner Post-Acute Rehab OT:     Discharge Plan: home, wc-based, w/assist from family and homecare OT     Precautions: TTWB in RLE with KI donned whenever moving or OOB  Falls with heavy reliance on BUE to stand  Fatigue - perform submaximal strengthening to avoid over exhaustion with myositis    Current Status:  ADLs:    Mobility: Mod A bed mobility with AE. W/c based, Max A SPT LUE platform FWW, RLE KI.    Grooming: Set up seated.     Dressing: UB Min A. LB Max A FWW or supine.    Bathing: Mod A rolling shower chair with back and LE support, RLE covered.    Toileting: Max A SPT platform FWW BSC, total A hygiene/clothing. A x 2 nursing. Also using bedpan due to pain and fatigue.  IADLs: Previously IND. Spouse/children will assist with heavier IADLs.   Vision/Cognition: No concerns.    Assessment: Pt making gains with functional mobility as evidenced by SPT from wc to toilet, benefits from verbal guidance for sequencing.  Continued reviewing home safety with toileting, measurements added to sheet on whiteboard. Pt reporting safe plan to leave for 4 hours [place] day,  building ramp, bringing FWW, will toilet before leaving, utilizing wc accessible van.     Other Barriers to Discharge (DME, Family Training, etc):   Home accessibility/stairs: Spouse is a ernst and plans to build and install ramp. Step to access main level bathroom and w/c accessibility questionable.   Equipment: Manual w/c, tub bench, commode chair, RLE KI, bedrail, reacher.  Caregiver support: Spouse and children can assist with IADLs as needed. Caregiver training to be scheduled.     Mainfloor bathroom measurements: length of doorway opening is 3 ft, wall from L to R 49\" while sitting on toilet, 20 inches front of toilet to wall  "

## 2022-12-23 ENCOUNTER — APPOINTMENT (OUTPATIENT)
Dept: OCCUPATIONAL THERAPY | Facility: CLINIC | Age: 52
DRG: 949 | End: 2022-12-23
Attending: PHYSICAL MEDICINE & REHABILITATION
Payer: COMMERCIAL

## 2022-12-23 ENCOUNTER — APPOINTMENT (OUTPATIENT)
Dept: PHYSICAL THERAPY | Facility: CLINIC | Age: 52
DRG: 949 | End: 2022-12-23
Attending: PHYSICAL MEDICINE & REHABILITATION
Payer: COMMERCIAL

## 2022-12-23 PROCEDURE — 250N000013 HC RX MED GY IP 250 OP 250 PS 637: Performed by: PHYSICAL MEDICINE & REHABILITATION

## 2022-12-23 PROCEDURE — 250N000013 HC RX MED GY IP 250 OP 250 PS 637: Performed by: PHYSICIAN ASSISTANT

## 2022-12-23 PROCEDURE — 99233 SBSQ HOSP IP/OBS HIGH 50: CPT | Mod: FS | Performed by: PHYSICIAN ASSISTANT

## 2022-12-23 PROCEDURE — 128N000003 HC R&B REHAB

## 2022-12-23 PROCEDURE — 97530 THERAPEUTIC ACTIVITIES: CPT | Mod: GP | Performed by: STUDENT IN AN ORGANIZED HEALTH CARE EDUCATION/TRAINING PROGRAM

## 2022-12-23 PROCEDURE — 250N000012 HC RX MED GY IP 250 OP 636 PS 637: Performed by: PHYSICAL MEDICINE & REHABILITATION

## 2022-12-23 PROCEDURE — 97535 SELF CARE MNGMENT TRAINING: CPT | Mod: GO | Performed by: OCCUPATIONAL THERAPIST

## 2022-12-23 RX ORDER — ACETAMINOPHEN 325 MG/1
975 TABLET ORAL 3 TIMES DAILY
Status: DISCONTINUED | OUTPATIENT
Start: 2022-12-23 | End: 2023-01-02 | Stop reason: HOSPADM

## 2022-12-23 RX ADMIN — HYDROXYZINE HYDROCHLORIDE 25 MG: 25 TABLET, FILM COATED ORAL at 07:37

## 2022-12-23 RX ADMIN — METHOCARBAMOL 500 MG: 500 TABLET ORAL at 20:47

## 2022-12-23 RX ADMIN — PANTOPRAZOLE SODIUM 40 MG: 40 TABLET, DELAYED RELEASE ORAL at 07:37

## 2022-12-23 RX ADMIN — SENNOSIDES AND DOCUSATE SODIUM 1 TABLET: 50; 8.6 TABLET ORAL at 07:37

## 2022-12-23 RX ADMIN — ASPIRIN 325 MG: 325 TABLET ORAL at 07:36

## 2022-12-23 RX ADMIN — ACETAMINOPHEN 975 MG: 325 TABLET, FILM COATED ORAL at 20:47

## 2022-12-23 RX ADMIN — OXYCODONE HYDROCHLORIDE 5 MG: 5 TABLET ORAL at 11:10

## 2022-12-23 RX ADMIN — ACETAMINOPHEN 975 MG: 325 TABLET, FILM COATED ORAL at 01:56

## 2022-12-23 RX ADMIN — OXYCODONE HYDROCHLORIDE 5 MG: 5 TABLET ORAL at 15:08

## 2022-12-23 RX ADMIN — HYDROXYZINE HYDROCHLORIDE 25 MG: 25 TABLET, FILM COATED ORAL at 13:37

## 2022-12-23 RX ADMIN — ATORVASTATIN CALCIUM 10 MG: 10 TABLET, FILM COATED ORAL at 07:37

## 2022-12-23 RX ADMIN — METHOCARBAMOL 500 MG: 500 TABLET ORAL at 07:38

## 2022-12-23 RX ADMIN — ACETAMINOPHEN 975 MG: 325 TABLET, FILM COATED ORAL at 07:37

## 2022-12-23 RX ADMIN — IBUPROFEN 600 MG: 200 TABLET, FILM COATED ORAL at 11:10

## 2022-12-23 RX ADMIN — ACETAMINOPHEN 975 MG: 325 TABLET, FILM COATED ORAL at 13:37

## 2022-12-23 RX ADMIN — ASPIRIN 325 MG: 325 TABLET ORAL at 20:47

## 2022-12-23 RX ADMIN — OXYCODONE HYDROCHLORIDE 5 MG: 5 TABLET ORAL at 21:29

## 2022-12-23 RX ADMIN — Medication 12.5 MG: at 07:37

## 2022-12-23 RX ADMIN — METHOCARBAMOL 500 MG: 500 TABLET ORAL at 12:06

## 2022-12-23 RX ADMIN — OXYCODONE HYDROCHLORIDE 5 MG: 5 TABLET ORAL at 06:56

## 2022-12-23 RX ADMIN — HYDROXYZINE HYDROCHLORIDE 25 MG: 25 TABLET, FILM COATED ORAL at 01:56

## 2022-12-23 RX ADMIN — METHOCARBAMOL 500 MG: 500 TABLET ORAL at 16:19

## 2022-12-23 RX ADMIN — POLYETHYLENE GLYCOL 3350 17 G: 17 POWDER, FOR SOLUTION ORAL at 07:37

## 2022-12-23 RX ADMIN — PREDNISONE 10 MG: 10 TABLET ORAL at 07:37

## 2022-12-23 RX ADMIN — FOLIC ACID 1 MG: 1 TABLET ORAL at 07:37

## 2022-12-23 ASSESSMENT — ACTIVITIES OF DAILY LIVING (ADL)
ADLS_ACUITY_SCORE: 41
ADLS_ACUITY_SCORE: 43
ADLS_ACUITY_SCORE: 41

## 2022-12-23 NOTE — PLAN OF CARE
Goal Outcome Evaluation:      Plan of Care Reviewed With: patient      Outcome Evaluation: Pt contnues to be safe, uses her call light appropriately, advocates for pain medication and continues to be continent of both B/B.    FOCUS/GOAL: Medication, Safety and Pain Management    ASSESSMENT, INTERVENTIONS AND CONTINUING PLAN FOR GOAL:  Orientation: A&OX4  Ambulation/Transfer:Ax2 SPT  Bladder/Bowel: Continent uses bedpan. LB 12/23/2022  Pain: R. Leg, utilizes scheduled pain med and PRN OXYX1 shift.  Diet: Regular, thin liquids  Meds: Whole with water  Tubes/Lines/Drains: None  Skin: No new concerns.

## 2022-12-23 NOTE — PLAN OF CARE
"Discharge Planner Post-Acute Rehab PT:     Discharge Plan: home w/c based with intermittent assist from family and HH PT    Precautions: TTWB in RLE with KI donned whenever moving or OOB  Falls with heavy reliance on BUE to stand  Fatigue - perform submaximal strengthening to avoid over exhaustion with myositis    Current Status:  Bed Mobility: no assist for trunk, assist to place RLE over EOB in KI.  Transfer: SPT, platform FWW, minAx1 with therapy, recommend Ax1-2 with RN staff.   Gait: unsafe  Stairs: unsafe  Balance: requires heavy BUE support on PFWW to stand and maintain balance d/t RLE pain and weakness proximally.    Assessment: family training completed for Pass on Sunday. WC eval completed. [place] platform attachment resulting in improved comfort for STS and transfers.     Other Barriers to Discharge (DME, Family Training, etc):   Family training - initial 12/23 prior to Pass, may need to schedule additional training pending progress prior to discharge.    DME:  K4 rental w/c - w/c eval scheduled for 12/23 in PM, pursuing K4 rental with solid seat back, contoured cushion and R elevating legrest (telescoping preferable)  Platform FWW - needs to order from [employer] - ensure 30\" length pole  - TBD, hoping to be able to perform bed mob independent or mod I with portable bedrail, will make.   "

## 2022-12-23 NOTE — PROGRESS NOTES
Discharge Planner Post-Acute Rehab OT:     Discharge Plan: home, wc-based, w/assist from family and homecare OT     Precautions: TTWB in RLE with KI donned whenever moving or OOB  Falls with heavy reliance on BUE to stand  Fatigue - perform submaximal strengthening to avoid over exhaustion with myositis    Current Status:  ADLs:    Mobility: Mod A bed mobility with AE. W/c based, Mod A SPT LUE platform FWW, RLE KI.    Grooming: Set up seated.     Dressing: UB Min A. LB Mod A FWW or supine.    Bathing: Mod A rolling shower chair with back and LE support, RLE covered.    Toileting: Mod A SPT platform FWW BSC, Mod A hygiene/clothing. A x 2 nursing.  IADLs: Previously IND. Spouse/children will assist with heavier IADLs.   Vision/Cognition: No concerns.    Assessment: Completed caregiver training with pt family in preparation for pass on [place] day. Also practiced functional transfers and toileting with consideration of expected functional level at discharge. Pt spouse very pleased with pt progress.    Other Barriers to Discharge (DME, Family Training, etc):   Home accessibility/stairs: Spouse is a ernst and plans to build and install ramp, accessible entryway to main level bathroom, and grab bars.  Equipment: Manual w/c, tub bench, commode chair, RLE KI, bedrail, reacher.  Caregiver support: Spouse and children can assist with IADLs as needed. Caregiver training initiated 12/23; will complete additional sessions prior to discharge.

## 2022-12-23 NOTE — PLAN OF CARE
Goal Outcome Evaluation:      Plan of Care Reviewed With: patient    Overall Patient Progress: no changeOverall Patient Progress: no change     Pt A&Ox4. Cont of bladder on shift, using bedpan. Home CPAP present. Pt c/o R knee pain, PRN tylenol, oxy and atarax given with relief. Pt denies SOB, headache, nausea, or new numbness/tingling. No new skin issues. Pt slept well overnight, call light in reach, calls appropriately.

## 2022-12-23 NOTE — PLAN OF CARE
Goal Outcome Evaluation: Ongoing      Plan of Care Reviewed With: patient    Overall Patient Progress: improvingOverall Patient Progress: improving  VS: Temp: 95.7  F orally, HR 68 RR 18 BP: 106/54 Pulse: 89      O2: 98% RA   Output: Voids without difficulties on bedside commode.    Last BM: 12/20/22 Miralax and senokot given.    Activity: Assist of 2 SPT  TTWB to RLE  Right knee brace.    Skin: Has R hip incision w/ Aquacel dressing  Had ORIF surgery done to RLE, ace wrap CDI   Pain: RLE pain managed w/ scheduled meds, PRN oxycodone, vistaril and ibuprofen. Scheduled tylenol and robaxin.   CMS: Pt is alert and oriented x4, able to make needs known, denies chest pain, light headedness, dizziness or SOB.   Dressing: RLE dressing CDI, Hip incision dressing CDI   Diet: Regular thin   LDA: None    Equipment: W/c, BSC, personal belongings   Plan: TBD. Call light is in reach, continue w/ POC.   Additional Info: [place]  CPAP at [facility]  Has involuntary finger movements

## 2022-12-23 NOTE — PROGRESS NOTES
"  [facility]   Acute Rehabilitation Unit  Daily progress note  Interval History  Ventura was seen initially working with family on training with goal for pass on 12/25, teenage children and spouse able to assist with transfer, borrowing wheel chair accessible vehicle for pass.     Ongoing pain, will try scheduling tylenol and monitor pain.  Denies other new questions or concerns today.       OT:   caregiver training with pt family in preparation for pass on [place] day. Also practiced functional transfers and toileting with consideration of expected functional level at discharge. Pt spouse very pleased with pt progress.      Medications  Scheduled meds    aspirin  325 mg Oral BID     atorvastatin  10 mg Oral Daily     estradiol  2 g Vaginal Once per day on Mon Thu     folic acid  1 mg Oral Daily     losartan  12.5 mg Oral Daily     methocarbamol  500 mg Oral 4x Daily     [START ON 12/28/2022] methotrexate  20 mg Subcutaneous Weekly     pantoprazole  40 mg Oral Daily     polyethylene glycol  17 g Oral Daily     predniSONE  10 mg Oral Daily       PRN meds:  acetaminophen, bisacodyl, hydrOXYzine, ibuprofen, naloxone **OR** naloxone **OR** naloxone **OR** naloxone, ondansetron, oxyCODONE, senna-docusate      PHYSICAL EXAM  /54 (BP Location: Right arm)   Pulse 68   Temp (!) 95.7  F (35.4  C) (Oral)   Resp 16   Ht 1.575 m (5' 2\")   Wt 67 kg (147 lb 11.3 oz)   SpO2 98%   BMI 27.02 kg/m    Gen: Alert and cooperative  HEENT: MMM  CV: S1, S2 RRR  Pulm: Clear to auscultation  Abd: Soft, non tender  Ext: Calves non tender  Neuro/MSK: Moves UE and LLE well. Moves RLE distally.       LABS  Last Comprehensive Metabolic Panel:  Sodium   Date Value Ref Range Status   12/14/2022 141 133 - 144 mmol/L Final     Potassium   Date Value Ref Range Status   12/14/2022 3.8 3.4 - 5.3 mmol/L Final     Chloride   Date Value Ref Range Status   12/14/2022 107 94 - 109 mmol/L Final     Carbon " Dioxide (CO2)   Date Value Ref Range Status   12/14/2022 29 20 - 32 mmol/L Final     Anion Gap   Date Value Ref Range Status   12/14/2022 5 3 - 14 mmol/L Final     Glucose   Date Value Ref Range Status   12/14/2022 91 70 - 99 mg/dL Final     Urea Nitrogen   Date Value Ref Range Status   12/14/2022 17 7 - 30 mg/dL Final     Creatinine   Date Value Ref Range Status   12/14/2022 0.43 (L) 0.52 - 1.04 mg/dL Final     GFR Estimate   Date Value Ref Range Status   12/14/2022 >90 >60 mL/min/1.73m2 Final     Comment:     Effective December 21, 2021 eGFRcr in adults is calculated using the 2021 CKD-EPI creatinine equation which includes age and gender (Gloria et al., NEJ, DOI: 10.1056/FFXDct3537807)     Calcium   Date Value Ref Range Status   12/14/2022 8.5 8.5 - 10.1 mg/dL Final        CBC RESULTS:   Recent Labs   Lab Test 12/17/22  1622   WBC 9.3   RBC 3.33*   HGB 11.4*   HCT 35.7   *   MCH 34.2*   MCHC 31.9   RDW 14.0           ASSESSMENT AND PLAN    Patient will work with PT for 90 min daily to work on gait exercises, strengthening, endurance buildup, transfers with use of walker as needed.   Patient will work with OT for 90 min daily to work on upper and lower body self care, dressing, toileting, bathing, energy conservation techniques with use of ADs as needed.   Rehab RN to administer medication, patient education on medication taking, VS monitoring, and surgical wound dressing changes and monitoring.      Mechanical fall  Right displaced diaphyseal femur fracture  Right intra-articular distal femur fracture  Was at [facility] when she slipped on some water, twisting her R knee and falling. unable to get up and had severe pain to R leg/ knee. No head or other trauma. No dizzy/lightheaded: Xray femur:  spiral comminuted fracture of the distal femur which may extend into the intercondylar aspect of the femur anteriorly. There is a small spur patellar joint effusion. The tibia and fibula are intact. CT knee 12/11:  Acute comminuted moderately displaced and angulated fracture of the distal femur with intra-articular extension of the fracture.     12/11 open reduction intramedullary nailing of right femur fracture and ORIF of right distal intra-articular femur fracture Surgeons Al Larson MD - Primary & Landy Montes PA-C     Schedule tylenol. Continue scheduled robaxin, as needed  hydrhoxyzine, ibuprofen, and oxycodone,   continue to  monitor pain levels and titrate medications as needed.     Postoperative anemia    Hgb dropped to 9.9 12/13, stable at 11.4 12/17.     History of granulomatous myositis   on folic acid 1 mg daily, methotrexate 20 mg weekly SubQ, prednisone 10 mg daily. Follows with Rheumatology at [facility]. Patient given stress dose steroids  just before surgery      continue prednisone at 10 mg daily.    Continue folic acid    Continue methotrexate every Wednesday     HTN/HLD  -continue atorvastatin 10 mg daily  Currently on losartan for blood pressure control.12.5 mg daily  -monitor bp     Hepatic steatosis  10/2022 LFTs normal. Thought 2/2 methotrexate vs fatty liver   Not an active issue     MAYA  - continue home CPAP     Bladder, Bowel, GI and DVT ppx   Bladder ;check PVRs ×3 straight cath for volumes more than 350 cc.  Monitor for symptoms of urinary tract infection, rehab nursing to send for a UA as needed  Bowels; place the patient on a bowel program and titrate medications as needed.  Hold the bowel program in case of loose stools.  Educated patient on Impact with fiber and fluid intake on a bowel function  DVT prophylaxis with mechanical means           Ashley Walker PA-C  Physical Medicine & Rehabilitation

## 2022-12-23 NOTE — PROGRESS NOTES
SW called and left  for [facility]HA:    [facility] [phone]-they are closed for referrals until next week.   [facility] [phone]  [facility] [phone]  [facility] [phone]     Declined:   [facility] [phone]- they don't have insurance.   [facility] [phone]- they don't have RN.    FABY Quintana   [facility], Transitional Care Unit   Social Work   [facility] [address] [place] [address]  (PH) [phone]

## 2022-12-23 NOTE — PROGRESS NOTES
"SPIRITUAL HEALTH SERVICES Progress Note  [facility] ([facility]) Acute Rehab    Saw pt Britt Oakley \"Ventura\" Vivian as a follow-up unit  visit.    Patient/Family Understanding of Illness and Goals of Care - pt spoke in very positive terms about her progress with therapies, and also said she was happy that \"my family, especially my children (15 year old twin sons and 17 year old daughter) were able to come in yesterday and observe some of my therapy sessions. They were able to learn how to help me with transfers using a belt.\"    Distress and Loss - pt talked about having difficulties with rather severe pain yesterday, but was grateful \"it got worked out...\"  Pt also talked about concerns and stress regarding FMLA.    Strengths, Coping, and Resources  - Pt was grateful for advice for coping from physician team, and she continues to have very good support from family. Overall pt is extremely grateful for the care she is receiving on the ARU.    Meaning, Beliefs, and Spirituality - we didn't talk about issues of romulo today, but pt clearly cultivates a sense of hope and optimism.    Plan of Care - continue to follow while pt on unit.    Al Long M.Div (Bill)., [facility]  Staff   Pager [phone]    * [facility] remains available 24/7 for emergent requests/referrals, either by having the switchboard page the on-call  or by entering an ASAP/STAT consult in Epic (this will also page the on-call ). Routine Epic consults receive an initial response within 24 hours.*    "

## 2022-12-24 ENCOUNTER — APPOINTMENT (OUTPATIENT)
Dept: PHYSICAL THERAPY | Facility: CLINIC | Age: 52
DRG: 949 | End: 2022-12-24
Attending: PHYSICAL MEDICINE & REHABILITATION
Payer: COMMERCIAL

## 2022-12-24 ENCOUNTER — APPOINTMENT (OUTPATIENT)
Dept: OCCUPATIONAL THERAPY | Facility: CLINIC | Age: 52
DRG: 949 | End: 2022-12-24
Attending: PHYSICAL MEDICINE & REHABILITATION
Payer: COMMERCIAL

## 2022-12-24 PROCEDURE — 128N000003 HC R&B REHAB

## 2022-12-24 PROCEDURE — 99231 SBSQ HOSP IP/OBS SF/LOW 25: CPT | Performed by: PHYSICAL MEDICINE & REHABILITATION

## 2022-12-24 PROCEDURE — 250N000013 HC RX MED GY IP 250 OP 250 PS 637: Performed by: PHYSICAL MEDICINE & REHABILITATION

## 2022-12-24 PROCEDURE — 97530 THERAPEUTIC ACTIVITIES: CPT | Mod: GP

## 2022-12-24 PROCEDURE — 250N000013 HC RX MED GY IP 250 OP 250 PS 637: Performed by: PHYSICIAN ASSISTANT

## 2022-12-24 PROCEDURE — 97535 SELF CARE MNGMENT TRAINING: CPT | Mod: GO | Performed by: OCCUPATIONAL THERAPIST

## 2022-12-24 PROCEDURE — 250N000012 HC RX MED GY IP 250 OP 636 PS 637: Performed by: PHYSICAL MEDICINE & REHABILITATION

## 2022-12-24 RX ADMIN — ACETAMINOPHEN 975 MG: 325 TABLET, FILM COATED ORAL at 08:18

## 2022-12-24 RX ADMIN — POLYETHYLENE GLYCOL 3350 17 G: 17 POWDER, FOR SOLUTION ORAL at 08:18

## 2022-12-24 RX ADMIN — ACETAMINOPHEN 975 MG: 325 TABLET, FILM COATED ORAL at 20:11

## 2022-12-24 RX ADMIN — METHOCARBAMOL 500 MG: 500 TABLET ORAL at 15:46

## 2022-12-24 RX ADMIN — HYDROXYZINE HYDROCHLORIDE 25 MG: 25 TABLET, FILM COATED ORAL at 05:09

## 2022-12-24 RX ADMIN — ASPIRIN 325 MG: 325 TABLET ORAL at 08:18

## 2022-12-24 RX ADMIN — ATORVASTATIN CALCIUM 10 MG: 10 TABLET, FILM COATED ORAL at 08:18

## 2022-12-24 RX ADMIN — METHOCARBAMOL 500 MG: 500 TABLET ORAL at 08:18

## 2022-12-24 RX ADMIN — ACETAMINOPHEN 975 MG: 325 TABLET, FILM COATED ORAL at 13:30

## 2022-12-24 RX ADMIN — Medication 12.5 MG: at 08:18

## 2022-12-24 RX ADMIN — METHOCARBAMOL 500 MG: 500 TABLET ORAL at 20:12

## 2022-12-24 RX ADMIN — OXYCODONE HYDROCHLORIDE 5 MG: 5 TABLET ORAL at 05:07

## 2022-12-24 RX ADMIN — IBUPROFEN 600 MG: 200 TABLET, FILM COATED ORAL at 11:19

## 2022-12-24 RX ADMIN — PANTOPRAZOLE SODIUM 40 MG: 40 TABLET, DELAYED RELEASE ORAL at 08:18

## 2022-12-24 RX ADMIN — OXYCODONE HYDROCHLORIDE 5 MG: 5 TABLET ORAL at 09:44

## 2022-12-24 RX ADMIN — ASPIRIN 325 MG: 325 TABLET ORAL at 20:11

## 2022-12-24 RX ADMIN — OXYCODONE HYDROCHLORIDE 5 MG: 5 TABLET ORAL at 22:05

## 2022-12-24 RX ADMIN — METHOCARBAMOL 500 MG: 500 TABLET ORAL at 11:19

## 2022-12-24 RX ADMIN — FOLIC ACID 1 MG: 1 TABLET ORAL at 08:18

## 2022-12-24 RX ADMIN — OXYCODONE HYDROCHLORIDE 5 MG: 5 TABLET ORAL at 15:46

## 2022-12-24 RX ADMIN — PREDNISONE 10 MG: 10 TABLET ORAL at 08:18

## 2022-12-24 ASSESSMENT — ACTIVITIES OF DAILY LIVING (ADL)
ADLS_ACUITY_SCORE: 41
ADLS_ACUITY_SCORE: 41
ADLS_ACUITY_SCORE: 37
ADLS_ACUITY_SCORE: 41
ADLS_ACUITY_SCORE: 33
ADLS_ACUITY_SCORE: 41

## 2022-12-24 NOTE — PLAN OF CARE
"Goal Outcome Evaluation:               Discharge Planner Post-Acute Rehab PT:      Discharge Plan: home w/c based with intermittent assist from family and HH PT     Precautions: TTWB in RLE with KI donned whenever moving or OOB  Falls with heavy reliance on BUE to stand  Fatigue - perform submaximal strengthening to avoid over exhaustion with myositis     Current Status:  Bed Mobility: no assist for trunk, assist to place RLE over EOB in KI.  Transfer: SPT, platform FWW, minAx1 with therapy, recommend Ax1-2 with RN staff.   Gait: unsafe  Stairs: unsafe  Balance: requires heavy BUE support on PFWW to stand and maintain balance d/t RLE pain and weakness proximally.     Assessment: STS in // bars 3x with min A-pt able to tolerate standing x 3min.  Pt holds R l/e off floor with hip hike.  Pt attempted to rest heel of R foot on floor, but fearful of accidentally putting weight on it.   Other Barriers to Discharge (DME, Family Training, etc):   Family training - initial 12/23 prior to Pass, may need to schedule additional training pending progress prior to discharge.     DME:  K4 rental w/c - w/c eval scheduled for 12/23 in PM, pursuing K4 rental with solid seat back, contoured cushion and R elevating legrest (telescoping preferable)  Platform FWW - needs to order from [employer] - ensure 30\" length pole  - TBD, hoping to be able to perform bed mob independent or mod I with portable bedrail, will make.                  "

## 2022-12-24 NOTE — PLAN OF CARE
FOCUS/GOAL  Medical management    ASSESSMENT, INTERVENTIONS AND CONTINUING PLAN FOR GOAL:  Pt is seen sleeping during shift change and during safety round check. Wears cpap while sleeping; Use call light appropriately for needs. Complain of pain on right hip, prn oxycodone and atarax given. No other concern voice later. Dressing to rt hip is intact. Wraps on. Rt leg supported by pillow. Cont w/ POC.  Goal Outcome Evaluation:      Plan of Care Reviewed With: patient    Overall Patient Progress: no changeOverall Patient Progress: no change

## 2022-12-24 NOTE — PROGRESS NOTES
Discharge Planner Post-Acute Rehab OT:     Discharge Plan: home, wc-based, w/assist from family and homecare OT     Precautions: TTWB in RLE with KI donned whenever moving or OOB  Falls with heavy reliance on BUE to stand  Fatigue - perform submaximal strengthening to avoid over exhaustion with myositis    Current Status:  ADLs:    Mobility: Min A bed mobility with AE. W/c based, Min-Mod A SPT LUE platform FWW, RLE KI.    Grooming: Set up seated.     Dressing: UB Min A. LB Mod A FWW or supine.    Bathing: Min A tub bench, RLE covered and elevated.    Toileting: Mod A SPT platform FWW BSC, Mod A hygiene/clothing. A x 2 nursing.  IADLs: Previously IND. Spouse/children will assist with heavier IADLs.   Vision/Cognition: No concerns.    Assessment: Completed full shower with ADL routine. Advanced from a rolling shower chair to a tub bench with good tolerance. Anticipate pt will need to initially complete sponge baths at home due to limited access to a shower on the main level.    Other Barriers to Discharge (DME, Family Training, etc):   Home accessibility/stairs: Spouse is a ernst and plans to build and install ramp, accessible entryway to main level bathroom, and grab bars.  Equipment: Manual w/c, tub bench, commode chair, RLE KI, bedrail, reacher.  Caregiver support: Spouse and children can assist with IADLs as needed. Caregiver training initiated 12/23; will complete additional sessions prior to discharge.

## 2022-12-24 NOTE — PLAN OF CARE
Goal Outcome Evaluation:      Plan of Care Reviewed With: patient    Overall Patient Progress: no changeOverall Patient Progress: no change    FOCUS/GOAL: Safety, Pain and Medication Management      ASSESSMENT, INTERVENTIONS AND CONTINUING PLAN FOR GOAL:  Pt continues to be at baseline, c/o pain on R. Hip/Femur area- PRNs given with effectiveness. Pt is A&OX4, takes pills whole with water, able to make her needs known. No new concerns raised during shift. Nursing will continue with POC.

## 2022-12-25 LAB
ALBUMIN UR-MCNC: NEGATIVE MG/DL
APPEARANCE UR: CLEAR
BILIRUB UR QL STRIP: NEGATIVE
COLOR UR AUTO: NORMAL
GLUCOSE UR STRIP-MCNC: NEGATIVE MG/DL
HGB UR QL STRIP: NEGATIVE
KETONES UR STRIP-MCNC: NEGATIVE MG/DL
LEUKOCYTE ESTERASE UR QL STRIP: NEGATIVE
NITRATE UR QL: NEGATIVE
PH UR STRIP: 5.5 [PH] (ref 5–7)
SP GR UR STRIP: 1.02 (ref 1–1.03)
UROBILINOGEN UR STRIP-MCNC: NORMAL MG/DL

## 2022-12-25 PROCEDURE — 81003 URINALYSIS AUTO W/O SCOPE: CPT | Performed by: STUDENT IN AN ORGANIZED HEALTH CARE EDUCATION/TRAINING PROGRAM

## 2022-12-25 PROCEDURE — 250N000013 HC RX MED GY IP 250 OP 250 PS 637: Performed by: PHYSICIAN ASSISTANT

## 2022-12-25 PROCEDURE — 250N000012 HC RX MED GY IP 250 OP 636 PS 637: Performed by: PHYSICAL MEDICINE & REHABILITATION

## 2022-12-25 PROCEDURE — 250N000013 HC RX MED GY IP 250 OP 250 PS 637: Performed by: PHYSICAL MEDICINE & REHABILITATION

## 2022-12-25 PROCEDURE — 99232 SBSQ HOSP IP/OBS MODERATE 35: CPT | Performed by: PHYSICAL MEDICINE & REHABILITATION

## 2022-12-25 PROCEDURE — 128N000003 HC R&B REHAB

## 2022-12-25 RX ADMIN — METHOCARBAMOL 500 MG: 500 TABLET ORAL at 10:12

## 2022-12-25 RX ADMIN — METHOCARBAMOL 500 MG: 500 TABLET ORAL at 07:59

## 2022-12-25 RX ADMIN — PREDNISONE 10 MG: 10 TABLET ORAL at 07:58

## 2022-12-25 RX ADMIN — METHOCARBAMOL 500 MG: 500 TABLET ORAL at 17:59

## 2022-12-25 RX ADMIN — ASPIRIN 325 MG: 325 TABLET ORAL at 07:58

## 2022-12-25 RX ADMIN — FOLIC ACID 1 MG: 1 TABLET ORAL at 07:58

## 2022-12-25 RX ADMIN — ACETAMINOPHEN 975 MG: 325 TABLET, FILM COATED ORAL at 07:59

## 2022-12-25 RX ADMIN — IBUPROFEN 600 MG: 200 TABLET, FILM COATED ORAL at 10:11

## 2022-12-25 RX ADMIN — OXYCODONE HYDROCHLORIDE 5 MG: 5 TABLET ORAL at 22:48

## 2022-12-25 RX ADMIN — ASPIRIN 325 MG: 325 TABLET ORAL at 21:37

## 2022-12-25 RX ADMIN — OXYCODONE HYDROCHLORIDE 5 MG: 5 TABLET ORAL at 10:11

## 2022-12-25 RX ADMIN — OXYCODONE HYDROCHLORIDE 5 MG: 5 TABLET ORAL at 05:39

## 2022-12-25 RX ADMIN — ACETAMINOPHEN 975 MG: 325 TABLET, FILM COATED ORAL at 21:37

## 2022-12-25 RX ADMIN — PANTOPRAZOLE SODIUM 40 MG: 40 TABLET, DELAYED RELEASE ORAL at 07:58

## 2022-12-25 RX ADMIN — OXYCODONE HYDROCHLORIDE 5 MG: 5 TABLET ORAL at 17:59

## 2022-12-25 RX ADMIN — ACETAMINOPHEN 975 MG: 325 TABLET, FILM COATED ORAL at 10:13

## 2022-12-25 RX ADMIN — Medication 12.5 MG: at 07:58

## 2022-12-25 RX ADMIN — METHOCARBAMOL 500 MG: 500 TABLET ORAL at 21:37

## 2022-12-25 RX ADMIN — ATORVASTATIN CALCIUM 10 MG: 10 TABLET, FILM COATED ORAL at 07:59

## 2022-12-25 ASSESSMENT — ACTIVITIES OF DAILY LIVING (ADL)
ADLS_ACUITY_SCORE: 41
ADLS_ACUITY_SCORE: 40
ADLS_ACUITY_SCORE: 41
ADLS_ACUITY_SCORE: 33
ADLS_ACUITY_SCORE: 41
ADLS_ACUITY_SCORE: 33
ADLS_ACUITY_SCORE: 40
ADLS_ACUITY_SCORE: 41
ADLS_ACUITY_SCORE: 33

## 2022-12-25 NOTE — PLAN OF CARE
Goal Outcome Evaluation:       Overall Patient Progress: no changeOverall Patient Progress: no change    Outcome Evaluation: Pt continues to have pain on L knee and using PRN pain medications with rest/repositioning. Interventions effective and pain lowered after an hour but then goes back up. PRN oxycodone given once with scheduled Tylenol and Robaxin. Continent of bowel and bladder, used the toilet with assist of 1. LBM: 12/24. Appetite unchanged, ate 100% of dinner served. Pt looking forward to going out-on-pass tomorrow morning. Continues to use her call light appropriately and waits for assistance.

## 2022-12-25 NOTE — PROGRESS NOTES
"  [facility]   Acute Rehabilitation Unit  Daily progress note  Interval History  Ventura was seen in her room. She was doing well; excited about her pass. Reviewed the plan for pain management when she is gone and she was agreeable. Dysuria started 2-3 days ago and was better yesterday but again worse today. No other associated s/s.     Medications  Scheduled meds    acetaminophen  975 mg Oral TID     aspirin  325 mg Oral BID     atorvastatin  10 mg Oral Daily     estradiol  2 g Vaginal Once per day on Mon Thu     folic acid  1 mg Oral Daily     losartan  12.5 mg Oral Daily     methocarbamol  500 mg Oral 4x Daily     [START ON 12/28/2022] methotrexate  20 mg Subcutaneous Weekly     pantoprazole  40 mg Oral Daily     polyethylene glycol  17 g Oral Daily     predniSONE  10 mg Oral Daily       PRN meds:  bisacodyl, hydrOXYzine, ibuprofen, naloxone **OR** naloxone **OR** naloxone **OR** naloxone, ondansetron, oxyCODONE, senna-docusate      PHYSICAL EXAM  /81 (BP Location: Right arm, Patient Position: Supine, Cuff Size: Adult Regular)   Pulse 76   Temp (!) 96.6  F (35.9  C) (Oral)   Resp 16   Ht 1.575 m (5' 2\")   Wt 67 kg (147 lb 11.3 oz)   SpO2 99%   BMI 27.02 kg/m      Gen: NAD, pleasant   Pulm: non-labored in room air   Abd: Soft, non tender  Ext: RLE with hinged knee pain   Neuro/MSK: bilateral upper extremities and LLE with intact ROM and strength.     LABS  Last Comprehensive Metabolic Panel:  Sodium   Date Value Ref Range Status   12/14/2022 141 133 - 144 mmol/L Final     Potassium   Date Value Ref Range Status   12/14/2022 3.8 3.4 - 5.3 mmol/L Final     Chloride   Date Value Ref Range Status   12/14/2022 107 94 - 109 mmol/L Final     Carbon Dioxide (CO2)   Date Value Ref Range Status   12/14/2022 29 20 - 32 mmol/L Final     Anion Gap   Date Value Ref Range Status   12/14/2022 5 3 - 14 mmol/L Final     Glucose   Date Value Ref Range Status   12/14/2022 91 70 - 99 " mg/dL Final     Urea Nitrogen   Date Value Ref Range Status   12/14/2022 17 7 - 30 mg/dL Final     Creatinine   Date Value Ref Range Status   12/14/2022 0.43 (L) 0.52 - 1.04 mg/dL Final     GFR Estimate   Date Value Ref Range Status   12/14/2022 >90 >60 mL/min/1.73m2 Final     Comment:     Effective December 21, 2021 eGFRcr in adults is calculated using the 2021 CKD-EPI creatinine equation which includes age and gender (Gloria et al., NE, DOI: 10.1056/[id number])     Calcium   Date Value Ref Range Status   12/14/2022 8.5 8.5 - 10.1 mg/dL Final        CBC RESULTS:   Recent Labs   Lab Test 12/17/22  1622   WBC 9.3   RBC 3.33*   HGB 11.4*   HCT 35.7   *   MCH 34.2*   MCHC 31.9   RDW 14.0           ASSESSMENT AND PLAN  Britt Park is a 51 yo female who was admitted on 12/10 after a fall resulting in right displaced diaphyseal femur fracture and right intra-articular distal femur fracture. She underwent open reduction intramedullary nailing of right femur fracture and ORIF of right distal intra-articular femur fracture on 12/11 and was transferred to ARU on 12/17.     --Vitals stable. No lab today.    --Continue ongoing medical management. No change in meds. Will get UA hopefully before she leaves; not a barrier for her pass. Will take oxy and robaxin before leaving and can have tylenol and ibuprofen when on pass.     --Continue therapies and plan of care. Family training was completed for therapeutic pass. She will be  based and her  helps with transfers.       Rosalba Canela MD  Physical Medicine & Rehabilitation

## 2022-12-25 NOTE — PROGRESS NOTES
"  [facility]   Acute Rehabilitation Unit  Daily progress note  Interval History  Ventura was seen in her room. She was doing well; excited about her pass tomorrow. Participating well in therapies and making slow gains.       Medications  Scheduled meds    acetaminophen  975 mg Oral TID     aspirin  325 mg Oral BID     atorvastatin  10 mg Oral Daily     estradiol  2 g Vaginal Once per day on Mon Thu     folic acid  1 mg Oral Daily     losartan  12.5 mg Oral Daily     methocarbamol  500 mg Oral 4x Daily     [START ON 12/28/2022] methotrexate  20 mg Subcutaneous Weekly     pantoprazole  40 mg Oral Daily     polyethylene glycol  17 g Oral Daily     predniSONE  10 mg Oral Daily       PRN meds:  bisacodyl, hydrOXYzine, ibuprofen, naloxone **OR** naloxone **OR** naloxone **OR** naloxone, ondansetron, oxyCODONE, senna-docusate      PHYSICAL EXAM  BP 98/60 (BP Location: Right arm, Patient Position: Sitting, Cuff Size: Adult Regular)   Pulse 76   Temp 97.6  F (36.4  C) (Oral)   Resp 15   Ht 1.575 m (5' 2\")   Wt 67 kg (147 lb 11.3 oz)   SpO2 97%   BMI 27.02 kg/m      Gen: NAD, pleasant   Pulm: non-labored in room air   Abd: Soft, non tender  Ext: RLE with hinged knee pain   Neuro/MSK: bilateral upper extremities and LLE with intact ROM and strength.     LABS  Last Comprehensive Metabolic Panel:  Sodium   Date Value Ref Range Status   12/14/2022 141 133 - 144 mmol/L Final     Potassium   Date Value Ref Range Status   12/14/2022 3.8 3.4 - 5.3 mmol/L Final     Chloride   Date Value Ref Range Status   12/14/2022 107 94 - 109 mmol/L Final     Carbon Dioxide (CO2)   Date Value Ref Range Status   12/14/2022 29 20 - 32 mmol/L Final     Anion Gap   Date Value Ref Range Status   12/14/2022 5 3 - 14 mmol/L Final     Glucose   Date Value Ref Range Status   12/14/2022 91 70 - 99 mg/dL Final     Urea Nitrogen   Date Value Ref Range Status   12/14/2022 17 7 - 30 mg/dL Final     Creatinine   Date " Value Ref Range Status   12/14/2022 0.43 (L) 0.52 - 1.04 mg/dL Final     GFR Estimate   Date Value Ref Range Status   12/14/2022 >90 >60 mL/min/1.73m2 Final     Comment:     Effective December 21, 2021 eGFRcr in adults is calculated using the 2021 CKD-EPI creatinine equation which includes age and gender (Gloria et al., NE, DOI: 10.1056/VVNKoc9392154)     Calcium   Date Value Ref Range Status   12/14/2022 8.5 8.5 - 10.1 mg/dL Final        CBC RESULTS:   Recent Labs   Lab Test 12/17/22  1622   WBC 9.3   RBC 3.33*   HGB 11.4*   HCT 35.7   *   MCH 34.2*   MCHC 31.9   RDW 14.0           ASSESSMENT AND PLAN  Britt Park is a 51 yo female who was admitted on 12/10 after a fall resulting in right displaced diaphyseal femur fracture and right intra-articular distal femur fracture. She underwent open reduction intramedullary nailing of right femur fracture and ORIF of right distal intra-articular femur fracture on 12/11 and was transferred to ARU on 12/17.     --Vitals stable. No lab today.  --Continue ongoing medical management. No change in meds.  --Continue therapies and plan of care. Family training has been completed for tomorrow's therapeutic pass. She will be  based and leave at 9:30 (returning at 1:30)      Rosalba Canela MD  Physical Medicine & Rehabilitation

## 2022-12-25 NOTE — PLAN OF CARE
Goal Outcome Evaluation:      Plan of Care Reviewed With: patient    Overall Patient Progress: no change     FOCUS/GOAL: Pain, medication and Safety Mangement      ASSESSMENT, INTERVENTIONS AND CONTINUING PLAN FOR GOAL:  No change during shift, patient continues to have pain on R. knee/hip area, utilizes PRN and scheduled pain medications with some effectiveness.Continent of bowel and bladder, uses toilet/bedpan. LBM: 12/25/22. Pt on CELESTINO with family, took 1200 and 1400 medication with her provider aware. Unable to obtain urine sample on AM shift, Pm nurse notified.

## 2022-12-25 NOTE — PLAN OF CARE
FOCUS/GOAL  Medical management    ASSESSMENT, INTERVENTIONS AND CONTINUING PLAN FOR GOAL:  Pt is alert and oriented. Was awake during shift change. Able to sleep during the night. Use call light for assistance. A1-2 w/ bed pan use this morning. A1 w/ tegan cares. A2 to boost up in bed. Prn oxycodone given for pain on right hip. Aquacel dressing is intact. Ace wraps on. Elevated w/ pillow. Cont w/ POC.  Goal Outcome Evaluation:      Plan of Care Reviewed With: patient    Overall Patient Progress: no changeOverall Patient Progress: no change

## 2022-12-26 ENCOUNTER — APPOINTMENT (OUTPATIENT)
Dept: PHYSICAL THERAPY | Facility: CLINIC | Age: 52
DRG: 949 | End: 2022-12-26
Attending: PHYSICAL MEDICINE & REHABILITATION
Payer: COMMERCIAL

## 2022-12-26 ENCOUNTER — APPOINTMENT (OUTPATIENT)
Dept: OCCUPATIONAL THERAPY | Facility: CLINIC | Age: 52
DRG: 949 | End: 2022-12-26
Attending: PHYSICAL MEDICINE & REHABILITATION
Payer: COMMERCIAL

## 2022-12-26 LAB
CREAT SERPL-MCNC: 0.48 MG/DL (ref 0.52–1.04)
GFR SERPL CREATININE-BSD FRML MDRD: >90 ML/MIN/1.73M2
HOLD SPECIMEN: NORMAL

## 2022-12-26 PROCEDURE — 250N000013 HC RX MED GY IP 250 OP 250 PS 637: Performed by: PHYSICAL MEDICINE & REHABILITATION

## 2022-12-26 PROCEDURE — 128N000003 HC R&B REHAB

## 2022-12-26 PROCEDURE — 36415 COLL VENOUS BLD VENIPUNCTURE: CPT | Performed by: PHYSICAL MEDICINE & REHABILITATION

## 2022-12-26 PROCEDURE — 97535 SELF CARE MNGMENT TRAINING: CPT | Mod: GO | Performed by: OCCUPATIONAL THERAPIST

## 2022-12-26 PROCEDURE — 250N000012 HC RX MED GY IP 250 OP 636 PS 637: Performed by: PHYSICAL MEDICINE & REHABILITATION

## 2022-12-26 PROCEDURE — 97530 THERAPEUTIC ACTIVITIES: CPT | Mod: GP

## 2022-12-26 PROCEDURE — 250N000013 HC RX MED GY IP 250 OP 250 PS 637: Performed by: STUDENT IN AN ORGANIZED HEALTH CARE EDUCATION/TRAINING PROGRAM

## 2022-12-26 PROCEDURE — 250N000013 HC RX MED GY IP 250 OP 250 PS 637: Performed by: PHYSICIAN ASSISTANT

## 2022-12-26 PROCEDURE — 82565 ASSAY OF CREATININE: CPT | Performed by: PHYSICAL MEDICINE & REHABILITATION

## 2022-12-26 PROCEDURE — 97110 THERAPEUTIC EXERCISES: CPT | Mod: GP

## 2022-12-26 PROCEDURE — 99232 SBSQ HOSP IP/OBS MODERATE 35: CPT | Performed by: PHYSICAL MEDICINE & REHABILITATION

## 2022-12-26 RX ORDER — CALCIUM CARBONATE 500 MG/1
500 TABLET, CHEWABLE ORAL 2 TIMES DAILY PRN
Status: DISCONTINUED | OUTPATIENT
Start: 2022-12-26 | End: 2023-01-02 | Stop reason: HOSPADM

## 2022-12-26 RX ADMIN — IBUPROFEN 600 MG: 200 TABLET, FILM COATED ORAL at 10:06

## 2022-12-26 RX ADMIN — FOLIC ACID 1 MG: 1 TABLET ORAL at 08:08

## 2022-12-26 RX ADMIN — ACETAMINOPHEN 975 MG: 325 TABLET, FILM COATED ORAL at 14:31

## 2022-12-26 RX ADMIN — OXYCODONE HYDROCHLORIDE 5 MG: 5 TABLET ORAL at 23:29

## 2022-12-26 RX ADMIN — Medication 12.5 MG: at 08:08

## 2022-12-26 RX ADMIN — METHOCARBAMOL 500 MG: 500 TABLET ORAL at 19:50

## 2022-12-26 RX ADMIN — OXYCODONE HYDROCHLORIDE 5 MG: 5 TABLET ORAL at 19:50

## 2022-12-26 RX ADMIN — ACETAMINOPHEN 975 MG: 325 TABLET, FILM COATED ORAL at 19:50

## 2022-12-26 RX ADMIN — PREDNISONE 10 MG: 10 TABLET ORAL at 08:08

## 2022-12-26 RX ADMIN — HYDROXYZINE HYDROCHLORIDE 25 MG: 25 TABLET, FILM COATED ORAL at 10:06

## 2022-12-26 RX ADMIN — ACETAMINOPHEN 975 MG: 325 TABLET, FILM COATED ORAL at 08:07

## 2022-12-26 RX ADMIN — OXYCODONE HYDROCHLORIDE 5 MG: 5 TABLET ORAL at 07:54

## 2022-12-26 RX ADMIN — CALCIUM CARBONATE (ANTACID) CHEW TAB 500 MG 500 MG: 500 CHEW TAB at 22:05

## 2022-12-26 RX ADMIN — POLYETHYLENE GLYCOL 3350 17 G: 17 POWDER, FOR SOLUTION ORAL at 08:07

## 2022-12-26 RX ADMIN — METHOCARBAMOL 500 MG: 500 TABLET ORAL at 16:24

## 2022-12-26 RX ADMIN — METHOCARBAMOL 500 MG: 500 TABLET ORAL at 12:49

## 2022-12-26 RX ADMIN — ASPIRIN 325 MG: 325 TABLET ORAL at 08:08

## 2022-12-26 RX ADMIN — OXYCODONE HYDROCHLORIDE 5 MG: 5 TABLET ORAL at 12:56

## 2022-12-26 RX ADMIN — PANTOPRAZOLE SODIUM 40 MG: 40 TABLET, DELAYED RELEASE ORAL at 08:08

## 2022-12-26 RX ADMIN — ASPIRIN 325 MG: 325 TABLET ORAL at 19:50

## 2022-12-26 RX ADMIN — OXYCODONE HYDROCHLORIDE 5 MG: 5 TABLET ORAL at 03:02

## 2022-12-26 RX ADMIN — ATORVASTATIN CALCIUM 10 MG: 10 TABLET, FILM COATED ORAL at 08:08

## 2022-12-26 RX ADMIN — HYDROXYZINE HYDROCHLORIDE 25 MG: 25 TABLET, FILM COATED ORAL at 23:29

## 2022-12-26 RX ADMIN — METHOCARBAMOL 500 MG: 500 TABLET ORAL at 08:08

## 2022-12-26 ASSESSMENT — ACTIVITIES OF DAILY LIVING (ADL)
ADLS_ACUITY_SCORE: 35
ADLS_ACUITY_SCORE: 40
ADLS_ACUITY_SCORE: 40
ADLS_ACUITY_SCORE: 39
ADLS_ACUITY_SCORE: 35
ADLS_ACUITY_SCORE: 39
ADLS_ACUITY_SCORE: 35
ADLS_ACUITY_SCORE: 35
ADLS_ACUITY_SCORE: 40
ADLS_ACUITY_SCORE: 40
ADLS_ACUITY_SCORE: 39
ADLS_ACUITY_SCORE: 35
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
## 2022-12-26 NOTE — PROGRESS NOTES
"  [facility]   Acute Rehabilitation Unit  Daily progress note  Interval History  Ventura was seen and examined in her room. She was doing well; sayra went very well. Reviewed UA results and the plan as outlined below.     Talked to Jinny and our pharmacist regarding Ca-D supplements.       Medications  Scheduled meds    acetaminophen  975 mg Oral TID     aspirin  325 mg Oral BID     atorvastatin  10 mg Oral Daily     estradiol  2 g Vaginal Once per day on Mon Thu     folic acid  1 mg Oral Daily     losartan  12.5 mg Oral Daily     methocarbamol  500 mg Oral 4x Daily     [START ON 12/28/2022] methotrexate  20 mg Subcutaneous Weekly     pantoprazole  40 mg Oral Daily     polyethylene glycol  17 g Oral Daily     predniSONE  10 mg Oral Daily       PRN meds:  bisacodyl, hydrOXYzine, ibuprofen, naloxone **OR** naloxone **OR** naloxone **OR** naloxone, ondansetron, oxyCODONE, senna-docusate      PHYSICAL EXAM  /63 (BP Location: Right arm)   Pulse 77   Temp 97.9  F (36.6  C) (Oral)   Resp 16   Ht 1.575 m (5' 2\")   Wt 67.9 kg (149 lb 11.2 oz)   SpO2 99%   BMI 27.38 kg/m      Gen: NAD, pleasant   Pulm: non-labored in room air   Abd: Soft, non tender  Ext: RLE with hinged knee pain   Neuro/MSK: bilateral upper extremities and LLE with intact ROM and strength.     Skin: removed all the dressings - has 4 surgical incisions on the lateral side of right LE with staples in place - all intact and healing well. Pedal edema > edema in RLE       LABS  Last Comprehensive Metabolic Panel:  Sodium   Date Value Ref Range Status   12/14/2022 141 133 - 144 mmol/L Final     Potassium   Date Value Ref Range Status   12/14/2022 3.8 3.4 - 5.3 mmol/L Final     Chloride   Date Value Ref Range Status   12/14/2022 107 94 - 109 mmol/L Final     Carbon Dioxide (CO2)   Date Value Ref Range Status   12/14/2022 29 20 - 32 mmol/L Final     Anion Gap   Date Value Ref Range Status   12/14/2022 5 3 - 14 mmol/L " Final     Glucose   Date Value Ref Range Status   12/14/2022 91 70 - 99 mg/dL Final     Urea Nitrogen   Date Value Ref Range Status   12/14/2022 17 7 - 30 mg/dL Final     Creatinine   Date Value Ref Range Status   12/26/2022 0.48 (L) 0.52 - 1.04 mg/dL Final     GFR Estimate   Date Value Ref Range Status   12/26/2022 >90 >60 mL/min/1.73m2 Final     Comment:     Effective December 21, 2021 eGFRcr in adults is calculated using the 2021 CKD-EPI creatinine equation which includes age and gender (Gloria et al., NEJ, DOI: 10.1056/WJHJdx3047084)     Calcium   Date Value Ref Range Status   12/14/2022 8.5 8.5 - 10.1 mg/dL Final        CBC RESULTS:   Recent Labs   Lab Test 12/17/22  1622   WBC 9.3   RBC 3.33*   HGB 11.4*   HCT 35.7   *   MCH 34.2*   MCHC 31.9   RDW 14.0           ASSESSMENT AND PLAN  Britt Park is a 53 yo female who was admitted on 12/10 after a fall resulting in right displaced diaphyseal femur fracture and right intra-articular distal femur fracture. She underwent open reduction intramedullary nailing of right femur fracture and ORIF of right distal intra-articular femur fracture on 12/11 and was transferred to ARU on 12/17.     --Vitals stable.   --Labs: Cr wnl     --Continue ongoing medical management.    -UA negative. Dysuria likely due to dry MM; discussed trial of estradiol cream but she wants to think about it for now   -dressings were removed and wound care was updated; can remove staples Tuesday or Wednesday    -agree with the plan for Ca-D supplements but she wants chewable options and we don't have them as inpatient     --Continue therapies and plan of care. Family training was completed for therapeutic pass and it went well. She is participating well and making good gains. Will need a rental WC at discharge     Rosalba Canela MD  Physical Medicine & Rehabilitation

## 2022-12-26 NOTE — PLAN OF CARE
"Discharge Planner Post-Acute Rehab PT:      Discharge Plan: home w/c based, intermittent assist from family, HH PT     Precautions: TTWB in RLE with KI donned whenever moving or OOB  Falls, heavy reliance on BUE to stand  Fatigue - perform submaximal strengthening to avoid over exhaustion in setting of myositis     Current Status:  Bed Mobility: no assist for trunk, assist to place RLE over EOB in KI, portable bed rail, SBA  Transfer: SPT, platform FWW, CGAx1 with therapy and RN staff.  Gait: unsafe  Stairs: unsafe, ramp at home  Balance: requires heavy BUE support on PFWW to stand and maintain balance d/t RLE pain and weakness proximally.     Assessment: Improving sequencing technique with STSs, utilizing momentum with less assist needed to stand.     Other Barriers to Discharge (DME, Family Training, etc):   Family training - completed 12/23     DME:  K4 rental w/c - w/c eval 12/23, pursuing K4 rental solid seat back, contoured cushion and R elevating legrest (telescoping preferable).  Platform FWW - needs to order from [employer], needs 30\" long platform pole. May need to have  cut down typical 37\" long platform pole.  Portable bed rail -  constructing for home  Ramps - installed to enter home and inside home  "

## 2022-12-26 NOTE — PLAN OF CARE
FOCUS/GOAL  Bowel management, Bladder management, Pain management, Wound care management, and Mobility    ASSESSMENT, INTERVENTIONS AND CONTINUING PLAN FOR GOAL:    Orientation: alert and oriented x4  VS: stable  Pain: R knee pain, prn oxycodone x2, Ibuprofen and atarax x1 given, ice pack to right knee  Ambulation/ Transfers: Ax1 stand pivot, W/C based, walker for transfers  Bowel: LBM 12/25  Bladder: continent, used toilet  Diet/ Liquids: regular/ thin  Skin: R hip and knee incisions/ staples CDI, REKHA, Tubi  applied to RLE for right foot/ankle edema  Misc: TTWB in RLE with knee immobilizer on when OOB.   Pt participated in therapies. Continue with POC.

## 2022-12-26 NOTE — PROGRESS NOTES
Discharge Planner Post-Acute Rehab OT:     Discharge Plan: Home, wc-based, w/assist from family and homecare OT     Precautions: TTWB in RLE with KI donned whenever moving or OOB  Falls with heavy reliance on BUE to stand  Fatigue - perform submaximal strengthening to avoid over exhaustion with myositis    Current Status:  ADLs:    Mobility: Min A bed mobility with AE. W/c based, Min A SPT LUE platform FWW, RLE KI.    Grooming: Set up seated.     Dressing: UB Set up-Min A. LB Min-Mod A FWW or supine.    Bathing: Min A tub bench, RLE covered and elevated.    Toileting: Min A SPT platform FWW BSC, Mod A hygiene/clothing.   IADLs: Previously IND. Spouse/children will assist with heavier IADLs.   Vision/Cognition: No concerns.    Assessment: Good progress with isolated activities including bed mobility, transfers, toileting, and dressing but now working on improving IND with transitions and whole task completion. Also initiated w/c based IADLs with good tolerance.    Other Barriers to Discharge (DME, Family Training, etc):   Home accessibility/stairs: Spouse is a ernst and plans to build and install ramp, accessible entryway to main level bathroom, and grab bars.  Equipment: Manual w/c, tub bench, commode chair, RLE KI, bedrail, reacher.  Caregiver support: Spouse and children can assist with IADLs as needed. Caregiver training initiated 12/23; will complete additional sessions prior to discharge.

## 2022-12-26 NOTE — PLAN OF CARE
Goal Outcome Evaluation:    Overall Patient Progress: no changeOverall Patient Progress: no change    Patient is alert and oriented x4. Make needs known. Called for rpn oxycodone for right knee pain. Given once this shift at 0305. Denied sob, dizizness, headache, and any new weakness. Slept good. Will continue poc.

## 2022-12-26 NOTE — PROGRESS NOTES
CLINICAL NUTRITION SERVICES - ASSESSMENT NOTE     Nutrition Prescription    RECOMMENDATIONS FOR MDs/PROVIDERS TO ORDER:  If agreeing, Provider to order 1,000 international unit(s) of vitamin D, and 600 mg of calcium - pt prefers chewable forms vs large pills     Malnutrition Status:    Patient does not meet two of the established criteria necessary for diagnosing malnutrition    Recommendations already ordered by Registered Dietitian (RD):  Re-ordered weekly weight order and requested nursing get updated weight    Orange Gelatein once daily at lunch meal    Education: Provided pt handouts on Ca/Vit D, and FODMAPP    Future/Additional Recommendations:  Monitor meal/supplement intakes, wt/lab trends      REASON FOR ASSESSMENT  Britt Park is a/an 52 year old female assessed by the dietitian for LOS    NUTRITION/MEDICAL HISTORY  Per chart review: Pt admits to ARU for rehabilitation in the setting of mechanical fall resulting in right displaced diaphyseal femur fracture and right intra-articular distal femur fracture - s/p open reduction intramedullary nailing of right femur fracture and ORIF of right distal intra-articular femur fracture on 12/11. Other past medical history noted for granulomatous myositis, hypertension hyperlipidemia.    Per pt visit: RD visited with pt at bedside earlier today, pt reports fair po intakes, assumes current weight is a stable/UBW, notes gaining weight due to medications. Pt also notes some chronic IBS type symptoms for which pt has had outpt workup that was mostly unrevealing, but pt notes was recommended to follow a FODMAPP diet for symptoms, also notes some varying bowel changes acutely due to above issues with needed medications, reviewed tips for monitoring which medications causing more discomfort and opt for alternative orders. Pt unsure if is adequately taking in protein and agreeable to supplement above. Pt notes due to history of prednisone for chronic condition above,  "is also concerned about needing Ca/Vit D supplementation (with noted fractures), reviewed inpatient Provider can order and pt agreeable. Pt also attempting to increase dietary intake of Ca/Vit D as well, RD provided pt handouts on Ca/Vit D in the diet, and additional handout on FODMAPP.      Reviewed supplements requested by pt with rounding Provider, Provider to order.     CURRENT NUTRITION ORDERS  Diet: Regular  Intake/Tolerance: 100% per flow sheets     LABS  Labs reviewed    MEDICATIONS  PRN Oxycodone, Lipitor, Cozaar, weekly Methotrexate, Prednisone, Folvite, Miralax, Protonix    ANTHROPOMETRICS  Height: 157.5 cm (5' 2\")  Most Recent Weight: 67.9 kg (149 lb 11.2 oz) (RLE knee immobilizer on)    IBW: 50 kg  BMI: Overweight BMI 25-29.9  Weight History: None available     Dosing Weight: 67.9 kg    ASSESSED NUTRITION NEEDS  Estimated Energy Needs: 2872-4396 kcals/day (25 - 30 kcals/kg)  Justification: Increased needs  Estimated Protein Needs: 70-80 grams protein/day (1 - 1.2 grams of pro/kg)  Justification: Maintenance  Estimated Fluid Needs: 1 mL/kcal  Justification: Maintenance    MALNUTRITION  % Intake: Decreased intake does not meet criteria  % Weight Loss: None noted  Subcutaneous Fat Loss: None observed  Muscle Loss: None observed  Fluid Accumulation/Edema: Moderate per flow sheets   Malnutrition Diagnosis: Patient does not meet two of the established criteria necessary for diagnosing malnutrition    NUTRITION DIAGNOSIS  Predicted inadequate nutrient intake related to appetite vs other     INTERVENTIONS  Implementation  Nutrition Education: Provided handouts as noted above    Medical food supplement therapy - ordered as above     Goals  Patient to consume % of nutritionally adequate meal trays TID, or the equivalent with supplements/snacks.     Monitoring/Evaluation  Progress toward goals will be monitored and evaluated per protocol.    Jinny Tejada RD, CNSC, LD  [facility] RD pager: [phone]  "

## 2022-12-27 ENCOUNTER — APPOINTMENT (OUTPATIENT)
Dept: PHYSICAL THERAPY | Facility: CLINIC | Age: 52
DRG: 949 | End: 2022-12-27
Attending: PHYSICAL MEDICINE & REHABILITATION
Payer: COMMERCIAL

## 2022-12-27 ENCOUNTER — APPOINTMENT (OUTPATIENT)
Dept: OCCUPATIONAL THERAPY | Facility: CLINIC | Age: 52
DRG: 949 | End: 2022-12-27
Attending: PHYSICAL MEDICINE & REHABILITATION
Payer: COMMERCIAL

## 2022-12-27 PROCEDURE — 250N000013 HC RX MED GY IP 250 OP 250 PS 637: Performed by: PHYSICAL MEDICINE & REHABILITATION

## 2022-12-27 PROCEDURE — 97530 THERAPEUTIC ACTIVITIES: CPT | Mod: GP

## 2022-12-27 PROCEDURE — 99232 SBSQ HOSP IP/OBS MODERATE 35: CPT | Mod: FS | Performed by: PHYSICIAN ASSISTANT

## 2022-12-27 PROCEDURE — 128N000003 HC R&B REHAB

## 2022-12-27 PROCEDURE — 250N000012 HC RX MED GY IP 250 OP 636 PS 637: Performed by: PHYSICAL MEDICINE & REHABILITATION

## 2022-12-27 PROCEDURE — 250N000013 HC RX MED GY IP 250 OP 250 PS 637: Performed by: PHYSICIAN ASSISTANT

## 2022-12-27 PROCEDURE — 97110 THERAPEUTIC EXERCISES: CPT | Mod: GP

## 2022-12-27 PROCEDURE — 97535 SELF CARE MNGMENT TRAINING: CPT | Mod: GO | Performed by: OCCUPATIONAL THERAPIST

## 2022-12-27 RX ADMIN — METHOCARBAMOL 500 MG: 500 TABLET ORAL at 12:32

## 2022-12-27 RX ADMIN — IBUPROFEN 600 MG: 200 TABLET, FILM COATED ORAL at 16:49

## 2022-12-27 RX ADMIN — Medication 12.5 MG: at 09:35

## 2022-12-27 RX ADMIN — METHOCARBAMOL 500 MG: 500 TABLET ORAL at 20:47

## 2022-12-27 RX ADMIN — METHOCARBAMOL 500 MG: 500 TABLET ORAL at 16:49

## 2022-12-27 RX ADMIN — POLYETHYLENE GLYCOL 3350 17 G: 17 POWDER, FOR SOLUTION ORAL at 09:35

## 2022-12-27 RX ADMIN — ACETAMINOPHEN 975 MG: 325 TABLET, FILM COATED ORAL at 13:49

## 2022-12-27 RX ADMIN — ASPIRIN 325 MG: 325 TABLET ORAL at 09:35

## 2022-12-27 RX ADMIN — ACETAMINOPHEN 975 MG: 325 TABLET, FILM COATED ORAL at 09:36

## 2022-12-27 RX ADMIN — HYDROXYZINE HYDROCHLORIDE 25 MG: 25 TABLET, FILM COATED ORAL at 13:49

## 2022-12-27 RX ADMIN — ACETAMINOPHEN 975 MG: 325 TABLET, FILM COATED ORAL at 20:46

## 2022-12-27 RX ADMIN — ATORVASTATIN CALCIUM 10 MG: 10 TABLET, FILM COATED ORAL at 09:36

## 2022-12-27 RX ADMIN — OXYCODONE HYDROCHLORIDE 5 MG: 5 TABLET ORAL at 05:45

## 2022-12-27 RX ADMIN — ASPIRIN 325 MG: 325 TABLET ORAL at 20:47

## 2022-12-27 RX ADMIN — FOLIC ACID 1 MG: 1 TABLET ORAL at 09:36

## 2022-12-27 RX ADMIN — PANTOPRAZOLE SODIUM 40 MG: 40 TABLET, DELAYED RELEASE ORAL at 09:36

## 2022-12-27 RX ADMIN — METHOCARBAMOL 500 MG: 500 TABLET ORAL at 09:35

## 2022-12-27 RX ADMIN — PREDNISONE 10 MG: 10 TABLET ORAL at 09:36

## 2022-12-27 RX ADMIN — OXYCODONE HYDROCHLORIDE 5 MG: 5 TABLET ORAL at 20:47

## 2022-12-27 RX ADMIN — OXYCODONE HYDROCHLORIDE 5 MG: 5 TABLET ORAL at 12:29

## 2022-12-27 RX ADMIN — OXYCODONE HYDROCHLORIDE 5 MG: 5 TABLET ORAL at 16:49

## 2022-12-27 ASSESSMENT — ACTIVITIES OF DAILY LIVING (ADL)
ADLS_ACUITY_SCORE: 39
ADLS_ACUITY_SCORE: 39
ADLS_ACUITY_SCORE: 35
ADLS_ACUITY_SCORE: 39
ADLS_ACUITY_SCORE: 35
ADLS_ACUITY_SCORE: 35
ADLS_ACUITY_SCORE: 39

## 2022-12-27 NOTE — PLAN OF CARE
"Discharge Planner Post-Acute Rehab PT:      Discharge Plan: home w/c based, intermittent assist from family, HH PT     Precautions: TTWB in RLE with KI donned whenever moving or OOB  Falls, heavy reliance on BUE to stand  Fatigue - perform submaximal strengthening to avoid over exhaustion in setting of myositis     Current Status:  Bed Mobility: no assist for trunk, assist to place RLE over EOB in KI, portable bed rail, SBA  Transfer: SPT, platform FWW, CGAx1 with therapy and RN staff.  Gait: unsafe  Stairs: unsafe, ramp at home  Balance: requires heavy BUE support on PFWW to stand and maintain balance d/t RLE pain and weakness proximally.     Assessment: Difficulties with using momentum for STS in PM d/t fatigue, will continue address sequencing. Good progression of endurance with w/c mobility.     Other Barriers to Discharge (DME, Family Training, etc):   Family training - completed 12/23     DME:  K4 rental w/c - w/c eval 12/23, pursuing K4 rental solid seat back, contoured cushion and R elevating legrest (telescoping preferable).  Platform FWW - needs to order from [employer], needs 30\" long platform pole. May need to have  cut down typical 37\" long platform pole.  Portable bed rail -  constructing for home  Ramps - installed to enter home and inside home  "

## 2022-12-27 NOTE — PROGRESS NOTES
"  [facility]   Acute Rehabilitation Unit  Daily progress note  Interval History  Ventura was seen sitting up in wheel chair, says pain is slowly improving, will continue on current regimen and is gradually tapering off oxycodone.  Denies n/v/d, sob, and fevers.  Worried about mobility upon discharge and getting to recommended follow up appointments.     Remove staples today    OT:   Assessment: Continues to progress mobility and ADL IND during sessions but needs to carryover skills with nursing throughout the day. Continues to frequently utilize bedpan outside of sessions.    Medications  Scheduled meds    acetaminophen  975 mg Oral TID     aspirin  325 mg Oral BID     atorvastatin  10 mg Oral Daily     estradiol  2 g Vaginal Once per day on Mon Thu     folic acid  1 mg Oral Daily     losartan  12.5 mg Oral Daily     methocarbamol  500 mg Oral 4x Daily     [START ON 12/28/2022] methotrexate  20 mg Subcutaneous Weekly     pantoprazole  40 mg Oral Daily     polyethylene glycol  17 g Oral Daily     predniSONE  10 mg Oral Daily       PRN meds:  bisacodyl, calcium carbonate, hydrOXYzine, ibuprofen, naloxone **OR** naloxone **OR** naloxone **OR** naloxone, ondansetron, oxyCODONE, senna-docusate      PHYSICAL EXAM  /76 (BP Location: Right arm, Patient Position: Chair)   Pulse 98   Temp 97.6  F (36.4  C) (Oral)   Resp 18   Ht 1.575 m (5' 2\")   Wt 67.9 kg (149 lb 11.2 oz)   SpO2 98%   BMI 27.38 kg/m    Gen: Alert and cooperative  HEENT: MMM  CV: S1, S2 RRR  Pulm: Clear to auscultation  Abd: Soft, non tender  Ext: Calves non tender  Neuro/MSK: Moves UE and LLE well. Moves RLE distally.       LABS  Last Comprehensive Metabolic Panel:  Sodium   Date Value Ref Range Status   12/14/2022 141 133 - 144 mmol/L Final     Potassium   Date Value Ref Range Status   12/14/2022 3.8 3.4 - 5.3 mmol/L Final     Chloride   Date Value Ref Range Status   12/14/2022 107 94 - 109 mmol/L Final "     Carbon Dioxide (CO2)   Date Value Ref Range Status   12/14/2022 29 20 - 32 mmol/L Final     Anion Gap   Date Value Ref Range Status   12/14/2022 5 3 - 14 mmol/L Final     Glucose   Date Value Ref Range Status   12/14/2022 91 70 - 99 mg/dL Final     Urea Nitrogen   Date Value Ref Range Status   12/14/2022 17 7 - 30 mg/dL Final     Creatinine   Date Value Ref Range Status   12/26/2022 0.48 (L) 0.52 - 1.04 mg/dL Final     GFR Estimate   Date Value Ref Range Status   12/26/2022 >90 >60 mL/min/1.73m2 Final     Comment:     Effective December 21, 2021 eGFRcr in adults is calculated using the 2021 CKD-EPI creatinine equation which includes age and gender (Gloria et al., NEJ, DOI: 10.1056/YNHGig0902053)     Calcium   Date Value Ref Range Status   12/14/2022 8.5 8.5 - 10.1 mg/dL Final        CBC RESULTS:   Recent Labs   Lab Test 12/17/22  1622   WBC 9.3   RBC 3.33*   HGB 11.4*   HCT 35.7   *   MCH 34.2*   MCHC 31.9   RDW 14.0           ASSESSMENT AND PLAN    Patient will work with PT for 90 min daily to work on gait exercises, strengthening, endurance buildup, transfers with use of walker as needed.   Patient will work with OT for 90 min daily to work on upper and lower body self care, dressing, toileting, bathing, energy conservation techniques with use of ADs as needed.   Rehab RN to administer medication, patient education on medication taking, VS monitoring, and surgical wound dressing changes and monitoring.      Mechanical fall  Right displaced diaphyseal femur fracture  Right intra-articular distal femur fracture  Was at [facility] when she slipped on some water, twisting her R knee and falling. unable to get up and had severe pain to R leg/ knee. No head or other trauma. No dizzy/lightheaded: Xray femur:  spiral comminuted fracture of the distal femur which may extend into the intercondylar aspect of the femur anteriorly. There is a small spur patellar joint effusion. The tibia and fibula are intact. CT  knee 12/11: Acute comminuted moderately displaced and angulated fracture of the distal femur with intra-articular extension of the fracture.     12/11 open reduction intramedullary nailing of right femur fracture and ORIF of right distal intra-articular femur fracture Surgeons Al Larson MD - Primary & Landy Montes PA-C     Continue tylenol & robaxin scheduled,continue  as needed  hydrhoxyzine, ibuprofen, and oxycodone,   continue to  monitor pain levels and titrate medications as needed    Remove staples 12/27     Postoperative anemia    Hgb dropped to 9.9 12/13, stable at 11.4 12/17.   -trend 12/29    History of granulomatous myositis   on folic acid 1 mg daily, methotrexate 20 mg weekly SubQ, prednisone 10 mg daily. Follows with Rheumatology at [place]. Patient given stress dose steroids  just before surgery      continue prednisone at 10 mg daily.    Continue folic acid    Continue methotrexate every Wednesday     HTN/HLD  -continue atorvastatin 10 mg daily  Currently on losartan for blood pressure control.12.5 mg daily  -monitor bp       MAYA  - continue home CPAP     Bladder, Bowel, GI and DVT ppx   Bladder ;check PVRs ×3 straight cath for volumes more than 350 cc.  Monitor for symptoms of urinary tract infection, rehab nursing to send for a UA as needed  Bowels; place the patient on a bowel program and titrate medications as needed.  Hold the bowel program in case of loose stools.  Educated patient on Impact with fiber and fluid intake on a bowel function  DVT prophylaxis with mechanical means           Ashley Walker PA-C  Physical Medicine & Rehabilitation

## 2022-12-27 NOTE — PLAN OF CARE
Goal Outcome Evaluation:      Plan of Care Reviewed With: patient    Overall Patient Progress: no change    Outcome Evaluation: No changes during shift, oxy x1 for right hip pain.    FOCUS/GOAL: Medication, Pain and Safety Management.      ASSESSMENT, INTERVENTIONS AND CONTINUING PLAN FOR GOAL:   Orientation: A&OX4  Ambulation/Transfer: AX1 SPT, w/c based  Bladder/ Bowel: Continent uses bedpan/toilet. [facility] 12/25/2022  Pain: Right Hip PRN oxy x1 during shift  Diet: Reg, thin liquids  Meds: Whole with water  Tubes/Lines/Drains: None  Skin: R hip and knee incisions with staples open to air. No s/s of infection noted.  R. Leg Tubi  on per order. No other concerns at this time, will continue with POC

## 2022-12-27 NOTE — PROGRESS NOTES
Discharge Planner Post-Acute Rehab OT:     Discharge Plan: Home, wc-based, w/assist from family and homecare OT     Precautions: TTWB in RLE with KI donned whenever moving or OOB  Falls with heavy reliance on BUE to stand  Fatigue - perform submaximal strengthening to avoid over exhaustion with myositis    Current Status:  ADLs:    Mobility: SBA bed mobility with AE. W/c based, CGA SPT LUE platform FWW 5# weight, RLE KI.    Grooming: IND.    Dressing: UB Set up. LB Min-Mod A FWW or supine.    Bathing: Min A tub bench, RLE covered and elevated.    Toileting: CGA SPT w/c <> toilet commode overlay grab bar or BSC. Min A hygiene/clothing platform FWW.  IADLs: Previously IND. Spouse/children will assist with heavier IADLs.   Vision/Cognition: No concerns.    Assessment: Continues to progress mobility and ADL IND during sessions but needs to carryover skills with nursing throughout the day. Continues to frequently utilize bedpan outside of sessions.    Other Barriers to Discharge (DME, Family Training, etc):   Home accessibility/stairs: Spouse is a ernst and plans to build and install ramp, accessible entryway to main level bathroom, and grab bars. Plans to have a bed moved to the main level.  Equipment: Manual w/c, tub bench, commode chair, RLE KI, bedrail, reacher.  Caregiver support: Spouse and children can assist with IADLs as needed. Caregiver training initiated 12/23; will complete additional sessions prior to discharge.

## 2022-12-27 NOTE — PLAN OF CARE
FOCUS/GOAL  Medical management    ASSESSMENT, INTERVENTIONS AND CONTINUING PLAN FOR GOAL:  Pt is alert and oriented. Use call light for needs. She complain of pain after she was assisted to the bedpan during shift change, prn oxycodone 5 mg and prn atarax given. Assisted w/ cpap use. Seen sleeping during safety round checks. Assisted to use the bedpan this morning. Prn oxycodone 5 mg for pain. Covered her incision to protect from her morning shower today. Cont w/ POC.  Goal Outcome Evaluation:      Plan of Care Reviewed With: patient    Overall Patient Progress: no changeOverall Patient Progress: no change    Outcome Evaluation: Contineu to manage pain and promote sleep.

## 2022-12-27 NOTE — PLAN OF CARE
Goal Outcome Evaluation:             Outcome Evaluation: Pt is alert and oriented. VSS. Reported sleeping well last night.    Orientation: Alert and oriented x4. No dizziness or weakness.   Bowel: continent for bowel. LBM  12/27/22  Bladder: Continent of bladder. Uses the commode in bathroom with 1 assist and w/c.  Pain: Reports right knee pain . Scheduled tylenol, PRN oxycodone given and ice pac applied per pt requested.  Ambulation/Transfers: A1SPT to wheel chair.  Diet/ Liquids: Regular / thin  Skin:  Right leg Incision in four area with staples intact.  Call light within reach. Continue with POC.

## 2022-12-28 ENCOUNTER — APPOINTMENT (OUTPATIENT)
Dept: PHYSICAL THERAPY | Facility: CLINIC | Age: 52
DRG: 949 | End: 2022-12-28
Attending: PHYSICAL MEDICINE & REHABILITATION
Payer: COMMERCIAL

## 2022-12-28 ENCOUNTER — APPOINTMENT (OUTPATIENT)
Dept: OCCUPATIONAL THERAPY | Facility: CLINIC | Age: 52
DRG: 949 | End: 2022-12-28
Attending: PHYSICAL MEDICINE & REHABILITATION
Payer: COMMERCIAL

## 2022-12-28 PROCEDURE — 999N000150 HC STATISTIC PT MED CONFERENCE < 30 MIN

## 2022-12-28 PROCEDURE — 250N000011 HC RX IP 250 OP 636: Performed by: PHYSICAL MEDICINE & REHABILITATION

## 2022-12-28 PROCEDURE — 99232 SBSQ HOSP IP/OBS MODERATE 35: CPT | Mod: FS | Performed by: PHYSICIAN ASSISTANT

## 2022-12-28 PROCEDURE — 250N000013 HC RX MED GY IP 250 OP 250 PS 637: Performed by: PHYSICIAN ASSISTANT

## 2022-12-28 PROCEDURE — 97110 THERAPEUTIC EXERCISES: CPT | Mod: GP | Performed by: REHABILITATION PRACTITIONER

## 2022-12-28 PROCEDURE — 250N000012 HC RX MED GY IP 250 OP 636 PS 637: Performed by: PHYSICAL MEDICINE & REHABILITATION

## 2022-12-28 PROCEDURE — 97535 SELF CARE MNGMENT TRAINING: CPT | Mod: GO | Performed by: OCCUPATIONAL THERAPIST

## 2022-12-28 PROCEDURE — 97530 THERAPEUTIC ACTIVITIES: CPT | Mod: GP | Performed by: REHABILITATION PRACTITIONER

## 2022-12-28 PROCEDURE — 250N000013 HC RX MED GY IP 250 OP 250 PS 637: Performed by: PHYSICAL MEDICINE & REHABILITATION

## 2022-12-28 PROCEDURE — 128N000003 HC R&B REHAB

## 2022-12-28 PROCEDURE — 999N000125 HC STATISTIC PATIENT MED CONFERENCE < 30 MIN: Performed by: OCCUPATIONAL THERAPIST

## 2022-12-28 PROCEDURE — 97530 THERAPEUTIC ACTIVITIES: CPT | Mod: GP

## 2022-12-28 RX ADMIN — OXYCODONE HYDROCHLORIDE 5 MG: 5 TABLET ORAL at 13:11

## 2022-12-28 RX ADMIN — PREDNISONE 10 MG: 10 TABLET ORAL at 09:24

## 2022-12-28 RX ADMIN — METHOCARBAMOL 500 MG: 500 TABLET ORAL at 21:29

## 2022-12-28 RX ADMIN — ASPIRIN 325 MG: 325 TABLET ORAL at 09:24

## 2022-12-28 RX ADMIN — OXYCODONE HYDROCHLORIDE 5 MG: 5 TABLET ORAL at 06:36

## 2022-12-28 RX ADMIN — ACETAMINOPHEN 975 MG: 325 TABLET, FILM COATED ORAL at 09:22

## 2022-12-28 RX ADMIN — IBUPROFEN 600 MG: 200 TABLET, FILM COATED ORAL at 16:48

## 2022-12-28 RX ADMIN — ASPIRIN 325 MG: 325 TABLET ORAL at 21:29

## 2022-12-28 RX ADMIN — Medication 12.5 MG: at 09:24

## 2022-12-28 RX ADMIN — ACETAMINOPHEN 975 MG: 325 TABLET, FILM COATED ORAL at 13:10

## 2022-12-28 RX ADMIN — FOLIC ACID 1 MG: 1 TABLET ORAL at 09:23

## 2022-12-28 RX ADMIN — METHOCARBAMOL 500 MG: 500 TABLET ORAL at 09:25

## 2022-12-28 RX ADMIN — METHOTREXATE 20 MG: 25 INJECTION, SOLUTION INTRA-ARTERIAL; INTRAMUSCULAR; INTRAVENOUS at 13:12

## 2022-12-28 RX ADMIN — ATORVASTATIN CALCIUM 10 MG: 10 TABLET, FILM COATED ORAL at 09:24

## 2022-12-28 RX ADMIN — METHOCARBAMOL 500 MG: 500 TABLET ORAL at 16:48

## 2022-12-28 RX ADMIN — METHOCARBAMOL 500 MG: 500 TABLET ORAL at 13:11

## 2022-12-28 RX ADMIN — POLYETHYLENE GLYCOL 3350 17 G: 17 POWDER, FOR SOLUTION ORAL at 09:22

## 2022-12-28 RX ADMIN — OXYCODONE HYDROCHLORIDE 5 MG: 5 TABLET ORAL at 21:30

## 2022-12-28 RX ADMIN — PANTOPRAZOLE SODIUM 40 MG: 40 TABLET, DELAYED RELEASE ORAL at 09:24

## 2022-12-28 RX ADMIN — ACETAMINOPHEN 975 MG: 325 TABLET, FILM COATED ORAL at 21:29

## 2022-12-28 ASSESSMENT — ACTIVITIES OF DAILY LIVING (ADL)
ADLS_ACUITY_SCORE: 39

## 2022-12-28 NOTE — PLAN OF CARE
"Discharge Planner Post-Acute Rehab PT:      Discharge Plan: home w/c based, intermittent assist from family, HH PT     Precautions: TTWB in RLE with KI donned whenever moving or OOB  Falls, heavy reliance on BUE to stand  Fatigue - perform submaximal strengthening to avoid over exhaustion in setting of myositis     Current Status:  Bed Mobility: no assist for trunk, assist to place RLE over EOB in KI, portable bed rail, SBA  Transfer: SPT, platform FWW, CGAx1 with therapy and RN staff.  Gait: unsafe. Will be W/c based until change of WB status.   Stairs: unsafe, ramp at home  Balance: requires heavy BUE support on PFWW to stand and maintain balance d/t RLE pain and weakness proximally.     Assessment: calm approach with therapies today with less anxiety noted. Pt able to demo mulit STS and W/C mob with slight due to W/c heavy pull to L.   Pt to cont to work on 21\" seat Ht STS.   P.M session: SPT from w/c <> EOM, reaching activity for improved anterior weight shift. Pt interested in shower chair for stairs. Trial in stairwell?    Other Barriers to Discharge (DME, Family Training, etc):   Family training - completed 12/23     DME:  K4 rental w/c - w/c eval 12/23, pursuing K4 rental solid seat back, contoured cushion and R elevating legrest (telescoping preferable).  Platform FWW - needs to order from [employer], needs 30\" long platform pole. May need to have  cut down typical 37\" long platform pole.  Portable bed rail -  constructing for home  Ramps - installed to enter home and inside home  "

## 2022-12-28 NOTE — PLAN OF CARE
"Goal Outcome Evaluation:         VS: /73 (BP Location: Left arm)   Pulse 89   Temp 98.3  F (36.8  C) (Oral)   Resp 18   Ht 1.575 m (5' 2\")   Wt 67.9 kg (149 lb 11.2 oz)   SpO2 98%   BMI 27.38 kg/m     O2: RA   Output: Voids spontaneously   Last BM: 12/27/2022   Activity: Assist of 1 stand pivot, wheelchair   Skin: WDL except incisions on right leg   Pain: Pain managed with oxy 5mg, robaxin   CMS: Intact, AOx4. Denies numbness and tingling.   Dressing: Staples removed from four incisions   Diet: Regular diet.   LDA: None   Equipment: IV pole, personal belongings,    Plan: Continue POC   Additional Info:                    "

## 2022-12-28 NOTE — CARE PLAN
Acute Rehab Care Conference/Team Rounds      Type: Team Rounds    Present: Dr. Len Bear, Ashley Walker PA, Kris Hahn PT, Annabelle Sahni OT, Fidelia CA, Jinny Tejada RN,  Alice Tatum RN, and Ventura Villaltatalia Patient.       Discharge Barriers/Treatment/Education    Rehab Diagnosis: Orthopaedic Disorders 08.2 Femur (Shaft) Fracture: right displaced femoral shaft fracture with extension into the knee joint s/p ORIF    Active Medical Co-morbidities/Prognosis:   Patient Active Problem List   Diagnosis     Closed fracture of right femur, unspecified fracture morphology, unspecified portion of femur, initial encounter (H)        Safety:: Patient is alert and oriented x4. Calls appropriately. Wears the right knee brace when oob.     Pain:Complained of right knee pain, requested for prn oxycodone this morning prior to therapy.    Medications, Skin, Tubes/Lines:Takes medications whole. Right knee incision sites staples were removed yesterday, approximated. No lines/tubes.       Swallowing/Nutrition:    Bowel/Bladder:Continent of bowel and bladder. LBM 12/26.Uses the toilet during the day and bedpan at night.     Psychosocial:  Lives at home with spouse and 3 dependent teenage children. Employed FT. [facility] paperwork sent in. No mental health or chem dep concerns.     ADLs/IADLs: Progressing with ADLs/IADLs. Pt completing w/c based ADLs and CGA SPT using a L platform walker. Pt requiring set up UB dressing, set up grooming, Min A LB dressing (but needs assist footwear), CGA toileting with commode and grab bar/FWW, and Min A bathing with tub bench. 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 Goals to advance to SBA-Mod IND. Anticipate pt will require increased assist IADLs initially. Caregiver training completed but will complete additional prior to discharge. Plan for follow up HC OT. Equipment needs: wc, platform walker,  "bench, commode, leg , reacher, bedrail, bedwedge, ramps, accessible vehicle.    Mobility: Pt making steady progress with transfers with platform FWW. Limited by R knee pain, deconditioning and proximal weakness and anxiety. Home mods installed: ramps to enter and in house. Pt's  building bed rail for home. Pursuing K4 rental w/c with elevating leg rests. Currently thinking K4 best for weight class d/t her proximal weakness and fatigue from myositis. Pt will order platform attachment and FWW off [employer]. Also borrowing wheelchair accessible van from a friend to use for transportation. Recommend HH PT.   Bed Mobility: no assist for trunk, assist to place RLE over EOB in KI, portable bed rail, SBA  Transfer: SPT, platform FWW, close SBA from 21\" mat table.  Gait: unsafe    Cognition/Language: No concerns. Not a barrier to discharge.    Community Re-Entry: w/c based with assist    Transportation: assist from family with borrowed transportation van.    Decision maker: self    Plan of Care and goals reviewed and updated.    Discharge Plan/Recommendations    Fall Precautions: continue    Patient/Family input to goals: Yes    Anticipated rehab needs following discharge: Home with home care    Anticipated care giver support after discharge: Family    Estimated length of stay: 1/2/23    Overall plan for the patient: Continue IP Rehabilitation.       Utilization Review and Continued Stay Justification    Medical Necessity Criteria:    For any criteria that is not met, please document reason and plan for discharge, transfer, or modification of plan of care to address.    Requires intensive rehabilitation program to treat functional deficits?: Yes    Requires 3x per week or greater involvement of rehabilitation physician to oversee rehabilitation program?: Yes    Requires rehabilitation nursing interventions?: Yes    Patient is making functional progress?: Yes    There is a potential for additional functional " progress? Yes    Patient is participating in therapy 3 hours per day a minimum of 5 days per week or 15 hours per week in 7 day period?:Yes    Has discharge needs that require coordinated discharge planning approach?:Yes          Final Physician Sign off    Statement of Approval: I approve the plan of care.     Patient Goals       Social Work Goals: Confirm discharge recommendations with therapy, coordinate safe discharge plan and remain available to support and assist as needed.      OT Predicted Duration/Target Date for Goal Attainment: 01/02/23  Therapy Frequency (OT): Daily  OT: Hygiene/Grooming: modified independent  OT: Upper Body Dressing: Independent  OT: Lower Body Dressing: Modified independent  OT: Upper Body Bathing: Modified independent  OT: Lower Body Bathing: Modified independent  OT: Transfer: Modified independent  OT: Toilet Transfer/Toileting: Modified independent    PT Predicted Duration/Target Date for Goal Attainment: 01/03/23  PT Frequency: Daily  PT: Bed Mobility: Modified independent, Supine to/from sit, Rolling (portable bedrail)  PT: Transfers: Modified independent, Sit to/from stand, Bed to/from chair, Assistive device (FWW + R KI)  PT: Gait: Moderate assist, Rolling walker, 10 feet (therapeutic gait)     PT: Wheelchair Mobility: 150 feet, manual wheelchair  PT: Perform aerobic activity with stable cardiovascular response: intermittent activity, 10 minutes, NuStep (BUE and LLE) (discontinued)  PT: Goal 1: Car transfer, FWW, modA for assist to place RLE into car (discontinued)                                                        Patient Goal:  Pain Management: Pt will report pain and advocate for interventions when necessary.                    Patient/Family Goal: Medication Management: Pt will pass MAP by discharge from ARU if appropriate.                             Goal: Safety Management: Pt will use call light to make needs known and wait for assistance with transfers until  independent in room while at [facility].

## 2022-12-28 NOTE — PROGRESS NOTES
Discharge Planner Post-Acute Rehab OT:     Discharge Plan: Home, wc-based, w/assist from family, homecare OT     Precautions: TTWB in RLE with KI donned whenever moving or OOB  Falls with heavy reliance on BUE to stand  Fatigue - perform submaximal strengthening to avoid over exhaustion with myositis    Current Status:  ADLs:    Mobility: SBA bed mobility with AE (leg  and railing). W/c based, CGA SPT LUE platform FWW 5# weight, RLE KI.    Grooming: IND seated.    Dressing: UB Set up w/ UE support. LB Min A AE long sitting.    Bathing: Min A tub bench, RLE covered and elevated.    Toileting: CGA SPT w/c <> toilet commode overlay grab bar or BSC. CGA hygiene/clothing platform UE support.  IADLs: Previously IND. Spouse/children will assist with heavier IADLs.   Vision/Cognition: No concerns.    Assessment: Rounds meeting today. Pt progressing with ADLs and functional mobility. Moved up discharge date to 1/2. Plan to complete further caregiver training with spouse 1/2 am prior to discharge.  Interventions focused on progressing toileting IND. Plan to discontinue use of bedpan, reinforcing pt to utilize bedside commode overnight and toilet with overlay during the day.    Other Barriers to Discharge (DME, Family Training, etc):   Home accessibility/stairs: Spouse is a ernst and plans to build and install ramp, accessible entryway to main level bathroom, and grab bars. Plans to have a bed moved to the main level.  Equipment: Manual w/c, tub bench, bed wedge, commode chair, RLE KI, bedrail, reacher.  Caregiver support: Spouse and children can assist with IADLs as needed. Pt in-laws will be staying with pt first 1-2 weeks after discharge. Caregiver training initiated 12/23; additional training 1/2 9-11am.

## 2022-12-28 NOTE — PLAN OF CARE
Goal Outcome Evaluation:      Plan of Care Reviewed With: patient    Overall Patient Progress: improvingOverall Patient Progress: improving    Outcome Evaluation: Pt is alert and oriented. VSS. Reported sleeping well last night. Pt is trying to get involve in cares. Discharge plan in progress.    Goal Outcome Evaluation:          Outcome Evaluation: Pt is alert and oriented. VSS. Reported sleeping well last night.     Orientation: Alert and oriented x 4. No dizziness or weakness.   Bowel: continent for bowel. LBM  12/28/22  Bladder: Continent of bladder. Uses the commode in bathroom with 1 assist and w/c.  Pain: Reports right knee pain . Scheduled tylenol and PRN oxycodone given.  Ambulation/Transfers: A1SPT to wheel chair.  Diet/ Liquids: Regular / thin liquids  Skin:  Right leg Incision in four area with staples removed.  Call light within reach. Continue with POC.

## 2022-12-28 NOTE — PROGRESS NOTES
"  [facility]   Acute Rehabilitation Unit  Daily progress note  Interval History  Ventura was seen sitting up in wheel chair, she is doing well and will continue to work on endurance and independence with adls and transfers prior to discharge home.  Goals for home with home care     See rounds note by Dr. Bear for further details.     Medications  Scheduled meds    acetaminophen  975 mg Oral TID     aspirin  325 mg Oral BID     atorvastatin  10 mg Oral Daily     estradiol  2 g Vaginal Once per day on Mon Thu     folic acid  1 mg Oral Daily     losartan  12.5 mg Oral Daily     methocarbamol  500 mg Oral 4x Daily     methotrexate  20 mg Subcutaneous Weekly     pantoprazole  40 mg Oral Daily     polyethylene glycol  17 g Oral Daily     predniSONE  10 mg Oral Daily       PRN meds:  bisacodyl, calcium carbonate, hydrOXYzine, ibuprofen, naloxone **OR** naloxone **OR** naloxone **OR** naloxone, ondansetron, oxyCODONE, senna-docusate      PHYSICAL EXAM  /72 (BP Location: Right arm)   Pulse 72   Temp (!) 96  F (35.6  C) (Oral)   Resp 18   Ht 1.575 m (5' 2\")   Wt 67.9 kg (149 lb 11.2 oz)   SpO2 100%   BMI 27.38 kg/m     Further exam deferred for rounds convseration  Gen: Alert and cooperative  HEENT: MMM  Pulm: non labored on room air.  Abd: non distended.  Ext: Calves non tender  Neuro/MSK: Moves UE and LLE well. Moves RLE distally.       LABS  Last Comprehensive Metabolic Panel:  Sodium   Date Value Ref Range Status   12/14/2022 141 133 - 144 mmol/L Final     Potassium   Date Value Ref Range Status   12/14/2022 3.8 3.4 - 5.3 mmol/L Final     Chloride   Date Value Ref Range Status   12/14/2022 107 94 - 109 mmol/L Final     Carbon Dioxide (CO2)   Date Value Ref Range Status   12/14/2022 29 20 - 32 mmol/L Final     Anion Gap   Date Value Ref Range Status   12/14/2022 5 3 - 14 mmol/L Final     Glucose   Date Value Ref Range Status   12/14/2022 91 70 - 99 mg/dL Final "     Urea Nitrogen   Date Value Ref Range Status   12/14/2022 17 7 - 30 mg/dL Final     Creatinine   Date Value Ref Range Status   12/26/2022 0.48 (L) 0.52 - 1.04 mg/dL Final     GFR Estimate   Date Value Ref Range Status   12/26/2022 >90 >60 mL/min/1.73m2 Final     Comment:     Effective December 21, 2021 eGFRcr in adults is calculated using the 2021 CKD-EPI creatinine equation which includes age and gender (Gloria et al., NE, DOI: 10.1056/LIBEcq2340075)     Calcium   Date Value Ref Range Status   12/14/2022 8.5 8.5 - 10.1 mg/dL Final        CBC RESULTS:   Recent Labs   Lab Test 12/17/22  1622   WBC 9.3   RBC 3.33*   HGB 11.4*   HCT 35.7   *   MCH 34.2*   MCHC 31.9   RDW 14.0           ASSESSMENT AND PLAN    Patient will work with PT for 90 min daily to work on gait exercises, strengthening, endurance buildup, transfers with use of walker as needed.   Patient will work with OT for 90 min daily to work on upper and lower body self care, dressing, toileting, bathing, energy conservation techniques with use of ADs as needed.   Rehab RN to administer medication, patient education on medication taking, VS monitoring, and surgical wound dressing changes and monitoring.      Mechanical fall  Right displaced diaphyseal femur fracture  Right intra-articular distal femur fracture  Was at [facility] when she slipped on some water, twisting her R knee and falling. unable to get up and had severe pain to R leg/ knee. No head or other trauma. No dizzy/lightheaded: Xray femur:  spiral comminuted fracture of the distal femur which may extend into the intercondylar aspect of the femur anteriorly. There is a small spur patellar joint effusion. The tibia and fibula are intact. CT knee 12/11: Acute comminuted moderately displaced and angulated fracture of the distal femur with intra-articular extension of the fracture.     12/11 open reduction intramedullary nailing of right femur fracture and ORIF of right distal  intra-articular femur fracture Surgeons Al Larson MD - Primary & Landy Montes PA-C     Continue tylenol & robaxin scheduled,continue  as needed  hydrhoxyzine, ibuprofen, and oxycodone,   continue to  monitor pain levels and titrate medications as needed    Remove staples 12/27     Postoperative anemia    Hgb dropped to 9.9 12/13, stable at 11.4 12/17.   -trend 12/29    History of granulomatous myositis   on folic acid 1 mg daily, methotrexate 20 mg weekly SubQ, prednisone 10 mg daily. Follows with Rheumatology at [place]. Patient given stress dose steroids  just before surgery      continue prednisone at 10 mg daily.    Continue folic acid    Continue methotrexate every Wednesday     HTN/HLD  -continue atorvastatin 10 mg daily  Currently on losartan for blood pressure control.12.5 mg daily  -monitor bp     MAYA  - continue home CPAP     Bladder, Bowel, GI and DVT ppx   Bladder ;check PVRs ×3 straight cath for volumes more than 350 cc.  Monitor for symptoms of urinary tract infection, rehab nursing to send for a UA as needed  Bowels; place the patient on a bowel program and titrate medications as needed.  Hold the bowel program in case of loose stools.  Educated patient on Impact with fiber and fluid intake on a bowel function  DVT prophylaxis with mechanical means           Ashley Walker PA-C  Physical Medicine & Rehabilitation

## 2022-12-28 NOTE — PROGRESS NOTES
"SPIRITUAL HEALTH SERVICES Progress Note  [facility] Acute Rehab    Brief visit with pt, as she was about to have a therapy session. Pt said \"my discharge date has been moved up to Monday. I feel good about getting home, but I'm also aware of some challenges. But overall I'm excited about getting home.\" Pt shared regarding specific goals for therapies over the weekend, to help her be even more ready for discharge. I let pt know I will be out until Monday, so took the opportunity at this time to congratulate her regarding the hard work she has done with therapies.     Al Dickey) Marisel Long M.Div., [facility]  Staff   Pager [phone]      * [facility] remains available 24/7 for emergent requests/referrals, either by having the switchboard page the on-call  or by entering an ASAP/STAT consult in Epic (this will also page the on-call ). Routine Epic consults receive an initial response within 24 hours.*        "

## 2022-12-29 ENCOUNTER — APPOINTMENT (OUTPATIENT)
Dept: PHYSICAL THERAPY | Facility: CLINIC | Age: 52
DRG: 949 | End: 2022-12-29
Attending: PHYSICAL MEDICINE & REHABILITATION
Payer: COMMERCIAL

## 2022-12-29 ENCOUNTER — APPOINTMENT (OUTPATIENT)
Dept: OCCUPATIONAL THERAPY | Facility: CLINIC | Age: 52
DRG: 949 | End: 2022-12-29
Attending: PHYSICAL MEDICINE & REHABILITATION
Payer: COMMERCIAL

## 2022-12-29 LAB
ANION GAP SERPL CALCULATED.3IONS-SCNC: 7 MMOL/L (ref 3–14)
BUN SERPL-MCNC: 25 MG/DL (ref 7–30)
CALCIUM SERPL-MCNC: 8.8 MG/DL (ref 8.5–10.1)
CHLORIDE BLD-SCNC: 109 MMOL/L (ref 94–109)
CO2 SERPL-SCNC: 27 MMOL/L (ref 20–32)
CREAT SERPL-MCNC: 0.46 MG/DL (ref 0.52–1.04)
ERYTHROCYTE [DISTWIDTH] IN BLOOD BY AUTOMATED COUNT: 15 % (ref 10–15)
GFR SERPL CREATININE-BSD FRML MDRD: >90 ML/MIN/1.73M2
GLUCOSE BLD-MCNC: 83 MG/DL (ref 70–99)
HCT VFR BLD AUTO: 35 % (ref 35–47)
HGB BLD-MCNC: 11 G/DL (ref 11.7–15.7)
MCH RBC QN AUTO: 33.5 PG (ref 26.5–33)
MCHC RBC AUTO-ENTMCNC: 31.4 G/DL (ref 31.5–36.5)
MCV RBC AUTO: 107 FL (ref 78–100)
PLATELET # BLD AUTO: 488 10E3/UL (ref 150–450)
POTASSIUM BLD-SCNC: 4 MMOL/L (ref 3.4–5.3)
RBC # BLD AUTO: 3.28 10E6/UL (ref 3.8–5.2)
SODIUM SERPL-SCNC: 143 MMOL/L (ref 133–144)
WBC # BLD AUTO: 6.9 10E3/UL (ref 4–11)

## 2022-12-29 PROCEDURE — 99232 SBSQ HOSP IP/OBS MODERATE 35: CPT | Mod: FS | Performed by: PHYSICIAN ASSISTANT

## 2022-12-29 PROCEDURE — 36415 COLL VENOUS BLD VENIPUNCTURE: CPT | Performed by: PHYSICAL MEDICINE & REHABILITATION

## 2022-12-29 PROCEDURE — 85027 COMPLETE CBC AUTOMATED: CPT | Performed by: PHYSICIAN ASSISTANT

## 2022-12-29 PROCEDURE — 97535 SELF CARE MNGMENT TRAINING: CPT | Mod: GO | Performed by: STUDENT IN AN ORGANIZED HEALTH CARE EDUCATION/TRAINING PROGRAM

## 2022-12-29 PROCEDURE — 250N000013 HC RX MED GY IP 250 OP 250 PS 637: Performed by: PHYSICAL MEDICINE & REHABILITATION

## 2022-12-29 PROCEDURE — 97110 THERAPEUTIC EXERCISES: CPT | Mod: GP

## 2022-12-29 PROCEDURE — 82310 ASSAY OF CALCIUM: CPT | Performed by: PHYSICAL MEDICINE & REHABILITATION

## 2022-12-29 PROCEDURE — 250N000012 HC RX MED GY IP 250 OP 636 PS 637: Performed by: PHYSICAL MEDICINE & REHABILITATION

## 2022-12-29 PROCEDURE — 97535 SELF CARE MNGMENT TRAINING: CPT | Mod: GO

## 2022-12-29 PROCEDURE — 97530 THERAPEUTIC ACTIVITIES: CPT | Mod: GP

## 2022-12-29 PROCEDURE — 250N000013 HC RX MED GY IP 250 OP 250 PS 637: Performed by: PHYSICIAN ASSISTANT

## 2022-12-29 PROCEDURE — 128N000003 HC R&B REHAB

## 2022-12-29 RX ADMIN — ACETAMINOPHEN 975 MG: 325 TABLET, FILM COATED ORAL at 13:11

## 2022-12-29 RX ADMIN — METHOCARBAMOL 500 MG: 500 TABLET ORAL at 08:03

## 2022-12-29 RX ADMIN — METHOCARBAMOL 500 MG: 500 TABLET ORAL at 16:23

## 2022-12-29 RX ADMIN — OXYCODONE HYDROCHLORIDE 5 MG: 5 TABLET ORAL at 10:23

## 2022-12-29 RX ADMIN — ATORVASTATIN CALCIUM 10 MG: 10 TABLET, FILM COATED ORAL at 08:03

## 2022-12-29 RX ADMIN — OXYCODONE HYDROCHLORIDE 5 MG: 5 TABLET ORAL at 06:13

## 2022-12-29 RX ADMIN — PANTOPRAZOLE SODIUM 40 MG: 40 TABLET, DELAYED RELEASE ORAL at 08:02

## 2022-12-29 RX ADMIN — OXYCODONE HYDROCHLORIDE 5 MG: 5 TABLET ORAL at 20:28

## 2022-12-29 RX ADMIN — OXYCODONE HYDROCHLORIDE 5 MG: 5 TABLET ORAL at 16:23

## 2022-12-29 RX ADMIN — METHOCARBAMOL 500 MG: 500 TABLET ORAL at 13:11

## 2022-12-29 RX ADMIN — Medication 12.5 MG: at 08:01

## 2022-12-29 RX ADMIN — ASPIRIN 325 MG: 325 TABLET ORAL at 20:28

## 2022-12-29 RX ADMIN — ACETAMINOPHEN 975 MG: 325 TABLET, FILM COATED ORAL at 20:28

## 2022-12-29 RX ADMIN — ACETAMINOPHEN 975 MG: 325 TABLET, FILM COATED ORAL at 08:02

## 2022-12-29 RX ADMIN — PREDNISONE 10 MG: 10 TABLET ORAL at 08:02

## 2022-12-29 RX ADMIN — FOLIC ACID 1 MG: 1 TABLET ORAL at 08:02

## 2022-12-29 RX ADMIN — METHOCARBAMOL 500 MG: 500 TABLET ORAL at 20:28

## 2022-12-29 RX ADMIN — ASPIRIN 325 MG: 325 TABLET ORAL at 08:02

## 2022-12-29 ASSESSMENT — ACTIVITIES OF DAILY LIVING (ADL)
ADLS_ACUITY_SCORE: 39

## 2022-12-29 NOTE — PROGRESS NOTES
"  [facility]   Acute Rehabilitation Unit  Daily progress note  Interval History  Ventura was seen sitting up in chair, she is feeling well reports she was able to get up to bedside commode overnight and going to work toward doing this consistently, she is worried about how fatigued this makes her and how challenging this may be upon discharge.  She is overall feeling well and denies other concerns.     OT:   Pt reports that using the commode over night went well. Pt ordered a commode but it will not arrive until Jan 4th. Pt may need to get a different commode for temporary use if she is leaving jan 2. Pt using AE for dressing. Pt still needs min A for managing leg rests on WC.    Medications  Scheduled meds    acetaminophen  975 mg Oral TID     aspirin  325 mg Oral BID     atorvastatin  10 mg Oral Daily     estradiol  2 g Vaginal Once per day on Mon Thu     folic acid  1 mg Oral Daily     losartan  12.5 mg Oral Daily     methocarbamol  500 mg Oral 4x Daily     methotrexate  20 mg Subcutaneous Weekly     pantoprazole  40 mg Oral Daily     polyethylene glycol  17 g Oral Daily     predniSONE  10 mg Oral Daily       PRN meds:  bisacodyl, calcium carbonate, hydrOXYzine, ibuprofen, naloxone **OR** naloxone **OR** naloxone **OR** naloxone, ondansetron, oxyCODONE, senna-docusate      PHYSICAL EXAM  BP 98/63 (BP Location: Right arm, Patient Position: Supine, Cuff Size: Adult Regular)   Pulse 81   Temp 98.5  F (36.9  C)   Resp 16   Ht 1.575 m (5' 2\")   Wt 67.9 kg (149 lb 11.2 oz)   SpO2 98%   BMI 27.38 kg/m     Further exam deferred for rounds convseration  Gen: Alert and cooperative  HEENT: MMM  Pulm: non labored on room air.  Abd: non distended.  Ext: Calves non tender  Neuro/MSK: Moves UE and LLE well. Moves RLE distally.       LABS  Last Comprehensive Metabolic Panel:  Sodium   Date Value Ref Range Status   12/29/2022 143 133 - 144 mmol/L Final     Potassium   Date Value Ref Range " Status   12/29/2022 4.0 3.4 - 5.3 mmol/L Final     Chloride   Date Value Ref Range Status   12/29/2022 109 94 - 109 mmol/L Final     Carbon Dioxide (CO2)   Date Value Ref Range Status   12/29/2022 27 20 - 32 mmol/L Final     Anion Gap   Date Value Ref Range Status   12/29/2022 7 3 - 14 mmol/L Final     Glucose   Date Value Ref Range Status   12/29/2022 83 70 - 99 mg/dL Final     Urea Nitrogen   Date Value Ref Range Status   12/29/2022 25 7 - 30 mg/dL Final     Creatinine   Date Value Ref Range Status   12/29/2022 0.46 (L) 0.52 - 1.04 mg/dL Final     GFR Estimate   Date Value Ref Range Status   12/29/2022 >90 >60 mL/min/1.73m2 Final     Comment:     Effective December 21, 2021 eGFRcr in adults is calculated using the 2021 CKD-EPI creatinine equation which includes age and gender (Gloria et al., [place], DOI: 10.1056/YSFEyq9003496)     Calcium   Date Value Ref Range Status   12/29/2022 8.8 8.5 - 10.1 mg/dL Final        CBC RESULTS:   Recent Labs   Lab Test 12/17/22  1622   WBC 9.3   RBC 3.33*   HGB 11.4*   HCT 35.7   *   MCH 34.2*   MCHC 31.9   RDW 14.0           ASSESSMENT AND PLAN    Patient will work with PT for 90 min daily to work on gait exercises, strengthening, endurance buildup, transfers with use of walker as needed.   Patient will work with OT for 90 min daily to work on upper and lower body self care, dressing, toileting, bathing, energy conservation techniques with use of ADs as needed.   Rehab RN to administer medication, patient education on medication taking, VS monitoring, and surgical wound dressing changes and monitoring.      Mechanical fall  Right displaced diaphyseal femur fracture  Right intra-articular distal femur fracture  Was at [facility] when she slipped on some water, twisting her R knee and falling. unable to get up and had severe pain to R leg/ knee. No head or other trauma. No dizzy/lightheaded: Xray femur:  spiral comminuted fracture of the distal femur which may extend into the  intercondylar aspect of the femur anteriorly. There is a small spur patellar joint effusion. The tibia and fibula are intact. CT knee 12/11: Acute comminuted moderately displaced and angulated fracture of the distal femur with intra-articular extension of the fracture.     12/11 open reduction intramedullary nailing of right femur fracture and ORIF of right distal intra-articular femur fracture Surgeons Al Larson MD - Primary & Landy Montes PA-C     Continue tylenol & robaxin scheduled,continue  as needed  hydrhoxyzine, ibuprofen, and oxycodone,   continue to  monitor pain levels and titrate medications as needed    Removed staples 12/27     Postoperative anemia  Hgb dropped to 9.9 12/13, stable at 11.0 12/29.    History of granulomatous myositis   on folic acid 1 mg daily, methotrexate 20 mg weekly SubQ, prednisone 10 mg daily. Follows with Rheumatology at [place]. Patient given stress dose steroids  just before surgery      continue prednisone at 10 mg daily.    Continue folic acid    Continue methotrexate every Wednesday     HTN/HLD  -continue atorvastatin 10 mg daily  Currently on losartan for blood pressure control.12.5 mg daily  -monitor bp     MAYA  - continue home CPAP     Bladder, Bowel, GI and DVT ppx   Bladder ;check PVRs ×3 straight cath for volumes more than 350 cc.  Monitor for symptoms of urinary tract infection, rehab nursing to send for a UA as needed  Bowels; place the patient on a bowel program and titrate medications as needed.  Hold the bowel program in case of loose stools.  Educated patient on Impact with fiber and fluid intake on a bowel function  DVT prophylaxis with mechanical means           Ashley Walker PA-C  Physical Medicine & Rehabilitation

## 2022-12-29 NOTE — PLAN OF CARE
"/71 (BP Location: Right arm)   Pulse 83   Temp 98.7  F (37.1  C) (Oral)   Resp 16   Ht 1.575 m (5' 2\")   Wt 67.9 kg (149 lb 11.2 oz)   SpO2 99%   BMI 27.38 kg/m    Pt vitals stable. On room air during the day, using CPAP at night. Denies SOB, dizziness, chest pain, headache and fever. Right knee pain controlled well with PRN ibuprofen and oxycodone and scheduled tylenol and robaxin. Right leg incisions C/D/I REKHA. Pt wears immobilizer when out of bed. Transfers with assist of 1 stand pivot to . Continent of bladder. LBM today 12/28. Tolerating regular diet well/thin liquids. Denies nausea/vomiting. Pt is alert and oriented x4. Continue with POC.  "

## 2022-12-29 NOTE — CARE PLAN
"Rehabilitation Medicine Wheelchair Face to Face     Diagnosis: S72.91XA closed fracture of R femur.  M60.9 myositis    Currently has limited mobility due to R knee pain managed with pain medication, weakness proximally in BUE, trunk and BLE from fracture as well as prior granulomatous myositis, and ROM deficits due to her R knee being fixed in extension with a knee immobilizer for at least 6 weeks until orthopedics allows for PROM. She also has toe touch weightbearing status (TTWB) on RLE until cleared by orthopedics to bear partial weight through RLE.     Current transfer status: sit to stand and stand pivot, L platform FWW, CGA from 21\" wheelchair.    Distance patient currently able to walk: 0 ft, unsafe due to proximal weakness and limitations in RLE (pain, ROM, strength).    Therapy team has ruled out the following less costly options:  Cane due to inability to safely ambulate per reasons above.  Walker due to inability to safely ambulate per reasons above.  Standard Wheelchair - Ronni requires components on her wheelchair to safely self propel not included on a standard wheelchair.    Requested Items:  Elevating Legrests - Ventura requires these to be able to properly support her RLE in extension with her knee immobilizer (KI) donned whenever up out of bed during the day.    Height Adjustable Armrests - Ventura requires these to elevate the armrests to a non-standard height for leverage through her arms to be able to stand. This is because of her BUE weakness from her myositis.    Brake Extenders - Ventura requires these to be able to safely apply the brakes throughout the day. Due to the weakness in her BUE from her myositis, she would not be able to apply standard length brakes for an entire day.    Seat Belt - Ventura requires this to maintain her pelvic position in the wheelchair while she propels the wheelchair with her BUE. Due to her arm weakness, she requires an increased amount of trunk flexion during arm " extension to propel the wheelchair efficiently, and with increased trunk movement, her pelvis moves more on the cushion during wheelchair propulsion. The seat belt will improve the efficiency of her arm strokes.    Rear Anti-tippers - Ventura requires these to maintain an upright position in the wheelchair when navigating inclines around her home such as the two ramps she has installed to enter and navigate the home.    Seat Cushion Ellen Whitehead requires a contoured cushion with femoral cutouts to maintain her femurs in a neutral position when seated throughout the day. This will help manage her pain in her right knee by keeping her knee in a joint neutral position.    Back Cushion Ellen Whitehead requires a contoured back cushion to maintain a neutral spinal position when seated in the wheelchair for the day. She will be up in the chair 6-8 hours/day. Maintaining a neutral position will reduce the amount of energy she has to expend to sit up for this time. This will allow her to use her energy for transfers and wheelchair mobility which is important considering the proximal weakness and fatigue she experiences from her myositis.    Patient will use wheelchair to complete Mobility Related ADL's in the home including toileting, dressing, self cares, meal prep.    Without a wheelchair patient will be unable to safely move about their home.  This equipment will allow them to be out of bed, participate in home activities with their family, and it will be part of a fall prevention plan for safe mobility.    Patient's home will accommodate use of wheelchair.     Patient has family members (, teenage daughter and teenage sons) willing and able to assist as necessary with wheelchair.      Arm and leg strength:   Shoulder flex: L 1+/5, R 1+/5  Shoulder abd: L 1+/5, R 1+/5  Elbow flex: L 3+/5, R 3+/5  Elbow ext: L 2-/5, R 2-/5  Wrist flex: L 4-/5, R 4-/5  Wrist ext: L 2-/5, R 2/5  Hip flex: L 3+/5, R 2/5  Hip ext: L 3+/5, R 2/5  Knee  "flex: L 4/5  Knee ext: L 4/5  Ankle DF: L 3+/5, R 2+/5  Ankle PF: L 3/5, R 2-/5    Gait/balance/coordination: requires heavy use of BUE to maintain balance in standing d/t RLE TTWB status and weakness in trunk and BUE/BLE proximally.    Length of need: 12 months    Current height: 5' 2\"  Current weight: 149 lbs  : 1970        Signature:  Ashley Walker PA-C  NPI: [id number]  Date:      "

## 2022-12-29 NOTE — PLAN OF CARE
Discharge Planner Post-Acute Rehab PT:      Discharge Plan: home w/c based, intermittent assist from family, HH PT. Plan to have K3 w/c delivered Mon 1/2 in       Precautions: TTWB in RLE with KI donned whenever moving or OOB  Falls, heavy reliance on BUE to stand  Fatigue - perform submaximal strengthening to avoid over exhaustion in setting of myositis     Current Status:  Bed Mobility: no assist for trunk, assist to place RLE over EOB in KI, portable bed rail, SBA  Transfer: SPT, platform FWW, CGA-SBA with therapy and RN staff.  Gait: unsafe. Will be W/c based until change of WB status.   Stairs: unsafe, ramp at home  Balance: requires heavy BUE support on PFWW to stand and maintain balance d/t RLE pain and weakness proximally.     Assessment: unsafe for pt to attempt stairs with shower chair d/t weakness in BLE and fatigue, would not be able to navigate a full flight of stairs.    Called [facility] re: K3 vs K4 w/c. Per rep in rehab dept at [facility], they do not currently have any loaner w/c's available. Per rep in intake dept at [facility], they are planning to deliver a K3 w/c to pt on Monday, time unknown. Planning to have pt stay on unit until w/c can be delivered on Monday. Backup plan would be to have her borrow w/c from [facility] to take home and then return once her K3 w/c is delivered.    Other Barriers to Discharge (DME, Family Training, etc):   Family training - completed 12/23. Will complete again on Monday 1/2/23 prior to discharge home.     DME:  Rental W/c - switched from K4 to K3 w/c rental in part d/t inventory issues with [facility]. K3 with elevating legrests will still be functional for pt to use at home for mobility although not ideal considering the increased energy expenditure propelling a K3 vs a K4 in the setting of her myositis.    Platform FWW - ordered platform attachment from [employer] and borrowing FWW x2.  will bring platform and FWW to family training Monday to be sized  appropriately.    Portable bed rail -  constructing for home  Ramps - installed to enter home and inside home

## 2022-12-29 NOTE — PROGRESS NOTES
Discharge Planner Post-Acute Rehab OT:     Discharge Plan: Home, wc-based, w/assist from family, homecare OT     Precautions: TTWB in RLE with KI donned whenever moving or OOB  Falls with heavy reliance on BUE to stand  Fatigue - perform submaximal strengthening to avoid over exhaustion with myositis    Current Status:  ADLs:    Mobility: SBA bed mobility with AE (leg  and railing). W/c based, CGA SPT LUE platform FWW 5# weight, RLE KI.    Grooming: IND seated.    Dressing: UB Set up w/ UE support. LB Set up AE long sitting. Started using sock aide for L LE    Bathing: Min A tub bench, RLE covered and elevated.    Toileting: CGA SPT w/c <> toilet commode overlay grab bar or BSC. CGA hygiene/clothing platform UE support.  IADLs: Previously IND. Spouse/children will assist with heavier IADLs.   Vision/Cognition: No concerns.    Assessment:Pt reports that using the commode over night went well. Pt ordered a commode but it will not arrive until Jan 4th. Pt may need to get a different commode for temporary use if she is leaving jan 2. Pt using AE for dressing. Pt still needs min A for managing leg rests on WC.      Other Barriers to Discharge (DME, Family Training, etc):   Home accessibility/stairs: Spouse is a ernst and plans to build and install ramp, accessible entryway to main level bathroom, and grab bars. Plans to have a bed moved to the main level.  Equipment: Manual w/c, tub bench, bed wedge, commode chair, RLE KI, bedrail, reacher.  Caregiver support: Spouse and children can assist with IADLs as needed. Pt in-laws will be staying with pt first 1-2 weeks after discharge. Caregiver training initiated 12/23; additional training 1/2 9-11am.

## 2022-12-29 NOTE — PLAN OF CARE
Goal Outcome Evaluation:       Plan of Care Reviewed With: patient     Overall Patient Progress: improvingOverall Patient Progress: improving     Outcome Evaluation: Pt is alert and oriented. VSS. Reported sleeping well last night. Pt is trying to get involved  with self cares.          Orientation: Alert and oriented x 4. Uses call light appropriately and makes needs known . No dizziness or weakness.   Bowel: continent for bowel. LBM  12/28/22  Bladder: Continent of bladder. Uses the commode in bathroom with 1 assist and w/c.  Pain: Reports right knee pain . Scheduled tylenol and PRN oxycodone given.  Ambulation/Transfers: A1SPT to wheel chair.  Diet/ Liquids: Regular / thin liquids  Skin:  Right leg Incision in four area intact and open air.  Call light within reach. Continue with POC.

## 2022-12-30 ENCOUNTER — APPOINTMENT (OUTPATIENT)
Dept: OCCUPATIONAL THERAPY | Facility: CLINIC | Age: 52
DRG: 949 | End: 2022-12-30
Attending: PHYSICAL MEDICINE & REHABILITATION
Payer: COMMERCIAL

## 2022-12-30 ENCOUNTER — APPOINTMENT (OUTPATIENT)
Dept: PHYSICAL THERAPY | Facility: CLINIC | Age: 52
DRG: 949 | End: 2022-12-30
Attending: PHYSICAL MEDICINE & REHABILITATION
Payer: COMMERCIAL

## 2022-12-30 PROCEDURE — 250N000013 HC RX MED GY IP 250 OP 250 PS 637: Performed by: PHYSICAL MEDICINE & REHABILITATION

## 2022-12-30 PROCEDURE — 97530 THERAPEUTIC ACTIVITIES: CPT | Mod: GP | Performed by: STUDENT IN AN ORGANIZED HEALTH CARE EDUCATION/TRAINING PROGRAM

## 2022-12-30 PROCEDURE — 99232 SBSQ HOSP IP/OBS MODERATE 35: CPT | Mod: FS | Performed by: PHYSICIAN ASSISTANT

## 2022-12-30 PROCEDURE — 250N000013 HC RX MED GY IP 250 OP 250 PS 637: Performed by: PHYSICIAN ASSISTANT

## 2022-12-30 PROCEDURE — 128N000003 HC R&B REHAB

## 2022-12-30 PROCEDURE — 97110 THERAPEUTIC EXERCISES: CPT | Mod: GP | Performed by: STUDENT IN AN ORGANIZED HEALTH CARE EDUCATION/TRAINING PROGRAM

## 2022-12-30 PROCEDURE — 97535 SELF CARE MNGMENT TRAINING: CPT | Mod: GO

## 2022-12-30 PROCEDURE — 250N000012 HC RX MED GY IP 250 OP 636 PS 637: Performed by: PHYSICAL MEDICINE & REHABILITATION

## 2022-12-30 RX ORDER — HYDROXYZINE HYDROCHLORIDE 25 MG/1
25 TABLET, FILM COATED ORAL EVERY 6 HOURS PRN
Qty: 30 TABLET | Refills: 0 | Status: SHIPPED | OUTPATIENT
Start: 2022-12-30 | End: 2023-01-06

## 2022-12-30 RX ORDER — ASPIRIN 325 MG
325 TABLET, DELAYED RELEASE (ENTERIC COATED) ORAL 2 TIMES DAILY
Qty: 60 TABLET | Refills: 0 | Status: SHIPPED | OUTPATIENT
Start: 2022-12-30 | End: 2023-02-13

## 2022-12-30 RX ORDER — METHOCARBAMOL 500 MG/1
500 TABLET, FILM COATED ORAL 4 TIMES DAILY PRN
Qty: 100 TABLET | Refills: 0 | Status: SHIPPED | OUTPATIENT
Start: 2022-12-30 | End: 2023-01-31

## 2022-12-30 RX ORDER — MAGNESIUM HYDROXIDE/ALUMINUM HYDROXICE/SIMETHICONE 120; 1200; 1200 MG/30ML; MG/30ML; MG/30ML
30 SUSPENSION ORAL EVERY 4 HOURS PRN
Status: DISCONTINUED | OUTPATIENT
Start: 2022-12-30 | End: 2023-01-02 | Stop reason: HOSPADM

## 2022-12-30 RX ORDER — LOSARTAN POTASSIUM 25 MG/1
12.5 TABLET ORAL DAILY
COMMUNITY
Start: 2022-12-30 | End: 2023-01-04

## 2022-12-30 RX ORDER — OXYCODONE HYDROCHLORIDE 5 MG/1
TABLET ORAL
Qty: 25 TABLET | Refills: 0 | Status: SHIPPED | OUTPATIENT
Start: 2022-12-30 | End: 2023-01-06

## 2022-12-30 RX ORDER — CALCIUM CARBONATE 500 MG/1
1 TABLET, CHEWABLE ORAL 2 TIMES DAILY PRN
COMMUNITY
Start: 2022-12-30

## 2022-12-30 RX ORDER — POLYETHYLENE GLYCOL 3350 17 G/17G
17 POWDER, FOR SOLUTION ORAL DAILY
Qty: 578 G | Refills: 0 | Status: SHIPPED | OUTPATIENT
Start: 2022-12-30 | End: 2023-08-03

## 2022-12-30 RX ADMIN — METHOCARBAMOL 500 MG: 500 TABLET ORAL at 11:20

## 2022-12-30 RX ADMIN — PREDNISONE 10 MG: 10 TABLET ORAL at 07:55

## 2022-12-30 RX ADMIN — PANTOPRAZOLE SODIUM 40 MG: 40 TABLET, DELAYED RELEASE ORAL at 07:55

## 2022-12-30 RX ADMIN — OXYCODONE HYDROCHLORIDE 5 MG: 5 TABLET ORAL at 07:13

## 2022-12-30 RX ADMIN — ACETAMINOPHEN 975 MG: 325 TABLET, FILM COATED ORAL at 07:54

## 2022-12-30 RX ADMIN — ASPIRIN 325 MG: 325 TABLET ORAL at 20:06

## 2022-12-30 RX ADMIN — IBUPROFEN 600 MG: 200 TABLET, FILM COATED ORAL at 12:37

## 2022-12-30 RX ADMIN — METHOCARBAMOL 500 MG: 500 TABLET ORAL at 07:55

## 2022-12-30 RX ADMIN — METHOCARBAMOL 500 MG: 500 TABLET ORAL at 16:02

## 2022-12-30 RX ADMIN — Medication 12.5 MG: at 07:55

## 2022-12-30 RX ADMIN — OXYCODONE HYDROCHLORIDE 5 MG: 5 TABLET ORAL at 12:34

## 2022-12-30 RX ADMIN — ACETAMINOPHEN 975 MG: 325 TABLET, FILM COATED ORAL at 14:53

## 2022-12-30 RX ADMIN — OXYCODONE HYDROCHLORIDE 5 MG: 5 TABLET ORAL at 00:49

## 2022-12-30 RX ADMIN — POLYETHYLENE GLYCOL 3350 17 G: 17 POWDER, FOR SOLUTION ORAL at 11:19

## 2022-12-30 RX ADMIN — FOLIC ACID 1 MG: 1 TABLET ORAL at 07:55

## 2022-12-30 RX ADMIN — ASPIRIN 325 MG: 325 TABLET ORAL at 07:58

## 2022-12-30 RX ADMIN — METHOCARBAMOL 500 MG: 500 TABLET ORAL at 20:06

## 2022-12-30 RX ADMIN — ACETAMINOPHEN 975 MG: 325 TABLET, FILM COATED ORAL at 20:06

## 2022-12-30 RX ADMIN — ALUMINUM HYDROXIDE, MAGNESIUM HYDROXIDE, AND SIMETHICONE 30 ML: 200; 200; 20 SUSPENSION ORAL at 12:36

## 2022-12-30 RX ADMIN — ATORVASTATIN CALCIUM 10 MG: 10 TABLET, FILM COATED ORAL at 07:55

## 2022-12-30 ASSESSMENT — ACTIVITIES OF DAILY LIVING (ADL)
ADLS_ACUITY_SCORE: 33
ADLS_ACUITY_SCORE: 35
ADLS_ACUITY_SCORE: 39
ADLS_ACUITY_SCORE: 35
ADLS_ACUITY_SCORE: 33
ADLS_ACUITY_SCORE: 35
ADLS_ACUITY_SCORE: 33
ADLS_ACUITY_SCORE: 35

## 2022-12-30 NOTE — DISCHARGE SUMMARY
[facility]   Acute Rehabilitation Unit  Discharge summary     Date of Admission: 12/17/2022  Date of Discharge: 1/2/2023  Disposition: home  Primary Care Physician: Vi Metz  Attending physician: Len Bear MD      DISCHARGE DIAGNOSIS  Right displaced diaphyseal femur fracture  Right intra-articular distal femur fracture    BRIEF SUMMARY  Britt Park is a 52 year old female with past medical history of granulomatous myositis, HTN, HLD, and MAYA who was admitted after a fall and underwent IM nailing of right femur fratcture and ORIF of right distal intra-articular femur fracture 12/11/22. Admitted to ARU 12/17/22      REHABILITATION COURSE  PT:   Continue therapy with home care  Bed Mobility: no assist for trunk, assist to place RLE over EOB in KI, portable bed rail, SBA  Transfer: SPT, platform FWW, CGA-SBA with therapy and RN staff.  Gait: unsafe. Will be W/c based until change of WB status.   Stairs: unsafe, ramp at home  Balance: requires heavy BUE support on PFWW to stand and maintain balance d/t RLE pain and weakness proximally.    OT:   Continue therapy with home care.   ADLs:    Mobility: SBA bed mobility with AE (leg  and railing). W/c based, CGA SPT LUE platform FWW 5# weight, RLE KI.    Grooming: IND seated.    Dressing: UB Set up w/ UE support. LB Set up AE long sitting. Started using sock aide for L LE    Bathing: Min A tub bench, RLE covered and elevated.    Toileting: CGA SPT w/c <> toilet commode overlay grab bar or BSC. CGA hygiene/clothing platform UE support.  IADLs: Previously IND. Spouse/children will assist with heavier IADLs.   Vision/Cognition: No concerns.    MEDICAL COURSE  Mechanical fall  Right displaced diaphyseal femur fracture  Right intra-articular distal femur fracture  Was at [facility] when she slipped on some water, twisting her R knee and falling. unable to get up and had severe pain to R leg/ knee. No head or other  trauma. No dizzy/lightheaded: Xray femur:  spiral comminuted fracture of the distal femur which may extend into the intercondylar aspect of the femur anteriorly. There is a small spur patellar joint effusion. The tibia and fibula are intact. CT knee 12/11: Acute comminuted moderately displaced and angulated fracture of the distal femur with intra-articular extension of the fracture.     12/11 underwent open reduction intramedullary nailing of right femur fracture and ORIF of right distal intra-articular femur fracture Surgeons Al Larson MD    Continues with ice, tylenol, robaxin, atarax and oxycodone with goal to continue to reduce oxycodone use,   Removed staples 12/27    Follow up ortho     Postoperative anemia  Hgb dropped to 9.9 12/13, stable at 11.0 12/29.     History of granulomatous myositis   on folic acid 1 mg daily, methotrexate 20 mg weekly SubQ, prednisone 10 mg daily. Follows with Rheumatology at [place]. Patient given stress dose steroids  just before surgery      continue prednisone at 10 mg daily.    Continue folic acid    Continue methotrexate every Wednesday     HTN/HLD  -continue atorvastatin 10 mg daily  Currently on losartan for blood pressure control.12.5 mg daily  -monitored bp      MAYA  - continue home CPAP    DISCHARGE MEDICATIONS  Current Discharge Medication List      START taking these medications    Details   calcium carbonate (TUMS) 500 MG chewable tablet Take 1 tablet (500 mg) by mouth 2 times daily as needed for heartburn    Associated Diagnoses: Gastroesophageal reflux disease with esophagitis, unspecified whether hemorrhage      methocarbamol (ROBAXIN) 500 MG tablet Take 1 tablet (500 mg) by mouth 4 times daily as needed for muscle spasms Allow at least 2 hours between doses max 4 doses daily. Continue to reduce use as able.  Qty: 100 tablet, Refills: 0    Associated Diagnoses: Closed fracture of right femur, unspecified fracture morphology, unspecified portion of femur,  initial encounter (H)         CONTINUE these medications which have CHANGED    Details   aspirin (ASA) 325 MG EC tablet Take 1 tablet (325 mg) by mouth 2 times daily  Qty: 60 tablet, Refills: 0    Associated Diagnoses: Closed fracture of right femur, unspecified fracture morphology, unspecified portion of femur, initial encounter (H)      hydrOXYzine (ATARAX) 25 MG tablet Take 1 tablet (25 mg) by mouth every 6 hours as needed for other (adjuvant pain)  Qty: 30 tablet, Refills: 0    Associated Diagnoses: Closed fracture of right femur, unspecified fracture morphology, unspecified portion of femur, initial encounter (H)      losartan (COZAAR) 25 MG tablet Take 0.5 tablets (12.5 mg) by mouth daily      oxyCODONE (ROXICODONE) 5 MG tablet Take 2.5 (1/2 tab)- 5 mg (1 tab) up to three times daily as needed, allow for at least 4 hours between doses, continue to reduce oxycodone use allowing for more time between doses.  Qty: 25 tablet, Refills: 0    Associated Diagnoses: Closed fracture of right femur, unspecified fracture morphology, unspecified portion of femur, initial encounter (H)      polyethylene glycol (MIRALAX) 17 GM/Dose powder Take 17 g by mouth daily  Qty: 578 g, Refills: 0    Associated Diagnoses: Closed fracture of right femur, unspecified fracture morphology, unspecified portion of femur, initial encounter (H)         CONTINUE these medications which have NOT CHANGED    Details   acetaminophen (TYLENOL) 325 MG tablet Take 2 tablets (650 mg) by mouth every 6 hours as needed for other (For optimal non-opioid multimodal pain management to improve pain control.)  Qty: 100 tablet, Refills: 0    Associated Diagnoses: Closed fracture of right femur, unspecified fracture morphology, unspecified portion of femur, initial encounter (H)      atorvastatin (LIPITOR) 10 MG tablet Take 10 mg by mouth daily      estradiol (ESTRACE) 0.1 MG/GM vaginal cream Place 2 g vaginally twice a week      folic acid (FOLVITE) 1 MG tablet  Take 1 mg by mouth daily      Methotrexate 20 MG/0.8ML SOSY Inject 0.8 mLs Subcutaneous once a week On Wednesdays.      pantoprazole (PROTONIX) 40 MG EC tablet Take 40 mg by mouth daily      predniSONE (DELTASONE) 5 MG tablet Take 10 mg by mouth daily      senna-docusate (SENOKOT-S/PERICOLACE) 8.6-50 MG tablet Take 1 tablet by mouth 2 times daily as needed for constipation  Qty: 14 tablet, Refills: 0    Associated Diagnoses: Closed fracture of right femur, unspecified fracture morphology, unspecified portion of femur, initial encounter (H)         STOP taking these medications       ibuprofen (ADVIL/MOTRIN) 600 MG tablet Comments:   Reason for Stopping:         ondansetron (ZOFRAN ODT) 4 MG ODT tab Comments:   Reason for Stopping:                 DISCHARGE INSTRUCTIONS AND FOLLOW UP  Discharge Procedure Orders   Home Care Referral   Referral Priority: Routine: Next available opening Referral Type: Home Health Therapies & Aides   Number of Visits Requested: 1     Reason for your hospital stay   Order Comments: Admitted for rehabilitation after femur fracture with surgical repair     Activity   Order Comments: Your activity upon discharge: activity as tolerated.  Toe touch weight bearing to right lower extremity with brace in extension for ambulating.     Order Specific Question Answer Comments   Is discharge order? Yes      Adult [facility] Follow-up and recommended labs and tests   Order Comments: Follow up with primary care provider, REYNOLD RAMOS, within 7 days for hospital follow- up.   Follow up ortho- follow up femur fracture  Appointments on [place] and/or [facility] (with [facility] or [facility] provider or service). Call [phone] if you haven't heard regarding these appointments within 7 days of discharge.     Full Code     Order Specific Question Answer Comments   Code status determined by: Discussion with patient/ legal decision maker      Diet   Order Comments: Follow this diet upon discharge:        Combination Diet Regular Diet; Thin Liquids (level 0)     Order Specific Question Answer Comments   Is discharge order? Yes           PHYSICAL EXAMINATION    Most recent Vital Signs:   Vitals:    12/29/22 0802 12/29/22 1740 12/30/22 0740 12/30/22 0824   BP:  126/74 122/54 120/69   BP Location:  Right arm Right arm    Patient Position:   Supine    Cuff Size:       Pulse:  86 75 87   Resp:  16 16 16   Temp: 98.5  F (36.9  C) 98  F (36.7  C) 96.9  F (36.1  C)    TempSrc:  Oral Oral    SpO2:  99% 100% 100%   Weight:       Height:           General: in no acute distress, conversational and alert, resting comfortably in chair  HEENT: atraumatic, nares clear, conjunctiva white  Pulmonary: on room air, lungs clear to auscultation bilaterally  Cardiovascular: RRR, no murmur auscultated, well-perfused peripherally  Abdominal: soft, non-tender to palpation, non-distended  Extremities: warm peripherally, no edema in BLEs, RLE in brace  Skin: warm, dry, without erythema, ecchymosis, or rash noted  MSK/neuro:   Mental Status:  alert and conversational   Cranial Nerves:   1. 2nd CN: Pupils equal, round, reactive to light and accomodation. Visual fields intact to confrontation.   2. 3rd,4th,6th CN:  EOMI, appropriate pupillary responses  3. 5th CN: facial sensation intact   4. 7th CN: face symmetrical   5. 8th CN: functional hearing bilaterally  6. 9th, 10th CN: palate elevates symmetrically   7. 12th CN: tongue midline and without fasciculations     Strength:    EF  EE   I  HF  KE  DF  EHL  PF   L  5/5 5/5 5/5 5/5 NA NA 5/5 5/5 5/5  R  5/5 5/5 5/5 5/5 5/5 5/5 5/5 5/5 5/5   Reflexes: Present and symmetrical 2/4 biceps, patellar   Abnormal movements: None    Coordination: No dysmetria on finger to nose.    Speech: no dysarthria, speech fluent        Discharge summary was forwarded to Vi Metz (PCP) at the time of discharge, so as to bridge from hospital to outpatient care.     It was our pleasure to care for Britt Oakley  Vivian during this hospitalization. Please do not hesitate to contact me should there be questions regarding the hospital course or discharge plan.          Note prepared by Ashley Walker PA-C    Note was staff by Dr. Anabell Simms, PMR staff.    Suzie Boyd  PGY-2   [phone]

## 2022-12-30 NOTE — PROGRESS NOTES
"  [facility]   Acute Rehabilitation Unit  Daily progress note  Interval History  Ventura was seen resting in bed, stomach upset today not sure why, is eating ok, no vomiting, no fevers, denies dizziness, taking miralax and reported bowel movements.  Ventura and I reviewed medications and recommended follow up with plan for upcoming discharge home 1/2/23.  Incision was evaluated and appears to be healing nicely.  She is continuing to taper medications but remains in consistent pain.      OT: Assessment: Pt continues making gains with functional mobility and IND w/ADLs. Treatment focus on wc mobility for tsfr setup. Moderate verbal cues and coban added for increased IND w/ leg rests.        Medications  Scheduled meds    acetaminophen  975 mg Oral TID     aspirin  325 mg Oral BID     atorvastatin  10 mg Oral Daily     estradiol  2 g Vaginal Once per day on Mon Thu     folic acid  1 mg Oral Daily     losartan  12.5 mg Oral Daily     methocarbamol  500 mg Oral 4x Daily     methotrexate  20 mg Subcutaneous Weekly     pantoprazole  40 mg Oral Daily     polyethylene glycol  17 g Oral Daily     predniSONE  10 mg Oral Daily       PRN meds:  alum & mag hydroxide-simethicone, bisacodyl, calcium carbonate, hydrOXYzine, ibuprofen, naloxone **OR** naloxone **OR** naloxone **OR** naloxone, ondansetron, oxyCODONE, senna-docusate      PHYSICAL EXAM  /69   Pulse 87   Temp 96.9  F (36.1  C) (Oral)   Resp 16   Ht 1.575 m (5' 2\")   Wt 67.9 kg (149 lb 11.2 oz)   SpO2 100%   BMI 27.38 kg/m     Gen: Alert and cooperative  HEENT: MMM  Pulm: non labored clear on room air.  CV: rrr  Abd: soft non distended.  Ext: Calves non tender  Neuro/MSK: Moves UE and LLE well. Moves RLE distally.   Skin: right lower extremity with no significant redness, incision cdi no drainage      LABS  Last Comprehensive Metabolic Panel:  Sodium   Date Value Ref Range Status   12/29/2022 143 133 - 144 mmol/L Final "     Potassium   Date Value Ref Range Status   12/29/2022 4.0 3.4 - 5.3 mmol/L Final     Chloride   Date Value Ref Range Status   12/29/2022 109 94 - 109 mmol/L Final     Carbon Dioxide (CO2)   Date Value Ref Range Status   12/29/2022 27 20 - 32 mmol/L Final     Anion Gap   Date Value Ref Range Status   12/29/2022 7 3 - 14 mmol/L Final     Glucose   Date Value Ref Range Status   12/29/2022 83 70 - 99 mg/dL Final     Urea Nitrogen   Date Value Ref Range Status   12/29/2022 25 7 - 30 mg/dL Final     Creatinine   Date Value Ref Range Status   12/29/2022 0.46 (L) 0.52 - 1.04 mg/dL Final     GFR Estimate   Date Value Ref Range Status   12/29/2022 >90 >60 mL/min/1.73m2 Final     Comment:     Effective December 21, 2021 eGFRcr in adults is calculated using the 2021 CKD-EPI creatinine equation which includes age and gender (Gloria et al., NE, DOI: 10.1056/INBWdk4095603)     Calcium   Date Value Ref Range Status   12/29/2022 8.8 8.5 - 10.1 mg/dL Final        CBC RESULTS: Recent Labs   Lab Test 12/29/22  0643 12/17/22  1622 12/14/22  0724   WBC 6.9 9.3 7.8   RBC 3.28* 3.33* 3.04*   HGB 11.0* 11.4* 10.5*   HCT 35.0 35.7 31.9*   * 107* 105*   MCH 33.5* 34.2* 34.5*   MCHC 31.4* 31.9 32.9   RDW 15.0 14.0 14.1   * 409 292       ASSESSMENT AND PLAN    Britt Park is a 52 year old female with past medical history of granulomatous myositis, HTN, HLD, and MAYA who was admitted after a fall and underwent IM nailing of right femur fratcture and ORIF of right distal intra-articular femur fracture 12/11/22. Admitted to ARU 12/17/22    Mechanical fall  Right displaced diaphyseal femur fracture  Right intra-articular distal femur fracture  Was at [facility] when she slipped on some water, twisting her R knee and falling. unable to get up and had severe pain to R leg/ knee. No head or other trauma. No dizzy/lightheaded: Xray femur:  spiral comminuted fracture of the distal femur which may extend into the intercondylar aspect of  the femur anteriorly. There is a small spur patellar joint effusion. The tibia and fibula are intact. CT knee 12/11: Acute comminuted moderately displaced and angulated fracture of the distal femur with intra-articular extension of the fracture.     12/11 open reduction intramedullary nailing of right femur fracture and ORIF of right distal intra-articular femur fracture Surgeons Al Larson MD - Primary & Landy Montes PA-C     Continue tylenol & robaxin scheduled,continue  as needed  hydrhoxyzine,  and oxycodone,   Removed staples 12/27    Follow up ortho     Postoperative anemia  Hgb dropped to 9.9 12/13, stable at 11.0 12/29.    History of granulomatous myositis   on folic acid 1 mg daily, methotrexate 20 mg weekly SubQ, prednisone 10 mg daily. Follows with Rheumatology at [place]. Patient given stress dose steroids  just before surgery      continue prednisone at 10 mg daily.    Continue folic acid    Continue methotrexate every Wednesday     HTN/HLD  -continue atorvastatin 10 mg daily  Currently on losartan for blood pressure control.12.5 mg daily  -monitor bp     MAYA  - continue home CPAP        Ashley Walker PA-C  Physical Medicine & Rehabilitation    I spent a total of 35 minutes face-to-face or managing the care of Ventura Park. Over 50% of my time on the unit was spent counseling the patient and coordinating care. See note for details.

## 2022-12-30 NOTE — PROGRESS NOTES
SW left a vm for  JOSE/Adelina [phone] ext. [phone] and asked for list of [facility] they contract with.   She emailed list.     [facility] [phone]    Declined:  [facility] (aka: [facility]) (ph:[phone] fx:[phone])  Joshua ([facility])-no to insurance.        FABY Quintana   [facility], Transitional Care Unit   Social Work   [facility] [place] [address]  (PH) [phone]

## 2022-12-30 NOTE — PLAN OF CARE
Outcome Evaluation:       Plan of Care Reviewed With: patient     Overall Patient Progress: improvingOverall Patient Progress: improving     Outcome Evaluation: Pt is alert and oriented. VSS. Reported sleeping well last night. Pt is trying to get involve in cares. Discharge plan in progress.           Orientation: Alert and oriented x 4. No dizziness or weakness.   Bowel: continent for bowel. LBM  12/30/22  Bladder: Continent of bladder. Reported  GI comfortPRN maalox suspension given.  Uses the commode in bathroom with 1 assist and w/c.  Pain: Reports right knee pain .Scheduled tylenol given at 7:54 and PRN oxycodone and ibuprofen at 12:34. Pt also complained of headache at that time.  Ambulation/Transfers: A1SPT to wheel chair.  Diet/ Liquids: Regular / thin liquids  Skin:  Right leg Incision in four area intact.   Call light within reach. Continue with POC.

## 2022-12-30 NOTE — DISCHARGE INSTRUCTIONS
PCP  You are scheduled to see Dr. Metz on 01/04/2023 at 12:45PM.    Address [address] [facility] [address]      Phone  [phone]    Orthopedics  You are scheduled to see  on *** at ***.  Please call the number below to schedule, the office is closed 1/2 due to the holiday.    Address [address] [address]      Phone  [phone]      Follow up with Rheumatology as you regularly would.

## 2022-12-30 NOTE — PLAN OF CARE
Goal Outcome Evaluation:    Overall Patient Progress: improvingOverall Patient Progress: improving    Patient called for assistance for toileting using the BSC. A1 stand pivot with the Right knee brace on. Continent of bladder. Is very determined to practice using the commode prior to going home on Monday. Is alert and oriented x4. Make needs known. Requested for PRN oxycodone for right knee pain after getting up with good effect. Wore cpap all night. No further concerns at this time.

## 2022-12-30 NOTE — PLAN OF CARE
"Goal Outcome Evaluation:          VS: /74 (BP Location: Right arm)   Pulse 86   Temp 98  F (36.7  C) (Oral)   Resp 16   Ht 1.575 m (5' 2\")   Wt 67.9 kg (149 lb 11.2 oz)   SpO2 99%   BMI 27.38 kg/m     O2: RA   Output: Voids spontaneously without difficulty to bathroom   Last BM: 12/28   Activity: Assist of 1 pivot to wheelchair, standby assist   Skin: WDL, leg incisions REKHA   Pain: Pain managed with oxycodone, tylenol, robaxin   CMS: Intact, AOx4. Numbness in right leg   Dressing:    Diet: Regular diet   Equipment: Wheelchair, commode   Plan: Continue POC   Additional Info:                     "

## 2022-12-30 NOTE — PROGRESS NOTES
Discharge Planner Post-Acute Rehab OT:     Discharge Plan: Home, wc-based, w/assist from family, homecare OT     Precautions: TTWB in RLE with KI donned whenever moving or OOB  Falls with heavy reliance on BUE to stand  Fatigue - perform submaximal strengthening to avoid over exhaustion with myositis    Current Status:  ADLs:    Mobility: SBA bed mobility with AE (leg  and railing). W/c based, CGA SPT LUE platform FWW 5# weight, RLE KI.    Grooming: IND seated.    Dressing: UB Set up w/ UE support. LB Set up AE long sitting. Started using sock aide for L LE    Bathing: Min A tub bench, RLE covered and elevated.    Toileting: CGA SPT w/c <> toilet commode overlay grab bar or BSC. CGA hygiene/clothing platform UE support.  IADLs: Previously IND. Spouse/children will assist with heavier IADLs.   Vision/Cognition: No concerns.    Assessment: Pt continues making gains with functional mobility and IND w/ADLs. Treatment focus on wc mobility for tsfr setup. Moderate verbal cues and coban added for increased IND w/ leg rests.     Other Barriers to Discharge (DME, Family Training, etc):   Home accessibility/stairs: Spouse is a ernst and plans to build and install ramp, accessible entryway to main level bathroom, and grab bars. Plans to have a bed moved to the main level.  Equipment: Manual w/c, tub bench, bed wedge, commode chair, RLE KI, bedrail, reacher.  Caregiver support: Spouse and children can assist with IADLs as needed. Pt in-laws will be staying with pt first 1-2 weeks after discharge. Caregiver training initiated 12/23; additional training 1/2 9-11am.

## 2022-12-31 ENCOUNTER — APPOINTMENT (OUTPATIENT)
Dept: OCCUPATIONAL THERAPY | Facility: CLINIC | Age: 52
DRG: 949 | End: 2022-12-31
Attending: PHYSICAL MEDICINE & REHABILITATION
Payer: COMMERCIAL

## 2022-12-31 ENCOUNTER — APPOINTMENT (OUTPATIENT)
Dept: PHYSICAL THERAPY | Facility: CLINIC | Age: 52
DRG: 949 | End: 2022-12-31
Attending: PHYSICAL MEDICINE & REHABILITATION
Payer: COMMERCIAL

## 2022-12-31 PROCEDURE — 128N000003 HC R&B REHAB

## 2022-12-31 PROCEDURE — 250N000013 HC RX MED GY IP 250 OP 250 PS 637: Performed by: PHYSICAL MEDICINE & REHABILITATION

## 2022-12-31 PROCEDURE — 250N000011 HC RX IP 250 OP 636: Performed by: PHYSICAL MEDICINE & REHABILITATION

## 2022-12-31 PROCEDURE — 250N000012 HC RX MED GY IP 250 OP 636 PS 637: Performed by: PHYSICAL MEDICINE & REHABILITATION

## 2022-12-31 PROCEDURE — 97535 SELF CARE MNGMENT TRAINING: CPT | Mod: GO

## 2022-12-31 PROCEDURE — 97110 THERAPEUTIC EXERCISES: CPT | Mod: GP | Performed by: PHYSICAL THERAPIST

## 2022-12-31 PROCEDURE — 250N000013 HC RX MED GY IP 250 OP 250 PS 637: Performed by: PHYSICIAN ASSISTANT

## 2022-12-31 PROCEDURE — 97110 THERAPEUTIC EXERCISES: CPT | Mod: GO

## 2022-12-31 PROCEDURE — 99231 SBSQ HOSP IP/OBS SF/LOW 25: CPT | Performed by: PHYSICAL MEDICINE & REHABILITATION

## 2022-12-31 RX ADMIN — PREDNISONE 10 MG: 10 TABLET ORAL at 08:42

## 2022-12-31 RX ADMIN — METHOCARBAMOL 500 MG: 500 TABLET ORAL at 08:42

## 2022-12-31 RX ADMIN — ALUMINUM HYDROXIDE, MAGNESIUM HYDROXIDE, AND SIMETHICONE 30 ML: 200; 200; 20 SUSPENSION ORAL at 16:12

## 2022-12-31 RX ADMIN — ASPIRIN 325 MG: 325 TABLET ORAL at 20:14

## 2022-12-31 RX ADMIN — OXYCODONE HYDROCHLORIDE 5 MG: 5 TABLET ORAL at 18:41

## 2022-12-31 RX ADMIN — METHOCARBAMOL 500 MG: 500 TABLET ORAL at 13:19

## 2022-12-31 RX ADMIN — Medication 12.5 MG: at 08:42

## 2022-12-31 RX ADMIN — OXYCODONE HYDROCHLORIDE 5 MG: 5 TABLET ORAL at 22:40

## 2022-12-31 RX ADMIN — ACETAMINOPHEN 975 MG: 325 TABLET, FILM COATED ORAL at 08:42

## 2022-12-31 RX ADMIN — METHOCARBAMOL 500 MG: 500 TABLET ORAL at 16:12

## 2022-12-31 RX ADMIN — OXYCODONE HYDROCHLORIDE 5 MG: 5 TABLET ORAL at 08:42

## 2022-12-31 RX ADMIN — ACETAMINOPHEN 975 MG: 325 TABLET, FILM COATED ORAL at 20:14

## 2022-12-31 RX ADMIN — OXYCODONE HYDROCHLORIDE 5 MG: 5 TABLET ORAL at 12:42

## 2022-12-31 RX ADMIN — ACETAMINOPHEN 975 MG: 325 TABLET, FILM COATED ORAL at 13:19

## 2022-12-31 RX ADMIN — FOLIC ACID 1 MG: 1 TABLET ORAL at 08:42

## 2022-12-31 RX ADMIN — METHOCARBAMOL 500 MG: 500 TABLET ORAL at 20:14

## 2022-12-31 RX ADMIN — ASPIRIN 325 MG: 325 TABLET ORAL at 08:42

## 2022-12-31 RX ADMIN — ATORVASTATIN CALCIUM 10 MG: 10 TABLET, FILM COATED ORAL at 08:42

## 2022-12-31 RX ADMIN — PANTOPRAZOLE SODIUM 40 MG: 40 TABLET, DELAYED RELEASE ORAL at 08:42

## 2022-12-31 RX ADMIN — ONDANSETRON 4 MG: 4 TABLET, ORALLY DISINTEGRATING ORAL at 09:30

## 2022-12-31 RX ADMIN — OXYCODONE HYDROCHLORIDE 5 MG: 5 TABLET ORAL at 03:39

## 2022-12-31 ASSESSMENT — ACTIVITIES OF DAILY LIVING (ADL)
ADLS_ACUITY_SCORE: 33

## 2022-12-31 NOTE — PLAN OF CARE
Discharge Planner Post-Acute Rehab OT:      Discharge Plan: Home, wc-based, w/assist from family, homecare OT      Precautions: TTWB in RLE with KI donned whenever moving or OOB  Falls with heavy reliance on BUE to stand  Fatigue - perform submaximal strengthening to avoid over exhaustion with myositis     Current Status:  ADLs:    Mobility: SBA bed mobility with AE (leg  and railing). W/c based, CGA SPT LUE platform FWW 5# weight, RLE KI.    Grooming: IND seated.    Dressing: UB Set up w/ UE support. LB Set up AE long sitting. Started using sock aide for L LE    Bathing: Min A tub bench, RLE covered and elevated.    Toileting: CGA SPT w/c <> toilet commode overlay grab bar or BSC. CGA hygiene/clothing platform UE support.  IADLs: Previously IND. Spouse/children will assist with heavier IADLs.   Vision/Cognition: No concerns.     Assessment: Pt continues making gains with functional mobility and IND w/ADLs. Treatment focus on wc mobility for tsfr setup. Pt recalling cues for WC management. Pt nauseated and required frequent rest breaks.      Other Barriers to Discharge (DME, Family Training, etc):   Home accessibility/stairs: Spouse is a ernst and plans to build and install ramp, accessible entryway to main level bathroom, and grab bars. Plans to have a bed moved to the main level.  Equipment: Manual w/c, tub bench, bed wedge, commode chair, RLE KI, bedrail, reacher.  Caregiver support: Spouse and children can assist with IADLs as needed. Pt in-laws will be staying with pt first 1-2 weeks after discharge. Caregiver training initiated 12/23; additional training 1/2 9-11am.

## 2022-12-31 NOTE — PLAN OF CARE
Goal Outcome Evaluation:      Plan of Care Reviewed With: patient    Overall Patient Progress: no change    Orientation: A/O x4, able to make needs known  Ambulation/Transfers: A1 SPT, w/c based  Bowel/Bladder: Continent of bowel and bladder  Pain: Ongoing right knee/leg pain, utilized prn's  Diet/Liquids/Pills: Regular diet/thin liquids, good appetite  Tubes/Lines/Drains:  Skin: right hip/knee incisions approx and REKHA

## 2022-12-31 NOTE — PROGRESS NOTES
[facility], [place]   Physical Medicine and Rehabilitation Daily Note           Assessment and Plan of Care:   Britt Park is a 52 year old female with past medical history of granulomatous myositis, HTN, HLD, and MAYA who was admitted after a fall and underwent IM nailing of right femur fratcture and ORIF of right distal intra-articular femur fracture 12/11/22. Admitted to ARU 12/17/22    --Vitals stable. No labs today.  --Patient will attempt to wean oxycodone to prevent nausea.   --Continue ongoing medical management.  --Continue therapies and plan of care.           Interval history:   Patient seen and examined at bedside. She reports that she has been having a headache localized over her right frontalis/eye region. She also reports that her neck has been quite painful. Also notes that she has been having nausea this morning, and this improved with some Zofran. Still taking oxycodone - states she has a history of nausea with oxycodone in the past. Also having some anxiety about discharging home on 1/2.  Denies fever, chills, CP, SOB, N/V, abdominal pain, new pain or weakness/numbness/tingling. Had 2 BMs yesterday.            Physical Exam:   VS:   Vitals:    12/30/22 0740 12/30/22 0824 12/30/22 1630 12/31/22 0806   BP: 122/54 120/69 125/80 (!) 140/73   BP Location: Right arm  Right arm Right arm   Patient Position: Supine      Pulse: 75 87 84 74   Resp: 16 16 16 16   Temp: 96.9  F (36.1  C)  97  F (36.1  C) 98.4  F (36.9  C)   TempSrc: Oral  Oral Oral   SpO2: 100% 100% 100% 99%   Weight:       Height:         Gen: NAD, resting comfortably in bed  Lungs: breathing unlabored on room air  Abd: soft and non-tender  Ext: no edema in BLE, no calf tenderness  MSK/neuro: Alert, speech fluent, moves all four extremities volitionally.          Data:   Scheduled meds    acetaminophen  975 mg Oral TID     aspirin  325 mg Oral BID     atorvastatin  10 mg Oral Daily     [Held by provider]  estradiol  2 g Vaginal Once per day on Mon Thu     folic acid  1 mg Oral Daily     losartan  12.5 mg Oral Daily     methocarbamol  500 mg Oral 4x Daily     methotrexate  20 mg Subcutaneous Weekly     pantoprazole  40 mg Oral Daily     polyethylene glycol  17 g Oral Daily     predniSONE  10 mg Oral Daily       PRN meds:  alum & mag hydroxide-simethicone, bisacodyl, calcium carbonate, hydrOXYzine, ibuprofen, naloxone **OR** naloxone **OR** naloxone **OR** naloxone, ondansetron, oxyCODONE, senna-docusate      Anabell Simms MD  Physical Medicine and Rehabilitation     I spent a total of 15 minutes face-to-face and managing the care of Britt Park. Over 50% of my time on the unit was spent counseling the patient and coordinating care. Please see note for details.

## 2022-12-31 NOTE — PLAN OF CARE
Discharge Planner Post-Acute Rehab PT:      Discharge Plan: home w/c based, intermittent assist from family, HH PT. Plan to have K3 w/c delivered Mon 1/2 in       Precautions: TTWB in RLE with KI donned whenever moving or OOB  Falls, heavy reliance on BUE to stand  Fatigue - perform submaximal strengthening to avoid over exhaustion in setting of myositis     Current Status:  Bed Mobility: no assist for trunk, assist to place RLE over EOB in KI, portable bed rail, SBA  Transfer: SPT, platform FWW, CGA-SBA with therapy and RN staff.  Gait: unsafe. Will be W/c based until change of WB status.   Stairs: unsafe, ramp at home  Balance: requires heavy BUE support on PFWW to stand and maintain balance d/t RLE pain and weakness proximally.     Assessment: pt on track for safe discharge home with family with prn assist. Pt/family continues to work on acquiring all needed DME. For Saturday - ensure HEP complete (submaximal strengthening in context of myositis), continue to work on STS and pivot transfers as able.     Other Barriers to Discharge (DME, Family Training, etc):   Family training - completed 12/23. Will complete again on Monday 1/2/23 prior to discharge home.     DME:  Rental W/c - switched from K4 to K3 w/c rental in part d/t inventory issues with [facility]. K3 with elevating legrests will still be functional for pt to use at home for mobility although not ideal considering the increased energy expenditure propelling a K3 vs a K4 in the setting of her myositis.  12/29: Called [facility] re: K3 vs K4 w/c. Per rep in rehab dept at [facility], they do not currently have any loaner w/c's available. Per rep in intake dept at [facility], they are planning to deliver a K3 w/c to pt on Monday, time unknown. Planning to have pt stay on unit until w/c can be delivered on Monday. Backup plan would be to have her borrow w/c from ARU to take home and then return once her K3 w/c is delivered.    Platform FWW - ordered platform  attachment from [employer] and borrowing FWW x2.  will bring platform and FWW to family training Monday to be sized appropriately.    Portable bed rail -  constructing for home  Ramps - installed to enter home and inside home

## 2022-12-31 NOTE — PLAN OF CARE
FOCUS/GOAL  Medical management    ASSESSMENT, INTERVENTIONS AND CONTINUING PLAN FOR GOAL:  Patient alert and oriented, able to make needs known  Calls appropriately  Slept most of the night, awaken in between pain medication request  Pain managed by PRN oxycodone, utilized x1 , relief provided, otherwise patient denied chest pain, headache, N&V, no SOB  Continent of Bladder and Bowel, no BM this shift  CPAP ON overnight  Safety rounding checked completed, 3 side rails UP, call light in reach  Continue with POC.   Goal Outcome Evaluation:

## 2022-12-31 NOTE — PLAN OF CARE
Discharge Planner Post-Acute Rehab PT:      Discharge Plan: home w/c based, intermittent assist from family,  PT. Plan to have K3 w/c delivered Mon 1/2 in       Precautions: TTWB in RLE with KI donned whenever moving or OOB  Falls, heavy reliance on BUE to stand  Fatigue - perform submaximal strengthening to avoid over exhaustion in setting of myositis     Current Status:  Bed Mobility: no assist for trunk, assist to place RLE over EOB in KI, portable bed rail, SBA  Transfer: SPT, platform FWW, CGA-SBA with therapy and RN staff.  Gait: unsafe. Will be W/c based until change of WB status.   Stairs: unsafe, ramp at home  Balance: requires heavy BUE support on PFWW to stand and maintain balance d/t RLE pain and weakness proximally.     Assessment: pt on track for safe discharge home with family with prn assist. Pt/family continues to work on acquiring all needed DME. For Sunday/Monday- verify mwc delivery time, completed family trg and [place] day, continue to work on STS and pivot transfers as able.     Other Barriers to Discharge (DME, Family Training, etc):   Family training - completed 12/23. Will complete again on Monday 1/2/23 prior to discharge home.     DME:  Rental W/c - switched from K4 to K3 w/c rental in part d/t inventory issues with [facility]. K3 with elevating legrests will still be functional for pt to use at home for mobility although not ideal considering the increased energy expenditure propelling a K3 vs a K4 in the setting of her myositis.  12/29: Called [facility] re: K3 vs K4 w/c. Per rep in rehab dept at [facility], they do not currently have any loaner w/c's available. Per rep in intake dept at [facility], they are planning to deliver a K3 w/c to pt on Monday, time unknown. Planning to have pt stay on unit until w/c can be delivered on Monday. Backup plan would be to have her borrow w/c from ARU to take home and then return once her K3 w/c is delivered.    Platform FWW - ordered platform  attachment from [employer] and borrowing FWW x2.  will bring platform and FWW to family training Monday to be sized appropriately.    Portable bed rail -  constructing for home  Ramps - installed to enter home and inside home

## 2022-12-31 NOTE — PLAN OF CARE
Goal Outcome Evaluation:      Plan of Care Reviewed With: patient, spouse    Overall Patient Progress: improvingOverall Patient Progress: improving    Outcome Evaluation: Advocating for needs, calling appropriately. Managing pain with current medications. Motivated to go home.Emesis x2 this morning. Ondansetron given with effective result.    Orientation: Alert and oriented x4  Bowel: Continent of bowel using commode. LBM 12/31  Bladder: Continent of bladder using commode.   Pain: Endorses pain in R leg, oxycodone given x2 with effective relief per patient.   Ambulation/Transfers: Assist of 1 stand pivot transfer to wheelchair.  Diet/ Liquids: Regular/thin diet  Skin: R leg incisions approximated and open to air.

## 2023-01-01 PROCEDURE — 250N000013 HC RX MED GY IP 250 OP 250 PS 637: Performed by: PHYSICIAN ASSISTANT

## 2023-01-01 PROCEDURE — 250N000012 HC RX MED GY IP 250 OP 636 PS 637: Performed by: PHYSICAL MEDICINE & REHABILITATION

## 2023-01-01 PROCEDURE — 250N000013 HC RX MED GY IP 250 OP 250 PS 637: Performed by: PHYSICAL MEDICINE & REHABILITATION

## 2023-01-01 PROCEDURE — 250N000011 HC RX IP 250 OP 636: Performed by: PHYSICAL MEDICINE & REHABILITATION

## 2023-01-01 PROCEDURE — 128N000003 HC R&B REHAB

## 2023-01-01 RX ORDER — GABAPENTIN 100 MG/1
100 CAPSULE ORAL 3 TIMES DAILY
Status: DISCONTINUED | OUTPATIENT
Start: 2023-01-01 | End: 2023-01-02 | Stop reason: HOSPADM

## 2023-01-01 RX ADMIN — OXYCODONE HYDROCHLORIDE 5 MG: 5 TABLET ORAL at 12:34

## 2023-01-01 RX ADMIN — PREDNISONE 10 MG: 10 TABLET ORAL at 08:11

## 2023-01-01 RX ADMIN — ASPIRIN 325 MG: 325 TABLET ORAL at 20:26

## 2023-01-01 RX ADMIN — METHOCARBAMOL 500 MG: 500 TABLET ORAL at 08:11

## 2023-01-01 RX ADMIN — ACETAMINOPHEN 975 MG: 325 TABLET, FILM COATED ORAL at 13:54

## 2023-01-01 RX ADMIN — ATORVASTATIN CALCIUM 10 MG: 10 TABLET, FILM COATED ORAL at 08:11

## 2023-01-01 RX ADMIN — FOLIC ACID 1 MG: 1 TABLET ORAL at 08:11

## 2023-01-01 RX ADMIN — METHOCARBAMOL 500 MG: 500 TABLET ORAL at 20:26

## 2023-01-01 RX ADMIN — METHOCARBAMOL 500 MG: 500 TABLET ORAL at 15:48

## 2023-01-01 RX ADMIN — OXYCODONE HYDROCHLORIDE 5 MG: 5 TABLET ORAL at 05:32

## 2023-01-01 RX ADMIN — ACETAMINOPHEN 975 MG: 325 TABLET, FILM COATED ORAL at 08:11

## 2023-01-01 RX ADMIN — ONDANSETRON 4 MG: 4 TABLET, ORALLY DISINTEGRATING ORAL at 09:31

## 2023-01-01 RX ADMIN — ACETAMINOPHEN 975 MG: 325 TABLET, FILM COATED ORAL at 20:26

## 2023-01-01 RX ADMIN — OXYCODONE HYDROCHLORIDE 5 MG: 5 TABLET ORAL at 21:51

## 2023-01-01 RX ADMIN — GABAPENTIN 100 MG: 100 CAPSULE ORAL at 20:26

## 2023-01-01 RX ADMIN — ASPIRIN 325 MG: 325 TABLET ORAL at 08:11

## 2023-01-01 RX ADMIN — GABAPENTIN 100 MG: 100 CAPSULE ORAL at 13:54

## 2023-01-01 RX ADMIN — Medication 12.5 MG: at 08:11

## 2023-01-01 RX ADMIN — PANTOPRAZOLE SODIUM 40 MG: 40 TABLET, DELAYED RELEASE ORAL at 08:11

## 2023-01-01 RX ADMIN — HYDROXYZINE HYDROCHLORIDE 25 MG: 25 TABLET, FILM COATED ORAL at 15:09

## 2023-01-01 RX ADMIN — METHOCARBAMOL 500 MG: 500 TABLET ORAL at 11:36

## 2023-01-01 ASSESSMENT — ACTIVITIES OF DAILY LIVING (ADL)
ADLS_ACUITY_SCORE: 35
ADLS_ACUITY_SCORE: 33
ADLS_ACUITY_SCORE: 35
ADLS_ACUITY_SCORE: 33
ADLS_ACUITY_SCORE: 35

## 2023-01-01 NOTE — PLAN OF CARE
FOCUS/GOAL  Medical management    ASSESSMENT, INTERVENTIONS AND CONTINUING PLAN FOR GOAL:  Patient alert and oriented, able to make needs known, calls appropriately  Slept most of the night  Pain managed by scheduled and PRN medications, PRN given this shift, provided relief, otherwise patient denied headache, chest pain, N&V, no SOB, no emesis  Continent of Bladder, assisted to BSC, voiding well, NO BM this shift  Safety rounding checked completed, 3 side rails UP, bed alarm ON, call light in reach  Continue with POC.     Goal Outcome Evaluation:

## 2023-01-01 NOTE — PLAN OF CARE
Goal Outcome Evaluation:      Plan of Care Reviewed With: patient    Overall Patient Progress: no changeOverall Patient Progress: no change    Outcome Evaluation: Advocating for needs, calling appropriately, nausea but no emesis today, pain management somewhat effective.    Orientation: Alert and oriented x4  Bowel: Continent of bowel using commode/toilet. LBM 12/31  Bladder: Continent of bladder using commode/toilet.   Pain: Endorses pain in R leg, oxycodone given x1 with effective relief per patient.   Ambulation/Transfers: Assist of 1 stand pivot transfer to wheelchair.  Diet/ Liquids: Regular/thin diet  Skin: R leg incisions approximated and open to air.

## 2023-01-02 ENCOUNTER — APPOINTMENT (OUTPATIENT)
Dept: OCCUPATIONAL THERAPY | Facility: CLINIC | Age: 53
DRG: 949 | End: 2023-01-02
Attending: PHYSICAL MEDICINE & REHABILITATION
Payer: COMMERCIAL

## 2023-01-02 ENCOUNTER — APPOINTMENT (OUTPATIENT)
Dept: PHYSICAL THERAPY | Facility: CLINIC | Age: 53
DRG: 949 | End: 2023-01-02
Attending: PHYSICAL MEDICINE & REHABILITATION
Payer: COMMERCIAL

## 2023-01-02 VITALS
HEIGHT: 62 IN | WEIGHT: 149.7 LBS | TEMPERATURE: 97.3 F | SYSTOLIC BLOOD PRESSURE: 107 MMHG | OXYGEN SATURATION: 97 % | RESPIRATION RATE: 16 BRPM | HEART RATE: 98 BPM | BODY MASS INDEX: 27.55 KG/M2 | DIASTOLIC BLOOD PRESSURE: 69 MMHG

## 2023-01-02 PROCEDURE — 97530 THERAPEUTIC ACTIVITIES: CPT | Mod: GP

## 2023-01-02 PROCEDURE — 250N000012 HC RX MED GY IP 250 OP 636 PS 637: Performed by: PHYSICAL MEDICINE & REHABILITATION

## 2023-01-02 PROCEDURE — 97535 SELF CARE MNGMENT TRAINING: CPT | Mod: GO | Performed by: STUDENT IN AN ORGANIZED HEALTH CARE EDUCATION/TRAINING PROGRAM

## 2023-01-02 PROCEDURE — 250N000013 HC RX MED GY IP 250 OP 250 PS 637: Performed by: PHYSICAL MEDICINE & REHABILITATION

## 2023-01-02 PROCEDURE — 250N000013 HC RX MED GY IP 250 OP 250 PS 637: Performed by: PHYSICIAN ASSISTANT

## 2023-01-02 RX ORDER — GABAPENTIN 100 MG/1
100 CAPSULE ORAL 3 TIMES DAILY
Qty: 90 CAPSULE | Refills: 0 | Status: SHIPPED | OUTPATIENT
Start: 2023-01-02 | End: 2023-02-13

## 2023-01-02 RX ADMIN — PREDNISONE 10 MG: 10 TABLET ORAL at 08:51

## 2023-01-02 RX ADMIN — GABAPENTIN 100 MG: 100 CAPSULE ORAL at 08:51

## 2023-01-02 RX ADMIN — HYDROXYZINE HYDROCHLORIDE 25 MG: 25 TABLET, FILM COATED ORAL at 15:30

## 2023-01-02 RX ADMIN — METHOCARBAMOL 500 MG: 500 TABLET ORAL at 13:02

## 2023-01-02 RX ADMIN — PANTOPRAZOLE SODIUM 40 MG: 40 TABLET, DELAYED RELEASE ORAL at 08:51

## 2023-01-02 RX ADMIN — ACETAMINOPHEN 975 MG: 325 TABLET, FILM COATED ORAL at 08:52

## 2023-01-02 RX ADMIN — METHOCARBAMOL 500 MG: 500 TABLET ORAL at 08:52

## 2023-01-02 RX ADMIN — ATORVASTATIN CALCIUM 10 MG: 10 TABLET, FILM COATED ORAL at 08:52

## 2023-01-02 RX ADMIN — OXYCODONE HYDROCHLORIDE 5 MG: 5 TABLET ORAL at 04:54

## 2023-01-02 RX ADMIN — POLYETHYLENE GLYCOL 3350 17 G: 17 POWDER, FOR SOLUTION ORAL at 08:52

## 2023-01-02 RX ADMIN — IBUPROFEN 600 MG: 200 TABLET, FILM COATED ORAL at 15:30

## 2023-01-02 RX ADMIN — Medication 12.5 MG: at 08:51

## 2023-01-02 RX ADMIN — FOLIC ACID 1 MG: 1 TABLET ORAL at 08:51

## 2023-01-02 RX ADMIN — GABAPENTIN 100 MG: 100 CAPSULE ORAL at 13:02

## 2023-01-02 RX ADMIN — ASPIRIN 325 MG: 325 TABLET ORAL at 08:51

## 2023-01-02 RX ADMIN — OXYCODONE HYDROCHLORIDE 5 MG: 5 TABLET ORAL at 08:56

## 2023-01-02 RX ADMIN — OXYCODONE HYDROCHLORIDE 5 MG: 5 TABLET ORAL at 14:34

## 2023-01-02 RX ADMIN — ACETAMINOPHEN 975 MG: 325 TABLET, FILM COATED ORAL at 13:02

## 2023-01-02 ASSESSMENT — ACTIVITIES OF DAILY LIVING (ADL)
ADLS_ACUITY_SCORE: 33

## 2023-01-02 NOTE — PROGRESS NOTES
1500  Pt sitting on w/c, in room with spouse. For discharge today, still in waiting for wheelchair delivery. VSS. Complained of rt knee pain, PRN atarax and PRN Ibuprofen given. Discharge instructions was also given.    1635  Discharged in fair condition via wheelchair accomapanied by .

## 2023-01-02 NOTE — PLAN OF CARE
FOCUS/GOAL  Medical management    ASSESSMENT, INTERVENTIONS AND CONTINUING PLAN FOR GOAL:  Patient alert and oriented, able to make needs known, calls appropriately  Slept most of the night  Pain managed by scheduled and PRN medications, utilized PRN x1 ,  otherwise patient denied headache, chest pain, no respiratory distress, wears CPAP   No episode of emesis throughout the night  Continent of Bladder and Bowel, up to bedside commode for toilet needs, No BM this shift  Safety rounding checked completed, 3 side rails UP, call light in reach  Continue with POC.    Goal Outcome Evaluation:

## 2023-01-02 NOTE — PROGRESS NOTES
SPIRITUAL HEALTH SERVICES Progress Note  [facility] Acute Rehab    Follow-up  visit with pt on day of discharge to home. Pt in good spirits, said she feels she is ready for discharge. She has been a little apprehensive about some issues regarding discharge but feels they have been appropriately addressed. No further needs indicated before discharge.     Al Dickey) Marisel Long M.Div., [facility]  Staff   Pager [phone]      * [facility] remains available 24/7 for emergent requests/referrals, either by having the switchboard page the on-call  or by entering an ASAP/STAT consult in Epic (this will also page the on-call ). Routine Epic consults receive an initial response within 24 hours.*

## 2023-01-02 NOTE — PLAN OF CARE
Occupational Therapy Discharge Summary    Reason for therapy discharge:    Discharged to home with home therapy.    Progress towards therapy goal(s). See goals on Care Plan in [facility] electronic health record for goal details.  Goals partially met.  Barriers to achieving goals:   discharge from facility.    Therapy recommendation(s):    Continued therapy is recommended.  Rationale/Recommendations:  Pt is discharging with assist from family, pt did not advance to mod I with ADL and will continue to work with OT to build independence. Family has been trained in level fo assist for ADL and transfers. They have a commode, WC, walker, hip kit, gait belt. Recommend HH OT.

## 2023-01-02 NOTE — PROGRESS NOTES
Plan for pt to discharge home today after family training. Per pt chart, HC recommended but not set up and no accepting agency at this time.     SWer met with pt at bedside. Introduced self and previous efforts to find HC. Notified pt that SW would call additional agencies this morning, but if unable to locate, pt would have to do OP therapy. Pt expressed concern with transportation as she has not practiced a car transfer and her  is borrowing a w/c accessible van to transport home today and that van won't always be available. SWer informed pt that SW would bring a list of w/c accessible companies for when at home, given that pt will have OP apts. Pt in agreement and expressed understanding. IRF completed with pt during this visit.     Received a list from insurance CM of HC agencies in-network with insurance. Called multiple agencies and all closed due to the holiday (New Year's).     Referrals were faxed to the following HC agencies this morning:   [facility]  [facility] (ph:[phone] fx:[phone])--DECLINED   [facility]  (ph:[phone] fx:[phone])  [facility] (ph:[phone]:[phone])  [facility] (ph:[phone] fx:[phone])  [facility] (ph:[phone] fx:[phone])   [facility] (ph:[phone] fx:[phone])   [facility] (ph:[phone] fx:[phone])--DECLINED  [facility] (ph:[phone] fx:[phone])  [facility] (ph: [phone], f: [phone])  [facility] (ph: [phone], f: [phone])  [facility] (ph:[phone] fx: [phone])   [facility] ([phone] fx:[phone])  [facility] ([phone], fx:[phone])  [facility] ([phone] fx:[phone])   [facility] ([phone] fx:[phone])  [facility] (ph: [phone] fx:[phone])  [facility] ([place]) (ph:[phone] fx:[phone])    Followed up with pt. Pt  and dtr at bedside. Notified pt of SW efforts to get HC set up.  "Pt expressed appreciation and understanding. Made a plan to get OP PT/OT set up and if a HC agency able to accept, will f/u with pt, get orders, and switch up the plan. Pt in agreement. SWer confirmed pt phone and email address. Pt  denied concerns with the plan and stated, \"it is what it is\". Pt given a list of w/c accessible transportation companies. PT Kris Hahn and provider notified (orders being placed). No additional SW needs at this time. SWer called and left vm with update for R JOSE (Jeannette, ph: [phone] ext [phone]).      IRF-MARY Pain Assessment  Pain Effect on Sleep  \"Over the past 5 days, how much of the time has pain made it hard for you to sleep at night?\"    3. Frequently     Pain Interference with Therapy Activities  \"Over the past 5 days, how often have you limited your participation in rehabilitation therapy sessions due to pain?\"   1. Rarely or not at all    Pain Interference with Day-to-Day Activities  \"Over the past 5 days, how often have you limited your day-to-day activities (excluding rehabilitation therapy sessions) because of pain?\"   2. Occasionally      "

## 2023-01-02 NOTE — PLAN OF CARE
"Physical Therapy Discharge Summary    Reason for therapy discharge:    Discharged to home with outpatient therapy.    Progress towards therapy goal(s). See goals on Care Plan in [facility] electronic health record for goal details.  Goals partially met.  Barriers to achieving goals:   limited tolerance for therapy.    Therapy recommendation(s):    Continued therapy is recommended.  Rationale/Recommendations:  continue OP PT to progress overall strength, endurance, balance and initiate weightbearing through RLE once cleared by ortho team.    Summary:  Bed mob - SBA with leg  or using straps on KI to lift RLE  Transfers - SPT, PFWW, CGA-SBA after setup depending on height of surface  Gait - unsafe d/t fatigue and pain  W/c mob - 150+ ft BUE SBA    DME:  Ordered K3 w/c with cushion and back support and elevating legrests from [email or web address].  FWW - pt borrowing FWw from a friend.  Platform attachment for FWW - pt ordered platform from [employer], has not arrived by discharge date. Planning to have pt borrow platform from [facility] and return in 2 days once her platform attachment arrives.  Bed rail - pt's  built for their bed  Ramps - pt's  built for entrance    Addendum:  K3 w/c arrived around 1545. Assessed fit.   [email or web address] delivered a 19\"x16\" w/c with height adjustable armrests, brake extenders, no seat cushion or back cushion and standard leg rests. Had ordered 16x18\" w/c with elevating/articulating leg rests, seat cushion, back cushion, height adjustable armrests, brake extensions.  Writer allowed pt to borrow a [employer] cushion and a R elevating leg rest for improved support sitting in chair.  Will follow up with [email or web address] on 1/3/23 to discuss obtaining correct w/c for pt.      "

## 2023-01-02 NOTE — PLAN OF CARE
Goal Outcome Evaluation:      Plan of Care Reviewed With: patient    Overall Patient Progress: no change    Outcome Evaluation: Plan to discharge this afternoon around 1600.    Orientation: A/Ox4  Bowel: No BM on this shift  Bladder: Continent of bladder using BSC  Pain: Complains of R knee/R leg pain. PRN Oxycodone given x2 with good effect per patient report  Ambulation/Transfers: Ax1 SPT for transfers, WC based. Wears R knee brace for OOB. RLE TTWB maintained  Diet/ Liquids: Tolerating diet with good appetite  Skin: R leg incision REKHA  Bed alarm no longer indicated, call light within reach. Continue with POC.

## 2023-01-03 ENCOUNTER — TELEPHONE (OUTPATIENT)
Dept: FAMILY MEDICINE | Facility: CLINIC | Age: 53
End: 2023-01-03
Payer: COMMERCIAL

## 2023-01-03 NOTE — TELEPHONE ENCOUNTER
What type of discharge? Inpatient  Risk of Hospital admission or ED visit: 8%  Is a TCM episode required? No  When should the patient follow up with PCP? within 30 days of discharge.    Janette Lloyd RN  [facility]

## 2023-01-04 ENCOUNTER — HOSPITAL ENCOUNTER (OUTPATIENT)
Dept: ULTRASOUND IMAGING | Facility: CLINIC | Age: 53
Discharge: HOME | End: 2023-01-04
Attending: INTERNAL MEDICINE | Admitting: INTERNAL MEDICINE
Payer: COMMERCIAL

## 2023-01-04 ENCOUNTER — OFFICE VISIT (OUTPATIENT)
Dept: FAMILY MEDICINE | Facility: CLINIC | Age: 53
End: 2023-01-04
Payer: COMMERCIAL

## 2023-01-04 VITALS
OXYGEN SATURATION: 100 % | HEART RATE: 83 BPM | SYSTOLIC BLOOD PRESSURE: 118 MMHG | RESPIRATION RATE: 16 BRPM | TEMPERATURE: 96.6 F | DIASTOLIC BLOOD PRESSURE: 64 MMHG

## 2023-01-04 DIAGNOSIS — R59.9 ENLARGED LYMPH NODES: ICD-10-CM

## 2023-01-04 DIAGNOSIS — S72.91XA CLOSED FRACTURE OF RIGHT FEMUR, UNSPECIFIED FRACTURE MORPHOLOGY, UNSPECIFIED PORTION OF FEMUR, INITIAL ENCOUNTER (H): Primary | ICD-10-CM

## 2023-01-04 DIAGNOSIS — Z79.52 ON PREDNISONE THERAPY: ICD-10-CM

## 2023-01-04 PROCEDURE — 76536 US EXAM OF HEAD AND NECK: CPT

## 2023-01-04 PROCEDURE — 99495 TRANSJ CARE MGMT MOD F2F 14D: CPT | Performed by: INTERNAL MEDICINE

## 2023-01-04 ASSESSMENT — PAIN SCALES - GENERAL: PAINLEVEL: MODERATE PAIN (5)
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
## 2023-01-05 ENCOUNTER — THERAPY VISIT (OUTPATIENT)
Dept: PHYSICAL THERAPY | Facility: CLINIC | Age: 53
End: 2023-01-05
Payer: COMMERCIAL

## 2023-01-05 ENCOUNTER — HOSPITAL ENCOUNTER (OUTPATIENT)
Dept: OCCUPATIONAL THERAPY | Facility: CLINIC | Age: 53
Discharge: HOME | End: 2023-01-05
Payer: COMMERCIAL

## 2023-01-05 ENCOUNTER — PATIENT OUTREACH (OUTPATIENT)
Dept: CARE COORDINATION | Facility: CLINIC | Age: 53
End: 2023-01-05

## 2023-01-05 DIAGNOSIS — S72.91XA CLOSED FRACTURE OF RIGHT FEMUR, UNSPECIFIED FRACTURE MORPHOLOGY, UNSPECIFIED PORTION OF FEMUR, INITIAL ENCOUNTER (H): Primary | ICD-10-CM

## 2023-01-05 DIAGNOSIS — Z74.09 IMPAIRED MOBILITY AND ACTIVITIES OF DAILY LIVING: ICD-10-CM

## 2023-01-05 DIAGNOSIS — Z78.9 IMPAIRED INSTRUMENTAL ACTIVITIES OF DAILY LIVING (IADL): ICD-10-CM

## 2023-01-05 DIAGNOSIS — Z78.9 IMPAIRED MOBILITY AND ACTIVITIES OF DAILY LIVING: ICD-10-CM

## 2023-01-05 PROCEDURE — 97161 PT EVAL LOW COMPLEX 20 MIN: CPT | Mod: GP | Performed by: PHYSICAL THERAPIST

## 2023-01-05 PROCEDURE — 97110 THERAPEUTIC EXERCISES: CPT | Mod: GP | Performed by: PHYSICAL THERAPIST

## 2023-01-05 PROCEDURE — 97165 OT EVAL LOW COMPLEX 30 MIN: CPT | Mod: GO | Performed by: OCCUPATIONAL THERAPIST
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
## 2023-01-06 NOTE — TELEPHONE ENCOUNTER
Writer called patient to complete hospital follow up call.    Patient informed writer that she has already seen PCP for a hospital follow up appointment on 1/4/23.    Patient would like assistance in scheduling a virtual follow up appointment, writer scheduled patient:    4/5/2023 2:30 PM (Arrive by 2:10 PM) Vi Metz MD [facility]     Patient also would like an FYI sent to PCP that she will be dropping of a [employer] mobility form to the  of clinic (states this was discussed with PCP)    Leanna Calles RN  [facility]

## 2023-01-07 ENCOUNTER — HOSPITAL ENCOUNTER (OUTPATIENT)
Dept: CT IMAGING | Facility: CLINIC | Age: 53
Discharge: HOME | End: 2023-01-07
Attending: INTERNAL MEDICINE | Admitting: INTERNAL MEDICINE
Payer: COMMERCIAL

## 2023-01-07 ENCOUNTER — TELEPHONE (OUTPATIENT)
Dept: FAMILY MEDICINE | Facility: CLINIC | Age: 53
End: 2023-01-07

## 2023-01-07 DIAGNOSIS — R59.9 ENLARGED LYMPH NODES: ICD-10-CM

## 2023-01-07 PROCEDURE — 70490 CT SOFT TISSUE NECK W/O DYE: CPT

## 2023-01-07 NOTE — TELEPHONE ENCOUNTER
Pt's spouse dropped off an Application for [employer] for [place] at  on Saturday, 1/7/23.    Please complete the forms and mail into [employer]ty for this patient.    Any questions, call [place] at:[phone].

## 2023-01-09 ENCOUNTER — TELEPHONE (OUTPATIENT)
Dept: FAMILY MEDICINE | Facility: CLINIC | Age: 53
End: 2023-01-09

## 2023-01-09 ENCOUNTER — MYC MEDICAL ADVICE (OUTPATIENT)
Dept: FAMILY MEDICINE | Facility: CLINIC | Age: 53
End: 2023-01-09

## 2023-01-09 NOTE — TELEPHONE ENCOUNTER
[facility] called requesting Dr. Metz call radiologist about critical findings to [phone].

## 2023-01-10 ENCOUNTER — TELEPHONE (OUTPATIENT)
Dept: FAMILY MEDICINE | Facility: CLINIC | Age: 53
End: 2023-01-10

## 2023-01-10 ENCOUNTER — TELEPHONE (OUTPATIENT)
Dept: NEUROLOGY | Facility: CLINIC | Age: 53
End: 2023-01-10

## 2023-01-10 DIAGNOSIS — G95.89 CERVICAL SPINAL MASS (H): Primary | ICD-10-CM

## 2023-01-10 NOTE — TELEPHONE ENCOUNTER
Brief neurosurgery note:    Received a call from Dr. Metz, patient's PCP re: request for biopsy of calcified mass present lateral to C2/C3. Requested Dr. eMtz that since it is a call from an outside facility, as per policy it is beyond resident's domain to make a decision and if she could call the on call staff Neurosurgeon. Dr. Metz was also suggested to reach out to IR team for consideration of biopsy for this lesion. She agreed with the discussions.    Jean Swann  Neurosurgery Resident    D/w chief resident, Dr. Pate.

## 2023-01-16 ENCOUNTER — NURSE TRIAGE (OUTPATIENT)
Dept: FAMILY MEDICINE | Facility: CLINIC | Age: 53
End: 2023-01-16
Payer: COMMERCIAL

## 2023-01-16 ENCOUNTER — TELEPHONE (OUTPATIENT)
Dept: FAMILY MEDICINE | Facility: CLINIC | Age: 53
End: 2023-01-16

## 2023-01-16 DIAGNOSIS — S72.91XA CLOSED FRACTURE OF RIGHT FEMUR, UNSPECIFIED FRACTURE MORPHOLOGY, UNSPECIFIED PORTION OF FEMUR, INITIAL ENCOUNTER (H): ICD-10-CM

## 2023-01-16 RX ORDER — OXYCODONE HYDROCHLORIDE 5 MG/1
5 TABLET ORAL EVERY 6 HOURS PRN
Qty: 80 TABLET | Refills: 0 | Status: SHIPPED | OUTPATIENT
Start: 2023-01-16 | End: 2023-01-17

## 2023-01-16 NOTE — TELEPHONE ENCOUNTER
PharmacistJason [phone]  called requesting order clarification for :    oxyCODONE (ROXICODONE) 5 MG tablet 80 tablet 0 1/16/2023 2/5/2023 No   Sig - Route: Take 1 tablet (5 mg) by mouth every 6 hours as needed for severe pain (7-10) Take 2.5 (1/2 tab)- 5 mg (1 tab) up to three times daily as needed, allow for at least 4 hours between doses, continue to reduce oxycodone use allowing for more time between doses. - Oral   Sent to pharmacy as: oxyCODONE HCl 5 MG Oral Tablet (ROXICODONE)   Class: E-Prescribe   Earliest Fill Date: 1/16/2023   Order: [id number]   E-Prescribing Status: Receipt confirmed by pharmacy (1/16/2023  3:22 PM CST)   Renewals    Renewal requests to authorizing provider (Vi Metz MD) prohibited        1.) please modify sig to state one frequency. Would you like order to be for every 6 hours prn? Or 3 times daily?  2.) If pt is taking medication for a non- chronic condition, the patient can only be prescribed narcotic for 7 days according to the [place] state law.   Please modify order to 21 tabs if patient is taking medication up to 3x day or to 28 tabs if patient is taking medication up to 4x day.       Reason for Disposition    Pharmacy calling with prescription question and triager unable to answer question    Additional Information    Negative: Intentional drug overdose and suicidal thoughts or ideas    Negative: Drug overdose and triager unable to answer question    Negative: Caller requesting a renewal or refill of a medicine patient is currently taking    Negative: Caller requesting information unrelated to medicine    Negative: Caller requesting information about COVID-19 Vaccine    Negative: Caller requesting information about Emergency Contraception    Negative: Caller requesting information about Combined Birth Control Pills    Negative: Caller requesting information about Progestin Birth Control Pills    Negative: Caller requesting information about Post-Op pain or  medicines    Negative: Caller requesting a prescription antibiotic (such as penicillin) for Strep throat and has a positive culture result    Negative: Caller requesting a prescription anti-viral med (such as Tamiflu) and has influenza (flu) symptoms    Negative: Immunization reaction suspected    Negative: Rash while taking a medicine or within 3 days of stopping it    Negative: Asthma and having symptoms of asthma (cough, wheezing, etc.)    Negative: Symptom of illness (e.g., headache, abdominal pain, earache, vomiting) that are more than mild    Negative: Breastfeeding questions about mother's medicines and diet    Negative: MORE THAN A DOUBLE DOSE of a prescription or over-the-counter (OTC) drug    Negative: DOUBLE DOSE (an extra dose or lesser amount) of prescription drug and any symptoms (e.g., dizziness, nausea, pain, sleepiness)    Negative: DOUBLE DOSE (an extra dose or lesser amount) of over-the-counter (OTC) drug and any symptoms (e.g., dizziness, nausea, pain, sleepiness)    Negative: Took another person's prescription drug    Negative: DOUBLE DOSE (an extra dose or lesser amount) of prescription drug and NO symptoms  (Exception: A double dose of antibiotics.)    Negative: Diabetes drug error or overdose (e.g., took wrong type of insulin or took extra dose)    Negative: Caller has medication question about med NOT prescribed by PCP and triager unable to answer question (e.g., compatibility with other med, storage)    Negative: Prescription not at pharmacy and was prescribed by PCP recently  (Exception: triager has access to EMR and prescription is recorded there. Go to Home Care and confirm for pharmacy.)    Protocols used: MEDICATION QUESTION CALL-A-[place]

## 2023-01-16 NOTE — TELEPHONE ENCOUNTER
Forms/Letter Request    Type of form/letter: FMLA - Unknown    Have you been seen for this request: N/A    Do we have the form/letter: Yes: [employer]-Form given to Dr. Metz    When is form/letter needed by: 01/20/23    How would you like the form/letter returned: Fax    Patient Notified form requests are processed in 3-5 business days:No    Could we send this information to you in [place] or would you prefer to receive a phone call?:   No preference   Okay to leave a detailed message?: Yes at Work number on file:  There is no work phone number on file.

## 2023-01-17 RX ORDER — OXYCODONE HYDROCHLORIDE 5 MG/1
5 TABLET ORAL EVERY 6 HOURS PRN
Qty: 28 TABLET | Refills: 0 | Status: SHIPPED | OUTPATIENT
Start: 2023-01-17 | End: 2023-02-02

## 2023-01-24 ENCOUNTER — HOSPITAL ENCOUNTER (OUTPATIENT)
Dept: MRI IMAGING | Facility: CLINIC | Age: 53
Discharge: HOME | End: 2023-01-24
Attending: INTERNAL MEDICINE | Admitting: INTERNAL MEDICINE
Payer: COMMERCIAL

## 2023-01-24 DIAGNOSIS — G95.89 CERVICAL SPINAL MASS (H): ICD-10-CM

## 2023-01-24 PROCEDURE — 72156 MRI NECK SPINE W/O & W/DYE: CPT

## 2023-01-24 PROCEDURE — 255N000002 HC RX 255 OP 636: Performed by: INTERNAL MEDICINE

## 2023-01-24 PROCEDURE — A9585 GADOBUTROL INJECTION: HCPCS | Performed by: INTERNAL MEDICINE

## 2023-01-24 RX ORDER — GADOBUTROL 604.72 MG/ML
7 INJECTION INTRAVENOUS ONCE
Status: COMPLETED | OUTPATIENT
Start: 2023-01-24 | End: 2023-01-24

## 2023-01-24 RX ADMIN — GADOBUTROL 7 ML: 604.72 INJECTION INTRAVENOUS at 14:31

## 2023-01-26 ENCOUNTER — TELEPHONE (OUTPATIENT)
Dept: FAMILY MEDICINE | Facility: CLINIC | Age: 53
End: 2023-01-26
Payer: COMMERCIAL

## 2023-01-26 DIAGNOSIS — D49.2 CERVICAL SPINE TUMOR: Primary | ICD-10-CM

## 2023-01-30 ENCOUNTER — MYC MEDICAL ADVICE (OUTPATIENT)
Dept: FAMILY MEDICINE | Facility: CLINIC | Age: 53
End: 2023-01-30
Payer: COMMERCIAL

## 2023-01-30 DIAGNOSIS — S72.91XA CLOSED FRACTURE OF RIGHT FEMUR, UNSPECIFIED FRACTURE MORPHOLOGY, UNSPECIFIED PORTION OF FEMUR, INITIAL ENCOUNTER (H): ICD-10-CM

## 2023-01-31 RX ORDER — HYDROXYZINE HYDROCHLORIDE 25 MG/1
25 TABLET, FILM COATED ORAL EVERY 6 HOURS PRN
Qty: 30 TABLET | Refills: 1 | Status: SHIPPED | OUTPATIENT
Start: 2023-01-31 | End: 2023-03-06

## 2023-01-31 RX ORDER — METHOCARBAMOL 500 MG/1
500 TABLET, FILM COATED ORAL 4 TIMES DAILY PRN
Qty: 100 TABLET | Refills: 0 | Status: SHIPPED | OUTPATIENT
Start: 2023-01-31 | End: 2023-03-27

## 2023-01-31 NOTE — TELEPHONE ENCOUNTER
TO PCP    Patient requesting refills.     Requesting refill for oxycodone as well. Not on current med list. Please advise.     Two of three rx's and pharmacy pended below.     Janette Lloyd RN on 1/31/2023 at 4:28 PM

## 2023-02-01 ENCOUNTER — HOSPITAL ENCOUNTER (OUTPATIENT)
Dept: MRI IMAGING | Facility: CLINIC | Age: 53
Discharge: HOME | End: 2023-02-01
Attending: INTERNAL MEDICINE | Admitting: INTERNAL MEDICINE
Payer: COMMERCIAL

## 2023-02-01 DIAGNOSIS — G71.3 MYOPATHY, MITOCHONDRIAL: ICD-10-CM

## 2023-02-01 PROCEDURE — 73718 MRI LOWER EXTREMITY W/O DYE: CPT | Mod: LT

## 2023-02-01 PROCEDURE — 73718 MRI LOWER EXTREMITY W/O DYE: CPT | Mod: 26 | Performed by: RADIOLOGY

## 2023-02-02 ENCOUNTER — MYC REFILL (OUTPATIENT)
Dept: FAMILY MEDICINE | Facility: CLINIC | Age: 53
End: 2023-02-02
Payer: COMMERCIAL

## 2023-02-02 DIAGNOSIS — S72.91XA CLOSED FRACTURE OF RIGHT FEMUR, UNSPECIFIED FRACTURE MORPHOLOGY, UNSPECIFIED PORTION OF FEMUR, INITIAL ENCOUNTER (H): ICD-10-CM

## 2023-02-02 RX ORDER — OXYCODONE HYDROCHLORIDE 5 MG/1
5 TABLET ORAL ONCE
Status: CANCELLED | OUTPATIENT
Start: 2023-02-02 | End: 2023-02-02

## 2023-02-02 RX ORDER — OXYCODONE HYDROCHLORIDE 5 MG/1
5 TABLET ORAL EVERY 6 HOURS PRN
Qty: 28 TABLET | Refills: 0 | Status: SHIPPED | OUTPATIENT
Start: 2023-02-02 | End: 2023-02-13

## 2023-02-02 NOTE — TELEPHONE ENCOUNTER
Pt reports she discussed medication refill with PCP. Please advice on approval.     Routing refill request to provider for review/approval because:  Drug not on the [facility] refill protocol   Drug not active on patient's medication list  Medication is reported/historical

## 2023-02-03 ENCOUNTER — TELEPHONE (OUTPATIENT)
Dept: FAMILY MEDICINE | Facility: CLINIC | Age: 53
End: 2023-02-03
Payer: COMMERCIAL

## 2023-02-03 ENCOUNTER — MYC MEDICAL ADVICE (OUTPATIENT)
Dept: FAMILY MEDICINE | Facility: CLINIC | Age: 53
End: 2023-02-03
Payer: COMMERCIAL

## 2023-02-03 DIAGNOSIS — S72.91XA CLOSED FRACTURE OF RIGHT FEMUR, UNSPECIFIED FRACTURE MORPHOLOGY, UNSPECIFIED PORTION OF FEMUR, INITIAL ENCOUNTER (H): Primary | ICD-10-CM

## 2023-02-03 NOTE — TELEPHONE ENCOUNTER
Patient calling clinic requesting PCP write orders for patient to return to [facility] to have continued PT. Patient stated she has attempted to reach out to over 32 different agencies and none of them will accept her insurance. Patient is requesting to return to Acute Rehab Facility for continued PT.     Routing to PCP for review.

## 2023-02-03 NOTE — TELEPHONE ENCOUNTER
See below messages from patient requesting referrals for OT nad PT out patient     Darya LEE, Triage RN  [facility]

## 2023-02-06 ENCOUNTER — PATIENT OUTREACH (OUTPATIENT)
Dept: CARE COORDINATION | Facility: CLINIC | Age: 53
End: 2023-02-06
Payer: COMMERCIAL

## 2023-02-06 ASSESSMENT — ACTIVITIES OF DAILY LIVING (ADL): DEPENDENT_IADLS:: TRANSPORTATION;CLEANING;COOKING;LAUNDRY;SHOPPING;MEAL PREPARATION

## 2023-02-06 NOTE — PROGRESS NOTES
Clinic Care Coordination Contact  [facility]/Voicemail    Referral Source: Care Team  Clinical Data: Care Coordinator Outreach  Outreach attempted x 1.  Left message on patient's voicemail with call back information and requested return call.    Plan: Care Coordinator will try to reach patient again in 1 month.     Anabell Chaidez RN, BSN, PHN  Primary Care / Care Coordinator   [facility]  [email or web address]   [phone]

## 2023-02-07 ENCOUNTER — TELEPHONE (OUTPATIENT)
Dept: PHYSICAL MEDICINE AND REHAB | Facility: CLINIC | Age: 53
End: 2023-02-07
Payer: COMMERCIAL

## 2023-02-07 NOTE — TELEPHONE ENCOUNTER
[facility] Call Center    Phone Message    May a detailed message be left on voicemail: yes     Reason for Call: Appointment Intake    Referring Provider Name: Self  Diagnosis and/or Symptoms:   Patient called with questions for PM&R department.   She states she broke her femur in early December, was in hospital for a week, acute rehab for two weeks.   She is wanting to get back into acute rehab for therapies. She states her muscles are atrophying after being inactive for several months.   Patient is requesting a PM&R provider to evaluate her and her case.   Patient states she is homebound and in a wheelchair and it is very diffuclt for her to get around to go to appointments which is why she would like to go back to acute rehab per a PM&R providers advisory. Patient states acute rehab told her to schedule with PM&R to be authorized to go back to acute rehab.    Action Taken: Message routed to:  [facility] PM&R    Travel Screening: Not Applicable

## 2023-02-08 ENCOUNTER — TELEPHONE (OUTPATIENT)
Dept: RHEUMATOLOGY | Facility: CLINIC | Age: 53
End: 2023-02-08

## 2023-02-08 ENCOUNTER — HOSPITAL ENCOUNTER (OUTPATIENT)
Dept: OCCUPATIONAL THERAPY | Facility: CLINIC | Age: 53
Discharge: HOME | End: 2023-02-08
Attending: INTERNAL MEDICINE
Payer: COMMERCIAL

## 2023-02-08 DIAGNOSIS — R29.898 DECREASED STRENGTH OF UPPER EXTREMITY: ICD-10-CM

## 2023-02-08 DIAGNOSIS — Z78.9 DECREASED ACTIVITIES OF DAILY LIVING (ADL): ICD-10-CM

## 2023-02-08 DIAGNOSIS — S72.91XA CLOSED FRACTURE OF RIGHT FEMUR, UNSPECIFIED FRACTURE MORPHOLOGY, UNSPECIFIED PORTION OF FEMUR, INITIAL ENCOUNTER (H): Primary | ICD-10-CM

## 2023-02-08 PROCEDURE — 97165 OT EVAL LOW COMPLEX 30 MIN: CPT | Mod: GO | Performed by: OCCUPATIONAL THERAPIST

## 2023-02-08 NOTE — TELEPHONE ENCOUNTER
[facility] Call Center    Phone Message    May a detailed message be left on voicemail: yes     Reason for Call: Order(s): Other:   Reason for requested: Patient is requesting a referral to a PMR clinic. Patient is wheelchair bound and and looking to get back in to inpatient rehab for therapy.  Date needed: ASAP  Provider name: Dr. Branett      Action Taken: Other: [facility]    Travel Screening: Not Applicable

## 2023-02-08 NOTE — TELEPHONE ENCOUNTER
Returned call to patient. She understood that she will need a referral to be seen. She will reach out to her PCP and see if she needs to begin with Neurology and or just PMR Referral.

## 2023-02-09 ENCOUNTER — THERAPY VISIT (OUTPATIENT)
Dept: PHYSICAL THERAPY | Facility: CLINIC | Age: 53
End: 2023-02-09
Attending: INTERNAL MEDICINE
Payer: COMMERCIAL

## 2023-02-09 DIAGNOSIS — S72.91XA CLOSED FRACTURE OF RIGHT FEMUR, UNSPECIFIED FRACTURE MORPHOLOGY, UNSPECIFIED PORTION OF FEMUR, INITIAL ENCOUNTER (H): ICD-10-CM

## 2023-02-09 PROCEDURE — 97110 THERAPEUTIC EXERCISES: CPT | Mod: GP | Performed by: PHYSICAL THERAPIST

## 2023-02-09 PROCEDURE — 97530 THERAPEUTIC ACTIVITIES: CPT | Mod: GP | Performed by: PHYSICAL THERAPIST

## 2023-02-09 NOTE — PROGRESS NOTES
Subjective:    Patient Health History  Britt Park being seen for Follow-up evaluation for PT on right leg/knee.     Problem began: 12/10/2022.   Problem occurred: Slipped and fell on [facility] at the [facility]. Taken by ambulance to ER.   Pain is reported as 7/10 on pain scale.  General health as reported by patient is fair.  Pertinent medical history includes: history of fractures, numbness/tingling, pain at night/rest and weakness.     Medical allergies: other. Other medical allergies details: Sulfa, benedryl.   Surgeries include:  Orthopedic surgery and other. Other surgery history details: C section on 4/11/2007.    Current medications:  High blood pressure medication, muscle relaxants, pain medication, steroids and other. Other medications details: High cholesterol meds.       Primary job tasks include:  Computer work, prolonged sitting and repetitive tasks.                  Physical Exam                    Objective:  System    Physical Exam    General     ROS    Assessment/Plan:    PROGRESS  REPORT    Progress reporting period is from 1-5-23 to 2-9-23.       SUBJECTIVE  Subjective changes noted by patient:   Pt. returns to PT today for the first time since the IE 5 weeks ago. Pt. has had difficulty attending due to transportation issues. Pt also was trying to secure home PT during this time but has been unsuccessful. Pt. is now going to try to attend regular outpatient PT moving forward. Pt. saw her surgeon 1/24 and has been cleared to WBAT. She reports doing very little WB at home thus far. She spends most of the day in her WC. She does pivot transfers with walker but has not been walking at all.      Current pain level is  4/10.     Previous pain level was  7/10  .   Changes in function:  Yes (See Goal flowsheet attached for changes in current functional level)  Adverse reaction to treatment or activity: None    OBJECTIVE  Changes noted in objective findings: Transfers - SBA to min assist with chair to  plinth and sit to stand transfer. AROM R knee flex 40. PROM flex 50 with pain. Strength R SLR - able but very difficult     ASSESSMENT/PLAN  Updated problem list and treatment plan: Diagnosis 1:  S/p R femur ORIF  Pain -  self management and education  Decreased ROM/flexibility - manual therapy and therapeutic exercise  Decreased strength - therapeutic exercise and therapeutic activities  Impaired gait - gait training  Impaired muscle performance - neuro re-education  Decreased function - therapeutic activities  STG/LTGs have been met or progress has been made towards goals:  Yes (See Goal flow sheet completed today.)  Assessment of Progress: The patient's condition is improving.  Self Management Plans:  Patient has been instructed in a home treatment program.  Patient  has been instructed in self management of symptoms.  I have re-evaluated this patient and find that the nature, scope, duration and intensity of the therapy is appropriate for the medical condition of the patient.  Sun continues to require the following intervention to meet STG and LTG's:  PT    Recommendations:  This patient would benefit from continued therapy.     Frequency:  1-2 X week, once daily  Duration:  for 8 weeks        Please refer to the daily flowsheet for treatment today, total treatment time and time spent performing 1:1 timed codes.

## 2023-02-09 NOTE — PROGRESS NOTES
"   02/08/23 1400   Quick Adds   Type of Visit Outpatient Occupational Therapy Re-Evaluation       Present No   General Information   Start Of Care Date 02/08/23   Referring Physician Vi Metz MD in CS   Orders Evaluate and treat as indicated   Other Orders PT   Orders Date 02/07/23   Medical Diagnosis Closed fracture of right femur, unspecified fracture morphology, unspecified portion of femur, initial encounter (H) (S72.91XA)  - Primary   Onset of Illness/Injury or Date of Surgery 12/11/22   Precautions/Limitations Fall precautions;Weightbearing restrictions  (Per paper order from [facility]: WBAT with knee locked into extension, then transition to WBAT with unlocked position)   Surgical/Medical History Reviewed Yes   Additional Occupational Profile Info/Pertinent History of Current Problem Ventura is a 52 year old, right hand dominant female presenting for OP OT evaluation at the request of Dr.Tooba Metz in regards to assessment for acute rehab needs.  Since initial OP OT evaluation on 1-5-23, it was recommended that she would benefit from homecare.  Patient reports that they called 32+ agencies and she was not accepted for homecare, therfore has not received any rehab/therapy since discharge from ARU. Patient reports that she has been in her W/C or bed each day, has not walked despite WBAT orders from MD in January. \"I am very concerned and deconditioned.  I am supposed to return to work next month.\"  She sustained a R femur fracture sustained on 12/11/22 requiring IM nailing and ORIF.   Functional limitations are further exacerbated by pre-existing condition of granulomatous myositis with decreased ROM and strength through bilateral UE s.  Patient would be a good candidate to return to acute rehab to maximize her function, return to PLOF and improve overall strength from deconditioning and inactivity for a month.   Comments/Observations Patient arrived in W/C, knee immobilizer with ice pack. 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  Hobbies: Going for walks, cooking, reading.  Has 3 kids (17, 15 and 15).   Current Living Environment House   Number of Stairs to Enter Home 4   Number of Stairs Within Home 12-14   Primary Bathroom Location/Comments Second Floor   Primary Bathroom Set Up/Equipment Tub/Shower combo   Home/Community Accessibility Comments Patient is currently not able to access second floor bathroom for bathing/showering purposes.  Has not bathed since return home however patient indicates that she has a punch card through [email or web address] where she uses shower as needed.   Prior Level - Transfers Independent   Prior Level - Ambulation Independent   Prior Level - ADLS Independent   Prior Responsibilities - IADL Meal Preparation;Housekeeping;Laundry;Shopping;Medication management;Finances;Driving   Prior Level Comments Patient was independent with all ADLs/IADLs, driving and working full time prior to surgery.   Current Assistive Devices - Mobility Manual wheelchair  (Walker for transfers, utilizes side rail on bed for supine<>sit transfer)   Current Assistive Devices - ADL Reacher;Sock aide  (leg )   Role/Living Environment Comments Uses W/C in the home.  Is either sitting in the W/C or in the bed.   works during the day so pt is alone from 5 a.m -6:40 p.m.   Patient/family Goals Statement To improve strength, be able to walk and transfer safely, return to work   Pain   Patient currently in pain Yes   Pain location R knee   Pain rating 4   Pain comments Patient reports intense sharp pain in her R knee, with any movement (had MRI last week and " was told she had edema).  Patient reports that she is taking Oxycodone for pain.  No redness or temperature change when assessed today.   Fall Risk Screen   Have you fallen 2 or more times in the past year? No   Have you fallen and had an injury in the past year? Yes   Is patient a fall risk? Yes;Department fall risk interventions implemented   Fall screen comments Patient see's ortho PT tomorrow.   Abuse Screen (yes response referral indicated)   Feels Unsafe at Home or Work/School no   Feels Threatened by Someone no   Does Anyone Try to Keep You From Having Contact with Others or Doing Things Outside Your Home? no   Physical Signs of Abuse Present no   Cognitive Status Examination   Orientation Orientation to person, place and time   Level of Consciousness Alert   Follows Commands and Answers Questions 100% of the time;Able to follow multistep instructions   Personal Safety and Judgment Intact   Memory Intact   Attention No deficits were identified   Organization/Problem Solving No deficits were identified   Executive Function No deficits were identified   Visual Perception   Visual Perception No deficits were identified   Sensation   Upper Extremity Sensory Examination No deficits were identified   Range of Motion (ROM)   ROM Comments Patient demonstrates decreased AROM through shoulder-pre-existing condition of granulomatous myositis.   Left Shoulder Flexion ROM   Left Shoulder Flexion AROM - degrees 70   Left Shoulder Flexion PROM - degrees 110   Left Shoulder Horizontal ABduction ROM   Left Shoulder Horizontal ABduction AROM - degrees 40   Left Shoulder Horizontal ABduction PROM - degrees 120   Right Shoulder Flexion ROM   Right Shoulder Flexion AROM - degrees 70   Right Shoulder Flexion PROM - degrees 110   Right Shoulder Horizontal Abduction ROM   Right Shoulder Horizontal ABduction AROM - degrees 40   Right Shoulder Horizontal ABduction PROM - degrees 120   Strength   Strength MMT: Shoulder;MMT:  Elbow/Forearm;MMT: Wrist   MMT: Shoulder   Shoulder Flexion - Left Side (3-/5) fair minus, left   Shoulder Extension - Left Side (3+/5) fair plus, left   Shoulder Flexion - Right Side (3/5) fair, right   Shoulder Extension - Right Side (3/5) fair, right   MMT: Elbow/Forearm   Elbow Flexion - Left Side (4/5) good, left   Elbow Extension - Left Side (4/5) good, left   Elbow Flexion - Right Side (4/5) good, right   Elbow Extension - Right Side (4/5) good, right   Hand Strength   Hand Dominance Right   Left Hand  (pounds) 33 pounds  (was 26#)   Right Hand  (pounds) 19.6 pounds  (decreased from 31#)   Hand Strength Comments B hand  strength more than 2 SD below the mean for her age group and has decreased from initial OP OT evaluation on 1/5/23.   Balance   Balance Comments Patient is now WBAT (as of 1/24/23) but has not put any weight through her R leg.   Functional Mobility   Functional Mobility Comments Using knee immobilizer for all transfer, has not done any ROM since her surgery.  She was not accepted to home health and has not had any therapy since her surgery.   Mobility   Bed Mobility Comments Reports she is able to get in/out of bed using the bed rail.   Transfer Skills   Transfer Comments Patient needing min CGA for sit to stand x 1 from W/C, cues for hand placement (reports having a platform walker at home).   Transfer Skill   Level of [place]: Transfers minimum assist (75% patients effort)   Physical Assist/Nonphysical Assist: Transfers 1 person assist   Weight-Bearing Restrictions weight-bearing as tolerated   Assistive Device standard walker   Toilet Transfer   Toilet Transfer Comments Reports she is able to stand pivot transfer to toilet.   Bathing   Level of [place] - Bathing unable to perform   Bathing Comments Patient is unable to participate in traditional bathing routine at this time due to inability to reach second floor of home to tub/shower combo.  Has been sponge bathing or  "using the shower at [employer].   Upper Body Dressing   Level of [place]: Dress Upper Body stand-by assist   Physical Assist/Nonphysical Assist: Dress Upper Body set-up required   Lower Body Dressing   Level of [place]: Dress Lower Body moderate assist (50% patients effort)   Physical Assist/Nonphysical Assist: Dress Lower Body 1 person assist;supervision;set-up required   Assistive Device sock-aid;reacher   Lower Body Dressing Comments total assist with shoes/socks and compression sock.  Assitance to thread pants/underwear but once in standing is able to pull up using 1 hand at a time.   Toileting   Level of [place]: Toilet stand-by assist   Physical Assist/Nonphysical Assist: Toilet 1 person assist   Assistive Device raised toilet seat;standard walker   Toileting Comments Stand pivot transfer with using WW   Grooming   Level of [place]: Grooming stand-by assist   Physical Assist/Nonphysical Assist: Grooming set-up required   Grooming Comments Sitting in W/C, using basin to brush teeth as she is not able to access sink.   Eating/Self-Feeding   Level of [place]: Eating independent   Physical Assist/Nonphysical Assist: Eating set-up required   Activity Tolerance   Activity Tolerance \"My muscles are wasting away.  I have not walked since December.\"  Patient notes and exhibits decreased activity tolerance with need for assistance in most aspects of life at this time.   Instrumental Activities of Daily Living Assessment   IADL Assessment/Observations Total assist for IADLs at this time.   Planned Therapy Interventions   Planned Therapy Interventions Strengthening;Therapeutic activities;Transfer training   Intervention Comments Patient has an exclusion for both the sensory integration code and the self care code under insurance.   Adult OT Eval Goals   OT Eval Goals (Adult) 1    OT Goal 1   Goal Identifier Strengthening   Goal Description Patient will demonstrate and verbalize understanding of " strengthening home programming within precautions of granulomatous myositis as needed to increase independence in functional transfers and general independence.   Target Date 04/09/23   Clinical Impression   Criteria for Skilled Therapeutic Interventions Met Yes, treatment indicated   OT Diagnosis Decreased baseline independence in functional tasks   Influenced by the following impairments deconditioning, generalized weakness.   Assessment of Occupational Performance 5 or more Performance Deficits   Identified Performance Deficits I/ADL's and functional transfers   Clinical Decision Making (Complexity) Low complexity   Therapy Frequency Strongly recommend acute rehab if patient is a candidate.  If not, recommend OT 1x/week.   Predicted Duration of Therapy Intervention (days/wks) 60 days   Risks and Benefits of Treatment have been explained. Yes   Patient, Family & other staff in agreement with plan of care Yes   Education Assessment   Barriers To Learning No Barriers   Preferred Learning Style Listening;Demonstration;Pictures/video   Total Evaluation Time   OT Lexx Low Complexity Minutes (67869) 21

## 2023-02-09 NOTE — TELEPHONE ENCOUNTER
Patient returned call.  She is looking for a referral to PMR as she wants to be admitted for acute rehab post femur fracture in December.  She states PMR needs the referral from neurology.  Explained that Dr Barnett is a rheumatologist.  She thought that rheumatology and neurology were the same.  She states she has a neuro at [facility].  Advised to contact them and see if her PCP could help coordinate the referral.  Advised PCP wrote orders for PT/OT.  Patient states she cannot transfer well, is WC bound, and it is hard for him to take off work.  She will look into alternative transportation options such as [employer].    Betina Valencia RN

## 2023-02-13 ENCOUNTER — OFFICE VISIT (OUTPATIENT)
Dept: FAMILY MEDICINE | Facility: CLINIC | Age: 53
End: 2023-02-13
Payer: COMMERCIAL

## 2023-02-13 ENCOUNTER — ANCILLARY PROCEDURE (OUTPATIENT)
Dept: ULTRASOUND IMAGING | Facility: CLINIC | Age: 53
End: 2023-02-13
Attending: INTERNAL MEDICINE
Payer: COMMERCIAL

## 2023-02-13 VITALS
HEIGHT: 62 IN | OXYGEN SATURATION: 97 % | DIASTOLIC BLOOD PRESSURE: 75 MMHG | SYSTOLIC BLOOD PRESSURE: 118 MMHG | WEIGHT: 150 LBS | BODY MASS INDEX: 27.6 KG/M2 | HEART RATE: 84 BPM | TEMPERATURE: 98.1 F | RESPIRATION RATE: 14 BRPM

## 2023-02-13 DIAGNOSIS — L20.9 ATOPIC DERMATITIS OF SCALP: Primary | ICD-10-CM

## 2023-02-13 DIAGNOSIS — F32.0 CURRENT MILD EPISODE OF MAJOR DEPRESSIVE DISORDER WITHOUT PRIOR EPISODE (H): ICD-10-CM

## 2023-02-13 DIAGNOSIS — S72.91XA CLOSED FRACTURE OF RIGHT FEMUR, UNSPECIFIED FRACTURE MORPHOLOGY, UNSPECIFIED PORTION OF FEMUR, INITIAL ENCOUNTER (H): ICD-10-CM

## 2023-02-13 DIAGNOSIS — M79.661 PAIN OF RIGHT LOWER LEG: ICD-10-CM

## 2023-02-13 PROCEDURE — 99214 OFFICE O/P EST MOD 30 MIN: CPT | Performed by: INTERNAL MEDICINE

## 2023-02-13 PROCEDURE — 93971 EXTREMITY STUDY: CPT | Mod: RT

## 2023-02-13 RX ORDER — GABAPENTIN 100 MG/1
100 CAPSULE ORAL 3 TIMES DAILY
Qty: 90 CAPSULE | Refills: 0 | Status: SHIPPED | OUTPATIENT
Start: 2023-02-13 | End: 2023-03-10

## 2023-02-13 RX ORDER — OXYCODONE HYDROCHLORIDE 5 MG/1
5 TABLET ORAL EVERY 6 HOURS PRN
Qty: 28 TABLET | Refills: 0 | Status: SHIPPED | OUTPATIENT
Start: 2023-02-13 | End: 2023-03-07

## 2023-02-13 RX ORDER — ASPIRIN 325 MG
325 TABLET, DELAYED RELEASE (ENTERIC COATED) ORAL 2 TIMES DAILY
Qty: 60 TABLET | Refills: 0 | Status: SHIPPED | OUTPATIENT
Start: 2023-02-13 | End: 2023-05-17

## 2023-02-13 RX ORDER — FLUOCINONIDE TOPICAL SOLUTION USP, 0.05% 0.5 MG/ML
SOLUTION TOPICAL 2 TIMES DAILY
Qty: 20 ML | Refills: 1 | Status: SHIPPED | OUTPATIENT
Start: 2023-02-13 | End: 2023-02-20

## 2023-02-13 ASSESSMENT — PAIN SCALES - GENERAL: PAINLEVEL: MILD PAIN (2)

## 2023-02-13 NOTE — PATIENT INSTRUCTIONS
[facility] Green Fitted Wrist Brace, For Men and Women, Right Hand, Black, Small/Medium    Call to schedule an appointment for imaging at [phone]

## 2023-02-13 NOTE — PROGRESS NOTES
Assessment & Plan     Closed fracture of right femur, unspecified fracture morphology, unspecified portion of femur, initial encounter (H)  Continue aspirin.  Oxycodone and gabapentin refilled  - aspirin (ASA) 325 MG EC tablet; Take 1 tablet (325 mg) by mouth 2 times daily  - oxyCODONE (ROXICODONE) 5 MG tablet; Take 1 tablet (5 mg) by mouth every 6 hours as needed for severe pain (7-10) Take 2.5 (1/2 tab)- 5 mg (1 tab) up to 4 times daily as needed, allow for at least 4 hours between doses, continue to reduce oxycodone use allowing for more time between doses.  - gabapentin (NEURONTIN) 100 MG capsule; Take 1 capsule (100 mg) by mouth 3 times daily    Atopic dermatitis of scalp  - fluocinonide (LIDEX) 0.05 % external solution; Apply topically 2 times daily for 7 days    Pain of right lower leg  Ultrasound to rule out DVT.  - US Lower Extremity Venous Duplex Right; Future    Current mild episode of major depressive disorder without prior episode (H)  - Adult Mental Health  Referral; Future       See Patient Instructions    Return in about 6 months (around 8/13/2023) for Follow up, with me.    REYNOLD RAMOS MD  [facility] ELO Whitehead is a 52 year old, presenting for the following health issues:  Mass (Back of head, and right side of neck )    Ventura is a 52-year-old female who presented to the clinic for follow-up.  She had right femur fracture 2 months ago status post surgery.  She has started to bear weight on her right foot.  She will be going for outpatient therapy along with occupational therapy.  She reports right calf tenderness and swelling.  On exam right and left fee are similar in appearance, no swelling noted on right foot.  She stopped taking aspirin 325 mg 1 week ago. I advised her to resume this medication.  She is using a wheelchair and able to go to the bathroom by herself at home.  She came to the clinic by herself.  [employer].  She reports bump in her  "scalp and occipital region.  On exam there is no swelling.  There is redness in the scalp possibly dermatitis.  Patient reports she feels hopeless.  She is interested in therapy.  [place] paperwork to be extended beyond February.  After that she will go to work part-time, 4 hours and increase hours as tolerated.    History of Present Illness       Reason for visit:  Look at bump on back of head & right side of neck is sore & tender  Symptom onset:  1-3 days ago  Symptoms include:  Bump on head: very sore & tender, headache. Right side of neck: sore & tender, small lump  Symptom intensity:  Moderate  Symptom progression:  Staying the same  Had these symptoms before:  No  What makes it worse:  Laying down - on the bump on back of head  What makes it better:  Sleeping & rest    She eats 2-3 servings of fruits and vegetables daily.She consumes 1 sweetened beverage(s) daily.She exercises with enough effort to increase her heart rate 9 or less minutes per day.  She exercises with enough effort to increase her heart rate 3 or less days per week.   She is taking medications regularly.       Review of Systems       Objective    /75 (BP Location: Right arm, Patient Position: Sitting, Cuff Size: Adult Regular)   Pulse 84   Temp 98.1  F (36.7  C) (Tympanic)   Resp 14   Ht 1.575 m (5' 2\")   Wt 68 kg (150 lb)   SpO2 97%   BMI 27.44 kg/m    Body mass index is 27.44 kg/m .  Physical Exam           "

## 2023-02-16 ENCOUNTER — THERAPY VISIT (OUTPATIENT)
Dept: PHYSICAL THERAPY | Facility: CLINIC | Age: 53
End: 2023-02-16
Payer: COMMERCIAL

## 2023-02-16 DIAGNOSIS — S72.91XA CLOSED FRACTURE OF RIGHT FEMUR, UNSPECIFIED FRACTURE MORPHOLOGY, UNSPECIFIED PORTION OF FEMUR, INITIAL ENCOUNTER (H): Primary | ICD-10-CM

## 2023-02-16 DIAGNOSIS — Z74.09 IMPAIRED MOBILITY AND ACTIVITIES OF DAILY LIVING: ICD-10-CM

## 2023-02-16 DIAGNOSIS — Z78.9 IMPAIRED MOBILITY AND ACTIVITIES OF DAILY LIVING: ICD-10-CM

## 2023-02-16 PROCEDURE — 97010 HOT OR COLD PACKS THERAPY: CPT | Mod: GP | Performed by: PHYSICAL THERAPIST

## 2023-02-16 PROCEDURE — 97530 THERAPEUTIC ACTIVITIES: CPT | Mod: GP | Performed by: PHYSICAL THERAPIST

## 2023-02-16 PROCEDURE — 97110 THERAPEUTIC EXERCISES: CPT | Mod: GP | Performed by: PHYSICAL THERAPIST

## 2023-02-20 ENCOUNTER — THERAPY VISIT (OUTPATIENT)
Dept: PHYSICAL THERAPY | Facility: CLINIC | Age: 53
End: 2023-02-20
Payer: COMMERCIAL

## 2023-02-20 DIAGNOSIS — M79.661 PAIN OF RIGHT LOWER LEG: Primary | ICD-10-CM

## 2023-02-20 DIAGNOSIS — S72.91XA CLOSED FRACTURE OF RIGHT FEMUR, UNSPECIFIED FRACTURE MORPHOLOGY, UNSPECIFIED PORTION OF FEMUR, INITIAL ENCOUNTER (H): ICD-10-CM

## 2023-02-20 PROCEDURE — 97110 THERAPEUTIC EXERCISES: CPT | Mod: GP

## 2023-02-20 PROCEDURE — 97530 THERAPEUTIC ACTIVITIES: CPT | Mod: GP

## 2023-02-21 ENCOUNTER — OFFICE VISIT (OUTPATIENT)
Dept: NEUROSURGERY | Facility: CLINIC | Age: 53
End: 2023-02-21
Attending: PHYSICIAN ASSISTANT
Payer: COMMERCIAL

## 2023-02-21 VITALS — HEART RATE: 96 BPM | DIASTOLIC BLOOD PRESSURE: 82 MMHG | OXYGEN SATURATION: 97 % | SYSTOLIC BLOOD PRESSURE: 125 MMHG

## 2023-02-21 DIAGNOSIS — D49.2 CERVICAL SPINE TUMOR: Primary | ICD-10-CM

## 2023-02-21 PROCEDURE — G0463 HOSPITAL OUTPT CLINIC VISIT: HCPCS

## 2023-02-21 PROCEDURE — G0463 HOSPITAL OUTPT CLINIC VISIT: HCPCS | Performed by: PHYSICIAN ASSISTANT

## 2023-02-21 PROCEDURE — 99203 OFFICE O/P NEW LOW 30 MIN: CPT | Performed by: PHYSICIAN ASSISTANT

## 2023-02-21 ASSESSMENT — PAIN SCALES - GENERAL: PAINLEVEL: MILD PAIN (3)

## 2023-02-21 NOTE — PROGRESS NOTES
NEUROSURGERY CLINIC CONSULT NOTE     DATE OF VISIT: 2/21/2023     SUBJECTIVE:     Britt Park is a pleasant 52 year old female who presents to the clinic today for consultation on a left sided mass on her cervical spine. She is referred to the Neurosurgery Clinic by Dr. Metz in Primary Care.     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 She currently has a right femur fracture.     Today, she states that she has had chronic left sided neck pain for approximately five years. She describes a daily with fluctuating intensity, aching pain that initiates in the left side of the neck and intermittently radiates to the side of her face. No distal radiculopathy. This left sided neck pain is intermittantly accompanied by paresthesia in the same distribution. She really cannot appreciate any aggravating or alleviating factors. No mechanism of injury such as trauma or a fall is associated with the onset of the pain. There are no bowel or bladder changes. She denies saddle anesthesia. She denies changes in gait, instability, or falling episodes. There has been no significant change in her handwriting or hand dexterity.        Current Outpatient Medications:      acetaminophen (TYLENOL) 325 MG tablet, Take 2 tablets (650 mg) by mouth every 6 hours as needed for other (For optimal non-opioid multimodal pain management to improve pain control.), Disp: 100 tablet, Rfl: 0     aspirin (ASA) 325 MG EC tablet, Take 1 tablet (325 mg) by mouth 2 times daily, Disp: 60 tablet, Rfl: 0     atorvastatin (LIPITOR) 10 MG tablet, TAKE 1 TABLET(10 MG) BY MOUTH DAILY, Disp: 90 tablet, Rfl: 3     calcium carbonate (TUMS) 500 MG chewable tablet, Take 1 tablet (500 mg) by mouth 2 times daily as needed for heartburn, Disp: , Rfl:      estradiol (ESTRACE) 0.1 MG/GM vaginal cream, PLACE 2 GRAMS VAGINALLY 2 TIMES A WEEK, Disp: 42.5 g, Rfl: 1     folic acid (FOLVITE) 1 MG tablet, Take 1 tablet (1 mg) by mouth daily, Disp: 90  tablet, Rfl: 3     gabapentin (NEURONTIN) 100 MG capsule, Take 1 capsule (100 mg) by mouth 3 times daily, Disp: 90 capsule, Rfl: 0     hydrOXYzine (ATARAX) 25 MG tablet, Take 1 tablet (25 mg) by mouth every 6 hours as needed for other (adjuvant pain), Disp: 30 tablet, Rfl: 1     losartan (COZAAR) 25 MG tablet, TAKE 1 TABLET(25 MG) BY MOUTH DAILY, Disp: 90 tablet, Rfl: 2     methocarbamol (ROBAXIN) 500 MG tablet, Take 1 tablet (500 mg) by mouth 4 times daily as needed for muscle spasms Allow at least 2 hours between doses max 4 doses daily. Continue to reduce use as able., Disp: 100 tablet, Rfl: 0     methotrexate 50 MG/2ML injection, Inject 0.8 mLs (20 mg) Subcutaneous every 7 days On Wednesdays, Disp: 4 mL, Rfl: 6     mometasone (NASONEX) 50 MCG/ACT nasal spray, 2 sprays daily, Disp: , Rfl:      mupirocin (BACTROBAN) 2 % external ointment, Apply topically 3 times daily, Disp: 15 g, Rfl: 0     oxyCODONE (ROXICODONE) 5 MG tablet, Take 1 tablet (5 mg) by mouth every 6 hours as needed for severe pain (7-10) Take 2.5 (1/2 tab)- 5 mg (1 tab) up to 4 times daily as needed, allow for at least 4 hours between doses, continue to reduce oxycodone use allowing for more time between doses., Disp: 28 tablet, Rfl: 0     pantoprazole (PROTONIX) 40 MG EC tablet, Take 40 mg by mouth daily, Disp: , Rfl:      polyethylene glycol (MIRALAX) 17 GM/Dose powder, Take 17 g by mouth daily, Disp: 578 g, Rfl: 0     predniSONE (DELTASONE) 5 MG tablet, Take 10 mg by mouth daily, Disp: , Rfl:      valACYclovir (VALTREX) 1000 mg tablet, Take 2,000 mg by mouth as needed, Disp: , Rfl:      Allergies   Allergen Reactions     Influenza Vaccines Hives     Benadryl [Diphenhydramine]      Diphenhydramine Other (See Comments)     SHAKY       Sulfa Drugs Swelling     Sulfa Drugs         Past Medical History:   Diagnosis Date     Secondary hypertension 11/2/2021        ROS: 10 point review of symptoms are negative other than the symptoms noted above in the  HPI.     Family History has been reviewed with the patient, there are no pertinent findings to presenting concern.     Past Surgical History:   Procedure Laterality Date     ABDOMEN SURGERY  2007         BIOPSY  2019 & 2021    Right bicep, result abnormal results, & left tricep biopsy     COLONOSCOPY  21     COLONOSCOPY N/A 2022    Procedure: COLONOSCOPY, FLEXIBLE, WITH LESION REMOVAL USING SNARE;  Surgeon: Dorie Santos MD;  Location:  GI     GYN SURGERY  07     for twins     OPEN REDUCTION INTERNAL FIXATION RODDING INTRAMEDULLARY FEMUR Right 2022    Procedure: Open reduction internal fixation of right femur fracture;  Surgeon: Al Larson MD;  Location:  OR        Social History     Tobacco Use     Smoking status: Never     Smokeless tobacco: Never   Vaping Use     Vaping Use: Never used   Substance Use Topics     Alcohol use: Not Currently     Drug use: Never        OBJECTIVE:   /82   Pulse 96   SpO2 97%    There is no height or weight on file to calculate BMI.     Imaging:     MRI CERVICAL SPINE WITHOUT AND WITH CONTRAST  2023 2:29 PM    Findings, per radiologist read, notable for:      1. Lobulated calcified mass with areas of apparent thin linear  internal and peripheral enhancement centered in the left lateral  paraspinous soft tissues at the level of C2-C3, as described. There  also appears to be a small component that extends through the  posterior aspect of the left C2-C3 neural foramen into the left  posterolateral epidural space. Findings are nonspecific. While benign  entities such as tumoral calcinosis, atypical synovial chondromatosis  or other nonspecific dystrophic calcification are possible, a  calcified neoplasm is also possible.   2. Multilevel degenerative changes, as described, without evidence for  high-grade spinal canal or neural foraminal stenosis.    Full radiological report in chart. Imaging was  reviewed with with patient today.     Exam:     Patient appears comfortable, conversational, and in no apparent distress.   Head: Normocephalic, without obvious abnormality, atraumatic, no facial asymmetry.   Eyes: conjunctivae/corneas clear. PERRL, EOM's intact.   Throat: lips, mucosa, and tongue normal; teeth and gums normal.   Neck: supple, symmetrical, trachea midline, no adenopathy and thyroid: not enlarged, symmetric, no tenderness/mass/nodules.   Lungs: clear to auscultation bilaterally.   Heart: regular rate and rhythm.   Abdomen: soft, non-tender; bowel sounds normal; no masses, no organomegaly.   Pulses: 2+ and symmetric.   Skin: Skin color, texture, turgor normal. No rashes or lesions.     CN II-XII grossly intact, alert and appropriate with conversation and following commands.   Gait was not witnessed due to her right femur fracture.   Cervical spine is slightly tender to palpation on the left. Appropriate range of motion of neck, not concerning for lhermitte's phenomenon.   Bilateral bicep 2/4 and tricep reflexes 1/4. Sensation intact throughout upper extremities.     UE muscle strength  Right  Left    Deltoid  5/5  5/5    Biceps  5/5  5/5    Triceps  5/5  5/5    Hand intrinsics  5/5  5/5    Hand grasp  5/5  5/5    Starks signs  neg  neg      Upper extremities are weak, but at baseline - granulomatous myositis.     Lumbar spine is non tender to palpation.  Intact sensation throughout lower extremities.   Right lower extremity is immobilized. Left is WNL.   Negative Babinski bilaterally. Negative for clonus.   Calves are soft and non-tender bilaterally.       ASSESSMENT/PLAN:     Britt Park is a 52 year old female who presents to the clinic for consultation on a lobulated calcified mass with areas of apparent thin linear  internal and peripheral enhancement centered in the left lateral paraspinous soft tissues at the level of C2-C3. On exam, she is noted to have appropriate strength, sensation  and range of motion.     We feel that it would be in her best interest to have a biopsy performed by our colleagues on the IR team. In the interim she would like to try a conservative approach by participating in a physical therapy program.    We would like to see her back after she has obtained the biopsy to further discuss possible surgical interventions or other conservative therapies. In the event that patient's symptoms worsen or change we would like to see her sooner. We also discussed signs of a worsening problem that she should seek being evaluated.        Respectfully,     AYESHA Reed, ALFREDA  [facility]  [facility]  Tel: [phone]      Exam, imaging, and plan reviewed by Dr. Banerjee.

## 2023-02-21 NOTE — LETTER
2/21/2023         RE: Britt Park  [address] [facility] [address]        Dear Colleague,    Thank you for referring your patient, Britt Park, to the [facility]UROSURGERY [facility]IN[facility] [place]. Please see a copy of my visit note below.    NEUROSURGERY CLINIC CONSULT NOTE     DATE OF VISIT: 2/21/2023     SUBJECTIVE:     Britt Park is a pleasant 52 year old female who presents to the clinic today for consultation on a left sided mass on her cervical spine. She is referred to the Neurosurgery Clinic by Dr. Metz in Primary Care.     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 She currently has a right femur fracture.     Today, she states that she has had chronic left sided neck pain for approximately five years. She describes a daily with fluctuating intensity, aching pain that initiates in the left side of the neck and intermittently radiates to the side of her face. No distal radiculopathy. This left sided neck pain is intermittantly accompanied by paresthesia in the same distribution. She really cannot appreciate any aggravating or alleviating factors. No mechanism of injury such as trauma or a fall is associated with the onset of the pain. There are no bowel or bladder changes. She denies saddle anesthesia. She denies changes in gait, instability, or falling episodes. There has been no significant change in her handwriting or hand dexterity.        Current Outpatient Medications:      acetaminophen (TYLENOL) 325 MG tablet, Take 2 tablets (650 mg) by mouth every 6 hours as needed for other (For optimal non-opioid multimodal pain management to improve pain control.), Disp: 100 tablet, Rfl: 0     aspirin (ASA) 325 MG EC tablet, Take 1 tablet (325 mg) by mouth 2 times daily, Disp: 60 tablet, Rfl: 0     atorvastatin (LIPITOR) 10 MG tablet, TAKE 1 TABLET(10 MG) BY MOUTH DAILY, Disp: 90 tablet, Rfl: 3     calcium carbonate (TUMS) 500 MG chewable tablet, Take  1 tablet (500 mg) by mouth 2 times daily as needed for heartburn, Disp: , Rfl:      estradiol (ESTRACE) 0.1 MG/GM vaginal cream, PLACE 2 GRAMS VAGINALLY 2 TIMES A WEEK, Disp: 42.5 g, Rfl: 1     folic acid (FOLVITE) 1 MG tablet, Take 1 tablet (1 mg) by mouth daily, Disp: 90 tablet, Rfl: 3     gabapentin (NEURONTIN) 100 MG capsule, Take 1 capsule (100 mg) by mouth 3 times daily, Disp: 90 capsule, Rfl: 0     hydrOXYzine (ATARAX) 25 MG tablet, Take 1 tablet (25 mg) by mouth every 6 hours as needed for other (adjuvant pain), Disp: 30 tablet, Rfl: 1     losartan (COZAAR) 25 MG tablet, TAKE 1 TABLET(25 MG) BY MOUTH DAILY, Disp: 90 tablet, Rfl: 2     methocarbamol (ROBAXIN) 500 MG tablet, Take 1 tablet (500 mg) by mouth 4 times daily as needed for muscle spasms Allow at least 2 hours between doses max 4 doses daily. Continue to reduce use as able., Disp: 100 tablet, Rfl: 0     methotrexate 50 MG/2ML injection, Inject 0.8 mLs (20 mg) Subcutaneous every 7 days On Wednesdays, Disp: 4 mL, Rfl: 6     mometasone (NASONEX) 50 MCG/ACT nasal spray, 2 sprays daily, Disp: , Rfl:      mupirocin (BACTROBAN) 2 % external ointment, Apply topically 3 times daily, Disp: 15 g, Rfl: 0     oxyCODONE (ROXICODONE) 5 MG tablet, Take 1 tablet (5 mg) by mouth every 6 hours as needed for severe pain (7-10) Take 2.5 (1/2 tab)- 5 mg (1 tab) up to 4 times daily as needed, allow for at least 4 hours between doses, continue to reduce oxycodone use allowing for more time between doses., Disp: 28 tablet, Rfl: 0     pantoprazole (PROTONIX) 40 MG EC tablet, Take 40 mg by mouth daily, Disp: , Rfl:      polyethylene glycol (MIRALAX) 17 GM/Dose powder, Take 17 g by mouth daily, Disp: 578 g, Rfl: 0     predniSONE (DELTASONE) 5 MG tablet, Take 10 mg by mouth daily, Disp: , Rfl:      valACYclovir (VALTREX) 1000 mg tablet, Take 2,000 mg by mouth as needed, Disp: , Rfl:      Allergies   Allergen Reactions     Influenza Vaccines Hives     Benadryl [Diphenhydramine]       Diphenhydramine Other (See Comments)     SHAKY       Sulfa Drugs Swelling     Sulfa Drugs         Past Medical History:   Diagnosis Date     Secondary hypertension 2021        ROS: 10 point review of symptoms are negative other than the symptoms noted above in the HPI.     Family History has been reviewed with the patient, there are no pertinent findings to presenting concern.     Past Surgical History:   Procedure Laterality Date     ABDOMEN SURGERY  2007         BIOPSY  2019 & 2021    Right bicep, result abnormal results, & left tricep biopsy     COLONOSCOPY  21     COLONOSCOPY N/A 2022    Procedure: COLONOSCOPY, FLEXIBLE, WITH LESION REMOVAL USING SNARE;  Surgeon: Dorie Santos MD;  Location:  GI     GYN SURGERY  07     for twins     OPEN REDUCTION INTERNAL FIXATION RODDING INTRAMEDULLARY FEMUR Right 2022    Procedure: Open reduction internal fixation of right femur fracture;  Surgeon: Al Larson MD;  Location:  OR        Social History     Tobacco Use     Smoking status: Never     Smokeless tobacco: Never   Vaping Use     Vaping Use: Never used   Substance Use Topics     Alcohol use: Not Currently     Drug use: Never        OBJECTIVE:   /82   Pulse 96   SpO2 97%    There is no height or weight on file to calculate BMI.     Imaging:     MRI CERVICAL SPINE WITHOUT AND WITH CONTRAST  2023 2:29 PM    Findings, per radiologist read, notable for:      1. Lobulated calcified mass with areas of apparent thin linear  internal and peripheral enhancement centered in the left lateral  paraspinous soft tissues at the level of C2-C3, as described. There  also appears to be a small component that extends through the  posterior aspect of the left C2-C3 neural foramen into the left  posterolateral epidural space. Findings are nonspecific. While benign  entities such as tumoral calcinosis, atypical synovial chondromatosis  or other  nonspecific dystrophic calcification are possible, a  calcified neoplasm is also possible.   2. Multilevel degenerative changes, as described, without evidence for  high-grade spinal canal or neural foraminal stenosis.    Full radiological report in chart. Imaging was reviewed with with patient today.     Exam:     Patient appears comfortable, conversational, and in no apparent distress.   Head: Normocephalic, without obvious abnormality, atraumatic, no facial asymmetry.   Eyes: conjunctivae/corneas clear. PERRL, EOM's intact.   Throat: lips, mucosa, and tongue normal; teeth and gums normal.   Neck: supple, symmetrical, trachea midline, no adenopathy and thyroid: not enlarged, symmetric, no tenderness/mass/nodules.   Lungs: clear to auscultation bilaterally.   Heart: regular rate and rhythm.   Abdomen: soft, non-tender; bowel sounds normal; no masses, no organomegaly.   Pulses: 2+ and symmetric.   Skin: Skin color, texture, turgor normal. No rashes or lesions.     CN II-XII grossly intact, alert and appropriate with conversation and following commands.   Gait was not witnessed due to her right femur fracture.   Cervical spine is slightly tender to palpation on the left. Appropriate range of motion of neck, not concerning for lhermitte's phenomenon.   Bilateral bicep 2/4 and tricep reflexes 1/4. Sensation intact throughout upper extremities.     UE muscle strength  Right  Left    Deltoid  5/5  5/5    Biceps  5/5  5/5    Triceps  5/5  5/5    Hand intrinsics  5/5  5/5    Hand grasp  5/5  5/5    Starks signs  neg  neg      Upper extremities are weak, but at baseline - granulomatous myositis.     Lumbar spine is non tender to palpation.  Intact sensation throughout lower extremities.   Right lower extremity is immobilized. Left is WNL.   Negative Babinski bilaterally. Negative for clonus.   Calves are soft and non-tender bilaterally.       ASSESSMENT/PLAN:     Britt Park is a 52 year old female who presents to  the clinic for consultation on a lobulated calcified mass with areas of apparent thin linear  internal and peripheral enhancement centered in the left lateral paraspinous soft tissues at the level of C2-C3. On exam, she is noted to have appropriate strength, sensation and range of motion.     We feel that it would be in her best interest to have a biopsy performed by our colleagues on the IR team. In the interim she would like to try a conservative approach by participating in a physical therapy program.    We would like to see her back after she has obtained the biopsy to further discuss possible surgical interventions or other conservative therapies. In the event that patient's symptoms worsen or change we would like to see her sooner. We also discussed signs of a worsening problem that she should seek being evaluated.        Respectfully,     AYESHA Reed PA-C  [facility] Neurosurgery  [facility]  Tel: [phone]      Exam, imaging, and plan reviewed by Dr. Banerjee.       Again, thank you for allowing me to participate in the care of your patient.        Sincerely,        Pb Hernandez PA-C

## 2023-02-21 NOTE — NURSING NOTE
"Britt Park is a 52 year old female who presents for:  Chief Complaint   Patient presents with     Consult        Vitals:    Vitals:    02/21/23 1359   BP: 125/82   Pulse: 96   SpO2: 97%       BMI:  Estimated body mass index is 27.44 kg/m  as calculated from the following:    Height as of 2/13/23: 5' 2\" (1.575 m).    Weight as of 2/13/23: 150 lb (68 kg).    Pain Score:  Mild Pain (3)        Amendo Phorn      "

## 2023-02-22 ENCOUNTER — TELEPHONE (OUTPATIENT)
Dept: NEUROSURGERY | Facility: CLINIC | Age: 53
End: 2023-02-22
Payer: COMMERCIAL

## 2023-02-22 ENCOUNTER — MYC MEDICAL ADVICE (OUTPATIENT)
Dept: NEUROSURGERY | Facility: CLINIC | Age: 53
End: 2023-02-22
Payer: COMMERCIAL

## 2023-02-22 ENCOUNTER — HOSPITAL ENCOUNTER (OUTPATIENT)
Dept: BONE DENSITY | Facility: CLINIC | Age: 53
Discharge: HOME | End: 2023-02-22
Attending: INTERNAL MEDICINE | Admitting: INTERNAL MEDICINE
Payer: COMMERCIAL

## 2023-02-22 ENCOUNTER — TELEPHONE (OUTPATIENT)
Dept: INTERVENTIONAL RADIOLOGY/VASCULAR | Facility: CLINIC | Age: 53
End: 2023-02-22
Payer: COMMERCIAL

## 2023-02-22 DIAGNOSIS — M80.859D: ICD-10-CM

## 2023-02-22 DIAGNOSIS — D49.2 CERVICAL SPINE TUMOR: Primary | ICD-10-CM

## 2023-02-22 PROCEDURE — 77080 DXA BONE DENSITY AXIAL: CPT

## 2023-02-22 NOTE — TELEPHONE ENCOUNTER
"    IR OP referral for a biopsy on [facility]. She has an area described below-  \"Lobulated calcified mass with areas of apparent thin linear internal and peripheral enhancement centered in the left lateral paraspinous soft tissues at the level of C2-C3, as described. There also appears to be a small component that extends through the posterior aspect of the left C2-C3 neural foramen into the left posterolateral epidural space.\"    Imaging and chart notes reviewed today with IR Dr Katharine Mckoy, who also reviewed with Neuro IR and [facility] IR. The area is a clump of calcifications. IR can't do a percutaneous biopsy unless there is some soft tissue in the area, so the results would be non diagnostic and not helpful. Plus the area is difficult to get to percutaneously. She and her partners recommend a surgical or ENT biopsy to remove and evaluate further.     Thanks, Yulissa [facility] Interventional Radiology CNP ([phone]) (phone [phone])         "

## 2023-02-22 NOTE — TELEPHONE ENCOUNTER
Patient saw Pb Hernandez on 2/21/23 and was told that he would put a referral for her to get a interdepartmental radiology, a biopsy regarding a mass on the side of her neck. She is requesting that Pb Hernandez put in a referral for her to get a biopsy regarding the mass on her neck done urgently, because she would like to get it done as soon as possible. Thank you ~

## 2023-02-23 NOTE — TELEPHONE ENCOUNTER
"Pt looking to schedule with IR -    Per IR notes form 2/22/23:    \"Lobulated calcified mass with areas of apparent thin linear internal and peripheral enhancement centered in the left lateral paraspinous soft tissues at the level of C2-C3, as described. There also appears to be a small component that extends through the posterior aspect of the left C2-C3 neural foramen into the left posterolateral epidural space.\"     Imaging and chart notes reviewed today with IR Dr Katharine Mckoy, who also reviewed with Neuro IR and [facility] IR. The area is a clump of calcifications. IR can't do a percutaneous biopsy unless there is some soft tissue in the area, so the results would be non diagnostic and not helpful. Plus the area is difficult to get to percutaneously. She and her partners recommend a surgical or ENT biopsy to remove and evaluate further.     Routed to Pb Hernandez PA-C   "

## 2023-02-23 NOTE — TELEPHONE ENCOUNTER
Patient said she tried making the appt but was told the referral was not placed for biopsy. She also would like to know updates about IR and updates on how soon she can schedule her appt for a biopsy. Please call patient back. Thank you ~

## 2023-02-23 NOTE — TELEPHONE ENCOUNTER
Pb Hernandez PA-C recc ENT consult for the biopsy as per IR recc.     Referral placed. Pt updated.     [facility] will call you to coordinate your care as prescribed by the provider. If you don t hear from a representative within 2 business days, please call [phone].

## 2023-02-24 NOTE — TELEPHONE ENCOUNTER
FUTURE VISIT INFORMATION:      FUTURE VISIT INFORMATION:    Date: 3/15/23    Time: 3:45 pm    [place] [facility] [place]  REFERRAL INFORMATION:    Referring provider: Pb Hernandez PA-C    Referring providers clinic: [facility]    Reason for visit/diagnosis Cervical spine tumor - Referred by Pb Hernandez PA-C in  NEUROSURGERY    RECORDS REQUESTED FROM:       Clinic name Comments Records Status Imaging Status   [facility] 2/22/23 mychart encounter  2/21/23 OV with Pb Hernandez PA-C [facility] Interventional Radiology 2/22/23 IR note - Indira Salcedo APRN CNP Epic    Imaging MR cervical 1/24/23  CT neck 1/7/23  US head neck 1/4/23 Epic Pacs

## 2023-03-01 ENCOUNTER — THERAPY VISIT (OUTPATIENT)
Dept: PHYSICAL THERAPY | Facility: CLINIC | Age: 53
End: 2023-03-01
Payer: COMMERCIAL

## 2023-03-01 DIAGNOSIS — S72.91XA CLOSED FRACTURE OF RIGHT FEMUR, UNSPECIFIED FRACTURE MORPHOLOGY, UNSPECIFIED PORTION OF FEMUR, INITIAL ENCOUNTER (H): ICD-10-CM

## 2023-03-01 DIAGNOSIS — M79.661 PAIN OF RIGHT LOWER LEG: Primary | ICD-10-CM

## 2023-03-01 PROCEDURE — 97530 THERAPEUTIC ACTIVITIES: CPT | Mod: GP | Performed by: PHYSICAL THERAPIST

## 2023-03-01 PROCEDURE — 97110 THERAPEUTIC EXERCISES: CPT | Mod: GP | Performed by: PHYSICAL THERAPIST

## 2023-03-01 NOTE — PROGRESS NOTES
Subjective:  HPI  Physical Exam                    Objective:  System    Physical Exam    General     ROS    Assessment/Plan:    PROGRESS  REPORT    Progress reporting period is from 2/9/23 to 3/1/23.     SUBJECTIVE  Subjective: Continuing to work on WS at home, not walking at home. Using counters more than the walker. Needs pain meds to kick in before getting moving in the morning. Movement in knee causes intermittent sharp/severe pain, usually when getting in and out of the car.      The objective findings are from DOS 3/1/23.    OBJECTIVE    Ventura arrives in WC today, able to transfer sit>stand with supervision and pivot transfer to table for standing exercises. Trunk lean vs weight shift, but able to correct when cued. Difficulty with fwd ws at counter, freq cueing needed to engage quad/glutes. Brace locked in ext for standing. Flex limited <50 deg in a seated position.    Ventura and I discussed at length the importance of practicing standing and practicing bending her knee in order to make progress towards functional goals. Recommended 3-4 times/day to perform seated knee flex, and standing WS.    Ventura will return to MD on the 7th to discuss progress and RTW. Ventura works from home, with the option for 100% remote which at this time is most appropriate. Recommend half days to start in order to allow for physical/cognitive recovery and time to perform home exercise program. Ventura will need additional time to attend PT visits.         ASSESSMENT/PLAN  Updated problem list and treatment plan:   Diagnosis 1:  S/p R femoral fracture ORIF    Pain -  hot/cold therapy, manual therapy, self management, education and home program  Decreased ROM/flexibility - manual therapy, therapeutic exercise and home program  Decreased joint mobility - manual therapy, therapeutic exercise and home program  Decreased strength - therapeutic exercise, therapeutic activities and home program  Decreased proprioception - neuro re-education,  gait training, therapeutic activities and home program  Impaired gait - gait training, assistive devices and home program  Decreased function - therapeutic activities and home program  STG/LTGs have been met or progress has been made towards goals:  Yes (See Goal flow sheet completed today.)  Assessment of Progress: The patient's condition has potential to improve.  Self Management Plans:  Patient has been instructed in a home treatment program.  Patient  has been instructed in self management of symptoms.  I have re-evaluated this patient and find that the nature, scope, duration and intensity of the therapy is appropriate for the medical condition of the patient.  Sun continues to require the following intervention to meet STG and LTG's: PT  The patient is returning to your office for a recheck appointment.    Recommendations:  This patient would benefit from continued therapy.     Frequency:  2 X week, once daily  Duration:  for 4 weeks tapering to 1 X a week over 6 weeks        Please refer to the daily flowsheet for treatment today, total treatment time and time spent performing 1:1 timed codes.

## 2023-03-05 ENCOUNTER — MYC MEDICAL ADVICE (OUTPATIENT)
Dept: FAMILY MEDICINE | Facility: CLINIC | Age: 53
End: 2023-03-05
Payer: COMMERCIAL

## 2023-03-05 DIAGNOSIS — S72.91XA CLOSED FRACTURE OF RIGHT FEMUR, UNSPECIFIED FRACTURE MORPHOLOGY, UNSPECIFIED PORTION OF FEMUR, INITIAL ENCOUNTER (H): ICD-10-CM

## 2023-03-06 ENCOUNTER — THERAPY VISIT (OUTPATIENT)
Dept: PHYSICAL THERAPY | Facility: CLINIC | Age: 53
End: 2023-03-06
Payer: COMMERCIAL

## 2023-03-06 ENCOUNTER — TELEPHONE (OUTPATIENT)
Dept: FAMILY MEDICINE | Facility: CLINIC | Age: 53
End: 2023-03-06

## 2023-03-06 DIAGNOSIS — S72.91XA CLOSED FRACTURE OF RIGHT FEMUR, UNSPECIFIED FRACTURE MORPHOLOGY, UNSPECIFIED PORTION OF FEMUR, INITIAL ENCOUNTER (H): ICD-10-CM

## 2023-03-06 DIAGNOSIS — M79.661 PAIN OF RIGHT LOWER LEG: Primary | ICD-10-CM

## 2023-03-06 PROCEDURE — 97530 THERAPEUTIC ACTIVITIES: CPT | Mod: GP | Performed by: PHYSICAL THERAPIST

## 2023-03-06 PROCEDURE — 97110 THERAPEUTIC EXERCISES: CPT | Mod: GP | Performed by: PHYSICAL THERAPIST

## 2023-03-06 NOTE — TELEPHONE ENCOUNTER
Forms/Letter Request    Type of form/letter: [employer]    Have you been seen for this request: unsure    Do we have the form/letter: Yes: placed in PCP's inbox in provider office    When is form/letter needed by: 02.28.23    How would you like the form/letter returned: Fax   [employer] fax: [phone]    Patient Notified form requests are processed in 3-5 business days:No    [facility] ph:   [phone]

## 2023-03-07 RX ORDER — OXYCODONE HYDROCHLORIDE 5 MG/1
5 TABLET ORAL EVERY 6 HOURS PRN
Qty: 28 TABLET | Refills: 0 | Status: SHIPPED | OUTPATIENT
Start: 2023-03-07 | End: 2023-12-06

## 2023-03-09 ENCOUNTER — THERAPY VISIT (OUTPATIENT)
Dept: PHYSICAL THERAPY | Facility: CLINIC | Age: 53
End: 2023-03-09
Payer: COMMERCIAL

## 2023-03-09 ENCOUNTER — TRANSFERRED RECORDS (OUTPATIENT)
Dept: HEALTH INFORMATION MANAGEMENT | Facility: CLINIC | Age: 53
End: 2023-03-09

## 2023-03-09 DIAGNOSIS — S72.91XA CLOSED FRACTURE OF RIGHT FEMUR, UNSPECIFIED FRACTURE MORPHOLOGY, UNSPECIFIED PORTION OF FEMUR, INITIAL ENCOUNTER (H): ICD-10-CM

## 2023-03-09 DIAGNOSIS — M79.661 PAIN OF RIGHT LOWER LEG: Primary | ICD-10-CM

## 2023-03-09 PROCEDURE — 97530 THERAPEUTIC ACTIVITIES: CPT | Mod: GP | Performed by: PHYSICAL THERAPIST

## 2023-03-09 PROCEDURE — 97110 THERAPEUTIC EXERCISES: CPT | Mod: GP | Performed by: PHYSICAL THERAPIST

## 2023-03-10 DIAGNOSIS — S72.91XA CLOSED FRACTURE OF RIGHT FEMUR, UNSPECIFIED FRACTURE MORPHOLOGY, UNSPECIFIED PORTION OF FEMUR, INITIAL ENCOUNTER (H): ICD-10-CM

## 2023-03-10 RX ORDER — GABAPENTIN 100 MG/1
CAPSULE ORAL
Qty: 90 CAPSULE | Refills: 0 | Status: SHIPPED | OUTPATIENT
Start: 2023-03-10 | End: 2023-04-10

## 2023-03-13 ENCOUNTER — PATIENT OUTREACH (OUTPATIENT)
Dept: NURSING | Facility: CLINIC | Age: 53
End: 2023-03-13
Payer: COMMERCIAL

## 2023-03-13 ENCOUNTER — THERAPY VISIT (OUTPATIENT)
Dept: PHYSICAL THERAPY | Facility: CLINIC | Age: 53
End: 2023-03-13
Payer: COMMERCIAL

## 2023-03-13 DIAGNOSIS — S72.91XA CLOSED FRACTURE OF RIGHT FEMUR, UNSPECIFIED FRACTURE MORPHOLOGY, UNSPECIFIED PORTION OF FEMUR, INITIAL ENCOUNTER (H): ICD-10-CM

## 2023-03-13 DIAGNOSIS — M79.661 PAIN OF RIGHT LOWER LEG: Primary | ICD-10-CM

## 2023-03-13 PROCEDURE — 97530 THERAPEUTIC ACTIVITIES: CPT | Mod: GP | Performed by: PHYSICAL THERAPIST

## 2023-03-13 PROCEDURE — 97110 THERAPEUTIC EXERCISES: CPT | Mod: GP | Performed by: PHYSICAL THERAPIST

## 2023-03-13 ASSESSMENT — ACTIVITIES OF DAILY LIVING (ADL): DEPENDENT_IADLS:: TRANSPORTATION

## 2023-03-13 NOTE — PROGRESS NOTES
Subjective:  HPI  Physical Exam                    Objective:  System    Physical Exam    General     ROS    Assessment/Plan:    SUBJECTIVE  Subjective changes as noted by pt:   Pt. is awaiting MRI results on knee from 3/10. She cont to note sharp knee pain with bending R knee. Transfers are improving. Pt. started back to work remotely today.   Current pain level:  5/10   Changes in function:  Yes (See Goal flowsheet attached for changes in current functional level)     Adverse reaction to treatment or activity:  None    OBJECTIVE  Changes in objective findings:  AROM R 0-0-65. PROM R 0-0-75. Transfers - SBA chair to plinth     ASSESSMENT  Sun continues to require intervention to meet STG and LTG's: PT  Patient is progressing as expected.  Response to therapy has shown an improvement in  pain level, ROM  and strength  Progress made towards STG/LTG?  Yes (See Goal flowsheet attached for updates on achievement of STG and LTG)    PLAN  Current treatment program is being advanced to more complex exercises.    PTA/ATC plan:  N/A    Please refer to the daily flowsheet for treatment today, total treatment time and time spent performing 1:1 timed codes.

## 2023-03-13 NOTE — LETTER
[address]  [place] [address]    [facility]  Patient Centered Plan of Care  About Me:        Patient Name:  Britt Lange    YOB: 1970  Age:         52 year old   Vanessa MRN:    [id number] Telephone Information:  Home Phone [phone]   Mobile [phone]       Address:  [address] [place], [place] [address] [place]  [place] [address] Email address:  [email or web address]      Emergency Contact(s)    Name Relationship Lgl Grd Work Phone Home Phone Mobile Phone   1. KELVIN OWENS* Spouse    [phone]   2. CATALINO LANGEI Daughter    [phone]   3. NAZARIOPRES* Son    [phone]   4. NAZARIOLAND* Son    [phone]   5. NIGEL LANGE* Spouse    [phone]           Primary language:  English     needed? No   [place] [facility]:  [phone] op. 1  Other communication barriers:None    Preferred Method of Communication:  Rea  Current living arrangement: I live in a private home with family (Lives with  and 3 children in their teens)    Mobility Status/ Medical Equipment: Independent w/Device    Health Maintenance  Health Maintenance Reviewed: Due/Overdue   Health Maintenance Due   Topic Date Due     DEPRESSION ACTION PLAN  Never done     PHQ-9  Never done     My Access Plan  Medical Emergency 911   Primary Clinic Line [facility] - [phone]   24 Hour Appointment Line [phone] or  [phone]) (toll-free)   24 Hour Nurse Line [phone] (toll-free)   Preferred Urgent Care [facility], [phone]     Preferred Hospital [facility]  [phone]     Preferred Pharmacy [id number] - [place] - [address] AT [facility] [address]     Behavioral Health Crisis Line The National Suicide Prevention Lifeline at 1-861.549.7251 or Text/Call 658     My Care Team Members  Patient Care Team       Relationship Specialty  Notifications Start End    Vi Metz MD PCP - General Internal Medicine  12/10/22     Merged    Phone: [phone] Fax: [phone] [address] [place] [address]    Vlad Leos MD Referring Physician Neurology  3/26/19     referring to neurol    Phone: [phone] Fax: [phone]         [facility] [address] [facility] [address]    Vi Metz MD Assigned PCP   12/23/21     Phone: [phone] Fax: [phone] [address] [place] [address]    Husam Barnett MD Assigned Rheumatology Provider   1/23/22     Phone: [phone] Fax: [phone]         [address] [place] [address]    Diane Lopez MD MD Gastroenterology  5/6/22     Phone: [phone] Fax: [phone]         [address] [place] [address]    Diane Lopez MD MD Gastroenterology  5/6/22     Phone: [phone] Fax: [phone]         [address] [place] [address]    Vi Metz MD  Internal Medicine  12/10/22     Merged    Phone: [phone] Fax: [phone]         [address] [place] [address]    Jyotsna Chavez MA Community Health Worker Primary Care - CC Admissions 1/4/23     Phone: [phone]         Anabell Chaidez, RN Lead Care Coordinator  Admissions 1/5/23     Vi Metz MD Assigned Pain Medication Provider   1/9/23     Phone: [phone] Fax: [phone]         [address] [place] [address]            My Care Plans  Self Management and Treatment Plan  Care Plan  Care Plan: Help At Home     Problem: Insufficient In-home support             Care Plan: Transportation     Problem: Lack of transportation             Care Plan: Advance Care Plan     Problem: Patient does not have a valid Health Care Directive     Goal: Complete Health Care Directive     Start Date: 3/13/2023 Expected End Date: 6/13/2023    Note:     Goal Statement: I will complete a Health Care Directive and have  this scanned into my Medical Records.  Barriers: Recent fall with right femur fracture  Strengths: Strong advocate for self  Patient expressed understanding of goal: yes  Action steps to achieve this goal:  1. Care Coordination will mail me a Health Care Directive and any requested resources (goals worksheets, education sheets, class flyer).   2. I will complete the Health Care Directive. My signature must be either notarized or witnessed by two adults who are not named as health care agents in the document. Additionally only one of the witnesses can be employed by my health care system. If I need a notary I can check with my bank, my place of Faith, [employer] stores, or look online at www.[email or web address] .  3. I will provide a copy of my Health Care Directive to my care team and/or email directly to [email or web address].   4. I can visit [email or web address] website, email [email or web address] or call  [employer] at [phone] (M-Friday 6a to 5p) for more information including free classes on completing a Health Care Directive.  5. I will contact my care team with any barriers, questions or assistance needed with this goal. Care coordinator will remain available as needed.                          Action Plans on File:     Advance Care Plans/Directives Type:   -- (Full Code)            My Medical and Care Information  Problem List   Patient Active Problem List   Diagnosis     Myopathy, mitochondrial     Overactive bladder     Dry eyes     Encounter for monitoring of systemic steroid therapy     Need for vaccination     Impetigo     Secondary hypertension     On prednisone therapy     Vitamin B 12 deficiency     Need for pneumocystis prophylaxis     Anemia, unspecified type     Visit for screening mammogram     Seasonal allergic rhinitis, unspecified trigger     Acute respiratory failure with hypoxia (H)     Other myositis, unspecified site     Facial paresthesia     Weight  gain     Vaginal dryness     Annual physical exam     Methemoglobinemia     Cyst of right ovary     Hepatic steatosis     Closed fracture of right femur, unspecified fracture morphology, unspecified portion of femur, initial encounter (H)     Enlarged lymph nodes     Atopic dermatitis of scalp     Pain of right lower leg      Current Medications and Allergies:    Allergies   Allergen Reactions     Influenza Vaccines Hives     Benadryl [Diphenhydramine]      Diphenhydramine Other (See Comments)     SHAKY       Sulfa Drugs Swelling     Sulfa Drugs      Current Outpatient Medications   Medication     acetaminophen (TYLENOL) 325 MG tablet     aspirin (ASA) 325 MG EC tablet     atorvastatin (LIPITOR) 10 MG tablet     calcium carbonate (TUMS) 500 MG chewable tablet     estradiol (ESTRACE) 0.1 MG/GM vaginal cream     folic acid (FOLVITE) 1 MG tablet     gabapentin (NEURONTIN) 100 MG capsule     hydrOXYzine (ATARAX) 25 MG tablet     losartan (COZAAR) 25 MG tablet     methocarbamol (ROBAXIN) 500 MG tablet     methotrexate 50 MG/2ML injection     mometasone (NASONEX) 50 MCG/ACT nasal spray     mupirocin (BACTROBAN) 2 % external ointment     oxyCODONE (ROXICODONE) 5 MG tablet     pantoprazole (PROTONIX) 40 MG EC tablet     polyethylene glycol (MIRALAX) 17 GM/Dose powder     predniSONE (DELTASONE) 5 MG tablet     valACYclovir (VALTREX) 1000 mg tablet     No current facility-administered medications for this visit.     Care Coordination Start Date: 1/4/2023   Frequency of Care Coordination: monthly     Form Last Updated: 03/13/2023

## 2023-03-13 NOTE — PROGRESS NOTES
"Clinic Care Coordination Contact    Follow Up Progress Note      Assessment:   Patient states today is her first day back to work and she is able to work from home which patient states is \"a great help\".  Patient is engaged in outpatient Physical Therapy/OT yet still struggles with pivoting and transferring into a car.  Patient reports she now has [employer] which has made her life \"so [facility] better\".  Patient has 6 children and a  that help patient with household chores and grocery shopping.    Patient states she has \"severe\" right knee pain and Ortho is suggesting a \"MRI\", per patient, \"They said that's unusual\" and she is scheduled next week. Patient worked with her insurance to find home care; reached out to 23 agencies without success. Patient has no other questions or concerns at this time.    Care Gaps:    Health Maintenance Due   Topic Date Due     DEPRESSION ACTION PLAN  Never done     PHQ-9  Never done     Care Gaps Last addressed on 3/13/2023    Care Plans  Care Plan: Help At Home     Problem: Insufficient In-home support             Care Plan: Transportation     Problem: Lack of transportation             Care Plan: Advance Care Plan     Problem: Patient does not have a valid Health Care Directive     Goal: Complete Health Care Directive     Start Date: 3/13/2023 Expected End Date: 6/13/2023    Note:     Goal Statement: I will complete a Health Care Directive and have this scanned into my Medical Records.  Barriers: Recent fall with right femur fracture  Strengths: Strong advocate for self  Patient expressed understanding of goal: yes  Action steps to achieve this goal:  1. Care Coordination will mail me a Health Care Directive and any requested resources (goals worksheets, education sheets, class flyer).   2. I will complete the Health Care Directive. My signature must be either notarized or witnessed by two adults who are not named as health care agents in the document. Additionally only one " of the witnesses can be employed by my health care system. If I need a notary I can check with my bank, my place of Hindu, [employer] stores, or look online at [email or web address] .  3. I will provide a copy of my Health Care Directive to my care team and/or email directly to [email or web address].   4. I can visit [email or web address] [email or web address] website, email [email or web address] or call [facility] at [phone] (M-Friday 6a to 5p) for more information including free classes on completing a Health Care Directive.  5. I will contact my care team with any barriers, questions or assistance needed with this goal. Care coordinator will remain available as needed.                       Intervention/Education provided during outreach:   RNCC called and spoke with patient/caregiver; introduced self, discussed role of Care Coordination and explained reason for call    Plan:   -Patient will contact the care team with questions, concerns, support needs   -Patient will use the clinic as a resource and understands (s)he can contact the [facility] with 24/7 after hours services available  -Care Coordinator will remain available as needed  -RNCC will follow up in one month for follow up appointments/recommendations and goal progression     Anabell Chaidez RN, BSN, PHN  Primary Care / Care Coordinator   [facility]  [email or web address] [email or web address]   [phone]

## 2023-03-14 NOTE — TELEPHONE ENCOUNTER
Completed forms faxed to [employer]  Medical records request sent to Medical Records.  [facility] to [facility] and placed in Purple [employer] accordion

## 2023-03-15 ENCOUNTER — PRE VISIT (OUTPATIENT)
Dept: OTOLARYNGOLOGY | Facility: CLINIC | Age: 53
End: 2023-03-15

## 2023-03-16 ENCOUNTER — THERAPY VISIT (OUTPATIENT)
Dept: PHYSICAL THERAPY | Facility: CLINIC | Age: 53
End: 2023-03-16
Payer: COMMERCIAL

## 2023-03-16 DIAGNOSIS — M79.661 PAIN OF RIGHT LOWER LEG: Primary | ICD-10-CM

## 2023-03-16 DIAGNOSIS — S72.91XA CLOSED FRACTURE OF RIGHT FEMUR, UNSPECIFIED FRACTURE MORPHOLOGY, UNSPECIFIED PORTION OF FEMUR, INITIAL ENCOUNTER (H): ICD-10-CM

## 2023-03-16 PROCEDURE — 97110 THERAPEUTIC EXERCISES: CPT | Mod: GP | Performed by: PHYSICAL THERAPIST

## 2023-03-16 PROCEDURE — 97530 THERAPEUTIC ACTIVITIES: CPT | Mod: GP | Performed by: PHYSICAL THERAPIST

## 2023-04-05 ENCOUNTER — VIRTUAL VISIT (OUTPATIENT)
Dept: FAMILY MEDICINE | Facility: CLINIC | Age: 53
End: 2023-04-05
Payer: COMMERCIAL

## 2023-04-05 DIAGNOSIS — N83.201 CYST OF RIGHT OVARY: Primary | ICD-10-CM

## 2023-04-05 DIAGNOSIS — S72.411D: ICD-10-CM

## 2023-04-05 DIAGNOSIS — M60.80 OTHER MYOSITIS, UNSPECIFIED SITE: ICD-10-CM

## 2023-04-05 DIAGNOSIS — M79.661 PAIN OF RIGHT LOWER LEG: ICD-10-CM

## 2023-04-05 DIAGNOSIS — R91.8 PULMONARY NODULES: ICD-10-CM

## 2023-04-05 PROCEDURE — 99213 OFFICE O/P EST LOW 20 MIN: CPT | Mod: VID | Performed by: INTERNAL MEDICINE

## 2023-04-05 RX ORDER — TRAMADOL HYDROCHLORIDE 50 MG/1
TABLET ORAL
COMMUNITY
Start: 2023-04-03 | End: 2023-12-06

## 2023-04-05 ASSESSMENT — PATIENT HEALTH QUESTIONNAIRE - PHQ9: SUM OF ALL RESPONSES TO PHQ QUESTIONS 1-9: 4

## 2023-04-05 NOTE — PATIENT INSTRUCTIONS
[facility] health: [phone]    [facility]    [facility]     Call to schedule an appointment for ultrasound at [phone]

## 2023-04-05 NOTE — PROGRESS NOTES
Ventura is a 52 year old who is being evaluated via a billable video visit.      How would you like to obtain your AVS? MyChart  If the video visit is dropped, the invitation should be resent by: Text to cell phone: [phone]  Will anyone else be joining your video visit? No        Assessment & Plan     Cyst of right ovary  4.2 cm cyst in right ovary. Recommendation to follow up in 6 months.   - US Pelvic Complete with Transvaginal; Future    Pain of right lower leg  Continue to follow recommendation of ortho    Closed disp fracture of condyle of right femur with routine healing  No new symptoms.    Other myositis, unspecified site  Slow taper off prednisone followed by slow taper methrotrexate    Pulmonary nodules  Follow up CT in dec 2023       See Patient Instructions    REYNOLD RAMOS MD  [facility]    Catrina Whitehead is a 52 year old, presenting for the following health issues:  Follow Up    Ventura has a virtual visit today. She has hx of right displaced femur fracture in December 2022, s/p ORIF    Follows with Ortho from [facility] - on most recent follow up visit.   She was found to have slow healing knee fracture, she was already weight bearing and walking for 7 weeks, was recommended to stop weight bearing and walking. She is back in wheelchair. She reports sore buttocks, possibly pressure sores. Skin intact.     She was recently seen at [facility] for follow up of myositis. She was told there is worsening of her condition and plan to slowly taper prednisone and subsequently methotrexate.     She is reporting irritability and I recommended she start therapy.     Neck mass: calcified mass, not malignant.     She has hx of lung nodule on CT scan in June 2022, 4 mm, she is not a smoker, plan to follow up in 18-24 months.     Discussed about isolated macrocytosis    History of Present Illness       Reason for visit:  Re-check / update on muscle condition & treatment & progress /healing of fracturered  leg & knee    She eats 2-3 servings of fruits and vegetables daily.She consumes 0 sweetened beverage(s) daily.She exercises with enough effort to increase her heart rate 9 or less minutes per day.  She exercises with enough effort to increase her heart rate 3 or less days per week.   She is taking medications regularly.     Review of Systems       Objective       Vitals:  No vitals were obtained today due to virtual visit.    Physical Exam   GEN: No acute distress  RESP: No audible increased work of breathing. Patient speaking in full sentences without distress.  PSYCH: pleasant  Exam otherwise limited due to virtual platform          Video-Visit Details    Type of service:  Video Visit     Originating Location (pt. Location): Home  Distant Location (provider location):  On-site  Platform used for Video Visit: Leonarda

## 2023-04-10 DIAGNOSIS — S72.91XA CLOSED FRACTURE OF RIGHT FEMUR, UNSPECIFIED FRACTURE MORPHOLOGY, UNSPECIFIED PORTION OF FEMUR, INITIAL ENCOUNTER (H): ICD-10-CM

## 2023-04-10 RX ORDER — GABAPENTIN 100 MG/1
CAPSULE ORAL
Qty: 90 CAPSULE | Refills: 0 | Status: SHIPPED | OUTPATIENT
Start: 2023-04-10 | End: 2023-05-15

## 2023-04-13 ENCOUNTER — PATIENT OUTREACH (OUTPATIENT)
Dept: NURSING | Facility: CLINIC | Age: 53
End: 2023-04-13
Payer: COMMERCIAL

## 2023-04-13 ASSESSMENT — ACTIVITIES OF DAILY LIVING (ADL): DEPENDENT_IADLS:: TRANSPORTATION

## 2023-04-13 NOTE — PROGRESS NOTES
Clinic Care Coordination Contact    Follow Up Progress Note      Assessment:   Patient answered the phone stating she's getting ready for a meeting that she needs to attend and is unable to speak at this time.    Care Gaps:    Health Maintenance Due   Topic Date Due     DEPRESSION ACTION PLAN  Never done       Postponed to next RNCC outreach     Care Plans  Care Plan: Help At Home     Problem: Insufficient In-home support             Care Plan: Transportation     Problem: Lack of transportation             Care Plan: Advance Care Plan     Problem: Patient does not have a valid Health Care Directive     Goal: Complete Health Care Directive     Start Date: 3/13/2023 Expected End Date: 6/13/2023    This Visit's Progress: 20%    Note:     Goal Statement: I will complete a Health Care Directive and have this scanned into my Medical Records.  Barriers: Recent fall with right femur fracture  Strengths: Strong advocate for self  Patient expressed understanding of goal: yes  Action steps to achieve this goal:  1. Care Coordination will mail me a Health Care Directive and any requested resources (goals worksheets, education sheets, class flyer).   2. I will complete the Health Care Directive. My signature must be either notarized or witnessed by two adults who are not named as health care agents in the document. Additionally only one of the witnesses can be employed by my health care system. If I need a notary I can check with my bank, my place of Amish, [employer] stores, or look online at [email or web address] .  3. I will provide a copy of my Health Care Directive to my care team and/or email directly to [email or web address].   4. I can visit [email or web address] website, email [email or web address] or call [facility] at [phone] (M-Friday 6a to 5p) for more information including free classes on completing a Health Care Directive.  5. I will contact my care team with any barriers,  questions or assistance needed with this goal. Care coordinator will remain available as needed.                       Intervention/Education provided during outreach:   RNCC called and spoke with patient/caregiver; introduced self, discussed role of Care Coordination and explained reason for call    Plan:   -Patient will contact the care team with questions, concerns, support needs   -Patient will use the clinic as a resource and understands (s)he can contact the [facility] with 24/7 after hours services available  -Care Coordinator will remain available as needed  -RNCC will follow up in one month for follow up appointments/recommendations and goal progression     Anabell Chaidez RN, BSN, PHN  Primary Care / Care Coordinator   [facility]  [email or web address]   [phone]         No

## 2023-04-18 ENCOUNTER — TRANSFERRED RECORDS (OUTPATIENT)
Dept: HEALTH INFORMATION MANAGEMENT | Facility: CLINIC | Age: 53
End: 2023-04-18

## 2023-04-20 ENCOUNTER — ANCILLARY PROCEDURE (OUTPATIENT)
Dept: CT IMAGING | Facility: CLINIC | Age: 53
End: 2023-04-20
Attending: ORTHOPAEDIC SURGERY
Payer: COMMERCIAL

## 2023-04-20 DIAGNOSIS — S72.409A: ICD-10-CM

## 2023-04-20 DIAGNOSIS — S72.009A: ICD-10-CM

## 2023-04-20 DIAGNOSIS — Z47.89 AFTERCARE FOLLOWING SURGERY OF THE MUSCULOSKELETAL SYSTEM: ICD-10-CM

## 2023-04-20 PROCEDURE — 73700 CT LOWER EXTREMITY W/O DYE: CPT | Mod: RT

## 2023-04-23 DIAGNOSIS — S72.91XA CLOSED FRACTURE OF RIGHT FEMUR, UNSPECIFIED FRACTURE MORPHOLOGY, UNSPECIFIED PORTION OF FEMUR, INITIAL ENCOUNTER (H): ICD-10-CM

## 2023-04-24 RX ORDER — HYDROXYZINE HYDROCHLORIDE 25 MG/1
TABLET, FILM COATED ORAL
Qty: 30 TABLET | Refills: 1 | Status: SHIPPED | OUTPATIENT
Start: 2023-04-24 | End: 2023-12-06

## 2023-04-28 ENCOUNTER — THERAPY VISIT (OUTPATIENT)
Dept: PHYSICAL THERAPY | Facility: CLINIC | Age: 53
End: 2023-04-28
Payer: COMMERCIAL

## 2023-04-28 DIAGNOSIS — S72.91XA CLOSED FRACTURE OF RIGHT FEMUR, UNSPECIFIED FRACTURE MORPHOLOGY, UNSPECIFIED PORTION OF FEMUR, INITIAL ENCOUNTER (H): ICD-10-CM

## 2023-04-28 DIAGNOSIS — M79.661 PAIN OF RIGHT LOWER LEG: Primary | ICD-10-CM

## 2023-04-28 PROCEDURE — 97530 THERAPEUTIC ACTIVITIES: CPT | Mod: GP | Performed by: PHYSICAL THERAPIST

## 2023-04-28 PROCEDURE — 97110 THERAPEUTIC EXERCISES: CPT | Mod: GP | Performed by: PHYSICAL THERAPIST

## 2023-04-28 NOTE — PROGRESS NOTES
PROGRESS  REPORT    Progress reporting period is from 3/13/2023 to 4/28/2023.   Ventura has been seen in PT 10 x  s/p right femur fx/ORIF.  She was last in clinic on 3/16/2023 returning today and after time off due to right knee pain and healing concerns.    SUBJECTIVE    Subjective: Patient returns to PT after she was told by her surgeon (following MRI) to take time off from PT due to poor healing .  She states she was told return to NWB status.  She had a CT on 4/20/2023 without findings of concern and is now returning to PT.     Current Pain level: 5/10.       Changes in function:  See goal flow sheet  Adverse reaction to treatment or activity: None    OBJECTIVE  Changes noted in objective findings:    Objective: Patient presents via WC wearing brace.  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  Right knee flexion ROM is limited to 55 degrees actively and 60 deg with assist.  Quad set is fair.  She is able to perform SLR without assist with min extensor lag.  Exercise was resumed per PTRx below.      ASSESSMENT/PLAN  Updated problem list and treatment plan: Diagnosis 1:  S/p right femur ORIF  Pain -  self management, education and home program  Decreased ROM/flexibility - manual therapy and therapeutic exercise  Decreased joint mobility - manual therapy and therapeutic exercise  Impaired gait - gait training  Decreased function - therapeutic activities  STG/LTGs have been met or progress has been made towards goals:  See goal flow sheet  Assessment of Progress: The patient has had set backs in their progress.  Self Management Plans:  Patient has been instructed in a home treatment program.  I have re-evaluated this patient and find that the nature, scope, duration and intensity of the therapy is appropriate for the medical condition of the patient.  Sun continues to require the following intervention to meet STG and  LTG's:  PT    Recommendations:  This patient would benefit from continued therapy.     Frequency:  1 X week, once daily  Duration:  for 8 visits        Please refer to the daily flowsheet for treatment today, total treatment time and time spent performing 1:1 timed codes.

## 2023-04-30 ASSESSMENT — KOOS JR
STRAIGHTENING KNEE FULLY: MILD
RISING FROM SITTING: MILD
TWISING OR PIVOTING ON KNEE: MODERATE
HOW SEVERE IS YOUR KNEE STIFFNESS AFTER FIRST WAKING IN MORNING: MODERATE
BENDING TO THE FLOOR TO PICK UP OBJECT: MODERATE
GOING UP OR DOWN STAIRS: EXTREME
KOOS JR SCORING: 54.84
STANDING UPRIGHT: MILD

## 2023-05-01 ENCOUNTER — OFFICE VISIT (OUTPATIENT)
Dept: ORTHOPEDICS | Facility: CLINIC | Age: 53
End: 2023-05-01
Payer: COMMERCIAL

## 2023-05-01 ENCOUNTER — LAB (OUTPATIENT)
Dept: LAB | Facility: CLINIC | Age: 53
End: 2023-05-01
Payer: COMMERCIAL

## 2023-05-01 VITALS — DIASTOLIC BLOOD PRESSURE: 80 MMHG | SYSTOLIC BLOOD PRESSURE: 120 MMHG

## 2023-05-01 DIAGNOSIS — Z79.631 LONG TERM METHOTREXATE USER: ICD-10-CM

## 2023-05-01 DIAGNOSIS — M79.661 PAIN OF RIGHT LOWER LEG: Primary | ICD-10-CM

## 2023-05-01 DIAGNOSIS — M79.661 PAIN OF RIGHT LOWER LEG: ICD-10-CM

## 2023-05-01 LAB
ALBUMIN SERPL BCG-MCNC: 4.5 G/DL (ref 3.5–5.2)
ALT SERPL W P-5'-P-CCNC: 24 U/L (ref 10–35)
ANION GAP SERPL CALCULATED.3IONS-SCNC: 12 MMOL/L (ref 7–15)
AST SERPL W P-5'-P-CCNC: 34 U/L (ref 10–35)
BUN SERPL-MCNC: 11.3 MG/DL (ref 6–20)
CALCIUM SERPL-MCNC: 9.5 MG/DL (ref 8.6–10)
CHLORIDE SERPL-SCNC: 103 MMOL/L (ref 98–107)
CREAT SERPL-MCNC: 0.38 MG/DL (ref 0.51–0.95)
CREAT SERPL-MCNC: 0.38 MG/DL (ref 0.51–0.95)
CRP SERPL-MCNC: <3 MG/L
DEPRECATED HCO3 PLAS-SCNC: 27 MMOL/L (ref 22–29)
ERYTHROCYTE [DISTWIDTH] IN BLOOD BY AUTOMATED COUNT: 13.2 % (ref 10–15)
GFR SERPL CREATININE-BSD FRML MDRD: >90 ML/MIN/1.73M2
GFR SERPL CREATININE-BSD FRML MDRD: >90 ML/MIN/1.73M2
GLUCOSE SERPL-MCNC: 97 MG/DL (ref 70–99)
HCT VFR BLD AUTO: 37.9 % (ref 35–47)
HGB BLD-MCNC: 12.1 G/DL (ref 11.7–15.7)
MCH RBC QN AUTO: 33.9 PG (ref 26.5–33)
MCHC RBC AUTO-ENTMCNC: 31.9 G/DL (ref 31.5–36.5)
MCV RBC AUTO: 106 FL (ref 78–100)
PLATELET # BLD AUTO: 317 10E3/UL (ref 150–450)
POTASSIUM SERPL-SCNC: 3.5 MMOL/L (ref 3.4–5.3)
RBC # BLD AUTO: 3.57 10E6/UL (ref 3.8–5.2)
SODIUM SERPL-SCNC: 142 MMOL/L (ref 136–145)
WBC # BLD AUTO: 3.9 10E3/UL (ref 4–11)

## 2023-05-01 PROCEDURE — 85027 COMPLETE CBC AUTOMATED: CPT

## 2023-05-01 PROCEDURE — 84450 TRANSFERASE (AST) (SGOT): CPT

## 2023-05-01 PROCEDURE — 36415 COLL VENOUS BLD VENIPUNCTURE: CPT

## 2023-05-01 PROCEDURE — 82565 ASSAY OF CREATININE: CPT

## 2023-05-01 PROCEDURE — 82435 ASSAY OF BLOOD CHLORIDE: CPT

## 2023-05-01 PROCEDURE — 84460 ALANINE AMINO (ALT) (SGPT): CPT

## 2023-05-01 PROCEDURE — 99203 OFFICE O/P NEW LOW 30 MIN: CPT | Performed by: ORTHOPAEDIC SURGERY

## 2023-05-01 PROCEDURE — 86140 C-REACTIVE PROTEIN: CPT

## 2023-05-01 PROCEDURE — 82306 VITAMIN D 25 HYDROXY: CPT

## 2023-05-01 PROCEDURE — 82040 ASSAY OF SERUM ALBUMIN: CPT

## 2023-05-01 NOTE — PATIENT INSTRUCTIONS
Thank you for choosing [facility]    Dr. Nunn Locations:    [facility] [address], [address]  [address] [address]   Appointments: [phone] Appointments: [phone]   Fax: [phone] Fax: [phone]       Follow up: 1 Please call [phone] to schedule your follow up appointment.

## 2023-05-01 NOTE — PROGRESS NOTES
S:Patient is a 53 year old,  female seen today in consultation for right leg.  They report onset of symptoms 12/10/23. [place] the mall and slipped on puddle and fell injury. Surgery 22open reduction for internal fixation of the right femur fracture   Pain is located medial knee, and described as sharp and intense pain with movement.  Increased pain with bending the knee.  Pain does  wake at nighttime. Pain is improved by ice packs. tramadol and tylenol  They have tried the following therapies: PT .    Current pain level: 1/10, Worst pain level: 6/10.     Patient is currently working HR .            Patient Active Problem List   Diagnosis     Myopathy, mitochondrial     Overactive bladder     Dry eyes     Encounter for monitoring of systemic steroid therapy     Need for vaccination     Impetigo     Secondary hypertension     On prednisone therapy     Vitamin B 12 deficiency     Need for pneumocystis prophylaxis     Anemia, unspecified type     Visit for screening mammogram     Seasonal allergic rhinitis, unspecified trigger     Acute respiratory failure with hypoxia (H)     Other myositis, unspecified site     Facial paresthesia     Weight gain     Vaginal dryness     Annual physical exam     Methemoglobinemia     Cyst of right ovary     Hepatic steatosis     Closed fracture of right femur, unspecified fracture morphology, unspecified portion of femur, initial encounter (H)     Enlarged lymph nodes     Atopic dermatitis of scalp     Pain of right lower leg     Closed disp fracture of condyle of right femur with routine healing     Pulmonary nodules            Past Medical History:   Diagnosis Date     Secondary hypertension 2021            Past Surgical History:   Procedure Laterality Date     ABDOMEN SURGERY  2007         BIOPSY  2019 & 2021    Right bicep, result abnormal results, & left tricep biopsy     COLONOSCOPY  21     COLONOSCOPY N/A 2022    Procedure:  COLONOSCOPY, FLEXIBLE, WITH LESION REMOVAL USING SNARE;  Surgeon: Dorie Santos MD;  Location:  GI     GYN SURGERY  07     for twins     OPEN REDUCTION INTERNAL FIXATION RODDING INTRAMEDULLARY FEMUR Right 2022    Procedure: Open reduction internal fixation of right femur fracture;  Surgeon: Al Larson MD;  Location:  OR            Social History     Tobacco Use     Smoking status: Never     Smokeless tobacco: Never   Vaping Use     Vaping status: Never Used   Substance Use Topics     Alcohol use: Not Currently            Family History   Problem Relation Age of Onset     Unknown/Adopted Other                Allergies   Allergen Reactions     Influenza Vaccines Hives     Benadryl [Diphenhydramine]      Diphenhydramine Other (See Comments)     SHAKY       Sulfa Antibiotics Swelling     Sulfa Antibiotics             Current Outpatient Medications   Medication Sig Dispense Refill     acetaminophen (TYLENOL) 325 MG tablet Take 2 tablets (650 mg) by mouth every 6 hours as needed for other (For optimal non-opioid multimodal pain management to improve pain control.) 100 tablet 0     aspirin (ASA) 325 MG EC tablet Take 1 tablet (325 mg) by mouth 2 times daily 60 tablet 0     atorvastatin (LIPITOR) 10 MG tablet TAKE 1 TABLET(10 MG) BY MOUTH DAILY 90 tablet 3     calcium carbonate (TUMS) 500 MG chewable tablet Take 1 tablet (500 mg) by mouth 2 times daily as needed for heartburn       estradiol (ESTRACE) 0.1 MG/GM vaginal cream PLACE 2 GRAMS VAGINALLY 2 TIMES A WEEK 42.5 g 1     folic acid (FOLVITE) 1 MG tablet Take 1 tablet (1 mg) by mouth daily 90 tablet 3     gabapentin (NEURONTIN) 100 MG capsule TAKE 1 CAPSULE(100 MG) BY MOUTH THREE TIMES DAILY 90 capsule 0     hydrOXYzine (ATARAX) 25 MG tablet TAKE 1 TABLET BY MOUTH EVERY 6 HOURS AS NEEDED FOR PAIN 30 tablet 1     losartan (COZAAR) 25 MG tablet TAKE 1 TABLET(25 MG) BY MOUTH DAILY 90 tablet 2     methotrexate 50 MG/2ML injection  Inject 0.8 mLs (20 mg) Subcutaneous every 7 days On Wednesdays 4 mL 6     mometasone (NASONEX) 50 MCG/ACT nasal spray 2 sprays daily       mupirocin (BACTROBAN) 2 % external ointment Apply topically 3 times daily 15 g 0     oxyCODONE (ROXICODONE) 5 MG tablet Take 1 tablet (5 mg) by mouth every 6 hours as needed for severe pain (7-10) Take 2.5 (1/2 tab)- 5 mg (1 tab) up to 4 times daily as needed, allow for at least 4 hours between doses, continue to reduce oxycodone use allowing for more time between doses. 28 tablet 0     pantoprazole (PROTONIX) 40 MG EC tablet Take 40 mg by mouth daily       polyethylene glycol (MIRALAX) 17 GM/Dose powder Take 17 g by mouth daily 578 g 0     predniSONE (DELTASONE) 5 MG tablet Take 10 mg by mouth daily       traMADol (ULTRAM) 50 MG tablet        valACYclovir (VALTREX) 1000 mg tablet Take 2,000 mg by mouth as needed            Review Of Systems  Skin: negative  Eyes: negative  Ears/Nose/Throat: negative  Respiratory: No shortness of breath, dyspnea on exertion, cough, or hemoptysis    O: Physical Exam:  Adequate knee flexion and extension.  Quad atrophy compared to contralateral extremity.  Well healed incision. Some tenderness over screw tip distal anterior medial knee.    Lab:  Vit D 28     Images:EXAM: CT FEMUR THIGH RIGHT W/O CONTRAST  LOCATION: [facility]  DATE/TIME: 4/20/2023 8:26 AM CDT     INDICATION:  Aftercare following surgery of the musculoskeletal system, fracture of femur, distal (H), fracture of neck of femur (H).  COMPARISON: None.  TECHNIQUE: Noncontrast. Axial, sagittal and coronal thin-section reconstruction. Dose reduction techniques were used.      FINDINGS:      BONES:  -Postoperative change of IM ronda fixation traversing a fracture of the distal aspect of the femoral diaphyseal shaft. The fracture is in excellent anatomic alignment. It is still visualized but there is evidence of some developing callus along its lateral   margin. The  fracture extends into the medial femoral condyle. There is some degenerative change at the knee joint but no evidence for significant cortical step-off along the joint margin.     SOFT TISSUES:  -No significant hip or knee joint effusion. No evidence for soft tissue mass or fluid collection. There is fatty infiltration and atrophy of the vastus lateralis and the hamstring musculature which could be secondary to disuse or chronic denervation   change.                                                                      IMPRESSION:  1.  Postoperative changes of IM ronda and locking screw fixation traversing a fracture of the mid to distal aspect of the femoral diaphyseal shaft. The fracture is in excellent anatomic alignment but still visualized. There does appear to be some early   developing callus along its lateral margin.  2.  The fracture extends into the medial femoral condyle, but there is no cortical step-off along the joint margin and no significant knee joint effusion or evidence for lipohemarthrosis.  3.  No evidence for soft tissue mass or fluid collection but note is made of fatty infiltration and atrophy of the vastus lateralis and hamstring muscle group which could be due to disuse or chronic denervation change.  4.  No hip or knee joint effusion.    EXAM: DX HIP/PELVIS/SPINE  LOCATION: [facility]  DATE/TIME: 2/22/2023 8:50 AM     INDICATION: Pathological fracture of femur due to other osteoporosis with routine healing, unspecified laterality, subsequent encounter.  COMPARISON: 12/08/2021.  TECHNIQUE: Dual-energy x-ray absorptiometry performed with routine technique.     FINDINGS:     Lumbar Spine: L1-L4: BMD: 1.006 g/cm2. T-score: -1.5. Z-score: -1.1  LEFT Hip Total: BMD: 0.841 g/cm2. T-score: -1.3. Z-score: -0.9  LEFT Hip Femoral neck: BMD: 0.762 g/cm2. T-score: -2.0. Z-score: -1.2     WHO Criteria:  Normal: T score at or above -1 SD  Osteopenia: T score between -1 and -2.5  SD  Osteoporosis: T score at or below -2.5 SD     COMPARISON: -There has been a 10.3% decrease in lumbar spine BMD. -There has been a 13.7% decrease in bilateral hip BMD.     FRAX Results: 10 year probability of major osteoporotic fracture is 10.0%, and of hip fracture is 1.7%, based on left femoral neck BMD.     RECOMMENDATIONS:   Consider treatment if major osteoporotic fracture score is greater than or equal to 20%. Consider treatment if hip fracture score is greater than or equal to 3%.                                                                      IMPRESSION: Low bone density (OSTEOPENIA). T score meets the World Health Organization (WHO) criteria for low bone density (osteopenia) at one or more measured sites. The risk of osteoporotic fracture increased approximately two-fold for each SD decrease   in T-score.            A:  Healing R distal femur after intramedullary fixation with retrograde ronda and interlocking screws.  Has been non weight bearing    P:  Start weight bearing slowly, discontinue brace  PT to help with ambulation and motion  Notify if exacerbation symptoms  See back 4-6 weeks and repeat R knee images (not femur)           In addition to the above assessment and plan each active problem on Britt's problem list was evaluated today. This included the questioning of Britt for any medication problems. We will continue the current treatment plan for these active problems except as noted.

## 2023-05-01 NOTE — LETTER
5/1/2023         RE: Britt Park  [address] [id number]        Dear Colleague,    Thank you for referring your patient, Britt Park, to the [facility] [place]. Please see a copy of my visit note below.    S:Patient is a 53 year old,  female seen today in consultation for right leg.  They report onset of symptoms 12/10/23. [place] the [place] and slipped on puddle and fell injury. Surgery 12/11/22open reduction for internal fixation of the right femur fracture   Pain is located medial knee, and described as sharp and intense pain with movement.  Increased pain with bending the knee.  Pain does  wake at nighttime. Pain is improved by ice packs. tramadol and tylenol  They have tried the following therapies: PT .    Current pain level: 1/10, Worst pain level: 6/10.     Patient is currently working HR .            Patient Active Problem List   Diagnosis     Myopathy, mitochondrial     Overactive bladder     Dry eyes     Encounter for monitoring of systemic steroid therapy     Need for vaccination     Impetigo     Secondary hypertension     On prednisone therapy     Vitamin B 12 deficiency     Need for pneumocystis prophylaxis     Anemia, unspecified type     Visit for screening mammogram     Seasonal allergic rhinitis, unspecified trigger     Acute respiratory failure with hypoxia (H)     Other myositis, unspecified site     Facial paresthesia     Weight gain     Vaginal dryness     Annual physical exam     Methemoglobinemia     Cyst of right ovary     Hepatic steatosis     Closed fracture of right femur, unspecified fracture morphology, unspecified portion of femur, initial encounter (H)     Enlarged lymph nodes     Atopic dermatitis of scalp     Pain of right lower leg     Closed disp fracture of condyle of right femur with routine healing     Pulmonary nodules            Past Medical History:   Diagnosis Date     Secondary hypertension 11/2/2021             Past Surgical History:   Procedure Laterality Date     ABDOMEN SURGERY  2007         BIOPSY  2019 & 2021    Right bicep, result abnormal results, & left tricep biopsy     COLONOSCOPY  21     COLONOSCOPY N/A 2022    Procedure: COLONOSCOPY, FLEXIBLE, WITH LESION REMOVAL USING SNARE;  Surgeon: Dorie Santos MD;  Location:  GI     GYN SURGERY  07     for twins     OPEN REDUCTION INTERNAL FIXATION RODDING INTRAMEDULLARY FEMUR Right 2022    Procedure: Open reduction internal fixation of right femur fracture;  Surgeon: Al Larson MD;  Location:  OR            Social History     Tobacco Use     Smoking status: Never     Smokeless tobacco: Never   Vaping Use     Vaping status: Never Used   Substance Use Topics     Alcohol use: Not Currently            Family History   Problem Relation Age of Onset     Unknown/Adopted Other                Allergies   Allergen Reactions     Influenza Vaccines Hives     Benadryl [Diphenhydramine]      Diphenhydramine Other (See Comments)     SHAKY       Sulfa Antibiotics Swelling     Sulfa Antibiotics             Current Outpatient Medications   Medication Sig Dispense Refill     acetaminophen (TYLENOL) 325 MG tablet Take 2 tablets (650 mg) by mouth every 6 hours as needed for other (For optimal non-opioid multimodal pain management to improve pain control.) 100 tablet 0     aspirin (ASA) 325 MG EC tablet Take 1 tablet (325 mg) by mouth 2 times daily 60 tablet 0     atorvastatin (LIPITOR) 10 MG tablet TAKE 1 TABLET(10 MG) BY MOUTH DAILY 90 tablet 3     calcium carbonate (TUMS) 500 MG chewable tablet Take 1 tablet (500 mg) by mouth 2 times daily as needed for heartburn       estradiol (ESTRACE) 0.1 MG/GM vaginal cream PLACE 2 GRAMS VAGINALLY 2 TIMES A WEEK 42.5 g 1     folic acid (FOLVITE) 1 MG tablet Take 1 tablet (1 mg) by mouth daily 90 tablet 3     gabapentin (NEURONTIN) 100 MG capsule TAKE 1 CAPSULE(100 MG) BY  MOUTH THREE TIMES DAILY 90 capsule 0     hydrOXYzine (ATARAX) 25 MG tablet TAKE 1 TABLET BY MOUTH EVERY 6 HOURS AS NEEDED FOR PAIN 30 tablet 1     losartan (COZAAR) 25 MG tablet TAKE 1 TABLET(25 MG) BY MOUTH DAILY 90 tablet 2     methotrexate 50 MG/2ML injection Inject 0.8 mLs (20 mg) Subcutaneous every 7 days On Wednesdays 4 mL 6     mometasone (NASONEX) 50 MCG/ACT nasal spray 2 sprays daily       mupirocin (BACTROBAN) 2 % external ointment Apply topically 3 times daily 15 g 0     oxyCODONE (ROXICODONE) 5 MG tablet Take 1 tablet (5 mg) by mouth every 6 hours as needed for severe pain (7-10) Take 2.5 (1/2 tab)- 5 mg (1 tab) up to 4 times daily as needed, allow for at least 4 hours between doses, continue to reduce oxycodone use allowing for more time between doses. 28 tablet 0     pantoprazole (PROTONIX) 40 MG EC tablet Take 40 mg by mouth daily       polyethylene glycol (MIRALAX) 17 GM/Dose powder Take 17 g by mouth daily 578 g 0     predniSONE (DELTASONE) 5 MG tablet Take 10 mg by mouth daily       traMADol (ULTRAM) 50 MG tablet        valACYclovir (VALTREX) 1000 mg tablet Take 2,000 mg by mouth as needed            Review Of Systems  Skin: negative  Eyes: negative  Ears/Nose/Throat: negative  Respiratory: No shortness of breath, dyspnea on exertion, cough, or hemoptysis    O: Physical Exam:  Adequate knee flexion and extension.  Quad atrophy compared to contralateral extremity.  Well healed incision. Some tenderness over screw tip distal anterior medial knee.    Lab:  Vit D 28     Images:EXAM: CT FEMUR THIGH RIGHT W/O CONTRAST  LOCATION: [facility]  DATE/TIME: 4/20/2023 8:26 AM CDT     INDICATION:  Aftercare following surgery of the musculoskeletal system, fracture of femur, distal (H), fracture of neck of femur (H).  COMPARISON: None.  TECHNIQUE: Noncontrast. Axial, sagittal and coronal thin-section reconstruction. Dose reduction techniques were used.      FINDINGS:       BONES:  -Postoperative change of IM ronda fixation traversing a fracture of the distal aspect of the femoral diaphyseal shaft. The fracture is in excellent anatomic alignment. It is still visualized but there is evidence of some developing callus along its lateral   margin. The fracture extends into the medial femoral condyle. There is some degenerative change at the knee joint but no evidence for significant cortical step-off along the joint margin.     SOFT TISSUES:  -No significant hip or knee joint effusion. No evidence for soft tissue mass or fluid collection. There is fatty infiltration and atrophy of the vastus lateralis and the hamstring musculature which could be secondary to disuse or chronic denervation   change.                                                                      IMPRESSION:  1.  Postoperative changes of IM ronda and locking screw fixation traversing a fracture of the mid to distal aspect of the femoral diaphyseal shaft. The fracture is in excellent anatomic alignment but still visualized. There does appear to be some early   developing callus along its lateral margin.  2.  The fracture extends into the medial femoral condyle, but there is no cortical step-off along the joint margin and no significant knee joint effusion or evidence for lipohemarthrosis.  3.  No evidence for soft tissue mass or fluid collection but note is made of fatty infiltration and atrophy of the vastus lateralis and hamstring muscle group which could be due to disuse or chronic denervation change.  4.  No hip or knee joint effusion.    EXAM: DX HIP/PELVIS/SPINE  LOCATION: [facility]  DATE/TIME: 2/22/2023 8:50 AM     INDICATION: Pathological fracture of femur due to other osteoporosis with routine healing, unspecified laterality, subsequent encounter.  COMPARISON: 12/08/2021.  TECHNIQUE: Dual-energy x-ray absorptiometry performed with routine technique.     FINDINGS:     Lumbar Spine:  L1-L4: BMD: 1.006 g/cm2. T-score: -1.5. Z-score: -1.1  LEFT Hip Total: BMD: 0.841 g/cm2. T-score: -1.3. Z-score: -0.9  LEFT Hip Femoral neck: BMD: 0.762 g/cm2. T-score: -2.0. Z-score: -1.2     WHO Criteria:  Normal: T score at or above -1 SD  Osteopenia: T score between -1 and -2.5 SD  Osteoporosis: T score at or below -2.5 SD     COMPARISON: -There has been a 10.3% decrease in lumbar spine BMD. -There has been a 13.7% decrease in bilateral hip BMD.     FRAX Results: 10 year probability of major osteoporotic fracture is 10.0%, and of hip fracture is 1.7%, based on left femoral neck BMD.     RECOMMENDATIONS:   Consider treatment if major osteoporotic fracture score is greater than or equal to 20%. Consider treatment if hip fracture score is greater than or equal to 3%.                                                                      IMPRESSION: Low bone density (OSTEOPENIA). T score meets the World Health Organization (WHO) criteria for low bone density (osteopenia) at one or more measured sites. The risk of osteoporotic fracture increased approximately two-fold for each SD decrease   in T-score.            A:  Healing R distal femur after intramedullary fixation with retrograde ronda and interlocking screws.  Has been non weight bearing    P:  Start weight bearing slowly, discontinue brace  PT to help with ambulation and motion  Notify if exacerbation symptoms  See back 4-6 weeks and repeat R knee images (not femur)           In addition to the above assessment and plan each active problem on Britt's problem list was evaluated today. This included the questioning of Britt for any medication problems. We will continue the current treatment plan for these active problems except as noted.      Again, thank you for allowing me to participate in the care of your patient.        Sincerely,        DEVI ALFRED MD

## 2023-05-02 ENCOUNTER — THERAPY VISIT (OUTPATIENT)
Dept: PHYSICAL THERAPY | Facility: CLINIC | Age: 53
End: 2023-05-02
Payer: COMMERCIAL

## 2023-05-02 DIAGNOSIS — S72.91XA CLOSED FRACTURE OF RIGHT FEMUR, UNSPECIFIED FRACTURE MORPHOLOGY, UNSPECIFIED PORTION OF FEMUR, INITIAL ENCOUNTER (H): ICD-10-CM

## 2023-05-02 DIAGNOSIS — M79.661 PAIN OF RIGHT LOWER LEG: Primary | ICD-10-CM

## 2023-05-02 LAB — DEPRECATED CALCIDIOL+CALCIFEROL SERPL-MC: 28 UG/L (ref 20–75)

## 2023-05-02 PROCEDURE — 97110 THERAPEUTIC EXERCISES: CPT | Mod: GP | Performed by: PHYSICAL THERAPIST

## 2023-05-02 PROCEDURE — 97530 THERAPEUTIC ACTIVITIES: CPT | Mod: GP | Performed by: PHYSICAL THERAPIST

## 2023-05-02 NOTE — PROGRESS NOTES
"Rheumatology Clinic Visit  [facility]  Husam Barnett M.D.     Britt Park MRN# [id number]   YOB: 1970 Age: 53 year old     Date of Visit: 05/04/2023  Primary care provider: Vi Metz          Assessment and Plan:     # \"Granulomatous myositis\" (+anti-Mi2, dx 2021, started prednisone 2021, methotrexate 4-22)\": myopathy symptoms stable.  Exam shows decreased deltoid and triceps strength, both sides.  Lower extremity strength not tested due to knee stabilizer brace on the right leg.    Lab work on May 1, 2023 showed comprehensive metabolic panel normal; CRP was less than 3.0; CBC showed mild leukopenia with white count of 3.9.  CT of the femur on April 20, 2023 showed postoperative changes in the left femur and fracture extending into the medial femoral condyle. CT of the abdomen and pelvis on September 9 showed 5 cm right ovarian mass and mild hepatic steatosis.    Discussion: Patient is following with neurology at [facility] for chronic proximal muscle weakness, incompletely responsive to immunomodulatory therapy.  I will be happy to provide further medication monitoring services if neurology and primary care request.  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 MRI on January 23, 2023 showed calcified mass in the cervical spine at level of C2 and C3; tumoral calcinosis, synovial chondromatosis or other dystrophic calcification were on the differential.   # Fracture, R femur: Slow recovery.  It is possible that lower bone density associated with chronic moderate dose prednisone affected bone mineral density and fracture risk.  I agree with planned vitamin D and calcium supplementation and consideration of anabolic therapy.  Patient states that she has an appointment with \"bone health specialist\" coming up; I " "support.     MRI of the femur done in February 2023 showed fatty atrophy of the musculature involving the abductors and vastus lateralis.  DEXA scan in February 2023 showed low bone density.    Plan:  1.  Continue off methotrexate  2. Continue of prednisone  3. Vitamin D 800 international unit(s) daily, Ca CO3 600 meq twice daily; discuss \"bone-building medication\" with Endocrinology or other osteoporosis specialist.  4. Further therapy for muscle disease per Dr. Albarran.  5. Nasonex Rx for allergies; followup with Dr. Metz  6. Repeat CBC in one month.    RTC prn    Husam Barnett MD  Staff Rheumatologist, [facility]    On the day of the encounter, a total of 30 minutes was spent in chart review, and in counseling and coordination of care, regarding the patient's complex medical problem of right femur fracture, low bone density, neck pain, and seasonal allergies.           Active Problem List:     Patient Active Problem List    Diagnosis Date Noted     Closed disp fracture of condyle of right femur with routine healing 04/05/2023     Priority: Medium     Pulmonary nodules 04/05/2023     Priority: Medium     Atopic dermatitis of scalp 02/13/2023     Priority: Medium     Pain of right lower leg 02/13/2023     Priority: Medium     Enlarged lymph nodes 01/04/2023     Priority: Medium     Closed fracture of right femur, unspecified fracture morphology, unspecified portion of femur, initial encounter (H) 12/10/2022     Priority: Medium     Cyst of right ovary 09/27/2022     Priority: Medium     Hepatic steatosis 09/27/2022     Priority: Medium     Facial paresthesia 06/27/2022     Priority: Medium     Weight gain 06/27/2022     Priority: Medium     Vaginal dryness 06/27/2022     Priority: Medium     Annual physical exam 06/27/2022     Priority: Medium     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     Priority: Medium     Acute respiratory failure with hypoxia (H) 06/02/2022     Priority: Medium     Other myositis, unspecified site " 06/02/2022     Priority: Medium     Anemia, unspecified type 04/18/2022     Priority: Medium     Visit for screening mammogram 04/18/2022     Priority: Medium     Seasonal allergic rhinitis, unspecified trigger 04/18/2022     Priority: Medium     Need for pneumocystis prophylaxis 01/04/2022     Priority: Medium     Impetigo 11/02/2021     Priority: Medium     Secondary hypertension 11/02/2021     Priority: Medium     On prednisone therapy 11/02/2021     Priority: Medium     Vitamin B 12 deficiency 11/02/2021     Priority: Medium     Myopathy, mitochondrial 09/22/2021     Priority: Medium     Overactive bladder 09/22/2021     Priority: Medium     Dry eyes 09/22/2021     Priority: Medium     Encounter for monitoring of systemic steroid therapy 09/22/2021     Priority: Medium     Need for vaccination 09/22/2021     Priority: Medium            History of Present Illness:   Britt Park follows up for medication monitoring.  She was last seen in , when she presented for followup of monitoring immunomodulatory therapy for with a diagnosis of granulomatous myositis established by neurology at [facility].  At that time, recommendation was made to continue methotrexate injection subcutaneous 0.8 mls weekly.    Interval history May 4, 2023  Patient was seen in neurology, Dr. Albarran at [facility] on March 15, 2023.  Impression was of granulomatous myositis, with no evidence of improvement on clinical exam or muscle MRI despite use of prednisone and methotrexate for more than 3 months.  Recent bone fracture was also noted, and a questionable association with anemia.  In view of the lack of clinical improvement, plan was to taper off the immunotherapy and watch and wait.  Prednisone was recommended to be tapered first over 2 months.  If prednisone taper succeeded, methotrexate would be tapered off afterwards.  Chest CT to screen for sarcoidosis was recommended.    Today, she reports that she broke her R femur in  "a fall in . She had surgery with ronda stabilization, and has had a xavier post-op course with persistent pain/neuritic discomfort.   She has started more PTx and weight-bearing in the last month.     She reports that she has tapered off prednisone and methotrexate as of 4-2023. She noted some tightness and discomfort in the neck and shoulders as she tapered. Discomfort has leveled off; she is taking just a small dose of tramadol for knee pain primarily.  She had a biopsy of a left cervical mass at C2-C3 with CT guidance in March; micro work-up is negative.    Interval history November 3, 2022    Today, she notes overall improved muscle weakness. Getting out of chair is easier, moving shoulders for dressing/lifting activities is not.  She is tolerating subcutaneous methotrexate without difficulty; she thinks it is helping her.  She is tapering prednisone per [facility] suggestions: she moved from 15 mg daily to 10 mg as of 11-1.  She noted recurrence of neck pain after lowering prednisone dose several days. Pain is only on the left, 8/10, constant. Pain formerly worked up in 2019. S/p cervical \"injections\"    She is doing weekly resistance training for the legs.  She is concerned about CT findings of fatty liver. She does note \"digestion\" issues; she relates to weight gain.    Patient contacted Dr. Albarran in early June 2022 with reports of low oxygen saturation and respiratory symptoms, subacute to chronic.  Recommendation was to come to the emergency room or see internal medicine if home oxygen saturations continue.  Macrocytic anemia was considered possible effect of methotrexate; patient was recommended to stop dapsone and continue methotrexate and prednisone. She has not restarted dapsone.    Patient was seen by primary care on September 21, 2022.  Impression was of newly recognized right ovarian cyst and hepatic steatosis.  Consideration of alternative to methotrexate for treatment of myopathy was deferred to " "[place] [facility].  Pelvic ultrasound was ordered.    Interval history May 16, 2022    Patient was last seen by Dr. Albarran in [facility] on April 13, 2022.  Impression was of granulomatous myositis with positive mitochondrial antibodies; mild improvement in hip girdle muscle weakness was noted.  Recent onset left lower facial numbness noted.  Recommendation was to decrease prednisone to 25 mg daily, and to taper by 5 mg every 2 months.  Continue methotrexate 20 mg weekly.  Brain MRI and muscle MRI were ordered.    She notes abdominal bloating and distension.  Occult blood was found in stool; colonscopy found only polyps. She has been referred to [facility]; she will have EGD.  She notes chronic tingling/numbness on L side of jaw.  She notes occasional color changes in R 3rd fingertip with numbness, lasting one hour.    She reports tolerating methotrexate well, now taking for 19 weeks. Still doing weekly subcutaneous injections, 0/8 mg. Not noting any adverse effects.    She is reducing prednisone, now on 25 mg daily, after 10 months.  Dapsone daily continues.    Initial hx 1-2022    Patient has been following with Dr. Albarran in neurology at [facility] since mid 2021.  I excerpt historical information included in Dr. Albarran's note from December 13, 2021    \" ...Last seen was on September 16, 2021. At that time, we establish the diagnosis of granulomatous myositis and recommended treatment with prednisone. The patient started treatment toward the end of September. She was originally of 50 mg of prednisone for a month, then 40 mg for a month, then last week she decrease the dose to 30 mg per day. She was also started on omeprazole and vitamin-D/calcium. She was not started on Bactrim.  The patient noticed immediate improvement in her neck pain. Her muscle falling and stiffness resolved so she stopped naltrexone. Her range of motion has not changed much. She still unable to fully abduct her shoulders. She still struggles " "a standing up from a low seat. She still has difficulty with chairs. But she felt overall her performance has improved. One time she was able to get into her 's truck without any help which is unusual for her. She also used to have intermittent tingling and numbness in her left lower face, along her jaw line, which has resolved on prednisone. Once she cut back down on prednisone to 30 mg, she has been feeling as if her muscle stiffness is coming back, she is again having some intermittent face numbness and tingling, and she feels some discomfort and instability in her right ankle. Overall, she feels her strength itself has not changed after cutting down on prednisone.\"    New test results  NCS/EMG: There continues to be an electrodiagnostic evidence of a proximal upper limb predominant myopathy with electrophysiologic correlates of necrotic fibers, fiber splitting or vacuolization of muscle fibers. Compared to the prior study in July 2021, there is an interval improvement of the myopathic process based on: 1. less fibrillation potentials and milder degree of motor unit potential changes observed in proximal upper limb muscles and 2. absence of myopathic motor unit potentials in tensor fascia latae.     Muscle MRI: no significant change in the diffuse inflammatory intramuscular edema throughout the bilateral pelvis and thigh musculature since 07/08/2020 suggestive of myositis. There has been mild interval progression of areas of moderate and advanced volume loss about both thighs. Areas of advanced atrophy include the bilateral vastus lateralis, bilateral adductor Rogelio and the right upper hamstring musculature....    ....ASSESSMENT / PLAN   #1 Granulomatous myositis, mildly positive mitochondrial antibodies  After three months of oral prednisone treatment, Mrs. Park has experienced some improvement in muscle pain and overall function with relatively unchanged strength. Neurological examination is " relatively unchanged. There is some improvement in the electrodiagnostic findings but as they were mostly confined to the tensor fascia corry, they could also be due to sampling error. Muscle MRI compared to the one from 2020 showed relatively unchanged T2 hyperintensity with more advanced muscle atrophy.  Given the lack of clear improvement in 's strength, it is possible that her myositis is indeed responsive to prednisone but we just need to give it more time, or that is how far we going to get with prednisone. It is also possible that her underlying myositis is not responsive to immunotherapy as can be seen in inclusion body myositis (IBM) or sometimes sarcoidosis, however, we cannot determine that at this time especially that the muscle biopsy showed no features of IBM.  The patient was seen by Dr. Martinez from  regarding her positive AMA. I appreciate his help. There is no evidence of liver disease at this time. The plan is to follow up in a year.  After discussing several options (including waiting to see if we get more improvement on prednisone or add methotrexate), we decided the followin. Start IVIG 2g/kg over 5 days on week 1, then start 0.4 g/kg once a week on week 2. The patient should remain on this weekly dose for at least 3 months.   2. Continue prednisone 30 mg per day for now. Once IVIG is started, a month later, the patient will let me know how she is doing at that time. If she is doing ok, prednisone dose can be decreased to 25 mg per day for a month, then 20 mg per day and stay on that dose until follow up.   3. Please start Trimethoprim/sulfamethoxazole (Bactrim) DS one tablet three times per week for Pneumocystis carinii prophylaxis.  For patients who are allergic to sulfa, alternatives include dapsone 50 mg PO daily plus pyrimethamine 50 mg PO weekly plus leucovorin 25 mg PO weekly, or aerosolized pentamidine 300 mg every four weeks. The patient should remain in that  "until she is on less than 20 mg of prednisone per day.   4. We will add TDP43 and p62 immunostains on her muscle biopsy.\"      Today 1-, she confirms muscle weakness going on since 2019. +difficulty with drying hair, grooming, going upstairs. She was eventually diagnosed vith myositis (although Noran-rec'd muscle biopsy did not show characteristic findings) by [facility] in July 2021. L tricweps biopsy showed \"granulomatous myositis\". She was treated with prednisone 50 mg starting in . She has noted improvement in chronic neck pain, and has noted slight improvement in weakness since then. However, she still has difficculty getting up from chairs and from the floor.She has tapered prednisone to 30 mg daily.     F/u MRI and EMG at [facility] in  showed \"little improvement\" by report. Dr. Albarran recommended either methotrexate or IVIg as a steroid-sparing agent.     She reports that she is lenaning toward use methotrexate due to its lower expense and tolerability, compared with IVIg.  She has questions about safety of methotrexate, and of IVIG.  She has acquired liquid methotrexate, but has not started the drug yet.    She reports having sleep apnea, and has frequent nasal skin irritation due to CPAP. She has been given bacterial cream for \"Impetigo\", and skin is improved, but she is concerned about having a \"cold\" right now.           Review of Systems:     See HPI  Constitutional: negative  Skin: negative  Eyes: negative  Ears/Nose/Throat: negative  Respiratory: No shortness of breath, dyspnea on exertion, cough, or hemoptysis  Cardiovascular: negative  Gastrointestinal: negative  Genitourinary: negative  Musculoskeletal: negative  Neurologic: negative  Psychiatric: negative  Hematologic/Lymphatic/Immunologic: negative  Endocrine: negative          Past Medical History:     Past Medical History:   Diagnosis Date     Secondary hypertension 11/2/2021     Past Surgical History:   Procedure Laterality " Date     ABDOMEN SURGERY  2007         BIOPSY  2019 & 2021    Right bicep, result abnormal results, & left tricep biopsy     COLONOSCOPY  21     COLONOSCOPY N/A 2022    Procedure: COLONOSCOPY, FLEXIBLE, WITH LESION REMOVAL USING SNARE;  Surgeon: Dorie Santos MD;  Location:  GI     GYN SURGERY  07     for twins     OPEN REDUCTION INTERNAL FIXATION RODDING INTRAMEDULLARY FEMUR Right 2022    Procedure: Open reduction internal fixation of right femur fracture;  Surgeon: Al Larson MD;  Location:  OR     # Positive AMA antibodies : The patient was seen by Dr. Martinez from [facility] . There is no evidence of liver disease; annual f/u in GI planned.       Social History:     Social History     Occupational History     Not on file   Tobacco Use     Smoking status: Never     Smokeless tobacco: Never   Vaping Use     Vaping status: Never Used   Substance and Sexual Activity     Alcohol use: Not Currently     Drug use: Never     Sexual activity: Yes     Partners: Male     Birth control/protection: Male Surgical      she started a new job in [employer] at the [employer] in 2021       Family History:     Family History   Problem Relation Age of Onset     Unknown/Adopted Other             Allergies:     Allergies   Allergen Reactions     Influenza Vaccines Hives     Benadryl [Diphenhydramine]      Diphenhydramine Other (See Comments)     SHAKY       Sulfa Antibiotics Swelling     Sulfa Antibiotics             Medications:     Current Outpatient Medications   Medication Sig Dispense Refill     acetaminophen (TYLENOL) 325 MG tablet Take 2 tablets (650 mg) by mouth every 6 hours as needed for other (For optimal non-opioid multimodal pain management to improve pain control.) 100 tablet 0     aspirin (ASA) 325 MG EC tablet Take 1 tablet (325 mg) by mouth 2 times daily 60 tablet 0     atorvastatin (LIPITOR) 10 MG tablet TAKE 1 TABLET(10 MG) BY MOUTH  DAILY 90 tablet 3     calcium carbonate (TUMS) 500 MG chewable tablet Take 1 tablet (500 mg) by mouth 2 times daily as needed for heartburn       estradiol (ESTRACE) 0.1 MG/GM vaginal cream PLACE 2 GRAMS VAGINALLY 2 TIMES A WEEK 42.5 g 1     folic acid (FOLVITE) 1 MG tablet Take 1 tablet (1 mg) by mouth daily 90 tablet 3     gabapentin (NEURONTIN) 100 MG capsule TAKE 1 CAPSULE(100 MG) BY MOUTH THREE TIMES DAILY 90 capsule 0     hydrOXYzine (ATARAX) 25 MG tablet TAKE 1 TABLET BY MOUTH EVERY 6 HOURS AS NEEDED FOR PAIN 30 tablet 1     losartan (COZAAR) 25 MG tablet TAKE 1 TABLET(25 MG) BY MOUTH DAILY 90 tablet 2     methotrexate 50 MG/2ML injection Inject 0.8 mLs (20 mg) Subcutaneous every 7 days On Wednesdays 4 mL 6     mometasone (NASONEX) 50 MCG/ACT nasal spray 2 sprays daily       mupirocin (BACTROBAN) 2 % external ointment Apply topically 3 times daily 15 g 0     oxyCODONE (ROXICODONE) 5 MG tablet Take 1 tablet (5 mg) by mouth every 6 hours as needed for severe pain (7-10) Take 2.5 (1/2 tab)- 5 mg (1 tab) up to 4 times daily as needed, allow for at least 4 hours between doses, continue to reduce oxycodone use allowing for more time between doses. 28 tablet 0     pantoprazole (PROTONIX) 40 MG EC tablet Take 40 mg by mouth daily       polyethylene glycol (MIRALAX) 17 GM/Dose powder Take 17 g by mouth daily 578 g 0     predniSONE (DELTASONE) 5 MG tablet Take 10 mg by mouth daily       traMADol (ULTRAM) 50 MG tablet        valACYclovir (VALTREX) 1000 mg tablet Take 2,000 mg by mouth as needed              Physical Exam:   not currently breastfeeding.  Wt Readings from Last 6 Encounters:   02/13/23 68 kg (150 lb)   12/26/22 67.9 kg (149 lb 11.2 oz)   11/03/22 66.7 kg (147 lb)   06/27/22 62.6 kg (138 lb)   06/03/22 61.5 kg (135 lb 8 oz)   06/01/22 61.2 kg (135 lb)       Constitutional: well-developed, appearing stated age; cooperative  Face: rounded facies.  Heent: no oral ulcers  Psych: nl judgement, orientation,  memory, affect.  Neck ROM normal, dose not elicit pain.  Neuro: DTRs symmetrcal at biceps  Chest: normal respiration.         Data:     @      Latest Ref Rng & Units 12/26/2022     8:06 AM 12/29/2022     6:43 AM 5/1/2023     1:39 PM   RHEUM RESULTS   Albumin 3.5 - 5.2 g/dL   4.5     ALT 10 - 35 U/L   24     AST 10 - 35 U/L   34     Creatinine 0.51 - 0.95 mg/dL  0.51 - 0.95 mg/dL 0.48   0.46   0.38      0.38     CRP Inflammation <5.00 mg/L   <3.00     GFR Estimate >60 mL/min/1.73m2  >60 mL/min/1.73m2 >90   >90   >90      >90     Hematocrit 35.0 - 47.0 %  35.0   37.9     Hemoglobin 11.7 - 15.7 g/dL  11.0   12.1     WBC 4.0 - 11.0 10e3/uL  6.9   3.9     RBC Count 3.80 - 5.20 10e6/uL  3.28   3.57     RDW 10.0 - 15.0 %  15.0   13.2     MCHC 31.5 - 36.5 g/dL  31.4   31.9     MCV 78 - 100 fL  107   106     Platelet Count 150 - 450 10e3/uL  488   317

## 2023-05-04 ENCOUNTER — OFFICE VISIT (OUTPATIENT)
Dept: RHEUMATOLOGY | Facility: CLINIC | Age: 53
End: 2023-05-04
Payer: COMMERCIAL

## 2023-05-04 VITALS — OXYGEN SATURATION: 100 % | HEART RATE: 89 BPM | DIASTOLIC BLOOD PRESSURE: 94 MMHG | SYSTOLIC BLOOD PRESSURE: 131 MMHG

## 2023-05-04 DIAGNOSIS — M05.00 RHEUMATOID ARTHRITIS WITH SPLENOADENOMEGALY AND LEUKOPENIA (H): ICD-10-CM

## 2023-05-04 DIAGNOSIS — D75.9 CYTOPENIA: ICD-10-CM

## 2023-05-04 DIAGNOSIS — J30.2 SEASONAL ALLERGIC RHINITIS, UNSPECIFIED TRIGGER: Primary | ICD-10-CM

## 2023-05-04 PROCEDURE — 99214 OFFICE O/P EST MOD 30 MIN: CPT | Performed by: INTERNAL MEDICINE

## 2023-05-04 RX ORDER — MOMETASONE FUROATE MONOHYDRATE 50 UG/1
2 SPRAY, METERED NASAL DAILY
Qty: 17 G | Refills: 2 | Status: SHIPPED | OUTPATIENT
Start: 2023-05-04 | End: 2023-08-03

## 2023-05-04 ASSESSMENT — PAIN SCALES - GENERAL: PAINLEVEL: MILD PAIN (2)

## 2023-05-04 NOTE — NURSING NOTE
Patient here for follow up.  Stopped methotrexate about 3 months ago.  [facility] provider suggested she stop it.  Not on prednisone currently.  Looking for a refill of nasonex.     Betina Valencia RN

## 2023-05-04 NOTE — PATIENT INSTRUCTIONS
"Dx:  1. Granulomatous myositis: no change in symptoms after stopping prednisone and methotrexate. Plan followup with Dr. Albarran. No need to monitor blood work off medication.  2. L neck pain: stable  3. Low white count  4. Decreased activity after R femur fracture. Plan increase Vit D and continue calcium supplement.    Plan:  1.  Continue off methotrexate  2. Continue of prednisone  3. Vitamin D 800 international unit(s) daily, Ca CO3 600 meq twice daily; discuss \"bone-building medication\" with Endocrinology or other osteoporosis specialist.  4. Further therapy for muscle disease per Dr. Albarran.  5. Nasonex for allergies; followup with Dr. Metz  6. Repeat CBC in one month.  "

## 2023-05-09 ENCOUNTER — THERAPY VISIT (OUTPATIENT)
Dept: PHYSICAL THERAPY | Facility: CLINIC | Age: 53
End: 2023-05-09
Payer: COMMERCIAL

## 2023-05-09 ENCOUNTER — PATIENT OUTREACH (OUTPATIENT)
Dept: CARE COORDINATION | Facility: CLINIC | Age: 53
End: 2023-05-09

## 2023-05-09 DIAGNOSIS — S72.91XA CLOSED FRACTURE OF RIGHT FEMUR, UNSPECIFIED FRACTURE MORPHOLOGY, UNSPECIFIED PORTION OF FEMUR, INITIAL ENCOUNTER (H): ICD-10-CM

## 2023-05-09 DIAGNOSIS — M79.661 PAIN OF RIGHT LOWER LEG: Primary | ICD-10-CM

## 2023-05-09 PROCEDURE — 97110 THERAPEUTIC EXERCISES: CPT | Mod: GP | Performed by: PHYSICAL THERAPIST

## 2023-05-09 PROCEDURE — 97530 THERAPEUTIC ACTIVITIES: CPT | Mod: GP | Performed by: PHYSICAL THERAPIST

## 2023-05-09 ASSESSMENT — ACTIVITIES OF DAILY LIVING (ADL): DEPENDENT_IADLS:: TRANSPORTATION

## 2023-05-09 NOTE — PROGRESS NOTES
Clinic Care Coordination Contact  [facility]/Voicemail    Referral Source: Care Team  Clinical Data: Care Coordinator Outreach  Outreach attempted x 1.  Left message on patient's voicemail with call back information and requested return call.    Plan: Care Coordinator will try to reach patient again in 1 month.     Anabell Chaidez RN, BSN, PHN  Primary Care / Care Coordinator   [facility]  E-mail [email or web address]   [phone]

## 2023-05-14 DIAGNOSIS — S72.91XA CLOSED FRACTURE OF RIGHT FEMUR, UNSPECIFIED FRACTURE MORPHOLOGY, UNSPECIFIED PORTION OF FEMUR, INITIAL ENCOUNTER (H): ICD-10-CM

## 2023-05-15 ENCOUNTER — THERAPY VISIT (OUTPATIENT)
Dept: PHYSICAL THERAPY | Facility: CLINIC | Age: 53
End: 2023-05-15
Payer: COMMERCIAL

## 2023-05-15 DIAGNOSIS — S72.91XA CLOSED FRACTURE OF RIGHT FEMUR, UNSPECIFIED FRACTURE MORPHOLOGY, UNSPECIFIED PORTION OF FEMUR, INITIAL ENCOUNTER (H): ICD-10-CM

## 2023-05-15 DIAGNOSIS — M79.661 PAIN OF RIGHT LOWER LEG: Primary | ICD-10-CM

## 2023-05-15 PROCEDURE — 97110 THERAPEUTIC EXERCISES: CPT | Mod: GP | Performed by: PHYSICAL THERAPIST

## 2023-05-15 PROCEDURE — 97530 THERAPEUTIC ACTIVITIES: CPT | Mod: GP | Performed by: PHYSICAL THERAPIST

## 2023-05-15 RX ORDER — GABAPENTIN 100 MG/1
CAPSULE ORAL
Qty: 90 CAPSULE | Refills: 0 | Status: SHIPPED | OUTPATIENT
Start: 2023-05-15 | End: 2023-08-03

## 2023-05-15 NOTE — PROGRESS NOTES
Subjective:  HPI  Physical Exam                    Objective:  System    Physical Exam    General     ROS    Assessment/Plan:    SUBJECTIVE  Subjective changes as noted by pt:   Pt. reports cont pain and swelling in the R knee this week. Some days are worse or better than others. Pt. has done less exercising at home this week due to pain.   Current pain level:  4/10   Changes in function:  Yes (See Goal flowsheet attached for changes in current functional level)     Adverse reaction to treatment or activity:  None    OBJECTIVE  Changes in objective findings:  AROM R 0-0-80. PROM R 0-0-85. Strength R quad 4-/5     ASSESSMENT  Sun continues to require intervention to meet STG and LTG's: PT  Patient is progressing slower than expected.  Response to therapy has shown an improvement in  pain level, ROM  and strength  Progress made towards STG/LTG?  Yes (See Goal flowsheet attached for updates on achievement of STG and LTG)    PLAN  Current treatment program is being advanced to more complex exercises.    PTA/ATC plan:  N/A    Please refer to the daily flowsheet for treatment today, total treatment time and time spent performing 1:1 timed codes.

## 2023-05-17 ENCOUNTER — OFFICE VISIT (OUTPATIENT)
Dept: PHYSICAL MEDICINE AND REHAB | Facility: CLINIC | Age: 53
End: 2023-05-17
Attending: ORTHOPAEDIC SURGERY
Payer: COMMERCIAL

## 2023-05-17 VITALS
DIASTOLIC BLOOD PRESSURE: 74 MMHG | BODY MASS INDEX: 27.6 KG/M2 | OXYGEN SATURATION: 100 % | HEIGHT: 62 IN | HEART RATE: 82 BPM | WEIGHT: 150 LBS | SYSTOLIC BLOOD PRESSURE: 112 MMHG
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
## 2023-05-17 DIAGNOSIS — M54.50 CHRONIC BILATERAL LOW BACK PAIN WITHOUT SCIATICA: Primary | ICD-10-CM

## 2023-05-17 DIAGNOSIS — G89.29 CHRONIC BILATERAL LOW BACK PAIN WITHOUT SCIATICA: Primary | ICD-10-CM

## 2023-05-17 DIAGNOSIS — M85.80 OSTEOPENIA, UNSPECIFIED LOCATION: ICD-10-CM

## 2023-05-17 DIAGNOSIS — M62.81 GENERALIZED MUSCLE WEAKNESS: ICD-10-CM

## 2023-05-17 DIAGNOSIS — M79.661 PAIN OF RIGHT LOWER LEG: ICD-10-CM

## 2023-05-17 PROCEDURE — 99205 OFFICE O/P NEW HI 60 MIN: CPT | Performed by: PHYSICAL MEDICINE & REHABILITATION

## 2023-05-17 ASSESSMENT — PAIN SCALES - GENERAL: PAINLEVEL: MILD PAIN (3)

## 2023-05-17 NOTE — LETTER
2023       RE: Jayla Park  [address] [facility] [address]       Dear Colleague,    Thank you for referring your patient, Jayla Park, to the [facility] at [facility]. Please see a copy of my visit note below.           PM&R Clinic Note     Patient Name: Ms. jayla Park is a very pleasant lady whom I have the privilege to see today in bone health clinic. Jayla Park : 1970 Medical Record: [id number]     Requesting Physician/clinician: Balwinder Nunn MD           History of Present Illness:      She has had a falling accident on December 10, 2022.  She suffered fracture of the femur and injury to her knee subsequent to that she had for femoral ronda and needed orthopedic procedures.  She has had excellent management post fracture accident at this time she is wearing a long knee and thigh supported brace.  She has been advised that she can discontinue the brace, I presume gradually.  She continues wearing the brace to feel safer during her ambulatory activities.  I reviewed her bone mineral density result of May 1 with her which indicates presence of osteopenia spine BMD -1.5 and hip BMD total -1.3.  She has past history of reportedly weakness in the shoulder girdle muscles as well as hip girdle muscles in the setting of a myopathic disorder.  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          Symptoms; at the present she has significant low back pain intensity 5/10, pain in the right lower extremity especially upon weightbearing.  Bilateral low back pain without radicular pattern.  With significant tenderness on palpation.        Therapies/HEP/equipments,; at the present she receives physical therapy for post fall and related symptoms of right lower extremity fracture.  She has come to this consultation in a wheelchair.  Around the house she uses the wheelchair periodically as  well        Functionally,: She is independent at wheelchair level reportedly               Past Medical and Surgical History:     Past Medical History:   Diagnosis Date    Secondary hypertension 2021     Past Surgical History:   Procedure Laterality Date    ABDOMEN SURGERY  2007        BIOPSY  2019 & 2021    Right bicep, result abnormal results, & left tricep biopsy    COLONOSCOPY  21    COLONOSCOPY N/A 2022    Procedure: COLONOSCOPY, FLEXIBLE, WITH LESION REMOVAL USING SNARE;  Surgeon: Dorie Santos MD;  Location:  GI    GYN SURGERY  07     for twins    OPEN REDUCTION INTERNAL FIXATION RODDING INTRAMEDULLARY FEMUR Right 2022    Procedure: Open reduction internal fixation of right femur fracture;  Surgeon: Al Larson MD;  Location:  OR            Social History:     Social History     Tobacco Use    Smoking status: Never    Smokeless tobacco: Never   Vaping Use    Vaping status: Never Used   Substance Use Topics    Alcohol use: Not Currently       Marital Status: Not discussed  Living situation: Independent at wheelchair level  Family support: Not discussed  Vocational History: Works at [employer]  Tobacco use: Not discussed  Alcohol use: Not discussed  Recreational drug use: Not applicable         Functional history:     Britt Park is independent with all aspects of her life at wheelchair level.    ADLs: Needs some assistance  Assistive devices: Wears a brace on the right lower extremity and uses a wheelchair off-and-on  iADLs (medication management and finances): Is able to perform, as above  Hand dominance: Right hand  Driving: Not discussed but she had a ride arranged for today           Family History:     Family History   Problem Relation Age of Onset    Unknown/Adopted Other             Medications:     Current Outpatient Medications   Medication Sig Dispense Refill    acetaminophen (TYLENOL) 325 MG tablet  Take 2 tablets (650 mg) by mouth every 6 hours as needed for other (For optimal non-opioid multimodal pain management to improve pain control.) 100 tablet 0    atorvastatin (LIPITOR) 10 MG tablet TAKE 1 TABLET(10 MG) BY MOUTH DAILY 90 tablet 3    calcium carbonate (TUMS) 500 MG chewable tablet Take 1 tablet (500 mg) by mouth 2 times daily as needed for heartburn      estradiol (ESTRACE) 0.1 MG/GM vaginal cream PLACE 2 GRAMS VAGINALLY 2 TIMES A WEEK (Patient not taking: Reported on 5/4/2023) 42.5 g 1    folic acid (FOLVITE) 1 MG tablet Take 1 tablet (1 mg) by mouth daily 90 tablet 3    gabapentin (NEURONTIN) 100 MG capsule TAKE 1 CAPSULE(100 MG) BY MOUTH THREE TIMES DAILY 90 capsule 0    hydrOXYzine (ATARAX) 25 MG tablet TAKE 1 TABLET BY MOUTH EVERY 6 HOURS AS NEEDED FOR PAIN 30 tablet 1    losartan (COZAAR) 25 MG tablet TAKE 1 TABLET(25 MG) BY MOUTH DAILY 90 tablet 2    methotrexate 50 MG/2ML injection Inject 0.8 mLs (20 mg) Subcutaneous every 7 days On Wednesdays (Patient not taking: Reported on 5/4/2023) 4 mL 6    mometasone (NASONEX) 50 MCG/ACT nasal spray Spray 2 sprays into both nostrils daily 17 g 2    mupirocin (BACTROBAN) 2 % external ointment Apply topically 3 times daily 15 g 0    oxyCODONE (ROXICODONE) 5 MG tablet Take 1 tablet (5 mg) by mouth every 6 hours as needed for severe pain (7-10) Take 2.5 (1/2 tab)- 5 mg (1 tab) up to 4 times daily as needed, allow for at least 4 hours between doses, continue to reduce oxycodone use allowing for more time between doses. (Patient not taking: Reported on 5/4/2023) 28 tablet 0    pantoprazole (PROTONIX) 40 MG EC tablet Take 40 mg by mouth daily      polyethylene glycol (MIRALAX) 17 GM/Dose powder Take 17 g by mouth daily 578 g 0    predniSONE (DELTASONE) 5 MG tablet Take 10 mg by mouth daily (Patient not taking: Reported on 5/4/2023)      traMADol (ULTRAM) 50 MG tablet       valACYclovir (VALTREX) 1000 mg tablet Take 2,000 mg by mouth as needed               "Allergies:     Allergies   Allergen Reactions    Influenza Vaccines Hives    Benadryl [Diphenhydramine]     Diphenhydramine Other (See Comments)     SHAKY      Sulfa Antibiotics Swelling    Sulfa Antibiotics               ROS:     A focused ROS is negative other than the symptoms noted above in the HPI.      Constitutional:  Gen: NAD, pleasant and cooperative     Respiratory: denies dyspnea   Musculoskeletal: Complains of low back pain and right lower extremity pain while in brace    Neurologic: denies any headache, changes in motor or sensory function, no loss of balance or vertigo, difficulty getting around as above  Psychiatric: denies mood changes; sleeps OK not applicable  Posture: Spine: Thoracolumbar care we need x-rays to define it better  Lower extremities: Lower extremity pain in the setting of post fracture and related deconditioning               Physical Examiniation:     VITAL SIGNS: /74   Pulse 82   Ht 1.575 m (5' 2\")   Wt 68 kg (150 lb)   SpO2 100%   BMI 27.44 kg/m    BMI: Estimated body mass index is 27.44 kg/m  as calculated from the following:    Height as of this encounter: 1.575 m (5' 2\").    Weight as of this encounter: 68 kg (150 lb).    Gen: NAD, pleasant and cooperative   Pulm: non-labored breathing in room air  Ext: No Edema in BLE, no tenderness in calves    Gen: In no acute distress, pleasant and cooperative       Neuro/Musculoskeletal:   Deep Tendon Reflexes:Symmetrical in the upper extremities.  Within normal limit in the left lower extremity, right lower extremity status post fracture in brace    Gross evaluation Muscle strength: Proximal weakness in the shoulder girdle muscles.  Weakness of the proximal muscles of the lower extremities   Upper extremities:      Lower extremities:    Paresthesias: Upper extremities    Lower extremities    Range of Motion:  +/-  Pain      Upper extremities:dystonia disorder in the hands right more than left involved.        Lower " extremities:as above.  Weakness of the hip girdle muscles  Gait :  Steadiness: Not tested in view of the brace on the right side, postfracture    Able to walk on toes and heels: Not tested    Tandem test: Not tested       Spine Alignment : Posture:  Thoracolumbar: Curve present but due to inability to stand unable to be evaluated at this time therefore radiographs will be obtained.  She has significant pain in the lumbar spine bilateral to the point that any palpation causes her discomfort.  Status of  lower ext's : Status postfracture of the right femur after the fall with internal fixation.  Presently in brace       SLR :  Right: Did not cause pain in the lumbar spine.  On the left it was not tried         Laboratory/Imaging:   Bone Mineral Density: Osteopenia of the spine and right hip    X-RAYS: Not available will be obtained           Assessment/Plan:     Ms. Park has a past history of weakness of the proximal muscles of the upper and lower extremities, dystonia of the hands; recent  fall related fracture of the femur and injury to the right knee presently in bilateral upright knee and thigh supportive brace with lateral locks for extension.  Diagnoses :    A.  Status post right lower extremity fracture secondary to a fall  B.  Osteopenia of the spine and hips  C.  History of hypovitaminosis D  D.  Chronic low back pain without evidence for sciatica  E.  History of falls   F.  Past history of proximal myopathy upper and lower extremities      Patient education: In depth discussion and education was provided about the assessment and implications of each of the below recommendations for management. Patient indicated readiness to learn, all questions were answered and understanding of material presented was confirmed.    Work-up: We need x-rays of thoracic lumbar and pelvis for her future visit.  We need complete metabolic blood test in 2 months    Therapy/equipment/braces: Provided prescription for low back  John support and further intervention after review of x-rays of the spine and pelvis    Medications: She needs to increase her vitamin D 3 intake was discussed after review of her complete metabolic bone channel we need to consider antiresorptive agents.  I discussed with her Reclast once a year injection for the time being but we will start that after we review her spine x-rays and complete metabolic bone blood test    Interventions: She needs to implement exercises at home for the time being she needs to start mild without exertion muscles setting exercises for the shoulder girdle muscles as well as hip girdle muscles.  We discussed these exercises in detail and she performed them during this visit and was able to perform.  I emphasized avoidance of overdo and fatigue in view of her myopathy(reportedly).  Further interventions for correction of her ambulatory activities and posture can be provided after review of her x-rays.  Lower extremity exercises are important for her especially the weightbearing exercises with avoidance of falls as discussed and instructions were provided.    Referral / follow up with other providers: She already has and an standing follow-up appointment with her orthopedic surgeon  Follow up:In 2 weeks with JENNIFER Felix MD    At follow-up we need to have the result of spine x-rays and I asked her to bring her supports that I discussed today so we can review them at that time.  I also suggest that she would bring the vitamins and D3 that she is taking so we can address the proper dose and avoid over dose of D or calcium as discussed.    Vianca Felix MD    Physical Medicine & Rehabilitation    I spent a total of 60 minutes face-to-face with Britt Park during today's office visit. Over 50% of this time was spent counseling the patient and/or coordinating care. See note for details.     20 minutes spent on the date of the encounter doing chart review, history and exam,  "documentation and further activities as noted above              Britt Park is a 53 year old female who presents for:  Chief Complaint   Patient presents with    Musculoskeletal Problem     Pain of R Lower Leg.        Initial Vitals:  /74   Pulse 82   Ht 5' 2\" (1.575 m)   Wt 150 lb (68 kg)   SpO2 100%   BMI 27.44 kg/m   Estimated body mass index is 27.44 kg/m  as calculated from the following:    Height as of this encounter: 5' 2\" (1.575 m).    Weight as of this encounter: 150 lb (68 kg).. Body surface area is 1.72 meters squared. BP completed using cuff size: regular  Mild Pain (3)    Nursing Comments: Patient was in a knee brace and wheelchair.           Again, thank you for allowing me to participate in the care of your patient.      Sincerely,    JOHANNA BRIAN MD      "

## 2023-05-17 NOTE — PROGRESS NOTES
PM&R Clinic Note     Patient Name: Ms. jayla Park is a very pleasant lady whom I have the privilege to see today in bone health clinic. Jayla Park : 1970 Medical Record: [id number]     Requesting Physician/clinician: Balwinder Nunn MD           History of Present Illness:      She has had a falling accident on December 10, 2022.  She suffered fracture of the femur and injury to her knee subsequent to that she had for femoral ronda and needed orthopedic procedures.  She has had excellent management post fracture accident at this time she is wearing a long knee and thigh supported brace.  She has been advised that she can discontinue the brace, I presume gradually.  She continues wearing the brace to feel safer during her ambulatory activities.  I reviewed her bone mineral density result of May 1 with her which indicates presence of osteopenia spine BMD -1.5 and hip BMD total -1.3.  She has past history of reportedly weakness in the shoulder girdle muscles as well as hip girdle muscles in the setting of a myopathic disorder.  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          Symptoms; at the present she has significant low back pain intensity 5/10, pain in the right lower extremity especially upon weightbearing.  Bilateral low back pain without radicular pattern.  With significant tenderness on palpation.        Therapies/HEP/equipments,; at the present she receives physical therapy for post fall and related symptoms of right lower extremity fracture.  She has come to this consultation in a wheelchair.  Around the house she uses the wheelchair periodically as well        Functionally,: She is independent at wheelchair level reportedly               Past Medical and Surgical History:     Past Medical History:   Diagnosis Date     Secondary hypertension 2021     Past Surgical History:   Procedure Laterality Date     ABDOMEN SURGERY  2007          BIOPSY  2019 & 2021    Right bicep, result abnormal results, & left tricep biopsy     COLONOSCOPY  21     COLONOSCOPY N/A 2022    Procedure: COLONOSCOPY, FLEXIBLE, WITH LESION REMOVAL USING SNARE;  Surgeon: Dorie Santos MD;  Location:  GI     GYN SURGERY  07     for twins     OPEN REDUCTION INTERNAL FIXATION RODDING INTRAMEDULLARY FEMUR Right 2022    Procedure: Open reduction internal fixation of right femur fracture;  Surgeon: Al Larson MD;  Location:  OR            Social History:     Social History     Tobacco Use     Smoking status: Never     Smokeless tobacco: Never   Vaping Use     Vaping status: Never Used   Substance Use Topics     Alcohol use: Not Currently       Marital Status: Not discussed  Living situation: Independent at wheelchair level  Family support: Not discussed  Vocational History: Works at [employer]  Tobacco use: Not discussed  Alcohol use: Not discussed  Recreational drug use: Not applicable         Functional history:     Britt Park is independent with all aspects of her life at wheelchair level.    ADLs: Needs some assistance  Assistive devices: Wears a brace on the right lower extremity and uses a wheelchair off-and-on  iADLs (medication management and finances): Is able to perform, as above  Hand dominance: Right hand  Driving: Not discussed but she had a ride arranged for today           Family History:     Family History   Problem Relation Age of Onset     Unknown/Adopted Other             Medications:     Current Outpatient Medications   Medication Sig Dispense Refill     acetaminophen (TYLENOL) 325 MG tablet Take 2 tablets (650 mg) by mouth every 6 hours as needed for other (For optimal non-opioid multimodal pain management to improve pain control.) 100 tablet 0     atorvastatin (LIPITOR) 10 MG tablet TAKE 1 TABLET(10 MG) BY MOUTH DAILY 90 tablet 3     calcium carbonate (TUMS) 500 MG chewable tablet  Take 1 tablet (500 mg) by mouth 2 times daily as needed for heartburn       estradiol (ESTRACE) 0.1 MG/GM vaginal cream PLACE 2 GRAMS VAGINALLY 2 TIMES A WEEK (Patient not taking: Reported on 5/4/2023) 42.5 g 1     folic acid (FOLVITE) 1 MG tablet Take 1 tablet (1 mg) by mouth daily 90 tablet 3     gabapentin (NEURONTIN) 100 MG capsule TAKE 1 CAPSULE(100 MG) BY MOUTH THREE TIMES DAILY 90 capsule 0     hydrOXYzine (ATARAX) 25 MG tablet TAKE 1 TABLET BY MOUTH EVERY 6 HOURS AS NEEDED FOR PAIN 30 tablet 1     losartan (COZAAR) 25 MG tablet TAKE 1 TABLET(25 MG) BY MOUTH DAILY 90 tablet 2     methotrexate 50 MG/2ML injection Inject 0.8 mLs (20 mg) Subcutaneous every 7 days On Wednesdays (Patient not taking: Reported on 5/4/2023) 4 mL 6     mometasone (NASONEX) 50 MCG/ACT nasal spray Spray 2 sprays into both nostrils daily 17 g 2     mupirocin (BACTROBAN) 2 % external ointment Apply topically 3 times daily 15 g 0     oxyCODONE (ROXICODONE) 5 MG tablet Take 1 tablet (5 mg) by mouth every 6 hours as needed for severe pain (7-10) Take 2.5 (1/2 tab)- 5 mg (1 tab) up to 4 times daily as needed, allow for at least 4 hours between doses, continue to reduce oxycodone use allowing for more time between doses. (Patient not taking: Reported on 5/4/2023) 28 tablet 0     pantoprazole (PROTONIX) 40 MG EC tablet Take 40 mg by mouth daily       polyethylene glycol (MIRALAX) 17 GM/Dose powder Take 17 g by mouth daily 578 g 0     predniSONE (DELTASONE) 5 MG tablet Take 10 mg by mouth daily (Patient not taking: Reported on 5/4/2023)       traMADol (ULTRAM) 50 MG tablet        valACYclovir (VALTREX) 1000 mg tablet Take 2,000 mg by mouth as needed              Allergies:     Allergies   Allergen Reactions     Influenza Vaccines Hives     Benadryl [Diphenhydramine]      Diphenhydramine Other (See Comments)     SHAKY       Sulfa Antibiotics Swelling     Sulfa Antibiotics               ROS:     A focused ROS is negative other than the symptoms  "noted above in the HPI.      Constitutional:  Gen: NAD, pleasant and cooperative     Respiratory: denies dyspnea   Musculoskeletal: Complains of low back pain and right lower extremity pain while in brace    Neurologic: denies any headache, changes in motor or sensory function, no loss of balance or vertigo, difficulty getting around as above  Psychiatric: denies mood changes; sleeps OK not applicable  Posture: Spine: Thoracolumbar care we need x-rays to define it better  Lower extremities: Lower extremity pain in the setting of post fracture and related deconditioning               Physical Examiniation:     VITAL SIGNS: /74   Pulse 82   Ht 1.575 m (5' 2\")   Wt 68 kg (150 lb)   SpO2 100%   BMI 27.44 kg/m    BMI: Estimated body mass index is 27.44 kg/m  as calculated from the following:    Height as of this encounter: 1.575 m (5' 2\").    Weight as of this encounter: 68 kg (150 lb).    Gen: NAD, pleasant and cooperative   Pulm: non-labored breathing in room air  Ext: No Edema in BLE, no tenderness in calves    Gen: In no acute distress, pleasant and cooperative       Neuro/Musculoskeletal:   Deep Tendon Reflexes:Symmetrical in the upper extremities.  Within normal limit in the left lower extremity, right lower extremity status post fracture in brace    Gross evaluation Muscle strength: Proximal weakness in the shoulder girdle muscles.  Weakness of the proximal muscles of the lower extremities   Upper extremities:      Lower extremities:    Paresthesias: Upper extremities    Lower extremities    Range of Motion:  +/-  Pain      Upper extremities:dystonia disorder in the hands right more than left involved.        Lower extremities:as above.  Weakness of the hip girdle muscles  Gait :  Steadiness: Not tested in view of the brace on the right side, postfracture    Able to walk on toes and heels: Not tested    Tandem test: Not tested       Spine Alignment : Posture:  Thoracolumbar: Curve present but due to " inability to stand unable to be evaluated at this time therefore radiographs will be obtained.  She has significant pain in the lumbar spine bilateral to the point that any palpation causes her discomfort.  Status of  lower ext's : Status postfracture of the right femur after the fall with internal fixation.  Presently in brace       SLR :  Right: Did not cause pain in the lumbar spine.  On the left it was not tried         Laboratory/Imaging:   Bone Mineral Density: Osteopenia of the spine and right hip    X-RAYS: Not available will be obtained           Assessment/Plan:     Ms. Park has a past history of weakness of the proximal muscles of the upper and lower extremities, dystonia of the hands; recent  fall related fracture of the femur and injury to the right knee presently in bilateral upright knee and thigh supportive brace with lateral locks for extension.  Diagnoses :    A.  Status post right lower extremity fracture secondary to a fall  B.  Osteopenia of the spine and hips  C.  History of hypovitaminosis D  D.  Chronic low back pain without evidence for sciatica  E.  History of falls   F.  Past history of proximal myopathy upper and lower extremities      1. Patient education: In depth discussion and education was provided about the assessment and implications of each of the below recommendations for management. Patient indicated readiness to learn, all questions were answered and understanding of material presented was confirmed.    2. Work-up: We need x-rays of thoracic lumbar and pelvis for her future visit.  We need complete metabolic blood test in 2 months    3. Therapy/equipment/braces: Provided prescription for low back Melhorn support and further intervention after review of x-rays of the spine and pelvis    4. Medications: She needs to increase her vitamin D 3 intake was discussed after review of her complete metabolic bone channel we need to consider antiresorptive agents.  I discussed with  her Reclast once a year injection for the time being but we will start that after we review her spine x-rays and complete metabolic bone blood test    5. Interventions: She needs to implement exercises at home for the time being she needs to start mild without exertion muscles setting exercises for the shoulder girdle muscles as well as hip girdle muscles.  We discussed these exercises in detail and she performed them during this visit and was able to perform.  I emphasized avoidance of overdo and fatigue in view of her myopathy(reportedly).  Further interventions for correction of her ambulatory activities and posture can be provided after review of her x-rays.  Lower extremity exercises are important for her especially the weightbearing exercises with avoidance of falls as discussed and instructions were provided.    6. Referral / follow up with other providers: She already has and an standing follow-up appointment with her orthopedic surgeon  7. Follow up:In 2 weeks with JENNIFER Felix MD    8. At follow-up we need to have the result of spine x-rays and I asked her to bring her supports that I discussed today so we can review them at that time.  I also suggest that she would bring the vitamins and D3 that she is taking so we can address the proper dose and avoid over dose of D or calcium as discussed.    Vianca Felix MD    Physical Medicine & Rehabilitation    I spent a total of 60 minutes face-to-face with Britt Park during today's office visit. Over 50% of this time was spent counseling the patient and/or coordinating care. See note for details.     20 minutes spent on the date of the encounter doing chart review, history and exam, documentation and further activities as noted above

## 2023-05-17 NOTE — PROGRESS NOTES
"Britt Park is a 53 year old female who presents for:  Chief Complaint   Patient presents with     Musculoskeletal Problem     Pain of R Lower Leg.        Initial Vitals:  /74   Pulse 82   Ht 5' 2\" (1.575 m)   Wt 150 lb (68 kg)   SpO2 100%   BMI 27.44 kg/m   Estimated body mass index is 27.44 kg/m  as calculated from the following:    Height as of this encounter: 5' 2\" (1.575 m).    Weight as of this encounter: 150 lb (68 kg).. Body surface area is 1.72 meters squared. BP completed using cuff size: regular  Mild Pain (3)    Nursing Comments: Patient was in a knee brace and wheelchair.     Chayito Armando MA    "

## 2023-05-18 ENCOUNTER — ANCILLARY PROCEDURE (OUTPATIENT)
Dept: GENERAL RADIOLOGY | Facility: CLINIC | Age: 53
End: 2023-05-18
Attending: PHYSICAL MEDICINE & REHABILITATION
Payer: COMMERCIAL

## 2023-05-18 ENCOUNTER — TELEPHONE (OUTPATIENT)
Dept: PHYSICAL MEDICINE AND REHAB | Facility: CLINIC | Age: 53
End: 2023-05-18

## 2023-05-18 DIAGNOSIS — G89.29 CHRONIC BILATERAL LOW BACK PAIN WITHOUT SCIATICA: ICD-10-CM

## 2023-05-18 DIAGNOSIS — M54.50 CHRONIC BILATERAL LOW BACK PAIN WITHOUT SCIATICA: ICD-10-CM

## 2023-05-18 DIAGNOSIS — M54.50 CHRONIC BILATERAL LOW BACK PAIN WITHOUT SCIATICA: Primary | ICD-10-CM

## 2023-05-18 DIAGNOSIS — G89.29 CHRONIC BILATERAL LOW BACK PAIN WITHOUT SCIATICA: Primary | ICD-10-CM

## 2023-05-18 PROCEDURE — 72170 X-RAY EXAM OF PELVIS: CPT

## 2023-05-18 PROCEDURE — 72100 X-RAY EXAM L-S SPINE 2/3 VWS: CPT

## 2023-05-18 PROCEDURE — 72070 X-RAY EXAM THORAC SPINE 2VWS: CPT

## 2023-05-18 NOTE — TELEPHONE ENCOUNTER
[facility] Call Center    Phone Message    May a detailed message be left on voicemail: no     Reason for Call: Other: Fernanda is calling requesting a call back for clarification on orders prior to patients 4pm appointment today. Fernanda states that due to what the provider is asking, she would not be able to do the xray appointment without new orders. She is requesting a call back asap for clarification and for new orders.    Action Taken: Message routed to:  Other: [facility]    Travel Screening: Not Applicable

## 2023-05-18 NOTE — TELEPHONE ENCOUNTER
[facility] Call Center    Phone Message    May a detailed message be left on voicemail: yes     Reason for Call: Other: Fernanda is an Xray tech calling from the imaging department needing clarification on the xray orders that were placed by Dr. Felix, The patient is coming in at 4pm today for her xray, they will need clarification prior to the appointment. Please call back to clarify.     Action Taken: Message routed to:  Other: [facility]    Travel Screening: Not Applicable

## 2023-05-18 NOTE — TELEPHONE ENCOUNTER
Pt seen yesterday in clinic   Orders given:  XR Thoracic Lumbar Standing 2 Views [[id number]]     With Comment:  Please x-ray of spine in standing position without shoes. Would appreciate if pelvis and thoracic are included.    Needs additional IMG orders to include pelvis and T spine    Left message to radiology control desk to call to my number.    Anabell Myers RN, CRRN  Patient Care Coordinator  Department of Physical Medicine and Rehabilitation  [facility]  [phone]

## 2023-05-18 NOTE — TELEPHONE ENCOUNTER
Spoke with Fernanda Radiology technician  We want pt standing (PA views) if pt can tolerate (she has been confined to wheel chair and not ambulating, weakened)    Created complete orders for radiographs of Thoracic spine, Lumbar spine and pelvis to ensure Dr Felix will be able to visualize all areas of concern.    Anabell Myers RN on 5/18/2023 at 4:06 PM

## 2023-05-22 ENCOUNTER — THERAPY VISIT (OUTPATIENT)
Dept: PHYSICAL THERAPY | Facility: CLINIC | Age: 53
End: 2023-05-22
Payer: COMMERCIAL

## 2023-05-22 DIAGNOSIS — M79.661 PAIN OF RIGHT LOWER LEG: Primary | ICD-10-CM

## 2023-05-22 DIAGNOSIS — S72.91XA CLOSED FRACTURE OF RIGHT FEMUR, UNSPECIFIED FRACTURE MORPHOLOGY, UNSPECIFIED PORTION OF FEMUR, INITIAL ENCOUNTER (H): ICD-10-CM

## 2023-05-22 PROCEDURE — 97530 THERAPEUTIC ACTIVITIES: CPT | Mod: GP | Performed by: PHYSICAL THERAPIST

## 2023-05-22 PROCEDURE — 97110 THERAPEUTIC EXERCISES: CPT | Mod: GP | Performed by: PHYSICAL THERAPIST

## 2023-05-31 ENCOUNTER — OFFICE VISIT (OUTPATIENT)
Dept: PHYSICAL MEDICINE AND REHAB | Facility: CLINIC | Age: 53
End: 2023-05-31
Payer: COMMERCIAL

## 2023-05-31 ENCOUNTER — LAB (OUTPATIENT)
Dept: LAB | Facility: CLINIC | Age: 53
End: 2023-05-31
Payer: COMMERCIAL

## 2023-05-31 VITALS
WEIGHT: 150 LBS | DIASTOLIC BLOOD PRESSURE: 66 MMHG | HEART RATE: 75 BPM | SYSTOLIC BLOOD PRESSURE: 97 MMHG | BODY MASS INDEX: 27.6 KG/M2 | OXYGEN SATURATION: 99 % | HEIGHT: 62 IN

## 2023-05-31 DIAGNOSIS — E67.3 HYPERVITAMINOSIS D: Primary | ICD-10-CM

## 2023-05-31 DIAGNOSIS — D75.9 CYTOPENIA: ICD-10-CM

## 2023-05-31 LAB
ERYTHROCYTE [DISTWIDTH] IN BLOOD BY AUTOMATED COUNT: 13.1 % (ref 10–15)
HCT VFR BLD AUTO: 36.5 % (ref 35–47)
HGB BLD-MCNC: 11.3 G/DL (ref 11.7–15.7)
MCH RBC QN AUTO: 32.9 PG (ref 26.5–33)
MCHC RBC AUTO-ENTMCNC: 31 G/DL (ref 31.5–36.5)
MCV RBC AUTO: 106 FL (ref 78–100)
PLATELET # BLD AUTO: 337 10E3/UL (ref 150–450)
RBC # BLD AUTO: 3.43 10E6/UL (ref 3.8–5.2)
WBC # BLD AUTO: 5.2 10E3/UL (ref 4–11)

## 2023-05-31 PROCEDURE — 85027 COMPLETE CBC AUTOMATED: CPT

## 2023-05-31 PROCEDURE — 99215 OFFICE O/P EST HI 40 MIN: CPT | Performed by: PHYSICAL MEDICINE & REHABILITATION

## 2023-05-31 PROCEDURE — 36415 COLL VENOUS BLD VENIPUNCTURE: CPT

## 2023-05-31 ASSESSMENT — PAIN SCALES - GENERAL: PAINLEVEL: MODERATE PAIN (4)

## 2023-05-31 NOTE — PROGRESS NOTES
"Britt Park is a 53 year old female who presents for:  Chief Complaint   Patient presents with     Follow Up     Chronic LBP, Right lower extremity pain.         Initial Vitals:  BP 97/66   Pulse 75   Ht 1.575 m (5' 2\")   Wt 68 kg (150 lb)   SpO2 99%   BMI 27.44 kg/m   Estimated body mass index is 27.44 kg/m  as calculated from the following:    Height as of this encounter: 1.575 m (5' 2\").    Weight as of this encounter: 68 kg (150 lb).. Body surface area is 1.72 meters squared. BP completed using cuff size: regular  Moderate Pain (4)    Nursing Comments:     Chayito Armando MA    "

## 2023-05-31 NOTE — PROGRESS NOTES
Ms. Park has a past history of weakness of the proximal muscles of the upper and lower extremities, dystonia of the hands; recent  fall related fracture of the femur and injury to the right knee presently in bilateral upright knee and thigh supportive brace with lateral locks for extension.  Diagnoses :     A.  Status post right lower extremity fracture secondary to a fall  B.  Osteopenia of the spine and hips  C.  History of hypovitaminosis D  D.  Chronic low back pain without evidence for sciatica  E.  History of falls   F.  Past history of proximal myopathy upper and lower extremities        1. Patient education: In depth discussion and education was provided about the assessment and implications of each of the below recommendations for management. Patient indicated readiness to learn, all questions were answered and understanding of material presented was confirmed.     2. Work-up: We need x-rays of thoracic lumbar and pelvis for her future visit.  We need complete metabolic blood test in 2 months     3. Therapy/equipment/braces: Provided prescription for low back Melhorn support and further intervention after review of x-rays of the spine and pelvis     4. Medications: She needs to increase her vitamin D 3 intake was discussed after review of her complete metabolic bone channel we need to consider antiresorptive agents.  I discussed with her Reclast once a year injection for the time being but we will start that after we review her spine x-rays and complete metabolic bone blood test     5. Interventions: She needs to implement exercises at home for the time being she needs to start mild without exertion muscles setting exercises for the shoulder girdle muscles as well as hip girdle muscles.  We discussed these exercises in detail and she performed them during this visit and was able to perform.  I emphasized avoidance of overdo and fatigue in view of her myopathy(reportedly).  Further interventions for  correction of her ambulatory activities and posture can be provided after review of her x-rays.  Lower extremity exercises are important for her especially the weightbearing exercises with avoidance of falls as discussed and instructions were provided.     6. Referral / follow up with other providers: She already has and an standing follow-up appointment with her orthopedic surgeon  7. Follow up:In 2 weeks with JENNIFER Felix MD     At follow-up we need to have the result of spine x-rays and I asked her to bring her supports that I discussed today so we can review them at that time.  I also suggest that she would bring the vitamins and D3 that she is taking so we can address the proper dose and avoid over dose of D or calcium as discussed.Ms. Park has a past history of weakness of the proximal muscles of the upper and lower extremities, dystonia of the hands; recent  fall related fracture of the femur and injury to the right knee presently in bilateral upright knee and thigh supportive brace with lateral locks for extension.  Diagnoses :     A.  Status post right lower extremity fracture secondary to a fall  B.  Osteopenia of the spine and hips  C.  History of hypovitaminosis D  D.  Chronic low back pain without evidence for sciatica  E.  History of falls   F.  Past history of proximal myopathy upper and lower extremities        2. Patient education: In depth discussion and education was provided about the assessment and implications of each of the below recommendations for management. Patient indicated readiness to learn, all questions were answered and understanding of material presented was confirmed.     3. Work-up: We need x-rays of thoracic lumbar and pelvis for her future visit.  We need complete metabolic blood test in 2 months     4. Therapy/equipment/braces: Provided prescription for low back Melhorn support and further intervention after review of x-rays of the spine and pelvis     5. Medications: She  needs to increase her vitamin D 3 intake was discussed after review of her complete metabolic bone channel we need to consider antiresorptive agents.  I discussed with her Reclast once a year injection for the time being but we will start that after we review her spine x-rays and complete metabolic bone blood test     6. Interventions: She needs to implement exercises at home for the time being she needs to start mild without exertion muscles setting exercises for the shoulder girdle muscles as well as hip girdle muscles.  We discussed these exercises in detail and she performed them during this visit and was able to perform.  I emphasized avoidance of overdo and fatigue in view of her myopathy(reportedly).  Further interventions for correction of her ambulatory activities and posture can be provided after review of her x-rays.  Lower extremity exercises are important for her especially the weightbearing exercises with avoidance of falls as discussed and instructions were provided.     8. Referral / follow up with other providers: She already has and an standing follow-up appointment with her orthopedic surgeon  9. Follow up:In 2 weeks with JENNIFER Felix MD     8. At follow-up we need to have the result of spine x-rays and I asked her to bring her supports that I discussed today so we can review them at that time.  I also suggest that she would bring the vitamins and D3 that she is taking so we can address the proper dose and avoid over dose of D or calcium as discussed.    Review tests result:    The result of her x-rays of the spine and images were reviewed with her and shared the images.  Her bone mineral density was reviewed she has osteopenia.  No evidence of compression fractures in the spine mild thoracolumbar curve was noted.  Her complete blood test regarding level of calcium and vitamin D has not been performed at this stage and needs to be reviewed in a future date 2 months from now.     Ask the patient the  Interim Events: better with our initial  interventions   She has been helped with use of the low back support we discussed the proper duration of applications on as-needed basis.  She has also obtained the substitute for the knee brace which is not rigid and helps her to walk better and tolerate the the standing position better.  Stiffness in the right knee continues but is getting better.    Gen: NAD, pleasant and cooperative     Ext:  no edema in BLE, no tenderness in calves    Neuro/MSK:No change, she feels the new knee support,soft support that we recommended has been helpful to her and she is able to walk better.    DTR's   weakness: myopathy, weakness the same   paresthesias: No paresthesias same as before  Gait : Unsteadiness  ROM : Same   Spine Alignment :  Thoracolumbar: curve   Discrepancy lower ext's . Noted again  Pain: In the R thigh  SLR : Stiffness with range of motion of the knees especially on the right side, status post accident, in knee support      Future plans and other new needs:    Instructions: Instructions were provided after evaluation the alignment of the spine she needs to try proper left and we discussed and she reviewed the site that she can obtain it after more stretching exercises to the right knee.  Provided exercise for improvement of the lumbar hyperlordosis picture she took of the instruction.  Suggest to have review of serum vitamin D level in 2 months  Suggest to continue with the back support prescription that she has on today and has been helpful to her to wear it for standing and walking  Lift trial at home discussed in detail.  To perform exercises: Range of motion for all joints as provided the instructions today  Stretching exercises for the hip flexors and knee flexors on daily basis  Mild muscle strengthening, submaximal with avoidance of overdo and fatigue.  This exercise needs to be performed with a few repetitions throughout the day  To try weightbearing as  instructions were provided to improve increment of bone mineral density in the lower extremities as was demonstrated today and she was able to tolerate without difficulty  To perform range of motion specifically for the shoulder and hip joints to avoid development of contractures  To perform pelvic tilt exercises picture was provided  Follow-up visit in future is recommended  It was indeed a pleasure to participate in the care of Ms. Park.    Future plans and other new needs

## 2023-05-31 NOTE — LETTER
5/31/2023       RE: Britt Park  [address] [address]         Dear Colleague,    Thank you for referring your patient, Britt Park, to the [facility]. Please see a copy of my visit note below.      Ms. Park has a past history of weakness of the proximal muscles of the upper and lower extremities, dystonia of the hands; recent  fall related fracture of the femur and injury to the right knee presently in bilateral upright knee and thigh supportive brace with lateral locks for extension.  Diagnoses :     A.  Status post right lower extremity fracture secondary to a fall  B.  Osteopenia of the spine and hips  C.  History of hypovitaminosis D  D.  Chronic low back pain without evidence for sciatica  E.  History of falls   F.  Past history of proximal myopathy upper and lower extremities        Patient education: In depth discussion and education was provided about the assessment and implications of each of the below recommendations for management. Patient indicated readiness to learn, all questions were answered and understanding of material presented was confirmed.     Work-up: We need x-rays of thoracic lumbar and pelvis for her future visit.  We need complete metabolic blood test in 2 months     Therapy/equipment/braces: Provided prescription for low back Melhorn support and further intervention after review of x-rays of the spine and pelvis     Medications: She needs to increase her vitamin D 3 intake was discussed after review of her complete metabolic bone channel we need to consider antiresorptive agents.  I discussed with her Reclast once a year injection for the time being but we will start that after we review her spine x-rays and complete metabolic bone blood test     Interventions: She needs to implement exercises at home for the time being she needs to start mild without exertion muscles setting  exercises for the shoulder girdle muscles as well as hip girdle muscles.  We discussed these exercises in detail and she performed them during this visit and was able to perform.  I emphasized avoidance of overdo and fatigue in view of her myopathy(reportedly).  Further interventions for correction of her ambulatory activities and posture can be provided after review of her x-rays.  Lower extremity exercises are important for her especially the weightbearing exercises with avoidance of falls as discussed and instructions were provided.     Referral / follow up with other providers: She already has and an standing follow-up appointment with her orthopedic surgeon  Follow up:In 2 weeks with JENNIFER Felix MD     At follow-up we need to have the result of spine x-rays and I asked her to bring her supports that I discussed today so we can review them at that time.  I also suggest that she would bring the vitamins and D3 that she is taking so we can address the proper dose and avoid over dose of D or calcium as discussed.Ms. Park has a past history of weakness of the proximal muscles of the upper and lower extremities, dystonia of the hands; recent  fall related fracture of the femur and injury to the right knee presently in bilateral upright knee and thigh supportive brace with lateral locks for extension.  Diagnoses :     A.  Status post right lower extremity fracture secondary to a fall  B.  Osteopenia of the spine and hips  C.  History of hypovitaminosis D  D.  Chronic low back pain without evidence for sciatica  E.  History of falls   F.  Past history of proximal myopathy upper and lower extremities        Patient education: In depth discussion and education was provided about the assessment and implications of each of the below recommendations for management. Patient indicated readiness to learn, all questions were answered and understanding of material presented was confirmed.     Work-up: We need x-rays of  thoracic lumbar and pelvis for her future visit.  We need complete metabolic blood test in 2 months     Therapy/equipment/braces: Provided prescription for low back Melhorn support and further intervention after review of x-rays of the spine and pelvis     Medications: She needs to increase her vitamin D 3 intake was discussed after review of her complete metabolic bone channel we need to consider antiresorptive agents.  I discussed with her Reclast once a year injection for the time being but we will start that after we review her spine x-rays and complete metabolic bone blood test     Interventions: She needs to implement exercises at home for the time being she needs to start mild without exertion muscles setting exercises for the shoulder girdle muscles as well as hip girdle muscles.  We discussed these exercises in detail and she performed them during this visit and was able to perform.  I emphasized avoidance of overdo and fatigue in view of her myopathy(reportedly).  Further interventions for correction of her ambulatory activities and posture can be provided after review of her x-rays.  Lower extremity exercises are important for her especially the weightbearing exercises with avoidance of falls as discussed and instructions were provided.     Referral / follow up with other providers: She already has and an standing follow-up appointment with her orthopedic surgeon  Follow up:In 2 weeks with JENNIFER Felix MD     At follow-up we need to have the result of spine x-rays and I asked her to bring her supports that I discussed today so we can review them at that time.  I also suggest that she would bring the vitamins and D3 that she is taking so we can address the proper dose and avoid over dose of D or calcium as discussed.    Review tests result:    The result of her x-rays of the spine and images were reviewed with her and shared the images.  Her bone mineral density was reviewed she has osteopenia.  No evidence  of compression fractures in the spine mild thoracolumbar curve was noted.  Her complete blood test regarding level of calcium and vitamin D has not been performed at this stage and needs to be reviewed in a future date 2 months from now.     Ask the patient the Interim Events: better with our initial  interventions   She has been helped with use of the low back support we discussed the proper duration of applications on as-needed basis.  She has also obtained the substitute for the knee brace which is not rigid and helps her to walk better and tolerate the the standing position better.  Stiffness in the right knee continues but is getting better.    Gen: NAD, pleasant and cooperative     Ext:  no edema in BLE, no tenderness in calves    Neuro/MSK:No change, she feels the new knee support,soft support that we recommended has been helpful to her and she is able to walk better.    DTR's   weakness: myopathy, weakness the same   paresthesias: No paresthesias same as before  Gait : Unsteadiness  ROM : Same   Spine Alignment :  Thoracolumbar: curve   Discrepancy lower ext's . Noted again  Pain: In the R thigh  SLR : Stiffness with range of motion of the knees especially on the right side, status post accident, in knee support      Future plans and other new needs:    Instructions: Instructions were provided after evaluation the alignment of the spine she needs to try proper left and we discussed and she reviewed the site that she can obtain it after more stretching exercises to the right knee.  Provided exercise for improvement of the lumbar hyperlordosis picture she took of the instruction.  Suggest to have review of serum vitamin D level in 2 months  Suggest to continue with the back support prescription that she has on today and has been helpful to her to wear it for standing and walking  Lift trial at home discussed in detail.  To perform exercises: Range of motion for all joints as provided the instructions  "today  Stretching exercises for the hip flexors and knee flexors on daily basis  Mild muscle strengthening, submaximal with avoidance of overdo and fatigue.  This exercise needs to be performed with a few repetitions throughout the day  To try weightbearing as instructions were provided to improve increment of bone mineral density in the lower extremities as was demonstrated today and she was able to tolerate without difficulty  To perform range of motion specifically for the shoulder and hip joints to avoid development of contractures  To perform pelvic tilt exercises picture was provided  Follow-up visit in future is recommended  It was indeed a pleasure to participate in the care of Ms. Park.    Future plans and other new needs      Britt Park is a 53 year old female who presents for:  Chief Complaint   Patient presents with    Follow Up     Chronic LBP, Right lower extremity pain.         Initial Vitals:  BP 97/66   Pulse 75   Ht 1.575 m (5' 2\")   Wt 68 kg (150 lb)   SpO2 99%   BMI 27.44 kg/m   Estimated body mass index is 27.44 kg/m  as calculated from the following:    Height as of this encounter: 1.575 m (5' 2\").    Weight as of this encounter: 68 kg (150 lb).. Body surface area is 1.72 meters squared. BP completed using cuff size: regular  Moderate Pain (4)    Nursing Comments:         Again, thank you for allowing me to participate in the care of your patient.      Sincerely,    JOHANNA BRIAN MD      "

## 2023-06-02 ENCOUNTER — THERAPY VISIT (OUTPATIENT)
Dept: PHYSICAL THERAPY | Facility: CLINIC | Age: 53
End: 2023-06-02
Payer: COMMERCIAL

## 2023-06-02 DIAGNOSIS — S72.91XA CLOSED FRACTURE OF RIGHT FEMUR, UNSPECIFIED FRACTURE MORPHOLOGY, UNSPECIFIED PORTION OF FEMUR, INITIAL ENCOUNTER (H): ICD-10-CM

## 2023-06-02 DIAGNOSIS — M79.661 PAIN OF RIGHT LOWER LEG: Primary | ICD-10-CM

## 2023-06-02 PROCEDURE — 97530 THERAPEUTIC ACTIVITIES: CPT | Mod: GP | Performed by: PHYSICAL THERAPIST

## 2023-06-02 PROCEDURE — 97110 THERAPEUTIC EXERCISES: CPT | Mod: GP | Performed by: PHYSICAL THERAPIST

## 2023-06-08 ENCOUNTER — THERAPY VISIT (OUTPATIENT)
Dept: PHYSICAL THERAPY | Facility: CLINIC | Age: 53
End: 2023-06-08
Payer: COMMERCIAL

## 2023-06-08 DIAGNOSIS — S72.91XA CLOSED FRACTURE OF RIGHT FEMUR, UNSPECIFIED FRACTURE MORPHOLOGY, UNSPECIFIED PORTION OF FEMUR, INITIAL ENCOUNTER (H): ICD-10-CM

## 2023-06-08 DIAGNOSIS — M79.661 PAIN OF RIGHT LOWER LEG: Primary | ICD-10-CM

## 2023-06-08 PROCEDURE — 97110 THERAPEUTIC EXERCISES: CPT | Mod: GP | Performed by: PHYSICAL THERAPIST

## 2023-06-08 PROCEDURE — 97530 THERAPEUTIC ACTIVITIES: CPT | Mod: GP | Performed by: PHYSICAL THERAPIST

## 2023-06-09 ENCOUNTER — PATIENT OUTREACH (OUTPATIENT)
Dept: CARE COORDINATION | Facility: CLINIC | Age: 53
End: 2023-06-09
Payer: COMMERCIAL

## 2023-06-09 ENCOUNTER — TRANSFERRED RECORDS (OUTPATIENT)
Dept: HEALTH INFORMATION MANAGEMENT | Facility: CLINIC | Age: 53
End: 2023-06-09

## 2023-06-09 ASSESSMENT — ACTIVITIES OF DAILY LIVING (ADL): DEPENDENT_IADLS:: TRANSPORTATION

## 2023-06-09 NOTE — PROGRESS NOTES
Clinic Care Coordination Contact  [facility]/Voicemail    Referral Source: Care Team  Clinical Data: Care Coordinator Outreach  Outreach attempted x 2.  Left message on patient's voicemail with call back information and requested return call.    Plan: Care Coordinator will try to reach patient again in 1 month and if unable to contact, will disenroll patient from Care Coordination.     Anabell Chaidez RN, BSN, PHN  Primary Care / Care Coordinator   [facility]  [email or web address]   [phone]

## 2023-06-28 ENCOUNTER — TELEPHONE (OUTPATIENT)
Dept: RHEUMATOLOGY | Facility: CLINIC | Age: 53
End: 2023-06-28
Payer: COMMERCIAL

## 2023-06-29 ENCOUNTER — THERAPY VISIT (OUTPATIENT)
Dept: PHYSICAL THERAPY | Facility: CLINIC | Age: 53
End: 2023-06-29
Payer: COMMERCIAL

## 2023-06-29 DIAGNOSIS — M79.661 PAIN OF RIGHT LOWER LEG: Primary | ICD-10-CM

## 2023-06-29 DIAGNOSIS — S72.91XA CLOSED FRACTURE OF RIGHT FEMUR, UNSPECIFIED FRACTURE MORPHOLOGY, UNSPECIFIED PORTION OF FEMUR, INITIAL ENCOUNTER (H): ICD-10-CM

## 2023-06-29 PROCEDURE — 97110 THERAPEUTIC EXERCISES: CPT | Mod: GP | Performed by: PHYSICAL THERAPIST

## 2023-06-29 PROCEDURE — 97530 THERAPEUTIC ACTIVITIES: CPT | Mod: GP | Performed by: PHYSICAL THERAPIST

## 2023-07-10 ENCOUNTER — PATIENT OUTREACH (OUTPATIENT)
Dept: CARE COORDINATION | Facility: CLINIC | Age: 53
End: 2023-07-10
Payer: COMMERCIAL

## 2023-07-10 ASSESSMENT — ACTIVITIES OF DAILY LIVING (ADL): DEPENDENT_IADLS:: TRANSPORTATION

## 2023-07-10 NOTE — LETTER
[facility] CARE COORDINATION  [address]  [place] [address]    July 10, 2023    Britt Park  [address] [address]    Dear Britt,    I have been unsuccessful in reaching you since our last contact. At this time the Care Coordination team will make no further attempts to reach you, however this does not change your ability to continue receiving care from your providers at your primary care clinic. If you need additional support from a care coordinator in the future please contact the [facility]  at [phone].    All of us at [facility] are invested in your health and are here to assist you in meeting your goals.     Sincerely,     Anabell Chaidez RN, BSN, PHN  Primary Care / Care Coordinator   [facility]  [email or web address]   [phone]

## 2023-07-10 NOTE — PROGRESS NOTES
Clinic Care Coordination Contact  [facility]/Voicemail    Referral Source: Care Team  Clinical Data: Care Coordinator Outreach  Outreach attempted x 3.  Left message on patient's voicemail with call back information and requested return call.    Plan: Care Coordinator will send unable to contact letter with care coordinator contact information via [employer]. Care Coordinator will do no further outreaches at this time.     Anabell Chaidez RN, BSN, PHN  Primary Care / Care Coordinator   [facility]  E-[email or web address]   [phone]

## 2023-07-13 ENCOUNTER — HOSPITAL ENCOUNTER (EMERGENCY)
Facility: CLINIC | Age: 53
Discharge: HOME | End: 2023-07-13
Attending: EMERGENCY MEDICINE | Admitting: EMERGENCY MEDICINE
Payer: COMMERCIAL

## 2023-07-13 VITALS
TEMPERATURE: 98 F | WEIGHT: 140 LBS | HEIGHT: 62 IN | BODY MASS INDEX: 25.76 KG/M2 | RESPIRATION RATE: 20 BRPM | OXYGEN SATURATION: 98 % | DIASTOLIC BLOOD PRESSURE: 75 MMHG | HEART RATE: 85 BPM | SYSTOLIC BLOOD PRESSURE: 121 MMHG

## 2023-07-13 DIAGNOSIS — J06.9 VIRAL URI WITH COUGH: ICD-10-CM

## 2023-07-13 LAB
FLUAV RNA SPEC QL NAA+PROBE: NEGATIVE
FLUBV RNA RESP QL NAA+PROBE: NEGATIVE
GROUP A STREP BY PCR: NOT DETECTED
RSV RNA SPEC NAA+PROBE: NEGATIVE
SARS-COV-2 RNA RESP QL NAA+PROBE: NEGATIVE

## 2023-07-13 PROCEDURE — 87651 STREP A DNA AMP PROBE: CPT | Performed by: EMERGENCY MEDICINE

## 2023-07-13 PROCEDURE — 99283 EMERGENCY DEPT VISIT LOW MDM: CPT

## 2023-07-13 PROCEDURE — 87637 SARSCOV2&INF A&B&RSV AMP PRB: CPT | Performed by: EMERGENCY MEDICINE

## 2023-07-13 RX ORDER — BENZONATATE 100 MG/1
100 CAPSULE ORAL 3 TIMES DAILY PRN
Qty: 30 CAPSULE | Refills: 0 | Status: SHIPPED | OUTPATIENT
Start: 2023-07-13 | End: 2023-12-06

## 2023-07-13 ASSESSMENT — ACTIVITIES OF DAILY LIVING (ADL): ADLS_ACUITY_SCORE: 35

## 2023-07-14 NOTE — ED PROVIDER NOTES
History     Chief Complaint:  Cough       The history is provided by the patient and the spouse.      Britt Park is a 53 year old female with history of hypertension, hyperlipidemia, myopathy, among others, who presents with a constant cough and sore throat for the last couple of days. She states that she has shortness of breath when she is coughing, and she has a runny nose and a headache. The patient has been coughing all day for the last two days, and she is worried that she will be unable to attend her daughter's graduation party. Her  mentions that he contracted the same cough from their daughter and he is worried that Ventura will not be able to handle this with her myopathy. No fever, chest pain, or generalized myalgias. The patient is not a smoker. Outside of this, the patient has been recovering from a fractured femur and knee since .    Independent Historian:       Review of External Notes:   None    Medications:    Lipitor  Tums  Estrace  Folvite  Gabapentin  Atarax  Cozaar  Robaxin  Methotrexate  Nasonex  Oxycodone  Protonix  Miralax  Deltasone  Ultram  Valtrex  Aspirin 325 mg  Vitamin B12  Monodox  Naprosyn  Revia  Myrbetriq    Past Medical History:    Hypertension   Hyperlipidemia  MAYA  Mitochondrial myopathy  Overactive bladder  Impetigo  Vitamin B 12 deficiency  Anemia  Seasonal allergic rhinitis  Acute respiratory failure with hypoxia   Myositis  Facial paresthesia  Weight gain  Vaginal dryness  Methemoglobinemia  Cyst of right ovary  Hepatic steatosis  Closed fracture of right femur  Enlarged lymph nodes  Atopic dermatitis of scalp  Pulmonary nodules  Chronically dry eyes  Colon polyp  Muscular dystrophy  HSV  Allergic rhinitis  PVCs  Anxiety  Depression  Chronic pain syndrome  Cervicalgia  Arthropathy of cervical facet joint  Uterine inertia  Obesity  NADIA  Chlamydia  Dysplastic nevus left neck    Past Surgical History:      Right bicep  "biopsy  Colonoscopy x 2  ORIF Rodding of intramedullary femur, right  [place] teeth extraction  C4-6 joint injection     Physical Exam     Patient Vitals for the past 24 hrs:   BP Temp Temp src Pulse Resp SpO2 Height Weight   07/13/23 1943 121/75 98  F (36.7  C) Temporal 85 20 98 % 1.575 m (5' 2\") 63.5 kg (140 lb)      Physical Exam  Nursing note and vitals reviewed.  Constitutional:  Oriented to person, place, and time. Cooperative.  Actively coughing.  HENT:   Nose:    Nose normal.   Mouth/Throat:   Mucous membranes are normal.   Eyes:    Conjunctivae normal and EOM are normal.      Pupils are equal, round, and reactive to light.   Neck:    Trachea normal.   Cardiovascular:  Normal rate, regular rhythm, normal heart sounds and normal pulses. No murmur heard.  Pulmonary/Chest:  Effort normal and breath sounds normal without any rales, rhonchi, or expiratory wheezes.   Abdominal:   Soft. Normal appearance and bowel sounds are normal.      There is no tenderness.      There is no rebound and no CVA tenderness.   Musculoskeletal:  Right lower extremity is in a brace but there is no swelling present.  Extremities otherwise atraumatic x 4.   Lymphadenopathy:  No cervical adenopathy.   Neurological:   Alert and oriented to person, place, and time. Normal strength.      No cranial nerve deficit or sensory deficit. GCS eye subscore is 4. GCS verbal subscore is 5. GCS motor subscore is 6.   Skin:    Skin is intact. No rash noted.   Psychiatric:   Normal mood and affect.    Emergency Department Course     Laboratory:  Labs Ordered and Resulted from Time of ED Arrival to Time of ED Departure   INFLUENZA A/B, RSV, & SARS-COV2 PCR - Normal       Result Value    Influenza A PCR Negative      Influenza B PCR Negative      RSV PCR Negative      SARS CoV2 PCR Negative     GROUP A STREPTOCOCCUS PCR THROAT SWAB - Normal    Group A strep by PCR Not Detected        Emergency Department Course & Assessments:       Assessments:  2112 I " obtained history and examined the patient as noted above.    Social Determinants of Health affecting care:   None    Disposition:  The patient was discharged to home.     Impression & Plan      Medical Decision Making:  This is a 53-year-old female who came in for further evaluation of 2 days of a cough and sore throat.  She has normal vital signs here and a normal lung exam.  She had a rapid strep obtained, which came back negative, as well as a swab for COVID, influenza, and RSV.  I do not feel that she requires an x-ray at this time, as I think it would be very unlikely that she would have pneumonia.  I also doubt this is from a pulmonary embolism, as this seems very infectious in nature based on the history and her exam.  I agreed to send her home with a prescription for Tessalon Pearls, which might help her cough.  She knows to return with any concerns or worsening symptoms and follow-up in her clinic as needed.    Diagnosis:    ICD-10-CM    1. Viral URI with cough  J06.9          Discharge Medications:  Discharge Medication List as of 7/13/2023  9:22 PM      START taking these medications    Details   benzonatate (TESSALON) 100 MG capsule Take 1 capsule (100 mg) by mouth 3 times daily as needed for cough, Disp-30 capsule, R-0, Local Print            Scribe Disclosure:  I, Bebeto Munguia, am serving as a scribe at 9:33 PM on 7/13/2023 to document services personally performed by Seamus Schuler MD, based on my observations and the provider's statements to me.   7/13/2023   Seamus Schuler MD Lashkowitz, Seth H, MD  07/13/23 0481

## 2023-07-14 NOTE — DISCHARGE INSTRUCTIONS

## 2023-07-14 NOTE — ED TRIAGE NOTES
Pt reports cough and sore throat x2 days. Pt states family has been sick with cough for a few weeks. Airway patent.      Triage Assessment     Row Name 07/13/23 1950       Triage Assessment (Adult)    Airway WDL WDL       Respiratory WDL    Respiratory WDL WDL       Cardiac WDL    Cardiac WDL WDL       Cognitive/Neuro/Behavioral WDL    Cognitive/Neuro/Behavioral WDL WDL

## 2023-07-19 ENCOUNTER — HOSPITAL ENCOUNTER (OUTPATIENT)
Dept: CT IMAGING | Facility: CLINIC | Age: 53
Discharge: HOME | End: 2023-07-19
Attending: ORTHOPAEDIC SURGERY | Admitting: ORTHOPAEDIC SURGERY
Payer: COMMERCIAL

## 2023-07-19 DIAGNOSIS — S72.009A: ICD-10-CM

## 2023-07-19 DIAGNOSIS — S72.409A FRACTURE OF DISTAL FEMUR (H): ICD-10-CM

## 2023-07-19 PROCEDURE — 73700 CT LOWER EXTREMITY W/O DYE: CPT | Mod: RT

## 2023-07-21 ENCOUNTER — THERAPY VISIT (OUTPATIENT)
Dept: PHYSICAL THERAPY | Facility: CLINIC | Age: 53
End: 2023-07-21
Payer: COMMERCIAL

## 2023-07-21 DIAGNOSIS — M79.661 PAIN OF RIGHT LOWER LEG: Primary | ICD-10-CM

## 2023-07-21 DIAGNOSIS — S72.91XA CLOSED FRACTURE OF RIGHT FEMUR, UNSPECIFIED FRACTURE MORPHOLOGY, UNSPECIFIED PORTION OF FEMUR, INITIAL ENCOUNTER (H): ICD-10-CM

## 2023-07-21 PROCEDURE — 97530 THERAPEUTIC ACTIVITIES: CPT | Mod: GP | Performed by: PHYSICAL THERAPIST

## 2023-07-21 PROCEDURE — 97110 THERAPEUTIC EXERCISES: CPT | Mod: GP | Performed by: PHYSICAL THERAPIST

## 2023-07-24 ENCOUNTER — MYC MEDICAL ADVICE (OUTPATIENT)
Dept: FAMILY MEDICINE | Facility: CLINIC | Age: 53
End: 2023-07-24

## 2023-07-24 ENCOUNTER — E-VISIT (OUTPATIENT)
Dept: FAMILY MEDICINE | Facility: CLINIC | Age: 53
End: 2023-07-24
Payer: COMMERCIAL

## 2023-07-24 DIAGNOSIS — N93.9 VAGINAL BLEEDING: Primary | ICD-10-CM

## 2023-07-24 DIAGNOSIS — N95.0 ABNORMAL VAGINAL BLEEDING WITH ENDOMETRIAL THICKNESS GREATER THAN 5 MM PRESENT ON TRANSVAGINAL ULTRASOUND IN POSTMENOPAUSAL PATIENT: ICD-10-CM

## 2023-07-24 DIAGNOSIS — N95.0 POSTMENOPAUSAL BLEEDING: Primary | ICD-10-CM

## 2023-07-24 DIAGNOSIS — R93.89 ABNORMAL VAGINAL BLEEDING WITH ENDOMETRIAL THICKNESS GREATER THAN 5 MM PRESENT ON TRANSVAGINAL ULTRASOUND IN POSTMENOPAUSAL PATIENT: ICD-10-CM

## 2023-07-24 PROCEDURE — 99421 OL DIG E/M SVC 5-10 MIN: CPT | Performed by: INTERNAL MEDICINE

## 2023-07-24 NOTE — TELEPHONE ENCOUNTER
"See MY CHART message.  Ongoing menstrual bleeding 62 days.    Routing back to triage and to Dr. Metz as high priority.    1st openings:  Dr. Metz Thursday.  Team Vivian & Kaylen Mancilla.     Chart review:   4-5-23 Virtual Visit = cyst. ordered US PELVIC. Scheduled 4-8-23 but pt cancelled.  Patient Comments: \"I need to wait for about a month or so to do this as my leg & knee is healing from fractures. I will reschedule.\"    Previous 11-6-2022 referral [facility] but does not appear pt established care.     Referral Details    Referred By  Referred To   Vi Metz MD                Diagnoses: Decreased libido   Order: Ob/Gyn Referral    [facility]   [address] [address]   Phone: [phone]   Fax: [phone]          Anne TIWARI MA     "

## 2023-07-25 ENCOUNTER — TELEPHONE (OUTPATIENT)
Dept: FAMILY MEDICINE | Facility: CLINIC | Age: 53
End: 2023-07-25
Payer: COMMERCIAL

## 2023-07-25 DIAGNOSIS — E55.9 VITAMIN D DEFICIENCY: Primary | ICD-10-CM

## 2023-07-25 NOTE — TELEPHONE ENCOUNTER
To PCP    Patient would also like vitamin D ordered for labs today.     Lab pended.    Janette Lloyd RN on 7/25/2023 at 9:35 AM

## 2023-07-25 NOTE — PATIENT INSTRUCTIONS
Thank you for choosing us for your care. Given your symptoms, I would like you to do a lab-only visit to determine what is causing them.  I have placed the orders.  Please schedule an appointment with the lab right here in [place], or call [phone].  I will let you know when the results are back and next steps to take.

## 2023-07-26 ENCOUNTER — TELEPHONE (OUTPATIENT)
Dept: OBGYN | Facility: CLINIC | Age: 53
End: 2023-07-26

## 2023-07-26 ENCOUNTER — HOSPITAL ENCOUNTER (OUTPATIENT)
Dept: ULTRASOUND IMAGING | Facility: CLINIC | Age: 53
Discharge: HOME | End: 2023-07-26
Attending: INTERNAL MEDICINE
Payer: COMMERCIAL

## 2023-07-26 ENCOUNTER — LAB (OUTPATIENT)
Dept: LAB | Facility: CLINIC | Age: 53
End: 2023-07-26
Attending: INTERNAL MEDICINE
Payer: COMMERCIAL

## 2023-07-26 DIAGNOSIS — E55.9 VITAMIN D DEFICIENCY: ICD-10-CM

## 2023-07-26 DIAGNOSIS — N95.0 POSTMENOPAUSAL BLEEDING: ICD-10-CM

## 2023-07-26 DIAGNOSIS — M85.80 OSTEOPENIA, UNSPECIFIED LOCATION: ICD-10-CM

## 2023-07-26 DIAGNOSIS — E67.3 HYPERVITAMINOSIS D: ICD-10-CM

## 2023-07-26 LAB
ALBUMIN SERPL BCG-MCNC: 4.4 G/DL (ref 3.5–5.2)
ALP SERPL-CCNC: 102 U/L (ref 35–104)
ALT SERPL W P-5'-P-CCNC: 28 U/L (ref 0–50)
ANION GAP SERPL CALCULATED.3IONS-SCNC: 14 MMOL/L (ref 7–15)
AST SERPL W P-5'-P-CCNC: 36 U/L (ref 0–45)
BILIRUB SERPL-MCNC: 0.3 MG/DL
BUN SERPL-MCNC: 10.5 MG/DL (ref 6–20)
CALCIUM SERPL-MCNC: 9.5 MG/DL (ref 8.6–10)
CHLORIDE SERPL-SCNC: 100 MMOL/L (ref 98–107)
CREAT SERPL-MCNC: 0.38 MG/DL (ref 0.51–0.95)
DEPRECATED CALCIDIOL+CALCIFEROL SERPL-MC: 37 UG/L (ref 20–75)
DEPRECATED HCO3 PLAS-SCNC: 27 MMOL/L (ref 22–29)
ERYTHROCYTE [DISTWIDTH] IN BLOOD BY AUTOMATED COUNT: 13.7 % (ref 10–15)
GFR SERPL CREATININE-BSD FRML MDRD: >90 ML/MIN/1.73M2
GLUCOSE SERPL-MCNC: 76 MG/DL (ref 70–99)
HCT VFR BLD AUTO: 36.3 % (ref 35–47)
HGB BLD-MCNC: 11.6 G/DL (ref 11.7–15.7)
MCH RBC QN AUTO: 32.1 PG (ref 26.5–33)
MCHC RBC AUTO-ENTMCNC: 32 G/DL (ref 31.5–36.5)
MCV RBC AUTO: 101 FL (ref 78–100)
PLATELET # BLD AUTO: 398 10E3/UL (ref 150–450)
POTASSIUM SERPL-SCNC: 4 MMOL/L (ref 3.4–5.3)
PROT SERPL-MCNC: 7.5 G/DL (ref 6.4–8.3)
RBC # BLD AUTO: 3.61 10E6/UL (ref 3.8–5.2)
SODIUM SERPL-SCNC: 141 MMOL/L (ref 136–145)
WBC # BLD AUTO: 5.3 10E3/UL (ref 4–11)

## 2023-07-26 PROCEDURE — 76856 US EXAM PELVIC COMPLETE: CPT

## 2023-07-26 PROCEDURE — 82306 VITAMIN D 25 HYDROXY: CPT

## 2023-07-26 PROCEDURE — 36415 COLL VENOUS BLD VENIPUNCTURE: CPT

## 2023-07-26 PROCEDURE — 85027 COMPLETE CBC AUTOMATED: CPT

## 2023-07-26 PROCEDURE — 80053 COMPREHEN METABOLIC PANEL: CPT

## 2023-07-26 NOTE — TELEPHONE ENCOUNTER
Reason for call:  Other   Patient called regarding (reason for call): call back  Additional comments: patient is new to our clinic and got in for our next available appointment on 9/18. She's having vaginal bleeding for 60 days and got an ultrasound done at another [facility]. They recommended she see an OB/GYN but we are booking out so far so she's wondering what to do in the meantime?     Phone number to reach patient:  Cell number on file:    Telephone Information:   Mobile [phone]     Best Time:  any    Can we leave a detailed message on this number?  YES    Travel screening: Not Applicable

## 2023-07-26 NOTE — TELEPHONE ENCOUNTER
[facility] [facility] message sent to patient regarding questions.    Gregoria Souza RN on 7/26/2023 at 2:54 PM

## 2023-07-28 ENCOUNTER — THERAPY VISIT (OUTPATIENT)
Dept: PHYSICAL THERAPY | Facility: CLINIC | Age: 53
End: 2023-07-28
Payer: COMMERCIAL

## 2023-07-28 DIAGNOSIS — S72.91XA CLOSED FRACTURE OF RIGHT FEMUR, UNSPECIFIED FRACTURE MORPHOLOGY, UNSPECIFIED PORTION OF FEMUR, INITIAL ENCOUNTER (H): ICD-10-CM

## 2023-07-28 DIAGNOSIS — M79.661 PAIN OF RIGHT LOWER LEG: Primary | ICD-10-CM

## 2023-07-28 PROCEDURE — 97140 MANUAL THERAPY 1/> REGIONS: CPT | Mod: GP | Performed by: PHYSICAL THERAPIST

## 2023-07-28 PROCEDURE — 97110 THERAPEUTIC EXERCISES: CPT | Mod: GP | Performed by: PHYSICAL THERAPIST

## 2023-07-29 ENCOUNTER — HOSPITAL ENCOUNTER (OUTPATIENT)
Dept: MRI IMAGING | Facility: CLINIC | Age: 53
Discharge: HOME | End: 2023-07-29
Attending: INTERNAL MEDICINE | Admitting: INTERNAL MEDICINE
Payer: COMMERCIAL

## 2023-07-29 DIAGNOSIS — R93.89 ABNORMAL VAGINAL BLEEDING WITH ENDOMETRIAL THICKNESS GREATER THAN 5 MM PRESENT ON TRANSVAGINAL ULTRASOUND IN POSTMENOPAUSAL PATIENT: ICD-10-CM

## 2023-07-29 DIAGNOSIS — N95.0 ABNORMAL VAGINAL BLEEDING WITH ENDOMETRIAL THICKNESS GREATER THAN 5 MM PRESENT ON TRANSVAGINAL ULTRASOUND IN POSTMENOPAUSAL PATIENT: ICD-10-CM

## 2023-07-29 PROCEDURE — 72197 MRI PELVIS W/O & W/DYE: CPT

## 2023-07-29 PROCEDURE — A9585 GADOBUTROL INJECTION: HCPCS | Performed by: INTERNAL MEDICINE

## 2023-07-29 PROCEDURE — 255N000002 HC RX 255 OP 636: Performed by: INTERNAL MEDICINE

## 2023-07-29 RX ORDER — GADOBUTROL 604.72 MG/ML
7 INJECTION INTRAVENOUS ONCE
Status: COMPLETED | OUTPATIENT
Start: 2023-07-29 | End: 2023-07-29

## 2023-07-29 RX ADMIN — GADOBUTROL 7 ML: 604.72 INJECTION INTRAVENOUS at 08:25

## 2023-07-30 ENCOUNTER — HEALTH MAINTENANCE LETTER (OUTPATIENT)
Age: 53
End: 2023-07-30

## 2023-08-02 NOTE — PROGRESS NOTES
SUBJECTIVE:                                                   Britt Park is a 53 year old female who presents to clinic today for the following health issue(s):  Patient presents with:  Abnormal Uterine Bleeding      Additional information: end of May started having a heavy cycle. Bleed for 71 days and has not stopped. She has had a US and MRI     HPI:  Ventura presents with new onset heavy vaginal bleeding. She state that she went through menopause in 2021 where she had 13 months of no bleeding until one day of spotting in 2022. At the end of May she started to have bleeding that did not stop and has now lasted for 71 days. The bleeding alternates between heavy and light. To her knowledge she does not have a history of fibroids or endometrial polyps. She is wheel chair bound after a traumatic fall and injury to her right femur at [facility] over 6 months ago. She has osteopenia in multiple areas. She has a rare muscular weakness disease and is under care at the [facility] so movement is challenging as well. She is not on any anticoagulation. Her initial pelvic ultrasound showed an endometrial lining just above the threshold at 4.8 mm but there was a right ovarian cyst seen with one large one and several smaller one. All simple in appearance. It was followed up by a pelvic MRI that showed a similar finding of simple cysts. There was however a suggestion of pelvic congestion syndrome.     No LMP recorded. Patient is postmenopausal..     Patient is not sexually active, ..  Using none for contraception.    reports that she has never smoked. She has never used smokeless tobacco.    STD testing offered?  Declined    Health maintenance updated:  yes    Today's PHQ-2 Score:       8/3/2023     3:28 PM   PHQ-2 (  [employer])   Q1: Little interest or pleasure in doing things 1   Q2: Feeling down, depressed or hopeless 1   PHQ-2 Score 2     Today's PHQ-9 Score:       8/3/2023     3:28 PM   PHQ-9  SCORE   PHQ-9 Total Score 5     Today's BERTO-7 Score:       8/3/2023     3:28 PM   BERTO-7 SCORE   Total Score 6       Problem list and histories reviewed & adjusted, as indicated.  Additional history: as documented.    Patient Active Problem List   Diagnosis     Myopathy, mitochondrial     Overactive bladder     Dry eyes     Encounter for monitoring of systemic steroid therapy     Need for vaccination     Impetigo     Secondary hypertension     On prednisone therapy     Vitamin B 12 deficiency     Need for pneumocystis prophylaxis     Anemia, unspecified type     Visit for screening mammogram     Seasonal allergic rhinitis, unspecified trigger     Acute respiratory failure with hypoxia (H)     Other myositis, unspecified site     Facial paresthesia     Weight gain     Vaginal dryness     Annual physical exam     Methemoglobinemia     Cyst of right ovary     Hepatic steatosis     Closed fracture of right femur, unspecified fracture morphology, unspecified portion of femur, initial encounter (H)     Enlarged lymph nodes     Atopic dermatitis of scalp     Pain of right lower leg     Closed disp fracture of condyle of right femur with routine healing     Pulmonary nodules     Past Surgical History:   Procedure Laterality Date     ABDOMEN SURGERY  2007         BIOPSY  2019 & 2021    Right bicep, result abnormal results, & left tricep biopsy     COLONOSCOPY  21     COLONOSCOPY N/A 2022    Procedure: COLONOSCOPY, FLEXIBLE, WITH LESION REMOVAL USING SNARE;  Surgeon: Dorie Santos MD;  Location:  GI     GYN SURGERY  07     for twins     OPEN REDUCTION INTERNAL FIXATION RODDING INTRAMEDULLARY FEMUR Right 2022    Procedure: Open reduction internal fixation of right femur fracture;  Surgeon: Al Larson MD;  Location:  OR      Social History     Tobacco Use     Smoking status: Never     Smokeless tobacco: Never   Substance Use Topics     Alcohol use: Not  Currently      *Patient is Adopted           Problem (# of Occurrences) Relation (Name,Age of Onset)    Unknown/Adopted (1) Other (I am adopted. No family history.)              Current Outpatient Medications   Medication Sig     acetaminophen (TYLENOL) 325 MG tablet Take 2 tablets (650 mg) by mouth every 6 hours as needed for other (For optimal non-opioid multimodal pain management to improve pain control.)     atorvastatin (LIPITOR) 20 MG tablet Take 1 tablet by mouth daily at 2 pm     calcium carbonate (TUMS) 500 MG chewable tablet Take 1 tablet (500 mg) by mouth 2 times daily as needed for heartburn     folic acid (FOLVITE) 1 MG tablet Take 1 tablet (1 mg) by mouth daily     gabapentin (NEURONTIN) 300 MG capsule Take 300 mg by mouth 3 times daily     losartan (COZAAR) 25 MG tablet TAKE 1 TABLET(25 MG) BY MOUTH DAILY     methocarbamol (ROBAXIN) 500 MG tablet TAKE 1 TABLET(500 MG) BY MOUTH FOUR TIMES DAILY FOR 25 DAYS     mupirocin (BACTROBAN) 2 % external ointment Apply topically 3 times daily     oxyCODONE (ROXICODONE) 5 MG tablet Take 1 tablet (5 mg) by mouth every 6 hours as needed for severe pain (7-10) Take 2.5 (1/2 tab)- 5 mg (1 tab) up to 4 times daily as needed, allow for at least 4 hours between doses, continue to reduce oxycodone use allowing for more time between doses.     pantoprazole (PROTONIX) 40 MG EC tablet Take 40 mg by mouth daily     traMADol (ULTRAM) 50 MG tablet      valACYclovir (VALTREX) 1000 mg tablet Take 2,000 mg by mouth as needed     benzonatate (TESSALON) 100 MG capsule Take 1 capsule (100 mg) by mouth 3 times daily as needed for cough (Patient not taking: Reported on 8/3/2023)     estradiol (ESTRACE) 0.1 MG/GM vaginal cream PLACE 2 GRAMS VAGINALLY 2 TIMES A WEEK (Patient not taking: Reported on 5/4/2023)     hydrOXYzine (ATARAX) 25 MG tablet TAKE 1 TABLET BY MOUTH EVERY 6 HOURS AS NEEDED FOR PAIN (Patient not taking: Reported on 8/3/2023)     mometasone (NASONEX) 50 MCG/ACT nasal  "spray Spray 2 sprays into both nostrils daily     No current facility-administered medications for this visit.     Allergies   Allergen Reactions     Dapsone Shortness Of Breath     Oxygen levels went to 79 %.     Influenza Vaccines Hives     2/24/23 Patient states she's been able to have a flu shot the past couple of years and has done well with it.     Benadryl [Diphenhydramine]      Diphenhydramine Other (See Comments)     SHAKY       Sulfa Antibiotics Swelling     Sulfa Antibiotics        ROS:  12 point review of systems negative other than symptoms noted below or in the HPI.  Genitourinary: Vaginal bleeding  No urinary frequency or dysuria, bladder or kidney problems      OBJECTIVE:     /60   Ht 1.575 m (5' 2\")   Wt 63.5 kg (140 lb)   Breastfeeding No   BMI 25.61 kg/m    Body mass index is 25.61 kg/m .    Exam:  Constitutional:  Appearance: Well nourished, well developed alert, in no acute distress  Skin: General Inspection:  No rashes present, no lesions present, no areas of discoloration.  Neurologic:  Mental Status:  Oriented X3.  Normal strength and tone, sensory exam grossly normal, mentation intact and speech normal.    Psychiatric:  Mentation appears normal and affect normal/bright.  Cervix: Appears normal    Endometrial Biopsy:  A bivalve speculum was placed into the vaginal vault. The cervix was visualized and cleaned with betadine X 3. Minimal spotting was noted. A four quadrant biopsy was performed removing a moderate amount of tissue. The specimen was labeled and sent to pathology. The patient tolerated the procedure well.    ASSESSMENT/PLAN:                                                        ICD-10-CM    1. Right ovarian cyst  N83.201       2. Postmenopausal bleeding  N95.0 Surgical Pathology Exam     ENDOMETRIAL BIOPSY W/O CERVICAL DILATION     ENDOMETRIAL BIOPSY W/O CERVICAL DILATION      3. Abnormal uterine bleeding (AUB)  N93.9 Surgical Pathology Exam      4. Impetigo  L01.00 " mupirocin (BACTROBAN) 2 % external ointment        We discussed her need for an initial endometrial biopsy. If the pathology is benign then I would recommend a right oophorectomy for the ovarian cysts. Given the light nature of the bleeding additional provera was not recommended today.    Annette Walls MD  [facility] [place]

## 2023-08-03 ENCOUNTER — OFFICE VISIT (OUTPATIENT)
Dept: OBGYN | Facility: CLINIC | Age: 53
End: 2023-08-03
Payer: COMMERCIAL

## 2023-08-03 VITALS
DIASTOLIC BLOOD PRESSURE: 60 MMHG | BODY MASS INDEX: 25.76 KG/M2 | SYSTOLIC BLOOD PRESSURE: 108 MMHG | HEIGHT: 62 IN | WEIGHT: 140 LBS

## 2023-08-03 DIAGNOSIS — N95.0 POSTMENOPAUSAL BLEEDING: ICD-10-CM

## 2023-08-03 DIAGNOSIS — N93.9 ABNORMAL UTERINE BLEEDING (AUB): ICD-10-CM

## 2023-08-03 DIAGNOSIS — L01.00 IMPETIGO: ICD-10-CM

## 2023-08-03 DIAGNOSIS — N83.201 RIGHT OVARIAN CYST: Primary | ICD-10-CM

## 2023-08-03 PROCEDURE — 88305 TISSUE EXAM BY PATHOLOGIST: CPT | Performed by: PATHOLOGY

## 2023-08-03 PROCEDURE — 99203 OFFICE O/P NEW LOW 30 MIN: CPT | Mod: 25 | Performed by: OBSTETRICS & GYNECOLOGY

## 2023-08-03 PROCEDURE — 58100 BIOPSY OF UTERUS LINING: CPT | Performed by: OBSTETRICS & GYNECOLOGY

## 2023-08-03 RX ORDER — GABAPENTIN 300 MG/1
300 CAPSULE ORAL 3 TIMES DAILY
COMMUNITY
Start: 2023-04-12 | End: 2023-12-06

## 2023-08-03 RX ORDER — ATORVASTATIN CALCIUM 20 MG/1
1 TABLET, FILM COATED ORAL
COMMUNITY
Start: 2023-02-27 | End: 2024-07-29

## 2023-08-03 RX ORDER — MUPIROCIN 20 MG/G
OINTMENT TOPICAL 3 TIMES DAILY
Qty: 15 G | Refills: 0 | Status: ON HOLD | OUTPATIENT
Start: 2023-08-03 | End: 2023-12-20

## 2023-08-03 ASSESSMENT — ANXIETY QUESTIONNAIRES
5. BEING SO RESTLESS THAT IT IS HARD TO SIT STILL: NOT AT ALL
7. FEELING AFRAID AS IF SOMETHING AWFUL MIGHT HAPPEN: SEVERAL DAYS
3. WORRYING TOO MUCH ABOUT DIFFERENT THINGS: SEVERAL DAYS
IF YOU CHECKED OFF ANY PROBLEMS ON THIS QUESTIONNAIRE, HOW DIFFICULT HAVE THESE PROBLEMS MADE IT FOR YOU TO DO YOUR WORK, TAKE CARE OF THINGS AT HOME, OR GET ALONG WITH OTHER PEOPLE: SOMEWHAT DIFFICULT
2. NOT BEING ABLE TO STOP OR CONTROL WORRYING: SEVERAL DAYS
1. FEELING NERVOUS, ANXIOUS, OR ON EDGE: SEVERAL DAYS
6. BECOMING EASILY ANNOYED OR IRRITABLE: MORE THAN HALF THE DAYS
GAD7 TOTAL SCORE: 6
GAD7 TOTAL SCORE: 6

## 2023-08-03 ASSESSMENT — PATIENT HEALTH QUESTIONNAIRE - PHQ9
5. POOR APPETITE OR OVEREATING: NOT AT ALL
SUM OF ALL RESPONSES TO PHQ QUESTIONS 1-9: 5

## 2023-08-04 ENCOUNTER — THERAPY VISIT (OUTPATIENT)
Dept: PHYSICAL THERAPY | Facility: CLINIC | Age: 53
End: 2023-08-04
Payer: COMMERCIAL

## 2023-08-04 DIAGNOSIS — M79.661 PAIN OF RIGHT LOWER LEG: Primary | ICD-10-CM

## 2023-08-04 DIAGNOSIS — S72.91XA CLOSED FRACTURE OF RIGHT FEMUR, UNSPECIFIED FRACTURE MORPHOLOGY, UNSPECIFIED PORTION OF FEMUR, INITIAL ENCOUNTER (H): ICD-10-CM

## 2023-08-04 PROCEDURE — 97530 THERAPEUTIC ACTIVITIES: CPT | Mod: GP | Performed by: PHYSICAL THERAPIST

## 2023-08-04 PROCEDURE — 97110 THERAPEUTIC EXERCISES: CPT | Mod: 59 | Performed by: PHYSICAL THERAPIST

## 2023-08-06 DIAGNOSIS — S72.91XA CLOSED FRACTURE OF RIGHT FEMUR, UNSPECIFIED FRACTURE MORPHOLOGY, UNSPECIFIED PORTION OF FEMUR, INITIAL ENCOUNTER (H): ICD-10-CM

## 2023-08-07 RX ORDER — METHOCARBAMOL 500 MG/1
TABLET, FILM COATED ORAL
Qty: 100 TABLET | Refills: 0 | Status: SHIPPED | OUTPATIENT
Start: 2023-08-07 | End: 2023-12-06

## 2023-08-11 ENCOUNTER — THERAPY VISIT (OUTPATIENT)
Dept: PHYSICAL THERAPY | Facility: CLINIC | Age: 53
End: 2023-08-11
Payer: COMMERCIAL

## 2023-08-11 DIAGNOSIS — S72.91XA CLOSED FRACTURE OF RIGHT FEMUR, UNSPECIFIED FRACTURE MORPHOLOGY, UNSPECIFIED PORTION OF FEMUR, INITIAL ENCOUNTER (H): ICD-10-CM

## 2023-08-11 DIAGNOSIS — M79.661 PAIN OF RIGHT LOWER LEG: Primary | ICD-10-CM

## 2023-08-11 PROCEDURE — 97530 THERAPEUTIC ACTIVITIES: CPT | Mod: GP | Performed by: PHYSICAL THERAPIST

## 2023-08-11 PROCEDURE — 97110 THERAPEUTIC EXERCISES: CPT | Mod: 59 | Performed by: PHYSICAL THERAPIST

## 2023-08-14 ENCOUNTER — TELEPHONE (OUTPATIENT)
Dept: OBGYN | Facility: CLINIC | Age: 53
End: 2023-08-14

## 2023-08-14 NOTE — TELEPHONE ENCOUNTER
Patient Name:  Britt Park ([id number]).  :  1970        Physician requests assist from:  None  Requested Dates:  Discuss with Dr. Walls if Wednesday is not available. To look at other days  Schedule based on:  Availability  Amount of time needed for the procedure:  1 hour            Expected time off from work:  1 day  Surgeon:  Annette Walls MD  Surgery permit to read:  Operative Hysteroscopy  Preop Needed:  No  Location for surgery to performed:   In Clinic  Anesthesia:  Lidocaine           Allergies   Allergen Reactions    Dapsone Shortness Of Breath       Oxygen levels went to 79 %.    Influenza Vaccines Hives       23 Patient states she's been able to have a flu shot the past couple of years and has done well with it.    Benadryl [Diphenhydramine]      Diphenhydramine Other (See Comments)       SHAKY       Sulfa Antibiotics Swelling    Sulfa Antibiotics        Nickel allergy:  No  EMB: YES     DIAGNOSIS:  Postmenopausal Bleeding     Special instructions:    Vendor Rep:    Meds Needed:  Local Anesthesia           Ventura was called and after discussion she has opted to proceed with an in office procedure. Please cancel appointment with Dr. Mullins tomorrow.

## 2023-08-18 ENCOUNTER — THERAPY VISIT (OUTPATIENT)
Dept: PHYSICAL THERAPY | Facility: CLINIC | Age: 53
End: 2023-08-18
Payer: COMMERCIAL

## 2023-08-18 DIAGNOSIS — S72.91XA CLOSED FRACTURE OF RIGHT FEMUR, UNSPECIFIED FRACTURE MORPHOLOGY, UNSPECIFIED PORTION OF FEMUR, INITIAL ENCOUNTER (H): ICD-10-CM

## 2023-08-18 DIAGNOSIS — M79.661 PAIN OF RIGHT LOWER LEG: Primary | ICD-10-CM

## 2023-08-18 PROCEDURE — 97530 THERAPEUTIC ACTIVITIES: CPT | Mod: GP | Performed by: PHYSICAL THERAPIST

## 2023-08-18 PROCEDURE — 97110 THERAPEUTIC EXERCISES: CPT | Mod: 59 | Performed by: PHYSICAL THERAPIST

## 2023-08-25 ENCOUNTER — THERAPY VISIT (OUTPATIENT)
Dept: PHYSICAL THERAPY | Facility: CLINIC | Age: 53
End: 2023-08-25
Payer: COMMERCIAL

## 2023-08-25 DIAGNOSIS — M79.661 PAIN OF RIGHT LOWER LEG: Primary | ICD-10-CM

## 2023-08-25 DIAGNOSIS — S72.91XA CLOSED FRACTURE OF RIGHT FEMUR, UNSPECIFIED FRACTURE MORPHOLOGY, UNSPECIFIED PORTION OF FEMUR, INITIAL ENCOUNTER (H): ICD-10-CM

## 2023-08-25 PROCEDURE — 97530 THERAPEUTIC ACTIVITIES: CPT | Mod: GP | Performed by: PHYSICAL THERAPIST

## 2023-08-25 PROCEDURE — 97110 THERAPEUTIC EXERCISES: CPT | Mod: GP | Performed by: PHYSICAL THERAPIST

## 2023-08-29 NOTE — TELEPHONE ENCOUNTER
This pt showed up but staff was not available in time to complete the procedure.  Will need to reschedule.    Kaylen Huggins  Surgery Scheduler

## 2023-08-29 NOTE — TELEPHONE ENCOUNTER
Attempted call to Vent[place].   No VM picked up. Will try again later today.    Plan if works for patient is as follows.  Schedule for 9/13/2023 as first case in Procedure Room. This is a CRNA day but NOT A CRNA case    Pt WILL NEED AVIVA Huggins  Surgery Scheduler

## 2023-08-31 NOTE — TELEPHONE ENCOUNTER
Dr Walls would like to do US prior to this procedure    Pt scheduled as follows NON CRNA CASE    9/13/2023  8a US   9a OPERATIVE HSC WITH MYOSURE    Kaylen Huggins  Surgery Scheduler

## 2023-09-01 ENCOUNTER — THERAPY VISIT (OUTPATIENT)
Dept: PHYSICAL THERAPY | Facility: CLINIC | Age: 53
End: 2023-09-01
Payer: COMMERCIAL

## 2023-09-01 DIAGNOSIS — S72.91XA CLOSED FRACTURE OF RIGHT FEMUR, UNSPECIFIED FRACTURE MORPHOLOGY, UNSPECIFIED PORTION OF FEMUR, INITIAL ENCOUNTER (H): ICD-10-CM

## 2023-09-01 DIAGNOSIS — M79.661 PAIN OF RIGHT LOWER LEG: Primary | ICD-10-CM

## 2023-09-01 PROCEDURE — 97530 THERAPEUTIC ACTIVITIES: CPT | Mod: GP | Performed by: PHYSICAL THERAPIST

## 2023-09-01 PROCEDURE — 97110 THERAPEUTIC EXERCISES: CPT | Mod: GP | Performed by: PHYSICAL THERAPIST

## 2023-09-08 ENCOUNTER — THERAPY VISIT (OUTPATIENT)
Dept: PHYSICAL THERAPY | Facility: CLINIC | Age: 53
End: 2023-09-08
Payer: COMMERCIAL

## 2023-09-08 DIAGNOSIS — M79.661 PAIN OF RIGHT LOWER LEG: Primary | ICD-10-CM

## 2023-09-08 DIAGNOSIS — S72.91XA CLOSED FRACTURE OF RIGHT FEMUR, UNSPECIFIED FRACTURE MORPHOLOGY, UNSPECIFIED PORTION OF FEMUR, INITIAL ENCOUNTER (H): ICD-10-CM

## 2023-09-08 PROCEDURE — 97110 THERAPEUTIC EXERCISES: CPT | Mod: GP | Performed by: PHYSICAL THERAPIST

## 2023-09-08 PROCEDURE — 97530 THERAPEUTIC ACTIVITIES: CPT | Mod: GP | Performed by: PHYSICAL THERAPIST

## 2023-09-13 ENCOUNTER — OFFICE VISIT (OUTPATIENT)
Dept: OBGYN | Facility: CLINIC | Age: 53
End: 2023-09-13
Payer: COMMERCIAL

## 2023-09-13 ENCOUNTER — LAB (OUTPATIENT)
Dept: LAB | Facility: CLINIC | Age: 53
End: 2023-09-13
Payer: COMMERCIAL

## 2023-09-13 ENCOUNTER — ANCILLARY PROCEDURE (OUTPATIENT)
Dept: ULTRASOUND IMAGING | Facility: CLINIC | Age: 53
End: 2023-09-13
Attending: OBSTETRICS & GYNECOLOGY
Payer: COMMERCIAL

## 2023-09-13 DIAGNOSIS — N95.0 POSTMENOPAUSAL BLEEDING: ICD-10-CM

## 2023-09-13 DIAGNOSIS — N95.0 POSTMENOPAUSAL BLEEDING: Primary | ICD-10-CM

## 2023-09-13 DIAGNOSIS — N83.201 RIGHT OVARIAN CYST: ICD-10-CM

## 2023-09-13 DIAGNOSIS — Z12.4 SCREENING FOR CERVICAL CANCER: ICD-10-CM

## 2023-09-13 LAB — CANCER AG125 SERPL-ACNC: 19 U/ML

## 2023-09-13 PROCEDURE — 99213 OFFICE O/P EST LOW 20 MIN: CPT | Mod: 25 | Performed by: OBSTETRICS & GYNECOLOGY

## 2023-09-13 PROCEDURE — 86304 IMMUNOASSAY TUMOR CA 125: CPT

## 2023-09-13 PROCEDURE — 87624 HPV HI-RISK TYP POOLED RSLT: CPT | Performed by: OBSTETRICS & GYNECOLOGY

## 2023-09-13 PROCEDURE — 58555 HYSTEROSCOPY DX SEP PROC: CPT | Performed by: OBSTETRICS & GYNECOLOGY

## 2023-09-13 PROCEDURE — 76830 TRANSVAGINAL US NON-OB: CPT | Performed by: OBSTETRICS & GYNECOLOGY

## 2023-09-13 PROCEDURE — G0145 SCR C/V CYTO,THINLAYER,RESCR: HCPCS | Performed by: OBSTETRICS & GYNECOLOGY

## 2023-09-13 PROCEDURE — 36415 COLL VENOUS BLD VENIPUNCTURE: CPT

## 2023-09-13 NOTE — PROGRESS NOTES
INDICATIONS:                                                    Is a pregnancy test required: No.       Was a consent obtained?  Yes                                              Britt Park is a 53 year old female, No obstetric history on file.  who presents for diagnostic hysteroscopy. The risks, benefits and alternatives of the hysteroscopy were discussed in detail previously.   She has elected to go ahead with the procedure today and her questions were answered. Consent was signed.     Today's PHQ-2 Score:       9/12/2023     8:59 PM   PHQ-2 ( 1999 [employer])   Q1: Little interest or pleasure in doing things 0   Q2: Feeling down, depressed or hopeless 1   PHQ-2 Score 1   Q1: Little interest or pleasure in doing things Not at all   Q2: Feeling down, depressed or hopeless Several days   PHQ-2 Score 1          PROCEDURE: Diagnostic Hysteroscopy                                                                                                      Sunnywas taken to the procedure room.  She was placed in dorsal supine position a bivalve speculum was placed and the cervix was cleaned.  A total of 35 mL of 1% Polocaine was used during this procedure.  The cervix was sequentially dilated initially with the os finder and then with the metal dilators.  The hysteroscope was introduced into the endometrial cavity and no visible lesions were seen.  Since she had had a prior normal endometrial biopsy another 1 was not performed.    POST PROCEDURE:                                                                                            She  experienced moderate discomfort during the procedure. There were no complications. Patient was discharged in stable condition.    We discussed management options moving forward.  Her bleeding is currently unexplained.  What she does have are bilateral ovarian cysts with the bigger one on the right side.  Both cysts appear to be simple in nature and we discussed management options.   Given her age of 53 I would advocate an oophorectomy versus cystectomy.  At this time she is concerned about the decrease in hormones and so would opt for a unilateral oophorectomy.  Ventura will think about her options and will contact me with her decision.    Annette Walls MD

## 2023-09-13 NOTE — PATIENT INSTRUCTIONS
Pros and Cons of a unilateral oophorectomy  Pro:   if the bleeding is being caused by the cyst this would solve the issue  No surgical menopause as long as one ovary is left in place  Leaving one ovary in place that has a small cyst means you may need future procedures on that remaining ovary.    Con  Laparoscopic surgery carries the risk of bleeding, infection and damage to nearby structures  It may not solve the problem as the left ovary is not being removed as well  The biggest drawback to removing both ovaries is instant menopause. Also the risk of osteoporosis and Cardiovascular disease increases with the loss of estrogen.  If you have bothersome symptoms of menopause you may need to consider hormone replacement therapy.

## 2023-09-15 ENCOUNTER — TRANSFERRED RECORDS (OUTPATIENT)
Dept: HEALTH INFORMATION MANAGEMENT | Facility: CLINIC | Age: 53
End: 2023-09-15

## 2023-09-17 DIAGNOSIS — K21.9 GASTROESOPHAGEAL REFLUX DISEASE WITHOUT ESOPHAGITIS: Primary | ICD-10-CM

## 2023-09-18 LAB
BKR LAB AP GYN ADEQUACY: NORMAL
BKR LAB AP GYN INTERPRETATION: NORMAL
BKR LAB AP HPV REFLEX: NORMAL
BKR LAB AP PREVIOUS ABNORMAL: NORMAL
PATH REPORT.COMMENTS IMP SPEC: NORMAL
PATH REPORT.COMMENTS IMP SPEC: NORMAL
PATH REPORT.RELEVANT HX SPEC: NORMAL

## 2023-09-19 ENCOUNTER — HOSPITAL ENCOUNTER (OUTPATIENT)
Dept: CT IMAGING | Facility: CLINIC | Age: 53
Discharge: HOME | End: 2023-09-19
Attending: ORTHOPAEDIC SURGERY
Payer: COMMERCIAL

## 2023-09-19 ENCOUNTER — LAB (OUTPATIENT)
Dept: LAB | Facility: CLINIC | Age: 53
End: 2023-09-19
Attending: ORTHOPAEDIC SURGERY
Payer: COMMERCIAL

## 2023-09-19 DIAGNOSIS — K21.9 GASTROESOPHAGEAL REFLUX DISEASE WITHOUT ESOPHAGITIS: ICD-10-CM

## 2023-09-19 DIAGNOSIS — S72.309G: ICD-10-CM

## 2023-09-19 DIAGNOSIS — Z47.89 AFTERCARE FOLLOWING SURGERY OF THE MUSCULOSKELETAL SYSTEM, NEC: Primary | ICD-10-CM

## 2023-09-19 DIAGNOSIS — N95.0 POSTMENOPAUSAL BLEEDING: ICD-10-CM

## 2023-09-19 DIAGNOSIS — M79.604 PAIN OF RIGHT LOWER EXTREMITY: ICD-10-CM

## 2023-09-19 LAB
ALBUMIN SERPL BCG-MCNC: 4.4 G/DL (ref 3.5–5.2)
ALP SERPL-CCNC: 90 U/L (ref 35–104)
ALT SERPL W P-5'-P-CCNC: 24 U/L (ref 0–50)
ANION GAP SERPL CALCULATED.3IONS-SCNC: 11 MMOL/L (ref 7–15)
AST SERPL W P-5'-P-CCNC: 31 U/L (ref 0–45)
BASOPHILS # BLD AUTO: 0 10E3/UL (ref 0–0.2)
BASOPHILS NFR BLD AUTO: 1 %
BILIRUB SERPL-MCNC: 0.3 MG/DL
BUN SERPL-MCNC: 19.6 MG/DL (ref 6–20)
CALCIUM SERPL-MCNC: 9.3 MG/DL (ref 8.6–10)
CHLORIDE SERPL-SCNC: 102 MMOL/L (ref 98–107)
CREAT SERPL-MCNC: 0.35 MG/DL (ref 0.51–0.95)
CRP SERPL-MCNC: 3.7 MG/L
DEPRECATED HCO3 PLAS-SCNC: 26 MMOL/L (ref 22–29)
EGFRCR SERPLBLD CKD-EPI 2021: >90 ML/MIN/1.73M2
EOSINOPHIL # BLD AUTO: 0.1 10E3/UL (ref 0–0.7)
EOSINOPHIL NFR BLD AUTO: 3 %
ERYTHROCYTE [DISTWIDTH] IN BLOOD BY AUTOMATED COUNT: 14.6 % (ref 10–15)
ERYTHROCYTE [SEDIMENTATION RATE] IN BLOOD BY WESTERGREN METHOD: 36 MM/HR (ref 0–30)
ESTRADIOL SERPL-MCNC: <5 PG/ML
FSH SERPL IRP2-ACNC: 57.4 MIU/ML
GLUCOSE SERPL-MCNC: 63 MG/DL (ref 70–99)
HBA1C MFR BLD: 4.8 %
HCT VFR BLD AUTO: 34.9 % (ref 35–47)
HGB BLD-MCNC: 10.9 G/DL (ref 11.7–15.7)
HOLD SPECIMEN: NORMAL
IMM GRANULOCYTES # BLD: 0 10E3/UL
IMM GRANULOCYTES NFR BLD: 0 %
LH SERPL-ACNC: 27.5 MIU/ML
LYMPHOCYTES # BLD AUTO: 1.6 10E3/UL (ref 0.8–5.3)
LYMPHOCYTES NFR BLD AUTO: 48 %
MAGNESIUM SERPL-MCNC: 2.1 MG/DL (ref 1.7–2.3)
MCH RBC QN AUTO: 31.9 PG (ref 26.5–33)
MCHC RBC AUTO-ENTMCNC: 31.2 G/DL (ref 31.5–36.5)
MCV RBC AUTO: 102 FL (ref 78–100)
MONOCYTES # BLD AUTO: 0.4 10E3/UL (ref 0–1.3)
MONOCYTES NFR BLD AUTO: 10 %
NEUTROPHILS # BLD AUTO: 1.3 10E3/UL (ref 1.6–8.3)
NEUTROPHILS NFR BLD AUTO: 38 %
NRBC # BLD AUTO: 0 10E3/UL
NRBC BLD AUTO-RTO: 0 /100
PLATELET # BLD AUTO: 315 10E3/UL (ref 150–450)
POTASSIUM SERPL-SCNC: 4.4 MMOL/L (ref 3.4–5.3)
PROLACTIN SERPL 3RD IS-MCNC: 16 NG/ML (ref 5–23)
PROT SERPL-MCNC: 7.5 G/DL (ref 6.4–8.3)
PTH-INTACT SERPL-MCNC: 23 PG/ML (ref 15–65)
RBC # BLD AUTO: 3.42 10E6/UL (ref 3.8–5.2)
SODIUM SERPL-SCNC: 139 MMOL/L (ref 136–145)
TSH SERPL DL<=0.005 MIU/L-ACNC: 1.2 UIU/ML (ref 0.3–4.2)
WBC # BLD AUTO: 3.5 10E3/UL (ref 4–11)

## 2023-09-19 PROCEDURE — 83003 ASSAY GROWTH HORMONE (HGH): CPT

## 2023-09-19 PROCEDURE — 86140 C-REACTIVE PROTEIN: CPT

## 2023-09-19 PROCEDURE — 80053 COMPREHEN METABOLIC PANEL: CPT

## 2023-09-19 PROCEDURE — 82306 VITAMIN D 25 HYDROXY: CPT

## 2023-09-19 PROCEDURE — 73700 CT LOWER EXTREMITY W/O DYE: CPT | Mod: RT

## 2023-09-19 PROCEDURE — 83001 ASSAY OF GONADOTROPIN (FSH): CPT

## 2023-09-19 PROCEDURE — 83036 HEMOGLOBIN GLYCOSYLATED A1C: CPT

## 2023-09-19 PROCEDURE — 84146 ASSAY OF PROLACTIN: CPT

## 2023-09-19 PROCEDURE — 82670 ASSAY OF TOTAL ESTRADIOL: CPT

## 2023-09-19 PROCEDURE — 83735 ASSAY OF MAGNESIUM: CPT

## 2023-09-19 PROCEDURE — 84443 ASSAY THYROID STIM HORMONE: CPT

## 2023-09-19 PROCEDURE — 36415 COLL VENOUS BLD VENIPUNCTURE: CPT

## 2023-09-19 PROCEDURE — 83970 ASSAY OF PARATHORMONE: CPT

## 2023-09-19 PROCEDURE — 85652 RBC SED RATE AUTOMATED: CPT

## 2023-09-19 PROCEDURE — 85025 COMPLETE CBC W/AUTO DIFF WBC: CPT

## 2023-09-19 PROCEDURE — 83002 ASSAY OF GONADOTROPIN (LH): CPT

## 2023-09-19 RX ORDER — PANTOPRAZOLE SODIUM 40 MG/1
TABLET, DELAYED RELEASE ORAL
Qty: 30 TABLET | Refills: 1 | Status: SHIPPED | OUTPATIENT
Start: 2023-09-19 | End: 2024-01-22

## 2023-09-19 RX ORDER — PANTOPRAZOLE SODIUM 40 MG/1
TABLET, DELAYED RELEASE ORAL
Qty: 90 TABLET | OUTPATIENT
Start: 2023-09-19

## 2023-09-19 NOTE — TELEPHONE ENCOUNTER
Pharmacy requested duplicate. Medication refused.     Script sent to the pharmacy today for 30 tablets with one additional refill on file.     Cary Vega RN

## 2023-09-20 LAB
HUMAN PAPILLOMA VIRUS 16 DNA: NEGATIVE
HUMAN PAPILLOMA VIRUS 18 DNA: NEGATIVE
HUMAN PAPILLOMA VIRUS FINAL DIAGNOSIS: NORMAL
HUMAN PAPILLOMA VIRUS OTHER HR: NEGATIVE

## 2023-09-21 LAB — GH SERPL-MCNC: 2 UG/L

## 2023-09-22 ENCOUNTER — THERAPY VISIT (OUTPATIENT)
Dept: PHYSICAL THERAPY | Facility: CLINIC | Age: 53
End: 2023-09-22
Payer: COMMERCIAL

## 2023-09-22 DIAGNOSIS — M25.561 ACUTE PAIN OF RIGHT KNEE: Primary | ICD-10-CM

## 2023-09-22 DIAGNOSIS — Z47.89 AFTERCARE FOLLOWING SURGERY OF THE MUSCULOSKELETAL SYSTEM: ICD-10-CM

## 2023-09-22 DIAGNOSIS — M79.661 PAIN OF RIGHT LOWER LEG: ICD-10-CM

## 2023-09-22 DIAGNOSIS — S72.91XA CLOSED FRACTURE OF RIGHT FEMUR, UNSPECIFIED FRACTURE MORPHOLOGY, UNSPECIFIED PORTION OF FEMUR, INITIAL ENCOUNTER (H): ICD-10-CM

## 2023-09-22 LAB — DEPRECATED CALCIDIOL+CALCIFEROL SERPL-MC: 36 UG/L (ref 20–75)

## 2023-09-22 PROCEDURE — 97110 THERAPEUTIC EXERCISES: CPT | Mod: GP | Performed by: PHYSICAL THERAPIST

## 2023-09-22 PROCEDURE — 97530 THERAPEUTIC ACTIVITIES: CPT | Mod: GP | Performed by: PHYSICAL THERAPIST

## 2023-09-27 ENCOUNTER — MYC MEDICAL ADVICE (OUTPATIENT)
Dept: OBGYN | Facility: CLINIC | Age: 53
End: 2023-09-27

## 2023-09-27 ENCOUNTER — VIRTUAL VISIT (OUTPATIENT)
Dept: OBGYN | Facility: CLINIC | Age: 53
End: 2023-09-27
Payer: COMMERCIAL

## 2023-09-27 DIAGNOSIS — N95.0 POSTMENOPAUSAL BLEEDING: Primary | ICD-10-CM

## 2023-09-27 DIAGNOSIS — N83.201 RIGHT OVARIAN CYST: ICD-10-CM

## 2023-09-27 DIAGNOSIS — N83.202 LEFT OVARIAN CYST: ICD-10-CM

## 2023-09-27 PROCEDURE — 99443 PR PHYSICIAN TELEPHONE EVALUATION 21-30 MIN: CPT | Performed by: OBSTETRICS & GYNECOLOGY

## 2023-09-27 NOTE — PROGRESS NOTES
Britt Park is a 53 year old female who is being evaluated via a billable telephone visit.      What phone number would you like to be contacted at? [phone]  How would you like to obtain your AVS? [email or web address]      Originating Location (pt. Location): home      Distant Location (provider location):  On-site      SUBJECTIVE:                                                   Britt Park is a 53 year old female who presents for virtual visit today for the following health issue(s):  Patient presents with:  Consult: Surgical consult - removing ovary, possible removing uterus but leaving 1 ovary      Additional information: discuss surgery    HPI:  Ventura presents for a telephone visit today to discuss the next steps in her care.  She has a history of urgency urinary incontinence and has noticed that she has been having some urinary incontinence more recently where she is not able to make it to the bedside commode.  She only notices this overnight she is wanting to consider a total hysterectomy due to the persistent of the postmenopausal bleeding.  She is worried about that right ovarian cyst which has been present since 2022 as she is worried that this may be contributing to her urgency symptoms and also to the postmenopausal bleeding.  Her work-up has included a negative endometrial biopsy as well as a negative hysteroscopy with no intracavitary lesions seen.  She would like to retain her left ovary so as not to lose the benefit of any residual estrogen in terms of cardiovascular and bone health.  She is currently in need of repeat orthopedic surgery on her femur and is trying to decide which surgery to have firstly.  She was given the recommendation to have 6 months of recovery from her femur surgery before pursuing any gynecologic procedures if she has a surgery firstly    No LMP recorded (lmp unknown). Patient is postmenopausal..     Patient is sexually active, ..  Using menopause for  contraception.    reports that she has never smoked. She has never used smokeless tobacco.    Health maintenance updated:  yes    Today's PHQ-2 Score:       2023    12:21 PM   PHQ-2 (  [employer])   Q1: Little interest or pleasure in doing things 0   Q2: Feeling down, depressed or hopeless 1   PHQ-2 Score 1   Q1: Little interest or pleasure in doing things Not at all   Q2: Feeling down, depressed or hopeless Several days   PHQ-2 Score 1     Today's PHQ-9 Score:       8/3/2023     3:28 PM   PHQ-9 SCORE   PHQ-9 Total Score 5     Today's BERTO-7 Score:       8/3/2023     3:28 PM   BERTO-7 SCORE   Total Score 6       Problem list and histories reviewed & adjusted, as indicated.  Additional history: as documented.    Patient Active Problem List   Diagnosis    Myopathy, mitochondrial    Overactive bladder    Dry eyes    Encounter for monitoring of systemic steroid therapy    Need for vaccination    Impetigo    Secondary hypertension    On prednisone therapy    Vitamin B 12 deficiency    Need for pneumocystis prophylaxis    Anemia, unspecified type    Visit for screening mammogram    Seasonal allergic rhinitis, unspecified trigger    Acute respiratory failure with hypoxia (H)    Other myositis, unspecified site    Facial paresthesia    Weight gain    Vaginal dryness    Annual physical exam    Methemoglobinemia    Cyst of right ovary    Hepatic steatosis    Closed fracture of right femur, unspecified fracture morphology, unspecified portion of femur, initial encounter (H)    Enlarged lymph nodes    Atopic dermatitis of scalp    Pain of right lower leg    Closed disp fracture of condyle of right femur with routine healing    Pulmonary nodules    Acute pain of right knee    Aftercare following surgery of the musculoskeletal system     Past Surgical History:   Procedure Laterality Date    ABDOMEN SURGERY  2007        BIOPSY  2019 & 2021    Right bicep, result abnormal results, & left tricep biopsy     COLONOSCOPY  21    COLONOSCOPY N/A 2022    Procedure: COLONOSCOPY, FLEXIBLE, WITH LESION REMOVAL USING SNARE;  Surgeon: Dorie Santos MD;  Location:  GI    GYN SURGERY  07     for twins    OPEN REDUCTION INTERNAL FIXATION RODDING INTRAMEDULLARY FEMUR Right 2022    Procedure: Open reduction internal fixation of right femur fracture;  Surgeon: Al Larson MD;  Location:  OR      Social History     Tobacco Use    Smoking status: Never    Smokeless tobacco: Never   Substance Use Topics    Alcohol use: Not Currently      *Patient is Adopted           Problem (# of Occurrences) Relation (Name,Age of Onset)    Unknown/Adopted (1) Other (I am adopted. No family history.)              Current Outpatient Medications   Medication Sig    acetaminophen (TYLENOL) 325 MG tablet Take 2 tablets (650 mg) by mouth every 6 hours as needed for other (For optimal non-opioid multimodal pain management to improve pain control.)    atorvastatin (LIPITOR) 20 MG tablet Take 1 tablet by mouth daily at 2 pm    calcium carbonate (TUMS) 500 MG chewable tablet Take 1 tablet (500 mg) by mouth 2 times daily as needed for heartburn    folic acid (FOLVITE) 1 MG tablet Take 1 tablet (1 mg) by mouth daily    gabapentin (NEURONTIN) 300 MG capsule Take 300 mg by mouth 3 times daily    losartan (COZAAR) 25 MG tablet TAKE 1 TABLET(25 MG) BY MOUTH DAILY    methocarbamol (ROBAXIN) 500 MG tablet TAKE 1 TABLET(500 MG) BY MOUTH FOUR TIMES DAILY FOR 25 DAYS    mometasone (NASONEX) 50 MCG/ACT nasal spray Spray 2 sprays into both nostrils daily    mupirocin (BACTROBAN) 2 % external ointment Apply topically 3 times daily    pantoprazole (PROTONIX) 40 MG EC tablet TAKE 1 TABLET BY MOUTH DAILY 30 MINUTES BEFORE BREAKFAST    traMADol (ULTRAM) 50 MG tablet     benzonatate (TESSALON) 100 MG capsule Take 1 capsule (100 mg) by mouth 3 times daily as needed for cough (Patient not taking: Reported on 8/3/2023)    estradiol  (ESTRACE) 0.1 MG/GM vaginal cream PLACE 2 GRAMS VAGINALLY 2 TIMES A WEEK (Patient not taking: Reported on 5/4/2023)    hydrOXYzine (ATARAX) 25 MG tablet TAKE 1 TABLET BY MOUTH EVERY 6 HOURS AS NEEDED FOR PAIN (Patient not taking: Reported on 8/3/2023)    oxyCODONE (ROXICODONE) 5 MG tablet Take 1 tablet (5 mg) by mouth every 6 hours as needed for severe pain (7-10) Take 2.5 (1/2 tab)- 5 mg (1 tab) up to 4 times daily as needed, allow for at least 4 hours between doses, continue to reduce oxycodone use allowing for more time between doses. (Patient not taking: Reported on 9/27/2023)    valACYclovir (VALTREX) 1000 mg tablet Take 2,000 mg by mouth as needed (Patient not taking: Reported on 9/27/2023)     No current facility-administered medications for this visit.     Allergies   Allergen Reactions    Dapsone Shortness Of Breath     Oxygen levels went to 79 %.    Influenza Vaccines Hives     2/24/23 Patient states she's been able to have a flu shot the past couple of years and has done well with it.    Benadryl [Diphenhydramine]     Diphenhydramine Other (See Comments)     SHAKY      Sulfa Antibiotics Swelling    Sulfa Antibiotics          OBJECTIVE:     No vitals were obtained today due to virtual visit.        ASSESSMENT/PLAN:                                                      Phone call duration: 33 minutes      ICD-10-CM    1. Postmenopausal bleeding  N95.0       2. Right ovarian cyst  N83.201       3. Left ovarian cyst  N83.202         Ventura asked appropriate questions today. We discussed the pros and cons of just having an oophorectomy versus a total hysterectomy. She has had prolonged postmenopausal bleeding with no resolution and she would like to pursue a hysterectomy to get relief from this. She would like to retain her left ovary. We discussed her symptoms of urgency incontinence and since this symptom is pre-existing I recommend addressing it after surgery as the pressure from the right ovary could be  exerting pressure on the bladder. It is unknown at this point. We discussed different types of hysterectomy and I would recommend a total hysterectomy for her. We discussed that the fallopian tubes would be removed at the same time. I also discussed the need for a diagnostic cystoscopy after surgery to rule out bladder or ureteral injury which I perform with every hysterectomy that I perform. At the end of our discussion she decided to weigh her options and will communicate her desires with me in the future. When she is ready to proceed she will be requesting a Laparoscopic assisted vaginal hysterectomy, bilateral salpingectomy, right oophorectomy and diagnostic cystoscopy with a left ovarian cyst drainage if needed at the time of surgery.     Annette Walls MD  [facility] [place]

## 2023-09-29 ENCOUNTER — THERAPY VISIT (OUTPATIENT)
Dept: PHYSICAL THERAPY | Facility: CLINIC | Age: 53
End: 2023-09-29
Payer: COMMERCIAL

## 2023-09-29 DIAGNOSIS — Z47.89 AFTERCARE FOLLOWING SURGERY OF THE MUSCULOSKELETAL SYSTEM: ICD-10-CM

## 2023-09-29 DIAGNOSIS — M79.661 PAIN OF RIGHT LOWER LEG: ICD-10-CM

## 2023-09-29 DIAGNOSIS — S72.91XA CLOSED FRACTURE OF RIGHT FEMUR, UNSPECIFIED FRACTURE MORPHOLOGY, UNSPECIFIED PORTION OF FEMUR, INITIAL ENCOUNTER (H): Primary | ICD-10-CM

## 2023-09-29 PROCEDURE — 97110 THERAPEUTIC EXERCISES: CPT | Mod: GP | Performed by: PHYSICAL THERAPIST

## 2023-09-29 PROCEDURE — 97530 THERAPEUTIC ACTIVITIES: CPT | Mod: GP | Performed by: PHYSICAL THERAPIST

## 2023-10-02 ENCOUNTER — HOSPITAL ENCOUNTER (OUTPATIENT)
Facility: CLINIC | Age: 53
End: 2023-10-02
Attending: ORTHOPAEDIC SURGERY | Admitting: ORTHOPAEDIC SURGERY
Payer: COMMERCIAL

## 2023-10-02 ENCOUNTER — MYC MEDICAL ADVICE (OUTPATIENT)
Dept: ORTHOPEDICS | Facility: CLINIC | Age: 53
End: 2023-10-02

## 2023-10-02 ENCOUNTER — OFFICE VISIT (OUTPATIENT)
Dept: ORTHOPEDICS | Facility: CLINIC | Age: 53
End: 2023-10-02
Payer: COMMERCIAL

## 2023-10-02 VITALS — WEIGHT: 140 LBS | HEIGHT: 62 IN | BODY MASS INDEX: 25.76 KG/M2

## 2023-10-02 DIAGNOSIS — S72.91XA CLOSED FRACTURE OF RIGHT FEMUR, UNSPECIFIED FRACTURE MORPHOLOGY, UNSPECIFIED PORTION OF FEMUR, INITIAL ENCOUNTER (H): Primary | ICD-10-CM

## 2023-10-02 PROCEDURE — 99212 OFFICE O/P EST SF 10 MIN: CPT | Performed by: ORTHOPAEDIC SURGERY

## 2023-10-02 NOTE — TELEPHONE ENCOUNTER
Patient seen in clinic on 10/2/23. Closing encounter.     Kyung Phillips MSA, ATC  Certified Athletic Trainer

## 2023-10-02 NOTE — PROGRESS NOTES
S: Patient is a 53 year old female seen today in follow up for right lower leg pain. They were previously evaluated on 5/1/2023,  they are 10 months out from surgery on 12/11/2022.  Patient is wanting second opinion because she is being told to replace one of the nails in the ronda in her leg. Wants to review the CT scan and has been trying to do a bone growth stimulator and last CT scan shows some growth forming.  Patient has lots of pain across the fracture site with some discomfort on the knee. Patient states it feels tight, can get to 90 degrees with assistance. Has been going to physical therapy once a week, therapuetic pool, utilizes fitness centers. 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         Patient Active Problem List   Diagnosis    Myopathy, mitochondrial    Overactive bladder    Dry eyes    Encounter for monitoring of systemic steroid therapy    Need for vaccination    Impetigo    Secondary hypertension    On prednisone therapy    Vitamin B 12 deficiency    Need for pneumocystis prophylaxis    Anemia, unspecified type    Visit for screening mammogram    Seasonal allergic rhinitis, unspecified trigger    Acute respiratory failure with hypoxia (H)    Other myositis, unspecified site    Facial paresthesia    Weight gain    Vaginal dryness    Annual physical exam    Methemoglobinemia    Cyst of right ovary    Hepatic steatosis    Closed fracture of right femur, unspecified fracture morphology, unspecified portion of femur, initial encounter (H)    Enlarged lymph nodes    Atopic dermatitis of scalp    Pain of right lower leg    Closed disp fracture of condyle of right femur with routine healing    Pulmonary nodules    Acute pain of right knee    Aftercare following surgery of the musculoskeletal system            Past Medical History:   Diagnosis Date    Secondary hypertension 11/2/2021            Past Surgical History:   Procedure Laterality Date    ABDOMEN  SURGERY  2007        BIOPSY  2019 & 2021    Right bicep, result abnormal results, & left tricep biopsy    COLONOSCOPY  21    COLONOSCOPY N/A 2022    Procedure: COLONOSCOPY, FLEXIBLE, WITH LESION REMOVAL USING SNARE;  Surgeon: Dorie Santos MD;  Location:  GI    GYN SURGERY  07     for twins    OPEN REDUCTION INTERNAL FIXATION RODDING INTRAMEDULLARY FEMUR Right 2022    Procedure: Open reduction internal fixation of right femur fracture;  Surgeon: Al Larson MD;  Location:  OR            Social History     Tobacco Use    Smoking status: Never    Smokeless tobacco: Never   Substance Use Topics    Alcohol use: Not Currently            Family History   Adopted: Yes   Problem Relation Age of Onset    Unknown/Adopted Other                Allergies   Allergen Reactions    Dapsone Shortness Of Breath     Oxygen levels went to 79 %.    Influenza Vaccines Hives     23 Patient states she's been able to have a flu shot the past couple of years and has done well with it.    Benadryl [Diphenhydramine]     Diphenhydramine Other (See Comments)     SHAKY      Sulfa Antibiotics Swelling    Sulfa Antibiotics             Current Outpatient Medications   Medication Sig Dispense Refill    acetaminophen (TYLENOL) 325 MG tablet Take 2 tablets (650 mg) by mouth every 6 hours as needed for other (For optimal non-opioid multimodal pain management to improve pain control.) 100 tablet 0    atorvastatin (LIPITOR) 20 MG tablet Take 1 tablet by mouth daily at 2 pm      benzonatate (TESSALON) 100 MG capsule Take 1 capsule (100 mg) by mouth 3 times daily as needed for cough (Patient not taking: Reported on 8/3/2023) 30 capsule 0    calcium carbonate (TUMS) 500 MG chewable tablet Take 1 tablet (500 mg) by mouth 2 times daily as needed for heartburn      estradiol (ESTRACE) 0.1 MG/GM vaginal cream PLACE 2 GRAMS VAGINALLY 2 TIMES A WEEK (Patient not taking: Reported on  5/4/2023) 42.5 g 1    folic acid (FOLVITE) 1 MG tablet Take 1 tablet (1 mg) by mouth daily 90 tablet 3    gabapentin (NEURONTIN) 300 MG capsule Take 300 mg by mouth 3 times daily      hydrOXYzine (ATARAX) 25 MG tablet TAKE 1 TABLET BY MOUTH EVERY 6 HOURS AS NEEDED FOR PAIN (Patient not taking: Reported on 8/3/2023) 30 tablet 1    losartan (COZAAR) 25 MG tablet TAKE 1 TABLET(25 MG) BY MOUTH DAILY 90 tablet 2    methocarbamol (ROBAXIN) 500 MG tablet TAKE 1 TABLET(500 MG) BY MOUTH FOUR TIMES DAILY FOR 25 DAYS 100 tablet 0    mometasone (NASONEX) 50 MCG/ACT nasal spray Spray 2 sprays into both nostrils daily 17 g 2    mupirocin (BACTROBAN) 2 % external ointment Apply topically 3 times daily 15 g 0    oxyCODONE (ROXICODONE) 5 MG tablet Take 1 tablet (5 mg) by mouth every 6 hours as needed for severe pain (7-10) Take 2.5 (1/2 tab)- 5 mg (1 tab) up to 4 times daily as needed, allow for at least 4 hours between doses, continue to reduce oxycodone use allowing for more time between doses. (Patient not taking: Reported on 9/27/2023) 28 tablet 0    pantoprazole (PROTONIX) 40 MG EC tablet TAKE 1 TABLET BY MOUTH DAILY 30 MINUTES BEFORE BREAKFAST 30 tablet 1    traMADol (ULTRAM) 50 MG tablet       valACYclovir (VALTREX) 1000 mg tablet Take 2,000 mg by mouth as needed (Patient not taking: Reported on 9/27/2023)            Review Of Systems  Skin: negative  Eyes: negative  Ears/Nose/Throat: negative  Respiratory: No shortness of breath, dyspnea on exertion, cough, or hemoptysis    O:  Physical Exam:Hinged knee brace in place, patient sitting in wheelchair.  Adequate knee flexion and extension.  CMS intact to foot.    Lab: Vit D 36    Images:  EXAM: DX HIP/PELVIS/SPINE  LOCATION: [facility]  DATE/TIME: 2/22/2023 8:50 AM     INDICATION: Pathological fracture of femur due to other osteoporosis with routine healing, unspecified laterality, subsequent encounter.  COMPARISON: 12/08/2021.  TECHNIQUE: Dual-energy  x-ray absorptiometry performed with routine technique.     FINDINGS:     Lumbar Spine: L1-L4: BMD: 1.006 g/cm2. T-score: -1.5. Z-score: -1.1  LEFT Hip Total: BMD: 0.841 g/cm2. T-score: -1.3. Z-score: -0.9  LEFT Hip Femoral neck: BMD: 0.762 g/cm2. T-score: -2.0. Z-score: -1.2     WHO Criteria:  Normal: T score at or above -1 SD  Osteopenia: T score between -1 and -2.5 SD  Osteoporosis: T score at or below -2.5 SD     COMPARISON: -There has been a 10.3% decrease in lumbar spine BMD. -There has been a 13.7% decrease in bilateral hip BMD.     FRAX Results: 10 year probability of major osteoporotic fracture is 10.0%, and of hip fracture is 1.7%, based on left femoral neck BMD.     RECOMMENDATIONS:   Consider treatment if major osteoporotic fracture score is greater than or equal to 20%. Consider treatment if hip fracture score is greater than or equal to 3%.                                                                      IMPRESSION: Low bone density (OSTEOPENIA). T score meets the World Health Organization (WHO) criteria for low bone density (osteopenia) at one or more measured sites. The risk of osteoporotic fracture increased approximately two-fold for each SD decrease   in T-score.       CT FEMUR THIGH RIGHT W/O CONTRAST 9/19/2023 8:13 AM     INDICATION: RT femur CT with Metal Suppression. Eval nonunion verses  delayed union; Delayed union of closed fracture of shaft of femur  COMPARISON: CT 7/19/2023     TECHNIQUE: Noncontrast. Axial, sagittal and coronal thin-section  reconstruction. Dose reduction techniques were used.   CONTRAST: None.     FINDINGS:   Stable postoperative changes of IM ronda and locking screw fixation  traversing a fracture of the femoral diaphyseal shaft the junction of  the middle and distal thirds. There has been some new bone formation  around the fracture margins since the previous examination. The  fracture is still visualized. No change in alignment. The hip is  negative for fracture or  dislocation. There is degenerative change  within the medial and lateral compartments of the knee joint. No new  fractures are visualized. No evidence for fracture or migration of the  fixation appliances. There is some atrophy of the vastus lateralis  which could be secretory to disuse. There is no evidence for organized  mass or fluid collection. There is also fatty infiltration and atrophy  of the hamstring musculature.                                                                      IMPRESSION:  1.  Stable postoperative changes of IM ronda and locking screw fixation  of a fracture of the right femoral diaphyseal shaft at the junction of  the middle and distal thirds. This remains in excellent anatomic  alignment. The fracture is still visualized but there is evidence of  progression of developing callus and periosteal new bone since the  prior study.  2.  No evidence for fracture or migration of the fixation appliances.  3.  No new fractures are identified.  4.  Fatty infiltration and atrophy of the hamstring musculature and  vastus lateralis. Some of this could be secondary to disuse.  5.  No evidence for organized mass or fluid collection.            A:  Stable postoperative changes of IM ronda and locking screw fixation  of a fracture of the right femoral diaphyseal shaft at the junction of  the middle and distal thirds. This remains in excellent anatomic  alignment. The fracture is still visualized but there is evidence of  progression of developing callus and periosteal new bone since the  prior study.      P:  Work on bone health, consider Prolia, see bone health clinic  Quite a bit of disuse osteopaenia  We discussed the pros and cons of further surgical intervention.  We discussed hypertrophic nonunion/euthrophic/atrophic as well as delayed union.  I think I agree with Radiology that I see new periosteal and endosteal new bone formation.  I would work on osteoblastic stimulation and careful progressive  weight bearing/water therapy/slow loading.  Repeat CT in 3mos  Notify if exacerbation symptoms  Will let us know how she'd like to proceed  Repeat DEXA same machine 2 years  Increase Vit D to 2-6000 international unit(s) D3 daily to try and increase 25 OH Vit D to 50-80             In addition to the above assessment and plan each active problem on Sun's problem list was evaluated today. This included the questioning of Sun for any medication problems. We will continue the current treatment plan for these active problems except as noted.

## 2023-10-02 NOTE — LETTER
10/2/2023         RE: Britt Park  [address] [facility] [address]        Dear Colleague,    Thank you for referring your patient, Britt Park, to the [facility] [place]. Please see a copy of my visit note below.    S: Patient is a 53 year old female seen today in follow up for right lower leg pain. They were previously evaluated on 5/1/2023,  they are 10 months out from surgery on 12/11/2022.  Patient is wanting second opinion because she is being told to replace one of the nails in the ronda in her leg. Wants to review the CT scan and has been trying to do a bone growth stimulator and last CT scan shows some growth forming.  Patient has lots of pain across the fracture site with some discomfort on the knee. Patient states it feels tight, can get to 90 degrees with assistance. Has been going to physical therapy once a week, therapuetic pool, utilizes fitness centers. 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         Patient Active Problem List   Diagnosis     Myopathy, mitochondrial     Overactive bladder     Dry eyes     Encounter for monitoring of systemic steroid therapy     Need for vaccination     Impetigo     Secondary hypertension     On prednisone therapy     Vitamin B 12 deficiency     Need for pneumocystis prophylaxis     Anemia, unspecified type     Visit for screening mammogram     Seasonal allergic rhinitis, unspecified trigger     Acute respiratory failure with hypoxia (H)     Other myositis, unspecified site     Facial paresthesia     Weight gain     Vaginal dryness     Annual physical exam     Methemoglobinemia     Cyst of right ovary     Hepatic steatosis     Closed fracture of right femur, unspecified fracture morphology, unspecified portion of femur, initial encounter (H)     Enlarged lymph nodes     Atopic dermatitis of scalp     Pain of right lower leg     Closed disp fracture of condyle of right  femur with routine healing     Pulmonary nodules     Acute pain of right knee     Aftercare following surgery of the musculoskeletal system            Past Medical History:   Diagnosis Date     Secondary hypertension 2021            Past Surgical History:   Procedure Laterality Date     ABDOMEN SURGERY  2007         BIOPSY  2019 & 2021    Right bicep, result abnormal results, & left tricep biopsy     COLONOSCOPY  21     COLONOSCOPY N/A 2022    Procedure: COLONOSCOPY, FLEXIBLE, WITH LESION REMOVAL USING SNARE;  Surgeon: Dorie Santos MD;  Location:  GI     GYN SURGERY  07     for twins     OPEN REDUCTION INTERNAL FIXATION RODDING INTRAMEDULLARY FEMUR Right 2022    Procedure: Open reduction internal fixation of right femur fracture;  Surgeon: Al Larson MD;  Location:  OR            Social History     Tobacco Use     Smoking status: Never     Smokeless tobacco: Never   Substance Use Topics     Alcohol use: Not Currently            Family History   Adopted: Yes   Problem Relation Age of Onset     Unknown/Adopted Other                Allergies   Allergen Reactions     Dapsone Shortness Of Breath     Oxygen levels went to 79 %.     Influenza Vaccines Hives     23 Patient states she's been able to have a flu shot the past couple of years and has done well with it.     Benadryl [Diphenhydramine]      Diphenhydramine Other (See Comments)     SHAKY       Sulfa Antibiotics Swelling     Sulfa Antibiotics             Current Outpatient Medications   Medication Sig Dispense Refill     acetaminophen (TYLENOL) 325 MG tablet Take 2 tablets (650 mg) by mouth every 6 hours as needed for other (For optimal non-opioid multimodal pain management to improve pain control.) 100 tablet 0     atorvastatin (LIPITOR) 20 MG tablet Take 1 tablet by mouth daily at 2 pm       benzonatate (TESSALON) 100 MG capsule Take 1 capsule (100 mg) by mouth 3 times daily as  needed for cough (Patient not taking: Reported on 8/3/2023) 30 capsule 0     calcium carbonate (TUMS) 500 MG chewable tablet Take 1 tablet (500 mg) by mouth 2 times daily as needed for heartburn       estradiol (ESTRACE) 0.1 MG/GM vaginal cream PLACE 2 GRAMS VAGINALLY 2 TIMES A WEEK (Patient not taking: Reported on 5/4/2023) 42.5 g 1     folic acid (FOLVITE) 1 MG tablet Take 1 tablet (1 mg) by mouth daily 90 tablet 3     gabapentin (NEURONTIN) 300 MG capsule Take 300 mg by mouth 3 times daily       hydrOXYzine (ATARAX) 25 MG tablet TAKE 1 TABLET BY MOUTH EVERY 6 HOURS AS NEEDED FOR PAIN (Patient not taking: Reported on 8/3/2023) 30 tablet 1     losartan (COZAAR) 25 MG tablet TAKE 1 TABLET(25 MG) BY MOUTH DAILY 90 tablet 2     methocarbamol (ROBAXIN) 500 MG tablet TAKE 1 TABLET(500 MG) BY MOUTH FOUR TIMES DAILY FOR 25 DAYS 100 tablet 0     mometasone (NASONEX) 50 MCG/ACT nasal spray Spray 2 sprays into both nostrils daily 17 g 2     mupirocin (BACTROBAN) 2 % external ointment Apply topically 3 times daily 15 g 0     oxyCODONE (ROXICODONE) 5 MG tablet Take 1 tablet (5 mg) by mouth every 6 hours as needed for severe pain (7-10) Take 2.5 (1/2 tab)- 5 mg (1 tab) up to 4 times daily as needed, allow for at least 4 hours between doses, continue to reduce oxycodone use allowing for more time between doses. (Patient not taking: Reported on 9/27/2023) 28 tablet 0     pantoprazole (PROTONIX) 40 MG EC tablet TAKE 1 TABLET BY MOUTH DAILY 30 MINUTES BEFORE BREAKFAST 30 tablet 1     traMADol (ULTRAM) 50 MG tablet        valACYclovir (VALTREX) 1000 mg tablet Take 2,000 mg by mouth as needed (Patient not taking: Reported on 9/27/2023)            Review Of Systems  Skin: negative  Eyes: negative  Ears/Nose/Throat: negative  Respiratory: No shortness of breath, dyspnea on exertion, cough, or hemoptysis    O:  Physical Exam:Hinged knee brace in place, patient sitting in wheelchair.  Adequate knee flexion and extension.  CMS intact to  foot.    Lab: Vit D 36    Images:  EXAM: DX HIP/PELVIS/SPINE  LOCATION: [facility]  DATE/TIME: 2/22/2023 8:50 AM     INDICATION: Pathological fracture of femur due to other osteoporosis with routine healing, unspecified laterality, subsequent encounter.  COMPARISON: 12/08/2021.  TECHNIQUE: Dual-energy x-ray absorptiometry performed with routine technique.     FINDINGS:     Lumbar Spine: L1-L4: BMD: 1.006 g/cm2. T-score: -1.5. Z-score: -1.1  LEFT Hip Total: BMD: 0.841 g/cm2. T-score: -1.3. Z-score: -0.9  LEFT Hip Femoral neck: BMD: 0.762 g/cm2. T-score: -2.0. Z-score: -1.2     WHO Criteria:  Normal: T score at or above -1 SD  Osteopenia: T score between -1 and -2.5 SD  Osteoporosis: T score at or below -2.5 SD     COMPARISON: -There has been a 10.3% decrease in lumbar spine BMD. -There has been a 13.7% decrease in bilateral hip BMD.     FRAX Results: 10 year probability of major osteoporotic fracture is 10.0%, and of hip fracture is 1.7%, based on left femoral neck BMD.     RECOMMENDATIONS:   Consider treatment if major osteoporotic fracture score is greater than or equal to 20%. Consider treatment if hip fracture score is greater than or equal to 3%.                                                                      IMPRESSION: Low bone density (OSTEOPENIA). T score meets the World Health Organization (WHO) criteria for low bone density (osteopenia) at one or more measured sites. The risk of osteoporotic fracture increased approximately two-fold for each SD decrease   in T-score.       CT FEMUR THIGH RIGHT W/O CONTRAST 9/19/2023 8:13 AM     INDICATION: RT femur CT with Metal Suppression. Eval nonunion verses  delayed union; Delayed union of closed fracture of shaft of femur  COMPARISON: CT 7/19/2023     TECHNIQUE: Noncontrast. Axial, sagittal and coronal thin-section  reconstruction. Dose reduction techniques were used.   CONTRAST: None.     FINDINGS:   Stable postoperative changes of IM  ronda and locking screw fixation  traversing a fracture of the femoral diaphyseal shaft the junction of  the middle and distal thirds. There has been some new bone formation  around the fracture margins since the previous examination. The  fracture is still visualized. No change in alignment. The hip is  negative for fracture or dislocation. There is degenerative change  within the medial and lateral compartments of the knee joint. No new  fractures are visualized. No evidence for fracture or migration of the  fixation appliances. There is some atrophy of the vastus lateralis  which could be secretory to disuse. There is no evidence for organized  mass or fluid collection. There is also fatty infiltration and atrophy  of the hamstring musculature.                                                                      IMPRESSION:  1.  Stable postoperative changes of IM ronda and locking screw fixation  of a fracture of the right femoral diaphyseal shaft at the junction of  the middle and distal thirds. This remains in excellent anatomic  alignment. The fracture is still visualized but there is evidence of  progression of developing callus and periosteal new bone since the  prior study.  2.  No evidence for fracture or migration of the fixation appliances.  3.  No new fractures are identified.  4.  Fatty infiltration and atrophy of the hamstring musculature and  vastus lateralis. Some of this could be secondary to disuse.  5.  No evidence for organized mass or fluid collection.            A:  Stable postoperative changes of IM ronda and locking screw fixation  of a fracture of the right femoral diaphyseal shaft at the junction of  the middle and distal thirds. This remains in excellent anatomic  alignment. The fracture is still visualized but there is evidence of  progression of developing callus and periosteal new bone since the  prior study.      P:  Work on bone health, consider Prolia, see bone health clinic  Quite a bit  of disuse osteopaenia  We discussed the pros and cons of further surgical intervention.  We discussed hypertrophic nonunion/euthrophic/atrophic as well as delayed union.  I think I agree with Radiology that I see new periosteal and endosteal new bone formation.  I would work on osteoblastic stimulation and careful progressive weight bearing/water therapy/slow loading.  Repeat CT in 3mos  Notify if exacerbation symptoms  Will let us know how she'd like to proceed  Repeat DEXA same machine 2 years  Increase Vit D to 2-6000 international unit(s) D3 daily to try and increase 25 OH Vit D to 50-80             In addition to the above assessment and plan each active problem on Sun's problem list was evaluated today. This included the questioning of Sun for any medication problems. We will continue the current treatment plan for these active problems except as noted.        Again, thank you for allowing me to participate in the care of your patient.        Sincerely,        Balwinder Nunn MD

## 2023-10-03 ENCOUNTER — PREP FOR PROCEDURE (OUTPATIENT)
Dept: OBGYN | Facility: CLINIC | Age: 53
End: 2023-10-03
Payer: COMMERCIAL

## 2023-10-03 ENCOUNTER — TELEPHONE (OUTPATIENT)
Dept: OBGYN | Facility: CLINIC | Age: 53
End: 2023-10-03

## 2023-10-03 DIAGNOSIS — N83.201 RIGHT OVARIAN CYST: Primary | ICD-10-CM

## 2023-10-04 NOTE — TELEPHONE ENCOUNTER
Type of surgery: LSC R OOPHERECTOMY  Location of surgery: [facility] OR  Date and time of surgery: 12/20/2023 12p  Surgeon: BHAVANA Paniagua  Pre-Op Appt Date: HOSPITAL  Post-Op Appt Date: 1/22/2024 8a   Packet sent out:  MAILED 10/4/2023  Pre-cert/Authorization completed:   TBD  Date: 10/4/2023 Isabelle Huggins  Surgery Scheduler    CPT [id number]

## 2023-10-06 ENCOUNTER — THERAPY VISIT (OUTPATIENT)
Dept: PHYSICAL THERAPY | Facility: CLINIC | Age: 53
End: 2023-10-06
Payer: COMMERCIAL

## 2023-10-06 DIAGNOSIS — M79.661 PAIN OF RIGHT LOWER LEG: ICD-10-CM

## 2023-10-06 DIAGNOSIS — S72.91XA CLOSED FRACTURE OF RIGHT FEMUR, UNSPECIFIED FRACTURE MORPHOLOGY, UNSPECIFIED PORTION OF FEMUR, INITIAL ENCOUNTER (H): Primary | ICD-10-CM

## 2023-10-06 DIAGNOSIS — Z47.89 AFTERCARE FOLLOWING SURGERY OF THE MUSCULOSKELETAL SYSTEM: ICD-10-CM

## 2023-10-06 PROCEDURE — 97110 THERAPEUTIC EXERCISES: CPT | Mod: GP | Performed by: PHYSICAL THERAPIST

## 2023-10-06 PROCEDURE — 97530 THERAPEUTIC ACTIVITIES: CPT | Mod: GP | Performed by: PHYSICAL THERAPIST

## 2023-10-13 ENCOUNTER — THERAPY VISIT (OUTPATIENT)
Dept: PHYSICAL THERAPY | Facility: CLINIC | Age: 53
End: 2023-10-13
Payer: COMMERCIAL

## 2023-10-13 DIAGNOSIS — S72.91XA CLOSED FRACTURE OF RIGHT FEMUR, UNSPECIFIED FRACTURE MORPHOLOGY, UNSPECIFIED PORTION OF FEMUR, INITIAL ENCOUNTER (H): Primary | ICD-10-CM

## 2023-10-13 DIAGNOSIS — M79.661 PAIN OF RIGHT LOWER LEG: ICD-10-CM

## 2023-10-13 DIAGNOSIS — Z47.89 AFTERCARE FOLLOWING SURGERY OF THE MUSCULOSKELETAL SYSTEM: ICD-10-CM

## 2023-10-13 PROCEDURE — 97110 THERAPEUTIC EXERCISES: CPT | Mod: GP | Performed by: PHYSICAL THERAPIST

## 2023-10-13 PROCEDURE — 97530 THERAPEUTIC ACTIVITIES: CPT | Mod: GP | Performed by: PHYSICAL THERAPIST

## 2023-10-20 ENCOUNTER — THERAPY VISIT (OUTPATIENT)
Dept: PHYSICAL THERAPY | Facility: CLINIC | Age: 53
End: 2023-10-20
Payer: COMMERCIAL

## 2023-10-20 DIAGNOSIS — M25.561 ACUTE PAIN OF RIGHT KNEE: Primary | ICD-10-CM

## 2023-10-20 PROCEDURE — 97110 THERAPEUTIC EXERCISES: CPT | Mod: 59 | Performed by: PHYSICAL THERAPIST

## 2023-10-20 PROCEDURE — 97530 THERAPEUTIC ACTIVITIES: CPT | Mod: GP | Performed by: PHYSICAL THERAPIST

## 2023-10-27 ENCOUNTER — THERAPY VISIT (OUTPATIENT)
Dept: PHYSICAL THERAPY | Facility: CLINIC | Age: 53
End: 2023-10-27
Payer: COMMERCIAL

## 2023-10-27 DIAGNOSIS — S72.91XA CLOSED FRACTURE OF RIGHT FEMUR, UNSPECIFIED FRACTURE MORPHOLOGY, UNSPECIFIED PORTION OF FEMUR, INITIAL ENCOUNTER (H): Primary | ICD-10-CM

## 2023-10-27 DIAGNOSIS — M79.661 PAIN OF RIGHT LOWER LEG: ICD-10-CM

## 2023-10-27 DIAGNOSIS — Z47.89 AFTERCARE FOLLOWING SURGERY OF THE MUSCULOSKELETAL SYSTEM: ICD-10-CM

## 2023-10-27 PROCEDURE — 97530 THERAPEUTIC ACTIVITIES: CPT | Mod: GP | Performed by: PHYSICAL THERAPIST

## 2023-10-27 PROCEDURE — 97110 THERAPEUTIC EXERCISES: CPT | Mod: GP | Performed by: PHYSICAL THERAPIST

## 2023-10-30 ENCOUNTER — TELEPHONE (OUTPATIENT)
Dept: PHYSICAL MEDICINE AND REHAB | Facility: CLINIC | Age: 53
End: 2023-10-30
Payer: COMMERCIAL

## 2023-10-30 NOTE — TELEPHONE ENCOUNTER
[facility] Call Center    Phone Message    May a detailed message be left on voicemail: yes     Reason for Call: Other: Patient called needing to reschedule her appt with Dr. Raygoza.  Writer unsure who to schedule with.  Patient requesting call back.     Action Taken: Message routed to:  Other: [facility] PM[facility]R    Travel Screening: Not Applicable

## 2023-10-31 NOTE — TELEPHONE ENCOUNTER
Returned call to patient, and pt called to move up appt,  notified her Dr Raygoza is on [place] [facility] therefore to reach out to referring provder and request a referral for sports medicine and or Endo. pt agreed with plan.

## 2023-11-03 ENCOUNTER — THERAPY VISIT (OUTPATIENT)
Dept: PHYSICAL THERAPY | Facility: CLINIC | Age: 53
End: 2023-11-03
Payer: COMMERCIAL

## 2023-11-03 DIAGNOSIS — S72.91XA CLOSED FRACTURE OF RIGHT FEMUR, UNSPECIFIED FRACTURE MORPHOLOGY, UNSPECIFIED PORTION OF FEMUR, INITIAL ENCOUNTER (H): Primary | ICD-10-CM

## 2023-11-03 DIAGNOSIS — M79.661 PAIN OF RIGHT LOWER LEG: ICD-10-CM

## 2023-11-03 DIAGNOSIS — Z47.89 AFTERCARE FOLLOWING SURGERY OF THE MUSCULOSKELETAL SYSTEM: ICD-10-CM

## 2023-11-03 PROCEDURE — 97530 THERAPEUTIC ACTIVITIES: CPT | Mod: GP | Performed by: PHYSICAL THERAPIST

## 2023-11-03 PROCEDURE — 97110 THERAPEUTIC EXERCISES: CPT | Mod: GP | Performed by: PHYSICAL THERAPIST

## 2023-11-06 ENCOUNTER — TELEPHONE (OUTPATIENT)
Dept: OBGYN | Facility: CLINIC | Age: 53
End: 2023-11-06
Payer: COMMERCIAL

## 2023-11-06 NOTE — TELEPHONE ENCOUNTER
PLEASE EMAIL TO PATIENT PER PT REQUEST   [email or web address]    Type of Paperwork received:  FMLA     Date Rcvd:  10/23/2023 and again 11/6/2023    Rcvd From (Company name): PATIENT    Provider:  BHAVANA Owens on Provider Cart Date:  11/6/2023    Kaylen Huggins  Surgery Scheduler

## 2023-11-10 ENCOUNTER — THERAPY VISIT (OUTPATIENT)
Dept: PHYSICAL THERAPY | Facility: CLINIC | Age: 53
End: 2023-11-10
Payer: COMMERCIAL

## 2023-11-10 DIAGNOSIS — Z47.89 AFTERCARE FOLLOWING SURGERY OF THE MUSCULOSKELETAL SYSTEM: ICD-10-CM

## 2023-11-10 DIAGNOSIS — S72.91XA CLOSED FRACTURE OF RIGHT FEMUR, UNSPECIFIED FRACTURE MORPHOLOGY, UNSPECIFIED PORTION OF FEMUR, INITIAL ENCOUNTER (H): Primary | ICD-10-CM

## 2023-11-10 DIAGNOSIS — M79.661 PAIN OF RIGHT LOWER LEG: ICD-10-CM

## 2023-11-10 PROCEDURE — 97530 THERAPEUTIC ACTIVITIES: CPT | Mod: GP | Performed by: PHYSICAL THERAPIST

## 2023-11-10 PROCEDURE — 97110 THERAPEUTIC EXERCISES: CPT | Mod: GP | Performed by: PHYSICAL THERAPIST

## 2023-11-13 ENCOUNTER — MYC MEDICAL ADVICE (OUTPATIENT)
Dept: OBGYN | Facility: CLINIC | Age: 53
End: 2023-11-13
Payer: COMMERCIAL

## 2023-11-13 DIAGNOSIS — R32 INCONTINENCE IN FEMALE: Primary | ICD-10-CM

## 2023-11-13 NOTE — TELEPHONE ENCOUNTER
"Routing pt C[facility] [employer] Inchart message to provider to advise.  \"Do you think my enlarged ovary could be pushing down on my bladder? \"      Rylee Deal RN on 11/13/2023 at 3:54 PM    "

## 2023-11-14 ENCOUNTER — MYC MEDICAL ADVICE (OUTPATIENT)
Dept: FAMILY MEDICINE | Facility: CLINIC | Age: 53
End: 2023-11-14
Payer: COMMERCIAL

## 2023-11-14 DIAGNOSIS — L73.9 FOLLICULITIS: Primary | ICD-10-CM

## 2023-11-14 RX ORDER — CEPHALEXIN 500 MG/1
500 CAPSULE ORAL 4 TIMES DAILY
Qty: 28 CAPSULE | Refills: 0 | Status: SHIPPED | OUTPATIENT
Start: 2023-11-14 | End: 2023-11-21

## 2023-11-14 NOTE — TELEPHONE ENCOUNTER
TO PCP    Patient calling regarding my chart message. Please advise.     Janette Lloyd RN on 11/14/2023 at 10:37 AM

## 2023-11-14 NOTE — TELEPHONE ENCOUNTER
I agree with Urogyn evaluation. The burning is not from the ovary. At best it should only cause increased frequency and urgency. The burning should be evaluated for a UTI

## 2023-11-17 ENCOUNTER — THERAPY VISIT (OUTPATIENT)
Dept: PHYSICAL THERAPY | Facility: CLINIC | Age: 53
End: 2023-11-17
Payer: COMMERCIAL

## 2023-11-17 DIAGNOSIS — M79.661 PAIN OF RIGHT LOWER LEG: ICD-10-CM

## 2023-11-17 DIAGNOSIS — Z47.89 AFTERCARE FOLLOWING SURGERY OF THE MUSCULOSKELETAL SYSTEM: ICD-10-CM

## 2023-11-17 DIAGNOSIS — S72.91XA CLOSED FRACTURE OF RIGHT FEMUR, UNSPECIFIED FRACTURE MORPHOLOGY, UNSPECIFIED PORTION OF FEMUR, INITIAL ENCOUNTER (H): Primary | ICD-10-CM

## 2023-11-17 PROCEDURE — 97530 THERAPEUTIC ACTIVITIES: CPT | Mod: GP | Performed by: PHYSICAL THERAPIST

## 2023-11-17 PROCEDURE — 97110 THERAPEUTIC EXERCISES: CPT | Mod: 59 | Performed by: PHYSICAL THERAPIST

## 2023-11-21 ENCOUNTER — THERAPY VISIT (OUTPATIENT)
Dept: PHYSICAL THERAPY | Facility: CLINIC | Age: 53
End: 2023-11-21
Payer: COMMERCIAL

## 2023-11-21 DIAGNOSIS — S72.91XA CLOSED FRACTURE OF RIGHT FEMUR, UNSPECIFIED FRACTURE MORPHOLOGY, UNSPECIFIED PORTION OF FEMUR, INITIAL ENCOUNTER (H): Primary | ICD-10-CM

## 2023-11-21 DIAGNOSIS — M79.661 PAIN OF RIGHT LOWER LEG: ICD-10-CM

## 2023-11-21 DIAGNOSIS — Z47.89 AFTERCARE FOLLOWING SURGERY OF THE MUSCULOSKELETAL SYSTEM: ICD-10-CM

## 2023-11-21 PROCEDURE — 97110 THERAPEUTIC EXERCISES: CPT | Mod: GP | Performed by: PHYSICAL THERAPIST

## 2023-11-21 PROCEDURE — 97530 THERAPEUTIC ACTIVITIES: CPT | Mod: GP | Performed by: PHYSICAL THERAPIST

## 2023-11-21 NOTE — TELEPHONE ENCOUNTER
Surgical time has changed    Pt aware via phone call and [facility] sent    SURGERY 12/20/2023 10:40a  ARRIVAL TIME 8:40a  GATORADE BY 6:40a    Kaylen Huggins  Surgery Scheduler

## 2023-12-04 NOTE — TELEPHONE ENCOUNTER
Action December 4, 2023 10:55 AM MT   Action Taken Called [facility] and left a voicemail for imaging to be pushed.        DIAGNOSIS: Right Femur Fracture   APPOINTMENT DATE: 12/07/2023   NOTES STATUS DETAILS   OFFICE NOTE from referring provider Internal 10/02/2023- Balwinder Nunn MD - [facility]   OFFICE NOTE from other specialist Internal  Care Everywhere  Media Tab [facility]    10/30/2023 - Jamal De Los Santos MD - [facility]    09/15/2023 - Armen Kingsley MD- [facility]   DISCHARGE SUMMARY from hospital Internal 12/10/2022 to 12/17/2022 - [facility]    OPERATIVE REPORT Internal 12/11/2022- ORIF RT Femur   MEDICATION LIST Internal    EMG Care Everywhere [facility]: 12/13/2021   IMPLANT RECORD/STICKER Internal    LABS     DEXA PACS [facility]:  07/19/2021 - Hips/Spine     MRI PACS Internal    [facility]:   03/09/2023 - RT Knee    [facility]:  04/13/2022, 12/13/2021- Bilateral lower Extremity     CT SCAN PACS Internal    [facility]:  08/24/2021 - PET Skull to Thigh     XRAYS (IMAGES & REPORTS) PACS Internal    [facility]:  04/18/2023, 03/07/2023 - RT Femur

## 2023-12-06 ENCOUNTER — OFFICE VISIT (OUTPATIENT)
Dept: FAMILY MEDICINE | Facility: CLINIC | Age: 53
End: 2023-12-06
Payer: COMMERCIAL

## 2023-12-06 VITALS
WEIGHT: 130 LBS | OXYGEN SATURATION: 100 % | HEIGHT: 63 IN | RESPIRATION RATE: 20 BRPM | BODY MASS INDEX: 23.04 KG/M2 | DIASTOLIC BLOOD PRESSURE: 76 MMHG | SYSTOLIC BLOOD PRESSURE: 116 MMHG | TEMPERATURE: 97.9 F | HEART RATE: 86 BPM

## 2023-12-06 DIAGNOSIS — N39.3 STRESS INCONTINENCE OF URINE: ICD-10-CM

## 2023-12-06 DIAGNOSIS — Z23 NEED FOR PROPHYLACTIC VACCINATION AND INOCULATION AGAINST INFLUENZA: ICD-10-CM

## 2023-12-06 DIAGNOSIS — Z48.02 VISIT FOR SUTURE REMOVAL: Primary | ICD-10-CM

## 2023-12-06 DIAGNOSIS — S72.91XA CLOSED FRACTURE OF RIGHT FEMUR, UNSPECIFIED FRACTURE MORPHOLOGY, UNSPECIFIED PORTION OF FEMUR, INITIAL ENCOUNTER (H): Primary | ICD-10-CM

## 2023-12-06 LAB
ALBUMIN UR-MCNC: ABNORMAL MG/DL
APPEARANCE UR: CLEAR
BACTERIA #/AREA URNS HPF: ABNORMAL /HPF
BILIRUB UR QL STRIP: ABNORMAL
COLOR UR AUTO: YELLOW
GLUCOSE UR STRIP-MCNC: NEGATIVE MG/DL
HGB UR QL STRIP: NEGATIVE
KETONES UR STRIP-MCNC: 15 MG/DL
LEUKOCYTE ESTERASE UR QL STRIP: NEGATIVE
MUCOUS THREADS #/AREA URNS LPF: PRESENT /LPF
NITRATE UR QL: NEGATIVE
PH UR STRIP: 6 [PH] (ref 5–7)
RBC #/AREA URNS AUTO: ABNORMAL /HPF
SP GR UR STRIP: >=1.03 (ref 1–1.03)
UROBILINOGEN UR STRIP-ACNC: 0.2 E.U./DL
WBC #/AREA URNS AUTO: ABNORMAL /HPF

## 2023-12-06 PROCEDURE — 91320 SARSCV2 VAC 30MCG TRS-SUC IM: CPT | Performed by: PHYSICIAN ASSISTANT

## 2023-12-06 PROCEDURE — 81001 URINALYSIS AUTO W/SCOPE: CPT | Performed by: PHYSICIAN ASSISTANT

## 2023-12-06 PROCEDURE — 90686 IIV4 VACC NO PRSV 0.5 ML IM: CPT | Performed by: PHYSICIAN ASSISTANT

## 2023-12-06 PROCEDURE — 99213 OFFICE O/P EST LOW 20 MIN: CPT | Mod: 25 | Performed by: PHYSICIAN ASSISTANT

## 2023-12-06 PROCEDURE — 90480 ADMN SARSCOV2 VAC 1/ONLY CMP: CPT | Performed by: PHYSICIAN ASSISTANT

## 2023-12-06 PROCEDURE — 90471 IMMUNIZATION ADMIN: CPT | Performed by: PHYSICIAN ASSISTANT

## 2023-12-06 RX ORDER — VITAMIN B COMPLEX
25 TABLET ORAL
COMMUNITY

## 2023-12-06 ASSESSMENT — PAIN SCALES - GENERAL: PAINLEVEL: NO PAIN (0)

## 2023-12-06 NOTE — RESULT ENCOUNTER NOTE
Hi Ventura,     Your urine was negative for signs of infection.     Please let us know if you have any questions or concerns.    Regards,  Marlena Driscoll PA-C

## 2023-12-06 NOTE — PROGRESS NOTES
Assessment and Plan:     (Z48.02) Visit for suture removal  (primary encounter diagnosis)  Comment: right knee after hardware exchange at [place], appear not quite ready to come out  Plan: see me in one week    (N39.3) Stress incontinence of urine  Comment: ongoing issue which has worsened recently, suspect should improve after removal of ovarian mass/oophorectomy, sees urogyne in January, has had occasional burning with incontinence, nothing consistent, no fever/chills  Plan: UA with Microscopic reflex to Culture - lab         collect        Follow-up as planned    (Z23) Need for prophylactic vaccination and inoculation against influenza  Comment: due for flu and covid booster  Plan: above given today       Marlena Driscoll PA-C      Catrina Whitehead is a 53 year old, presenting for the following health issues:  Hospital F/U      History of Present Illness       Reason for visit:  To have my stitches removed from my right knee, post-surgery. I also have a recurring sore under my nose and am unable to use my CPAP machine.    She eats 2-3 servings of fruits and vegetables daily.She consumes 0 sweetened beverage(s) daily.She exercises with enough effort to increase her heart rate 9 or less minutes per day.  She exercises with enough effort to increase her heart rate 3 or less days per week. She is missing 1 dose(s) of medications per week.  She is not taking prescribed medications regularly due to remembering to take.     Ventura is S/p ORIF right femur and knee after falling at [facility] about a year ago  Continued to have pain after ORIF so had hardware exchange 11/22/23 at [place]  She is here today for suture removal, she was told to have sutures out in 2-3 weeks, today is two week post op  She hasn't had any issues with her incision  She has been doing okay post-operatively, she feels it has been a bit of a set back in her recovery from initial injury, she is in PT now  Her pain does continue to improve and she is off  "narcotics     She also notes that she has had issues with stress incontinence and has been having increasing issues getting to the bathroom in time at night  She has had some accidents recently  She does have an enlarged ovary and wonders if could be pressing on her bladder, will have it removed in December  She does get occasional burning when she is trying to avoid having an accident  She sees urogyne in January  Has been off vaginal estrogen for a few weeks    She denies fever/chills, nausea/vomiting, chest pain, sob    Review of Systems   See above      Objective    LMP  (LMP Unknown)   There is no height or weight on file to calculate BMI.    /76 (BP Location: Right arm, Patient Position: Sitting, Cuff Size: Adult Regular)   Pulse 86   Temp 97.9  F (36.6  C) (Temporal)   Resp 20   Ht 1.6 m (5' 3\")   Wt 59 kg (130 lb)   LMP  (LMP Unknown)   SpO2 100%   BMI 23.03 kg/m        Physical Exam      GENERAL: healthy, alert and no distress  RESP: lungs clear to auscultation - no rales, no rhonchi, no wheezes  CV: regular rates and rhythm, normal S1 S2, no S3 or S4 and no murmur, no click or rub   MS: extremities- no gross deformities noted, no edema  SKIN: wound lateral right knee, about 2cm in length, c/d/I, inferior portion of the wound appears to still be healing, no erythema or drainage, sutures intact                 "

## 2023-12-06 NOTE — PATIENT INSTRUCTIONS
Come back in one week for suture removal    Flu shot and covid boosters today    Urine check today

## 2023-12-06 NOTE — PROGRESS NOTES
[facility]  Orthopaedic Surgery, Joint Replacement Consultation  by Jason Tan M.D.    Britt Park MRN# [id number]    YOB: 1970     Requesting physician: No ref. provider found  Vi Metz            Assessment and Plan:   Assessment:  53-year-old female presenting to clinic now about 12 months out from ORIF of a spiral right distal femur fracture.  She demonstrated delayed healing in the months following her surgery, but recent imaging shows progression in her healing of the fracture.  She reports clinical improvement in the pain in her thigh.  She did have a recent removal of her prominent distal screws which she says has improved some of the pain surrounding her knee.      Plan:  We discussed the imaging findings including the progression in healing on the x-ray.  We advised the patient that she can continue with activity and full weightbearing as tolerated.  She does not need any further surgical interventions at this time.  She will continue to work with physical therapy on muscle strengthening and gait.  We did discuss quadriceps strenghtening exercises and pool-based therapy as possible options for regaining her function. We will have her follow-up on an as-needed basis.      Ken Lomeli MD  Adult Reconstruction Fellow     For additional information and frequently asked questions regarding joint replacements, scan the QR code image below on your phone camera or go to:  [email or web address]      Attending MD (Dr. Jason Tan) Attestation :  This patient was seen and evaluated by me including a history, exam, and interpretation of all imaging and/or lab data.  I formulated the treatment plan along with either a physician's assistant (ALFREDA) or training physician (resident/fellow), who also saw the patient. That individual or a scribe has documented the visit in the attached note which I approve.    Jason Tan,  MD [facility] Professor  Oncology and Adult Reconstructive Surgery  Dept Orthopaedic Surgery, [facility]  [phone] office, [phone] pager  [email or web address]            History of Present Illness:   53 year old female presenting to clinic for evaluation and management of her right lower extremity symptoms.  She has a history of a right spiral distal femur fracture that was treated with ORIF at [facility] 1 year ago.  She reports that she was slow to heal the fracture and the were considering doing an exchange nailing procedure to treat a nonunion.  She saw Dr. Nunn who noticed some signs of healing on her CT scan and so she continued with nonoperative management.  She does note that within the past month she has had distal thigh pain that has significantly improved.  She did have pain surrounding her knee and prominent palpable screws.  She had those removed 2 weeks ago at [place] and believes that she may have had some improvement in her knee symptoms as a result of this as well.  She denies numbness or tingling in the extremity.  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 She gets around using a wheelchair but is able to bear weight and walk short distances at home.      Current symptoms:  Problem: Right  leg pain  Onset and duration: 12/10/2022  Awakens from sleep due to sx's:  No  Precipitating Injury:  Yes slipped and fell on water at the [facility]  Other joints or sites painful:  Yes, left shoulder      Background history:  DX:  Mitochondrial myopathy  Hypertension  Closed fracture of right femur 12/20/2022    TREATMENTS:  8/6/2021, Left triceps muscle biopsy (JENNIFER Rondon) [place]  12/11/2022, Open reduction internal fixation of right femur fracture (LUCY Larson), [facility]  11/22/2023, Hardware removal right femur, (AGNES De Los Santos), [place]                 Physical Exam:     EXAMINATION pertinent findings:   PSYCH: Pleasant, healthy-appearing, alert, oriented x3,  cooperative. Normal mood and affect.  VITAL SIGNS: not currently breastfeeding..  Reviewed nursing intake notes.   There is no height or weight on file to calculate BMI.  RESP: non labored breathing   ABD: benign, soft, non-tender, no acute peritoneal findings  SKIN: grossly normal   LYMPHATIC: grossly normal, no adenopathy, no extremity edema  NEURO: grossly normal , no motor deficits  VASCULAR: satisfactory perfusion of all extremities   MUSCULOSKELETAL:   Presents in a wheelchair.  She is able to bear weight and ambulate with an unsteady gait.  She is mildly tender to palpation about the knee where she had her recent surgery.  She is less tender to palpation over the thigh.  Her incision sites are healing well with sutures from her recent surgery present.  No signs of infection.  She has knee range of motion from 5 to 100 degrees.  She is neurovascular intact distally.                  Data:   All laboratory data reviewed  All imaging studies reviewed by me     XR femur 12/7/23:  My interpretation: Patient has an intramedullary ronda from previous surgery for ORIF of femur fracture. The femur is well aligned on imaging. There is formation of new bone/healing appreciated on today's imaging, compared with prior XR imaging in 4/2023. There is interval removal of several of his distal screws with replacement of one.             DATA for DOCUMENTATION:         Past Medical History:     Patient Active Problem List   Diagnosis    Myopathy, mitochondrial    Overactive bladder    Dry eyes    Encounter for monitoring of systemic steroid therapy    Need for vaccination    Impetigo    Secondary hypertension    On prednisone therapy    Vitamin B 12 deficiency    Need for pneumocystis prophylaxis    Anemia, unspecified type    Visit for screening mammogram    Seasonal allergic rhinitis, unspecified trigger    Acute respiratory failure with hypoxia (H)    Other myositis, unspecified site    Facial paresthesia    Weight gain     Vaginal dryness    Annual physical exam    Methemoglobinemia    Cyst of right ovary    Hepatic steatosis    Closed fracture of right femur, unspecified fracture morphology, unspecified portion of femur, initial encounter (H)    Enlarged lymph nodes    Atopic dermatitis of scalp    Pain of right lower leg    Closed disp fracture of condyle of right femur with routine healing    Pulmonary nodules    Acute pain of right knee    Aftercare following surgery of the musculoskeletal system     Past Medical History:   Diagnosis Date    Secondary hypertension 2021       Also see scanned health assessment forms.       Past Surgical History:     Past Surgical History:   Procedure Laterality Date    ABDOMEN SURGERY  2007        BIOPSY  2019 & 2021    Right bicep, result abnormal results, & left tricep biopsy    COLONOSCOPY  21    COLONOSCOPY N/A 2022    Procedure: COLONOSCOPY, FLEXIBLE, WITH LESION REMOVAL USING SNARE;  Surgeon: Dorie Santos MD;  Location:  GI    GYN SURGERY  07     for twins    OPEN REDUCTION INTERNAL FIXATION RODDING INTRAMEDULLARY FEMUR Right 2022    Procedure: Open reduction internal fixation of right femur fracture;  Surgeon: Al Larson MD;  Location:  OR            Social History:     Social History     Socioeconomic History    Marital status:      Spouse name: Not on file    Number of children: Not on file    Years of education: Not on file    Highest education level: Not on file   Occupational History    Not on file   Tobacco Use    Smoking status: Never    Smokeless tobacco: Never   Vaping Use    Vaping Use: Never used   Substance and Sexual Activity    Alcohol use: Not Currently    Drug use: Never    Sexual activity: Yes     Partners: Male     Birth control/protection: Male Surgical   Other Topics Concern    Parent/sibling w/ CABG, MI or angioplasty before 65F 55M? No   Social History Narrative    ** Merged History  Encounter **          Social Determinants of Health     Financial Resource Strain: Low Risk  (11/30/2023)    Financial Resource Strain     Within the past 12 months, have you or your family members you live with been unable to get utilities (heat, electricity) when it was really needed?: No   Food Insecurity: Low Risk  (11/30/2023)    Food Insecurity     Within the past 12 months, did you worry that your food would run out before you got money to buy more?: No     Within the past 12 months, did the food you bought just not last and you didn t have money to get more?: No   Transportation Needs: Low Risk  (11/30/2023)    Transportation Needs     Within the past 12 months, has lack of transportation kept you from medical appointments, getting your medicines, non-medical meetings or appointments, work, or from getting things that you need?: No   Physical Activity: Inactive (1/5/2023)    Exercise Vital Sign     Days of Exercise per Week: 0 days     Minutes of Exercise per Session: 0 min   Stress: No Stress Concern Present (1/5/2023)    [employer] [place] [employer] - Occupational Stress Questionnaire     Feeling of Stress : Only a little   Social Connections: Unknown (1/5/2023)    Social Connection and Isolation Panel [NHANES]     Frequency of Communication with Friends and Family: Twice a week     Frequency of Social Gatherings with Friends and Family: Once a week     Attends Cheondoism Services: Patient refused     Active Member of Clubs or Organizations: Yes     Attends Club or Organization Meetings: Not on file     Marital Status:    Interpersonal Safety: Low Risk  (12/6/2023)    Interpersonal Safety     Do you feel physically and emotionally safe where you currently live?: Yes     Within the past 12 months, have you been hit, slapped, kicked or otherwise physically hurt by someone?: No     Within the past 12 months, have you been humiliated or emotionally abused in other ways by your partner or  ex-partner?: No   Housing Stability: Low Risk  (11/30/2023)    Housing Stability     Do you have housing? : Yes     Are you worried about losing your housing?: No            Family History:       Family History   Adopted: Yes   Problem Relation Age of Onset    Unknown/Adopted Other             Medications:     Current Outpatient Medications   Medication Sig    acetaminophen (TYLENOL) 325 MG tablet Take 2 tablets (650 mg) by mouth every 6 hours as needed for other (For optimal non-opioid multimodal pain management to improve pain control.)    atorvastatin (LIPITOR) 20 MG tablet Take 1 tablet by mouth daily at 2 pm    calcium carbonate (TUMS) 500 MG chewable tablet Take 1 tablet (500 mg) by mouth 2 times daily as needed for heartburn    estradiol (ESTRACE) 0.1 MG/GM vaginal cream PLACE 2 GRAMS VAGINALLY 2 TIMES A WEEK (Patient not taking: Reported on 5/4/2023)    folic acid (FOLVITE) 1 MG tablet Take 1 tablet (1 mg) by mouth daily    losartan (COZAAR) 25 MG tablet TAKE 1 TABLET(25 MG) BY MOUTH DAILY    mometasone (NASONEX) 50 MCG/ACT nasal spray Spray 2 sprays into both nostrils daily (Patient not taking: Reported on 12/6/2023)    mupirocin (BACTROBAN) 2 % external ointment Apply topically 3 times daily (Patient not taking: Reported on 12/6/2023)    pantoprazole (PROTONIX) 40 MG EC tablet TAKE 1 TABLET BY MOUTH DAILY 30 MINUTES BEFORE BREAKFAST    valACYclovir (VALTREX) 1000 mg tablet Take 2,000 mg by mouth as needed    Vitamin D3 (CHOLECALCIFEROL) 25 mcg (1000 units) tablet Take 25 mcg by mouth     No current facility-administered medications for this visit.              Review of Systems:   A comprehensive 10 point review of systems (constitutional, ENT, cardiac, peripheral vascular, lymphatic, respiratory, GI, , Musculoskeletal, skin, Neurological) was performed and found to be negative except as described in this note.       HOOS Hip Dysfunction & Osteoarthritis Outcome Questionnaire         No data to display                        [unfilled]    ELIZABETH Knee Survey Assessment         No data to display                       Promis 10 Assessment        11/30/2023     6:46 PM   PROMIS 10   In general, would you say your health is: Poor   In general, would you say your quality of life is: Poor   In general, how would you rate your physical health? Poor   In general, how would you rate your mental health, including your mood and your ability to think? Fair   In general, how would you rate your satisfaction with your social activities and relationships? Fair   In general, please rate how well you carry out your usual social activities and roles Poor   To what extent are you able to carry out your everyday physical activities such as walking, climbing stairs, carrying groceries, or moving a chair? Not at all   In the past 7 days, how often have you been bothered by emotional problems such as feeling anxious, depressed, or irritable? Sometimes   In the past 7 days, how would you rate your fatigue on average? Moderate   In the past 7 days, how would you rate your pain on average, where 0 means no pain, and 10 means worst imaginable pain? 4   In general, would you say your health is: 1   In general, would you say your quality of life is: 1   In general, how would you rate your physical health? 1   In general, how would you rate your mental health, including your mood and your ability to think? 2   In general, how would you rate your satisfaction with your social activities and relationships? 2   In general, please rate how well you carry out your usual social activities and roles. (This includes activities at home, at work and in your community, and responsibilities as a parent, child, spouse, employee, friend, etc.) 1   To what extent are you able to carry out your everyday physical activities such as walking, climbing stairs, carrying groceries, or moving a chair? 1   In the past 7 days, how often have you been bothered by  emotional problems such as feeling anxious, depressed, or irritable? 3   In the past 7 days, how would you rate your fatigue on average? 3   In the past 7 days, how would you rate your pain on average, where 0 means no pain, and 10 means worst imaginable pain? 4   Global Mental Health Score 8   Global Physical Health Score 8   PROMIS TOTAL - SUBSCORES 16              Or[facility] [place] Knee Questionnaire         No data to display                         See intake form completed by patient

## 2023-12-07 ENCOUNTER — ANCILLARY PROCEDURE (OUTPATIENT)
Dept: GENERAL RADIOLOGY | Facility: CLINIC | Age: 53
End: 2023-12-07
Attending: ORTHOPAEDIC SURGERY
Payer: COMMERCIAL

## 2023-12-07 ENCOUNTER — OFFICE VISIT (OUTPATIENT)
Dept: ORTHOPEDICS | Facility: CLINIC | Age: 53
End: 2023-12-07
Payer: COMMERCIAL

## 2023-12-07 ENCOUNTER — PRE VISIT (OUTPATIENT)
Dept: ORTHOPEDICS | Facility: CLINIC | Age: 53
End: 2023-12-07

## 2023-12-07 VITALS — WEIGHT: 130.07 LBS | BODY MASS INDEX: 23.05 KG/M2 | HEIGHT: 63 IN

## 2023-12-07 DIAGNOSIS — S72.91XA CLOSED FRACTURE OF RIGHT FEMUR, UNSPECIFIED FRACTURE MORPHOLOGY, UNSPECIFIED PORTION OF FEMUR, INITIAL ENCOUNTER (H): ICD-10-CM

## 2023-12-07 DIAGNOSIS — S72.91XA CLOSED FRACTURE OF RIGHT FEMUR, UNSPECIFIED FRACTURE MORPHOLOGY, UNSPECIFIED PORTION OF FEMUR, INITIAL ENCOUNTER (H): Primary | ICD-10-CM

## 2023-12-07 PROCEDURE — 73552 X-RAY EXAM OF FEMUR 2/>: CPT | Mod: RT | Performed by: RADIOLOGY

## 2023-12-07 PROCEDURE — 99213 OFFICE O/P EST LOW 20 MIN: CPT | Mod: GC | Performed by: ORTHOPAEDIC SURGERY

## 2023-12-07 NOTE — LETTER
12/7/2023         RE: Britt Park  [address] [address]        Dear Colleague,    Thank you for referring your patient, Britt Park, to the [facility] [place]. Please see a copy of my visit note below.        [facility]  Orthopaedic Surgery, Joint Replacement Consultation  by Jason Tan M.D.    Britt Park MRN# [id number]    YOB: 1970     Requesting physician: No ref. provider found  Vi Metz            Assessment and Plan:   Assessment:  53-year-old female presenting to clinic now about 12 months out from ORIF of a spiral right distal femur fracture.  She demonstrated delayed healing in the months following her surgery, but recent imaging shows progression in her healing of the fracture.  She reports clinical improvement in the pain in her thigh.  She did have a recent removal of her prominent distal screws which she says has improved some of the pain surrounding her knee.      Plan:  We discussed the imaging findings including the progression in healing on the x-ray.  We advised the patient that she can continue with activity and full weightbearing as tolerated.  She does not need any further surgical interventions at this time.  She will continue to work with physical therapy on muscle strengthening and gait.  We did discuss quadriceps strenghtening exercises and pool-based therapy as possible options for regaining her function. We will have her follow-up on an as-needed basis.      Ken Lomeli MD  Adult Reconstruction Fellow     For additional information and frequently asked questions regarding joint replacements, scan the QR code image below on your phone camera or go to:  [email or web address]      Attending MD (Dr. Jason Tan) Attestation :  This patient was seen and evaluated by me including a history, exam, and interpretation of all imaging and/or lab  data.  I formulated the treatment plan along with either a physician's assistant (ALFREDA) or training physician (resident/fellow), who also saw the patient. That individual or a scribe has documented the visit in the attached note which I approve.    MD Pao Prater Family Professor  Oncology and Adult Reconstructive Surgery  Dept Orthopaedic Surgery, [facility]  [phone] office, [phone] pager  [email or web address]            History of Present Illness:   53 year old female presenting to clinic for evaluation and management of her right lower extremity symptoms.  She has a history of a right spiral distal femur fracture that was treated with ORIF at [facility] orth[facility]ics 1 year ago.  She reports that she was slow to heal the fracture and the were considering doing an exchange nailing procedure to treat a nonunion.  She saw Dr. Nunn who noticed some signs of healing on her CT scan and so she continued with nonoperative management.  She does note that within the past month she has had distal thigh pain that has significantly improved.  She did have pain surrounding her knee and prominent palpable screws.  She had those removed 2 weeks ago at [facility] and believes that she may have had some improvement in her knee symptoms as a result of this as well.  She denies numbness or tingling in the extremity.  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 She gets around using a wheelchair but is able to bear weight and walk short distances at home.      Current symptoms:  Problem: Right  leg pain  Onset and duration: 12/10/2022  Awakens from sleep due to sx's:  No  Precipitating Injury:  Yes slipped and fell on water at the [facility]  Other joints or sites painful:  Yes, left shoulder      Background history:  DX:  Mitochondrial myopathy  Hypertension  Closed fracture of right femur 12/20/2022    TREATMENTS:  8/6/2021, Left triceps muscle biopsy (JENNIFER Rondon) [facility]  12/11/2022, Open  reduction internal fixation of right femur fracture (LUCY Larson), [facility]  11/22/2023, Hardware removal right femur, (AGNES De Los Santos), [facility]                 Physical Exam:     EXAMINATION pertinent findings:   PSYCH: Pleasant, healthy-appearing, alert, oriented x3, cooperative. Normal mood and affect.  VITAL SIGNS: not currently breastfeeding..  Reviewed nursing intake notes.   There is no height or weight on file to calculate BMI.  RESP: non labored breathing   ABD: benign, soft, non-tender, no acute peritoneal findings  SKIN: grossly normal   LYMPHATIC: grossly normal, no adenopathy, no extremity edema  NEURO: grossly normal , no motor deficits  VASCULAR: satisfactory perfusion of all extremities   MUSCULOSKELETAL:   Presents in a wheelchair.  She is able to bear weight and ambulate with an unsteady gait.  She is mildly tender to palpation about the knee where she had her recent surgery.  She is less tender to palpation over the thigh.  Her incision sites are healing well with sutures from her recent surgery present.  No signs of infection.  She has knee range of motion from 5 to 100 degrees.  She is neurovascular intact distally.                  Data:   All laboratory data reviewed  All imaging studies reviewed by me     XR femur 12/7/23:  My interpretation: Patient has an intramedullary ronda from previous surgery for ORIF of femur fracture. The femur is well aligned on imaging. There is formation of new bone/healing appreciated on today's imaging, compared with prior XR imaging in 4/2023. There is interval removal of several of his distal screws with replacement of one.             DATA for DOCUMENTATION:         Past Medical History:     Patient Active Problem List   Diagnosis    Myopathy, mitochondrial    Overactive bladder    Dry eyes    Encounter for monitoring of systemic steroid therapy    Need for vaccination    Impetigo    Secondary hypertension    On prednisone therapy    Vitamin B 12 deficiency    Need  for pneumocystis prophylaxis    Anemia, unspecified type    Visit for screening mammogram    Seasonal allergic rhinitis, unspecified trigger    Acute respiratory failure with hypoxia (H)    Other myositis, unspecified site    Facial paresthesia    Weight gain    Vaginal dryness    Annual physical exam    Methemoglobinemia    Cyst of right ovary    Hepatic steatosis    Closed fracture of right femur, unspecified fracture morphology, unspecified portion of femur, initial encounter (H)    Enlarged lymph nodes    Atopic dermatitis of scalp    Pain of right lower leg    Closed disp fracture of condyle of right femur with routine healing    Pulmonary nodules    Acute pain of right knee    Aftercare following surgery of the musculoskeletal system     Past Medical History:   Diagnosis Date    Secondary hypertension 2021       Also see scanned health assessment forms.       Past Surgical History:     Past Surgical History:   Procedure Laterality Date    ABDOMEN SURGERY  2007        BIOPSY  2019 & 2021    Right bicep, result abnormal results, & left tricep biopsy    COLONOSCOPY  21    COLONOSCOPY N/A 2022    Procedure: COLONOSCOPY, FLEXIBLE, WITH LESION REMOVAL USING SNARE;  Surgeon: Dorie Santos MD;  Location:  GI    GYN SURGERY  07     for twins    OPEN REDUCTION INTERNAL FIXATION RODDING INTRAMEDULLARY FEMUR Right 2022    Procedure: Open reduction internal fixation of right femur fracture;  Surgeon: Al Larson MD;  Location:  OR            Social History:     Social History     Socioeconomic History    Marital status:      Spouse name: Not on file    Number of children: Not on file    Years of education: Not on file    Highest education level: Not on file   Occupational History    Not on file   Tobacco Use    Smoking status: Never    Smokeless tobacco: Never   Vaping Use    Vaping Use: Never used   Substance and Sexual Activity     Alcohol use: Not Currently    Drug use: Never    Sexual activity: Yes     Partners: Male     Birth control/protection: Male Surgical   Other Topics Concern    Parent/sibling w/ CABG, MI or angioplasty before 65F 55M? No   Social History Narrative    ** Merged History Encounter **          Social Determinants of Health     Financial Resource Strain: Low Risk  (11/30/2023)    Financial Resource Strain     Within the past 12 months, have you or your family members you live with been unable to get utilities (heat, electricity) when it was really needed?: No   Food Insecurity: Low Risk  (11/30/2023)    Food Insecurity     Within the past 12 months, did you worry that your food would run out before you got money to buy more?: No     Within the past 12 months, did the food you bought just not last and you didn t have money to get more?: No   Transportation Needs: Low Risk  (11/30/2023)    Transportation Needs     Within the past 12 months, has lack of transportation kept you from medical appointments, getting your medicines, non-medical meetings or appointments, work, or from getting things that you need?: No   Physical Activity: Inactive (1/5/2023)    Exercise Vital Sign     Days of Exercise per Week: 0 days     Minutes of Exercise per Session: 0 min   Stress: No Stress Concern Present (1/5/2023)    [employer] [employer] - Occupational Stress Questionnaire     Feeling of Stress : Only a little   Social Connections: Unknown (1/5/2023)    Social Connection and Isolation Panel [NHANES]     Frequency of Communication with Friends and Family: Twice a week     Frequency of Social Gatherings with Friends and Family: Once a week     Attends [employer] Services: Patient refused     Active Member of Clubs or Organizations: Yes     Attends Club or Organization Meetings: Not on file     Marital Status:    Interpersonal Safety: Low Risk  (12/6/2023)    Interpersonal Safety     Do you feel physically and  emotionally safe where you currently live?: Yes     Within the past 12 months, have you been hit, slapped, kicked or otherwise physically hurt by someone?: No     Within the past 12 months, have you been humiliated or emotionally abused in other ways by your partner or ex-partner?: No   Housing Stability: Low Risk  (11/30/2023)    Housing Stability     Do you have housing? : Yes     Are you worried about losing your housing?: No            Family History:       Family History   Adopted: Yes   Problem Relation Age of Onset    Unknown/Adopted Other             Medications:     Current Outpatient Medications   Medication Sig    acetaminophen (TYLENOL) 325 MG tablet Take 2 tablets (650 mg) by mouth every 6 hours as needed for other (For optimal non-opioid multimodal pain management to improve pain control.)    atorvastatin (LIPITOR) 20 MG tablet Take 1 tablet by mouth daily at 2 pm    calcium carbonate (TUMS) 500 MG chewable tablet Take 1 tablet (500 mg) by mouth 2 times daily as needed for heartburn    estradiol (ESTRACE) 0.1 MG/GM vaginal cream PLACE 2 GRAMS VAGINALLY 2 TIMES A WEEK (Patient not taking: Reported on 5/4/2023)    folic acid (FOLVITE) 1 MG tablet Take 1 tablet (1 mg) by mouth daily    losartan (COZAAR) 25 MG tablet TAKE 1 TABLET(25 MG) BY MOUTH DAILY    mometasone (NASONEX) 50 MCG/ACT nasal spray Spray 2 sprays into both nostrils daily (Patient not taking: Reported on 12/6/2023)    mupirocin (BACTROBAN) 2 % external ointment Apply topically 3 times daily (Patient not taking: Reported on 12/6/2023)    pantoprazole (PROTONIX) 40 MG EC tablet TAKE 1 TABLET BY MOUTH DAILY 30 MINUTES BEFORE BREAKFAST    valACYclovir (VALTREX) 1000 mg tablet Take 2,000 mg by mouth as needed    Vitamin D3 (CHOLECALCIFEROL) 25 mcg (1000 units) tablet Take 25 mcg by mouth     No current facility-administered medications for this visit.              Review of Systems:   A comprehensive 10 point review of systems (constitutional,  ENT, cardiac, peripheral vascular, lymphatic, respiratory, GI, , Musculoskeletal, skin, Neurological) was performed and found to be negative except as described in this note.       HOOS Hip Dysfunction & Osteoarthritis Outcome Questionnaire         No data to display                       [unfilled]    KOOS Knee Survey Assessment         No data to display                       Promis 10 Assessment        11/30/2023     6:46 PM   PROMIS 10   In general, would you say your health is: Poor   In general, would you say your quality of life is: Poor   In general, how would you rate your physical health? Poor   In general, how would you rate your mental health, including your mood and your ability to think? Fair   In general, how would you rate your satisfaction with your social activities and relationships? Fair   In general, please rate how well you carry out your usual social activities and roles Poor   To what extent are you able to carry out your everyday physical activities such as walking, climbing stairs, carrying groceries, or moving a chair? Not at all   In the past 7 days, how often have you been bothered by emotional problems such as feeling anxious, depressed, or irritable? Sometimes   In the past 7 days, how would you rate your fatigue on average? Moderate   In the past 7 days, how would you rate your pain on average, where 0 means no pain, and 10 means worst imaginable pain? 4   In general, would you say your health is: 1   In general, would you say your quality of life is: 1   In general, how would you rate your physical health? 1   In general, how would you rate your mental health, including your mood and your ability to think? 2   In general, how would you rate your satisfaction with your social activities and relationships? 2   In general, please rate how well you carry out your usual social activities and roles. (This includes activities at home, at work and in your community, and responsibilities  as a parent, child, spouse, employee, friend, etc.) 1   To what extent are you able to carry out your everyday physical activities such as walking, climbing stairs, carrying groceries, or moving a chair? 1   In the past 7 days, how often have you been bothered by emotional problems such as feeling anxious, depressed, or irritable? 3   In the past 7 days, how would you rate your fatigue on average? 3   In the past 7 days, how would you rate your pain on average, where 0 means no pain, and 10 means worst imaginable pain? 4   Global Mental Health Score 8   Global Physical Health Score 8   PROMIS TOTAL - SUBSCORES 16        Ortho [place] Knee Questionnaire         No data to display                       See intake form completed by patient

## 2023-12-08 ENCOUNTER — THERAPY VISIT (OUTPATIENT)
Dept: PHYSICAL THERAPY | Facility: CLINIC | Age: 53
End: 2023-12-08
Payer: COMMERCIAL

## 2023-12-08 DIAGNOSIS — M79.661 PAIN OF RIGHT LOWER LEG: ICD-10-CM

## 2023-12-08 DIAGNOSIS — Z47.89 AFTERCARE FOLLOWING SURGERY OF THE MUSCULOSKELETAL SYSTEM: ICD-10-CM

## 2023-12-08 DIAGNOSIS — S72.91XA CLOSED FRACTURE OF RIGHT FEMUR, UNSPECIFIED FRACTURE MORPHOLOGY, UNSPECIFIED PORTION OF FEMUR, INITIAL ENCOUNTER (H): Primary | ICD-10-CM

## 2023-12-08 PROCEDURE — 97530 THERAPEUTIC ACTIVITIES: CPT | Mod: GP | Performed by: PHYSICAL THERAPIST

## 2023-12-08 PROCEDURE — 97110 THERAPEUTIC EXERCISES: CPT | Mod: 59 | Performed by: PHYSICAL THERAPIST

## 2023-12-08 NOTE — PROGRESS NOTES
12/08/23 0500   Appointment Info   Signing clinician's name / credentials Anum Bullard PT   Total/Authorized Visits 24 per therapist   Visits Used 36   Medical Diagnosis R femur fx   PT Tx Diagnosis s/p R femur ORIF   Precautions/Limitations WBAT   Progress Note/Certification   Onset of illness/injury or Date of Surgery 12/11/22   PT Goal 1   Goal Identifier Ambulation   Goal Description Minutes patient will be able to walk without AD - 10   Rationale to maximize safety and independence within the community;to maximize safety and independence with performance of ADLs and functional tasks   Goal Progress Patient is able to walk 2 minutes without AD   Target Date 01/05/24   Subjective Report   Subjective Report Pt. returns to PT today for first time since surgery on 11/22 to remove the hardware from her knee. Pt. reports mod pain since surgery and has had a hard time doing much exercise or activity since. Pt. had an peisode at home yesterday where she could not get up off the raised toilet that she always previously has been able to do. Pt. feels she is getting weaker.   Objective Measures   Objective Measures Objective Measure 1;Objective Measure 3;Objective Measure 2   Objective Measure 1   Objective Measure Amb   Details pt. can walk 1-2 minutes w/out AD with marked sway back due to core weakness   Objective Measure 2   Objective Measure ROM   Details AROM R knee 0-0-95; PROM R 0-0-100   Objective Measure 3   Objective Measure strength   Details patient unable to raise arms greater than 30 deg of  shoulder flexion actively. Biceps 5/5 bilat, triceps 3/5 bilat. Hip flexion 2+/5 bilat.  Knee ext  4+/5 bilat with MMT sitting, ankle DF  5/5. Difficulty getting self out of a chair today even from a chair with arms.   Treatment Interventions (PT)   Interventions Therapeutic Activity;Therapeutic Procedure/Exercise;Manual Therapy   Therapeutic Procedure/Exercise   Therapeutic Procedures: strength, endurance, ROM,  flexibillity minutes (28681) 25   Therapeutic Procedures Ther Proc 2;Ther Proc 3;Ther Proc 4;Ther Proc 5   Ther Proc 1 NuStep   Ther Proc 1 - Details 10 minutes Level 3   Ther Proc 2 - Details sit to stand requires min/mod assist today, limited to 5 x   Ther Proc 3 knee bends   Ther Proc 3 - Details 10 reps at bar   Ther Proc 4 roll outs   Ther Proc 4 - Details Gr TB x 10   Ther Proc 5 seated back ext   Ther Proc 5 - Details bend over toward floor then straighten back up x 10 - difficult   PTRx Ther Proc 1 Sit to Stand- not today   PTRx Ther Proc 1 - Details No Notes   PTRx Ther Proc 2 Short Arc Knee Extension (Long Sitting)   PTRx Ther Proc 2 - Details x15 with fatigue - 2 lbs right,4 lbs left   PTRx Ther Proc 3 Active Assisted Knee Flexion   PTRx Ther Proc 3 - Details No Notes   PTRx Ther Proc 4 Isometric Quad   PTRx Ther Proc 4 - Details x5 with glute activation mod verbal and tactile cues for quad > glute max education and trial of REYNALDO LE quad set with improved contraction x10   PTRx Ther Proc 5 Toe Raises   PTRx Ther Proc 5 - Details single toe raises 10x each leg   PTRx Ther Proc 6 Hip Flexion Straight Leg Raise   PTRx Ther Proc 6 - Details can only do 1/3rd excursion   PTRx Ther Proc 7 Standing Hip Flexion Straight Leg Raise   PTRx Ther Proc 7 - Details 2x10 with cues on quad set   PTRx Ther Proc 8 Supine Heel Slides   PTRx Ther Proc 8 - Details 5-10x 3-4x/day as tolerated   PTRx Ther Proc 9 Seated Heel Slide with Foot on the Floor   PTRx Ther Proc 9 - Details 5-10x 3-4x/day   PTRx Ther Proc 10 Standing Weight Shift   PTRx Ther Proc 10 - Details No Notes   PTRx Ther Proc 11 Hip AROM Standing Abduction   PTRx Ther Proc 11 - Details 10x each leg   PTRx Ther Proc 12 Hip Flexion With Tubing   PTRx Ther Proc 12 - Details not today   Skilled Intervention Cues for form, activity modification and pain relief exercises   Patient Response/Progress deferred sit to stand ex today due to right knee pain   Therapeutic  Activity   Therapeutic Activities: dynamic activities to improve functional performance minutes (41635) 15   Therapeutic Activities Ther Act 2;Ther Act 3;Ther Act 4;Ther Act 5;Ther Act 6;Ther Act 7   Ther Act 1 Cont education in activity modification to reduce stress on injury. Instructed in parameters of frequency and duration of activities to progress activity safely. Answered mult questions regarding diana's nad expectations/timeframes of her recovery.   Ther Act 3 stairs   Ther Act 3 - Details not today   Ther Act 4 standing hip abd   Ther Act 4 - Details 10 reps B   Ther Act 5 marching   Ther Act 5 - Details 5 reps B   Ther Act 6 discussed ex in pool to work on glut med, max and quads   Ther Act 7 leg press   Ther Act 7 - Details seat 2 20# x 20 DL;   PTRx Ther Act 1 Education Sheet General   PTRx Ther Act 1 - Details No Notes   Skilled Intervention education/cueing for proper form/technique with exercise and also for proper gait and stair mechanics   Manual Therapy   Manual Therapy 1 MFR  (not today)   Manual Therapy 1 - Details not today   Education   Learner/Method Patient;Listening;Demonstration   Plan   Plan for next session cont R LE strengthening and ROM work - work on ambulation, progress exercises for strength   Comments   Comments decreasing proximal muscle strength,  requires assist for supine to sit.  Patient encouraged to return to neurologist   Total Session Time   Timed Code Treatment Minutes 40   Total Treatment Time (sum of timed and untimed services) 40       PLAN  Continue therapy per current plan of care.    Beginning/End Dates of Progress Note Reporting Period:    to 12/08/2023    Referring Provider:  Vi Metz

## 2023-12-13 ENCOUNTER — OFFICE VISIT (OUTPATIENT)
Dept: FAMILY MEDICINE | Facility: CLINIC | Age: 53
End: 2023-12-13
Payer: COMMERCIAL

## 2023-12-13 ENCOUNTER — MYC MEDICAL ADVICE (OUTPATIENT)
Dept: OBGYN | Facility: CLINIC | Age: 53
End: 2023-12-13

## 2023-12-13 VITALS
RESPIRATION RATE: 20 BRPM | OXYGEN SATURATION: 100 % | TEMPERATURE: 98.6 F | WEIGHT: 130 LBS | HEIGHT: 63 IN | HEART RATE: 100 BPM | DIASTOLIC BLOOD PRESSURE: 76 MMHG | BODY MASS INDEX: 23.04 KG/M2 | SYSTOLIC BLOOD PRESSURE: 121 MMHG

## 2023-12-13 DIAGNOSIS — S72.411D: Primary | ICD-10-CM

## 2023-12-13 PROCEDURE — 99212 OFFICE O/P EST SF 10 MIN: CPT | Performed by: PHYSICIAN ASSISTANT

## 2023-12-13 RX ORDER — ALENDRONATE SODIUM 70 MG/1
70 TABLET ORAL
COMMUNITY
Start: 2023-12-12

## 2023-12-13 ASSESSMENT — PAIN SCALES - GENERAL: PAINLEVEL: NO PAIN (0)

## 2023-12-13 NOTE — PROGRESS NOTES
"Assessment and Plan:     (P70.098M) Closed disp fracture of condyle of right femur with routine healing  (primary encounter diagnosis)  Comment: sutures removed today, wound healing appropriately  Plan: XR Femur Right 2 Views        Discussed wound cares--keep area clean, no swimming/submerging until fully healed  Follow-up XR ordered per [facility]  Continue PT      Marlena Driscoll PA-C      Catrina Whitehead is a 53 year old, presenting for the following health issues:  Suture Removal      History of Present Illness       Reason for visit:  To have my stitches removed from my right knee, post-surgery. I also have a recurring sore under my nose and am unable to use my CPAP machine.    She eats 2-3 servings of fruits and vegetables daily.She consumes 0 sweetened beverage(s) daily.She exercises with enough effort to increase her heart rate 9 or less minutes per day.  She exercises with enough effort to increase her heart rate 3 or less days per week. She is missing 1 dose(s) of medications per week.  She is not taking prescribed medications regularly due to remembering to take.       [place] is here for suture removal  She had surgery at [facility] for hardware removal from right femur on 11/22/23 with Dr. De Los Santos  She hasn't had any issues with her right knee wound  She needs follow-up femur x-rays per MD at [facility], has order with her per DEBRA Levin  She has been doing PT    Review of Systems   See above      Objective    LMP  (LMP Unknown)   There is no height or weight on file to calculate BMI.    /76 (BP Location: Right arm, Patient Position: Sitting, Cuff Size: Adult Regular)   Pulse 100   Temp 98.6  F (37  C) (Oral)   Resp 20   Ht 1.6 m (5' 2.99\")   Wt 59 kg (130 lb)   LMP  (LMP Unknown)   SpO2 100%   BMI 23.04 kg/m      Physical Exam     GENERAL: healthy, alert and no distress  MS: extremities- no gross deformities noted, no edema  SKIN: no suspicious lesions, no rashes  4 horizontal mattress sutures " lateral right knee were cleansed with alcohol swab x 2 then easily removed, the patient tolerated procedure wll  Steri strips were applied

## 2023-12-14 NOTE — TELEPHONE ENCOUNTER
Pt would like to have left cyst drained in surgery while you are removing right ovary.-- states this was already discussed with you    12/20/23: [facility]--laparoscopic right oophorectomy    Routing pt mychart message to provider to advise.  Oc Mcmillan RN on 12/14/2023 at 8:42 AM

## 2023-12-15 ENCOUNTER — THERAPY VISIT (OUTPATIENT)
Dept: PHYSICAL THERAPY | Facility: CLINIC | Age: 53
End: 2023-12-15
Payer: COMMERCIAL

## 2023-12-15 DIAGNOSIS — S72.91XA CLOSED FRACTURE OF RIGHT FEMUR, UNSPECIFIED FRACTURE MORPHOLOGY, UNSPECIFIED PORTION OF FEMUR, INITIAL ENCOUNTER (H): Primary | ICD-10-CM

## 2023-12-15 DIAGNOSIS — Z47.89 AFTERCARE FOLLOWING SURGERY OF THE MUSCULOSKELETAL SYSTEM: ICD-10-CM

## 2023-12-15 DIAGNOSIS — M79.661 PAIN OF RIGHT LOWER LEG: ICD-10-CM

## 2023-12-15 PROCEDURE — 97140 MANUAL THERAPY 1/> REGIONS: CPT | Mod: GP | Performed by: PHYSICAL THERAPIST

## 2023-12-15 PROCEDURE — 97530 THERAPEUTIC ACTIVITIES: CPT | Mod: GP | Performed by: PHYSICAL THERAPIST

## 2023-12-15 PROCEDURE — 97110 THERAPEUTIC EXERCISES: CPT | Mod: GP | Performed by: PHYSICAL THERAPIST

## 2023-12-17 ENCOUNTER — MYC MEDICAL ADVICE (OUTPATIENT)
Dept: FAMILY MEDICINE | Facility: CLINIC | Age: 53
End: 2023-12-17
Payer: COMMERCIAL

## 2023-12-17 DIAGNOSIS — G71.3 MYOPATHY, MITOCHONDRIAL: ICD-10-CM

## 2023-12-18 RX ORDER — FOLIC ACID 1 MG/1
1 TABLET ORAL DAILY
Qty: 90 TABLET | Refills: 3 | Status: SHIPPED | OUTPATIENT
Start: 2023-12-18

## 2023-12-19 ENCOUNTER — ANESTHESIA EVENT (OUTPATIENT)
Dept: SURGERY | Facility: CLINIC | Age: 53
End: 2023-12-19
Payer: COMMERCIAL

## 2023-12-20 ENCOUNTER — HOSPITAL ENCOUNTER (OUTPATIENT)
Facility: CLINIC | Age: 53
Discharge: HOME | End: 2023-12-20
Attending: OBSTETRICS & GYNECOLOGY | Admitting: OBSTETRICS & GYNECOLOGY
Payer: COMMERCIAL

## 2023-12-20 ENCOUNTER — ANESTHESIA (OUTPATIENT)
Dept: SURGERY | Facility: CLINIC | Age: 53
End: 2023-12-20
Payer: COMMERCIAL

## 2023-12-20 VITALS
HEIGHT: 62 IN | WEIGHT: 131 LBS | DIASTOLIC BLOOD PRESSURE: 63 MMHG | OXYGEN SATURATION: 99 % | HEART RATE: 83 BPM | RESPIRATION RATE: 16 BRPM | BODY MASS INDEX: 24.11 KG/M2 | TEMPERATURE: 98 F | SYSTOLIC BLOOD PRESSURE: 101 MMHG

## 2023-12-20 DIAGNOSIS — N83.201 RIGHT OVARIAN CYST: ICD-10-CM

## 2023-12-20 DIAGNOSIS — Z98.890 S/P LAPAROSCOPY: Primary | ICD-10-CM

## 2023-12-20 LAB
ABO/RH(D): NORMAL
ANTIBODY SCREEN: NEGATIVE
HCG SERPL QL: NEGATIVE
SPECIMEN EXPIRATION DATE: NORMAL

## 2023-12-20 PROCEDURE — 250N000011 HC RX IP 250 OP 636: Performed by: OBSTETRICS & GYNECOLOGY

## 2023-12-20 PROCEDURE — 272N000001 HC OR GENERAL SUPPLY STERILE: Performed by: OBSTETRICS & GYNECOLOGY

## 2023-12-20 PROCEDURE — 370N000017 HC ANESTHESIA TECHNICAL FEE, PER MIN: Performed by: OBSTETRICS & GYNECOLOGY

## 2023-12-20 PROCEDURE — 710N000009 HC RECOVERY PHASE 1, LEVEL 1, PER MIN: Performed by: OBSTETRICS & GYNECOLOGY

## 2023-12-20 PROCEDURE — 250N000011 HC RX IP 250 OP 636: Mod: JZ | Performed by: ANESTHESIOLOGY

## 2023-12-20 PROCEDURE — 250N000013 HC RX MED GY IP 250 OP 250 PS 637: Performed by: OBSTETRICS & GYNECOLOGY

## 2023-12-20 PROCEDURE — 84703 CHORIONIC GONADOTROPIN ASSAY: CPT | Performed by: ANESTHESIOLOGY

## 2023-12-20 PROCEDURE — 58661 LAPAROSCOPY REMOVE ADNEXA: CPT | Mod: 80 | Performed by: OBSTETRICS & GYNECOLOGY

## 2023-12-20 PROCEDURE — 49322 LAPAROSCOPY ASPIRATION: CPT | Mod: 59 | Performed by: OBSTETRICS & GYNECOLOGY

## 2023-12-20 PROCEDURE — 250N000009 HC RX 250: Performed by: NURSE ANESTHETIST, CERTIFIED REGISTERED

## 2023-12-20 PROCEDURE — 88302 TISSUE EXAM BY PATHOLOGIST: CPT | Mod: TC | Performed by: OBSTETRICS & GYNECOLOGY

## 2023-12-20 PROCEDURE — 250N000009 HC RX 250: Performed by: OBSTETRICS & GYNECOLOGY

## 2023-12-20 PROCEDURE — P9045 ALBUMIN (HUMAN), 5%, 250 ML: HCPCS | Mod: JZ | Performed by: ANESTHESIOLOGY

## 2023-12-20 PROCEDURE — 58661 LAPAROSCOPY REMOVE ADNEXA: CPT | Mod: 50 | Performed by: OBSTETRICS & GYNECOLOGY

## 2023-12-20 PROCEDURE — 36415 COLL VENOUS BLD VENIPUNCTURE: CPT | Performed by: OBSTETRICS & GYNECOLOGY

## 2023-12-20 PROCEDURE — 250N000011 HC RX IP 250 OP 636: Performed by: NURSE ANESTHETIST, CERTIFIED REGISTERED

## 2023-12-20 PROCEDURE — 710N000012 HC RECOVERY PHASE 2, PER MINUTE: Performed by: OBSTETRICS & GYNECOLOGY

## 2023-12-20 PROCEDURE — 86900 BLOOD TYPING SEROLOGIC ABO: CPT | Performed by: OBSTETRICS & GYNECOLOGY

## 2023-12-20 PROCEDURE — 258N000003 HC RX IP 258 OP 636: Performed by: NURSE ANESTHETIST, CERTIFIED REGISTERED

## 2023-12-20 PROCEDURE — 999N000141 HC STATISTIC PRE-PROCEDURE NURSING ASSESSMENT: Performed by: OBSTETRICS & GYNECOLOGY

## 2023-12-20 PROCEDURE — 250N000025 HC SEVOFLURANE, PER MIN: Performed by: OBSTETRICS & GYNECOLOGY

## 2023-12-20 PROCEDURE — 360N000076 HC SURGERY LEVEL 3, PER MIN: Performed by: OBSTETRICS & GYNECOLOGY

## 2023-12-20 PROCEDURE — 49322 LAPAROSCOPY ASPIRATION: CPT | Mod: 80 | Performed by: OBSTETRICS & GYNECOLOGY

## 2023-12-20 PROCEDURE — 258N000001 HC RX 258: Performed by: OBSTETRICS & GYNECOLOGY

## 2023-12-20 RX ORDER — FENTANYL CITRATE 0.05 MG/ML
50 INJECTION, SOLUTION INTRAMUSCULAR; INTRAVENOUS EVERY 5 MIN PRN
Status: DISCONTINUED | OUTPATIENT
Start: 2023-12-20 | End: 2023-12-20 | Stop reason: HOSPADM

## 2023-12-20 RX ORDER — OXYCODONE HYDROCHLORIDE 5 MG/1
5 CAPSULE ORAL EVERY 4 HOURS PRN
COMMUNITY
End: 2024-01-22

## 2023-12-20 RX ORDER — OXYCODONE HYDROCHLORIDE 5 MG/1
5 TABLET ORAL
Status: COMPLETED | OUTPATIENT
Start: 2023-12-20 | End: 2023-12-20

## 2023-12-20 RX ORDER — HYDROMORPHONE HCL IN WATER/PF 6 MG/30 ML
0.4 PATIENT CONTROLLED ANALGESIA SYRINGE INTRAVENOUS EVERY 5 MIN PRN
Status: DISCONTINUED | OUTPATIENT
Start: 2023-12-20 | End: 2023-12-20 | Stop reason: HOSPADM

## 2023-12-20 RX ORDER — HYDROXYZINE HYDROCHLORIDE 25 MG/1
25 TABLET, FILM COATED ORAL 3 TIMES DAILY PRN
COMMUNITY
End: 2024-02-01

## 2023-12-20 RX ORDER — MAGNESIUM HYDROXIDE 1200 MG/15ML
LIQUID ORAL PRN
Status: DISCONTINUED | OUTPATIENT
Start: 2023-12-20 | End: 2023-12-20 | Stop reason: HOSPADM

## 2023-12-20 RX ORDER — IBUPROFEN 400 MG/1
800 TABLET, FILM COATED ORAL ONCE
Status: DISCONTINUED | OUTPATIENT
Start: 2023-12-20 | End: 2023-12-20 | Stop reason: HOSPADM

## 2023-12-20 RX ORDER — ONDANSETRON 2 MG/ML
4 INJECTION INTRAMUSCULAR; INTRAVENOUS EVERY 30 MIN PRN
Status: DISCONTINUED | OUTPATIENT
Start: 2023-12-20 | End: 2023-12-20 | Stop reason: HOSPADM

## 2023-12-20 RX ORDER — AMOXICILLIN 250 MG
1-2 CAPSULE ORAL 2 TIMES DAILY
Qty: 30 TABLET | Refills: 0 | Status: SHIPPED | OUTPATIENT
Start: 2023-12-20 | End: 2024-01-22

## 2023-12-20 RX ORDER — ONDANSETRON 4 MG/1
4 TABLET, ORALLY DISINTEGRATING ORAL EVERY 8 HOURS PRN
Qty: 4 TABLET | Refills: 0 | Status: SHIPPED | OUTPATIENT
Start: 2023-12-20 | End: 2024-01-22

## 2023-12-20 RX ORDER — ACETAMINOPHEN 325 MG/1
975 TABLET ORAL ONCE
Status: COMPLETED | OUTPATIENT
Start: 2023-12-20 | End: 2023-12-20

## 2023-12-20 RX ORDER — OXYCODONE HYDROCHLORIDE 5 MG/1
5-10 TABLET ORAL EVERY 4 HOURS PRN
Qty: 12 TABLET | Refills: 0 | Status: SHIPPED | OUTPATIENT
Start: 2023-12-20 | End: 2024-01-22

## 2023-12-20 RX ORDER — LIDOCAINE HYDROCHLORIDE 20 MG/ML
INJECTION, SOLUTION INFILTRATION; PERINEURAL PRN
Status: DISCONTINUED | OUTPATIENT
Start: 2023-12-20 | End: 2023-12-20

## 2023-12-20 RX ORDER — SODIUM CHLORIDE, SODIUM LACTATE, POTASSIUM CHLORIDE, CALCIUM CHLORIDE 600; 310; 30; 20 MG/100ML; MG/100ML; MG/100ML; MG/100ML
INJECTION, SOLUTION INTRAVENOUS CONTINUOUS PRN
Status: DISCONTINUED | OUTPATIENT
Start: 2023-12-20 | End: 2023-12-20

## 2023-12-20 RX ORDER — CEFAZOLIN SODIUM/WATER 2 G/20 ML
2 SYRINGE (ML) INTRAVENOUS SEE ADMIN INSTRUCTIONS
Status: DISCONTINUED | OUTPATIENT
Start: 2023-12-20 | End: 2023-12-20 | Stop reason: HOSPADM

## 2023-12-20 RX ORDER — ACETAMINOPHEN 325 MG/1
975 TABLET ORAL ONCE
Status: DISCONTINUED | OUTPATIENT
Start: 2023-12-20 | End: 2023-12-20 | Stop reason: HOSPADM

## 2023-12-20 RX ORDER — TRAMADOL HYDROCHLORIDE 50 MG/1
50 TABLET ORAL EVERY 6 HOURS PRN
COMMUNITY
End: 2024-02-01

## 2023-12-20 RX ORDER — CEFAZOLIN SODIUM/WATER 2 G/20 ML
2 SYRINGE (ML) INTRAVENOUS
Status: COMPLETED | OUTPATIENT
Start: 2023-12-20 | End: 2023-12-20

## 2023-12-20 RX ORDER — FENTANYL CITRATE 50 UG/ML
INJECTION, SOLUTION INTRAMUSCULAR; INTRAVENOUS PRN
Status: DISCONTINUED | OUTPATIENT
Start: 2023-12-20 | End: 2023-12-20

## 2023-12-20 RX ORDER — FENTANYL CITRATE 0.05 MG/ML
25 INJECTION, SOLUTION INTRAMUSCULAR; INTRAVENOUS EVERY 5 MIN PRN
Status: DISCONTINUED | OUTPATIENT
Start: 2023-12-20 | End: 2023-12-20 | Stop reason: HOSPADM

## 2023-12-20 RX ORDER — BUPIVACAINE HYDROCHLORIDE AND EPINEPHRINE 2.5; 5 MG/ML; UG/ML
INJECTION, SOLUTION INFILTRATION; PERINEURAL PRN
Status: DISCONTINUED | OUTPATIENT
Start: 2023-12-20 | End: 2023-12-20 | Stop reason: HOSPADM

## 2023-12-20 RX ORDER — DEXAMETHASONE SODIUM PHOSPHATE 4 MG/ML
INJECTION, SOLUTION INTRA-ARTICULAR; INTRALESIONAL; INTRAMUSCULAR; INTRAVENOUS; SOFT TISSUE PRN
Status: DISCONTINUED | OUTPATIENT
Start: 2023-12-20 | End: 2023-12-20

## 2023-12-20 RX ORDER — IBUPROFEN 800 MG/1
800 TABLET, FILM COATED ORAL EVERY 6 HOURS PRN
Qty: 30 TABLET | Refills: 0 | Status: SHIPPED | OUTPATIENT
Start: 2023-12-20

## 2023-12-20 RX ORDER — KETOROLAC TROMETHAMINE 30 MG/ML
INJECTION, SOLUTION INTRAMUSCULAR; INTRAVENOUS PRN
Status: DISCONTINUED | OUTPATIENT
Start: 2023-12-20 | End: 2023-12-20

## 2023-12-20 RX ORDER — METHOCARBAMOL 500 MG/1
500 TABLET, FILM COATED ORAL 4 TIMES DAILY PRN
COMMUNITY
End: 2024-01-22

## 2023-12-20 RX ORDER — ONDANSETRON 2 MG/ML
INJECTION INTRAMUSCULAR; INTRAVENOUS PRN
Status: DISCONTINUED | OUTPATIENT
Start: 2023-12-20 | End: 2023-12-20

## 2023-12-20 RX ORDER — ACETAMINOPHEN 325 MG/1
975 TABLET ORAL EVERY 6 HOURS PRN
Qty: 50 TABLET | Refills: 0 | Status: SHIPPED | OUTPATIENT
Start: 2023-12-20 | End: 2024-01-03

## 2023-12-20 RX ORDER — HYDROXYZINE HYDROCHLORIDE 25 MG/1
25 TABLET, FILM COATED ORAL
Status: DISCONTINUED | OUTPATIENT
Start: 2023-12-20 | End: 2023-12-20 | Stop reason: HOSPADM

## 2023-12-20 RX ORDER — PROPOFOL 10 MG/ML
INJECTION, EMULSION INTRAVENOUS PRN
Status: DISCONTINUED | OUTPATIENT
Start: 2023-12-20 | End: 2023-12-20

## 2023-12-20 RX ORDER — PROPOFOL 10 MG/ML
INJECTION, EMULSION INTRAVENOUS CONTINUOUS PRN
Status: DISCONTINUED | OUTPATIENT
Start: 2023-12-20 | End: 2023-12-20

## 2023-12-20 RX ORDER — SODIUM CHLORIDE, SODIUM LACTATE, POTASSIUM CHLORIDE, CALCIUM CHLORIDE 600; 310; 30; 20 MG/100ML; MG/100ML; MG/100ML; MG/100ML
INJECTION, SOLUTION INTRAVENOUS CONTINUOUS
Status: DISCONTINUED | OUTPATIENT
Start: 2023-12-20 | End: 2023-12-20 | Stop reason: HOSPADM

## 2023-12-20 RX ORDER — HYDROMORPHONE HCL IN WATER/PF 6 MG/30 ML
0.2 PATIENT CONTROLLED ANALGESIA SYRINGE INTRAVENOUS EVERY 5 MIN PRN
Status: DISCONTINUED | OUTPATIENT
Start: 2023-12-20 | End: 2023-12-20 | Stop reason: HOSPADM

## 2023-12-20 RX ORDER — ONDANSETRON 4 MG/1
4 TABLET, ORALLY DISINTEGRATING ORAL EVERY 30 MIN PRN
Status: DISCONTINUED | OUTPATIENT
Start: 2023-12-20 | End: 2023-12-20 | Stop reason: HOSPADM

## 2023-12-20 RX ORDER — HYDRALAZINE HYDROCHLORIDE 20 MG/ML
2.5-5 INJECTION INTRAMUSCULAR; INTRAVENOUS EVERY 10 MIN PRN
Status: DISCONTINUED | OUTPATIENT
Start: 2023-12-20 | End: 2023-12-20 | Stop reason: HOSPADM

## 2023-12-20 RX ADMIN — ONDANSETRON 4 MG: 2 INJECTION INTRAMUSCULAR; INTRAVENOUS at 11:58

## 2023-12-20 RX ADMIN — PROPOFOL 50 MCG/KG/MIN: 10 INJECTION, EMULSION INTRAVENOUS at 11:00

## 2023-12-20 RX ADMIN — ALBUMIN HUMAN 12.5 G: 0.05 INJECTION, SOLUTION INTRAVENOUS at 12:33

## 2023-12-20 RX ADMIN — PROPOFOL 200 MG: 10 INJECTION, EMULSION INTRAVENOUS at 11:00

## 2023-12-20 RX ADMIN — ROCURONIUM BROMIDE 40 MG: 50 INJECTION, SOLUTION INTRAVENOUS at 11:00

## 2023-12-20 RX ADMIN — SUGAMMADEX 200 MG: 100 INJECTION, SOLUTION INTRAVENOUS at 12:05

## 2023-12-20 RX ADMIN — MIDAZOLAM 2 MG: 1 INJECTION INTRAMUSCULAR; INTRAVENOUS at 10:55

## 2023-12-20 RX ADMIN — OXYCODONE HYDROCHLORIDE 5 MG: 5 TABLET ORAL at 13:24

## 2023-12-20 RX ADMIN — FENTANYL CITRATE 25 MCG: 50 INJECTION, SOLUTION INTRAMUSCULAR; INTRAVENOUS at 13:26

## 2023-12-20 RX ADMIN — KETOROLAC TROMETHAMINE 30 MG: 30 INJECTION, SOLUTION INTRAMUSCULAR at 12:05

## 2023-12-20 RX ADMIN — DEXAMETHASONE SODIUM PHOSPHATE 4 MG: 4 INJECTION, SOLUTION INTRA-ARTICULAR; INTRALESIONAL; INTRAMUSCULAR; INTRAVENOUS; SOFT TISSUE at 11:15

## 2023-12-20 RX ADMIN — PHENYLEPHRINE HYDROCHLORIDE 100 MCG: 10 INJECTION INTRAVENOUS at 11:12

## 2023-12-20 RX ADMIN — LIDOCAINE HYDROCHLORIDE 100 MG: 20 INJECTION, SOLUTION INFILTRATION; PERINEURAL at 11:00

## 2023-12-20 RX ADMIN — PHENYLEPHRINE HYDROCHLORIDE 200 MCG: 10 INJECTION INTRAVENOUS at 11:07

## 2023-12-20 RX ADMIN — FENTANYL CITRATE 50 MCG: 50 INJECTION INTRAMUSCULAR; INTRAVENOUS at 12:14

## 2023-12-20 RX ADMIN — SODIUM CHLORIDE, POTASSIUM CHLORIDE, SODIUM LACTATE AND CALCIUM CHLORIDE: 600; 310; 30; 20 INJECTION, SOLUTION INTRAVENOUS at 10:55

## 2023-12-20 RX ADMIN — PHENYLEPHRINE HYDROCHLORIDE 100 MCG: 10 INJECTION INTRAVENOUS at 11:15

## 2023-12-20 RX ADMIN — PHENYLEPHRINE HYDROCHLORIDE 100 MCG: 10 INJECTION INTRAVENOUS at 11:04

## 2023-12-20 RX ADMIN — Medication 2 G: at 11:00

## 2023-12-20 RX ADMIN — FENTANYL CITRATE 50 MCG: 50 INJECTION INTRAMUSCULAR; INTRAVENOUS at 11:00

## 2023-12-20 RX ADMIN — ACETAMINOPHEN 975 MG: 325 TABLET, FILM COATED ORAL at 09:55

## 2023-12-20 RX ADMIN — PHENYLEPHRINE HYDROCHLORIDE 100 MCG: 10 INJECTION INTRAVENOUS at 11:05

## 2023-12-20 ASSESSMENT — COPD QUESTIONNAIRES: COPD: 0

## 2023-12-20 ASSESSMENT — ACTIVITIES OF DAILY LIVING (ADL)
ADLS_ACUITY_SCORE: 37
ADLS_ACUITY_SCORE: 39
ADLS_ACUITY_SCORE: 39

## 2023-12-20 ASSESSMENT — LIFESTYLE VARIABLES: TOBACCO_USE: 0

## 2023-12-20 ASSESSMENT — ENCOUNTER SYMPTOMS
DYSRHYTHMIAS: 0
SEIZURES: 0

## 2023-12-20 NOTE — OP NOTE
Date of Procedure: 2023     Preoperative Diagnosis: Bilateral ovarian cysts    Postoperative Diagnosis: Same as Preoperative Diagnosis    Procedure Performed: Laparoscopic right salpingo-oophorectomy left salpingectomy drainage of left ovarian cyst    Primary Surgeon: Dr. Annette Walls    Assistant Surgeon: Dr. Peggy Barakat.  Dr. Barakat was needed to assist due to the patient's history of prior surgery and potential for intra-abdominal scarring she assisted with visualization and retraction.    Anesthesia Received: General    Estimated Blood Loss: 5 ml    Specimens: Bilateral fallopian tubes right ovary with cyst    Operative Findings: Large right ovarian cyst normal-appearing left fallopian tube very small left simple cyst    Complications: None apparent at time of procedure    Indication: The patient is a 53-year-old  2 para 2 with a history of pressure symptoms from known right ovarian cyst she desired surgical management.    Implants: none    Procedure in Detail: Patient was taken to the operating room she received Ancef infection prophylaxis she was placed in dorsal spine position in the lithotomy under general anesthesia.  A final timeout was completed a bilateral speculum was placed into the vaginal vault and the [employer] uterine manipulator was placed speculum was then removed sterile technique was maintained attention was then turned to the abdomen the skin underneath the umbilicus was injected with local anesthetic and direct cutdown method was used and treated on the cavity trocar was placed the abdomen was insufflated initial survey did not reveal any obvious intra-abdominal adhesions she was then placed in Trendelenburg position and bilateral lower quadrant ports were placed.  Once in Trendelenburg uterus was visible bilateral fallopian tubes and ovaries came into view as well as the large right ovarian cyst.  Bilateral ureters were identified in the bilateral pelvic sidewalls the right  infundibulopelvic ligament was coagulated with the LigaSure and cut this was then carried down to the cornua with the fallopian tube was also removed as well the right utero-ovarian ligament was identified coagulated and cut as well.  Specimen was placed in the posterior cul-de-sac on the left side the left fallopian tube was able to be removed using the LigaSure device.  There was a very small left ovarian cyst about 1-1/2 cm per the patient's request this was drained with drilling performed of the left ovary.  At this point the Endo Catch bag was placed into the abdomen and the specimen was placed into the bag and exteriorized the ovary was able to be removed without spilling any contents into the abdomen.  Next the fascia at the umbilical port site was closed with 0 Vicryl suture the ports were removed and the skin sites were closed with 4-0 Monocryl.  All instrument counts were noted to be correct the patient was taken recovery in stable condition.    Annette Walls MD

## 2023-12-20 NOTE — H&P
[facility]    History and Physical  Obstetrics and Gynecology     Date of Admission:  2023    Assessment & Plan   Britt Park is a 53 year old female who presents for a right oophorectomy, bilateral salpingectomy and left ovarian cyst drainage.     ASSESSMENT:   Bilateral ovarian cysts      PLAN:   Ok to proceed with the indicated procedure    Annette Walls MD    History of Present Illness   Britt Park is a 53 year old female  who presents for surgery. She had post-menopausal bleeding with a negative work up. She has had a negative endometrial biopsy and normal hysteroscopy. She has continued to have pelvic pain and bladder pressure felt to be from the right ovarian cyst. In order to avoid complete loss of her natural hormones we will only pursue removal of the right ovary and drain the left cyst as it is small. She consents to removal of the two fallopian tubes.       Prior to Admission Medications   Prior to Admission Medications   Prescriptions Last Dose Informant Patient Reported? Taking?   Vitamin D3 (CHOLECALCIFEROL) 25 mcg (1000 units) tablet Past Week  Yes Yes   Sig: Take 25 mcg by mouth   acetaminophen (TYLENOL) 325 MG tablet 2023  No Yes   Sig: Take 2 tablets (650 mg) by mouth every 6 hours as needed for other (For optimal non-opioid multimodal pain management to improve pain control.)   alendronate (FOSAMAX) 70 MG tablet 2023  Yes Yes   Sig: Take 70 mg by mouth   atorvastatin (LIPITOR) 20 MG tablet 2023  Yes Yes   Sig: Take 1 tablet by mouth daily at 2 pm   calcium carbonate (TUMS) 500 MG chewable tablet Past Month  No Yes   Sig: Take 1 tablet (500 mg) by mouth 2 times daily as needed for heartburn   estradiol (ESTRACE) 0.1 MG/GM vaginal cream   No No   Sig: PLACE 2 GRAMS VAGINALLY 2 TIMES A WEEK   Patient not taking: Reported on 2023   folic acid (FOLVITE) 1 MG tablet 2023  No Yes   Sig: Take 1 tablet (1 mg) by  mouth daily   hydrOXYzine HCl (ATARAX) 25 MG tablet More than a month  Yes Yes   Sig: Take 25 mg by mouth 3 times daily as needed for itching   losartan (COZAAR) 25 MG tablet 12/20/2023  No Yes   Sig: TAKE 1 TABLET(25 MG) BY MOUTH DAILY   methocarbamol (ROBAXIN) 500 MG tablet More than a month  Yes Yes   Sig: Take 500 mg by mouth 4 times daily as needed for muscle spasms   mometasone (NASONEX) 50 MCG/ACT nasal spray   No No   Sig: Spray 2 sprays into both nostrils daily   Patient not taking: Reported on 12/6/2023   mupirocin (BACTROBAN) 2 % external ointment   No No   Sig: Apply topically 3 times daily   Patient not taking: Reported on 12/6/2023   oxyCODONE (OXY-IR) 5 MG capsule More than a month  Yes Yes   Sig: Take 5 mg by mouth every 4 hours as needed for severe pain   pantoprazole (PROTONIX) 40 MG EC tablet 12/19/2023  No Yes   Sig: TAKE 1 TABLET BY MOUTH DAILY 30 MINUTES BEFORE BREAKFAST   traMADol (ULTRAM) 50 MG tablet More than a month  Yes Yes   Sig: Take 50 mg by mouth every 6 hours as needed for severe pain   valACYclovir (VALTREX) 1000 mg tablet More than a month  Yes Yes   Sig: Take 2,000 mg by mouth as needed      Facility-Administered Medications: None     Allergies   Allergies   Allergen Reactions    Dapsone Shortness Of Breath     Oxygen levels went to 79 %.    Influenza Vaccines Hives     2/24/23 Patient states she's been able to have a flu shot the past couple of years and has done well with it.    Benadryl [Diphenhydramine]     Diphenhydramine Other (See Comments)     SHAKY      Sulfa Antibiotics Swelling    Sulfa Antibiotics          Immunization History   Immunization History   Administered Date(s) Administered    COVID-19 12+ (2023-24) (Pfizer) 12/06/2023    COVID-19 Bivalent 12+ (Pfizer) 09/27/2022    COVID-19 MONOVALENT 12+ (Pfizer) 03/11/2021, 04/01/2021, 11/10/2021    COVID-19 Monovalent 12+ (Pfizer 2022) 04/08/2022    Flu 65+ Years 10/19/2006    Flu, Unspecified 10/19/2006    Hepatitis A  "(ADULT 19+) 2017, 2019    Hepatitis B, Adult 1997, 1997, 10/31/1997    Influenza (IIV3) PF 10/19/2006    Influenza Vaccine 18-64 (Flublok) 2021    Influenza Vaccine >6 months,quad, PF 2023    Influenza, seasonal, injectable, PF 2022    Pneumo Conj 13-V (2010&after) 2021    Pneumococcal 23 valent 2022    Rubella 2005, 2005    TD,PF 7+ (Tenivac) 1998    TDAP (Adacel,Boostrix) 2008    Td (Adult), Adsorbed 1998, 2019    Td, Absorbed, Pf, Adult, Lf Unspecified 2019    Tdap (Adult) Unspecified Formulation 2008    Typhoid IM 2017    Zoster recombinant adjuvanted (SHINGRIX) 2021, 2021       Past Medical History:   Diagnosis Date    Acute respiratory failure with hypoxia (H)     Anemia     Facial paresthesia     Hepatic vein stenosis     Impetigo     Methemoglobinemia     Ovarian cyst     Overactive bladder     Secondary hypertension 2021    Sleep apnea        Past Surgical History:   Procedure Laterality Date    ABDOMEN SURGERY  2007        BIOPSY  2019 & 2021    Right bicep, result abnormal results, & left tricep biopsy    COLONOSCOPY  21    COLONOSCOPY N/A 2022    Procedure: COLONOSCOPY, FLEXIBLE, WITH LESION REMOVAL USING SNARE;  Surgeon: Dorie Santos MD;  Location:  GI    GYN SURGERY  07     for twins    OPEN REDUCTION INTERNAL FIXATION RODDING INTRAMEDULLARY FEMUR Right 2022    Procedure: Open reduction internal fixation of right femur fracture;  Surgeon: Al Larson MD;  Location:  OR       /70   Pulse 93   Temp 98.6  F (37  C) (Temporal)   Resp 18   Ht 1.575 m (5' 2\")   Wt 59.4 kg (131 lb)   LMP  (LMP Unknown)   SpO2 100%   BMI 23.96 kg/m        Constitutional: healthy, alert, active and no distress   Heart: Regular rate and rhythm  Lungs: clear to ausculation bilaterally  Extremities: NT, no " edema  Neurologic: Awake, alert, oriented x3  Neuropsychiatric: General: normal, calm and normal eye contact    Annette Walls MD

## 2023-12-20 NOTE — DISCHARGE INSTRUCTIONS
Same Day Surgery Discharge Instructions for  Sedation and General Anesthesia     It's not unusual to feel dizzy, light-headed or faint for up to 24 hours after surgery or while taking pain medication.  If you have these symptoms: sit for a few minutes before standing and have someone assist you when you get up to walk or use the bathroom.    You should rest and relax for the next 24 hours. We recommend you make arrangements to have an adult stay with you for at least 24 hours after your discharge.  Avoid hazardous and strenuous activity.    DO NOT DRIVE any vehicle or operate mechanical equipment for 24 hours following the end of your surgery.  Even though you may feel normal, your reactions may be affected by the medication you have received.    Do not drink alcoholic beverages for 24 hours following surgery.     Slowly progress to your regular diet as you feel able. It's not unusual to feel nauseated and/or vomit after receiving anesthesia.  If you develop these symptoms, drink clear liquids (apple juice, ginger ale, broth, 7-up, etc. ) until you feel better.  If your nausea and vomiting persists for 24 hours, please notify your surgeon.      All narcotic pain medications, along with inactivity and anesthesia, can cause constipation. Drinking plenty of liquids and increasing fiber intake will help.    For any questions of a medical nature, call your surgeon.    Do not make important decisions for 24 hours.    If you had general anesthesia, you may have a sore throat for a couple of days related to the breathing tube used during surgery.  You may use Cepacol lozenges to help with this discomfort.  If it worsens or if you develop a fever, contact your surgeon.     If you feel your pain is not well managed with the pain medications prescribed by your surgeon, please contact your surgeon's office to let them know so they can address your concerns.     **If you have questions or concerns about your procedure,    call Dr Walls at [phone]**    Today you received Toradol, an antiinflammatory medication similar to Ibuprofen.  You should not take other antiinflammatory medication, such as Ibuprofen, Motrin, Advil, Aleve, Naprosyn, etc until 6pm.      Today you were given 975 mg of Tylenol at 1000a.m.. The recommended daily maximum dose is 4000 mg.

## 2023-12-20 NOTE — ANESTHESIA PROCEDURE NOTES
Airway       Patient location during procedure: OR       Procedure Start/Stop Times: 12/20/2023 11:03 AM  Staff -        Anesthesiologist:  Nehal Wright MD       CRNA: Eric Goodman APRN CRNA       Performed By: CRNA  Consent for Airway        Urgency: elective  Indications and Patient Condition       Indications for airway management: tegan-procedural and airway protection       Induction type:intravenous       Mask difficulty assessment: 1 - vent by mask    Final Airway Details       Final airway type: endotracheal airway       Successful airway: ETT - single and Oral  Endotracheal Airway Details        ETT size (mm): 7.0       Cuffed: yes       Successful intubation technique: video laryngoscopy       VL Blade Size: Glidescope 3       Grade View of Cords: 1       Adjucts: stylet       Position: Right       Measured from: lips       Secured at (cm): 21       Bite block used: None    Post intubation assessment        Placement verified by: capnometry, equal breath sounds and chest rise        Number of attempts at approach: 1       Number of other approaches attempted: 0       Secured with: tape       Ease of procedure: easy       Dentition: Unchanged    Medication(s) Administered   Medication Administration Time: 12/20/2023 11:03 AM

## 2023-12-20 NOTE — BRIEF OP NOTE
[facility]    Brief Operative Note    Pre-operative diagnosis: Right ovarian cyst [N83.201]. Left Ovarian Cyst  Post-operative diagnosis Same as pre-operative diagnosis    Procedure: LAPAROSCOPIC RIGHT OOPHORECTOMY AND SALPINGECTOMY, LEFT SALPINGECTOMY AND LEFT OVARIAN CYST DRAINAGE, Right - Abdomen    Surgeon: Surgeon(s) and Role:     * Annette Walls MD - Primary     * Peggy Barakat MD - Assisting  Anesthesia: General   Estimated Blood Loss: 10 ml    Drains: None  Specimens:   ID Type Source Tests Collected by Time Destination   1 : RIGHT OVARY AND FALLOPIAN TUBE Tissue Ovary and Fallopian Tube, Right SURGICAL PATHOLOGY EXAM Annette Walls MD 12/20/2023 11:55 AM    2 : LEFT FALLOPIAN TUBE Tissue Fallopian Tube, Left SURGICAL PATHOLOGY EXAM Annette Walls MD 12/20/2023 11:49 AM      Findings:   Large right ovarian cyst. Left small ovarian cyst. Normal appearing bilateral fallopian tubes and uterus. .  Complications: None.  Implants: None

## 2023-12-20 NOTE — ANESTHESIA CARE TRANSFER NOTE
Patient: Britt Park    Procedure: Procedure(s):  LAPAROSCOPIC RIGHT OOPHORECTOMY AND SALPINGECTOMY, LEFT SALPINGECTOMY AND LEFT OVARIAN CYST DRAINAGE       Diagnosis: Right ovarian cyst [N83.201]  Diagnosis Additional Information: No value filed.    Anesthesia Type:   General     Note:    Oropharynx: oropharynx clear of all foreign objects and spontaneously breathing  Level of Consciousness: drowsy  Oxygen Supplementation: face mask  Level of Supplemental Oxygen (L/min / FiO2): 6  Independent Airway: airway patency satisfactory and stable  Dentition: dentition unchanged  Vital Signs Stable: post-procedure vital signs reviewed and stable  Report to RN Given: handoff report given  Patient transferred to: PACU    Handoff Report: Identifed the Patient, Identified the Reponsible Provider, Reviewed the pertinent medical history, Discussed the surgical course, Reviewed Intra-OP anesthesia mangement and issues during anesthesia, Set expectations for post-procedure period and Allowed opportunity for questions and acknowledgement of understanding          Electronically Signed By: ROSANNE Lee CRNA  December 20, 2023  12:23 PM

## 2023-12-20 NOTE — ANESTHESIA PREPROCEDURE EVALUATION
Anesthesia Pre-Procedure Evaluation    Patient: Britt Park   MRN: [id number] : 1970        Procedure : Procedure(s):  LAPAROSCOPIC RIGHT OOPHORECTOMY          Past Medical History:   Diagnosis Date    Secondary hypertension 2021      Past Surgical History:   Procedure Laterality Date    ABDOMEN SURGERY  2007        BIOPSY  2019 & 2021    Right bicep, result abnormal results, & left tricep biopsy    COLONOSCOPY  21    COLONOSCOPY N/A 2022    Procedure: COLONOSCOPY, FLEXIBLE, WITH LESION REMOVAL USING SNARE;  Surgeon: Dorie Santos MD;  Location:  GI    GYN SURGERY  07     for twins    OPEN REDUCTION INTERNAL FIXATION RODDING INTRAMEDULLARY FEMUR Right 2022    Procedure: Open reduction internal fixation of right femur fracture;  Surgeon: Al Larson MD;  Location:  OR      Allergies   Allergen Reactions    Dapsone Shortness Of Breath     Oxygen levels went to 79 %.    Influenza Vaccines Hives     23 Patient states she's been able to have a flu shot the past couple of years and has done well with it.    Benadryl [Diphenhydramine]     Diphenhydramine Other (See Comments)     SHAKY      Sulfa Antibiotics Swelling    Sulfa Antibiotics       Social History     Tobacco Use    Smoking status: Never    Smokeless tobacco: Never   Substance Use Topics    Alcohol use: Not Currently      Wt Readings from Last 1 Encounters:   23 59 kg (130 lb)        Anesthesia Evaluation   Pt has had prior anesthetic.     No history of anesthetic complications       ROS/MED HX  ENT/Pulmonary:     (+) sleep apnea, uses CPAP,                                   (-) tobacco use, asthma and COPD   Neurologic:    (-) no seizures and no CVA   Cardiovascular:     (+) Dyslipidemia hypertension- -   -  - -                                   (-) CAD, CHF and arrhythmias   METS/Exercise Tolerance:     Hematologic:       Musculoskeletal: Comment:  "Granulomatous myositis      GI/Hepatic:     (+) GERD, Asymptomatic on medication,           liver disease (Hepatic steatosis),       Renal/Genitourinary:    (-) renal disease   Endo:     (+)            Chronic steroid usage for Other.      (-) Type I DM and Type II DM   Psychiatric/Substance Use:       Infectious Disease:       Malignancy:       Other:            Physical Exam    Airway        Mallampati: II   TM distance: > 3 FB   Neck ROM: full   Mouth opening: > 3 cm    Respiratory Devices and Support         Dental       (+) Minor Abnormalities - some fillings, tiny chips      Cardiovascular   cardiovascular exam normal          Pulmonary   pulmonary exam normal                OUTSIDE LABS:  CBC:   Lab Results   Component Value Date    WBC 3.5 (L) 09/19/2023    WBC 5.3 07/26/2023    HGB 10.9 (L) 09/19/2023    HGB 11.6 (L) 07/26/2023    HCT 34.9 (L) 09/19/2023    HCT 36.3 07/26/2023     09/19/2023     07/26/2023     BMP:   Lab Results   Component Value Date     09/19/2023     07/26/2023    POTASSIUM 4.4 09/19/2023    POTASSIUM 4.0 07/26/2023    CHLORIDE 102 09/19/2023    CHLORIDE 100 07/26/2023    CO2 26 09/19/2023    CO2 27 07/26/2023    BUN 19.6 09/19/2023    BUN 10.5 07/26/2023    CR 0.35 (L) 09/19/2023    CR 0.38 (L) 07/26/2023    GLC 63 (L) 09/19/2023    GLC 76 07/26/2023     COAGS:   Lab Results   Component Value Date    INR 0.97 12/10/2022     POC: No results found for: \"BGM\", \"HCG\", \"HCGS\"  HEPATIC:   Lab Results   Component Value Date    ALBUMIN 4.4 09/19/2023    PROTTOTAL 7.5 09/19/2023    ALT 24 09/19/2023    AST 31 09/19/2023    ALKPHOS 90 09/19/2023    BILITOTAL 0.3 09/19/2023     OTHER:   Lab Results   Component Value Date    LACT 0.5 (L) 06/25/2020    A1C 4.8 09/19/2023    LASHONDA 9.3 09/19/2023    MAG 2.1 09/19/2023    TSH 1.20 09/19/2023    CRP <2.9 06/06/2022    SED 36 (H) 09/19/2023       Anesthesia Plan    ASA Status:  2    NPO Status:  NPO Appropriate    Anesthesia Type: " General.     - Airway: ETT   Induction: Intravenous, Propofol.   Maintenance: Balanced.        Consents    Anesthesia Plan(s) and associated risks, benefits, and realistic alternatives discussed. Questions answered and patient/representative(s) expressed understanding.     - Discussed:     - Discussed with:  Patient            Postoperative Care    Pain management: IV analgesics.   PONV prophylaxis: Ondansetron (or other 5HT-3), Background Propofol Infusion     Comments:               Nehal Wright MD    I have reviewed the pertinent notes and labs in the chart from the past 30 days and (re)examined the patient.  Any updates or changes from those notes are reflected in this note.

## 2023-12-20 NOTE — ANESTHESIA POSTPROCEDURE EVALUATION
Patient: Britt Park    Procedure: Procedure(s):  LAPAROSCOPIC RIGHT OOPHORECTOMY AND SALPINGECTOMY, LEFT SALPINGECTOMY AND LEFT OVARIAN CYST DRAINAGE       Anesthesia Type:  General    Note:     Postop Pain Control: Uneventful            Sign Out: Well controlled pain   PONV: No   Neuro/Psych: Uneventful            Sign Out: Acceptable/Baseline neuro status   Airway/Respiratory: Uneventful            Sign Out: Acceptable/Baseline resp. status   CV/Hemodynamics: Uneventful            Sign Out: Acceptable CV status; No obvious hypovolemia; No obvious fluid overload   Other NRE: NONE   DID A NON-ROUTINE EVENT OCCUR? No           Last vitals:  Vitals Value Taken Time   /58 12/20/23 1315   Temp 36.7  C (98.1  F) 12/20/23 1250   Pulse 71 12/20/23 1318   Resp 18 12/20/23 1318   SpO2 100 % 12/20/23 1318   Vitals shown include unfiled device data.    Electronically Signed By: Nehal Wright MD  December 20, 2023  1:19 PM

## 2023-12-21 PROCEDURE — 88304 TISSUE EXAM BY PATHOLOGIST: CPT | Mod: 26 | Performed by: PATHOLOGY

## 2023-12-21 PROCEDURE — 88307 TISSUE EXAM BY PATHOLOGIST: CPT | Mod: 26 | Performed by: PATHOLOGY

## 2023-12-29 ENCOUNTER — THERAPY VISIT (OUTPATIENT)
Dept: PHYSICAL THERAPY | Facility: CLINIC | Age: 53
End: 2023-12-29
Payer: COMMERCIAL

## 2023-12-29 DIAGNOSIS — M79.661 PAIN OF RIGHT LOWER LEG: ICD-10-CM

## 2023-12-29 DIAGNOSIS — Z47.89 AFTERCARE FOLLOWING SURGERY OF THE MUSCULOSKELETAL SYSTEM: ICD-10-CM

## 2023-12-29 DIAGNOSIS — S72.91XA CLOSED FRACTURE OF RIGHT FEMUR, UNSPECIFIED FRACTURE MORPHOLOGY, UNSPECIFIED PORTION OF FEMUR, INITIAL ENCOUNTER (H): Primary | ICD-10-CM

## 2023-12-29 PROCEDURE — 97530 THERAPEUTIC ACTIVITIES: CPT | Mod: GP | Performed by: PHYSICAL THERAPIST

## 2023-12-29 PROCEDURE — 97110 THERAPEUTIC EXERCISES: CPT | Mod: GP | Performed by: PHYSICAL THERAPIST

## 2024-01-03 ENCOUNTER — OFFICE VISIT (OUTPATIENT)
Dept: UROLOGY | Facility: CLINIC | Age: 54
End: 2024-01-03
Attending: OBSTETRICS & GYNECOLOGY
Payer: COMMERCIAL

## 2024-01-03 VITALS
HEIGHT: 62 IN | BODY MASS INDEX: 23.92 KG/M2 | HEART RATE: 89 BPM | WEIGHT: 130 LBS | SYSTOLIC BLOOD PRESSURE: 120 MMHG | DIASTOLIC BLOOD PRESSURE: 79 MMHG

## 2024-01-03 DIAGNOSIS — N39.41 URGE INCONTINENCE: Primary | ICD-10-CM

## 2024-01-03 DIAGNOSIS — R32 INCONTINENCE IN FEMALE: ICD-10-CM

## 2024-01-03 PROCEDURE — 99213 OFFICE O/P EST LOW 20 MIN: CPT | Performed by: OBSTETRICS & GYNECOLOGY

## 2024-01-03 PROCEDURE — 99215 OFFICE O/P EST HI 40 MIN: CPT | Performed by: OBSTETRICS & GYNECOLOGY

## 2024-01-03 RX ORDER — SOLIFENACIN SUCCINATE 5 MG/1
5 TABLET, FILM COATED ORAL DAILY
Qty: 90 TABLET | Refills: 1 | Status: SHIPPED | OUTPATIENT
Start: 2024-01-03 | End: 2024-06-04

## 2024-01-03 NOTE — LETTER
1/3/2024       RE: Britt Park  [address] [facility] [address]     Dear Colleague,    Thank you for referring your patient, Britt Park, to the [facility] [place] at [facility]. Please see a copy of my visit note below.    Chief Complaint   Patient presents with    Establish Care     Incontinence    Marion Monae , LEV     January 3, 2024    Referring Provider: Annette Walls MD  [address] [facility] [address]  [place] [address]    Primary Care Provider: Vi Metz    CC: urinary incontinence    HPI:  Britt Park is a 53 year old female who presents for evaluation of her pelvic floor symptoms.  She has 6 month h/o urinary incontinence primarily at night and in the early morning. Ventura does not feel that she has incontinence with cough, laugh or sneeze. She recently had an RSO, and left salpingectomy for an ovarian cyst that was benign. Ventura reports that her UUI improved for a short time after that, but that it is back to baseline now. She tried Myrbetriq in the past, with some improvement, but stopped it due to expense.     Ventura has been seen at [place] and  urogynecology. She has not had UDS.    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 She does not that she has increased difficulty getting up from chairs and from the toilet.      She has sleep apnea, that is well controlled with CPAP.     Prolapse:  Do you feel a vaginal bulge? no                                      Pressure? no   Do you have to place your fingers in the vagina or in the rectum to have a bowel movement? no  Impact to quality of life? -     Stress Incontinence:  Do you leak urine with cough, sneeze, exercise? no  How often do you leak with cough, sneeze, exercise?  -  How much do you usually leak? -   Do you wear a pad? - If so; -  Impact to quality of life? -    Urge  Incontinence:  Do you often get sudden urges to urinate? yes  How often do have urges? daily  If so, do you leak with these urges? yes  How much do you usually leak? More than drops  Impact to quality of life? moderate    Voids/day:-  Nocturia: every 2-3 hours  Fluid intake: -  Caffeine: -    Urinating:  Difficulty starting urination or strain to void? -  Weak or intermittent stream? -  Incomplete emptying or dribbling? -  Pain or burning with urination? -  Any blood in your urine? -    GI:  Constipation? -  Frequency stools -    Straining for stools -  Stool consistency -     Ever leak stool (Accidental Bowel Leakage)? -      If so, how often?               -      If so, do you leak?                   -      Soiling without sensation? -  History of irritable bowel or Crohn's? -    Sexual/Pain:  Are you currently having sex?. -  Pain with sex?   -   Sexual Partner: -  Do any of these symptoms interfere with sex? -  Impact to quality of life? -    Prior therapy:  Ever done pelvic floor physical therapy? yes  Trial of medication? myrbetriq  Have you ever tried a pessary? no    Medical History:  Do you have?   High Cholesterol? -     Diabetes? -  High Blood pressure? -     Recurrent UTIs? -  Sleep Apnea? -  Other medical problems: -    Surgical History:    Hysterectomy? -   Bladder Surgery? -   Other? -     OB/Gyn History:  Pregnancies? -  Deliveries? -  Vaginal -  Section -  Current birth control? -  Periods? -  When was the first day of your last period? -  Last Pap smear? - Any abnormal? -  Last mammogram? -  Last colonoscopy? -    Medications/Vitamins/Supplements: -    Drug Allergies: -    Latex Allergy: -  Iodine Allergy -    Family History: (list relationship and age at diagnosis)  Breast cancer? -   Ovarian cancer? -   Colon cancer? -  Other? -    Social History:  Marital status: -  Do you/ have you ever smoke(d)  cigarettes? -  Drink more than 1 alcoholic beverage a day?  -  Occupation? -    In the past  3 months have you regularly experienced:  Chest pain w/ walking/exercise? -                   Unusual headaches? -  Leg pain w/ walking/exercise? -                       Easy bruising? -  Difficulty breathing w/ walking/exercise? -  Problems with vision? -  Dizziness, falls, or fainting? -  Excessive bleeding from cuts, gums, surgery? -  Other: -    Past Medical History:   Diagnosis Date    Acute respiratory failure with hypoxia (H)     Anemia     Facial paresthesia     Hepatic vein stenosis     Impetigo     Methemoglobinemia     Ovarian cyst     Overactive bladder     Secondary hypertension 2021    Sleep apnea        Past Surgical History:   Procedure Laterality Date    ABDOMEN SURGERY  2007        BIOPSY  2019 & 2021    Right bicep, result abnormal results, & left tricep biopsy    COLONOSCOPY  21    COLONOSCOPY N/A 2022    Procedure: COLONOSCOPY, FLEXIBLE, WITH LESION REMOVAL USING SNARE;  Surgeon: Dorie Santos MD;  Location:  GI    GYN SURGERY  07     for twins    LAPAROSCOPIC OOPHORECTOMY Right 2023    Procedure: LAPAROSCOPIC RIGHT OOPHORECTOMY AND SALPINGECTOMY, LEFT SALPINGECTOMY AND LEFT OVARIAN CYST DRAINAGE;  Surgeon: Annette Walls MD;  Location:  OR    OPEN REDUCTION INTERNAL FIXATION RODDING INTRAMEDULLARY FEMUR Right 2022    Procedure: Open reduction internal fixation of right femur fracture;  Surgeon: Al Larson MD;  Location:  OR       Social History     Socioeconomic History    Marital status:      Spouse name: Not on file    Number of children: Not on file    Years of education: Not on file    Highest education level: Not on file   Occupational History    Not on file   Tobacco Use    Smoking status: Never    Smokeless tobacco: Never   Vaping Use    Vaping Use: Never used   Substance and Sexual Activity    Alcohol use: Yes    Drug use: Never    Sexual activity: Yes     Partners: Male      Birth control/protection: Male Surgical   Other Topics Concern    Parent/sibling w/ CABG, MI or angioplasty before 65F 55M? No   Social History Narrative    ** Merged History Encounter **          Social Determinants of Health     Financial Resource Strain: Low Risk  (11/30/2023)    Financial Resource Strain     Within the past 12 months, have you or your family members you live with been unable to get utilities (heat, electricity) when it was really needed?: No   Food Insecurity: Low Risk  (11/30/2023)    Food Insecurity     Within the past 12 months, did you worry that your food would run out before you got money to buy more?: No     Within the past 12 months, did the food you bought just not last and you didn t have money to get more?: No   Transportation Needs: Low Risk  (11/30/2023)    Transportation Needs     Within the past 12 months, has lack of transportation kept you from medical appointments, getting your medicines, non-medical meetings or appointments, work, or from getting things that you need?: No   Physical Activity: Inactive (1/5/2023)    Exercise Vital Sign     Days of Exercise per Week: 0 days     Minutes of Exercise per Session: 0 min   Stress: No Stress Concern Present (1/5/2023)    [place] of Occupational [employer] - Occupational Stress Questionnaire     Feeling of Stress : Only a little   Social Connections: Unknown (1/5/2023)    Social Connection and Isolation Panel [NHANES]     Frequency of Communication with Friends and Family: Twice a week     Frequency of Social Gatherings with Friends and Family: Once a week     Attends Judaism Services: Patient declined     Active Member of Clubs or Organizations: Yes     Attends Club or Organization Meetings: Not on file     Marital Status:    Interpersonal Safety: Low Risk  (12/6/2023)    Interpersonal Safety     Do you feel physically and emotionally safe where you currently live?: Yes     Within the past 12 months, have you been hit,  "slapped, kicked or otherwise physically hurt by someone?: No     Within the past 12 months, have you been humiliated or emotionally abused in other ways by your partner or ex-partner?: No   Housing Stability: Low Risk  (11/30/2023)    Housing Stability     Do you have housing? : Yes     Are you worried about losing your housing?: No       Family History   Adopted: Yes   Problem Relation Age of Onset    Unknown/Adopted Other        ROS    Allergies   Allergen Reactions    Dapsone Shortness Of Breath     Oxygen levels went to 79 %.    Influenza Vaccines Hives     2/24/23 Patient states she's been able to have a flu shot the past couple of years and has done well with it.    Benadryl [Diphenhydramine]     Diphenhydramine Other (See Comments)     SHAKY      Sulfa Antibiotics Swelling    Sulfa Antibiotics        Current Outpatient Medications   Medication    acetaminophen (TYLENOL) 325 MG tablet    alendronate (FOSAMAX) 70 MG tablet    atorvastatin (LIPITOR) 20 MG tablet    calcium carbonate (TUMS) 500 MG chewable tablet    folic acid (FOLVITE) 1 MG tablet    hydrOXYzine HCl (ATARAX) 25 MG tablet    ibuprofen (ADVIL/MOTRIN) 800 MG tablet    losartan (COZAAR) 25 MG tablet    methocarbamol (ROBAXIN) 500 MG tablet    pantoprazole (PROTONIX) 40 MG EC tablet    senna-docusate (SENOKOT-S/PERICOLACE) 8.6-50 MG tablet    traMADol (ULTRAM) 50 MG tablet    valACYclovir (VALTREX) 1000 mg tablet    Vitamin D3 (CHOLECALCIFEROL) 25 mcg (1000 units) tablet    ondansetron (ZOFRAN ODT) 4 MG ODT tab    oxyCODONE (OXY-IR) 5 MG capsule    oxyCODONE (ROXICODONE) 5 MG tablet     No current facility-administered medications for this visit.       /79   Pulse 89   Ht 1.575 m (5' 2\")   Wt 59 kg (130 lb)   LMP  (LMP Unknown)   BMI 23.78 kg/m   No LMP recorded (lmp unknown). Patient is postmenopausal. Body mass index is 23.78 kg/m .  Ms. Park is alert, comfortable in no acute distress, non-labored breathing.     A/P: Sun Cele " Vivian is a 53 year old F with UUI    Will start solfenacin 5mg every day. Refer for PFPT. Schedule UDS. F/U after UDS to discuss results    I spent a total of 45 minutes with  Ms. Larsen  on the date of the encounter in chart review, face to face patient visit, review of tests, documentation and/or discussion with other providers about the issues documented above.    Manan Quiles MD  Professor, OB/GYN  Urogynecologist  CC  Patient Care Team:  Vi Metz MD as PCP - General (Internal Medicine)  Vlad Leos MD as Referring Physician (Neurology)  Vi Metz MD as Assigned PCP  Husam Barnett MD as Assigned Rheumatology Provider  Diane Lopez MD as MD (Gastroenterology)  Diane Lopez MD as MD (Gastroenterology)  Vi Metz MD (Internal Medicine)  Balwinder Nunn MD as Assigned Musculoskeletal Provider  Annette Walls MD as Assigned OBGYN Provider  Manan Quiles MD as MD (OB/Gyn)  Vi Metz MD as Assigned Pain Medication Provider  ANNETTE WALSL

## 2024-01-03 NOTE — PROGRESS NOTES
Chief Complaint   Patient presents with    Establish Care     Incontinence    Marion Monae , LEV     January 3, 2024    Referring Provider: Annette Walls MD  [address] [address]    Primary Care Provider: Vi Metz    CC: urinary incontinence    HPI:  Britt Park is a 53 year old female who presents for evaluation of her pelvic floor symptoms.  She has 6 month h/o urinary incontinence primarily at night and in the early morning. Ventura does not feel that she has incontinence with cough, laugh or sneeze. She recently had an RSO, and left salpingectomy for an ovarian cyst that was benign. Ventura reports that her UUI improved for a short time after that, but that it is back to baseline now. She tried Myrbetriq in the past, with some improvement, but stopped it due to expense.     Ventura has been seen at [place] and  urogynecology. She has not had UDS.    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 She does not that she has increased difficulty getting up from chairs and from the toilet.      She has sleep apnea, that is well controlled with CPAP.     Prolapse:  Do you feel a vaginal bulge? no                                      Pressure? no   Do you have to place your fingers in the vagina or in the rectum to have a bowel movement? no  Impact to quality of life? -     Stress Incontinence:  Do you leak urine with cough, sneeze, exercise? no  How often do you leak with cough, sneeze, exercise?  -  How much do you usually leak? -   Do you wear a pad? - If so; -  Impact to quality of life? -    Urge Incontinence:  Do you often get sudden urges to urinate? yes  How often do have urges? daily  If so, do you leak with these urges? yes  How much do you usually leak? More than drops  Impact to quality of life? moderate    Voids/day:-  Nocturia: every 2-3 hours  Fluid intake: -  Caffeine:  -    Urinating:  Difficulty starting urination or strain to void? -  Weak or intermittent stream? -  Incomplete emptying or dribbling? -  Pain or burning with urination? -  Any blood in your urine? -    GI:  Constipation? -  Frequency stools -    Straining for stools -  Stool consistency -     Ever leak stool (Accidental Bowel Leakage)? -      If so, how often?               -      If so, do you leak?                   -      Soiling without sensation? -  History of irritable bowel or Crohn's? -    Sexual/Pain:  Are you currently having sex?. -  Pain with sex?   -   Sexual Partner: -  Do any of these symptoms interfere with sex? -  Impact to quality of life? -    Prior therapy:  Ever done pelvic floor physical therapy? yes  Trial of medication? myrbetriq  Have you ever tried a pessary? no    Medical History:  Do you have?   High Cholesterol? -     Diabetes? -  High Blood pressure? -     Recurrent UTIs? -  Sleep Apnea? -  Other medical problems: -    Surgical History:    Hysterectomy? -   Bladder Surgery? -   Other? -     OB/Gyn History:  Pregnancies? -  Deliveries? -  Vaginal -  Section -  Current birth control? -  Periods? -  When was the first day of your last period? -  Last Pap smear? - Any abnormal? -  Last mammogram? -  Last colonoscopy? -    Medications/Vitamins/Supplements: -    Drug Allergies: -    Latex Allergy: -  Iodine Allergy -    Family History: (list relationship and age at diagnosis)  Breast cancer? -   Ovarian cancer? -   Colon cancer? -  Other? -    Social History:  Marital status: -  Do you/ have you ever smoke(d)  cigarettes? -  Drink more than 1 alcoholic beverage a day?  -  Occupation? -    In the past 3 months have you regularly experienced:  Chest pain w/ walking/exercise? -                   Unusual headaches? -  Leg pain w/ walking/exercise? -                       Easy bruising? -  Difficulty breathing w/ walking/exercise? -  Problems with vision? -  Dizziness, falls, or fainting?  -  Excessive bleeding from cuts, gums, surgery? -  Other: -    Past Medical History:   Diagnosis Date    Acute respiratory failure with hypoxia (H)     Anemia     Facial paresthesia     Hepatic vein stenosis     Impetigo     Methemoglobinemia     Ovarian cyst     Overactive bladder     Secondary hypertension 2021    Sleep apnea        Past Surgical History:   Procedure Laterality Date    ABDOMEN SURGERY  2007        BIOPSY  2019 & 2021    Right bicep, result abnormal results, & left tricep biopsy    COLONOSCOPY  21    COLONOSCOPY N/A 2022    Procedure: COLONOSCOPY, FLEXIBLE, WITH LESION REMOVAL USING SNARE;  Surgeon: Dorie Santos MD;  Location:  GI    GYN SURGERY  07     for twins    LAPAROSCOPIC OOPHORECTOMY Right 2023    Procedure: LAPAROSCOPIC RIGHT OOPHORECTOMY AND SALPINGECTOMY, LEFT SALPINGECTOMY AND LEFT OVARIAN CYST DRAINAGE;  Surgeon: Annette Walls MD;  Location:  OR    OPEN REDUCTION INTERNAL FIXATION RODDING INTRAMEDULLARY FEMUR Right 2022    Procedure: Open reduction internal fixation of right femur fracture;  Surgeon: Al Larson MD;  Location:  OR       Social History     Socioeconomic History    Marital status:      Spouse name: Not on file    Number of children: Not on file    Years of education: Not on file    Highest education level: Not on file   Occupational History    Not on file   Tobacco Use    Smoking status: Never    Smokeless tobacco: Never   Vaping Use    Vaping Use: Never used   Substance and Sexual Activity    Alcohol use: Yes    Drug use: Never    Sexual activity: Yes     Partners: Male     Birth control/protection: Male Surgical   Other Topics Concern    Parent/sibling w/ CABG, MI or angioplasty before 65F 55M? No   Social History Narrative    ** Merged History Encounter **          Social Determinants of Health     Financial Resource Strain: Low Risk  (2023)     Financial Resource Strain     Within the past 12 months, have you or your family members you live with been unable to get utilities (heat, electricity) when it was really needed?: No   Food Insecurity: Low Risk  (11/30/2023)    Food Insecurity     Within the past 12 months, did you worry that your food would run out before you got money to buy more?: No     Within the past 12 months, did the food you bought just not last and you didn t have money to get more?: No   Transportation Needs: Low Risk  (11/30/2023)    Transportation Needs     Within the past 12 months, has lack of transportation kept you from medical appointments, getting your medicines, non-medical meetings or appointments, work, or from getting things that you need?: No   Physical Activity: Inactive (1/5/2023)    Exercise Vital Sign     Days of Exercise per Week: 0 days     Minutes of Exercise per Session: 0 min   Stress: No Stress Concern Present (1/5/2023)    [place] of Occupational Health - Occupational Stress Questionnaire     Feeling of Stress : Only a little   Social Connections: Unknown (1/5/2023)    Social Connection and Isolation Panel [NHANES]     Frequency of Communication with Friends and Family: Twice a week     Frequency of Social Gatherings with Friends and Family: Once a week     Attends Buddhism Services: Patient declined     Active Member of Clubs or Organizations: Yes     Attends Club or Organization Meetings: Not on file     Marital Status:    Interpersonal Safety: Low Risk  (12/6/2023)    Interpersonal Safety     Do you feel physically and emotionally safe where you currently live?: Yes     Within the past 12 months, have you been hit, slapped, kicked or otherwise physically hurt by someone?: No     Within the past 12 months, have you been humiliated or emotionally abused in other ways by your partner or ex-partner?: No   Housing Stability: Low Risk  (11/30/2023)    Housing Stability     Do you have housing? : Yes  "    Are you worried about losing your housing?: No       Family History   Adopted: Yes   Problem Relation Age of Onset    Unknown/Adopted Other        ROS    Allergies   Allergen Reactions    Dapsone Shortness Of Breath     Oxygen levels went to 79 %.    Influenza Vaccines Hives     2/24/23 Patient states she's been able to have a flu shot the past couple of years and has done well with it.    Benadryl [Diphenhydramine]     Diphenhydramine Other (See Comments)     SHAKY      Sulfa Antibiotics Swelling    Sulfa Antibiotics        Current Outpatient Medications   Medication    acetaminophen (TYLENOL) 325 MG tablet    alendronate (FOSAMAX) 70 MG tablet    atorvastatin (LIPITOR) 20 MG tablet    calcium carbonate (TUMS) 500 MG chewable tablet    folic acid (FOLVITE) 1 MG tablet    hydrOXYzine HCl (ATARAX) 25 MG tablet    ibuprofen (ADVIL/MOTRIN) 800 MG tablet    losartan (COZAAR) 25 MG tablet    methocarbamol (ROBAXIN) 500 MG tablet    pantoprazole (PROTONIX) 40 MG EC tablet    senna-docusate (SENOKOT-S/PERICOLACE) 8.6-50 MG tablet    traMADol (ULTRAM) 50 MG tablet    valACYclovir (VALTREX) 1000 mg tablet    Vitamin D3 (CHOLECALCIFEROL) 25 mcg (1000 units) tablet    ondansetron (ZOFRAN ODT) 4 MG ODT tab    oxyCODONE (OXY-IR) 5 MG capsule    oxyCODONE (ROXICODONE) 5 MG tablet     No current facility-administered medications for this visit.       /79   Pulse 89   Ht 1.575 m (5' 2\")   Wt 59 kg (130 lb)   LMP  (LMP Unknown)   BMI 23.78 kg/m   No LMP recorded (lmp unknown). Patient is postmenopausal. Body mass index is 23.78 kg/m .  Ms. Park is alert, comfortable in no acute distress, non-labored breathing.     A/P: Britt Park is a 53 year old F with UUI    Will start solfenacin 5mg every day. Refer for PFPT. Schedule UDS. F/U after UDS to discuss results    I spent a total of 45 minutes with  Ms. Larsen  on the date of the encounter in chart review, face to face patient visit, review of tests, " documentation and/or discussion with other providers about the issues documented above.    Manan Quiles MD  Professor, OB/GYN  Urogynecologist  CC  Patient Care Team:  Vi Metz MD as PCP - General (Internal Medicine)  Vlad Leos MD as Referring Physician (Neurology)  Vi Metz MD as Assigned PCP  Husam Barnett MD as Assigned Rheumatology Provider  Diane Lopez MD as MD (Gastroenterology)  Diane Lopez MD as MD (Gastroenterology)  Vi Metz MD (Internal Medicine)  Balwinder Nunn MD as Assigned Musculoskeletal Provider  Annette Walls MD as Assigned OBGYN Provider  Manan Quiles MD as MD (OB/Gyn)  Vi Metz MD as Assigned Pain Medication Provider  ANNETTE WALLS

## 2024-01-03 NOTE — PATIENT INSTRUCTIONS
Thank you for trusting us with your care!     If you need to contact us for questions about:  Symptoms, Scheduling & Medical Questions; Non-urgent (2-3 day response) Johnathan message, Urgent (needing response today) [phone] (if after 3:30pm next day response)   Prescriptions: Please call your Pharmacy   Billing: [facility] [phone] or [facility]:[phone]

## 2024-01-04 ENCOUNTER — TELEPHONE (OUTPATIENT)
Dept: UROLOGY | Facility: CLINIC | Age: 54
End: 2024-01-04
Payer: COMMERCIAL

## 2024-01-04 NOTE — TELEPHONE ENCOUNTER
----- Message from Najma Suarez RN sent at 1/4/2024 10:10 AM CST -----  Regarding: FW: UDS  Please call to schedule UDS for this patient   Next available   thanks  ----- Message -----  From: Manan Quiles MD  Sent: 1/3/2024   8:40 AM CST  To: Najma Suarez RN  Subject: UDS                                              Good morning Max,    Happy New Year! Looking forward to seeing you on Friday.    Would you please schedule Ms. Park for UDS.  Thank you,    Manan

## 2024-01-05 ENCOUNTER — THERAPY VISIT (OUTPATIENT)
Dept: PHYSICAL THERAPY | Facility: CLINIC | Age: 54
End: 2024-01-05
Payer: COMMERCIAL

## 2024-01-05 DIAGNOSIS — Z47.89 AFTERCARE FOLLOWING SURGERY OF THE MUSCULOSKELETAL SYSTEM: ICD-10-CM

## 2024-01-05 DIAGNOSIS — M79.661 PAIN OF RIGHT LOWER LEG: ICD-10-CM

## 2024-01-05 DIAGNOSIS — S72.91XA CLOSED FRACTURE OF RIGHT FEMUR, UNSPECIFIED FRACTURE MORPHOLOGY, UNSPECIFIED PORTION OF FEMUR, INITIAL ENCOUNTER (H): Primary | ICD-10-CM

## 2024-01-05 PROCEDURE — 97530 THERAPEUTIC ACTIVITIES: CPT | Mod: GP | Performed by: PHYSICAL THERAPIST

## 2024-01-05 PROCEDURE — 97110 THERAPEUTIC EXERCISES: CPT | Mod: GP | Performed by: PHYSICAL THERAPIST

## 2024-01-09 ENCOUNTER — MYC MEDICAL ADVICE (OUTPATIENT)
Dept: FAMILY MEDICINE | Facility: CLINIC | Age: 54
End: 2024-01-09
Payer: COMMERCIAL

## 2024-01-09 DIAGNOSIS — M60.80 OTHER MYOSITIS, UNSPECIFIED SITE: ICD-10-CM

## 2024-01-09 DIAGNOSIS — G71.3 MYOPATHY, MITOCHONDRIAL: Primary | ICD-10-CM

## 2024-01-12 ENCOUNTER — THERAPY VISIT (OUTPATIENT)
Dept: PHYSICAL THERAPY | Facility: CLINIC | Age: 54
End: 2024-01-12
Payer: COMMERCIAL

## 2024-01-12 DIAGNOSIS — Z47.89 AFTERCARE FOLLOWING SURGERY OF THE MUSCULOSKELETAL SYSTEM: ICD-10-CM

## 2024-01-12 DIAGNOSIS — M79.661 PAIN OF RIGHT LOWER LEG: ICD-10-CM

## 2024-01-12 DIAGNOSIS — S72.91XA CLOSED FRACTURE OF RIGHT FEMUR, UNSPECIFIED FRACTURE MORPHOLOGY, UNSPECIFIED PORTION OF FEMUR, INITIAL ENCOUNTER (H): Primary | ICD-10-CM

## 2024-01-12 PROCEDURE — 97530 THERAPEUTIC ACTIVITIES: CPT | Mod: GP | Performed by: PHYSICAL THERAPIST

## 2024-01-12 PROCEDURE — 97110 THERAPEUTIC EXERCISES: CPT | Mod: 59 | Performed by: PHYSICAL THERAPIST

## 2024-01-12 PROCEDURE — 97112 NEUROMUSCULAR REEDUCATION: CPT | Mod: 59 | Performed by: PHYSICAL THERAPIST

## 2024-01-19 ENCOUNTER — THERAPY VISIT (OUTPATIENT)
Dept: PHYSICAL THERAPY | Facility: CLINIC | Age: 54
End: 2024-01-19
Payer: COMMERCIAL

## 2024-01-19 ENCOUNTER — THERAPY VISIT (OUTPATIENT)
Dept: OCCUPATIONAL THERAPY | Facility: CLINIC | Age: 54
End: 2024-01-19
Payer: COMMERCIAL

## 2024-01-19 DIAGNOSIS — G71.3 MYOPATHY, MITOCHONDRIAL: ICD-10-CM

## 2024-01-19 DIAGNOSIS — M60.80 OTHER MYOSITIS, UNSPECIFIED SITE: ICD-10-CM

## 2024-01-19 DIAGNOSIS — R29.898 HAND WEAKNESS: ICD-10-CM

## 2024-01-19 DIAGNOSIS — R29.898 DECREASED STRENGTH OF UPPER EXTREMITY: Primary | ICD-10-CM

## 2024-01-19 DIAGNOSIS — R26.89 IMPAIRED GAIT AND MOBILITY: Primary | ICD-10-CM

## 2024-01-19 DIAGNOSIS — R68.89 DECREASED FUNCTIONAL ACTIVITY TOLERANCE: ICD-10-CM

## 2024-01-19 PROCEDURE — 97166 OT EVAL MOD COMPLEX 45 MIN: CPT | Mod: GO

## 2024-01-19 PROCEDURE — 97530 THERAPEUTIC ACTIVITIES: CPT | Mod: GP

## 2024-01-19 PROCEDURE — 97162 PT EVAL MOD COMPLEX 30 MIN: CPT | Mod: GP

## 2024-01-19 PROCEDURE — 97110 THERAPEUTIC EXERCISES: CPT | Mod: GO

## 2024-01-19 NOTE — PROGRESS NOTES
PHYSICAL THERAPY EVALUATION  Type of Visit: Evaluation    See electronic medical record for Abuse and Falls Screening details.    Subjective       Presenting condition or subjective complaint: Want to strengthen arms, core, back, and both legs. Healing from fractured femur (R leg) and have neuromuscular condition.    Patient reports to OP PT for worsening weakness in presence of myopathy with reporting difficulties with putting on a coat, getting dressed and getting up from chair. Patient reports getting weaker with the fractured femur and knee. The left leg not feels as weak as the right leg. Been working with [employer] to strengthen the thigh muscles - started improving a year after initial fracture. Last saw neurologist for myopathy 3/2023. Tapered off predisone and methotrexate last spring and has since not had treatment for myopathy. Has noticed weakening in core/back - struggling with reaching to ground from chair. Following up with neuropathy 1/30/2024. Cannot get up from ground - feel like this has been 3-4 months. Been living on main floor. Uses metromobility. Currently using wheelchair because she has a hard time getting up. Cannot get up from toilet even with grab bars. Initial dx with myopathy in 7/2021. Difficulty lifting arms to face. Difficulty getting dressed - has OT order as well with eval today. Currently doing bridges partial ROM for core strengthening and CK independent pool exercises with  but has been out of this routine due to surgeries. Also has leg exercises from downstairs for leg rehab. Has 4WW but does not walk a lot. Also has FWW. Has had 2 surgeries recently - R leg and ovaries/tubes removed - feels like this relates to deconditioning.     Date of onset: 07/01/21    Relevant medical history: Arthritis; Bladder or bowel problems; Depression; History of fractures; Incontinence; Menopause; Numbness or tingling in perianal area; Osteoarthritis; Osteoporosis; Progressive neurological  deficits; Significant weakness; Sleep disorder like apnea   Dates & types of surgery: 23: repair right fractured genur & knee, placed nail in R femur, screws in R knee. 23: right ovary and tubes (both sides) removed. 23: knee surgery 3 screws removed, addrd one shorter screw in knee. 2007:  borth of twins    Prior diagnostic imaging/testing results: MRI; CT scan; X-ray; Bone scan     Prior therapy history for the same diagnosis, illness or injury: No      Prior Level of Function  Transfers: Assistive equipment  Ambulation: Assistive equipment  ADL: Assistive equipment, Assistive person  IADL:     Living Environment  Social support: With family members -  and kids  Type of home: House; 2-story; Basement   Stairs to enter the home: Yes 3 Is there a railing: Yes   Ramp: Yes   Stairs inside the home: Yes 13 Is there a railing: Yes   Help at home: Self Cares (home health aide/personal care attendant, family, etc); Home management tasks (cooking, cleaning); Home and Yard maintenance tasks; Assist for driving and community activities  Equipment owned: Straight Cane; Walker; Walker with wheels; Standard wheelchair; Bedrail; Grab bars; Commode; Raised toilet seat; Bath bench     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    Pain assessment: Pain present - B shoulders, neck, R knee     Objective   Cognitive Status Examination  Orientation: Oriented to person, place and time   Level of Consciousness: Alert  Follows Commands and Answers Questions:   Personal Safety and Judgement:   Memory: Intact    OBSERVATION:   POSTURE: Standing Posture: Forward head, Sway back, Thoracic kyphosis increased, slight cervical flexion showing cervical weakness  Sitting Posture: Rounded  shoulders, Forward head, posterior lean in sitting     PALPATION:    RANGE OF MOTION: Shoulder AROM into abduction roughly 45 degrees bilaterally, limited by weakness  STRENGTH: Hip flexion R 2-/5 L2-/5, supine hip extension  R 3/5 L3/5, knee extension R 4/5 L3+/5, knee flexion R 3+/5 L3+/5, ankle DF R 5/5 L5/5, significant proximal weakness noted throughout testing; Additional shoulder MMT indicated with noted weakness impacting shoulder AROM - noted increased weakness in R arm      BED MOBILITY:   Sit>supine: able to complete with Min A at legs but taxing and requires heavy UE support and momentum, proper set up requires  Rolling/scooting: able to roll to R, min A and UE support needed to roll with L, use UE to help with momentum, scooting with decreased hip clearance and small movements  Supine>sit: Max A needed due to trunk weakness, reports needing elevated head of bed and use of momentum by pulling arms into chest and rocking to get up at home    TRANSFERS: Set-Up Required, requires a high seat, heavy forward lean for momentum, unable to get up from standard chair, UE needed    GAIT:   Gait Description: ambulates with maintained sway back and excessive pelvic rotation    BALANCE:     SPECIAL TESTS  Functional Gait Assessment (FGA)    (<22/30 indicates fall risk)   10 Meter Walk Test (Comfortable)  21 sec with 4WW, 0.3 m/s with 4WW   Gait Tolerance   Ambulates 4 min continuous with 4WW, roughly 250 feet        Anders Balance Scale (BBS)    (<45/56 indicates fall risk)   5 Times Sit-to-Stand (5TSTS)       SENSATION: not formally assessed    REFLEXES: not formally assessed  COORDINATION: not formally assessed  MUSCLE TONE: no tone noted with PROM in LEs     Assessment & Plan   CLINICAL IMPRESSIONS  Medical Diagnosis: Myopathy, mitochondrial, Other myositis, unspecified site    Treatment Diagnosis: impaired functional mobility with weakness   Impression/Assessment: Patient is a 53 year old female with functional  mobility and weakness/deconditioning complaints.  The following significant findings have been identified: Pain, Decreased strength, Impaired balance, Impaired gait, Impaired muscle performance, Decreased activity tolerance, Impaired posture, and Instability. These impairments interfere with their ability to perform self care tasks, work tasks, recreational activities, household chores, household mobility, and community mobility as compared to previous level of function.     Clinical Decision Making (Complexity):  Clinical Presentation: Evolving/Changing  Clinical Presentation Rationale: based on medical and personal factors listed in PT evaluation  Clinical Decision Making (Complexity): Moderate complexity    PLAN OF CARE  Treatment Interventions:  Interventions: Gait Training, Manual Therapy, Neuromuscular Re-education, Therapeutic Activity, Therapeutic Exercise    Long Term Goals     PT Goal 1  Goal Identifier: Transfers  Goal Description: Patient to be able to safely complete STS from 16 inch seat height with use of UE support and proper equipment in order to improve IND access to community mobility.  Goal Progress: eval: requires elevated seat, limited in community access by height of toilets  Target Date: 04/18/24  PT Goal 2  Goal Identifier: Bed Mobility  Goal Description: Patient to roll in both directions and complete sit<>supine IND with proper set up and equipment in order to improve IND mobility in home.  Goal Progress: eval: Max A with supine>sit, Min A with rolling to R, Min A with supine>sit  Target Date: 04/18/24  PT Goal 3  Goal Identifier: Gait Speed  Goal Description: Patient to improve gait speed to >0.5 m/s with appropriate or no AD to display improved functional endurance and strength and decreased falls with home navigation and work towards improved community ambulation.  Goal Progress: eval: 0.3 m/s with 4WW  Target Date: 04/18/24  PT Goal 4  Goal Identifier: HEP  Goal Description: Patient to  report confidence and compliance with ongoing HEP with safe exercise principle to ensure safe management of condition upon discharge from skilled therapy.  Goal Progress: eval: unsure how to safely exercise with myopathy  Target Date: 04/18/24      Frequency of Treatment: 1x/week  Duration of Treatment: 12 weeks    Recommended Referrals to Other Professionals: Occupational Therapy - scheduled today  Education Assessment:   Learner/Method: Patient  Education Comments: PT role and POC, assessment findings, initial HEP    Risks and benefits of evaluation/treatment have been explained.   Patient/Family/caregiver agrees with Plan of Care.     Evaluation Time:     PT Eval, Moderate Complexity Minutes (05719): 35       Signing Clinician: Ashleigh Herring, PT

## 2024-01-19 NOTE — PROGRESS NOTES
SUBJECTIVE:                                                   Britt Park is a 53 year old female who presents to clinic today for the following health issue(s):  Patient presents with:  Post-op Visit: Procedure 23: Laparoscopic right salpingo-oophorectomy left salpingectomy drainage of left ovarian cyst    HPI:  Ventura presents for follow-up after her right salpingo-oophorectomy and left salpingectomy and left ovarian drilling.  Her urinary incontinence and urgency improved with surgery.  Unfortunately none of the pictures that were labeled as patient's copy made at home with her.  She has been taking Solifenacin and has had marked improvement in her urinary incontinence.  She has further follow-up with Dr. Rose planned.  We also discussed vaginal estrogen which would likely help her urinary symptoms and help with any painful intercourse in the future.  He is not having bothersome hot flashes or night sweats but does have delayed healing from a bone fracture.    No LMP recorded (lmp unknown). Patient is postmenopausal..     Patient is not sexually active, .  Using menopause for contraception.    reports that she has never smoked. She has never used smokeless tobacco.    STD testing offered?  Declined    Health maintenance updated:  yes    Today's PHQ-2 Score:       2023    12:21 PM   PHQ-2 (  [employer])   Q1: Little interest or pleasure in doing things 0   Q2: Feeling down, depressed or hopeless 1   PHQ-2 Score 1   Q1: Little interest or pleasure in doing things Not at all   Q2: Feeling down, depressed or hopeless Several days   PHQ-2 Score 1     Today's PHQ-9 Score:       8/3/2023     3:28 PM   PHQ-9 SCORE   PHQ-9 Total Score 5     Today's BERTO-7 Score:       8/3/2023     3:28 PM   BERTO-7 SCORE   Total Score 6       Problem list and histories reviewed & adjusted, as indicated.  Additional history: as documented.    Patient Active Problem List   Diagnosis    Myopathy, mitochondrial     Overactive bladder    Dry eyes    Encounter for monitoring of systemic steroid therapy    Need for vaccination    Impetigo    Secondary hypertension    On prednisone therapy    Vitamin B 12 deficiency    Need for pneumocystis prophylaxis    Anemia, unspecified type    Visit for screening mammogram    Seasonal allergic rhinitis, unspecified trigger    Acute respiratory failure with hypoxia (H)    Other myositis, unspecified site    Facial paresthesia    Weight gain    Vaginal dryness    Annual physical exam    Methemoglobinemia    Cyst of right ovary    Hepatic steatosis    Closed fracture of right femur, unspecified fracture morphology, unspecified portion of femur, initial encounter (H)    Enlarged lymph nodes    Atopic dermatitis of scalp    Pain of right lower leg    Closed disp fracture of condyle of right femur with routine healing    Pulmonary nodules    Acute pain of right knee    Aftercare following surgery of the musculoskeletal system     Past Surgical History:   Procedure Laterality Date    ABDOMEN SURGERY  2007        BIOPSY  2019 & 2021    Right bicep, result abnormal results, & left tricep biopsy    COLONOSCOPY  21    COLONOSCOPY N/A 2022    Procedure: COLONOSCOPY, FLEXIBLE, WITH LESION REMOVAL USING SNARE;  Surgeon: Dorie Santos MD;  Location:  GI    GYN SURGERY  07     for twins    LAPAROSCOPIC OOPHORECTOMY Right 2023    Procedure: LAPAROSCOPIC RIGHT OOPHORECTOMY AND SALPINGECTOMY, LEFT SALPINGECTOMY AND LEFT OVARIAN CYST DRAINAGE;  Surgeon: Annette Walls MD;  Location:  OR    OPEN REDUCTION INTERNAL FIXATION RODDING INTRAMEDULLARY FEMUR Right 2022    Procedure: Open reduction internal fixation of right femur fracture;  Surgeon: Al Larson MD;  Location:  OR      Social History     Tobacco Use    Smoking status: Never    Smokeless tobacco: Never   Substance Use Topics    Alcohol use: Yes      *Patient  is Adopted           Problem (# of Occurrences) Relation (Name,Age of Onset)    Unknown/Adopted (1) Other (I am adopted. No family history.)              Current Outpatient Medications   Medication Sig    acetaminophen (TYLENOL) 325 MG tablet Take 2 tablets (650 mg) by mouth every 6 hours as needed for other (For optimal non-opioid multimodal pain management to improve pain control.)    alendronate (FOSAMAX) 70 MG tablet Take 70 mg by mouth    atorvastatin (LIPITOR) 20 MG tablet Take 1 tablet by mouth daily at 2 pm    calcium carbonate (TUMS) 500 MG chewable tablet Take 1 tablet (500 mg) by mouth 2 times daily as needed for heartburn    estradiol (VAGIFEM) 10 MCG TABS vaginal tablet Place 1 tablet (10 mcg) vaginally twice a week    folic acid (FOLVITE) 1 MG tablet Take 1 tablet (1 mg) by mouth daily    ibuprofen (ADVIL/MOTRIN) 800 MG tablet Take 1 tablet (800 mg) by mouth every 6 hours as needed for other (mild and/or inflammatory pain)    losartan (COZAAR) 25 MG tablet TAKE 1 TABLET(25 MG) BY MOUTH DAILY    pantoprazole (PROTONIX) 40 MG EC tablet TAKE 1 TABLET BY MOUTH DAILY 30 MINUTES BEFORE BREAKFAST    solifenacin (VESICARE) 5 MG tablet Take 1 tablet (5 mg) by mouth daily    valACYclovir (VALTREX) 1000 mg tablet Take 2,000 mg by mouth as needed    Vitamin D3 (CHOLECALCIFEROL) 25 mcg (1000 units) tablet Take 25 mcg by mouth    hydrOXYzine HCl (ATARAX) 25 MG tablet Take 25 mg by mouth 3 times daily as needed for itching (Patient not taking: Reported on 1/22/2024)    traMADol (ULTRAM) 50 MG tablet Take 50 mg by mouth every 6 hours as needed for severe pain (Patient not taking: Reported on 1/22/2024)     No current facility-administered medications for this visit.     Allergies   Allergen Reactions    Dapsone Shortness Of Breath     Oxygen levels went to 79 %.    Influenza Vaccines Hives     2/24/23 Patient states she's been able to have a flu shot the past couple of years and has done well with it.    Benadryl  "[Diphenhydramine]     Diphenhydramine Other (See Comments)     SHAKY      Sulfa Antibiotics Swelling    Sulfa Antibiotics        ROS:  12 point review of systems negative other than symptoms noted below or in the HPI.  POSITIVE for:, urinary frequency, urgency      OBJECTIVE:     /78   Ht 1.575 m (5' 2\")   Wt 59 kg (130 lb)   LMP  (LMP Unknown)   BMI 23.78 kg/m    Body mass index is 23.78 kg/m .    Exam:  Constitutional:  Appearance: Well nourished, well developed alert, in no acute distress  Gastrointestinal:  Abdominal Examination:  Abdomen nontender to palpation, tone normal without rigidity or guarding, no masses present, umbilicus without lesions; Liver/Spleen:  No hepatomegaly present, liver nontender to palpation; Hernias:  No hernias present.  Abdominal incisions are well-healed    In-Clinic Test Results:  Results for orders placed or performed in visit on 01/22/24 (from the past 24 hour(s))   XR Femur Right 2 Views    Narrative    XR FEMUR RIGHT 2 VIEWS 1/22/2024 7:30 AM     HISTORY: Closed disp fracture of condyle of right femur with routine  healing    COMPARISON: 12/7/2023         Impression    IMPRESSION: Postoperative changes of IM ronda and screw fixation of a  fracture of the diaphyseal shaft of the femur. Ongoing bony remodeling  at the fracture site. No new fractures are identified. No change in  alignment.    KEENA GUERRERO MD         SYSTEM ID:  [id number]       ASSESSMENT/PLAN:                                                        ICD-10-CM    1. Postoperative state  Z98.890       2. Vaginal atrophy  N95.2 estradiol (VAGIFEM) 10 MCG TABS vaginal tablet      3. Urinary urgency  R39.15 estradiol (VAGIFEM) 10 MCG TABS vaginal tablet          She is healing well from surgery with no postoperative complications suspected.  Recommend continued working with Dr. Rose to continue to workup her urinary incontinence however she is currently well-controlled on oral medication.  Discussed vaginal " estrogen to help with the urinary urgency symptoms and also with dyspareunia.  Mentioned Vagifem and this was prescribed today.  I discussed systemic hormone replacement therapy at this time she is not interested in this.    Annette Walls MD  [facility] [place], [place] FOR [facility] [place]

## 2024-01-19 NOTE — PROGRESS NOTES
OCCUPATIONAL THERAPY EVALUATION  Type of Visit: Evaluation    See electronic medical record for Abuse and Falls Screening details.    Subjective      Presenting condition or subjective complaint: Want to strengthen arms, core, back, and both legs. Healing from fractured femur (R leg) and have neuromuscular condition.  Date of onset: 24 (order date)    Relevant medical history: Arthritis; Bladder or bowel problems; Depression; History of fractures; Incontinence; Menopause; Numbness or tingling in perianal area; Osteoarthritis; Osteoporosis; Progressive neurological deficits; Significant weakness; Sleep disorder like apnea   Dates & types of surgery: 23: repair right fractured genur & knee, placed nail in R femur, screws in R knee. 23: right ovary and tubes (both sides) removed. 23: knee surgery 3 screws removed, addrd one shorter screw in knee. 2007:  borth of twins    Prior diagnostic imaging/testing results: MRI; CT scan; X-ray; Bone scan     Prior therapy history for the same diagnosis, illness or injury: No      Occupational Profile: Patient is a 53 year old female who was referred to outpatient occupational therapy due to progressive weaknesses and increased difficulty with daily tasks in setting of neuromuscular condition, granulomatous myositis. Patient also has experienced a decline in functional mobility and deconditioning after sustaining a right femur fracture 2023 and participated in outpatient ortho physical therapy. She last saw neurologist for myopathy at [facility]  3/2023 and has a follow up 24. She previously participated in outpatient OT in  and . She reports functional limitations including difficulty putting on a coat, washing/styling hair, makeup application, and rising from chairs and toilets. She uses manual wheelchair for mobility and uses 4WW for household mobility. She lives on the main level of her own and does not navigate stairs.  She showers a JUNTA.CL. She uses [employer] mobility or assistance from family for transportation. She works remotely full-time in HR. She was seen by outpatient neuro physical therapy and will be following pelvic floor therapist as well.     Prior Level of Function  Transfers:  Mod I   Ambulation:  Mod I   ADL: Assistive person  IADL: Housekeeping, Meal preparation, Work    Living Environment  Social support: With family members   Type of home: House; 2-story; Basement bathroom on second floor at home  Stairs to enter the home: Yes 3 Is there a railing: Yes   Ramp: Yes   Stairs inside the home: Yes 13 Is there a railing: Yes   Help at home: Self Cares (home health aide/personal care attendant, family, etc); Home management tasks (cooking, cleaning); Home and Yard maintenance tasks; Assist for driving and community activities  Equipment owned: Straight Cane; Walker; Walker with wheels; Standard wheelchair; Bedrail; Grab bars; Commode; Raised toilet seat; Bath bench     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    Pain assessment: Pain denied     Objective   Cognitive Status Examination  Orientation: Oriented to person, place and time   Level of Consciousness: Alert  Follows Commands and Answers Questions: 100% of the time  Personal Safety and Judgement: Intact  Memory: Intact  Attention: No deficits identified  Organization/Problem Solving: No deficits identified  Executive Function: No deficits identified    Pt understandably reflects on navigating challenges with living with neuromuscular condition and found speaking to a therapist helpful. She states she is looking into finding another therapist   VISUAL SKILLS  Visual Acuity: No deficits identified  Visual Field: Appears normal  Visual Attention: Appears normal    SENSATION:  reports onset  of parathesias in BUE and BLE when trying to fall asleep    POSTURE:  bilateral scapular winging; altered scapulohumeral rhythm   RANGE OF MOTION:   Right UE AROM : R SHDR FLX: 60*; R SHDR ABD: 55*; Functional ER: reaches to ear level and required to elicit trunk flexion to reach hair level; R ELBOW  FLEX/EXT, SUPINATION, WRIST FLEXION/EXTENSION: FULL; dystonia in right hand (predominately 5th digit);   Left UE AROM:  LSHDR FLX: 50*; L SHDR ABD: 55*; Functional ER: reaches to ear level and required to elicit trunk flexion to reach hair level; L ELBOW  FLEX/EXT, SUPINATION, WRIST FLEXION/EXTENSION: FULL  *Will assess gravity-eliminated plane next session  STRENGTH:     Pain: - none + mild ++ moderate +++ severe  Strength Scale: 0-5/5 Left Right    Strength (lbs) 28lb 11 lb    Lateral Pinch (lbs) 9 lb  17lb    3 Point Pinch (lbs)                 COORDINATION: Left Hand, Nine Hole Peg Test (sec): 23 sec  Right Hand, Nine Hole Peg Test (sec): 25 seconds  BALANCE:  defer to PT    FUNCTIONAL MOBILITY  Assistive Device(s): Walker (four wheeled), Wheelchair (manual)  Ambulation: Mod I using 4WW for household distances and uses manual wheelchair for community mobility   Wheelchair: manual     BED MOBILITY:  Mod I - requires bed height raised and use of UE for momentum and bed rail ; challenges donning C-pap in supine due to UE limitations     Sit>supine: able to complete with Min A at legs but taxing and requires heavy UE support and momentum, proper set up requires  Rolling/scooting: able to roll to R, min A and UE support needed to roll with L, use UE to help with momentum, scooting with decreased hip clearance and small movements  Supine>sit: Max A needed due to trunk weakness, reports needing elevated head of bed and use of momentum by pulling arms into chest and rocking to get up at home     TRANSFERS:  Set up required; challenges with rising from standard height chairs/toilets/surfaces    BATHING: Min Assist with  assist from  to wash hair due to limited UE function; uses proximal support for managing hair tasks; challenging managing hair tasks and makeup application   Equipment: Grab bar, Shower chair/Tub bench    UPPER BODY DRESSING:  Required to use modified techniques including forward trunk flexion and uses larger pull over shirts to assist with donning; engages dressing fasteners such as zipping, buttoning; challenges tying   Equipment: Dressing stick, Reacher    LOWER BODY DRESSING:  Mod I; wears easy pull-up pants ; dons socks and shoes in bed  Equipment:  No AE utilized at this time    TOILETING:  Mod I; notes challenges with rising from standard toilet height in community   Equipment: Raised toilet seat with grab bar    GROOMING:  challenges with hair styling and makeup application; utilizes standard toothbrush   Equipment:  No AE utilized at this time    EATING/SELF FEEDING:  challenges with loading utensil and transporting to mouth    Equipment:  No AE utilized at this time    ACTIVITY TOLERANCE: fatigues easily with ambulation and daily activities    INSTRUMENTAL ACTIVITIES OF DAILY LIVING (IADL): Pt receives support from family for IADL's  Meal Planning/Prep: Performs meal prep tasks with modifications. Able to perform chopping/cutting tasks in standing with rest breaks as needed. 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  The following significant findings have been identified: Impaired activity tolerance, Impaired balance, Impaired coordination, Impaired mobility, Impaired ROM, and Impaired strength.  These identified deficits interfere with their ability to perform self care tasks, work tasks, household chores, household mobility, community mobility, and meal planning and preparation as compared to previous level of function. Patient benefits from skilled outpatient occupational therapy for education and training on  upper extremity HEP with emphasis on proper technique and  submaximal exercise principles to preserve muscle strength, functional endurance, and enable maximal participation in ADL/IADL's and improve self-management of condition and adaptive equipment/techniques to improve ease, efficiency, and independence in daily tasks.     Clinical Decision Making (Complexity):  Assessment of Occupational Performance: 3-5 Performance Deficits  Occupational Performance Limitations: bathing/showering, toileting, dressing, feeding, hygiene and grooming, home establishment and management, meal preparation and cleanup, and work  Clinical Decision Making (Complexity): Moderate complexity    PLAN OF CARE  Treatment Interventions:  Interventions: Self-Care/Home Management, Therapeutic Activity, Therapeutic Exercise    Long Term Goals   OT Goal 1  Goal Identifier: Home Exercise Program  Goal Description: Patient will verbalize and demonstrate understanding of upper extremity HEP with emphasis on proper technique and  submaximal exercise principles to preserve muscle  strength, functional endurance, and enable maximal participation in ADL/IADL's and improve self-management of condition  Target Date: 04/17/24  OT Goal 2  Goal Identifier: Adaptive Techniques/Equipmen  Goal Description: Patient to verbalize and demonstrate understanding of adaptive  techniques prn to increase her independence and safety with ADLs and IADLS (kitchen tasks, writing, self-feeding, hair styling, makeup application, dressing, etc).  Target Date: 04/17/24      Frequency of Treatment: 1x/week  Duration of Treatment: up to 90 days     Recommended Referrals to Other Professionals:  none - has orders for neuro PT  Education Assessment:       Risks and benefits of evaluation/treatment have been explained.   Patient/Family/caregiver agrees with Plan of Care.     Evaluation Time:    OT Eval, Moderate Complexity Minutes (44282): 30    Signing Clinician: PARISA Damian/L, CNS, CSRS

## 2024-01-20 ENCOUNTER — MYC MEDICAL ADVICE (OUTPATIENT)
Dept: FAMILY MEDICINE | Facility: CLINIC | Age: 54
End: 2024-01-20
Payer: COMMERCIAL

## 2024-01-20 DIAGNOSIS — I15.9 SECONDARY HYPERTENSION: ICD-10-CM

## 2024-01-20 DIAGNOSIS — K21.9 GASTROESOPHAGEAL REFLUX DISEASE WITHOUT ESOPHAGITIS: ICD-10-CM

## 2024-01-22 ENCOUNTER — ANCILLARY PROCEDURE (OUTPATIENT)
Dept: GENERAL RADIOLOGY | Facility: CLINIC | Age: 54
End: 2024-01-22
Attending: PHYSICIAN ASSISTANT
Payer: COMMERCIAL

## 2024-01-22 ENCOUNTER — OFFICE VISIT (OUTPATIENT)
Dept: OBGYN | Facility: CLINIC | Age: 54
End: 2024-01-22
Payer: COMMERCIAL

## 2024-01-22 VITALS
WEIGHT: 130 LBS | HEIGHT: 62 IN | BODY MASS INDEX: 23.92 KG/M2 | DIASTOLIC BLOOD PRESSURE: 78 MMHG | SYSTOLIC BLOOD PRESSURE: 118 MMHG

## 2024-01-22 DIAGNOSIS — S72.411D: ICD-10-CM

## 2024-01-22 DIAGNOSIS — R39.15 URINARY URGENCY: ICD-10-CM

## 2024-01-22 DIAGNOSIS — Z98.890 POSTOPERATIVE STATE: Primary | ICD-10-CM

## 2024-01-22 DIAGNOSIS — N95.2 VAGINAL ATROPHY: ICD-10-CM

## 2024-01-22 PROCEDURE — 99213 OFFICE O/P EST LOW 20 MIN: CPT | Performed by: OBSTETRICS & GYNECOLOGY

## 2024-01-22 PROCEDURE — 73552 X-RAY EXAM OF FEMUR 2/>: CPT | Mod: TC | Performed by: RADIOLOGY

## 2024-01-22 RX ORDER — ESTRADIOL 10 UG/1
10 INSERT VAGINAL
Qty: 24 TABLET | Refills: 3 | Status: SHIPPED | OUTPATIENT
Start: 2024-01-22

## 2024-01-22 NOTE — TELEPHONE ENCOUNTER
See mc     Please advise if ok to continue on protonix given by Dr. Mena? Should she see Dr. Mena for this?

## 2024-01-23 ENCOUNTER — PRE VISIT (OUTPATIENT)
Dept: UROLOGY | Facility: CLINIC | Age: 54
End: 2024-01-23
Payer: COMMERCIAL

## 2024-01-23 RX ORDER — LOSARTAN POTASSIUM 25 MG/1
TABLET ORAL
Qty: 90 TABLET | Refills: 3 | Status: SHIPPED | OUTPATIENT
Start: 2024-01-23

## 2024-01-23 RX ORDER — PANTOPRAZOLE SODIUM 40 MG/1
TABLET, DELAYED RELEASE ORAL
Qty: 90 TABLET | Refills: 0 | Status: SHIPPED | OUTPATIENT
Start: 2024-01-23 | End: 2024-04-29

## 2024-01-24 ENCOUNTER — THERAPY VISIT (OUTPATIENT)
Dept: PHYSICAL THERAPY | Facility: CLINIC | Age: 54
End: 2024-01-24
Attending: OBSTETRICS & GYNECOLOGY
Payer: COMMERCIAL

## 2024-01-24 DIAGNOSIS — N39.41 URGE INCONTINENCE: ICD-10-CM

## 2024-01-24 PROCEDURE — 97162 PT EVAL MOD COMPLEX 30 MIN: CPT | Mod: GP

## 2024-01-24 PROCEDURE — 97112 NEUROMUSCULAR REEDUCATION: CPT | Mod: GP

## 2024-01-24 PROCEDURE — 97530 THERAPEUTIC ACTIVITIES: CPT | Mod: GP

## 2024-01-24 NOTE — PROGRESS NOTES
PHYSICAL THERAPY EVALUATION  Type of Visit: Evaluation    See electronic medical record for Abuse and Falls Screening details.    Subjective       Presenting condition or subjective complaint: Want to strengthen arms, core, back, and both legs. Healing from fractured femur (R leg) and have neuromuscular condition.  Patient reports to outpatient physical therapy with symptoms consistent with urge incontinence s/p 12/20/23: Laparoscopic right salpingo-oophorectomy left salpingectomy drainage of left ovarian cyst. Her symptoms have gotten a lot better. She is taking a new pain to help with the urgency so much. She does not get the urgency in the morning like she was. She does feel like she has to strain to fully empty her bladder though and occasionally strain with bowel movements. She has a frequency sensation with bladder as well - after voiding will feel like she still has to void. Has some tightness in R hip that has been present for about a month.    She is also working with [facility] for LE strengthening from a femur fx. She is also starting to see Ashleigh for neuro PT upstairs for myopathy as well as OT. Has some tightness in R hip that has been present for about a month.    Goals: reduce straining to feel like she can completely empty, not strain for bowels, continue to reduce urgency/stress    Date of onset: 01/03/24 (date of order)    Relevant medical history: Arthritis; Bladder or bowel problems; Depression; History of fractures; Incontinence; Menopause; Numbness or tingling in perianal area; Osteoarthritis; Osteoporosis; Progressive neurological deficits; Significant weakness; Sleep disorder like apnea   Past Medical History:   Diagnosis Date    Acute respiratory failure with hypoxia (H)     Anemia     Facial paresthesia     Hepatic vein stenosis     Impetigo     Methemoglobinemia     Ovarian cyst     Overactive bladder     Secondary hypertension 11/02/2021    Sleep apnea      Dates & types of surgery: 12/11/23:  repair right fractured genur & knee, placed nail in R femur, screws in R knee. 23: right ovary and tubes (both sides) removed. 23: knee surgery 3 screws removed, addrd one shorter screw in knee. 2007:  borth of twins  Past Surgical History:   Procedure Laterality Date    ABDOMEN SURGERY  2007        BIOPSY  2019 & 2021    Right bicep, result abnormal results, & left tricep biopsy    COLONOSCOPY  21    COLONOSCOPY N/A 2022    Procedure: COLONOSCOPY, FLEXIBLE, WITH LESION REMOVAL USING SNARE;  Surgeon: Dorie Santos MD;  Location:  GI    GYN SURGERY  07     for twins    LAPAROSCOPIC OOPHORECTOMY Right 2023    Procedure: LAPAROSCOPIC RIGHT OOPHORECTOMY AND SALPINGECTOMY, LEFT SALPINGECTOMY AND LEFT OVARIAN CYST DRAINAGE;  Surgeon: Annette Walls MD;  Location:  OR    OPEN REDUCTION INTERNAL FIXATION RODDING INTRAMEDULLARY FEMUR Right 2022    Procedure: Open reduction internal fixation of right femur fracture;  Surgeon: Al Larson MD;  Location:  OR     Prior diagnostic imaging/testing results: MRI; CT scan; X-ray; Bone scan     Prior therapy history for the same diagnosis, illness or injury: No      Prior Level of Function  Transfers: Assistive equipment  Ambulation: Assistive equipment  ADL: Assistive equipment  IADL: Finances, Housekeeping, Laundry, Meal preparation, Medication management, Work, Yard work    Living Environment  Social support: With family members   Type of home: House; 2-story; Basement   Stairs to enter the home: Yes 3 Is there a railing: Yes   Ramp: Yes   Stairs inside the home: Yes 13 Is there a railing: Yes   Help at home: Self Cares (home health aide/personal care attendant, family, etc); Home management tasks (cooking, cleaning); Home and Yard maintenance tasks; Assist for driving and community activities  Equipment owned: Straight Cane; Walker; Walker with wheels;  Standard wheelchair; Bedrail; Grab bars; Commode; Raised toilet seat; Bath bench     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    Pain assessment:  some tightness in R lateral hip     Objective      PELVIC EVALUATION  ADDITIONAL HISTORY:  Sex assigned at birth: Female  Gender identity: Female    Pronouns: She/Her Hers      Bladder History:  Feels bladder filling: Yes  Triggers for feeling of inability to wait to go to the bathroom: Yes Standing up to go to the bathroom, loosening my pants to use toilet  How long can you wait to urinate: A few seconds to a minute  Gets up at night to urinate: Yes 1-2 times  Can stop the flow of urine when urinating: Sometimes  Volume of urine usually released: Medium   Other issues: Straining to pass urine; Trouble emptying bladder completely  Number of bladder infections in last 12 months:    Fluid intake per day: 20 oz 12 oz.    Medications taken for bladder: Yes Solifenacin 5 MG tablets   Activities causing urine leak: Hurrying to the bathroom due to a strong urge to urinate (pee); Other activities Upon waking if it has been 5-6+ houts since i last urinated  Amount of urine typically leaked: Medium to large amounts  Pads used to help with leaking: Yes I use this many pads per day: Ranges from 1-3 I use this type/brand: Always Discreet disposable briefs/underware    Bowel History:  Frequency of bowel movement: Every 2-3 days  Consistency of stool: Soft-formed    Ignores the urge to defecate: No  Other bowel issues: Pain when pooping; Loss of gas; Straining to have bowel movement  Length of time spent trying to have a bowel movement: 5-10 minutes    Sexual Function History:  Sexual orientation: Straight    Sexually active: No  Lubrication used: No No  Pelvic pain: Initial penetration (rectal or vaginal)    Pain or difficulty with  orgasms/erection/ejaculation: Yes Difficulty having an orgasm, can take a long time  State of menopause: Post-menopause (I am done with menopause)  Hormone medications: No      Are you currently pregnant: No, Number of previous pregnancies: 2, Number of deliveries: 3, If you have delivered before, did you have any of these issues during delivery: Episiotomy;  delivery; Vaginal delivery, Have you been diagnosed with pelvic prolapse or abdominal separation: No, Do you get regular exercise: No, Have you tried pelvic floor strengthening exercises for 4 weeks: Yes, Do you have any history of trauma that is relevant to your care that you d like to share: No    Discussed reason for referral regarding pelvic health needs and external/internal pelvic floor muscle examination with patient/guardian.  Opportunity provided to ask questions and verbal consent for assessment and intervention was given.    POSTURE: Sitting Posture: Rounded shoulders  LUMBAR SCREEN:  not assessed due to mobility and balance deficits  HIP SCREEN:  ROM: not assessed  Strength:  not assessed with MMT    Functional Strength Testing: Double Leg Squat: Anterior knee translation, Knee valgus, Hip internal rotation, and Improper use of glutes/hips  Transfers: min-mod with sit to stand, max assist with supine to sit    BREATHING SYMMETRY: Decreased rib cage mobility, increased from upper chest, improved from diaphragm with TC and VC    PELVIC EXAM  External Visual Inspection:  At rest: Normal  With voluntary pelvic floor contraction: Present  Relaxation of PFM: Yes    Integumentary:   Introitus: Unremarkable    External Digital Palpation per Perineum:   Ischiocavernosis: Unremarkable  Bulbo cavernosis: Unremarkable  Transverse perineal: Unremarkable  Levator ani: Unremarkable  Perineal body: Unremarkable    Scar:   Location/Type: not assessed  Mobility:  n/a    Internal Digital Palpation:  Per Vagina:  Myofascial Resistance to Palpation: Soft,  Taut  Digital Muscle Performance: P (Power): 3  E (Endurance): 10 sec with reduced relaxation after contraction  F (Fast Twitch): 5 with reduced speed and relaxation after each rep  Compensations: Adductors, Gluts, Breath holding  Relaxation Post-Contraction: Partial/delayed relaxation    Per Rectum:  Not assessed      Pelvic Organ Prolapse:   Not assessed    ABDOMINAL ASSESSMENT  Diastasis Rectus Abdominis (DOMENICO):  Not assessed    Abdominal Activation/Strength:  TA activation: good activation on exhale, tendency to hold breath    Scar:   Location/Type: not assessed  Mobility:  n/a    Fascial Tension/Restriction:  not assessed    BIOFEEDBACK:  Position: Supine  Surface Electrodes: Perineal    Abdominals:     Perianals:   Baseline muscle activity: 7-8 microvolts  Peak Amplitude/MVC: 20-24 microvolts    DERMATOMES:  patient reports no sensory deficits during exam    Assessment & Plan   CLINICAL IMPRESSIONS  Medical Diagnosis: urge incontinence    Treatment Diagnosis:     Impression/Assessment: Patient is a 53 year old female with symptoms consistent with urge incontinence and straining with bowel/bladder complaints.  The following significant findings have been identified: Decreased ROM/flexibility, Decreased strength, Impaired balance, Impaired gait, Impaired muscle performance, Decreased activity tolerance, and Impaired posture. These impairments interfere with their ability to perform self care tasks, recreational activities, household chores, household mobility, and community mobility as compared to previous level of function.     Clinical Decision Making (Complexity):  Clinical Presentation: Stable/Uncomplicated  Clinical Presentation Rationale: based on medical and personal factors listed in PT evaluation  Clinical Decision Making (Complexity): Moderate complexity    PLAN OF CARE  Treatment Interventions:  Modalities: Biofeedback, Ultrasound  Interventions: Gait Training, Manual Therapy, Neuromuscular  Re-education, Therapeutic Activity, Therapeutic Exercise, Self-Care/Home Management    Long Term Goals     PT Goal 1  Goal Identifier: frequency  Goal Description: patient will be able to void every 2-4 hours with decreased sense of urge  Rationale: to maximize safety and independence with performance of ADLs and functional tasks;to maximize safety and independence within the home;to maximize safety and independence with self cares  Target Date: 04/17/24  PT Goal 2  Goal Identifier: defecation  Goal Description: patient will report not having to strain to have a bowel movement  Rationale: to maximize safety and independence with performance of ADLs and functional tasks;to maximize safety and independence within the home;to maximize safety and independence with self cares  Target Date: 04/17/24  PT Goal 3  Goal Identifier: voiding/emptying  Goal Description: patient will be able to void without straining  Rationale: to maximize safety and independence with performance of ADLs and functional tasks;to maximize safety and independence with self cares;to maximize safety and independence within the home  Target Date: 04/17/24      Frequency of Treatment: 1x/wk progressing to 1 x every other week  Duration of Treatment: 2-3 months    Recommended Referrals to Other Professionals: seeing ortho PT and neuro PT and OT at present  Education Assessment:   Learner/Method: Patient    Risks and benefits of evaluation/treatment have been explained.   Patient/Family/caregiver agrees with Plan of Care.     Evaluation Time:     PT Eval, Moderate Complexity Minutes (72871): 34     Signing Clinician: Sonali Squires PT

## 2024-01-26 ENCOUNTER — THERAPY VISIT (OUTPATIENT)
Dept: PHYSICAL THERAPY | Facility: CLINIC | Age: 54
End: 2024-01-26
Payer: COMMERCIAL

## 2024-01-26 DIAGNOSIS — S72.91XA CLOSED FRACTURE OF RIGHT FEMUR, UNSPECIFIED FRACTURE MORPHOLOGY, UNSPECIFIED PORTION OF FEMUR, INITIAL ENCOUNTER (H): Primary | ICD-10-CM

## 2024-01-26 DIAGNOSIS — M79.661 PAIN OF RIGHT LOWER LEG: ICD-10-CM

## 2024-01-26 DIAGNOSIS — Z47.89 AFTERCARE FOLLOWING SURGERY OF THE MUSCULOSKELETAL SYSTEM: ICD-10-CM

## 2024-01-26 PROCEDURE — 97530 THERAPEUTIC ACTIVITIES: CPT | Mod: GP | Performed by: PHYSICAL THERAPIST

## 2024-01-26 PROCEDURE — 97110 THERAPEUTIC EXERCISES: CPT | Mod: 59 | Performed by: PHYSICAL THERAPIST

## 2024-01-26 NOTE — PROGRESS NOTES
01/26/24 0500   Appointment Info   Signing clinician's name / credentials Anum Juan Miguel PT   Total/Authorized Visits 24 per therapist   Visits Used 41   Medical Diagnosis R femur fx   PT Tx Diagnosis s/p R femur ORIF   Precautions/Limitations WBAT   Progress Note/Certification   Onset of illness/injury or Date of Surgery 12/11/22   PT Goal 1   Goal Identifier Ambulation   Goal Description Minutes patient will be able to walk without AD - 10   Rationale to maximize safety and independence within the community;to maximize safety and independence with performance of ADLs and functional tasks   Goal Progress Patient is able to walk 3-4 minutes without AD   Target Date 03/08/24   Subjective Report   Subjective Report Pt. has made moderate progress with her R knee/LE following a R mid femur fx and ORIF. However, do to her ongoing neuromuscular disease she has gotten much weaker overall in the past year making ambulation, transfers, and all ADL's more difficult. For this reasony, patient is now doing both neuro PT and OT and will no longer be doing orthopedic PT moving forward. We will discharge patient from PT with no further visits planned at this time.   Objective Measures   Objective Measures Objective Measure 1;Objective Measure 3;Objective Measure 2;Objective Measure 4   Objective Measure 1   Objective Measure Amb   Details pt. can walk 2 minutes w/out AD with marked sway back due to core weakness   Objective Measure 2   Objective Measure ROM   Details AROM R knee 0-0-95; PROM R 0-0-100   Objective Measure 3   Objective Measure strength   Details patient unable to raise arms greater than 30 deg of  shoulder flexion actively. Biceps 5/5 bilat, triceps 3/5 bilat. Hip flexion 2+/5 bilat.  Knee ext  4+/5 bilat with MMT sitting, ankle DF  5/5. Difficulty getting self out of a chair today even from a chair with arms.   Objective Measure 4   Objective Measure transfers   Details pt. requires mod assist with sit to  stand from chair with arms - previously was independent   Treatment Interventions (PT)   Interventions Therapeutic Activity;Therapeutic Procedure/Exercise;Manual Therapy;Neuromuscular Re-education   Therapeutic Procedure/Exercise   Therapeutic Procedures: strength, endurance, ROM, flexibillity minutes (01767) 30   Therapeutic Procedures Ther Proc 2;Ther Proc 3;Ther Proc 4;Ther Proc 5;Ther Proc 6;Ther Proc 7;Ther Proc 8   Ther Proc 1 NuStep   Ther Proc 1 - Details 10 minutes Level 3   Ther Proc 2 - Details sit to stand requires min/mod assist today, limited to 5 x   Ther Proc 3 knee bends   Ther Proc 3 - Details 10 reps at bar   Ther Proc 4 roll outs   Ther Proc 4 - Details Gr TB x 10   Ther Proc 5 seated back ext   Ther Proc 5 - Details bend over toward floor then straighten back up x 10 - difficult   Ther Proc 6 roll ins   Ther Proc 6 - Details 5 sec x 10   Ther Proc 7 hams curls   Ther Proc 7 - Details yellow TB x 10 B   PTRx Ther Proc 1 Sit to Stand- not today   PTRx Ther Proc 1 - Details No Notes   PTRx Ther Proc 2 Short Arc Knee Extension (Long Sitting)   PTRx Ther Proc 2 - Details x15 with fatigue - 2 lbs right,4 lbs left   PTRx Ther Proc 3 Active Assisted Knee Flexion   PTRx Ther Proc 3 - Details No Notes   PTRx Ther Proc 4 Isometric Quad   PTRx Ther Proc 4 - Details x5 with glute activation mod verbal and tactile cues for quad > glute max education and trial of REYNALDO LE quad set with improved contraction x10   PTRx Ther Proc 5 Toe Raises   PTRx Ther Proc 5 - Details single toe raises 10x each leg   PTRx Ther Proc 6 Hip Flexion Straight Leg Raise   PTRx Ther Proc 6 - Details can only do 1/3rd excursion   PTRx Ther Proc 7 Standing Hip Flexion Straight Leg Raise   PTRx Ther Proc 7 - Details 2x10 with cues on quad set   PTRx Ther Proc 8 Supine Heel Slides   PTRx Ther Proc 8 - Details 5-10x 3-4x/day as tolerated   PTRx Ther Proc 9 Seated Heel Slide with Foot on the Floor   PTRx Ther Proc 9 - Details 5-10x 3-4x/day    PTRx Ther Proc 10 Standing Weight Shift   PTRx Ther Proc 10 - Details No Notes   PTRx Ther Proc 11 Hip AROM Standing Abduction   PTRx Ther Proc 11 - Details 10x each leg   PTRx Ther Proc 12 Hip Flexion With Tubing   PTRx Ther Proc 12 - Details not today   Skilled Intervention Cues for form, activity modification and pain relief exercises   Patient Response/Progress deferred sit to stand ex today due to right knee pain   Therapeutic Activity   Therapeutic Activities: dynamic activities to improve functional performance minutes (39048) 15   Therapeutic Activities Ther Act 2;Ther Act 3;Ther Act 4;Ther Act 5;Ther Act 6;Ther Act 7   Ther Act 1 Cont education in activity modification to reduce stress on injury. Instructed in parameters of frequency and duration of activities to progress activity safely. Answered mult questions regarding diana's nad expectations/timeframes of her recovery.   Ther Act 2 ambulation   Ther Act 2 - Details no  feet x 2 - cueing to increase stride length   Ther Act 3 stairs   Ther Act 3 - Details not today   Ther Act 4 standing hip abd   Ther Act 4 - Details 10 reps B   Ther Act 5 marching   Ther Act 5 - Details 5 reps B   Ther Act 6 discussed ex in pool to work on glut med, max and quads   Ther Act 7 leg press   Ther Act 7 - Details seat 3 35# x 20 DL;; 20# x 20 SL   PTRx Ther Act 1 Education Sheet General   PTRx Ther Act 1 - Details No Notes   Skilled Intervention education/cueing for proper form/technique with exercise and also for proper gait and stair mechanics   Neuromuscular Re-education   Neuromuscular Re-education Neuro Re-ed 2;Neuro Re-ed 3   Neuro Re-ed 1 weight shift   Neuro Re-ed 1 - Details side to side; fwd/bwd at counter   Neuro Re-ed 2 standing pelvic tilt   Neuro Re-ed 2 - Details 10 reps with cueing for abdominal bracing for better postural control   Skilled Intervention nm cueing for balance and cpostural control   Patient Response/Progress good tolerance   Manual  Therapy   Manual Therapy Manual Therapy 2   Manual Therapy 1 MFR  (not today)   Manual Therapy 1 - Details not today   Manual Therapy 2 manual R knee mobs   Manual Therapy 2 - Details supine   Skilled Intervention manual therpay to increase ROM   Patient Response/Progress tolerated well   Education   Learner/Method Patient;Listening;Demonstration   Plan   Plan for next session d/c ortho PT   Comments   Comments Pt. will seek a PT and OT referral neuro based from her neurologist   Total Session Time   Timed Code Treatment Minutes 45   Total Treatment Time (sum of timed and untimed services) 45       DISCHARGE  Reason for Discharge: Change in medical stat    Discharge Plan: Other services: neuro PT and OT.    Referring Provider:  Vi Metz

## 2024-01-29 ENCOUNTER — THERAPY VISIT (OUTPATIENT)
Dept: PHYSICAL THERAPY | Facility: CLINIC | Age: 54
End: 2024-01-29
Payer: COMMERCIAL

## 2024-01-29 DIAGNOSIS — R26.89 IMPAIRED GAIT AND MOBILITY: ICD-10-CM

## 2024-01-29 DIAGNOSIS — G71.3 MYOPATHY, MITOCHONDRIAL: Primary | ICD-10-CM

## 2024-01-29 PROCEDURE — 97110 THERAPEUTIC EXERCISES: CPT | Mod: GP

## 2024-01-29 PROCEDURE — 97530 THERAPEUTIC ACTIVITIES: CPT | Mod: GP

## 2024-01-31 ENCOUNTER — THERAPY VISIT (OUTPATIENT)
Dept: PHYSICAL THERAPY | Facility: CLINIC | Age: 54
End: 2024-01-31
Attending: OBSTETRICS & GYNECOLOGY
Payer: COMMERCIAL

## 2024-01-31 DIAGNOSIS — N39.41 URGE INCONTINENCE: Primary | ICD-10-CM

## 2024-01-31 PROCEDURE — 97530 THERAPEUTIC ACTIVITIES: CPT | Mod: GP

## 2024-01-31 PROCEDURE — 97112 NEUROMUSCULAR REEDUCATION: CPT | Mod: GP

## 2024-02-01 ENCOUNTER — ALLIED HEALTH/NURSE VISIT (OUTPATIENT)
Dept: UROLOGY | Facility: CLINIC | Age: 54
End: 2024-02-01
Payer: COMMERCIAL

## 2024-02-01 VITALS
OXYGEN SATURATION: 98 % | HEIGHT: 62 IN | DIASTOLIC BLOOD PRESSURE: 55 MMHG | HEART RATE: 90 BPM | SYSTOLIC BLOOD PRESSURE: 105 MMHG | RESPIRATION RATE: 17 BRPM | BODY MASS INDEX: 23.78 KG/M2

## 2024-02-01 DIAGNOSIS — N39.41 URGE INCONTINENCE: Primary | ICD-10-CM

## 2024-02-01 LAB
ALBUMIN UR-MCNC: NEGATIVE MG/DL
APPEARANCE UR: CLEAR
BILIRUB UR QL STRIP: NEGATIVE
COLOR UR AUTO: YELLOW
GLUCOSE UR STRIP-MCNC: NEGATIVE MG/DL
HGB UR QL STRIP: NEGATIVE
KETONES UR STRIP-MCNC: NEGATIVE MG/DL
LEUKOCYTE ESTERASE UR QL STRIP: NEGATIVE
NITRATE UR QL: NEGATIVE
PH UR STRIP: 6.5 [PH] (ref 5–8)
SP GR UR STRIP: <=1.005 (ref 1–1.03)
UROBILINOGEN UR STRIP-ACNC: 0.2 E.U./DL

## 2024-02-01 PROCEDURE — 51741 ELECTRO-UROFLOWMETRY FIRST: CPT | Performed by: PHYSICIAN ASSISTANT

## 2024-02-01 PROCEDURE — 51728 CYSTOMETROGRAM W/VP: CPT | Performed by: PHYSICIAN ASSISTANT

## 2024-02-01 PROCEDURE — 51797 INTRAABDOMINAL PRESSURE TEST: CPT | Performed by: PHYSICIAN ASSISTANT

## 2024-02-01 PROCEDURE — 81003 URINALYSIS AUTO W/O SCOPE: CPT | Performed by: PATHOLOGY

## 2024-02-01 PROCEDURE — 51784 ANAL/URINARY MUSCLE STUDY: CPT | Performed by: PHYSICIAN ASSISTANT

## 2024-02-01 RX ORDER — CEPHALEXIN 500 MG/1
500 CAPSULE ORAL ONCE
Status: COMPLETED | OUTPATIENT
Start: 2024-02-01 | End: 2024-02-01

## 2024-02-01 RX ADMIN — CEPHALEXIN 500 MG: 500 CAPSULE ORAL at 08:00

## 2024-02-01 ASSESSMENT — PAIN SCALES - GENERAL: PAINLEVEL: MILD PAIN (2)

## 2024-02-01 NOTE — NURSING NOTE
"  Chief Complaint   Patient presents with    Incontinence     UDS       Blood pressure 105/55, pulse 90, resp. rate 17, height 1.575 m (5' 2\"), SpO2 98%, not currently breastfeeding. Body mass index is 23.78 kg/m .    Patient Active Problem List   Diagnosis    Myopathy, mitochondrial    Overactive bladder    Dry eyes    Encounter for monitoring of systemic steroid therapy    Need for vaccination    Impetigo    Secondary hypertension    On prednisone therapy    Vitamin B 12 deficiency    Need for pneumocystis prophylaxis    Anemia, unspecified type    Visit for screening mammogram    Seasonal allergic rhinitis, unspecified trigger    Acute respiratory failure with hypoxia (H)    Other myositis, unspecified site    Facial paresthesia    Weight gain    Vaginal dryness    Annual physical exam    Methemoglobinemia    Cyst of right ovary    Hepatic steatosis    Enlarged lymph nodes    Atopic dermatitis of scalp    Closed disp fracture of condyle of right femur with routine healing    Pulmonary nodules    Acute pain of right knee    Urge incontinence       Allergies   Allergen Reactions    Dapsone Shortness Of Breath     Oxygen levels went to 79 %.    Influenza Vaccines Hives     2/24/23 Patient states she's been able to have a flu shot the past couple of years and has done well with it.    Benadryl [Diphenhydramine]     Diphenhydramine Other (See Comments)     SHAKY      Sulfa Antibiotics Swelling    Sulfa Antibiotics        Current Outpatient Medications   Medication Sig Dispense Refill    acetaminophen (TYLENOL) 325 MG tablet Take 2 tablets (650 mg) by mouth every 6 hours as needed for other (For optimal non-opioid multimodal pain management to improve pain control.) 100 tablet 0    alendronate (FOSAMAX) 70 MG tablet Take 70 mg by mouth      atorvastatin (LIPITOR) 20 MG tablet Take 1 tablet by mouth daily at 2 pm      calcium carbonate (TUMS) 500 MG chewable tablet Take 1 tablet (500 mg) by mouth 2 times daily as needed " for heartburn      estradiol (VAGIFEM) 10 MCG TABS vaginal tablet Place 1 tablet (10 mcg) vaginally twice a week 24 tablet 3    folic acid (FOLVITE) 1 MG tablet Take 1 tablet (1 mg) by mouth daily 90 tablet 3    hydrOXYzine HCl (ATARAX) 25 MG tablet Take 25 mg by mouth 3 times daily as needed for itching (Patient not taking: Reported on 2024)      ibuprofen (ADVIL/MOTRIN) 800 MG tablet Take 1 tablet (800 mg) by mouth every 6 hours as needed for other (mild and/or inflammatory pain) 30 tablet 0    losartan (COZAAR) 25 MG tablet TAKE 1 TABLET(25 MG) BY MOUTH DAILY 90 tablet 3    pantoprazole (PROTONIX) 40 MG EC tablet TAKE 1 TABLET BY MOUTH DAILY 30 MINUTES BEFORE BREAKFAST 90 tablet 0    solifenacin (VESICARE) 5 MG tablet Take 1 tablet (5 mg) by mouth daily 90 tablet 1    traMADol (ULTRAM) 50 MG tablet Take 50 mg by mouth every 6 hours as needed for severe pain (Patient not taking: Reported on 2024)      valACYclovir (VALTREX) 1000 mg tablet Take 2,000 mg by mouth as needed      Vitamin D3 (CHOLECALCIFEROL) 25 mcg (1000 units) tablet Take 25 mcg by mouth         Social History     Tobacco Use    Smoking status: Never    Smokeless tobacco: Never   Vaping Use    Vaping Use: Never used   Substance Use Topics    Alcohol use: Yes    Drug use: Never       Invasive Procedure Safety Checklist:    Procedure: Urodynamics    Action: Complete sections and checkboxes as appropriate.  Pre-procedure:  1. Patient ID Verified with 2 identifiers (Darlyn and  or MRN) : YES    2. Procedure and site verified with patient/designee (when able) : YES    3. Accurate consent documentation in medical record : YES    4. H&P (or appropriate assessment) documented in medical record : N/A  H&P must be up to 30 days prior to procedure an updated within 24 hours of Procedure as applicable.     5. Relevant diagnostic and radiology test results appropriately labeled and displayed as applicable : YES    6. Blood products, implants, devices,  and/or special equipment available for the procedure as applicable : YES    7. Procedure site(s) marked with provider initials [Exclusions: none] : NO    8. Marking not required. Reason : Yes  Procedure does not require site marking    Time Out:     Time-Out performed immediately prior to starting procedure, including verbal and active participation of all team members addressing: YES    1. Correct patient identity.  2. Confirmed that the correct side and site are marked.  3. An accurate procedure to be done.  4. Agreement on the procedure to be done.  5. Correct patient position.  6. Relevant images and results are properly labeled and appropriately displayed.  7. The need to administer antibiotics or fluids for irrigation purposes during the procedure as applicable.  8. Safety precautions based on patient history or medication use.    During Procedure: Verification of correct person, site, and procedure occurs any time the responsibility for care of the patient is transferred to another member of the care team.    The following medication was given: Keflex    MEDICATION:  Keflex  ROUTE: PO  SITE: Orally  DOSE: 500 MG  LOT #: [id number]  : [employer]  EXPIRATION DATE: MAY 2024  NDC#: [id number] -barcode # as no NDC is listed on packaging.  Was there drug waste? No    Prior to administration, verified patient identity using patient's name and date of birth.  Due to administration, patient instructed to remain in clinic for 15 minutes  afterwards, and to report any adverse reaction to me immediately.    Prior to injection, verified patient identity using patient's name and date of birth.  Due to injection administration, patient instructed to remain in clinic for 15 minutes  afterwards, and to report any adverse reaction to me immediately.    Drug Amount Wasted:  None.  Vial/Syringe: Single dose vial    Britt Cele Villaltatalia comes into clinic today at the request of Adelina Kay for Urodynamics.    Order  has been verified: Yes.    The following medication was given: See Above    Patient instructed as to where to call or go for pain, fever, leakage, or decreased urine flow.     This service provided today was under the direct supervision of Adelina Kay, who was available if needed.    Alyson Lopez LPN  2/1/2024  7:32 AM

## 2024-02-01 NOTE — PROGRESS NOTES
PREPROCEDURE DIAGNOSES:    1. Urinary incontinence  2. 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  -Good bladder compliance with mild terminal detrusor overactivity without incontinence.  -No stress urinary incontinence.  -Maximum detrusor contraction during voiding reaches 34 cm H2O. She voids 406 mL with Qmax 22 ml/s, continuous flow curve, some increased EMG activity, complete bladder emptying (PVR 0 mL), and no evidence for bladder outlet obstruction (BOOI -20).    PROCEDURE:    1. Uroflowmetry.  2. Sterile urethral catheterization for measurement of postvoid residual urine volume.  3. Complex filling cystometrogram with measurement of bladder and rectal pressures.  4. Complex voiding cystometrogram with measurement of bladder and rectal pressures.  5. Electromyography of the pelvic floor during urodynamics.    INDICATIONS FOR PROCEDURE:  Ms. 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 Baseline urodynamic assessment is requested today by Dr. Quiles to better characterize Ms. Britt Park's voiding dysfunction.      VOIDING DIARY:  Did not complete.     DESCRIPTION OF PROCEDURE:  Risks, benefits, and alternatives to urodynamics were discussed with the patient and she wished to proceed.  Urodynamics are planned to better assess the primary etiology for Ms. Park's urologic dysfunction.  The patient last took anticholinergic medication 2 hours ago.  After informed consent was obtained, the patient was taken to the procedure room where uroflowmetry was performed. Findings below.     PRE-STUDY UROFLOWMETRY:  Voided volume: 69 mL.  Maximum flow rate: 5.0 mL/sec.  Average flow rate: 2.0 mL/sec.  Character of the curve:  intermittent.  Postvoid residual by catheter: 75 mL.  Pretest urine dipstick was negative for leukocytes and nitrites.    Next a 7F double-lumen urodynamics catheter was inserted into the bladder under sterile technique via urethra.  A 7F abdominal manometry catheter was placed in the vagina.  EMG pads were placed on both sides of the anal verge.  The bladder was filled with normal saline at 40 mL/minute and serial pressures were recorded.  With coughing there was an appropriate rise in vesical and abdominal pressures with no change in detrusor pressure, confirming good study catheter placement.    DURING THE FILLING PHASE:  First sensation: 15 mL.  First Desire: 108 mL.  Strong Desire: 261 mL.  Maximum Capacity: filled to 325 mL; true residential 406 mL based on final voided volume + PVR.    Uninhibited detrusor contractions: mild terminal detrusor overactivity without incontinence.  Compliance: good. PDet low throughout filling.  Continence: no DOI or ALIREZA.  EMG: concordant during filling.    DURING THE VOIDING PHASE:  Maximum detrusor contraction with void: 34 cm of H2O pressure.  Voided volume: 406 mL.  Maximum flow rate: 22 mL/sec.  Average flow rate: 9.6 mL/sec.  Postvoid Residual: 0 mL.  EMG activity: increased.  Character of voiding curve: continuous with some intermittent peaks.  BOOI: -20.0 (suggesting no obstruction - see key below)  [obstructed (KEVIN index [BOOI] ? 40); equivocal (no definite   obstruction; BOOI 20-40); and no obstruction (BOOI ? 20)]    ASSESSMENT/PLAN:  Ms. Britt Park is a pleasant 53 year old female with urinary incontinence who demonstrated the following findings today on urodynamic evaluation:    -Maximum cystometric capacity 406 mL with heightened filling sensations.  -Good bladder compliance with mild terminal detrusor overactivity without incontinence.  -No stress urinary incontinence.  -Maximum detrusor contraction during voiding reaches 34 cm H2O. She voids 406 mL with Qmax 22  ml/s, continuous flow curve, some increased EMG activity, complete bladder emptying (PVR 0 mL), and no evidence for bladder outlet obstruction (BOOI -20).    The patient will follow up as scheduled with Dr. Quiles to further discuss today's study results and make plans for how best to proceed.      - A single cephalexin was provided for UTI prophylaxis following completion of today's study per department protocol.  The risk of UTI with VUDS is low at ~2.5-3%.      Thank you for allowing me to participate in the care of Ms. Britt Park and please don't hesitate to contact me with any questions or concerns.      Adelina Kay PA-C  Urology Physician Assistant

## 2024-02-01 NOTE — PATIENT INSTRUCTIONS
UROLOGY CLINIC VISIT PATIENT INSTRUCTIONS    Schedule an appointment with [facility]. Qu[facility] next available to discuss your urodynamics test results.     If you have any issues, questions or concerns in the meantime, do not hesitate to contact us at [phone] or via [employer].     It was a pleasure meeting with you today.  Thank you for allowing me and my team the privilege of caring for you today.  YOU are the reason we are here, and I truly hope we provided you with the excellent service you deserve.  Please let us know if there is anything else we can do for you so that we can be sure you are leaving completely satisfied with your care experience.

## 2024-02-02 ENCOUNTER — THERAPY VISIT (OUTPATIENT)
Dept: OCCUPATIONAL THERAPY | Facility: CLINIC | Age: 54
End: 2024-02-02
Payer: COMMERCIAL

## 2024-02-02 DIAGNOSIS — M60.80 OTHER MYOSITIS, UNSPECIFIED SITE: ICD-10-CM

## 2024-02-02 DIAGNOSIS — R29.898 HAND WEAKNESS: ICD-10-CM

## 2024-02-02 DIAGNOSIS — R29.898 DECREASED STRENGTH OF UPPER EXTREMITY: ICD-10-CM

## 2024-02-02 DIAGNOSIS — G71.3 MYOPATHY, MITOCHONDRIAL: Primary | ICD-10-CM

## 2024-02-02 DIAGNOSIS — R68.89 DECREASED FUNCTIONAL ACTIVITY TOLERANCE: ICD-10-CM

## 2024-02-02 PROCEDURE — 97535 SELF CARE MNGMENT TRAINING: CPT | Mod: GO

## 2024-02-05 ENCOUNTER — THERAPY VISIT (OUTPATIENT)
Dept: PHYSICAL THERAPY | Facility: CLINIC | Age: 54
End: 2024-02-05
Payer: COMMERCIAL

## 2024-02-05 DIAGNOSIS — R26.89 IMPAIRED GAIT AND MOBILITY: ICD-10-CM

## 2024-02-05 DIAGNOSIS — G71.3 MYOPATHY, MITOCHONDRIAL: Primary | ICD-10-CM

## 2024-02-05 PROCEDURE — 97530 THERAPEUTIC ACTIVITIES: CPT | Mod: GP

## 2024-02-07 ENCOUNTER — THERAPY VISIT (OUTPATIENT)
Dept: PHYSICAL THERAPY | Facility: CLINIC | Age: 54
End: 2024-02-07
Attending: OBSTETRICS & GYNECOLOGY
Payer: COMMERCIAL

## 2024-02-07 DIAGNOSIS — N39.41 URGE INCONTINENCE: Primary | ICD-10-CM

## 2024-02-07 PROCEDURE — 97530 THERAPEUTIC ACTIVITIES: CPT | Mod: GP

## 2024-02-07 PROCEDURE — 97112 NEUROMUSCULAR REEDUCATION: CPT | Mod: GP

## 2024-02-08 NOTE — PATIENT INSTRUCTIONS
Department of Anesthesiology  Postprocedure Note    Patient: Belkis Ramos  MRN: [id number]  YOB: 1978  Date of evaluation: 2/8/2024    Procedure Summary       Date: 02/08/24 Room / Location: [facility] MAIN OR 06 / [facility] MAIN OR    Anesthesia Start: 0726 Anesthesia Stop: 1035    Procedure: ROBOTIC TOTAL LAPAROSCOPIC HYSTERECTOMY WITH LEFT OOPHERECTOMY, EXPLORATORY LAPAROTOMY WITH SUTURE REPAIR OF PERFORATED TRANSVERSE COLON AND LYSIS OF ADHESIONS (Abdomen) Diagnosis:       Fibroid, uterine      Pelvic pain in female      Dysfunctional uterine bleeding      Menorrhagia      Left ovarian cyst      (Fibroid, uterine [D25.9])      (Pelvic pain in female [R10.2])      (Dysfunctional uterine bleeding [N93.8])      (Menorrhagia [N92.0])      (Left ovarian cyst [N83.202])    Surgeons: Darwin Burciaga MD Responsible Provider: Mari Roberts MD    Anesthesia Type: general ASA Status: 3            Anesthesia Type: No value filed.    Carolina Phase I: Carolina Score: 10    Carolina Phase II:      Anesthesia Post Evaluation    Patient location during evaluation: PACU  Patient participation: complete - patient participated  Level of consciousness: awake  Airway patency: patent  Nausea & Vomiting: no nausea and no vomiting  Cardiovascular status: hemodynamically stable  Respiratory status: acceptable  Hydration status: euvolemic  Pain management: adequate    No notable events documented.   Methotrexate could be causing hepatic steatosis.       Vitamin B12 1000 mcg tablet daily       Call to schedule an appointment for ultrasound at [phone]

## 2024-02-09 ENCOUNTER — THERAPY VISIT (OUTPATIENT)
Dept: OCCUPATIONAL THERAPY | Facility: CLINIC | Age: 54
End: 2024-02-09
Payer: COMMERCIAL

## 2024-02-09 DIAGNOSIS — R68.89 DECREASED FUNCTIONAL ACTIVITY TOLERANCE: ICD-10-CM

## 2024-02-09 DIAGNOSIS — M60.80 OTHER MYOSITIS, UNSPECIFIED SITE: ICD-10-CM

## 2024-02-09 DIAGNOSIS — G71.3 MYOPATHY, MITOCHONDRIAL: Primary | ICD-10-CM

## 2024-02-09 DIAGNOSIS — R29.898 HAND WEAKNESS: ICD-10-CM

## 2024-02-09 DIAGNOSIS — R29.898 DECREASED STRENGTH OF UPPER EXTREMITY: ICD-10-CM

## 2024-02-09 PROCEDURE — 97535 SELF CARE MNGMENT TRAINING: CPT | Mod: GO

## 2024-02-09 PROCEDURE — 97110 THERAPEUTIC EXERCISES: CPT | Mod: GO

## 2024-02-16 ENCOUNTER — THERAPY VISIT (OUTPATIENT)
Dept: PHYSICAL THERAPY | Facility: CLINIC | Age: 54
End: 2024-02-16
Payer: COMMERCIAL

## 2024-02-16 DIAGNOSIS — R26.89 IMPAIRED GAIT AND MOBILITY: ICD-10-CM

## 2024-02-16 DIAGNOSIS — S72.91XA CLOSED FRACTURE OF RIGHT FEMUR, UNSPECIFIED FRACTURE MORPHOLOGY, UNSPECIFIED PORTION OF FEMUR, INITIAL ENCOUNTER (H): ICD-10-CM

## 2024-02-16 DIAGNOSIS — G71.3 MYOPATHY, MITOCHONDRIAL: Primary | ICD-10-CM

## 2024-02-16 PROCEDURE — 97110 THERAPEUTIC EXERCISES: CPT | Mod: GP | Performed by: PHYSICAL THERAPIST

## 2024-02-21 ENCOUNTER — THERAPY VISIT (OUTPATIENT)
Dept: PHYSICAL THERAPY | Facility: CLINIC | Age: 54
End: 2024-02-21
Payer: COMMERCIAL

## 2024-02-21 DIAGNOSIS — G71.3 MYOPATHY, MITOCHONDRIAL: Primary | ICD-10-CM

## 2024-02-21 DIAGNOSIS — R26.89 IMPAIRED GAIT AND MOBILITY: ICD-10-CM

## 2024-02-21 PROCEDURE — 97530 THERAPEUTIC ACTIVITIES: CPT | Mod: GP

## 2024-02-21 PROCEDURE — 97110 THERAPEUTIC EXERCISES: CPT | Mod: GP

## 2024-02-23 ENCOUNTER — THERAPY VISIT (OUTPATIENT)
Dept: OCCUPATIONAL THERAPY | Facility: CLINIC | Age: 54
End: 2024-02-23
Payer: COMMERCIAL

## 2024-02-23 DIAGNOSIS — R68.89 DECREASED FUNCTIONAL ACTIVITY TOLERANCE: ICD-10-CM

## 2024-02-23 DIAGNOSIS — G71.3 MYOPATHY, MITOCHONDRIAL: Primary | ICD-10-CM

## 2024-02-23 DIAGNOSIS — R29.898 HAND WEAKNESS: ICD-10-CM

## 2024-02-23 DIAGNOSIS — R29.898 DECREASED STRENGTH OF UPPER EXTREMITY: ICD-10-CM

## 2024-02-23 DIAGNOSIS — M60.80 OTHER MYOSITIS, UNSPECIFIED SITE: ICD-10-CM

## 2024-02-23 PROCEDURE — 97110 THERAPEUTIC EXERCISES: CPT | Mod: GO

## 2024-02-23 PROCEDURE — 97535 SELF CARE MNGMENT TRAINING: CPT | Mod: GO

## 2024-02-28 ENCOUNTER — THERAPY VISIT (OUTPATIENT)
Dept: PHYSICAL THERAPY | Facility: CLINIC | Age: 54
End: 2024-02-28
Payer: COMMERCIAL

## 2024-02-28 DIAGNOSIS — N39.41 URGE INCONTINENCE: Primary | ICD-10-CM

## 2024-02-28 PROCEDURE — 97112 NEUROMUSCULAR REEDUCATION: CPT | Mod: GP

## 2024-02-28 PROCEDURE — 97530 THERAPEUTIC ACTIVITIES: CPT | Mod: GP

## 2024-03-06 ENCOUNTER — OFFICE VISIT (OUTPATIENT)
Dept: UROLOGY | Facility: CLINIC | Age: 54
End: 2024-03-06
Payer: COMMERCIAL

## 2024-03-06 VITALS — SYSTOLIC BLOOD PRESSURE: 102 MMHG | DIASTOLIC BLOOD PRESSURE: 62 MMHG

## 2024-03-06 DIAGNOSIS — N39.41 URGE INCONTINENCE OF URINE: Primary | ICD-10-CM

## 2024-03-06 PROCEDURE — 99214 OFFICE O/P EST MOD 30 MIN: CPT | Performed by: OBSTETRICS & GYNECOLOGY

## 2024-03-06 NOTE — PROGRESS NOTES
March 6, 2024    Return visit    Ventura returns today to discuss her UDS results and F/U after starting the Vesicare. Since her last visit she has  done very well on the Vesicare noting a 90% improvement in her UUI. We reviewed there UDS results. They were normal, with no ALIREZA, UUI and good emptying    /62   LMP  (LMP Unknown)   She is comfortable, in no distress, non-labored breathing.      A/P: 53 year old F with UUI    F/U in 6 months    I spent a total of 30 minutes with  Ms. Tran  on the date of the encounter in chart review, face to face patient visit, review of tests, documentation and/or discussion with other providers about the issues documented above.    Manan Quiles MD  Professor, OB/GYN  Urogynecologist    CC  Patient Care Team:  Vi Metz MD as PCP - General (Internal Medicine)  Vlad Leos MD as Referring Physician (Neurology)  Vi Metz MD as Assigned PCP  Husam Barnett MD as Assigned Rheumatology Provider  Diane Lopez MD as MD (Gastroenterology)  Diane Lopez MD as MD (Gastroenterology)  Vi Metz MD (Internal Medicine)  Balwinder Nunn MD as Assigned Musculoskeletal Provider  Annette Walls MD as Assigned OBGYN Provider  Manan Quiles MD as MD (OB/Gyn)  Pb Hernandez PA-C as Assigned Neuroscience Provider

## 2024-03-08 ENCOUNTER — THERAPY VISIT (OUTPATIENT)
Dept: OCCUPATIONAL THERAPY | Facility: CLINIC | Age: 54
End: 2024-03-08
Payer: COMMERCIAL

## 2024-03-08 ENCOUNTER — THERAPY VISIT (OUTPATIENT)
Dept: PHYSICAL THERAPY | Facility: CLINIC | Age: 54
End: 2024-03-08
Payer: COMMERCIAL

## 2024-03-08 DIAGNOSIS — R29.898 DECREASED STRENGTH OF UPPER EXTREMITY: ICD-10-CM

## 2024-03-08 DIAGNOSIS — R68.89 DECREASED FUNCTIONAL ACTIVITY TOLERANCE: ICD-10-CM

## 2024-03-08 DIAGNOSIS — R29.898 HAND WEAKNESS: ICD-10-CM

## 2024-03-08 DIAGNOSIS — G71.3 MYOPATHY, MITOCHONDRIAL: Primary | ICD-10-CM

## 2024-03-08 DIAGNOSIS — R26.89 IMPAIRED GAIT AND MOBILITY: ICD-10-CM

## 2024-03-08 DIAGNOSIS — M60.80 OTHER MYOSITIS, UNSPECIFIED SITE: ICD-10-CM

## 2024-03-08 PROCEDURE — 97110 THERAPEUTIC EXERCISES: CPT | Mod: GO

## 2024-03-08 PROCEDURE — 97535 SELF CARE MNGMENT TRAINING: CPT | Mod: GO

## 2024-03-08 PROCEDURE — 97110 THERAPEUTIC EXERCISES: CPT | Mod: GP

## 2024-03-11 ENCOUNTER — ANCILLARY PROCEDURE (OUTPATIENT)
Dept: GENERAL RADIOLOGY | Facility: CLINIC | Age: 54
End: 2024-03-11
Attending: INTERNAL MEDICINE
Payer: COMMERCIAL

## 2024-03-11 ENCOUNTER — TELEPHONE (OUTPATIENT)
Dept: FAMILY MEDICINE | Facility: CLINIC | Age: 54
End: 2024-03-11

## 2024-03-11 ENCOUNTER — VIRTUAL VISIT (OUTPATIENT)
Dept: PHYSICAL THERAPY | Facility: CLINIC | Age: 54
End: 2024-03-11
Payer: COMMERCIAL

## 2024-03-11 DIAGNOSIS — S72.411D: ICD-10-CM

## 2024-03-11 DIAGNOSIS — N39.41 URGE INCONTINENCE: Primary | ICD-10-CM

## 2024-03-11 PROCEDURE — 97530 THERAPEUTIC ACTIVITIES: CPT | Mod: GP

## 2024-03-11 PROCEDURE — 73552 X-RAY EXAM OF FEMUR 2/>: CPT | Mod: RT

## 2024-03-11 NOTE — TELEPHONE ENCOUNTER
Forms/Letter Request    Type of form/letter: FMLA - Unknown      Do we have the form/letter: Yes:     Who is the form from? Patient    Where did/will the form come from? Patient or family brought in       When is form/letter needed by: asap    How would you like the form/letter returned:  unknown    Patient Notified form requests are processed in 5-7 business days:No    Could we send this information to you in [employer] or would you prefer to receive a phone call?:   Patient would prefer a phone call   Okay to leave a detailed message?: Yes at Cell number on file:    Telephone Information:   Mobile [phone]     Patient has virtual appt with PCP ON 3-

## 2024-03-13 ENCOUNTER — VIRTUAL VISIT (OUTPATIENT)
Dept: FAMILY MEDICINE | Facility: CLINIC | Age: 54
End: 2024-03-13
Payer: COMMERCIAL

## 2024-03-13 DIAGNOSIS — S72.411D: ICD-10-CM

## 2024-03-13 DIAGNOSIS — G71.3 MYOPATHY, MITOCHONDRIAL: ICD-10-CM

## 2024-03-13 DIAGNOSIS — S72.91XA CLOSED FRACTURE OF RIGHT FEMUR, UNSPECIFIED FRACTURE MORPHOLOGY, UNSPECIFIED PORTION OF FEMUR, INITIAL ENCOUNTER (H): Primary | ICD-10-CM

## 2024-03-13 PROCEDURE — 99213 OFFICE O/P EST LOW 20 MIN: CPT | Mod: 95 | Performed by: INTERNAL MEDICINE

## 2024-03-13 ASSESSMENT — PATIENT HEALTH QUESTIONNAIRE - PHQ9
10. IF YOU CHECKED OFF ANY PROBLEMS, HOW DIFFICULT HAVE THESE PROBLEMS MADE IT FOR YOU TO DO YOUR WORK, TAKE CARE OF THINGS AT HOME, OR GET ALONG WITH OTHER PEOPLE: SOMEWHAT DIFFICULT
SUM OF ALL RESPONSES TO PHQ QUESTIONS 1-9: 4
SUM OF ALL RESPONSES TO PHQ QUESTIONS 1-9: 4

## 2024-03-13 NOTE — PROGRESS NOTES
Ventura is a 53 year old who is being evaluated via a billable video visit.    How would you like to obtain your AVS? MyChart  If the video visit is dropped, the invitation should be resent by: Text to cell phone: [phone]  Will anyone else be joining your video visit? No    Assessment & Plan     Closed fracture of right femur, unspecified fracture morphology, unspecified portion of femur, initial encounter (H)  DME order for wheelchair, wheelchair head cushion and gel seat pad   - Wheelchair Order for DME - ONLY FOR DME  - Misc. Devices (NOVA CUSHION GEL SEAT PAD) MISC; 1 Units daily  - Miscellaneous Order for DME - ONLY FOR DME    Myopathy, mitochondrial  - Wheelchair Order for DME - ONLY FOR DME  - Misc. Devices (NOVA CUSHION GEL SEAT PAD) MISC; 1 Units daily  - Miscellaneous Order for DME - ONLY FOR DME    Closed disp fracture of condyle of right femur with routine healing  - Wheelchair Order for DME - ONLY FOR DME  - Misc. Devices (NOVA CUSHION GEL SEAT PAD) MISC; 1 Units daily  - Miscellaneous Order for DME - ONLY FOR DME      See Patient Instructions    Catrina Whitehead is a 53 year old, presenting for the following health issues:  Forms    History of Present Illness       Reason for visit:  To discuss work accommodations for rare muscle disease    She eats 2-3 servings of fruits and vegetables daily.She consumes 0 sweetened beverage(s) daily.She exercises with enough effort to increase her heart rate 9 or less minutes per day.  She exercises with enough effort to increase her heart rate 3 or less days per week.   She is taking medications regularly.     Ventura is here to discuss paperwork for work accommodations.   DME order signed.  Her fracture is healing  She is going ahead with IVIG [facility] mitochondrial [facility]os[facility] at [facility]  She is doing well otherwise.   She is talking to a therapist.         Objective       Vitals:  No vitals were obtained today due to virtual visit.    Physical Exam   GEN: No acute  distress  RESP: No audible increased work of breathing. Patient speaking in full sentences without distress.  PSYCH: pleasant  Exam otherwise limited due to virtual platform        Video-Visit Details    Type of service:  Video Visit   Originating Location (pt. Location): Home  Distant Location (provider location):  On-site  Platform used for Video Visit: Laura  Signed Electronically by: Vi Metz MD

## 2024-03-14 ENCOUNTER — MYC MEDICAL ADVICE (OUTPATIENT)
Dept: FAMILY MEDICINE | Facility: CLINIC | Age: 54
End: 2024-03-14
Payer: COMMERCIAL

## 2024-03-14 NOTE — TELEPHONE ENCOUNTER
Please see [employer] message. Spoke with patient to let her know that forms is ready to be  with wheelchair orders with  staff.

## 2024-03-14 NOTE — CONFIDENTIAL NOTE
Pt's son came in to  FMLA form and another form for Ventura Vivian.  They were not in the files up front tonight.    Please complete and let patient know when she can pick these up at:[phone]  Or if you have further questions, call her at the same number.     Thank you!

## 2024-03-14 NOTE — TELEPHONE ENCOUNTER
The paperwork she is talking about is on my desk. I will not be in today or tomorrow. Please call her and let her know its ready for .

## 2024-03-22 ENCOUNTER — THERAPY VISIT (OUTPATIENT)
Dept: OCCUPATIONAL THERAPY | Facility: CLINIC | Age: 54
End: 2024-03-22
Payer: COMMERCIAL

## 2024-03-22 ENCOUNTER — THERAPY VISIT (OUTPATIENT)
Dept: PHYSICAL THERAPY | Facility: CLINIC | Age: 54
End: 2024-03-22
Payer: COMMERCIAL

## 2024-03-22 DIAGNOSIS — R29.898 HAND WEAKNESS: ICD-10-CM

## 2024-03-22 DIAGNOSIS — G71.3 MYOPATHY, MITOCHONDRIAL: Primary | ICD-10-CM

## 2024-03-22 DIAGNOSIS — R29.898 DECREASED STRENGTH OF UPPER EXTREMITY: ICD-10-CM

## 2024-03-22 DIAGNOSIS — R68.89 DECREASED FUNCTIONAL ACTIVITY TOLERANCE: ICD-10-CM

## 2024-03-22 DIAGNOSIS — M60.80 OTHER MYOSITIS, UNSPECIFIED SITE: ICD-10-CM

## 2024-03-22 DIAGNOSIS — R26.89 IMPAIRED GAIT AND MOBILITY: ICD-10-CM

## 2024-03-22 PROCEDURE — 97110 THERAPEUTIC EXERCISES: CPT | Mod: GP

## 2024-03-22 PROCEDURE — 97530 THERAPEUTIC ACTIVITIES: CPT | Mod: GO

## 2024-03-22 NOTE — PLAN OF CARE
"Goal Outcome Evaluation: Progressing  Patient states when she transferred from chair to bed tonight with NA assist, she may have \"tweaked\" her knee.  She complained of a lot of pain rating 10/10.  Oxycodone, tylenol, ice given with partial relief.  Later robaxin given with good relief of pain. Discussed pain medication management with patient because she did not advocate for meds when her pain was severe.  Continue to encourage her to ask for meds if needed.   CPAP  on at bedtime.                          " none

## 2024-03-25 ENCOUNTER — VIRTUAL VISIT (OUTPATIENT)
Dept: PHYSICAL THERAPY | Facility: CLINIC | Age: 54
End: 2024-03-25
Payer: COMMERCIAL

## 2024-03-25 ENCOUNTER — MYC MEDICAL ADVICE (OUTPATIENT)
Dept: FAMILY MEDICINE | Facility: CLINIC | Age: 54
End: 2024-03-25

## 2024-03-25 DIAGNOSIS — G71.3 MYOPATHY, MITOCHONDRIAL: Primary | ICD-10-CM

## 2024-03-25 DIAGNOSIS — N39.41 URGE INCONTINENCE: Primary | ICD-10-CM

## 2024-03-25 DIAGNOSIS — L01.00 IMPETIGO: ICD-10-CM

## 2024-03-25 PROCEDURE — 97530 THERAPEUTIC ACTIVITIES: CPT | Mod: GP

## 2024-03-25 NOTE — PROGRESS NOTES
03/25/24 0500   Appointment Info   Signing clinician's name / credentials Sonali Squires, PT, DPT   Total/Authorized Visits 10   Visits Used 6   Medical Diagnosis urge incontinence   Precautions/Limitations 12/20/23: Laparoscopic right salpingo-oophorectomy left salpingectomy drainage of left ovarian cyst   Quick Adds Pelvic Consent   Progress Note/Certification   Start of Care Date 01/24/24   Onset of illness/injury or Date of Surgery 01/03/24  (date of order)   Therapy Frequency 1x/wk progressing to 1 x every other week   Predicted Duration 2-3 months   Progress Note Completed Date 03/25/24   PT Goal 1   Goal Identifier frequency   Goal Description patient will be able to void every 2-4 hours with decreased sense of urge   Rationale to maximize safety and independence with performance of ADLs and functional tasks;to maximize safety and independence within the home;to maximize safety and independence with self cares   Goal Progress goal met- able to go every 2-3 hours   Target Date 04/17/24   Date Met 03/11/24   PT Goal 2   Goal Identifier defecation   Goal Description patient will report not having to strain to have a bowel movement   Rationale to maximize safety and independence with performance of ADLs and functional tasks;to maximize safety and independence within the home;to maximize safety and independence with self cares   Goal Progress improving   Target Date 04/17/24   PT Goal 3   Goal Identifier voiding/emptying   Goal Description patient will be able to void without straining   Rationale to maximize safety and independence with performance of ADLs and functional tasks;to maximize safety and independence with self cares;to maximize safety and independence within the home   Goal Progress improving   Target Date 04/17/24   Subjective Report   Subjective Report Patient reports that for the most part things have been going really well. She had a little bit of leakage with urge and was able to calm it down  some with the quick flicks. She only had a little bit of leakage and then when she tried to get things to calm down so might have happened with transfer. Has been going to the gym/pool at [facility] a couple times per week as well. Continuing to monitor fatigue levels.   Objective Measures   Objective Measures Objective Measure 1   Objective Measure 1   Objective Measure urge   Details one instance of leaking in the morning, getting better at controlling with quick flicks (reports might have happened with transfer)   Treatment Interventions (PT)   Interventions Therapeutic Activity;Neuromuscular Re-education;Self Care/Home Management   Therapeutic Procedure/Exercise   PTRx Ther Proc 1 Supine Butterfly   PTRx Ther Proc 1 - Details hep for mobility   PTRx Ther Proc 2 Pretzel Stretch   PTRx Ther Proc 2 - Details hep for mobility    PTRx Ther Proc 3 Ball Sitting Heel Ups   PTRx Ther Proc 3 - Details hep for NMR and strengthening   PTRx Ther Proc 4 Ball Exercise Seated Marching - Peds   PTRx Ther Proc 4 - Details hep for strengthening and NMR   Therapeutic Activity   Therapeutic Activities: dynamic activities to improve functional performance minutes (55970) 20   Ther Act 1 patient education   Ther Act 1 - Details patient education for kegel + exhale with sit to stand   PTRx Ther Act 1 Relaxed Awareness of the Pelvic Muscles   PTRx Ther Act 1 - Details hep for pelvic floor relaxation   PTRx Ther Act 2 Toileting Position   PTRx Ther Act 2 - Details hep for pelvic floor relaxation with bladder and bowel VR in visit   PTRx Ther Act 3 Education Sheet General   PTRx Ther Act 3 - Details patient education in clinic:double void before bedtry to wake up a half hour earlier to try to catch self before super strong sense of urgetry to go to the bathroom every 2 hourswith urge: quick flicks kegel + exhale with sit to stand (keep breathing)   PTRx Ther Act 4 Proper Defecation Techniques   PTRx Ther Act 4 - Details TA: hep, VR in  visit   PTRx Ther Act 5 Normal Bowel Habits   PTRx Ther Act 5 - Details TA: hep, VR in visit   PTRx Ther Act 6 Functions of Fiber   PTRx Ther Act 6 - Details TA: hep, VR in visit   PTRx Ther Act 7 Bladder Diary   PTRx Ther Act 7 - Details TA: hep, VR in visit   Skilled Intervention see above   Patient Response/Progress patient reports understanding   Neuromuscular Re-education   Neuromuscular re-ed of mvmt, balance, coord, kinesthetic sense, posture, proprioception minutes (58961) 5   PTRx Neuro Re-ed 1 Diaphragmatic Breathing - with Object/Weight   PTRx Neuro Re-ed 1 - Details hep for pelvic floor relaxation VR in clinic   PTRx Neuro Re-ed 2 Pelvic Floor Muscle Strengthening Basic   PTRx Neuro Re-ed 2 - Details hep for strengthening and NMR   PTRx Neuro Re-ed 3 Pelvic Floor Muscle Strengthening Quick Flicks   PTRx Neuro Re-ed 3 - Details VR in clinic for urge suppression using quick flicks   PTRx Neuro Re-ed 4 Sit to Stand Pelvic Floor    PTRx Neuro Re-ed 4 - Details hep for going to bathroom and pelvic floor strengthening VR in visits   Skilled Intervention see above   Patient Response/Progress patient tolerating well   Self Care/home Management   PTRx Self Care 1 ILU Massage   PTRx Self Care 1 - Details hep for set-up on helping bowels   Intervention (Other)   PTRx Other  1 Urge Incontinence Suppression Techniques   PTRx Other 1 - Details NMR: hep, VR in visit   PTRx Other  2 General Bladder Information   PTRx Other 2 - Details TA: hep, VR in visit   Education   Learner/Method Patient   Plan   Home program see PTRX   Updates to plan of care see PN   Plan for next session continue: review bladder diary and hep, ask about urge and toileting habits with bowel and bladder (if having to strain at all), see how sit to stand went and seated kegels, PROM for hip mobility/STM as indicated, progress core and hip strength   Comments   Pelvic Health Informed Consent Statement Discussed with patient/guardian reason for  referral regarding pelvic health needs and external/internal pelvic floor muscle examination.  Opportunity provided to ask questions and verbal consent for assessment and intervention was given.   Total Session Time   Timed Code Treatment Minutes 25   Total Treatment Time (sum of timed and untimed services) 25       PLAN  Continue therapy per current plan of care.    Beginning/End Dates of Progress Note Reporting Period:  1/24/2024 to 03/25/2024    Referring Provider:  Manan Quiles

## 2024-03-26 RX ORDER — MUPIROCIN 20 MG/G
OINTMENT TOPICAL 3 TIMES DAILY
Qty: 15 G | Refills: 3 | Status: SHIPPED | OUTPATIENT
Start: 2024-03-26

## 2024-03-31 PROBLEM — S72.91XA CLOSED FRACTURE OF RIGHT FEMUR, UNSPECIFIED FRACTURE MORPHOLOGY, UNSPECIFIED PORTION OF FEMUR, INITIAL ENCOUNTER (H): Status: ACTIVE | Noted: 2024-03-31

## 2024-04-05 ENCOUNTER — THERAPY VISIT (OUTPATIENT)
Dept: PHYSICAL THERAPY | Facility: CLINIC | Age: 54
End: 2024-04-05
Payer: COMMERCIAL

## 2024-04-05 ENCOUNTER — THERAPY VISIT (OUTPATIENT)
Dept: OCCUPATIONAL THERAPY | Facility: CLINIC | Age: 54
End: 2024-04-05
Payer: COMMERCIAL

## 2024-04-05 DIAGNOSIS — M60.80 OTHER MYOSITIS, UNSPECIFIED SITE: ICD-10-CM

## 2024-04-05 DIAGNOSIS — G71.3 MYOPATHY, MITOCHONDRIAL: Primary | ICD-10-CM

## 2024-04-05 DIAGNOSIS — R68.89 DECREASED FUNCTIONAL ACTIVITY TOLERANCE: ICD-10-CM

## 2024-04-05 DIAGNOSIS — R29.898 DECREASED STRENGTH OF UPPER EXTREMITY: ICD-10-CM

## 2024-04-05 DIAGNOSIS — R26.89 IMPAIRED GAIT AND MOBILITY: ICD-10-CM

## 2024-04-05 DIAGNOSIS — R29.898 HAND WEAKNESS: ICD-10-CM

## 2024-04-05 PROCEDURE — 97110 THERAPEUTIC EXERCISES: CPT | Mod: GO

## 2024-04-05 PROCEDURE — 97110 THERAPEUTIC EXERCISES: CPT | Mod: GP

## 2024-04-05 PROCEDURE — 97535 SELF CARE MNGMENT TRAINING: CPT | Mod: GO

## 2024-04-05 NOTE — PROGRESS NOTES
DISCHARGE  Reason for Discharge: Patient has met all goals.    Equipment Issued: built up foam handles    Discharge Plan: Patient to continue home program.    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   OT Tx Diagnosis decreased strength of upper extremity; decreased functional activity tolerance, hand weakness   Progress Note/Certification   Onset of Illness/Injury or Date of Surgery 01/11/24   Therapy Frequency 1x/week   Predicted Duration up to 90 days   Goals   OT Goals 1;2   OT Goal 1   Goal Identifier Home Exercise Program   Goal Description Patient will verbalize and demonstrate understanding of upper extremity HEP with emphasis on proper technique and  submaximal exercise principles to preserve muscle strength, functional endurance, and enable maximal participation in ADL/IADL's and improve self-management of condition   Goal Progress Patient has been trained in and independnet on graded upper extremity to improve joint mobility and functional strength for improved IND in ADL/IADL's. Reinfoced exercise principles related to neuromuscular condition including submaximal exercise principles to improve self-management of condition. Pt trained in supine active assistive shoulder flexion with bilateral clasps, and active assistive table slides for shoulder flexion, shoulder scaption, and shoulder abduction in sitting. Cues provided for proper technique and optimal joint alignment. She also performs pool UE exercise with improved range of motion and tolerance due to bouyancy.   Target Date 04/17/24   Date Met 04/05/24   OT Goal 2   Goal Identifier Adaptive Techniques/Equipmen   Goal Description Patient to verbalize and demonstrate understanding of adaptive  techniques prn to increase her  independence and safety with ADLs and IADLS (kitchen tasks, writing, self-feeding, hair styling, makeup application, dressing, etc).   Goal Progress SPatient has recieved skilled education, demonstration, and trial of adaptive equipment for improved IND in feeding/eating and grooming/hygiene and dressing including: plate guidate, scoop plate, plate riser vs proximal stabilization for transporting plate to mouth, built up utensils or built up foam handle (red foam) for toothbrush, pen or utenils, rocker knife and choppers for meal prep tasks, use of straw or nosey cup; extended comb/brush, blow dry stand, extended toe nail clipper; kizik's shoes and MagnaReady clothing to enable improved IND in dressing. Pt states she likes the extended hair brush, scoop plate, plate guard, rocker knife, blow dry stand, and kiziks shoes. WIll issue handout with individualized AE preferences next session   Target Date 04/17/24   Date Met 04/05/24   Subjective Report   Subjective Report Pt presented to session via manual w/c, seen following physical therapy. She states she was able . She may. She is improving her tolerance to home exercises with implementation of modified schedule including more frequent, shorter bouts of exercise and walking with improved energy levels. She has received the referral for wheelchair updates (light weight w/c, head rest) and [facility] paperwork completed for continued accomodations for work from home due to challenges with accessibility and fatigue. She continues to work with therapist as she processes diagnosis. She continues to go to [employer] 2x/week to go to the [facility] and the pool to complete exercise. She was given resources for outpatient program with home optiom ([facility]) for continued therapies as indicated and minimize need for transporting to clinic as well as home safety evaluation. Pt encouraged to return should she note functional decline as refinement of HEP, 100% comprehension    Treatment Interventions (OT)   Interventions Therapeutic Activity   Self Care/Home Management   Self-Care/Home Mgmt/ADL, Compensatory, Meal Prep Minutes (71173) 15 Minutes   Self Care 1 - Details Skiiled education, demonstration, and trial of adaptive equipment for improved IND in feeding/eating and grooming/hygiene and dressing including: plate guidate, scoop plate, plate riser vs proximal stabilization for transporting plate to mouth, built up utensils, rocker knife and choppers for meal prep tasks, use of straw or nosey cup; extended comb/brush, blow dry stand, extended toe nail clipper; kizik's shoes to enable improved IND in dressing. Pt states she likes the extended hair brush, scoop plate, plate guard, rocker knife, blow dry stand, and kiziks shoes. WIll issue handout with individualized AE preferences next session   Skilled Intervention Skilled review and progression of available adaptive equipmen/aids for improved IND in daily activiites   Therapeutic Procedure/Exercise   Therapeutic Procedure: strength, endurance, ROM, flexibillity minutes (89473) 25   Ther Proc 1 - Details Skilled review of home program including graded upper extremity to improve joint mobility and functional strength for improved IND in ADL/IADL's.Skilled facilitation of active assistive table slides for shoulder flexion, shoulder scaption, and shoulder abduction in sitting for right hand left upper extremity. Cues provided for proper technique and optimal joint alignment. Pt feels confident implementing home programs and will continue to go to [facility] and perform LB/UB exercises in pool   Patient Response/Progress Pt feels confident implementing HEP at home and will continue at [facility] and [facility]   Plan   Updates to plan of care No further outpatient occupational therapy indicated at this time   Total Session Time   Timed Code Treatment Minutes 40   Total Treatment Time (sum of timed and untimed services) 40

## 2024-04-19 ENCOUNTER — THERAPY VISIT (OUTPATIENT)
Dept: PHYSICAL THERAPY | Facility: CLINIC | Age: 54
End: 2024-04-19
Payer: COMMERCIAL

## 2024-04-19 DIAGNOSIS — G71.3 MYOPATHY, MITOCHONDRIAL: Primary | ICD-10-CM

## 2024-04-19 DIAGNOSIS — R26.89 IMPAIRED GAIT AND MOBILITY: ICD-10-CM

## 2024-04-19 PROCEDURE — 97110 THERAPEUTIC EXERCISES: CPT | Mod: GP

## 2024-04-19 PROCEDURE — 97530 THERAPEUTIC ACTIVITIES: CPT | Mod: GP

## 2024-04-19 NOTE — PROGRESS NOTES
04/19/24 0500   Appointment Info   Signing clinician's name / credentials Maryellen Lin, PT, DPT   Visits Used 9   Medical Diagnosis Myopathy, mitochondrial, Other myositis, unspecified site   PT Tx Diagnosis impaired functional mobility with weakness   Progress Note/Certification   Start of Care Date 01/19/24   Onset of illness/injury or Date of Surgery 07/01/21   Therapy Frequency 1x every other week   Predicted Duration 12 weeks   Progress Note Due Date 07/17/24   Progress Note Completed Date 04/19/24   GOALS   PT Goals 2;3;4   PT Goal 1   Goal Identifier Transfers   Goal Description Patient to be able to safely complete STS from 16 inch seat height with use of UE support and proper equipment in order to improve IND access to community mobility.   Goal Progress eval: requires elevated seat, limited in community access by height of toilets, 4/5: able to transfer from wheelchair height but needs raised toilet seat; 4/19: able to transfer independenlty from w/c however needs heavy UE assist through chair and walker, heavy forward lean and difficulty maintaing upright posture   Target Date 07/17/24   PT Goal 2   Goal Identifier Bed Mobility   Goal Description Patient to roll in both directions and complete sit<>supine IND with proper set up and equipment in order to improve IND mobility in home.   Goal Progress eval: Max A with supine>sit, Min A with rolling to R, Min A with supine>sit, 4/5: use of equpment for ING bed mobility in home; 4/19: Wyatt for sit>supine, IND with verbal cues for rolling, MaxA for supine to sit   Target Date 07/17/24   PT Goal 3   Goal Identifier Gait Speed   Goal Description Patient to improve gait speed to >0.5 m/s with appropriate or no AD to display improved functional endurance and strength and decreased falls with home navigation and work towards improved community ambulation.   Goal Progress eval: 0.3 m/s with 4WW, 4/5: 0.34 m/s FWW; 4/19: 0.26 m/s with FWW   Target Date 07/17/24    PT Goal 4   Goal Identifier HEP   Goal Description Patient to report confidence and compliance with ongoing HEP with safe exercise principle to ensure safe management of condition upon discharge from skilled therapy.   Goal Progress eval: unsure how to safely exercise with myopathy, 4/3: completing regular exercise program every mornin/19: completing regular exercise program including going to fitness center and pool   Target Date 24   Subjective Report   Subjective Report Pt feeling extra tired this morning. Have been doing the home exercises, every morning, 10-20 minutes. Pulse will get high with this. Also trying to walk with walker in house 3x/day for 4-8 minutes at a time. Going to pool 1-2 days/week, fitness center on  to use recumbant bike, leg press, and arm bike. Tried calling [employer] and they said they didn't have anyone qualified to work with her, also outside of her insurance network.   Objective Measure 1   Objective Measure Gait speed   Details 23.41 sec w/ FWW = 0.26 m/s   Objective Measure 2   Details able to transfer independenlty from w/c however needs heavy UE assist through chair and walker, heavy forward lean and difficulty maintaing upright posture   Objective Measure Transfers   Objective Measure 3   Objective Measure Bed mobility   Details Wyatt for sit>supine, IND with verbal cues for rolling, MaxA for supine to sit   Therapeutic Procedure/Exercise   Therapeutic Procedures: strength, endurance, ROM, flexibility minutes (32926) 25   Ther Proc 1 - Details Endurance training on SciFit at 1.5 resistance x 11 minutes with slow speed but good confidence. Walking with FWW x150 feet with noted significant ant peliv tilt and hyperextension through knees and hips.   Ther Proc 2 LE mobility   Ther Proc 2 - Details Instructed in supine cross body hip stretch x30 sec on R side d/t complaints of R hip stiffness. Supine heelslides for R leg d/t complaints of stidffness in R knee x10 reps.  Updated HEP   Skilled Intervention cues, facilitation, updated HEP   Ther Proc 1 endurance training   Therapeutic Activity   Therapeutic Activities: dynamic activities to improve functional performance minutes (18766) 20   Ther Act 1 Bed mobility and transfers   Ther Act 1 - Details STS transfer, x3 reps t/o session: able to transfer independenlty from w/c however needs heavy UE assist through chair and walker, heavy forward lean and difficulty maintaing upright posture. Bed mobility: Wyatt for sit>supine, IND with verbal cues for rolling to build momentum, MaxA for supine to sit   Skilled Intervention cues, guarding, assist   Plan   Updates to plan of care cahnged to 1x every other week for pt fatigue   Plan for next session COntinue discussing modifications and compensation strategies to ease daily life (strap for leg to help lift it). Continue to address gentle strengthening, bed mobility and ttransfers   Total Session Time   Timed Code Treatment Minutes 45   Total Treatment Time (sum of timed and untimed services) 45         PLAN  Continue therapy per current plan of care.  Pt continues to demonstrate unsafe transfers and lack of control, would benefit from continued skilled PT to address impairments.    Beginning/End Dates of Progress Note Reporting Period:  01/19/2024 to 04/19/2024    Referring Provider:  Vi Metz

## 2024-04-22 ENCOUNTER — VIRTUAL VISIT (OUTPATIENT)
Dept: PHYSICAL THERAPY | Facility: CLINIC | Age: 54
End: 2024-04-22
Payer: COMMERCIAL

## 2024-04-22 DIAGNOSIS — N39.41 URGE INCONTINENCE: Primary | ICD-10-CM

## 2024-04-22 PROCEDURE — 97110 THERAPEUTIC EXERCISES: CPT | Mod: GP

## 2024-04-22 PROCEDURE — 97530 THERAPEUTIC ACTIVITIES: CPT | Mod: GP

## 2024-04-28 DIAGNOSIS — K21.9 GASTROESOPHAGEAL REFLUX DISEASE WITHOUT ESOPHAGITIS: ICD-10-CM

## 2024-04-29 RX ORDER — PANTOPRAZOLE SODIUM 40 MG/1
TABLET, DELAYED RELEASE ORAL
Qty: 90 TABLET | Refills: 0 | Status: SHIPPED | OUTPATIENT
Start: 2024-04-29 | End: 2024-07-29

## 2024-04-29 NOTE — TELEPHONE ENCOUNTER
Prescription approved per [facility] Refill Protocol.  Kimmy Johnson RN  [facility]

## 2024-05-03 ENCOUNTER — THERAPY VISIT (OUTPATIENT)
Dept: PHYSICAL THERAPY | Facility: CLINIC | Age: 54
End: 2024-05-03
Payer: COMMERCIAL

## 2024-05-03 DIAGNOSIS — R26.89 IMPAIRED GAIT AND MOBILITY: ICD-10-CM

## 2024-05-03 DIAGNOSIS — G71.3 MYOPATHY, MITOCHONDRIAL: Primary | ICD-10-CM

## 2024-05-03 PROCEDURE — 97110 THERAPEUTIC EXERCISES: CPT | Mod: GP

## 2024-05-03 PROCEDURE — 97530 THERAPEUTIC ACTIVITIES: CPT | Mod: GP

## 2024-05-21 ENCOUNTER — THERAPY VISIT (OUTPATIENT)
Dept: OCCUPATIONAL THERAPY | Facility: CLINIC | Age: 54
End: 2024-05-21
Attending: INTERNAL MEDICINE
Payer: COMMERCIAL

## 2024-05-21 DIAGNOSIS — G71.3 MYOPATHY, MITOCHONDRIAL: ICD-10-CM

## 2024-05-21 DIAGNOSIS — R26.89 IMPAIRED GAIT AND MOBILITY: ICD-10-CM

## 2024-05-21 DIAGNOSIS — Z74.09 IMPAIRED MOBILITY AND ADLS: Primary | ICD-10-CM

## 2024-05-21 DIAGNOSIS — Z78.9 IMPAIRED MOBILITY AND ADLS: Primary | ICD-10-CM

## 2024-05-21 PROCEDURE — 97542 WHEELCHAIR MNGMENT TRAINING: CPT | Mod: GO | Performed by: OCCUPATIONAL THERAPIST

## 2024-05-21 NOTE — PROGRESS NOTES
"                                                                             SEATING AND WHEELED MOBILITY ASSESSMENT    [facility]  Occupational Therapy   Date of service: May 21, 2024    Referring provider: Vi Metz MD   Order Date 5/3/24  Onset Date: 5/3/24    Order Details: ramila del castilloal    Funding:[facility]    Others present at visit:  Spouse / significant other    Vendor: Ashleigh RODRIGUEZ from [employer]    Height/Weight: 5'2\" / 127    Medical diagnosis:    Mitochondrial myopathy  Nervous and Auditory  Facial paresthesia  Acute pain of right knee     Respiratory  Seasonal allergic rhinitis, unspecified trigger  Acute respiratory failure with hypoxia (H)  Pulmonary nodules     Digestive  Vitamin B 12 deficiency  Hepatic steatosis     Endocrine  Myopathy, mitochondrial  Cyst of right ovary     Circulatory  Secondary hypertension     Musculoskeletal and Integumentary  Impetigo  Other myositis, unspecified site  Atopic dermatitis of scalp  Closed disp fracture of condyle of right femur with routine healing  Closed fracture of right femur, unspecified fracture morphology, unspecified portion of femur, initial encounter (H)     Immune  Enlarged lymph nodes     Urinary  Overactive bladder  Vaginal dryness  Urge incontinence     Hematologic  Anemia, unspecified type  Methemoglobinemia     Other  Dry eyes  Encounter for monitoring of systemic steroid therapy  Need for vaccination  On prednisone therapy  Need for pneumocystis prophylaxis  Visit for screening mammogram  Weight gain  Annual physical exam    Pertinent medical history/surgery:  12/20/2023  Laparoscopic oophorectomy (Right) Procedure: LAPAROSCOPIC RIGHT OOPHORECTOMY AND SALPINGECTOMY, LEFT SALPINGECTOMY AND LEFT OVARIAN CYST DRAINAGE;  Surgeon: Annette Walls MD;  Location:  OR  12/11/2022  Open reduction internal fixation rodding intramedullary femur (Right) Procedure: Open reduction internal fixation of right femur " fracture;  Surgeon: Al Larson MD;  Location:  OR  2022  Colonoscopy (N/A) Procedure: COLONOSCOPY, FLEXIBLE, WITH LESION REMOVAL USING SNARE;  Surgeon: Dorie Santos MD;  Location:  GI  21  Colonoscopy  07  Gyn surgery  for twins  2007  Abdomen surgery   2019 & 2021  Biopsy Right bicep, result abnormal results, & left tricep biopsy    Patient concerns/goals: wheelchair    Living environment:  House    Living environment barriers:  Ramp(s) present      Current assistance/living environment:  Lives with spouse    Community Mobility:  Transportation: Transportation Services, Truck  Community Mobility Requirements: Medical Appointments, Shopping  Accessibility Requirements for Community Settings: works from home, PT      Current mobility equipment:  Manual wheelchair    Patient Measurements- per atp notes    Fall Risk Screen:   Has the patient fallen 2 or more times in the last year? Yes      Has the patient fallen and had an injury in the past year? No       Timed Up and Go Score: Not able to perform due to Nt    Is the patient a fall risk? Yes, department fall risk interventions implemented    FUNCTIONAL MOBILITY  Assistive Device(s): Walker (four wheeled), Wheelchair (manual)  Ambulation: Mod I using 4WW for household distances with frequent falls, and often has times when she is not able, uses manual wheelchair   Wheelchair: k3 manual that she cannot propel     BED MOBILITY:  Mod I ( takes 15 minutes) - requires bed height raised and use of UE for momentum and bed rail ; challenges donning C-pap in supine due to UE limitations      Sit>supine: able to complete with Min A at legs but taxing and requires heavy UE support and momentum, proper set up requires  Rolling/scooting: able to roll to R, min A and UE support needed to roll with L, use UE to help with momentum, scooting with decreased hip clearance and small movements  Supine>sit: Max A needed  due to trunk weakness, reports needing elevated head of bed and use of momentum by pulling arms into chest and rocking to get up at home     TRANSFERS: Set up required to maximal assist; challenges with rising from standard height chairs/toilets/surfaces- especially in the afternoon and post exertional.  Increasing need for transfer assist daily.  Mod assist in clinic after sitting for a period of time     BATHING: Min Assist with assist from  to wash hair due to limited UE function; uses proximal support for managing hair tasks; challenging managing hair tasks and makeup application.  Showers tues/sat at [employer] or [employer].  Equipment: Grab bar, Shower chair/Tub bench     UPPER BODY DRESSING:  Required to use modified techniques including forward trunk flexion and uses larger pull over shirts to assist with donning; engages dressing fasteners such as zipping, buttoning; challenges tying   Equipment: Dressing stick, Reacher    LOWER BODY DRESSING:  Mod I- moderate; wears easy pull-up pants ; dons socks and shoes in bed  Equipment:  No AE utilized at this time. Increasing help and will get help to speed things up     TOILETING:  Mod I; notes challenges with rising from standard toilet height in community   Equipment: Raised toilet seat with grab bar.  Double double riser- has needed increasing help      GROOMING:  challenges with hair styling and makeup application; utilizes standard toothbrush   Equipment:  No AE utilized at this time    EATING/SELF FEEDING:  challenges with loading utensil and transporting to mouth    Equipment:  No AE utilized at this time     ACTIVITY TOLERANCE: fatigues easily with any activity, socializing,  and daily activities     INSTRUMENTAL ACTIVITIES OF DAILY LIVING (IADL): Pt receives support from family for IADL's.   Meal Planning/Prep: Performs meal prep tasks with modifications. Able to perform chopping/cutting tasks in standing with rest breaks as needed. She has  challenges with lifting/transporting kitchen items (pots/pans, etc)  Home/Financial Management: Laundry is in the basement and performed by family but she participates in folding   Communication/Computer Use: challenges writing  Work: Works full-time from home using computer with sit-stand desk; working on getting ergonomic keyboard set up; sits in wheelchair due to height  Community Mobility: Uses [employer] mobility or assist form family for driving       Evaluation     Pain assessment:  Neck and Right knee- fall    Cognition:  wfl    Fatigue:  Reported Problems:       Neuromuscular:  Tone:   Hypotonic    Head and Neck:  Head and Neck Position: Flexed  Head Control: Adequate  Tone/Movement of Head and Neck: hypotonia    POSTURE:  bilateral scapular winging; altered scapulohumeral rhythm     RANGE OF MOTION:   Right UE AROM : R SHDR FLX: 60*; R SHDR ABD: 55*; Functional ER: reaches to ear level and required to elicit trunk flexion to reach hair level; R ELBOW  FLEX/EXT, SUPINATION, WRIST FLEXION/EXTENSION: FULL; dystonia in right hand (predominately 5th digit);   Left UE AROM:  LSHDR FLX: 50*; L SHDR ABD: 55*; Functional ER: reaches to ear level and required to elicit trunk flexion to reach hair level; L ELBOW  FLEX/EXT, SUPINATION, WRIST FLEXION/EXTENSION: FULL      STRENGTH:      Pain: - none + mild ++ moderate +++ severe  Strength Scale: 0-5/5 Left Right    Strength (lbs) 28lb 11 lb    Lateral Pinch (lbs) 9 lb  17lb       COORDINATION: Left Hand, Nine Hole Peg Test (sec): 23 sec  Right Hand, Nine Hole Peg Test (sec): 25 seconds    Pelvis  Anterior/Posterior Pelvis Position: Partially Flexible, Posterior Tilt    Trunk:  Anterior/Posterior Trunk Position: Increased Lumbar Lordosis, Increased Thoracic Kyphosis, Partially Flexible    Lower Extremities:  Hip flexion R 2-/5 L2-/5, supine hip extension  R 3/5 L3/5, knee extension R 4/5 L3+/5, knee flexion R 3+/5 L3+/5, ankle DF R 5/5 L5/5, significant proximal weakness  noted throughout testing;     Edema: dep pitting edema     Impairments:  Fatigue  Muscle atrophy  Coordination  Balance  Range of motion  Skin integrity     Treatment diagnosis:  Impaired mobility  Impaired activities of daily living     Recommendations/Plan of care:  Patient would benefit from interventions to enhance safety and independence.  Occupational therapy intervention for  wheelchair management.    Goals:   Target date:   Patient, family and/or caregiver will verbalize/demonstrate understanding of compensatory methods /equipment to enhance functional independence and safety.    Educational assessment/barriers to learning:  Emotional    Treatment provided this date:   Wheelchair management, 75 minutes   Educated on power w/c for tool for energy mgmt to aid in safety with MRADL participation with progressive condition.  Patient needing increasing help daily due to weakness.  Reports she will try to stand for 45 mins.  Significant head weakness in clinic today  needing support from headrest.  Safe and independent driving with need for all seat functions to position self.  Home trials of smaller chair.    Response to treatment/recommendations: understanding    Goal attainment:  All goals met    Risks and benefits of evaluation/treatment have been explained.  Patient, family and/or caregiver are in agreement with Plan of Care.     Timed Code Treatment Minutes: 75  Total Treatment Time (sum of timed and untimed services): 75    Electronically signed by:  Ashleigh MCCAULEY/ORACIO, ATP      Occupational Therapist, Assistive   [phone]      fax: [phone]      [email or web address]  [facility]- [facility], [address]  [address]  [place]   [address]  May 21, 2024

## 2024-06-03 ENCOUNTER — THERAPY VISIT (OUTPATIENT)
Dept: PHYSICAL THERAPY | Facility: CLINIC | Age: 54
End: 2024-06-03
Payer: COMMERCIAL

## 2024-06-03 DIAGNOSIS — G71.3 MYOPATHY, MITOCHONDRIAL: Primary | ICD-10-CM

## 2024-06-03 DIAGNOSIS — R26.89 IMPAIRED GAIT AND MOBILITY: ICD-10-CM

## 2024-06-03 PROCEDURE — 97530 THERAPEUTIC ACTIVITIES: CPT | Mod: GP

## 2024-06-03 PROCEDURE — 97110 THERAPEUTIC EXERCISES: CPT | Mod: GP

## 2024-06-04 DIAGNOSIS — R32 INCONTINENCE IN FEMALE: ICD-10-CM

## 2024-06-04 RX ORDER — SOLIFENACIN SUCCINATE 5 MG/1
5 TABLET, FILM COATED ORAL DAILY
Qty: 90 TABLET | Refills: 1 | Status: SHIPPED | OUTPATIENT
Start: 2024-06-04

## 2024-06-04 NOTE — TELEPHONE ENCOUNTER
Received refill request for Vesicare. Pt last seen 3/6/24, where she noted improvement on the Vesicare. Refilled.

## 2024-06-06 NOTE — PROGRESS NOTES
REQUISITION AND JUSTIFICATION FOR DURABLE MEDICAL EQUIPMENT    Patient Name:  Britt Park  MR #:  [id number]  :  1970  Age/Gender:  54 year old female  Visit Date:  Britt Park seen for seating and wheeled mobility evaluation by Ashleigh Cruz OTR/L, ATP and ATP from [facility] on 2024.    CLINICAL CRITERIA FOR MOBILITY ASSISTIVE EQUIPMENT  Coverage Criteria Per Local Coverage Determination  A) Britt has mobility limitations due to mitochondrial myopathy that significantly impairs her ability to participate in all of her mobility-related activities of daily living (MRADL). Specifically affected are toileting (being able to get there in time to prevent accidents), dressing, and bathing (getting into the bathroom of designated place). Current equipment used is manual wheelchair that she is unable to propel. This patient needs the new equipment requested to be able to  allow for safe and independent participation in MRADLS. Please see additional documentation in the seating and wheeled mobility report for details.   Britt had a successful clinical trial here, and also a successful trial at home with the recommended equipment. Britt is very willing and physically / cognitively able to use the recommended equipment to assist her with mobility-related activities of daily living and general mobility. A group 3 power wheelchair is being requested because it has better suspension for a smooth ride and has the capabilities of expandable electronics to operate the  power seat functions Britt needs for independence with her activities of daily living. A Group 2 power wheelchair does not have sufficient electronics to support this patient's progressive neurological deficits due to myopathies.  B) Britt's mobility limitation cannot be sufficiently and safely resolved by the use of an appropriately fitted cane or walker because frequent falls while using walker during short distances and thus increasing use of  manual wheelchair. TUG results not tested as unable. Strength of legs is Hip flexion R 2-/5 L2-/5, supine hip extension R 3/5 L3/5, knee extension R 4/5 L3+/5, knee flexion R 3+/5 L3+/5, ankle DF R 5/5 L5/5,  for one maximal repetition. Fatigue also impacts this patient's ability to ambulate, regardless of the gait aid.    C) Britt does not have sufficient upper extremity function to self-propel an optimally-configured manual wheelchair in her home to perform MRADLs during a typical day due to limitations in strength, endurance, range of motion, and coordination. Distance and time to propel a light weight manual wheelchair not tested due to significant weakness and limited range of motion.  Strength of arms is 2+.  D)  Britt is not able to use a POV/scooter because it will not fit in her home environment. Britt is unable to safely transfer to and from a POV, unable to operate the tiller steering system, and unable to maintain postural stability and position while operating the POV. Britt needs more appropriate seating and positioning than any scooter seat provides.  E) The need for this equipment is LIFETIME.     RECOMMENDATIONS FOR MOBILITY BASE, SEATING SYSTEM AND COMPONENTS  Quantum Q6 Edge 3MP-SS - this mid wheel drive power wheelchair is needed for this patient to continue to have independent mobility and to be able to allow her to complete or assist in all of her mobility related activities of daily living (MRADLs). This wheelchair will also have the seating and positioning system and seat function she needs to be able to use and tolerate the wheelchair full time, and have functional and comfortable positioning for a full day's activities.  Britt has mitochondrial myopathy which impairs her ability to move in her home without the use of the requested wheelchair. She lives in ramped home.    100 amp Q-Logic 3 EX controller -  Needed for operation of the joystick for mobility and seat functions    Harness for expandable  controller - needs to be inline for communication between the controller and the joystick    Q-Logic 3 EX Joystick - this is the joystick needed to be used by this patient for moving this wheelchair and also controlling the movement of the seat functions.    Swing away joystick mount - needed to be able to move the joystick out of the way during transfers, or when at the desk using the computer, or at the table eating, so as not to inadvertently hit the joystick, thus moving the wheelchair or turning the power on or off without her knowledge.    KURT Balance Power Tilt and Recline  - This seating function combination is needed for this patient to be able to perform independent weight shifts and also to be able to change her seated position without the request of a care giver.  Britt requires a powered seating system with both tilt and recline to optimize pressure distribution and postural repositioning.   The power tilt seat allows her to change her position by rotating rearward without changing the angle of her hips or knees. Due to atrophy of her buttocks she is at high risk of developing pressure ulcers if not able to change her position. Due to compromised ability to exert herself for weight shifting, the power tilt seat allows her to do this by operating it through the joystick. She can also use a slight degree of tilt for assisting in her seating and positioning for normal functions during the day a to assist keeping her in a midline, erect and upright position by using the effect of gravity.   The power reclining back feature also reduces pressures by allowing her to change the angle of her hips and knees into a more open and supine / recumbent position. This increases her tolerance and be able to remain in the requested wheelchair for a complete day and not be dependent on care givers to change her position or transfer her to another surface for comfort or resting. The recline feature can be used to access the  perineal area necessary for toileting assistance. The power tilt seat and power recline back are necessary features that allow tasks to be done by the care giver without the need for transferring back to bed for these activities.  The medical justification for these seat functions is consistent with the RESNA Position Papers regarding these seat function interventions (Scarlet et al., 2009). These seat functions will also reduce upper extremity strain per the Paralyzed [place] of [place] Guidelines for upper limb preservation (Leonardoinger, et al., 2005).    Power elevating seat - Vertical movement is necessary to allow her to function and participate in a three-dimensional world. This seat feature will increase her ability to reach by bringing her closer for better access with weak arms while reaching into cupboards or closets.  During the home trial she was able to use this feature to her.  She is transferring up to moderate assistance depending on fatigue level for sit to stand. This requested seat lift feature promotes safety with and improved independence with lateral transfers by allowing a level transfer or transfer from a higher to lower surface, which is gravity-assisted.  It also facilitates forward transfer by allowing legs, hips to be more extended, thereby lessening the strength required for the user to perform a stand-pivot transfer.  Power seat elevation also allows the user to have eye contact with others and reduces cervical strain and pain (including headaches from poor positioning). Vertical rise also provides psycho-social benefits of being on peer level and speaking eye-to-eye. Additionally, seat elevation allows certain medications to be kept out of reach of children but remain accessible to the user.    KURT comfort Solid curved and padded back support - firm and contoured back support is needed to support Sun's thoracolumbar area in an upright and midline position, with appropriate support pads  "as needed. This will provide support whether she is in the upright or tilted/reclined position. This back support is essential to provide sufficient lateral contour to maximize her postural alignment and minimize her tendency to develop scoliosis and other secondary complications.    Lateral wedge- needed to provide support for weak trunk muscles in order to provide upright posture for optimal environmental engagement.     Stealth height adjustable  needed to place the joystick of the wheelchair in a safe positioning for driving.    Stealth Comfort Plus 10\" headrest and mounting hardware - needed to keep Sun's head in an erect, midline and upright position whether she is in the upright, tilted or reclined position. Her head and neck need to be supported as well as the rest of her body.    Stealth comfort plus mounting hardware - needed for mounting the requested headrest in the most appropriate position on the back of the wheelchair for optimal head and neck support and to be able to be removed for transfers.     Power Positioning electronics to be operated through the Q logic controller - this is needed to allow her to use the joystick of the wheelchair for control of mobility and operation of the power seat function. This is important for this patient with limited strength and coordination so as not to require a separate switch for operation of the seat function.    Power elevating foot legrest- Allows for elevation and extension of lower extremities while elevating. This can improve circulation and prevent/reduce edema (with recline/tilt combination).  The lower legs of this wheelchair user act as a reservoir for fluid accumulation due to lack of movement. Elevation of this patient's legs above the level of the heart (left atrium) is recommended as part of the management of edema in conjunction with other measures such as support garments  This patient has lower extremity dependent edema while sitting in " her wheelchair, which resolves with elevation of her legs. This feature, when used with the power recline back or tilt seat, can increase Sun's sitting tolerance while positioning her in a more natural position. This can also be helpful if she fatigues and requires rests throughout the day, without transferring her back to bed.  Due to inability to perform a functional weight shifting, she is at risk for developing pressure ulcers. With the use of this feature it will allow for optimal weight shifting in conjunction with tilt and recline.    Per RESNA white paper on elevating legrests, using elevating legrests and tilting more than 30 degrees in combination with full recline significantly improves lower leg hemodynamics status as measured by near-infrared spectroscopy (Christofer et al., 2010)  Additionally, these legrests can improve ground clearance to navigate thresholds and slopes and still allowing the legs to achieve a tight 90 degree position for typical driving conditions. This position shortens the overall functional wheelbase for improved maneuverability    KURT comfort 2 spp seat cushion - this pressure distribution and positioning seat cushion will optimally  distribute seating pressures to prevent pressure ulcers, but also provide a stable base of support for her to use during MRADLs.    2 Batteries and  - gel sealed, and two are necessary to power the wheelchair. They are maintenance free and are safe for travel on the road or in the air. They are necessary to provide reliable use of the power wheelchair on a single charge.    This equipment is reasonable and necessary with reference to accepted standards of medical practice and treatment of this patient's condition and is not being recommended as a convenience item. Without this recommended equipment, she is highly likely to sustain injuries from falls, develop pressure sores or postural compensation, and/or be bed confined, which those costs far  exceed the cost of the requested equipment.    Electronically signed by:  ALAN Shanks      Occupational Therapist, Assistive   [phone]      fax: [phone]      [email or web address] [address]  June 6, 2024    I have read and concur with the above recommendations.    Physician Printed Name __________________________________________    Physician SIgnature  _____________________________________________    Date of SIgnature ______________________________    Physician Phone  ______________________________

## 2024-06-11 PROBLEM — N39.41 URGE INCONTINENCE: Status: RESOLVED | Noted: 2024-01-24 | Resolved: 2024-06-11

## 2024-06-11 NOTE — PROGRESS NOTES
Patient did not return for further treatment and no additional progress was noted.  Please refer to the progress note and goal flowsheet completed on 3/25/2024 and 4/22/2024  for discharge information.

## 2024-06-28 ENCOUNTER — MEDICAL CORRESPONDENCE (OUTPATIENT)
Dept: HEALTH INFORMATION MANAGEMENT | Facility: CLINIC | Age: 54
End: 2024-06-28

## 2024-07-02 ENCOUNTER — MEDICAL CORRESPONDENCE (OUTPATIENT)
Dept: HEALTH INFORMATION MANAGEMENT | Facility: CLINIC | Age: 54
End: 2024-07-02

## 2024-07-03 ENCOUNTER — TELEPHONE (OUTPATIENT)
Dept: FAMILY MEDICINE | Facility: CLINIC | Age: 54
End: 2024-07-03
Payer: COMMERCIAL

## 2024-07-03 NOTE — TELEPHONE ENCOUNTER
General Call    Contacts       Contact Date/Time Type Contact Phone/Fax    07/03/2024 09:54 AM CDT Phone (Outgoing) Denise Diaz (Other) [phone]     [employer] Rep.          Reason for Call:  Spoke with representative Denise relating to fax requests that need to have patient current PCP fill out as provider was the one that had the face-to-face appointment. Informed her that PCP is out of the clinic, on maternity leave. PCP will be back in the clinic on 08/19/24.    What are your questions or concerns:  Rep. will reach out to patient if she will want to wait until PCP is back in the office and/or see one of our same day providers. Please call back with any concerns or question at [phone]. Ask to speak with .    Date of last appointment with provider: 03/13/24    [facility] or would you prefer to receive a phone call?:   No preference.

## 2024-07-10 ENCOUNTER — VIRTUAL VISIT (OUTPATIENT)
Dept: FAMILY MEDICINE | Facility: CLINIC | Age: 54
End: 2024-07-10
Payer: COMMERCIAL

## 2024-07-10 DIAGNOSIS — I15.9 SECONDARY HYPERTENSION: ICD-10-CM

## 2024-07-10 DIAGNOSIS — E78.5 HYPERLIPIDEMIA LDL GOAL <100: ICD-10-CM

## 2024-07-10 DIAGNOSIS — G71.3 MYOPATHY, MITOCHONDRIAL: Primary | ICD-10-CM

## 2024-07-10 DIAGNOSIS — R29.898 LEG WEAKNESS, BILATERAL: ICD-10-CM

## 2024-07-10 DIAGNOSIS — Z91.81 AT RISK FOR INJURY RELATED TO FALL: ICD-10-CM

## 2024-07-10 DIAGNOSIS — R26.89 IMPAIRED GAIT AND MOBILITY: ICD-10-CM

## 2024-07-10 DIAGNOSIS — S72.91XA CLOSED FRACTURE OF RIGHT FEMUR, UNSPECIFIED FRACTURE MORPHOLOGY, UNSPECIFIED PORTION OF FEMUR, INITIAL ENCOUNTER (H): ICD-10-CM

## 2024-07-10 PROCEDURE — 99214 OFFICE O/P EST MOD 30 MIN: CPT | Mod: 95 | Performed by: INTERNAL MEDICINE

## 2024-07-10 NOTE — PROGRESS NOTES
Ventura is a 54 year old who is being evaluated via a billable video visit.    How would you like to obtain your AVS? MyChart  If the video visit is dropped, the invitation should be resent by: Text to cell phone: [phone]  Will anyone else be joining your video visit? No      Subjective   Ventura is a 54 year old, presenting for the following health issues:  Orders (Electric wheel chair)    History of Present Illness       Reason for visit:  To do a video visit for an electric wheelchair. 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 Please put in clinic notes we did a video visit for an electric wheelchair. My primary care physician, Dr. Metz, is on maternity leave until Aug.    She eats 2-3 servings of fruits and vegetables daily.She consumes 0 sweetened beverage(s) daily.She exercises with enough effort to increase her heart rate 20 to 29 minutes per day.  She exercises with enough effort to increase her heart rate 5 days per week.   She is taking medications regularly.             Current Medications:     Current Outpatient Medications   Medication Sig Dispense Refill    acetaminophen (TYLENOL) 325 MG tablet Take 2 tablets (650 mg) by mouth every 6 hours as needed for other (For optimal non-opioid multimodal pain management to improve pain control.) 100 tablet 0    alendronate (FOSAMAX) 70 MG tablet Take 70 mg by mouth      atorvastatin (LIPITOR) 20 MG tablet Take 1 tablet by mouth daily at 2 pm      calcium carbonate (TUMS) 500 MG chewable tablet Take 1 tablet (500 mg) by mouth 2 times daily as needed for heartburn      estradiol (VAGIFEM) 10 MCG TABS vaginal tablet Place 1 tablet (10 mcg) vaginally twice a week 24 tablet 3    folic acid (FOLVITE) 1 MG tablet Take 1 tablet (1 mg) by mouth daily 90 tablet 3    ibuprofen (ADVIL/MOTRIN) 800 MG tablet Take 1 tablet (800 mg) by mouth every 6 hours as needed for other (mild and/or inflammatory pain) 30 tablet 0    losartan (COZAAR) 25 MG tablet TAKE  1 TABLET(25 MG) BY MOUTH DAILY 90 tablet 3    Misc. Devices (NOVA CUSHION GEL SEAT PAD) MISC 1 Units daily 1 each 0    mupirocin (BACTROBAN) 2 % external ointment Apply topically 3 times daily 15 g 3    pantoprazole (PROTONIX) 40 MG EC tablet TAKE 1 TABLET BY MOUTH DAILY 30 MINUTES BEFORE BREAKFAST 90 tablet 0    solifenacin (VESICARE) 5 MG tablet Take 1 tablet (5 mg) by mouth daily 90 tablet 1    valACYclovir (VALTREX) 1000 mg tablet Take 2,000 mg by mouth as needed      Vitamin D3 (CHOLECALCIFEROL) 25 mcg (1000 units) tablet Take 25 mcg by mouth           Allergies:      Allergies   Allergen Reactions    Dapsone Shortness Of Breath     Oxygen levels went to 79 %.    Influenza Vaccines Hives     23 Patient states she's been able to have a flu shot the past couple of years and has done well with it.    Benadryl [Diphenhydramine]     Diphenhydramine Other (See Comments)     SHAKY      Sulfa Antibiotics Swelling    Sulfa Antibiotics             Past Medical History:     Past Medical History:   Diagnosis Date    Acute respiratory failure with hypoxia (H)     Anemia     Facial paresthesia     Hepatic vein stenosis     Impetigo     Methemoglobinemia     Ovarian cyst     Overactive bladder     Secondary hypertension 2021    Sleep apnea          Past Surgical History:     Past Surgical History:   Procedure Laterality Date    ABDOMEN SURGERY  2007        BIOPSY  2019 & 2021    Right bicep, result abnormal results, & left tricep biopsy    COLONOSCOPY  21    COLONOSCOPY N/A 2022    Procedure: COLONOSCOPY, FLEXIBLE, WITH LESION REMOVAL USING SNARE;  Surgeon: Dorie Santos MD;  Location:  GI    GYN SURGERY  07     for twins    LAPAROSCOPIC OOPHORECTOMY Right 2023    Procedure: LAPAROSCOPIC RIGHT OOPHORECTOMY AND SALPINGECTOMY, LEFT SALPINGECTOMY AND LEFT OVARIAN CYST DRAINAGE;  Surgeon: Annette Walls MD;  Location:  OR    OPEN  REDUCTION INTERNAL FIXATION RODDING INTRAMEDULLARY FEMUR Right 12/11/2022    Procedure: Open reduction internal fixation of right femur fracture;  Surgeon: Al Larson MD;  Location: [facility] OR         Family Medical History:     Family History   Adopted: Yes   Problem Relation Age of Onset    Unknown/Adopted Other          Social History:     Social History     Socioeconomic History    Marital status:      Spouse name: Not on file    Number of children: Not on file    Years of education: Not on file    Highest education level: Not on file   Occupational History    Not on file   Tobacco Use    Smoking status: Never    Smokeless tobacco: Never   Vaping Use    Vaping status: Never Used   Substance and Sexual Activity    Alcohol use: Yes    Drug use: Never    Sexual activity: Yes     Partners: Male     Birth control/protection: Male Surgical   Other Topics Concern    Parent/sibling w/ CABG, MI or angioplasty before 65F 55M? No   Social History Narrative    ** Merged History Encounter **          Social Determinants of Health     Financial Resource Strain: Low Risk  (11/30/2023)    Financial Resource Strain     Within the past 12 months, have you or your family members you live with been unable to get utilities (heat, electricity) when it was really needed?: No   Food Insecurity: Low Risk  (11/30/2023)    Food Insecurity     Within the past 12 months, did you worry that your food would run out before you got money to buy more?: No     Within the past 12 months, did the food you bought just not last and you didn t have money to get more?: No   Transportation Needs: Low Risk  (11/30/2023)    Transportation Needs     Within the past 12 months, has lack of transportation kept you from medical appointments, getting your medicines, non-medical meetings or appointments, work, or from getting things that you need?: No   Physical Activity: Inactive (2/26/2023)    [facility]    Exercise Vital  Sign     Days of Exercise per Week: 0 days     Minutes of Exercise per Session: 0 min   Stress: Stress Concern Present (2/26/2023)    [facility], [facility]    [employer] - Occupational Stress Questionnaire     Feeling of Stress : To some extent   Social Connections: Unknown (2/26/2023)    Received from [facility], [facility]    Social Connection and Isolation Panel [NHANES]     Frequency of Communication with Friends and Family: Patient declined     Frequency of Social Gatherings with Friends and Family: Patient declined     Attends Muslim Services: Never     Active Member of Clubs or Organizations: Yes     Attends Club or Organization Meetings: More than 4 times per year     Marital Status:    Interpersonal Safety: Low Risk  (12/6/2023)    Interpersonal Safety     Do you feel physically and emotionally safe where you currently live?: Yes     Within the past 12 months, have you been hit, slapped, kicked or otherwise physically hurt by someone?: No     Within the past 12 months, have you been humiliated or emotionally abused in other ways by your partner or ex-partner?: No   Housing Stability: Low Risk  (11/30/2023)    Housing Stability     Do you have housing? : Yes     Are you worried about losing your housing?: No           Review of System:     Constitutional: Negative for fever or chills  Skin: Negative for rashes  Ears/Nose/Throat: Negative for nasal congestion, sore throat  Respiratory: No shortness of breath, dyspnea on exertion, cough, or hemoptysis  Cardiovascular: Negative for chest pain  Gastrointestinal: Negative for nausea, vomiting  Genitourinary: Negative for dysuria, hematuria  Musculoskeletal: positive for myopathy  Neurologic: Negative for headaches  Psychiatric: Negative for depression, anxiety  Hematologic/Lymphatic/Immunologic: Negative  Endocrine: Negative  Behavioral: Negative for tobacco use       Physical Exam:   LMP  (LMP Unknown)      GENERAL: alert and no distress  EYES: eyes grossly normal to inspection, and conjunctivae and sclerae normal  HENT: Normocephalic atraumatic. Nose and mouth without ulcers or lesions  NECK: supple  RESP: lungs clear to auscultation   CV: regular rate and rhythm, normal S1 S2  LYMPH: no peripheral edema   ABDOMEN: nondistended  MS: muscle weakness noted  SKIN: no suspicious lesions or rashes  NEURO: Alert & Oriented x 3.   PSYCH: mentation appears normal, affect normal        Diagnostic Test Results:     Diagnostic Test Results:  Labs reviewed in Epic    ASSESSMENT/PLAN:       Ventura was seen today for orders.    Diagnoses and all orders for this visit:    Myopathy, mitochondrial  -     Electric Wheelchair Order for DME with OT or PT Referral; Future    At risk for injury related to fall  Closed fracture of right femur, unspecified fracture morphology, unspecified portion of femur, initial encounter (H)  -     Electric Wheelchair Order for DME with OT or PT Referral; Future    Impaired gait and mobility  -     Electric Wheelchair Order for DME with OT or PT Referral; Future    Leg weakness, bilateral  -     Electric Wheelchair Order for DME with OT or PT Referral; Future    Secondary hypertension  - stable, continue current therapy    Hyperlipidemia LDL goal <100  - stable, continue current therapy        Follow Up Plan:     Patient is instructed to return to Internal Medicine clinic for follow-up visit in 1 month.        Mayuri Skaggs MD  Internal Medicine  [facility]      Video-Visit Details    Type of service:  Video Visit   Originating Location (pt. Location): Home    Distant Location (provider location):  On-site  Platform used for Video Visit: Laura  Signed Electronically by: Mayuri Skaggs MD

## 2024-07-17 ENCOUNTER — MEDICAL CORRESPONDENCE (OUTPATIENT)
Dept: HEALTH INFORMATION MANAGEMENT | Facility: CLINIC | Age: 54
End: 2024-07-17

## 2024-07-18 ENCOUNTER — MYC MEDICAL ADVICE (OUTPATIENT)
Dept: FAMILY MEDICINE | Facility: CLINIC | Age: 54
End: 2024-07-18
Payer: COMMERCIAL

## 2024-07-18 NOTE — TELEPHONE ENCOUNTER
Good day Dr. Skaggs,      Please review patient's MyChart message and advise. Have you received patient's forms in regards of electric wheelchair.      Joy DIAZ MA on 7/18/2024 at 11:42 AM

## 2024-07-24 NOTE — PROGRESS NOTES
PT: Patient called to cancel additional therapy visits after appointment this date while in the process of obtaining an electric wheelchair. Patient showed progress in independence with HEP during POC. Patient may benefit from additional therapy in future. Will discharge at this time with goals partially met.

## 2024-07-28 ENCOUNTER — MYC MEDICAL ADVICE (OUTPATIENT)
Dept: FAMILY MEDICINE | Facility: CLINIC | Age: 54
End: 2024-07-28
Payer: COMMERCIAL

## 2024-07-28 DIAGNOSIS — E78.5 HYPERLIPIDEMIA LDL GOAL <100: Primary | ICD-10-CM

## 2024-07-28 DIAGNOSIS — K21.9 GASTROESOPHAGEAL REFLUX DISEASE WITHOUT ESOPHAGITIS: ICD-10-CM

## 2024-07-29 RX ORDER — PANTOPRAZOLE SODIUM 40 MG/1
TABLET, DELAYED RELEASE ORAL
Qty: 90 TABLET | Refills: 2 | Status: SHIPPED | OUTPATIENT
Start: 2024-07-29

## 2024-07-30 RX ORDER — ATORVASTATIN CALCIUM 20 MG/1
20 TABLET, FILM COATED ORAL
Qty: 90 TABLET | Refills: 1 | Status: SHIPPED | OUTPATIENT
Start: 2024-07-30

## 2024-08-28 ENCOUNTER — MYC MEDICAL ADVICE (OUTPATIENT)
Dept: FAMILY MEDICINE | Facility: CLINIC | Age: 54
End: 2024-08-28
Payer: COMMERCIAL

## 2024-08-28 DIAGNOSIS — G71.3 MYOPATHY, MITOCHONDRIAL: Primary | ICD-10-CM

## 2024-08-28 DIAGNOSIS — R26.89 IMPAIRED GAIT AND MOBILITY: ICD-10-CM

## 2024-08-28 DIAGNOSIS — S72.91XA: ICD-10-CM

## 2024-08-28 DIAGNOSIS — R29.898 LEG WEAKNESS, BILATERAL: ICD-10-CM

## 2024-08-29 ENCOUNTER — PATIENT OUTREACH (OUTPATIENT)
Dept: CARE COORDINATION | Facility: CLINIC | Age: 54
End: 2024-08-29
Payer: COMMERCIAL

## 2024-08-29 NOTE — PROGRESS NOTES
Clinic Care Coordination Contact  Community Health Worker Initial Outreach    CHW introduced self, intent of call, and offered the CC program.    Reason for Referral:  Resources for Transportation  Other- Start the process to apply for a luther for money toward a vehicle with a wheelchair lift    Patient stated she uses [employer] for transportation.  Patient stated her electric wheelchair is 404 lbs.  Patient stated she is looking for luther money for a vehicle with a wheelchair lift.      CHW Initial Information Gathering:  Referral Source: PCP  Preferred Hospital: [facility]  [phone]  Current living arrangement:: I live in a private home with family  Type of residence:: Private home - stairs  Community Resources: None  Supplies Currently Used at Home: None  Equipment Currently Used at Home: walker, rolling, wheelchair, manual, wheelchair, power  Informal Support system:: Children, Spouse  No PCP office visit in Past Year: No  Transportation means:: [employer]  CHW Additional Questions  If ED/Hospital discharge, follow-up appointment scheduled as recommended?: N/A  Medication changes made following ED/Hospital discharge?: N/A  MyChart active?: Yes  Patient sent Social Determinants of Health questionnaire?: Yes    Patient accepts CC: Yes. Patient scheduled for assessment with GERARDO Blackburn on 9/4/24 at 2:00pm. Patient noted desire to discuss grants for vehicles with a wheelchair lift and CC support.     LIAS Estrada  Clinic Care Coordination  [facility]: [facility], [facility], [facility]  Phone: [phone]

## 2024-09-04 ENCOUNTER — PATIENT OUTREACH (OUTPATIENT)
Dept: NURSING | Facility: CLINIC | Age: 54
End: 2024-09-04
Payer: COMMERCIAL

## 2024-09-04 DIAGNOSIS — Z71.89 OTHER SPECIFIED COUNSELING: Primary | Chronic | ICD-10-CM

## 2024-09-04 ASSESSMENT — ACTIVITIES OF DAILY LIVING (ADL): DEPENDENT_IADLS:: TRANSPORTATION

## 2024-09-04 NOTE — LETTER
[facility]  Patient Centered Plan of Care  About Me:        Patient Name:  Britt Lange    YOB: 1970  Age:         54 year old   Vanessa MRN:    [id number] Telephone Information:  Home Phone [phone]   Mobile [phone]       Address:  [address] [place] [address] Email address:  [email or web address]      Emergency Contact(s)    Name Relationship Lgl Grd Work Phone Home Phone Mobile Phone   1. NIGEL LANGE* Spouse    [phone]   2. CATALINO LANGEI Daughter    [phone]   3. SONIA LANGE* Son    [phone]   4. NAZARIOPRES* Son    [phone]           Primary language:  English     needed? No   [employer]:  [phone] op. 1  Other communication barriers:No data recorded  Preferred Method of Communication:     Current living arrangement: I live in a private home with family    Mobility Status/ Medical Equipment: Independent w/Device        Health Maintenance  Health Maintenance Reviewed: Due/Overdue   Health Maintenance Due   Topic Date Due    DEPRESSION ACTION PLAN  Never done    YEARLY PREVENTIVE VISIT  06/27/2023    LIPID  10/10/2023    INFLUENZA VACCINE (1) 09/01/2024    PHQ-9  09/13/2024           My Access Plan  Medical Emergency 911   Primary Clinic Line [facility] - [phone]   24 Hour Appointment Line [phone] or  [phone]) (toll-free)   24 Hour Nurse Line [phone] (toll-free)   Preferred Urgent Care [facility], [phone]     Preferred Hospital [facility]  [phone]     Preferred Pharmacy [id number] - [place], [place] - [address] AT [facility] [address]     Behavioral Health Crisis Line The National Suicide Prevention Lifeline at 1-225.658.9171 or Text/Call 358           My Care Team Members  Patient Care Team         Relationship Specialty Notifications Start End    Vi Metz MD PCP -  General Internal Medicine  12/10/22     Merged    Phone: [phone] Fax: [phone]         [address] [place] [address]    Vlad Leos MD Referring Physician Neurology  3/26/19     referring to neurol    Phone: [phone] Fax: [phone]         [facility] [address] [facility] [address]    Vi Metz MD Assigned PCP   12/23/21     Phone: [phone] Fax: [phone]         [address] [place] [address]    Husam Barnett MD Assigned Rheumatology Provider   1/23/22     Phone: [phone] Fax: [phone]         [address] [place] [address]    Diane Lopez MD MD Gastroenterology  5/6/22     Phone: [phone] Fax: [phone]         [address] [place] [address]    Diane Lopez MD MD Gastroenterology  5/6/22     Phone: [phone] Fax: [phone]         [address] [place] [address]    Vi Metz MD  Internal Medicine  12/10/22     Merged    Phone: [phone] Fax: [phone] [address] [place] [address]    Balwinder Nunn MD Assigned Musculoskeletal Provider   5/13/23     Phone: [phone] Fax: [phone]         [address] [place] [address] [address] [facility] [address]    Annette Walls MD Assigned OBGYN Provider   8/5/23     Phone: [phone] Fax: [phone] [address] [facility] [address] [facility] [address] [facility] [address]    Manan Quiles MD MD OB/Gyn  11/13/23     Phone: [phone] Fax: [phone]         [address] [address]    Bere Gomez, [facility] Lead Care Coordinator  Admissions 8/29/24     Phone: [phone]                     My Care Plans  Self Management and Treatment Plan    Care Plan  Care Plan: Community Resources       Problem: Navigating mobility needs       Note:     Goal Statement: Ventura will continue working with Care Coordination to ensure her needs are met for overall health and wellbeing.  Date Goal Set:  9/4/24  Barriers: Very high costs of home renovations and w/c van  Strengths: Strong support system  Date to Achieve By: 9/4/25  Patient expressed understanding of goal: yes  Action steps to achieve this goal:  1. I will continue to follow up with medical team as needed  2. I will look into resources SW sends me for home renovations  3. I will look into resources  sends me for wheelchair accessible van grants/loans  4. I will explore therapy options if needed, SW can assist/support  5. I will outreach to Care Coordination  for further questions or concerns          Goal: Establish adequate home support                             Action Plans on File:                       Advance Care Plans/Directives:   Advanced Care Plan/Directives on file:   No    Discussed with patient/caregiver(s): No data recorded           My Medical and Care Information  Problem List   Patient Active Problem List   Diagnosis    Myopathy, mitochondrial    Overactive bladder    Dry eyes    Encounter for monitoring of systemic steroid therapy    Need for vaccination    Impetigo    Secondary hypertension    On prednisone therapy    Vitamin B 12 deficiency    Need for pneumocystis prophylaxis    Anemia, unspecified type    Visit for screening mammogram    Seasonal allergic rhinitis, unspecified trigger    Acute respiratory failure with hypoxia (H)    Other myositis, unspecified site    Facial paresthesia    Weight gain    Vaginal dryness    Annual physical exam    Methemoglobinemia    Cyst of right ovary    Hepatic steatosis    Enlarged lymph nodes    Atopic dermatitis of scalp    Closed disp fracture of condyle of right femur with routine healing    Pulmonary nodules    Acute pain of right knee    Closed fracture of right femur, unspecified fracture morphology, unspecified portion of femur, initial encounter (H)      Current Medications and Allergies:  See printed Medication Report.    Care Coordination Start Date: 8/29/2024    Frequency of Care Coordination: monthly, more frequently as needed     Form Last Updated: 09/04/2024

## 2024-09-04 NOTE — PROGRESS NOTES
Clinic Care Coordination Contact  Clinic Care Coordination Contact  OUTREACH    Referral Information:  Referral Source: PCP    LANNY met with pt and spoke at length about her circumstances and needs and goals.  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  She shares that her  is incredibly supportive and lifts her on/off the toilet as her wheelchair will not fit.  She says he also carries her to his truck and they go out twice a week.  She says he is extremely kind and supportive.  She has a daughter who is 19 and twin sons who are 17.  Pt shares that her daughter is in an elite dance program in [place] and it broke her heart that she was not able to help her get settled in [place] (mobility and pain).  Pt shares that she is experiencing decreasing mobility and can no longer climb the steps to her second story, which is where the bedrooms and full bathroom are.  She shares that she just received an electric wheelchair and she is so grateful.  She has permanent accommodations to work remotely due to her medical needs.  She shares that she would like to be able to work in-person but she works at the [employer] and could not possibly make that work due to mobility issues.  She shares feelings of isolation and loneliness.  She did some therapy via [facility], but that has run out.  We talked about possibly finding a new therapist, but the priority for right now is addressing her vehicle needs and home adaptation needs.  LANNY will send by email (pt preference) information about [employer]-E[employer]/CADI as well as some non-profits that may assist pt with a wheelchair accessible van.    Pt would like to enroll in [facility], as she is unsure of how to navigate the resources and would like support.             Chief Complaint   Patient presents with    Clinic Care Coordination - Initial        Universal Utilization:      Utilization      No Show Count (past year)  0              ED Visits  0             Hospital Admissions  1                    Current as of: 9/4/2024  4:35 PM                Clinical Concerns:  Current Medical Concerns:  Psychosocial     Current Behavioral Concerns: N/A    Education Provided to patient: N/A      Health Maintenance Reviewed: Due/Overdue   Health Maintenance Due   Topic Date Due    DEPRESSION ACTION PLAN  Never done    YEARLY PREVENTIVE VISIT  06/27/2023    LIPID  10/10/2023    INFLUENZA VACCINE (1) 09/01/2024    PHQ-9  09/13/2024       Clinical Pathway: None    Medication Management:  Medication review status: Not discussed       Functional Status:  Dependent ADLs:: Ambulation-walker, Transfers, Positioning  Dependent IADLs:: Transportation  Bed or wheelchair confined:: No  Mobility Status: Independent w/Device  Any fall with injury in the past year?: Yes    Living Situation:  Current living arrangement:: I live in a private home with family  Type of residence:: Private home - stairs    Lifestyle & Psychosocial Needs:    Social Determinants of Health     Food Insecurity: Low Risk  (8/29/2024)    Food Insecurity     Within the past 12 months, did you worry that your food would run out before you got money to buy more?: No     Within the past 12 months, did the food you bought just not last and you didn t have money to get more?: No   Depression: Not at risk (7/10/2024)    PHQ-2     PHQ-2 Score: 0   Housing Stability: Low Risk  (8/29/2024)    Housing Stability     Do you have housing? : Yes     Are you worried about losing your housing?: No   Tobacco Use: Low Risk  (7/10/2024)    Patient History     Smoking Tobacco Use: Never     Smokeless Tobacco Use: Never     Passive Exposure: Not on file   Financial Resource Strain: Low Risk  (8/29/2024)    Financial Resource Strain     Within the past 12 months, have you or your family members you live with been unable to get utilities (heat, electricity) when it was really needed?: No   Alcohol Use: Not At Risk  (8/29/2024)    AUDIT-C     Frequency of Alcohol Consumption: Monthly or less     Average Number of Drinks: 1 or 2     Frequency of Binge Drinking: Never   Transportation Needs: Low Risk  (8/29/2024)    Transportation Needs     Within the past 12 months, has lack of transportation kept you from medical appointments, getting your medicines, non-medical meetings or appointments, work, or from getting things that you need?: No   Physical Activity: Insufficiently Active (8/29/2024)    Exercise Vital Sign     Days of Exercise per Week: 1 day     Minutes of Exercise per Session: 40 min   Interpersonal Safety: Low Risk  (12/6/2023)    Interpersonal Safety     Do you feel physically and emotionally safe where you currently live?: Yes     Within the past 12 months, have you been hit, slapped, kicked or otherwise physically hurt by someone?: No     Within the past 12 months, have you been humiliated or emotionally abused in other ways by your partner or ex-partner?: No   Stress: Stress Concern Present (8/29/2024)    [employer] [place] [employer] - Occupational Stress Questionnaire     Feeling of Stress : To some extent   Social Connections: Unknown (8/29/2024)    Social Connection and Isolation Panel [NHANES]     Frequency of Communication with Friends and Family: Patient declined     Frequency of Social Gatherings with Friends and Family: Once a week     Attends Voodoo Services: Patient declined     Active Member of Clubs or Organizations: No     Attends Club or Organization Meetings: Patient declined     Marital Status:    Health Literacy: Not on file              Voodoo or spiritual beliefs that impact treatment:: No  Informal Support system:: Children, Spouse          Resources and Interventions:  Current Resources:      Community Resources: None  Supplies Currently Used at Home: None  Equipment Currently Used at Home: walker, rolling, wheelchair, manual, wheelchair, power  Employment Status:  employed full-time         Advance Care Plan/Directive  Advanced Care Plans/Directives on file:: No    Referrals Placed: Community Resources       Care Plan:  Care Plan: Community Resources       Problem: Navigating mobility needs       Note:     Goal Statement: Ventura will continue working with Care Coordination to ensure her needs are met for overall health and wellbeing.  Date Goal Set: 9/4/24  Barriers: Very high costs of home renovations and w/c van  Strengths: Strong support system  Date to Achieve By: 9/4/25  Patient expressed understanding of goal: yes  Action steps to achieve this goal:  1. I will continue to follow up with medical team as needed  2. I will look into resources SW sends me for home renovations  3. I will look into resources SW sends me for wheelchair accessible van grants/loans  4. I will explore therapy options if needed, SW can assist/support  5. I will outreach to Care Coordination  for further questions or concerns          Goal: Establish adequate home support                             Patient/Caregiver understanding: yes    Outreach Frequency: monthly, more frequently as needed      Plan: LANNY to send email to pt with resources per her request.  LANNY enrolled pt in CCC.      Bere Gomez,  [facility]  Clinic Care Coordinator  [facility]  [phone]  [email or web address]

## 2024-09-04 NOTE — LETTER
[facility] CARE COORDINATION  [address] [address]    September 4, 2024    Britt Park  [address] [address]      Dear Ventura,    I am a clinic care coordinator who works with Vi Metz MD with the [facility]. I wanted to thank you for spending the time to talk with me.  Below is a description of clinic care coordination and how I can further assist you.       The clinic care coordination team is made up of a registered nurse, , financial resource worker and community health worker who understand the health care system. The goal of clinic care coordination is to help you manage your health and improve access to the health care system. Our team works alongside your provider to assist you in determining your health and social needs. We can help you obtain health care and community resources, providing you with necessary information and education. We can work with you through any barriers and develop a care plan that helps coordinate and strengthen the communication between you and your care team.  Our services are voluntary and are offered without charge to you personally.    Please feel free to contact me with any questions or concerns regarding care coordination and what we can offer.      We are focused on providing you with the highest-quality healthcare experience possible.    Sincerely,     Bere Gomez,  [facility]  Clinic Care Coordinator  [facility]  [phone]  [email or web address]

## 2024-09-09 ENCOUNTER — PATIENT OUTREACH (OUTPATIENT)
Dept: CARE COORDINATION | Facility: CLINIC | Age: 54
End: 2024-09-09
Payer: COMMERCIAL

## 2024-09-09 NOTE — PROGRESS NOTES
Outreach attempted x 1 was unable to reach. Left message on voicemail with call back information and requested return call.  Plan: Current outreach date reflects FRW 's follow up within one week.

## 2024-09-16 ENCOUNTER — PATIENT OUTREACH (OUTPATIENT)
Dept: CARE COORDINATION | Facility: CLINIC | Age: 54
End: 2024-09-16
Payer: COMMERCIAL

## 2024-09-22 ENCOUNTER — HEALTH MAINTENANCE LETTER (OUTPATIENT)
Age: 54
End: 2024-09-22

## 2024-10-01 ENCOUNTER — NURSE TRIAGE (OUTPATIENT)
Dept: FAMILY MEDICINE | Facility: CLINIC | Age: 54
End: 2024-10-01
Payer: COMMERCIAL

## 2024-10-01 ENCOUNTER — MYC MEDICAL ADVICE (OUTPATIENT)
Dept: FAMILY MEDICINE | Facility: CLINIC | Age: 54
End: 2024-10-01
Payer: COMMERCIAL

## 2024-10-01 DIAGNOSIS — U07.1 INFECTION DUE TO 2019 NOVEL CORONAVIRUS: Primary | ICD-10-CM

## 2024-10-01 NOTE — TELEPHONE ENCOUNTER
RN COVID TREATMENT VISIT  10/01/24      The patient has been triaged and does not require a higher level of care.    Britt Park  54 year old  Current weight? 115lbs    Has the patient been seen by a primary care provider at an [facility] or [facility] within the past two years? Yes.   Have you been in close proximity to/do you have a known exposure to a person with a confirmed case of influenza? No.     General treatment eligibility:  Date of positive COVID test (PCR or at home)?  10/1/24    Are you or have you been hospitalized for this COVID-19 infection? No.   Have you received monoclonal antibodies or antiviral treatment for COVID-19 since this positive test? No.   Do you have any of the following conditions that place you at risk of being very sick from COVID-19?   - Age 50 years or older  Yes, patient has at least one high risk condition as noted above.     Current COVID symptoms:   - cough  - sore throat  Yes. Patient has at least one symptom as selected.     How many days since symptoms started? 5 days or less. Established patient, 12 years or older weighing at least 88.2 lbs, who has symptoms that started in the past 5 days, has not been hospitalized nor received treatment already, and is at risk for being very sick from COVID-19.     Treatment eligibility by RN:  Are you currently pregnant or nursing? No  Do you have a clinically significant hypersensitivity to nirmatrelvir or ritonavir, or toxic epidermal necrolysis (TEN) or Sandoval-Gurpreet Syndrome? No  Do you have a history of hepatitis, any hepatic impairment on the Problem List (such as Child-Luther Class C, cirrhosis, fatty liver disease, alcoholic liver disease), or was the last liver lab (hepatic panel, ALT, AST, ALK Phos, bilirubin) elevated in the past 6 months? No  Do you have any history of severe renal impairment (eGFR < 30mL/min)? No    Is patient eligible to continue? Yes, patient meets all eligibility  requirements for the RN COVID treatment (as denoted by all no responses above).     Current Outpatient Medications   Medication Sig Dispense Refill    acetaminophen (TYLENOL) 325 MG tablet Take 2 tablets (650 mg) by mouth every 6 hours as needed for other (For optimal non-opioid multimodal pain management to improve pain control.) 100 tablet 0    alendronate (FOSAMAX) 70 MG tablet Take 70 mg by mouth      atorvastatin (LIPITOR) 20 MG tablet Take 1 tablet (20 mg) by mouth daily at 2 pm 90 tablet 1    calcium carbonate (TUMS) 500 MG chewable tablet Take 1 tablet (500 mg) by mouth 2 times daily as needed for heartburn      estradiol (VAGIFEM) 10 MCG TABS vaginal tablet Place 1 tablet (10 mcg) vaginally twice a week 24 tablet 3    folic acid (FOLVITE) 1 MG tablet Take 1 tablet (1 mg) by mouth daily 90 tablet 3    ibuprofen (ADVIL/MOTRIN) 800 MG tablet Take 1 tablet (800 mg) by mouth every 6 hours as needed for other (mild and/or inflammatory pain) 30 tablet 0    losartan (COZAAR) 25 MG tablet TAKE 1 TABLET(25 MG) BY MOUTH DAILY 90 tablet 3    Misc. Devices (NOVA CUSHION GEL SEAT PAD) MISC 1 Units daily 1 each 0    mupirocin (BACTROBAN) 2 % external ointment Apply topically 3 times daily 15 g 3    pantoprazole (PROTONIX) 40 MG EC tablet TAKE 1 TABLET BY MOUTH DAILY 30 MINUTES BEFORE BREAKFAST 90 tablet 2    solifenacin (VESICARE) 5 MG tablet Take 1 tablet (5 mg) by mouth daily 90 tablet 1    valACYclovir (VALTREX) 1000 mg tablet Take 2,000 mg by mouth as needed      Vitamin D3 (CHOLECALCIFEROL) 25 mcg (1000 units) tablet Take 25 mcg by mouth         Medications from List 1 of the standing order (on medications that exclude the use of Paxlovid) that patient is taking: NONE. Is patient taking Reba's Wort? No  Is patient taking [place]'s Wort or any meds from List 1? No.   Medications from List 2 of the standing order (on meds that provider needs to adjust) that patient is taking: NONE. Is patient on any of the meds  from List 2? No.   Medications from List 3 of standing order (on meds that a RN needs to adjust) that patient is taking: atorvastatin (Lipitor): Instructed patient to stop atorvastatin while taking Paxlovid and restart atorvastatin 1 day after the completion of Paxlovid.   solifenacin (Vesicare): Instruct patient to limit their dose of solifenacin to 5mg once daily. Usual dose of solifenacin should be restarted 3 days after the completion of Paxlovid. Is patient on any meds from List 3? Yes. Patient is on meds from list 3. No meds require a provider visit and at least one med required RN to adjust.     Paxlovid has an approximate 90% reduction in hospitalization. Paxlovid can possibly cause altered sense of taste, diarrhea (loose, watery stools), high blood pressure, muscle aches.     Would patient like a Paxlovid prescription?   Yes.   Lab Results   Component Value Date    GFRESTIMATED >90 09/19/2023       Was last eGFR reduced? No, eGFR 60 or greater/ No Result on record. Patient can receive the normal renal function dose. Paxlovid Rx sent to [facility]       Temporary change to home medications: None    All medication adjustments (holds, etc) were discussed with the patient and patient was asked to repeat back (teachback) their med adjustment.  Did patient understand med adjustment? Yes, patient repeated back and understood correctly.        Reviewed the following instructions with the patient:    Paxlovid (nimatrelvir and ritonavir)    How it works  Two medicines (nirmatrelvir and ritonavir) are taken together. They stop the virus from growing. Less amount of virus is easier for your body to fight.    How to take  Medicine comes in a daily container with both medicine tablets. Take by mouth twice daily (once in the morning, once at night) for 5 days.  The number of tablets to take varies by patient.  Don't chew or break capsules. Swallow whole.    When to take  Take as soon as possible after positive  COVID-19 test result, and within 5 days of your first symptoms.    Possible side effects  Can cause altered sense of taste, diarrhea (loose, watery stools), high blood pressure, muscle aches.    Brennen Powell RN

## 2024-10-01 NOTE — TELEPHONE ENCOUNTER
"Nurse Triage SBAR    Is this a 2nd Level Triage? YES, LICENSED PRACTITIONER REVIEW IS REQUIRED    Situation: pt is COVID positive with moderate symptoms; constant cough, pt reporting difficult to take deep breaths.   Complex medical history;   Lung myopathy - granulomatosis myocitis  Wheel chair bound  Pt triaged to ; pt stating she would like a paxlovid prescription instead due to difficulty with transportation    Routing to provider please advise. Ok to continue paxlovid protocol? Schedule VV?     Background:   Pt reporting symptoms started three days ago  Managing symptoms ok at home. Rest and fluids encouraged.   Pt lives with children and , isolation precautions reviewed. Pt verbalizes understanding and agrees to plan of care.   OTC medications reviewed; tylenol, ibuprofen, cough medications, honey, warm fluids, humidification, steam from shower reviewed.     Assessment: MD review/recommend     Protocol Recommended Disposition:   Go To ED/UCC Now (Or To Office With PCP Approval)    Recommendation:   Pt was advised to go to  but due to wheel chair bound pt is refusing per pt. She is requesting paxlovid instead. Please see mychart noted.      Routed to provider    Does the patient meet one of the following criteria for ADS visit consideration? 16+ years old, with an [facility] PCP     TIP  Providers, please consider if this condition is appropriate for management at one of our Acute and Diagnostic Services sites.     If patient is a good candidate, please use dotphrase <dot>triageresponse and select Refer to ADS to document.    Can we leave a detailed message on this number? YES  Phone number patient can be reached at: Home number on file [phone] (home)    Luz Krishna RN  [facility] Triage      1. COVID-19 DIAGNOSIS: \"How do you know that you have COVID?\" (e.g., positive lab test or self-test, diagnosed by doctor or NP/PA, symptoms after exposure).      10/1/2024 home kit positive   2. " "COVID-19 EXPOSURE: \"Was there any known exposure to COVID before the symptoms began?\" CDC Definition of close contact: within 6 feet (2 meters) for a total of 15 minutes or more over a 24-hour period.       Denies   3. ONSET: \"When did the COVID-19 symptoms start?\"       Three days ago   4. WORST SYMPTOM: \"What is your worst symptom?\" (e.g., cough, fever, shortness of breath, muscle aches)      Cough, sore throat   5. COUGH: \"Do you have a cough?\" If Yes, ask: \"How bad is the cough?\"        Dry, sore throat   6. FEVER: \"Do you have a fever?\" If Yes, ask: \"What is your temperature, how was it measured, and when did it start?\"      Denies   7. RESPIRATORY STATUS: \"Describe your breathing?\" (e.g., normal; shortness of breath, wheezing, unable to speak)       Normal   8. BETTER-SAME-WORSE: \"Are you getting better, staying the same or getting worse compared to yesterday?\"  If getting worse, ask, \"In what way?\"      Worse; sore throat is worse, coughing last night - pt reporting lung hx with reduced lung capacity. Difficult to take deep breaths.   9. OTHER SYMPTOMS: \"Do you have any other symptoms?\"  (e.g., chills, fatigue, headache, loss of smell or taste, muscle pain, sore throat)      Sore throat, chills   10. HIGH RISK DISEASE: \"Do you have any chronic medical problems?\" (e.g., asthma, heart or lung disease, weak immune system, obesity, etc.)        Lung myopathy - granulomatosis myocitis  11. VACCINE: \"Have you had the COVID-19 vaccine?\" If Yes, ask: \"Which one, how many shots, when did you get it?\"        Yes   12. PREGNANCY: \"Is there any chance you are pregnant?\" \"When was your last menstrual period?\"        Denies   13. O2 SATURATION MONITOR:  \"Do you use an oxygen saturation monitor (pulse oximeter) at home?\" If Yes, ask \"What is your reading (oxygen level) today?\" \"What is your usual oxygen saturation reading?\" (e.g., 95%)        No       Reason for Disposition   Patient sounds very sick or weak to the " triager    Additional Information   Negative: SEVERE difficulty breathing (e.g., struggling for each breath, speaks in single words)   Negative: Difficult to awaken or acting confused (e.g., disoriented, slurred speech)   Negative: Bluish (or gray) lips or face now   Negative: Shock suspected (e.g., cold/pale/clammy skin, too weak to stand, low BP, rapid pulse)   Negative: Sounds like a life-threatening emergency to the triager   Negative: Diagnosed or suspected COVID-19 and symptoms lasting 3 or more weeks   Negative: COVID-19 exposure and no symptoms   Negative: COVID-19 vaccine reaction suspected (e.g., fever, headache, muscle aches) occurring 1 to 3 days after getting vaccine   Negative: COVID-19 vaccine, questions about   Negative: Lives with someone known to have influenza (flu test positive) and flu-like symptoms (e.g., cough, runny nose, sore throat, SOB; with or without fever)   Negative: Possible COVID-19 symptoms and triager concerned about severity of symptoms or other causes   Negative: COVID-19 and breastfeeding, questions about   Negative: SEVERE or constant chest pain or pressure  (Exception: Mild central chest pain, present only when coughing.)   Negative: MODERATE difficulty breathing (e.g., speaks in phrases, SOB even at rest, pulse 100-120)   Negative: Headache and stiff neck (can't touch chin to chest)   Negative: Oxygen level (e.g., pulse oximetry) 90% or lower   Negative: Chest pain or pressure  (Exception: MILD central chest pain, present only when coughing.)   Negative: Drinking very little and dehydration suspected (e.g., no urine > 12 hours, very dry mouth, very lightheaded)    Protocols used: Coronavirus (COVID-19) Diagnosed or [id number]

## 2024-10-29 DIAGNOSIS — E78.5 HYPERLIPIDEMIA LDL GOAL <100: ICD-10-CM

## 2024-10-30 RX ORDER — ATORVASTATIN CALCIUM 20 MG/1
TABLET, FILM COATED ORAL
Qty: 90 TABLET | Refills: 3 | Status: SHIPPED | OUTPATIENT
Start: 2024-10-30

## 2024-11-14 DIAGNOSIS — R32 INCONTINENCE IN FEMALE: ICD-10-CM

## 2024-11-14 RX ORDER — SOLIFENACIN SUCCINATE 5 MG/1
5 TABLET, FILM COATED ORAL DAILY
Qty: 90 TABLET | Refills: 1 | Status: SHIPPED | OUTPATIENT
Start: 2024-11-14

## 2024-11-14 NOTE — TELEPHONE ENCOUNTER
LVD:  3/6/2024  [facility] [place]   1/3/24 [facility] [place]  Mnaan Quiles MD  OB/Gyn     Refilled per protocol.

## 2024-11-18 ENCOUNTER — PATIENT OUTREACH (OUTPATIENT)
Dept: CARE COORDINATION | Facility: CLINIC | Age: 54
End: 2024-11-18
Payer: COMMERCIAL

## 2024-11-18 NOTE — PROGRESS NOTES
Clinic Care Coordination Contact  [facility]/Voicemail    Clinical Data: Care Coordinator Outreach    Outreach Documentation Number of Outreach Attempt   11/18/2024  12:48 PM 1       Left message on patient's voicemail with call back information and requested return call.      Plan: Care Coordinator will try to reach patient again in 10 business days.    Bere Gomez  [facility]  Clinic Care Coordinator  [facility]  [phone]  [email or web address]

## 2024-11-27 ENCOUNTER — TELEPHONE (OUTPATIENT)
Dept: FAMILY MEDICINE | Facility: CLINIC | Age: 54
End: 2024-11-27

## 2024-11-27 ENCOUNTER — HOSPITAL ENCOUNTER (OUTPATIENT)
Facility: CLINIC | Age: 54
Setting detail: OBSERVATION
End: 2024-11-27
Attending: EMERGENCY MEDICINE | Admitting: INTERNAL MEDICINE
Payer: COMMERCIAL

## 2024-11-27 ENCOUNTER — LAB (OUTPATIENT)
Dept: LAB | Facility: CLINIC | Age: 54
End: 2024-11-27
Payer: COMMERCIAL

## 2024-11-27 DIAGNOSIS — G71.3 MYOPATHY, MITOCHONDRIAL: Primary | ICD-10-CM

## 2024-11-27 DIAGNOSIS — D64.9 ANEMIA, UNSPECIFIED TYPE: ICD-10-CM

## 2024-11-27 DIAGNOSIS — D64.9 ANEMIA: Primary | ICD-10-CM

## 2024-11-27 DIAGNOSIS — E61.1 IRON DEFICIENCY: ICD-10-CM

## 2024-11-27 DIAGNOSIS — G71.3 MYOPATHY, MITOCHONDRIAL: ICD-10-CM

## 2024-11-27 LAB
ABO/RH(D): NORMAL
ALBUMIN SERPL BCG-MCNC: 4.1 G/DL (ref 3.5–5.2)
ALP SERPL-CCNC: 47 U/L (ref 40–150)
ALT SERPL W P-5'-P-CCNC: 10 U/L (ref 0–50)
ANION GAP SERPL CALCULATED.3IONS-SCNC: 13 MMOL/L (ref 7–15)
ANTIBODY SCREEN: NEGATIVE
AST SERPL W P-5'-P-CCNC: 19 U/L (ref 0–45)
BILIRUB SERPL-MCNC: 0.2 MG/DL
BLD PROD TYP BPU: NORMAL
BLD PROD TYP BPU: NORMAL
BLOOD COMPONENT TYPE: NORMAL
BLOOD COMPONENT TYPE: NORMAL
BUN SERPL-MCNC: 15.8 MG/DL (ref 6–20)
CALCIUM SERPL-MCNC: 9.1 MG/DL (ref 8.8–10.4)
CHLORIDE SERPL-SCNC: 106 MMOL/L (ref 98–107)
CODING SYSTEM: NORMAL
CODING SYSTEM: NORMAL
CREAT SERPL-MCNC: 0.29 MG/DL (ref 0.51–0.95)
CROSSMATCH: NORMAL
CROSSMATCH: NORMAL
EGFRCR SERPLBLD CKD-EPI 2021: >90 ML/MIN/1.73M2
ERYTHROCYTE [DISTWIDTH] IN BLOOD BY AUTOMATED COUNT: 20.4 % (ref 10–15)
FOLATE SERPL-MCNC: 28.1 NG/ML (ref 4.6–34.8)
GLUCOSE SERPL-MCNC: 88 MG/DL (ref 70–99)
HCO3 SERPL-SCNC: 23 MMOL/L (ref 22–29)
HCT VFR BLD AUTO: 22.7 % (ref 35–47)
HGB BLD-MCNC: 5.5 G/DL (ref 11.7–15.7)
HGB BLD-MCNC: 5.6 G/DL (ref 11.7–15.7)
HGB BLD-MCNC: 8.2 G/DL (ref 11.7–15.7)
IRON BINDING CAPACITY (ROCHE): 386 UG/DL (ref 240–430)
IRON SATN MFR SERPL: 3 % (ref 15–46)
IRON SERPL-MCNC: 13 UG/DL (ref 37–145)
ISSUE DATE AND TIME: NORMAL
ISSUE DATE AND TIME: NORMAL
MCH RBC QN AUTO: 17.7 PG (ref 26.5–33)
MCHC RBC AUTO-ENTMCNC: 24.7 G/DL (ref 31.5–36.5)
MCV RBC AUTO: 72 FL (ref 78–100)
PLATELET # BLD AUTO: 497 10E3/UL (ref 150–450)
POTASSIUM SERPL-SCNC: 4.6 MMOL/L (ref 3.4–5.3)
PROT SERPL-MCNC: 6.7 G/DL (ref 6.4–8.3)
RBC # BLD AUTO: 3.16 10E6/UL (ref 3.8–5.2)
RETICS # AUTO: 0.04 10E6/UL (ref 0.03–0.1)
RETICS/RBC NFR AUTO: 1.1 % (ref 0.5–2)
SODIUM SERPL-SCNC: 142 MMOL/L (ref 135–145)
SPECIMEN EXPIRATION DATE: NORMAL
UNIT ABO/RH: NORMAL
UNIT ABO/RH: NORMAL
UNIT NUMBER: NORMAL
UNIT NUMBER: NORMAL
UNIT STATUS: NORMAL
UNIT STATUS: NORMAL
UNIT TYPE ISBT: 7300
UNIT TYPE ISBT: 7300
WBC # BLD AUTO: 5.3 10E3/UL (ref 4–11)

## 2024-11-27 PROCEDURE — 85018 HEMOGLOBIN: CPT | Performed by: EMERGENCY MEDICINE

## 2024-11-27 PROCEDURE — 36415 COLL VENOUS BLD VENIPUNCTURE: CPT | Performed by: EMERGENCY MEDICINE

## 2024-11-27 PROCEDURE — G0378 HOSPITAL OBSERVATION PER HR: HCPCS

## 2024-11-27 PROCEDURE — 86923 COMPATIBILITY TEST ELECTRIC: CPT | Performed by: EMERGENCY MEDICINE

## 2024-11-27 PROCEDURE — 99291 CRITICAL CARE FIRST HOUR: CPT | Mod: 25

## 2024-11-27 PROCEDURE — 85027 COMPLETE CBC AUTOMATED: CPT

## 2024-11-27 PROCEDURE — 82247 BILIRUBIN TOTAL: CPT | Performed by: INTERNAL MEDICINE

## 2024-11-27 PROCEDURE — 86850 RBC ANTIBODY SCREEN: CPT | Performed by: EMERGENCY MEDICINE

## 2024-11-27 PROCEDURE — 83550 IRON BINDING TEST: CPT | Performed by: EMERGENCY MEDICINE

## 2024-11-27 PROCEDURE — 36430 TRANSFUSION BLD/BLD COMPNT: CPT

## 2024-11-27 PROCEDURE — 85045 AUTOMATED RETICULOCYTE COUNT: CPT | Performed by: EMERGENCY MEDICINE

## 2024-11-27 PROCEDURE — 82746 ASSAY OF FOLIC ACID SERUM: CPT | Performed by: EMERGENCY MEDICINE

## 2024-11-27 PROCEDURE — 96374 THER/PROPH/DIAG INJ IV PUSH: CPT

## 2024-11-27 PROCEDURE — 85018 HEMOGLOBIN: CPT | Performed by: INTERNAL MEDICINE

## 2024-11-27 PROCEDURE — 86900 BLOOD TYPING SEROLOGIC ABO: CPT | Performed by: EMERGENCY MEDICINE

## 2024-11-27 PROCEDURE — 80053 COMPREHEN METABOLIC PANEL: CPT | Performed by: INTERNAL MEDICINE

## 2024-11-27 PROCEDURE — 82310 ASSAY OF CALCIUM: CPT | Performed by: INTERNAL MEDICINE

## 2024-11-27 PROCEDURE — 36415 COLL VENOUS BLD VENIPUNCTURE: CPT

## 2024-11-27 PROCEDURE — P9016 RBC LEUKOCYTES REDUCED: HCPCS | Performed by: EMERGENCY MEDICINE

## 2024-11-27 PROCEDURE — 99223 1ST HOSP IP/OBS HIGH 75: CPT | Performed by: INTERNAL MEDICINE

## 2024-11-27 PROCEDURE — 82607 VITAMIN B-12: CPT | Performed by: EMERGENCY MEDICINE

## 2024-11-27 PROCEDURE — 250N000009 HC RX 250: Performed by: INTERNAL MEDICINE

## 2024-11-27 PROCEDURE — 83540 ASSAY OF IRON: CPT | Performed by: EMERGENCY MEDICINE

## 2024-11-27 PROCEDURE — 82728 ASSAY OF FERRITIN: CPT | Performed by: EMERGENCY MEDICINE

## 2024-11-27 PROCEDURE — 250N000013 HC RX MED GY IP 250 OP 250 PS 637: Performed by: INTERNAL MEDICINE

## 2024-11-27 RX ORDER — NALOXONE HYDROCHLORIDE 0.4 MG/ML
0.4 INJECTION, SOLUTION INTRAMUSCULAR; INTRAVENOUS; SUBCUTANEOUS
Status: DISCONTINUED | OUTPATIENT
Start: 2024-11-27 | End: 2024-11-29 | Stop reason: HOSPADM

## 2024-11-27 RX ORDER — NALOXONE HYDROCHLORIDE 0.4 MG/ML
0.2 INJECTION, SOLUTION INTRAMUSCULAR; INTRAVENOUS; SUBCUTANEOUS
Status: DISCONTINUED | OUTPATIENT
Start: 2024-11-27 | End: 2024-11-29 | Stop reason: HOSPADM

## 2024-11-27 RX ORDER — OXYCODONE HYDROCHLORIDE 5 MG/1
5 TABLET ORAL EVERY 4 HOURS PRN
Status: DISCONTINUED | OUTPATIENT
Start: 2024-11-27 | End: 2024-11-29 | Stop reason: HOSPADM

## 2024-11-27 RX ORDER — AMOXICILLIN 250 MG
2 CAPSULE ORAL 2 TIMES DAILY PRN
Status: DISCONTINUED | OUTPATIENT
Start: 2024-11-27 | End: 2024-11-29 | Stop reason: HOSPADM

## 2024-11-27 RX ORDER — ONDANSETRON 4 MG/1
4 TABLET, ORALLY DISINTEGRATING ORAL EVERY 6 HOURS PRN
Status: DISCONTINUED | OUTPATIENT
Start: 2024-11-27 | End: 2024-11-29 | Stop reason: HOSPADM

## 2024-11-27 RX ORDER — PROCHLORPERAZINE MALEATE 10 MG
10 TABLET ORAL EVERY 6 HOURS PRN
Status: DISCONTINUED | OUTPATIENT
Start: 2024-11-27 | End: 2024-11-29 | Stop reason: HOSPADM

## 2024-11-27 RX ORDER — FOLIC ACID 1 MG/1
1 TABLET ORAL DAILY
Status: DISCONTINUED | OUTPATIENT
Start: 2024-11-28 | End: 2024-11-29 | Stop reason: HOSPADM

## 2024-11-27 RX ORDER — ATORVASTATIN CALCIUM 20 MG/1
20 TABLET, FILM COATED ORAL DAILY
Status: DISCONTINUED | OUTPATIENT
Start: 2024-11-28 | End: 2024-11-29 | Stop reason: HOSPADM

## 2024-11-27 RX ORDER — ESTRADIOL 10 UG/1
10 INSERT VAGINAL
Status: DISCONTINUED | OUTPATIENT
Start: 2024-11-28 | End: 2024-11-29 | Stop reason: HOSPADM

## 2024-11-27 RX ORDER — ACETAMINOPHEN 325 MG/1
650 TABLET ORAL EVERY 4 HOURS PRN
Status: DISCONTINUED | OUTPATIENT
Start: 2024-11-27 | End: 2024-11-29 | Stop reason: HOSPADM

## 2024-11-27 RX ORDER — ONDANSETRON 2 MG/ML
4 INJECTION INTRAMUSCULAR; INTRAVENOUS EVERY 6 HOURS PRN
Status: DISCONTINUED | OUTPATIENT
Start: 2024-11-27 | End: 2024-11-29 | Stop reason: HOSPADM

## 2024-11-27 RX ORDER — HYDROMORPHONE HYDROCHLORIDE 1 MG/ML
0.3 INJECTION, SOLUTION INTRAMUSCULAR; INTRAVENOUS; SUBCUTANEOUS
Status: DISCONTINUED | OUTPATIENT
Start: 2024-11-27 | End: 2024-11-29 | Stop reason: HOSPADM

## 2024-11-27 RX ORDER — TOLTERODINE 2 MG/1
2 CAPSULE, EXTENDED RELEASE ORAL DAILY
Status: DISCONTINUED | OUTPATIENT
Start: 2024-11-28 | End: 2024-11-29 | Stop reason: HOSPADM

## 2024-11-27 RX ORDER — AMOXICILLIN 250 MG
1 CAPSULE ORAL 2 TIMES DAILY PRN
Status: DISCONTINUED | OUTPATIENT
Start: 2024-11-27 | End: 2024-11-29 | Stop reason: HOSPADM

## 2024-11-27 RX ADMIN — PANTOPRAZOLE SODIUM 40 MG: 40 INJECTION, POWDER, FOR SOLUTION INTRAVENOUS at 23:03

## 2024-11-27 RX ADMIN — ACETAMINOPHEN 650 MG: 325 TABLET, FILM COATED ORAL at 23:03

## 2024-11-27 ASSESSMENT — ACTIVITIES OF DAILY LIVING (ADL)
ADLS_ACUITY_SCORE: 57
ADLS_ACUITY_SCORE: 55
ADLS_ACUITY_SCORE: 57
ADLS_ACUITY_SCORE: 57

## 2024-11-27 NOTE — TELEPHONE ENCOUNTER
Neurologist at [place]. [place] blood today -hemoglobin of 5.6. Most likely technical due to issue with IV being drawn. Needs a redraw to verify veracity. Please order CBC. Patient on way back to [facility].

## 2024-11-27 NOTE — ED PROVIDER NOTES
Emergency Department Note      History of Present Illness     Chief Complaint   Abnormal Labs    HPI   Britt Park is a 54 year old female with a history of hyperlipidemia who presents to the ED for abnormal labs. Today, while at a Neurology Clinic Visit, the patient had her blood drawn. On her way back from [place], her neurologist called and informed her to get a blood redraw as her hemoglobin was read to be 5.6. After the redraw, she was told to go to the ED. For the past couple of months, the patient has been experiencing some weakness and fatigue. As of recent, she endorses a numbness feeling in her legs when she sits up in bed. She denies any chest pain. No black or bloody stools. No vaginal bleeding. Her medication regimen has not changed. Of note, she had low hemoglobin in the past. She has also had a blood infusion. She states she may have had a blood infusion at [facility] following the birth of her daughter (19 years ago) or at [facility] after the birth of her twin sons (17 years ago).    Independent Historian   None    Review of External Notes   Reviewed an outpatient [facility] office visit from today.     Past Medical History   Medical History and Problem List   Osteoporosis  Hiatal hernia  Lymphoid hyperplasia  Fatty liver  Methemoglobinemia  Bilateral dry eye syndrome  Myositis  Myopathy  Anxiety  Spondylosis  Anemia   Gestational hypertension  Seasonal rhinitis  Hyperlipidemia   Sleep apnea  Ovarian cyst  Impetigo  Overactive bladder    Medications   Mometasone  Valacyclovir  Naltrexone  Losartan  Pantoprazole  Estradiol  Cephalexin  Solifenacin  Tramadol  Atorvastatin  Alendronate    Surgical History    section  Oophorectomy  Salpingectomy  Colonoscopy    Physical Exam   Patient Vitals for the past 24 hrs:   BP Temp Temp src Pulse Resp SpO2 Height Weight   24 2100 117/53 98.7  F (37.1  C) Oral 76 -- 99 % -- --   24 108/48 -- -- 83 16 99 % -- --   24  "2037 108/58 97.8  F (36.6  C) -- 79 16 -- -- --   11/27/24 2030 108/58 -- -- 80 12 99 % -- --   11/27/24 2015 101/63 -- -- 77 11 100 % -- --   11/27/24 2000 106/51 -- -- 80 10 99 % -- --   11/27/24 1943 122/74 -- -- 79 -- -- -- --   11/27/24 1939 -- 98.7  F (37.1  C) Oral 87 22 93 % -- --   11/27/24 1915 106/66 -- -- 79 20 98 % -- --   11/27/24 1845 106/63 -- -- 79 15 100 % -- --   11/27/24 1839 -- 98.5  F (36.9  C) -- 80 13 100 % -- --   11/27/24 1830 114/64 -- -- 84 11 100 % -- --   11/27/24 1824 100/89 98.9  F (37.2  C) Oral 84 16 100 % -- --   11/27/24 1800 97/55 -- -- 84 14 100 % -- --   11/27/24 1745 94/50 -- -- 87 13 100 % -- --   11/27/24 1730 102/60 -- -- 88 11 97 % -- --   11/27/24 1720 96/63 -- -- 86 28 93 % -- --   11/27/24 1619 112/50 97.9  F (36.6  C) Oral 66 16 94 % 1.575 m (5' 2\") 49.9 kg (110 lb)     Physical Exam  General: Alert, No distress. Nontoxic appearance  Head: No signs of trauma.   Mouth/Throat: Oropharynx moist.   Eyes: Conjunctivae are normal. Pupils are equal..   Neck: Normal range of motion.    CV: Appears well perfused.  Resp:No respiratory distress.   MSK: Normal range of motion. No obvious deformity.   Neuro: The patient is alert and interactive. PEÑA. Speech normal. GCS 15  Skin: No lesion or sign of trauma noted.  Slightly pale  Psych: normal mood and affect. behavior is normal.       Diagnostics   Lab Results   Labs Ordered and Resulted from Time of ED Arrival to Time of ED Departure   HEMOGLOBIN - Abnormal       Result Value    Hemoglobin 5.5 (*)    IRON AND IRON BINDING CAPACITY - Abnormal    Iron 13 (*)     Iron Binding Capacity 386      Iron Sat Index 3 (*)    RETICULOCYTE COUNT - Normal    % Reticulocyte 1.1      Absolute Reticulocyte 0.035     FERRITIN   VITAMIN B12   TYPE AND SCREEN, ADULT    ABO/RH(D) B POS      Antibody Screen Negative      SPECIMEN EXPIRATION DATE [id number]     PREPARE RED BLOOD CELLS (UNIT)    Blood Component Type Red Blood Cells      Product Code " N2554O43      Unit Status Transfused      Unit Number [id number]      CROSSMATCH Compatible      CODING SYSTEM DTJO221      ISSUE DATE AND TIME 99462928075218      UNIT ABO/RH B+      UNIT TYPE ISBT 7300     PREPARE RED BLOOD CELLS (UNIT)    Blood Component Type Red Blood Cells      Product Code R7243G75      Unit Status Transfused      Unit Number [id number]      CROSSMATCH Compatible      CODING SYSTEM VHVX547      ISSUE DATE AND TIME [id number]      UNIT ABO/RH B+      UNIT TYPE ISBT 7300     PREPARE RED BLOOD CELLS (UNIT)   TRANSFUSE RED BLOOD CELLS (UNIT)   ABO/RH TYPE AND SCREEN     Imaging   No orders to display     Independent Interpretation   None    ED Course    Medications Administered   Medications - No data to display    Procedures   Procedures     Discussion of Management   Hematology, Dr. Wright, as noted.   Admitting hospitalist, Dr. Hawk, as noted.     ED Course   ED Course as of 11/27/24 2140 Wed Nov 27, 2024 1727 I obtained history and examined the patient as noted above.     1757 I spoke with Dr. Wright, hematology, regarding the patient.      1824 I rechecked and updated the patient.     1915 I spoke with Dr. Hawk, of the hospitalist team, regarding the patient. They accepted the patient for admission to the hospital.       Additional Documentation  None    Medical Decision Making / Diagnosis       MDM   Britt Park is a 54 year old female who presents for evaluation of abnormally low hemoglobin level.  She is anemic on labs x 2 at the clinic today, once at [facility] and once at [facility].  A broad differential was of course considered, most probable etiologies include anemia of chronic disease, genetic disorder with abnormal hemoglobin, acute blood loss, slow or fast gastrointestinal bleeding, iron deficiency anemia, etc. After consultation with hematology, the most likely etiology of her anemia is iron deficiency.      Based on hgb, I recommended blood transfusion  and admission to the hospital. They consent to this. Discussed with medicine team who accepted patient for admission.      Disposition   The patient was admitted to the hospital.     Diagnosis     ICD-10-CM    1. Anemia, unspecified type  D64.9          Critical care time: 30 minutes    Scribe Disclosure:  I, Oscar Cristobal, am serving as a scribe at 5:12 PM on 11/27/2024 to document services personally performed by Flip Mcintosh MD based on my observations and the provider's statements to me.        Flip Mcintosh MD  11/28/24 0029

## 2024-11-27 NOTE — RESULT ENCOUNTER NOTE
Team - please call patient with results.  Her hgb is critically low at 5.6  She needs to go to the ED, she should have someone  her there    Thanks

## 2024-11-27 NOTE — TELEPHONE ENCOUNTER
Kelvin, cbc order placed, pls help her schedule lab visit     Marlena Driscoll PA-C  (Covering TR)

## 2024-11-28 LAB
ANION GAP SERPL CALCULATED.3IONS-SCNC: 10 MMOL/L (ref 7–15)
BUN SERPL-MCNC: 14 MG/DL (ref 6–20)
CALCIUM SERPL-MCNC: 8.9 MG/DL (ref 8.8–10.4)
CHLORIDE SERPL-SCNC: 106 MMOL/L (ref 98–107)
CREAT SERPL-MCNC: 0.3 MG/DL (ref 0.51–0.95)
EGFRCR SERPLBLD CKD-EPI 2021: >90 ML/MIN/1.73M2
ERYTHROCYTE [DISTWIDTH] IN BLOOD BY AUTOMATED COUNT: 19.4 % (ref 10–15)
FERRITIN SERPL-MCNC: 13 NG/ML (ref 11–328)
GLUCOSE SERPL-MCNC: 93 MG/DL (ref 70–99)
HCO3 SERPL-SCNC: 25 MMOL/L (ref 22–29)
HCT VFR BLD AUTO: 30.7 % (ref 35–47)
HGB BLD-MCNC: 8.7 G/DL (ref 11.7–15.7)
HGB BLD-MCNC: 9.1 G/DL (ref 11.7–15.7)
HGB BLD-MCNC: 9.1 G/DL (ref 11.7–15.7)
HGB BLD-MCNC: 9.2 G/DL (ref 11.7–15.7)
MCH RBC QN AUTO: 21.8 PG (ref 26.5–33)
MCHC RBC AUTO-ENTMCNC: 29.6 G/DL (ref 31.5–36.5)
MCV RBC AUTO: 73 FL (ref 78–100)
PLATELET # BLD AUTO: 392 10E3/UL (ref 150–450)
POTASSIUM SERPL-SCNC: 4 MMOL/L (ref 3.4–5.3)
RBC # BLD AUTO: 4.18 10E6/UL (ref 3.8–5.2)
SODIUM SERPL-SCNC: 141 MMOL/L (ref 135–145)
VIT B12 SERPL-MCNC: 1612 PG/ML (ref 232–1245)
WBC # BLD AUTO: 4.6 10E3/UL (ref 4–11)

## 2024-11-28 PROCEDURE — 96376 TX/PRO/DX INJ SAME DRUG ADON: CPT

## 2024-11-28 PROCEDURE — 85018 HEMOGLOBIN: CPT | Performed by: INTERNAL MEDICINE

## 2024-11-28 PROCEDURE — 82565 ASSAY OF CREATININE: CPT | Performed by: INTERNAL MEDICINE

## 2024-11-28 PROCEDURE — 85041 AUTOMATED RBC COUNT: CPT | Performed by: INTERNAL MEDICINE

## 2024-11-28 PROCEDURE — 250N000009 HC RX 250: Performed by: INTERNAL MEDICINE

## 2024-11-28 PROCEDURE — 99233 SBSQ HOSP IP/OBS HIGH 50: CPT | Performed by: NURSE PRACTITIONER

## 2024-11-28 PROCEDURE — 85014 HEMATOCRIT: CPT | Performed by: INTERNAL MEDICINE

## 2024-11-28 PROCEDURE — 96375 TX/PRO/DX INJ NEW DRUG ADDON: CPT

## 2024-11-28 PROCEDURE — 250N000013 HC RX MED GY IP 250 OP 250 PS 637: Performed by: INTERNAL MEDICINE

## 2024-11-28 PROCEDURE — 36415 COLL VENOUS BLD VENIPUNCTURE: CPT | Performed by: INTERNAL MEDICINE

## 2024-11-28 PROCEDURE — G0378 HOSPITAL OBSERVATION PER HR: HCPCS

## 2024-11-28 PROCEDURE — 250N000011 HC RX IP 250 OP 636: Performed by: INTERNAL MEDICINE

## 2024-11-28 PROCEDURE — 80048 BASIC METABOLIC PNL TOTAL CA: CPT | Performed by: INTERNAL MEDICINE

## 2024-11-28 PROCEDURE — 258N000003 HC RX IP 258 OP 636: Performed by: INTERNAL MEDICINE

## 2024-11-28 RX ORDER — BISACODYL 5 MG
10 TABLET, DELAYED RELEASE (ENTERIC COATED) ORAL ONCE
Status: COMPLETED | OUTPATIENT
Start: 2024-11-28 | End: 2024-11-28

## 2024-11-28 RX ORDER — MAGNESIUM CARB/ALUMINUM HYDROX 105-160MG
296 TABLET,CHEWABLE ORAL ONCE
Status: COMPLETED | OUTPATIENT
Start: 2024-11-29 | End: 2024-11-29

## 2024-11-28 RX ADMIN — FOLIC ACID 1 MG: 1 TABLET ORAL at 09:04

## 2024-11-28 RX ADMIN — TOLTERODINE 2 MG: 2 CAPSULE, EXTENDED RELEASE ORAL at 09:05

## 2024-11-28 RX ADMIN — PANTOPRAZOLE SODIUM 40 MG: 40 INJECTION, POWDER, FOR SOLUTION INTRAVENOUS at 21:27

## 2024-11-28 RX ADMIN — ACETAMINOPHEN 650 MG: 325 TABLET, FILM COATED ORAL at 09:10

## 2024-11-28 RX ADMIN — BISACODYL 10 MG: 5 TABLET, COATED ORAL at 16:56

## 2024-11-28 RX ADMIN — ATORVASTATIN CALCIUM 20 MG: 20 TABLET, FILM COATED ORAL at 09:04

## 2024-11-28 RX ADMIN — IRON SUCROSE 300 MG: 20 INJECTION, SOLUTION INTRAVENOUS at 09:10

## 2024-11-28 RX ADMIN — PANTOPRAZOLE SODIUM 40 MG: 40 INJECTION, POWDER, FOR SOLUTION INTRAVENOUS at 09:05

## 2024-11-28 RX ADMIN — POLYETHYLENE GLYCOL 3350, SODIUM SULFATE ANHYDROUS, SODIUM BICARBONATE, SODIUM CHLORIDE, POTASSIUM CHLORIDE 4000 ML: 236; 22.74; 6.74; 5.86; 2.97 POWDER, FOR SOLUTION ORAL at 16:57

## 2024-11-28 ASSESSMENT — ACTIVITIES OF DAILY LIVING (ADL)
ADLS_ACUITY_SCORE: 60
ADLS_ACUITY_SCORE: 59
ADLS_ACUITY_SCORE: 55
ADLS_ACUITY_SCORE: 60
ADLS_ACUITY_SCORE: 55
ADLS_ACUITY_SCORE: 59
ADLS_ACUITY_SCORE: 62
ADLS_ACUITY_SCORE: 59
ADLS_ACUITY_SCORE: 61
ADLS_ACUITY_SCORE: 60
ADLS_ACUITY_SCORE: 61
ADLS_ACUITY_SCORE: 55
ADLS_ACUITY_SCORE: 55
ADLS_ACUITY_SCORE: 60
ADLS_ACUITY_SCORE: 60
ADLS_ACUITY_SCORE: 55
ADLS_ACUITY_SCORE: 56
ADLS_ACUITY_SCORE: 55
ADLS_ACUITY_SCORE: 60
ADLS_ACUITY_SCORE: 62
ADLS_ACUITY_SCORE: 60

## 2024-11-28 NOTE — ED NOTES
[facility]    ED Nurse Handoff Report    ED Chief complaint: Abnormal Labs      ED Diagnosis:   Final diagnoses:   Anemia, unspecified type       Code Status: to be determined by provider    Allergies:   Allergies   Allergen Reactions    Dapsone Shortness Of Breath     Oxygen levels went to 79 %.    Influenza Vaccines Hives     2/24/23 Patient states she's been able to have a flu shot the past couple of years and has done well with it.    Benadryl [Diphenhydramine]     Diphenhydramine Other (See Comments)     SHAKY      Sulfa Antibiotics Swelling    Sulfa Antibiotics        Patient Story:       Hgb 5.6. States she has no bleeding from rectum or bloody emesis. Pt denies SOB/CP. Has had increased fatigue over the last month.           Focused Assessment:    Alert and oriented x4, Denies CP, SOB. Ambulates independently.     Labs Ordered and Resulted from Time of ED Arrival to Time of ED Departure   HEMOGLOBIN - Abnormal       Result Value    Hemoglobin 5.5 (*)    IRON AND IRON BINDING CAPACITY - Abnormal    Iron 13 (*)     Iron Binding Capacity 386      Iron Sat Index 3 (*)    RETICULOCYTE COUNT - Normal    % Reticulocyte 1.1      Absolute Reticulocyte 0.035     FERRITIN   VITAMIN B12   FOLATE   TYPE AND SCREEN, ADULT    ABO/RH(D) B POS      Antibody Screen Negative      SPECIMEN EXPIRATION DATE [id number]     PREPARE RED BLOOD CELLS (UNIT)    Blood Component Type Red Blood Cells      Product Code A5520M78      Unit Status Ready for issue      Unit Number [id number]      CROSSMATCH Compatible      CODING SYSTEM [id number]     PREPARE RED BLOOD CELLS (UNIT)    Blood Component Type Red Blood Cells      Product Code J8859G16      Unit Status Transfused      Unit Number [id number]      CROSSMATCH Compatible      CODING SYSTEM [id number]      ISSUE DATE AND TIME [id number]      UNIT ABO/RH B+      UNIT TYPE ISBT 7300     PREPARE RED BLOOD CELLS (UNIT)   TRANSFUSE RED BLOOD CELLS (UNIT)   ABO/RH  TYPE AND SCREEN       No orders to display         Treatments and/or interventions provided:    Blood transfusion  Medications - No data to display    Patient's response to treatments and/or interventions:    Tolerating blood transfusion well    To be done/followed up on inpatient unit:   See any in-patient orders    Does this patient have any cognitive concerns?:  n/a    Activity level - Baseline/Home:    Independent    Activity Level - Current:    Stand with Assist    Patient's Preferred language: English     Needed?: No    Isolation: None  Infection: Not Applicable  Patient tested for COVID 19 prior to admission: NO    Bariatric?: No    Vital Signs:   Vitals:    11/27/24 1845 11/27/24 1915 11/27/24 1939 11/27/24 1943   BP: 106/63 106/66  122/74   Pulse: 79 79 87 79   Resp: 15 20 22    Temp:   98.7  F (37.1  C)    TempSrc:   Oral    SpO2: 100% 98% 93%    Weight:       Height:           Cardiac Rhythm:     Was the PSS-3 completed:   Yes  What interventions are required if any?               Family Comments:  at bedside  OBS brochure/video discussed/provided to patient/family: Yes      For the majority of the shift this patient's behavior was Green.      ED NURSE PHONE NUMBER: [phone]

## 2024-11-28 NOTE — H&P
[facility]    History and Physical - Hospitalist Service       Date of Admission:  11/27/2024    Assessment & Plan      Britt Park is a 54 year old female admitted on 11/27/2024. 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, hypertension, hyperlipidemia, history of hiatal hernia, history of GERD presenting with anemia hemoglobin of 5.5    Severe iron deficiency microcytic anemia with hemoglobin of 5.5   Hemoglobin low at 5.5.  Iron level low at 13, iron saturation index at 3.  TIBC at 386.  MCV low at 72  Ferritin, folate and B12 levels are currently pending  Admitted to the hospital under observation status  2 unit PRBCs transfusion ordered  Blood transfusion consent signed by Pt in the ED   IV Protonix twice daily ordered  Patient denies any bleeding issues.  No bloody stools or black stools  No NSAID use.  No alcohol or tobacco use  Hemoglobin every 6 hours ordered  Conditional unit to give if hemoglobin less than 7  [facility] consultation will be requested regarding further evaluation management  N.p.o. after midnight  If ferritin returns low give her IV iron transfusion tomorrow  Discharged on oral iron supplement with Vitron-C daily    History of hypertension;  Blood pressure is soft systolics in the 90s  Hold losartan    History of hyperlipidemia;  Continue Lipitor    History of granulomatous myositis;  Uses walker to ambulate  Follows with the neurology at [facility] they are looking at treating her with IVIG in the future  Previously treated with prednisone and methotrexate which were not helpful as per the patient and was stopped in April 2023     Diet:  N.p.o. after midnight  DVT Prophylaxis: Ambulate every shift  Mercer Catheter: Not present  Lines: None     Cardiac Monitoring: Yes due to anemia  Code Status:  Full code discussed with the patient on admission    Clinically Significant Risk Factors Present on Admission                    # Hypertension: Noted on problem list      # Anemia: based on hgb <11                  Disposition Plan         Medically Ready for Discharge: Anticipated Tomorrow           Marisol Hawk MD  Hospitalist Service  [facility]  Securely message with [employer] (more info)  Text page via [employer] Paging/Directory     ______________________________________________________________________    Chief Complaint   Anemia with hemoglobin of 5.5    History is obtained from the patient and her  at bedside, electronic health record, and emergency department physician    History of Present Illness   Britt Park is a 54 year old female with 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  She went to the clinic and had a blood draw done and hemoglobin returned low again at 5.6 so she was referred to ED.  She denies any bloody stools or black stools.  She had prior colonoscopies and which are polyps were removed otherwise normal.  She has history of hiatal hernia and GERD and she is on Protonix daily.  Denies any NSAID use.  Denies any fevers or chills.  She has been feeling tired and fatigued.  Denies any nausea vomiting no abdominal pain.    In the emergency room she was evaluated by Dr. Flip Mcintosh    Vital signs showed temperature 97.9  F afebrile pulse rate of 66 blood pressure 112/50 respirate of 16 she was satting 94% on room air    Her blood work showed hemoglobin was low at 5.5.  WBC count at 5.3 platelet count 4 at 4 97K.  MCV low at 72.  Iron  returned low at 13, iron saturation index low at 3.  Reticulocyte count was normal at 1.1.    2 units of PRBCs transfusion ordered.  Folate ferritin and vitamin B12 levels ordered results are currently pending and request to hospitalization was made under observation status for iron deficiency anemia evaluation  At baseline patient uses a walker to ambulate.  She uses wheelchair outside of the house.      Past Medical History    Past Medical History:   Diagnosis Date    Acute respiratory failure with hypoxia (H)     Anemia     Facial paresthesia     Hepatic vein stenosis     Impetigo     Methemoglobinemia     Ovarian cyst     Overactive bladder     Secondary hypertension 2021    Sleep apnea      Apnea Sleep Obstructive   Dry Eye Syndrome Bilateral 8/15/2018 or earlier   Hyperlipidemia   Hypertension NOS   Osteopenia   Other Injury Of Unspecified Body Region 12/10/2022   Spiral fracture of right femur and into right knee   Other Specified Health Status 2019   Myopathy - type and cause unknown   Polyp Colon 2021   Sleep Apnea 2020       Past Surgical History   Past Surgical History:   Procedure Laterality Date    ABDOMEN SURGERY  2007        BIOPSY  2019 & 2021    Right bicep, result abnormal results, & left tricep biopsy    COLONOSCOPY  21    COLONOSCOPY N/A 2022    Procedure: COLONOSCOPY, FLEXIBLE, WITH LESION REMOVAL USING SNARE;  Surgeon: Dorie Santos MD;  Location:  GI    GYN SURGERY  07     for twins    LAPAROSCOPIC OOPHORECTOMY Right 2023    Procedure: LAPAROSCOPIC RIGHT OOPHORECTOMY AND SALPINGECTOMY, LEFT SALPINGECTOMY AND LEFT OVARIAN CYST DRAINAGE;  Surgeon: Annette Walls MD;  Location:  OR    OPEN REDUCTION INTERNAL FIXATION RODDING INTRAMEDULLARY FEMUR Right 2022    Procedure: Open reduction internal fixation of right femur fracture;  Surgeon: Al Larson MD;  Location:  OR       Prior to  Admission Medications   Prior to Admission Medications   Prescriptions Last Dose Informant Patient Reported? Taking?   Misc. Devices (NOVA CUSHION GEL SEAT PAD) MISC   No No   Si Units daily   Vitamin D3 (CHOLECALCIFEROL) 25 mcg (1000 units) tablet   Yes No   Sig: Take 25 mcg by mouth   acetaminophen (TYLENOL) 325 MG tablet   No No   Sig: Take 2 tablets (650 mg) by mouth every 6 hours as needed for other (For optimal non-opioid multimodal pain management to improve pain control.)   alendronate (FOSAMAX) 70 MG tablet   Yes No   Sig: Take 70 mg by mouth   atorvastatin (LIPITOR) 20 MG tablet   No No   Sig: TAKE 1 TABLET(20 MG) BY MOUTH DAILY AT 2 PM   calcium carbonate (TUMS) 500 MG chewable tablet   No No   Sig: Take 1 tablet (500 mg) by mouth 2 times daily as needed for heartburn   estradiol (VAGIFEM) 10 MCG TABS vaginal tablet   No No   Sig: Place 1 tablet (10 mcg) vaginally twice a week   folic acid (FOLVITE) 1 MG tablet   No No   Sig: Take 1 tablet (1 mg) by mouth daily   ibuprofen (ADVIL/MOTRIN) 800 MG tablet   No No   Sig: Take 1 tablet (800 mg) by mouth every 6 hours as needed for other (mild and/or inflammatory pain)   losartan (COZAAR) 25 MG tablet   No No   Sig: TAKE 1 TABLET(25 MG) BY MOUTH DAILY   mupirocin (BACTROBAN) 2 % external ointment   No No   Sig: Apply topically 3 times daily   pantoprazole (PROTONIX) 40 MG EC tablet   No No   Sig: TAKE 1 TABLET BY MOUTH DAILY 30 MINUTES BEFORE BREAKFAST   solifenacin (VESICARE) 5 MG tablet   No No   Sig: Take 1 tablet (5 mg) by mouth daily.   valACYclovir (VALTREX) 1000 mg tablet   Yes No   Sig: Take 2,000 mg by mouth as needed      Facility-Administered Medications: None        Review of Systems    The 10 point Review of Systems is negative other than noted in the HPI or here.     Social History   I have reviewed this patient's social history and updated it with pertinent information if needed.  Social History     Tobacco Use    Smoking status: Never     Smokeless tobacco: Never   Vaping Use    Vaping status: Never Used   Substance Use Topics    Alcohol use: Yes    Drug use: Never         Family History   I have reviewed this patient's family history and updated it with pertinent information if needed.  Family History   Adopted: Yes   Problem Relation Age of Onset    Unknown/Adopted Other          Allergies   Allergies   Allergen Reactions    Dapsone Shortness Of Breath     Oxygen levels went to 79 %.    Influenza Vaccines Hives     2/24/23 Patient states she's been able to have a flu shot the past couple of years and has done well with it.    Benadryl [Diphenhydramine]     Diphenhydramine Other (See Comments)     SHAKY      Sulfa Antibiotics Swelling    Sulfa Antibiotics         Physical Exam   Vital Signs: Temp: 98.5  F (36.9  C) Temp src: Oral BP: 106/63 Pulse: 79   Resp: 15 SpO2: 100 % O2 Device: None (Room air)    Weight: 110 lbs 0 oz    General Appearance: Pleasant  female lying comfortably in bed, not in acute distress  Eyes: Eyes and eyelids are normal, no scleral icterus or conjunctival injection noted  HEENT: Head atraumatic normocephalic, neck is supple, no JVD, normal oropharyngeal exam  Respiratory: Clear to auscultation bilaterally, no crackles or wheezing heard  Cardiovascular: Normal rate rhythm regular, no murmurs  GI: Soft, nontender nondistended bowel sounds positive  Skin: Pallor present no rashes or lesions seen  Musculoskeletal: No clubbing cyanosis.  Trace edema of the bilateral lower extremities noted  Neurologic: Bilateral lower extremity weakness noted  Psychiatric: Mood and affect are normal    Medical Decision Making       80 MINUTES SPENT BY ME on the date of service doing chart review, history, exam, documentation & further activities per the note.      Data     I have personally reviewed the following data over the past 24 hrs:    5.3  \   5.5 (LL)   / 497 (H)     N/A N/A N/A /  N/A   N/A N/A N/A \     Ferritin:  N/A % Retic:  1.1  LDH:  N/A       Imaging results reviewed over the past 24 hrs:   Recent Results (from the past 24 hours)   DX Hand Bilateral 3+ Views    Narrative    EXAM:  DX HAND BILATERAL 3+ VIEWS    Impression    Scattered mild degenerative arthritis of both hands, greatest at the radiocarpal and first CMC articulations. Similar heterotopic ossifications in the soft tissues along the volar aspect of the distal right radial metaphysis.

## 2024-11-28 NOTE — PROGRESS NOTES
PRIMARY DIAGNOSIS: Anemia   OUTPATIENT/OBSERVATION GOALS TO BE MET BEFORE DISCHARGE:  ADLs back to baseline: No    Activity and level of assistance: Up with A1/GB/W     Pain status: Pain free.    Return to near baseline physical activity: No     Discharge Planner Nurse   Safe discharge environment identified: Yes  Barriers to discharge: Yes       Entered by: Rebekah Lara RN 11/28/2024 6:32 PM     Please review provider order for any additional goals.   Nurse to notify provider when observation goals have been met and patient is ready for discharge.

## 2024-11-28 NOTE — CONSULTS
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    Patient underwent outpatient blood work in neurology clinic earlier this week with critical findings of severe anemia with hemoglobin at 5.6 g/dL.  She was asked to come to emergency room with repeat blood draw again noting anemia with microcytic 5.5. g/dL and MCV 72.  Iron deficiency with low saturation at 3%.  CMP unremarkable.  She received 3 units of pRBC overnight with improvement to 9.1 g/dL this afternoon.    Patient denies any recent or prior history of overt GI bleeding.  No hematochezia, melena, hematemesis, or hemoptysis reported.  She notes episodic severe epigastric abdominal pain once every 2-3 weeks that is well managed with use of over-the-counter Gaviscon as needed.  No nausea, emesis, typical heartburn, dysphagia, or odynophagia.  Bowel habits reported once every 3-4 days - though denies any feeling of constipation or incomplete evacuation.  No straining.  Stools with normal color and consistency.  No recent weight loss.    Prior history of chronic Prednisone and Methotrexate therapy for granulomatous myositis that was stopped in 2023.  She also reports taking NSAIDs for a few months following a femur fracture in late 2022 but none since then.    Prior GI work up with [facility] included colonoscopy, EGD, and PillCam in 2022 without any significant findings.  Patient not on any iron supplements at home.       PAST HISTORY      Past Medical History:   Diagnosis Date    Acute respiratory failure with hypoxia (H)     Anemia     Facial paresthesia     Hepatic vein  stenosis     Impetigo     Methemoglobinemia     Ovarian cyst     Overactive bladder     Secondary hypertension 2021    Sleep apnea       Past Surgical History:   Procedure Laterality Date    ABDOMEN SURGERY  2007        BIOPSY  2019 & 2021    Right bicep, result abnormal results, & left tricep biopsy    COLONOSCOPY  21    COLONOSCOPY N/A 2022    Procedure: COLONOSCOPY, FLEXIBLE, WITH LESION REMOVAL USING SNARE;  Surgeon: Dorie Santos MD;  Location:  GI    GYN SURGERY  07     for twins    LAPAROSCOPIC OOPHORECTOMY Right 2023    Procedure: LAPAROSCOPIC RIGHT OOPHORECTOMY AND SALPINGECTOMY, LEFT SALPINGECTOMY AND LEFT OVARIAN CYST DRAINAGE;  Surgeon: Annette Walls MD;  Location:  OR    OPEN REDUCTION INTERNAL FIXATION RODDING INTRAMEDULLARY FEMUR Right 2022    Procedure: Open reduction internal fixation of right femur fracture;  Surgeon: Al Larson MD;  Location:  OR      Family History Social History   Family History   Adopted: Yes   Problem Relation Age of Onset    Unknown/Adopted Other     Social History     Socioeconomic History    Marital status:      Spouse name: Not on file    Number of children: Not on file    Years of education: Not on file    Highest education level: Not on file   Occupational History    Not on file   Tobacco Use    Smoking status: Never    Smokeless tobacco: Never   Vaping Use    Vaping status: Never Used   Substance and Sexual Activity    Alcohol use: Yes    Drug use: Never    Sexual activity: Yes     Partners: Male     Birth control/protection: Male Surgical   Other Topics Concern    Parent/sibling w/ CABG, MI or angioplasty before 65F 55M? No   Social History Narrative    ** Merged History Encounter **          Social Drivers of Health     Financial Resource Strain: Low Risk  (2024)    Financial Resource Strain     Within the past 12 months, have you or your family  members you live with been unable to get utilities (heat, electricity) when it was really needed?: No   Food Insecurity: Low Risk  (11/27/2024)    Food Insecurity     Within the past 12 months, did you worry that your food would run out before you got money to buy more?: No     Within the past 12 months, did the food you bought just not last and you didn t have money to get more?: No   Transportation Needs: Low Risk  (11/27/2024)    Transportation Needs     Within the past 12 months, has lack of transportation kept you from medical appointments, getting your medicines, non-medical meetings or appointments, work, or from getting things that you need?: No   Physical Activity: Insufficiently Active (11/25/2024)    Received from [facility]    Exercise Vital Sign     Days of Exercise per Week: 4 days     Minutes of Exercise per Session: 30 min   Stress: Stress Concern Present (8/29/2024)    [employer] - Occupational Stress Questionnaire     Feeling of Stress : To some extent   Social Connections: Unknown (8/29/2024)    Social Connection and Isolation Panel [NHANES]     Frequency of Communication with Friends and Family: Patient declined     Frequency of Social Gatherings with Friends and Family: Once a week     Attends Jainism Services: Patient declined     Active Member of Clubs or Organizations: No     Attends Club or Organization Meetings: Patient declined     Marital Status:    Interpersonal Safety: Low Risk  (11/27/2024)    Interpersonal Safety     Do you feel physically and emotionally safe where you currently live?: Yes     Within the past 12 months, have you been hit, slapped, kicked or otherwise physically hurt by someone?: No     Within the past 12 months, have you been humiliated or emotionally abused in other ways by your partner or ex-partner?: No   Housing Stability: High Risk (11/27/2024)    Housing Stability     Do you have housing? : No     Are you worried about  losing your housing?: No         MEDICATIONS & ALLERGIES   Medications Prior to Admission   Medication Sig Dispense Refill Last Dose/Taking    acetaminophen (TYLENOL) 325 MG tablet Take 2 tablets (650 mg) by mouth every 6 hours as needed for other (For optimal non-opioid multimodal pain management to improve pain control.) 100 tablet 0 Taking As Needed    alendronate (FOSAMAX) 70 MG tablet Take 70 mg by mouth every 7 days. On Wednesdays 11/27/2024 Morning    atorvastatin (LIPITOR) 20 MG tablet TAKE 1 TABLET(20 MG) BY MOUTH DAILY AT 2 PM 90 tablet 3 11/27/2024 Morning    calcium carbonate (TUMS) 500 MG chewable tablet Take 1 tablet (500 mg) by mouth 2 times daily as needed for heartburn   Taking As Needed    Cyanocobalamin (VITAMIN B-12 PO) Take 1 tablet by mouth daily.   11/27/2024 Morning    folic acid (FOLVITE) 1 MG tablet Take 1 tablet (1 mg) by mouth daily 90 tablet 3 11/27/2024 Morning    losartan (COZAAR) 25 MG tablet TAKE 1 TABLET(25 MG) BY MOUTH DAILY (Patient taking differently: Take 12.5 tablets by mouth daily.) 90 tablet 3 11/27/2024 Morning    Misc. Devices (NOVA CUSHION GEL SEAT PAD) MISC 1 Units daily 1 each 0 Taking    mupirocin (BACTROBAN) 2 % external ointment Apply topically 3 times daily (Patient taking differently: Apply topically as needed.) 15 g 3 Taking Differently    pantoprazole (PROTONIX) 40 MG EC tablet TAKE 1 TABLET BY MOUTH DAILY 30 MINUTES BEFORE BREAKFAST 90 tablet 2 11/27/2024 Morning    solifenacin (VESICARE) 5 MG tablet Take 1 tablet (5 mg) by mouth daily. 90 tablet 1 11/27/2024 Morning    valACYclovir (VALTREX) 1000 mg tablet Take 2,000 mg by mouth as needed   Taking As Needed    Vitamin D3 (CHOLECALCIFEROL) 25 mcg (1000 units) tablet Take 25 mcg by mouth   11/27/2024 Morning    estradiol (VAGIFEM) 10 MCG TABS vaginal tablet Place 1 tablet (10 mcg) vaginally twice a week 24 tablet 3       Allergies   Allergen Reactions    Dapsone Shortness Of Breath     Oxygen levels went to 79  "%.    Influenza Vaccines Hives     2/24/23 Patient states she's been able to have a flu shot the past couple of years and has done well with it.    Benadryl [Diphenhydramine]     Diphenhydramine Other (See Comments)     SHAKY      Sulfa Antibiotics Swelling    Sulfa Antibiotics         REVIEW OF SYSTEMS   Comprehensive 10+ points review of systems performed with pertinent as per HPI above.    OBJECTIVE   Vitals /82 (BP Location: Right arm)   Pulse 72   Temp 98.5  F (36.9  C) (Oral)   Resp 18   Ht 1.575 m (5' 2\")   Wt 49.2 kg (108 lb 7.5 oz)   LMP  (LMP Unknown)   SpO2 98%   BMI 19.84 kg/m          General:   Well-developed middle aged female in NAD.   HEENT:   NC/AT. MMM.   Lungs:  No respiratory distress.   Heart:  RRR. +S1, S2.    Abdomen:   Soft. NT/ND. BS active.    Ext/MS:   No cyanosis or erythema.    Neuro:   No focal deficits noted.    Skin:  No obvious rashes or lesions noted.         LABORATORY AND IMAGING    ELECTROLYTE PANEL   Recent Labs   Lab Test 11/28/24  0643 11/27/24  1806 09/19/23  0743    142 139   POTASSIUM 4.0 4.6 4.4   BUN 14.0 15.8 19.6   CR 0.30* 0.29* 0.35*   LASHONDA 8.9 9.1 9.3      HEMATOLOGY PANEL   Recent Labs   Lab Test 11/28/24  1236 11/28/24  0643 11/27/24  2342 11/27/24  1630 11/27/24  1438 09/19/23  0743 12/12/22  0708 12/10/22  2110   WBC  --  4.6  --   --  5.3 3.5*   < > 7.4   HGB 8.7* 9.1*  9.1* 8.2*   < > 5.6* 10.9*   < > 12.0   MCV  --  73*  --   --  72* 102*   < > 107*   PLT  --  392  --   --  497* 315   < > 314   INR  --   --   --   --   --   --   --  0.97    < > = values in this interval not displayed.      LIVER AND PANCREAS PANEL   Recent Labs   Lab Test 11/27/24  1806 02/01/24  0751 12/06/23  1001 09/19/23  0743 07/26/23  0806 05/01/23  1339 12/25/22  1855   ALBUMIN 4.1  --   --  4.4 4.4   < >  --    BILITOTAL 0.2  --   --  0.3 0.3  --   --    ALT 10  --   --  24 28   < >  --    AST 19  --   --  31 36   < >  --    PROTEIN  --  Negative Trace*  --   --   " --  Negative    < > = values in this interval not displayed.      I have reviewed the current diagnostic and laboratory tests.     Assessment / Recommendations:     Assessment:  Iron deficiency anemia.  No overt GI blood loss.  Prior EGD, colonoscopy, and PillCam with [facility] in 2022 reviewed without any significant findings.  No recent falls or injuries to suspect extraintestinal hemorrhage or hematoma.  Anemia most likely due to slow pace GI blood loss with possible etiologies of angiectasia lesions, Gurwinder erosions, Dieulafoy lesions, peptic ulcer disease, erosive reflux esophagitis, or neoplastic process.  Clinical presentation not suggestive for brisk GI bleeding including variceal blood loss.  Given worsened anemia, discussed utility of repeat endoscopic evaluation with patient and , who are interested in proceeding with the procedures.      Recommendations:  Tentative plan for EGD and colonoscopy tomorrow.  Clear liquid diet today.  GoLytely bowel prep to start this afternoon.  Monitor hemoglobin and transfuse as needed.  Consider IV iron infusions while inpatient with transition to oral supplements on discharge.  Pending results of endoscopic evaluation, may consider repeat outpatient PillCam.  Would also consider hematology referral to evaluate for non-gastrointestinal causes of anemia.  Further recommendations to follow endoscopic findings tomorrow.       Total time spent in chart review, direct medical discussion, examination, and documentation was 41 minutes.    Reinier Hoffman MD  [facility]  [phone]  I appreciate the opportunity to participate in the care of this patient.   Please feel free to call me with any questions or concerns.

## 2024-11-28 NOTE — PROGRESS NOTES
Observation goals  PRIOR TO DISCHARGE       Comments: -diagnostic tests and consults completed and resulted- not met  -vital signs normal or at patient baseline- met  Nurse to notify provider when observation goals have been met and patient is ready for discharge

## 2024-11-28 NOTE — PROGRESS NOTES
RECEIVING UNIT ED HANDOFF REVIEW    ED Nurse Handoff Report was reviewed by: Jose Lockhart RN on November 27, 2024 at 9:24 PM

## 2024-11-28 NOTE — PHARMACY-ADMISSION MEDICATION HISTORY
Pharmacy Intern Admission Medication History    Admission medication history is complete. The information provided in this note is only as accurate as the sources available at the time of the update.    Information Source(s): Patient, Clinic records, Hospital records, and CareEverywhere/SureScripts via in-person    Pertinent Information: Pt reports taking 1/2 tab of losartan once daily (12.5 mg) instead of prescribed 25 mg losartan once daily. Pt reports not currently taking estradiol 10 mcg vaginal tabs but would like to keep on med list-- marked as not taken. Pt buys vitamin B12 OTC, does not recall dose.    Changes made to PTA medication list:  Added: Vitamin B12 (OTC)  Deleted: Ibuprofen 800 mg  Changed: Losartan 25 mg once daily --> Losartan 12.5 mg once daily.  Mupirocin 2% ointment 3 times daily --> PRN    Allergies reviewed with patient and updates made in EHR: yes    Medication History Completed By: Arina Draper 11/27/2024 8:31 PM    PTA Med List   Medication Sig Last Dose/Taking    Cyanocobalamin (VITAMIN B-12 PO) Take 1 tablet by mouth daily. 11/27/2024 Morning    acetaminophen (TYLENOL) 325 MG tablet Take 2 tablets (650 mg) by mouth every 6 hours as needed for other (For optimal non-opioid multimodal pain management to improve pain control.) Taking As Needed    alendronate (FOSAMAX) 70 MG tablet Take 70 mg by mouth every 7 days. On Wednesdays 11/27/2024 Morning    atorvastatin (LIPITOR) 20 MG tablet TAKE 1 TABLET(20 MG) BY MOUTH DAILY AT 2 PM 11/27/2024 Morning    calcium carbonate (TUMS) 500 MG chewable tablet Take 1 tablet (500 mg) by mouth 2 times daily as needed for heartburn Taking As Needed    folic acid (FOLVITE) 1 MG tablet Take 1 tablet (1 mg) by mouth daily 11/27/2024 Morning    losartan (COZAAR) 25 MG tablet TAKE 1 TABLET(25 MG) BY MOUTH DAILY (Patient taking differently: Take 12.5 tablets by mouth daily.) 11/27/2024 Morning    Misc. Devices (NOVA CUSHION GEL SEAT PAD) MISC 1 Units daily Taking     mupirocin (BACTROBAN) 2 % external ointment Apply topically 3 times daily (Patient taking differently: Apply topically as needed.) Taking Differently    pantoprazole (PROTONIX) 40 MG EC tablet TAKE 1 TABLET BY MOUTH DAILY 30 MINUTES BEFORE BREAKFAST 11/27/2024 Morning    solifenacin (VESICARE) 5 MG tablet Take 1 tablet (5 mg) by mouth daily. 11/27/2024 Morning    valACYclovir (VALTREX) 1000 mg tablet Take 2,000 mg by mouth as needed Taking As Needed    Vitamin D3 (CHOLECALCIFEROL) 25 mcg (1000 units) tablet Take 25 mcg by mouth 11/27/2024 Morning

## 2024-11-28 NOTE — PLAN OF CARE
Goal Outcome Evaluation:    Top portion must ALWAYS be completed  PRIMARY Concern: anemia  SAFETY RISK Concerns (fall risk, behaviors, etc.): falls risk      Aggression Tool Color: green  Isolation/Type: NA  Tests/Procedures for NEXT shift: Q6 serial hemoglobin  Consults? (Pending/following, signed-off?) [facility] consult pending  Where is patient from? (Home, TCU, etc.): home  Other Important info for NEXT shift: received 2 units of PRBCs  Anticipated DC date & active delays: pending  _____________________________________________________________________________  SUMMARY NOTE:  Orientation/Cognitive: A&O X4  Observation Goals (Met/ Not Met): NOT MET  Mobility Level/Assist Equipment: 2A GB+W  Antibiotics & Plan (IV/po, length of tx left): Iron transfusion in AM  Pain Management: managed with prn tylenol  Tele/VS/O2: VSS on RA , tele NSR  ABNL Lab/BG: hemoglobin 5.5--8.2  Diet: NPO  Bowel/Bladder: Up to BSC- 2a gb+w  Skin Concerns: NA  Drains/Devices: PIV SL  Patient Stated Goal for Today: rest

## 2024-11-28 NOTE — PROGRESS NOTES
PRIMARY DIAGNOSIS: Anemia  OUTPATIENT/OBSERVATION GOALS TO BE MET BEFORE DISCHARGE:  1. Stable vital signs Yes  2. Tolerating diet:No, NPO  3. Pain controlled with oral pain medications:  Yes  4. Positive bowel sounds:  Yes  5. Voiding without difficulty:  Yes  6. Able to ambulate:  Yes  7. Provider specific discharge goals met:  No, GI consult     Discharge Planner Nurse   Safe discharge environment identified: Yes  Barriers to discharge: Yes       Entered by: Rebekah Lara RN 11/28/2024 11:00 AM      Please review provider order for any additional goals.   Nurse to notify provider when observation goals have been met and patient is ready for discharge.

## 2024-11-28 NOTE — PROGRESS NOTES
[facility]    Medicine Progress Note - Hospitalist Service    Date of Admission:  11/27/2024    Assessment & Plan      Britt Park is a 54 year old female admitted on 11/27/2024. She has history of Granulomatous myositis followed by neurology at [facility], hypertension, hyperlipidemia, history of hiatal hernia, history of GERD presenting with anemia hemoglobin of 5.5    Severe iron deficiency microcytic anemia with hemoglobin of 5.5  Hemoglobin low at 5.5. without any sign of obvious bleeding, denies black/bloody stools  Iron level low at 13, iron saturation index at 3.  TIBC at 386.  MCV low at 72  Ferritin, folate and B12 levels are currently pending  Admitted to the hospital under observation status  2 unit PRBCs transfused with improved hgb 9.1 as of 11/28 am   IV Protonix twice daily ordered  No NSAID use.  No alcohol or tobacco use  Hemoglobin every 6 hours ordered  Conditional unit to give if hemoglobin less than 7  [facility] consultation will be requested regarding further evaluation management (has previously seen Rajesh but reports her insurance no longer covers).   N.p.o. after midnight  Venofer for iron 13  Plan on oral iron supplement with Vitron-C daily upon discharge     History of hypertension;  Blood pressure is soft systolics in the 90s  Hold losartan     History of hyperlipidemia;  Continue Lipitor     History of granulomatous myositis  Uses walker to ambulate  Follows with the neurology at [facility] they are looking at treating her with IVIG in the future  Previously treated with prednisone and methotrexate which were not helpful as per the patient and was stopped in April 2023       Observation Goals: -diagnostic tests and consults completed and resulted, -vital signs normal or at patient baseline, Nurse to notify provider when observation goals have been met and patient is ready for discharge.  Diet: NPO per Anesthesia Guidelines for Procedure/Surgery  Except for: Meds    DVT Prophylaxis: Pneumatic Compression Devices  Mercer Catheter: Not present  Lines: None     Cardiac Monitoring: ACTIVE order. Indication: Tachyarrhythmias, acute (48 hours)  Code Status: Full Code      Clinically Significant Risk Factors Present on Admission                   # Hypertension: Noted on problem list      # Anemia: based on hgb <11                  Social Drivers of Health    Housing Stability: High Risk (11/27/2024)    Housing Stability     Do you have housing? : No     Are you worried about losing your housing?: No   Physical Activity: Insufficiently Active (11/25/2024)    Received from [facility]    Exercise Vital Sign     Days of Exercise per Week: 4 days     Minutes of Exercise per Session: 30 min   Stress: Stress Concern Present (8/29/2024)    [employer] - Occupational Stress Questionnaire     Feeling of Stress : To some extent   Social Connections: Unknown (8/29/2024)    Social Connection and Isolation Panel [NHANES]     Frequency of Communication with Friends and Family: Patient declined     Frequency of Social Gatherings with Friends and Family: Once a week     Attends Episcopal Services: Patient declined     Active Member of Clubs or Organizations: No     Attends Club or Organization Meetings: Patient declined     Marital Status:           Disposition Plan     Medically Ready for Discharge: Anticipated Tomorrow           The patient's care was discussed with the Attending Physician, Dr. Montesinos .    Kristine Vaughn,DNP APRN CNP  Hospitalist Service  [facility]  Securely message with [email or web address] (more info)  Text page via [facility] Paging/Directory   ______________________________________________________________________    Interval History   This morning patient is seen in her room with nursing at bedside.  She is alert, oriented, very pleasant.  She denies any fever, chills, headache.  She denies any shortness of breath  or chest pain.  She does have overall muscle weakness and limited range of motion mobility secondary to her granulomatous myositis.  She denies any nausea, vomiting, abdominal pain, bloody stools or emesis.  No change in appetite or diet.  She has been having slowly progressing weakness.     Physical Exam   Vital Signs: Temp: 99.8  F (37.7  C) Temp src: Oral BP: 120/73 Pulse: 77   Resp: 18 SpO2: 98 % O2 Device: None (Room air)    Weight: 108 lbs 7.46 oz    Physical Exam  Constitutional:       Appearance: Normal appearance.   HENT:      Mouth/Throat:      Mouth: Mucous membranes are moist.   Eyes:      Pupils: Pupils are equal, round, and reactive to light.   Cardiovascular:      Rate and Rhythm: Normal rate.      Pulses: Normal pulses.      Heart sounds: Normal heart sounds.   Pulmonary:      Effort: Pulmonary effort is normal.      Breath sounds: Normal breath sounds.   Abdominal:      General: Abdomen is flat. Bowel sounds are normal.      Palpations: Abdomen is soft.   Neurological:      Mental Status: She is alert.          Medical Decision Making       61 MINUTES SPENT BY ME on the date of service doing chart review, history, exam, documentation & further activities per the note.      Data     I have personally reviewed the following data over the past 24 hrs:    4.6  \   9.1 (L); 9.1 (L)   / 392     141 106 14.0 /  93   4.0 25 0.30 (L) \     ALT: 10 AST: 19 AP: 47 TBILI: 0.2   ALB: 4.1 TOT PROTEIN: 6.7 LIPASE: N/A     Ferritin:  13 % Retic:  1.1 LDH:  N/A       Imaging results reviewed over the past 24 hrs:   No results found for this or any previous visit (from the past 24 hours).

## 2024-11-28 NOTE — PLAN OF CARE
Goal Outcome Evaluation:      Plan of Care Reviewed With: patient  PRIMARY Concern: Anemia   SAFETY RISK Concerns (fall risk, behaviors, etc.): Fall      Aggression Tool Color: Green   Isolation/Type: None   Tests/Procedures for NEXT shift: q6hr Hgb checks; EGD and colonoscopy to be done tomorrow; Needs to finish bowl prep tonight  Consults? (Pending/following, signed-off?) [facility] following   Where is patient from? (Home, TCU, etc.): Home   Other Important info for NEXT shift: Received 2 units PRBC since stay   Anticipated DC date & active delays: 11/29?   _____________________________________________________________________________  SUMMARY NOTE:  Orientation/Cognitive: AOx4  Observation Goals (Met/ Not Met): Not met   Mobility Level/Assist Equipment: A1/GB/W   Antibiotics & Plan (IV/po, length of tx left): None   Pain Management: C/o HA, Tylenol given with relief   Tele/VS/O2: NSR/ VSS on RA   ABNL Lab/BG: Hgb 9.1 > 8.7; Iron 13   Diet: Clears; NPO at midnight   Bowel/Bladder: Continent; voiding using BSC   Skin Concerns: WNL   Drains/Devices: PIV SL   Patient Stated Goal for Today: Rest

## 2024-11-28 NOTE — PROGRESS NOTES
PRIMARY DIAGNOSIS: Anemia   OUTPATIENT/OBSERVATION GOALS TO BE MET BEFORE DISCHARGE:  ADLs back to baseline: No    Activity and level of assistance: Up with A1/GB/W     Pain status: Pain free.    Return to near baseline physical activity: No     Discharge Planner Nurse   Safe discharge environment identified: Yes  Barriers to discharge: Yes       Entered by: Rebekah Lara RN 11/28/2024 3:00 PM     Please review provider order for any additional goals.   Nurse to notify provider when observation goals have been met and patient is ready for discharge.

## 2024-11-29 VITALS
OXYGEN SATURATION: 100 % | BODY MASS INDEX: 19.96 KG/M2 | HEART RATE: 76 BPM | DIASTOLIC BLOOD PRESSURE: 83 MMHG | TEMPERATURE: 97.7 F | HEIGHT: 62 IN | RESPIRATION RATE: 16 BRPM | WEIGHT: 108.47 LBS | SYSTOLIC BLOOD PRESSURE: 145 MMHG

## 2024-11-29 VITALS
BODY MASS INDEX: 20.59 KG/M2 | HEIGHT: 62 IN | TEMPERATURE: 98.5 F | SYSTOLIC BLOOD PRESSURE: 108 MMHG | RESPIRATION RATE: 18 BRPM | DIASTOLIC BLOOD PRESSURE: 67 MMHG | OXYGEN SATURATION: 100 % | HEART RATE: 72 BPM | WEIGHT: 111.9 LBS

## 2024-11-29 LAB
BASOPHILS # BLD AUTO: 0.1 10E3/UL (ref 0–0.2)
BASOPHILS NFR BLD AUTO: 1 %
COLONOSCOPY: NORMAL
EOSINOPHIL # BLD AUTO: 0.1 10E3/UL (ref 0–0.7)
EOSINOPHIL NFR BLD AUTO: 1 %
ERYTHROCYTE [DISTWIDTH] IN BLOOD BY AUTOMATED COUNT: 20.6 % (ref 10–15)
HCT VFR BLD AUTO: 32.8 % (ref 35–47)
HGB BLD-MCNC: 10.4 G/DL (ref 11.7–15.7)
HGB BLD-MCNC: 9.5 G/DL (ref 11.7–15.7)
HOLD SPECIMEN: NORMAL
IMM GRANULOCYTES # BLD: 0 10E3/UL
IMM GRANULOCYTES NFR BLD: 0 %
LYMPHOCYTES # BLD AUTO: 1.8 10E3/UL (ref 0.8–5.3)
LYMPHOCYTES NFR BLD AUTO: 34 %
MCH RBC QN AUTO: 22 PG (ref 26.5–33)
MCHC RBC AUTO-ENTMCNC: 29 G/DL (ref 31.5–36.5)
MCV RBC AUTO: 76 FL (ref 78–100)
MONOCYTES # BLD AUTO: 0.4 10E3/UL (ref 0–1.3)
MONOCYTES NFR BLD AUTO: 7 %
NEUTROPHILS # BLD AUTO: 2.9 10E3/UL (ref 1.6–8.3)
NEUTROPHILS NFR BLD AUTO: 56 %
NRBC # BLD AUTO: 0 10E3/UL
NRBC BLD AUTO-RTO: 0 /100
PLATELET # BLD AUTO: 382 10E3/UL (ref 150–450)
RBC # BLD AUTO: 4.31 10E6/UL (ref 3.8–5.2)
UPPER GI ENDOSCOPY: NORMAL
WBC # BLD AUTO: 5.2 10E3/UL (ref 4–11)

## 2024-11-29 PROCEDURE — 258N000003 HC RX IP 258 OP 636: Performed by: INTERNAL MEDICINE

## 2024-11-29 PROCEDURE — 999N000010 HC STATISTIC ANES STAT CODE-CRNA PER MINUTE: Performed by: INTERNAL MEDICINE

## 2024-11-29 PROCEDURE — 250N000009 HC RX 250: Performed by: INTERNAL MEDICINE

## 2024-11-29 PROCEDURE — G0378 HOSPITAL OBSERVATION PER HR: HCPCS

## 2024-11-29 PROCEDURE — 43239 EGD BIOPSY SINGLE/MULTIPLE: CPT | Performed by: INTERNAL MEDICINE

## 2024-11-29 PROCEDURE — 85004 AUTOMATED DIFF WBC COUNT: CPT | Performed by: NURSE PRACTITIONER

## 2024-11-29 PROCEDURE — 43255 EGD CONTROL BLEEDING ANY: CPT

## 2024-11-29 PROCEDURE — 36415 COLL VENOUS BLD VENIPUNCTURE: CPT | Performed by: NURSE PRACTITIONER

## 2024-11-29 PROCEDURE — 43255 EGD CONTROL BLEEDING ANY: CPT | Mod: 59 | Performed by: INTERNAL MEDICINE

## 2024-11-29 PROCEDURE — 43239 EGD BIOPSY SINGLE/MULTIPLE: CPT

## 2024-11-29 PROCEDURE — 250N000013 HC RX MED GY IP 250 OP 250 PS 637: Performed by: INTERNAL MEDICINE

## 2024-11-29 PROCEDURE — 99239 HOSP IP/OBS DSCHRG MGMT >30: CPT | Performed by: NURSE PRACTITIONER

## 2024-11-29 PROCEDURE — 250N000011 HC RX IP 250 OP 636: Performed by: INTERNAL MEDICINE

## 2024-11-29 PROCEDURE — 45380 COLONOSCOPY AND BIOPSY: CPT

## 2024-11-29 PROCEDURE — 96376 TX/PRO/DX INJ SAME DRUG ADON: CPT | Mod: 59

## 2024-11-29 PROCEDURE — 272N000104 HC DEVICE CLIP RESOLUTION, EACH: Performed by: INTERNAL MEDICINE

## 2024-11-29 PROCEDURE — 370N000017 HC ANESTHESIA TECHNICAL FEE, PER MIN: Performed by: INTERNAL MEDICINE

## 2024-11-29 PROCEDURE — 88305 TISSUE EXAM BY PATHOLOGIST: CPT | Mod: TC | Performed by: INTERNAL MEDICINE

## 2024-11-29 PROCEDURE — 45380 COLONOSCOPY AND BIOPSY: CPT | Performed by: INTERNAL MEDICINE

## 2024-11-29 RX ORDER — FLUMAZENIL 0.1 MG/ML
0.2 INJECTION, SOLUTION INTRAVENOUS
Status: CANCELLED | OUTPATIENT
Start: 2024-11-29 | End: 2024-11-29

## 2024-11-29 RX ORDER — SODIUM CHLORIDE, SODIUM LACTATE, POTASSIUM CHLORIDE, CALCIUM CHLORIDE 600; 310; 30; 20 MG/100ML; MG/100ML; MG/100ML; MG/100ML
INJECTION, SOLUTION INTRAVENOUS CONTINUOUS
Status: DISCONTINUED | OUTPATIENT
Start: 2024-11-29 | End: 2024-11-29 | Stop reason: HOSPADM

## 2024-11-29 RX ORDER — LIDOCAINE 40 MG/G
CREAM TOPICAL
Status: CANCELLED | OUTPATIENT
Start: 2024-11-29

## 2024-11-29 RX ORDER — PANTOPRAZOLE SODIUM 40 MG/1
40 TABLET, DELAYED RELEASE ORAL
Status: DISCONTINUED | OUTPATIENT
Start: 2024-11-30 | End: 2024-11-29 | Stop reason: HOSPADM

## 2024-11-29 RX ORDER — FERROUS SULFATE 325(65) MG
325 TABLET ORAL EVERY OTHER DAY
Qty: 15 TABLET | Refills: 0 | Status: SHIPPED | OUTPATIENT
Start: 2024-11-29 | End: 2024-12-29

## 2024-11-29 RX ADMIN — ACETAMINOPHEN 650 MG: 325 TABLET, FILM COATED ORAL at 10:16

## 2024-11-29 RX ADMIN — IRON SUCROSE 300 MG: 20 INJECTION, SOLUTION INTRAVENOUS at 10:21

## 2024-11-29 RX ADMIN — PANTOPRAZOLE SODIUM 40 MG: 40 INJECTION, POWDER, FOR SOLUTION INTRAVENOUS at 10:21

## 2024-11-29 RX ADMIN — TOLTERODINE 2 MG: 2 CAPSULE, EXTENDED RELEASE ORAL at 10:16

## 2024-11-29 RX ADMIN — FOLIC ACID 1 MG: 1 TABLET ORAL at 10:17

## 2024-11-29 RX ADMIN — ATORVASTATIN CALCIUM 20 MG: 20 TABLET, FILM COATED ORAL at 10:17

## 2024-11-29 RX ADMIN — MAGNESIUM CITRATE 296 ML: 1.75 LIQUID ORAL at 04:14

## 2024-11-29 ASSESSMENT — ACTIVITIES OF DAILY LIVING (ADL)
ADLS_ACUITY_SCORE: 61

## 2024-11-29 NOTE — PROGRESS NOTES
Observation goals  PRIOR TO DISCHARGE        Comments: -diagnostic tests and consults completed and resulted: Not met   -vital signs normal or at patient baseline: Met  Nurse to notify provider when observation goals have been met and patient is ready for discharge.

## 2024-11-29 NOTE — PLAN OF CARE
Goal Outcome Evaluation:  PRIMARY Concern: Anemia   SAFETY RISK Concerns (fall risk, behaviors, etc.): Fall      Aggression Tool Color: Green   Isolation/Type: None   Tests/Procedures for NEXT shift: q6hr Hgb checks, recent hgb 9.2. EGD and colonoscopy to be done tomorrow; Needs to finish bowl prep tonight  Consults? (Pending/following, signed-off?) [facility] following   Where is patient from? (Home, TCU, etc.): Home   Other Important info for NEXT shift: Received 2 units PRBC since stay   Anticipated DC date & active delays: 11/29?   _____________________________________________________________________________  SUMMARY NOTE:  11/28/2024 [id number]  Orientation/Cognitive: AOx4  Observation Goals (Met/ Not Met): Not met   Mobility Level/Assist Equipment: A1/GB/W pivot to BSC   Antibiotics & Plan (IV/po, length of tx left): None   Pain Management: denies this shift  Tele/VS/O2: NSR/ VSS on RA   ABNL Lab/BG: Hgb 9.2; Iron 13   Diet: Clears; NPO at midnight   Bowel/Bladder: Continent; voiding using BSC   Skin Concerns: WNL   Drains/Devices: PIV SL   Patient Stated Goal for Today: Rest

## 2024-11-29 NOTE — PLAN OF CARE
PRIMARY Concern: Anemia   SAFETY RISK Concerns (fall risk, behaviors, etc.): Fall      Aggression Tool Color: Green   Isolation/Type: None   Tests/Procedures for NEXT shift: q6hr Hgb checks, recent hgb 10.4. EGD and colonoscopy to be done today. Bowel prep done.   Consults? (Pending/following, signed-off?) [facility] following. SW/CM consulted- pending to see.  Where is patient from? (Home, TCU, etc.): Home   Other Important info for NEXT shift: Received 2 units PRBC since stay   Anticipated DC date & active delays: TBD    SUMMARY NOTE:  Orientation/Cognitive: AOx4  Observation Goals (Met/ Not Met): Not met   Mobility Level/Assist Equipment: A1/GB/W pivot to BSC   Antibiotics & Plan (IV/po, length of tx left): None   Pain Management: denies this shift  Tele/VS/O2: NSR/ VSS on RA   ABNL Lab/BG: Hgb 10.4; Iron 13   Diet: Clears; NPO at midnight   Bowel/Bladder: Continent; voiding using BSC. Had BM.  Skin Concerns: WNL   Drains/Devices: PIV SL   Patient Stated Goal for Today: Rest     Observation goals  PRIOR TO DISCHARGE       Comments:   -diagnostic tests and consults completed and resulted: NOT MET, pending EDG colonoscopy  -vital signs normal or at patient baseline: PARTIALLY MET, elevated BP  Nurse to notify provider when observation goals have been met and patient is ready for discharge.

## 2024-11-29 NOTE — PROGRESS NOTES
PRIMARY DIAGNOSIS: Anemia   OUTPATIENT/OBSERVATION GOALS TO BE MET BEFORE DISCHARGE:  ADLs back to baseline: No     Activity and level of assistance: Up with A1/GB/W      Pain status: Pain free.     Return to near baseline physical activity: No             Discharge Planner Nurse  Safe discharge environment identified: Yes  Barriers to discharge: Yes     Please review provider order for any additional goals.   Nurse to notify provider when observation goals have been met and patient is ready for discharge.

## 2024-11-29 NOTE — PROGRESS NOTES
[facility]    Medicine Progress Note - Hospitalist Service    Date of Admission:  11/27/2024    Assessment & Plan   Britt Park is a 54 year old female admitted on 11/27/2024. 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 without any sign of obvious bleeding, denies black/bloody stools  Iron level low at 13, iron saturation index at 3.  TIBC at 386.  MCV low at 72  Ferritin, folate and B12 levels are currently pending  Admitted to the hospital under observation status  2 unit PRBCs transfused with improved hgb 9.1 as of 11/28 am   IV Protonix twice daily ordered  No NSAID use.  No alcohol or tobacco use  Hemoglobin every 6 hours ordered  Conditional unit to give if hemoglobin less than 7  [place] [facility] consultation will be requested regarding further evaluation management (has previously seen Rajesh but reports her insurance no longer covers).   N.p.o. after midnight  Venofer for iron 13  Plan on oral iron supplement with Vitron-C daily upon discharge  -[facility] endoscopy, colonoscopy at 1400 11/29/2024. Patient should be able to discharge after procedure as her hgb is stable ay 10.4     History of hypertension;  Blood pressure is soft systolics in the 90s  Hold losartan     History of hyperlipidemia;  Continue Lipitor     History of granulomatous myositis  Uses walker to ambulate  Follows with the neurology at [facility] they are looking at treating her with IVIG in the future  Previously treated with prednisone and methotrexate which were not helpful as per the patient and was stopped in April 2023       Observation Goals: -diagnostic tests and consults completed and resulted, -vital signs normal or at patient baseline, Nurse to notify provider when  observation goals have been met and patient is ready for discharge.  Diet: NPO per Anesthesia Guidelines for Procedure/Surgery Except for: Ice Chips, Meds    DVT Prophylaxis: Pneumatic Compression Devices  Mercer Catheter: Not present  Lines: None     Cardiac Monitoring: ACTIVE order. Indication: Tachyarrhythmias, acute (48 hours)  Code Status: Full Code      Clinically Significant Risk Factors Present on Admission                   # Hypertension: Noted on problem list      # Anemia: based on hgb <11                  Social Drivers of Health    Housing Stability: High Risk (11/27/2024)    Housing Stability     Do you have housing? : No     Are you worried about losing your housing?: No   Physical Activity: Insufficiently Active (11/25/2024)    Received from [facility]    Exercise Vital Sign     Days of Exercise per Week: 4 days     Minutes of Exercise per Session: 30 min   Stress: Stress Concern Present (8/29/2024)    [employer] [place] [employer] - Occupational Stress Questionnaire     Feeling of Stress : To some extent   Social Connections: Unknown (8/29/2024)    Social Connection and Isolation Panel [NHANES]     Frequency of Communication with Friends and Family: Patient declined     Frequency of Social Gatherings with Friends and Family: Once a week     Attends Zoroastrian Services: Patient declined     Active Member of Clubs or Organizations: No     Attends Club or Organization Meetings: Patient declined     Marital Status:           Disposition Plan     Medically Ready for Discharge: Anticipated Today           The patient's care was discussed with the Attending Physician, Dr. Montesinos .    Kristine Vaughn DNP APRN [facility]  Hospitalist Service  [facility]  Securely message with [employer]) (more info)  Text page via [employer] Paging/Directory   ______________________________________________________________________    Interval History   This morning patient is seen in his room she  is alert oriented no distress noted.  No overnight events.  Hemoglobin stable at 10.4.  Imaging with plans for endoscopy colonoscopy today anticipate that she will discharge after.    Physical Exam   Vital Signs: Temp: 98.1  F (36.7  C) Temp src: Oral BP: 111/64 Pulse: 76   Resp: 16 SpO2: 98 % O2 Device: None (Room air)    Weight: 111 lbs 14.4 oz  Physical Exam  Constitutional:       Appearance: Normal appearance.   HENT:      Mouth/Throat:      Mouth: Mucous membranes are moist.   Eyes:      Pupils: Pupils are equal, round, and reactive to light.   Cardiovascular:      Rate and Rhythm: Normal rate and regular rhythm.      Pulses: Normal pulses.      Heart sounds: Normal heart sounds.   Pulmonary:      Effort: Pulmonary effort is normal.      Breath sounds: Normal breath sounds.   Abdominal:      General: Abdomen is flat. Bowel sounds are normal.      Palpations: Abdomen is soft.   Skin:     General: Skin is warm and dry.   Neurological:      General: No focal deficit present.      Mental Status: She is alert and oriented to person, place, and time.   Psychiatric:         Mood and Affect: Mood normal.          Medical Decision Making       45 MINUTES SPENT BY ME on the date of service doing chart review, history, exam, documentation & further activities per the note.      Data     I have personally reviewed the following data over the past 24 hrs:    N/A  \   10.4 (L)   / N/A     N/A N/A N/A /  N/A   N/A N/A N/A \       Imaging results reviewed over the past 24 hrs:   No results found for this or any previous visit (from the past 24 hours).

## 2024-11-29 NOTE — CONSULTS
No further care management intervention anticipated at this time.  Please re-consult if further needs arise.  Care management signing off.     Writer gave patient number for outpatient SW that she was working with. No further needs.    Bernadette Mckoy RN

## 2024-11-29 NOTE — PROGRESS NOTES
Observation goals  PRIOR TO DISCHARGE       Comments:   -diagnostic tests and consults completed and resulted: NOT MET, pending EDG colonoscopy  -vital signs normal or at patient baseline: PARTIALLY MET, elevated BP  Nurse to notify provider when observation goals have been met and patient is ready for discharge.

## 2024-11-29 NOTE — PROGRESS NOTES
Observation goals  PRIOR TO DISCHARGE        Comments: -diagnostic tests and consults completed and resulted: Not met   -vital signs normal or at patient baseline: Met  Nurse to notify provider when observation goals have been met and patient is ready for discharge.         Patient

## 2024-11-29 NOTE — PROGRESS NOTES
GI: EGD showed mild GAVE (likely cause of anemia). Tx w APC. Minimal oozing with tx so 2 clips placed. Gastric and duodenal bx taken. Colon neg except for 1 tiny polyp    Recs  -Pantoprazole daily x 2 months  -[facility] will follow up path  -Regular diet ok    Will sign off. Call with questions.    Melanie Jin MD  [facility]  Phone [phone] until 5 pm  Office [phone] for after hours on call provider

## 2024-11-29 NOTE — DISCHARGE SUMMARY
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  The following labs/tests are recommended: hgb.            Colonoscopy and endoscopy biopsy     Unresulted Labs Ordered in the Past 30 Days of this Admission       No orders found from 10/28/2024 to 11/28/2024.        These results will be followed up by PCP    Discharge Disposition   Discharged to home  Condition at discharge: Stable    Hospital Course   Britt Park is a 54 year old female admitted on 11/27/2024. 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 Endoscopy and colonoscopy performed notable for GAVE, suspected source of iron def anemia. Areas of concerned clipped, biopsies taken. Patient will discharge with her  and continue on her PPI. Ferrous sulfate and vit c prescribed. 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 without any sign of obvious bleeding, denies black/bloody stools  Iron level low at 13, iron saturation index at 3.  TIBC at 386.  MCV low at 72  Ferritin, folate and B12 levels are currently pending  Admitted to the hospital under  observation status  2 unit PRBCs transfused with improved hgb 9.1 as of 11/28 am   IV Protonix twice daily ordered  No NSAID use.  No alcohol or tobacco use  Hemoglobin every 6 hours ordered  Conditional unit to give if hemoglobin less than 7  [place] [facility] consultation will be requested regarding further evaluation management (has previously seen Rajesh but reports her insurance no longer covers).   N.p.o. after midnight  Venofer for iron 13  Plan on oral iron supplement with Vitron-C daily upon discharge  -[facility] endoscopy, colonoscopy at 1400 11/29/2024. Patient should be able to discharge after procedure as her hgb is stable ay 10.4  -[facility] reports gastric antral vascular ectasia noted on endoscopy. Biopsy's taken both endoscopy as well as colonoscopy. Will follow up with PCP   -continue PPI at discharge      History of hypertension;  Blood pressure is soft systolics in the 90s  Hold losartan     History of hyperlipidemia;  Continue Lipitor     History of granulomatous myositis  Uses walker to ambulate  Follows with the neurology at [facility] they are looking at treating her with IVIG in the future  Previously treated with prednisone and methotrexate which were not helpful as per the patient and was stopped in April 2023    Consultations This Hospital Stay   GASTROENTEROLOGY IP CONSULT  CARE MANAGEMENT / SOCIAL WORK IP CONSULT    Code Status   Full Code    Time Spent on this Encounter   I, JYOTHI Escalante CNP, personally saw the patient today and spent greater than 30 minutes discharging this patient.       JYOTHI Escalante CNP  [facility]  [address] [place] [address]  Phone: [phone]  ______________________________________________________________________    Physical Exam   Vital Signs: Temp: 98.5  F (36.9  C) Temp src: Oral BP: 125/74 Pulse: 73   Resp: 14 SpO2: 98 % O2 Device: None (Room air)    Weight: 111 lbs 14.4 oz  Physical Exam  Constitutional:        Appearance: Normal appearance.   Neurological:      Mental Status: She is alert.             Primary Care Physician   Vi Metz    Discharge Orders      Primary Care - Care Coordination Referral      Reason for your hospital stay    The cause of your low blood levels appears to be due to gastric antral vascular ectasia which was identified on your endoscopy. Please follow up with your primary care provider for continued evaluation and recheck of your labs.     Follow-up and recommended labs and tests     Follow up with primary care provider, Vi Metz, within 7 days for hospital follow- up.  The following labs/tests are recommended: hgb.     Activity    Your activity upon discharge: activity as tolerated     Diet    Follow this diet upon discharge: Current Diet:Orders Placed This Encounter      Regular Diet Adult       Significant Results and Procedures   Results for orders placed or performed in visit on 03/11/24   XR Femur Right 2 Views    Narrative    EXAM: XR FEMUR RIGHT 2 VIEWS  LOCATION: [facility]  DATE: 3/11/2024    INDICATION: Closed disp fracture of condyle of right femur with routine healing.  COMPARISON: 01/22/2024.      Impression    IMPRESSION:   Postop retrograde nailing for management of a right femoral shaft fracture. Alignment and hardware unchanged. Progressive slight interval incomplete healing. No new fracture or joint malalignment. Joint spaces are unchanged.       Discharge Medications   Current Discharge Medication List        START taking these medications    Details   ferrous sulfate (FEROSUL) 325 (65 Fe) MG tablet Take 1 tablet (325 mg) by mouth every other day.  Qty: 15 tablet, Refills: 0    Associated Diagnoses: Iron deficiency      vitamin C w/SABRA HIPS 500 MG tablet Take 1 tablet (500 mg) by mouth daily.  Qty: 30 tablet, Refills: 0    Associated Diagnoses: Iron deficiency           CONTINUE these medications which have NOT CHANGED    Details    acetaminophen (TYLENOL) 325 MG tablet Take 2 tablets (650 mg) by mouth every 6 hours as needed for other (For optimal non-opioid multimodal pain management to improve pain control.)  Qty: 100 tablet, Refills: 0    Associated Diagnoses: Closed fracture of right femur, unspecified fracture morphology, unspecified portion of femur, initial encounter (H)      alendronate (FOSAMAX) 70 MG tablet Take 70 mg by mouth every 7 days. On Wednesdays      atorvastatin (LIPITOR) 20 MG tablet TAKE 1 TABLET(20 MG) BY MOUTH DAILY AT 2 PM  Qty: 90 tablet, Refills: 3    Comments: ZERO refills remain on this prescription. Your patient is requesting advance approval of refills for this medication to PREVENT ANY MISSED DOSES  Associated Diagnoses: Hyperlipidemia LDL goal <100      calcium carbonate (TUMS) 500 MG chewable tablet Take 1 tablet (500 mg) by mouth 2 times daily as needed for heartburn    Associated Diagnoses: Gastroesophageal reflux disease with esophagitis, unspecified whether hemorrhage      Cyanocobalamin (VITAMIN B-12 PO) Take 1 tablet by mouth daily.      folic acid (FOLVITE) 1 MG tablet Take 1 tablet (1 mg) by mouth daily  Qty: 90 tablet, Refills: 3    Associated Diagnoses: Myopathy, mitochondrial      losartan (COZAAR) 25 MG tablet TAKE 1 TABLET(25 MG) BY MOUTH DAILY  Qty: 90 tablet, Refills: 3    Associated Diagnoses: Secondary hypertension      Misc. Devices (NOVA CUSHION GEL SEAT PAD) MISC 1 Units daily  Qty: 1 each, Refills: 0    Associated Diagnoses: Closed fracture of right femur, unspecified fracture morphology, unspecified portion of femur, initial encounter (H); Myopathy, mitochondrial; Closed disp fracture of condyle of right femur with routine healing      mupirocin (BACTROBAN) 2 % external ointment Apply topically 3 times daily  Qty: 15 g, Refills: 3    Associated Diagnoses: Impetigo      pantoprazole (PROTONIX) 40 MG EC tablet TAKE 1 TABLET BY MOUTH DAILY 30 MINUTES BEFORE BREAKFAST  Qty: 90 tablet,  Refills: 2    Associated Diagnoses: Gastroesophageal reflux disease without esophagitis      solifenacin (VESICARE) 5 MG tablet Take 1 tablet (5 mg) by mouth daily.  Qty: 90 tablet, Refills: 1    Associated Diagnoses: Incontinence in female      valACYclovir (VALTREX) 1000 mg tablet Take 2,000 mg by mouth as needed      Vitamin D3 (CHOLECALCIFEROL) 25 mcg (1000 units) tablet Take 25 mcg by mouth      estradiol (VAGIFEM) 10 MCG TABS vaginal tablet Place 1 tablet (10 mcg) vaginally twice a week  Qty: 24 tablet, Refills: 3    Associated Diagnoses: Vaginal atrophy; Urinary urgency           Allergies   Allergies   Allergen Reactions    Dapsone Shortness Of Breath     Oxygen levels went to 79 %.    Influenza Vaccines Hives     2/24/23 Patient states she's been able to have a flu shot the past couple of years and has done well with it.    Benadryl [Diphenhydramine]     Diphenhydramine Other (See Comments)     SHAKY      Sulfa Antibiotics Swelling    Sulfa Antibiotics

## 2024-11-30 NOTE — PLAN OF CARE
PRIMARY Concern: Anemia   SAFETY RISK Concerns (fall risk, behaviors, etc.): Fall      Aggression Tool Color: Green   Isolation/Type: None   Tests/Procedures for NEXT shift:  Consults? (Pending/following, signed-off?) [facility] following. SW/CM consulted- pending to see.  Where is patient from? (Home, TCU, etc.): Home   Other Important info for NEXT shift: Received 2 units in ER  Anticipated DC date & active delays: TBD    SUMMARY NOTE:  Orientation/Cognitive: AOx4  Observation Goals (Met/ Not Met): Not met   Mobility Level/Assist Equipment: A1/GB/W pivot to BSC   Antibiotics & Plan (IV/po, length of tx left): None   Pain Management: denies this shift  Tele/VS/O2: NSR/ VSS on RA   ABNL Lab/BG: Hgb 9.5; Iron 13   Diet: Clears; NPO at midnight   Bowel/Bladder: Continent; voiding using BSC. Had BM.  Skin Concerns: WNL   Drains/Devices: None   Patient Stated Goal for Today: Rest     Patient cleared to discharge by hospitalist. PIV and tele removed. AVS printed and reviewed with patient. Discharge medications reviewed with patient. All questions answered. Patient's  brought patient's personal wheelchair and brought the patient off unit for discharge.

## 2024-11-30 NOTE — PROGRESS NOTES
Observation goals  PRIOR TO DISCHARGE        Comments: -diagnostic tests and consults completed and resulted: Met   -vital signs normal or at patient baseline: Met  Nurse to notify provider when observation goals have been met and patient is ready for discharge.

## 2024-12-02 ENCOUNTER — TELEPHONE (OUTPATIENT)
Dept: FAMILY MEDICINE | Facility: CLINIC | Age: 54
End: 2024-12-02
Payer: COMMERCIAL

## 2024-12-02 ENCOUNTER — PATIENT OUTREACH (OUTPATIENT)
Dept: CARE COORDINATION | Facility: CLINIC | Age: 54
End: 2024-12-02
Payer: COMMERCIAL

## 2024-12-02 ASSESSMENT — PATIENT HEALTH QUESTIONNAIRE - PHQ9
SUM OF ALL RESPONSES TO PHQ QUESTIONS 1-9: 5
10. IF YOU CHECKED OFF ANY PROBLEMS, HOW DIFFICULT HAVE THESE PROBLEMS MADE IT FOR YOU TO DO YOUR WORK, TAKE CARE OF THINGS AT HOME, OR GET ALONG WITH OTHER PEOPLE: SOMEWHAT DIFFICULT
SUM OF ALL RESPONSES TO PHQ QUESTIONS 1-9: 5

## 2024-12-02 NOTE — PROGRESS NOTES
Clinic Care Coordination Contact  Follow Up Progress Note      Assessment: Pt emailed LANNY asking for suggestions for next steps in hopefully obtaiing greater support services at home.  SW strongly suggests that pt reach out and contact the [facility] hotline, and gave her their contact information.      Care Gaps:    Health Maintenance Due   Topic Date Due    DEPRESSION ACTION PLAN  Never done    YEARLY PREVENTIVE VISIT  06/27/2023    LIPID  10/10/2023    INFLUENZA VACCINE (1) 09/01/2024    COVID-19 Vaccine (7 - 2024-25 season) 09/01/2024    PHQ-9  09/13/2024           Care Plans  Care Plan: Community Resources       Problem: Navigating mobility needs       Note:     Goal Statement: [place] will continue working with Care Coordination to ensure her needs are met for overall health and wellbeing.  Date Goal Set: 9/4/24  Barriers: Very high costs of home renovations and w/c van  Strengths: Strong support system  Date to Achieve By: 9/4/25  Patient expressed understanding of goal: yes  Action steps to achieve this goal:  1. I will continue to follow up with medical team as needed  2. I will look into resources  sends me for home renovations  3. I will look into resources  sends me for wheelchair accessible van grants/loans  4. I will explore therapy options if needed, SW can assist/support  5. I will outreach to Care Coordination  for further questions or concerns          Goal: Establish adequate home support                             Intervention/Education provided during outreach: [facility] hotting         Plan:   SW to follow.   Care Coordinator will follow up in one month.     Bere Gomez,  [facility]  Clinic Care Coordinator  [facility]  [phone]  [email or web address]

## 2024-12-02 NOTE — TELEPHONE ENCOUNTER
Pt called, was to do an ER follow up within a week of her recent hospital discharge     Unable to get in with PCP     Scheduled an appointment with team provider     Appointments in Next Year      Dec 03, 2024 10:00 AM  (Arrive by 9:40 AM)  Provider Visit with Marlena Driscoll PA-C  [facility] ([facility] ) [phone]        Darya LEE Triage RN  [facility]

## 2024-12-02 NOTE — LETTER
[facility]  Patient Centered Plan of Care  About Me:        Patient Name:  Britt Lange    YOB: 1970  Age:         54 year old   Vanessa MRN:    [id number] Telephone Information:  Home Phone [phone]   Mobile [phone]       Address:  [address] [place] [address] Email address:  [email or web address]      Emergency Contact(s)    Name Relationship Lgl Grd Work Phone Home Phone Mobile Phone   1. NIGEL LANGE* Spouse    [phone]   2. CATALINO LANGEI Daughter    [phone]   3. SONIA LANGE* Son    [phone]   4. PRES NAZARIO* Son    [phone]           Primary language:  English     needed? No   [facility] [employer] [facility]:  [phone] op. 1  Other communication barriers:No data recorded  Preferred Method of Communication:     Current living arrangement: I live in a private home with family    Mobility Status/ Medical Equipment: Independent w/Device        Health Maintenance  Health Maintenance Reviewed: Due/Overdue   Health Maintenance Due   Topic Date Due    DEPRESSION ACTION PLAN  Never done    YEARLY PREVENTIVE VISIT  06/27/2023    LIPID  10/10/2023    INFLUENZA VACCINE (1) 09/01/2024    COVID-19 Vaccine (7 - 2024-25 season) 09/01/2024    PHQ-9  09/13/2024           My Access Plan  Medical Emergency 911   Primary Clinic Line [facility] [phone]   24 Hour Appointment Line [phone] or  [phone]) (toll-free)   24 Hour Nurse Line [phone] (toll-free)   Preferred Urgent Care [facility], [phone]     Preferred Hospital [facility]  [phone]     Preferred Pharmacy [id number] - [place] - [address] AT [facility] [address]     Behavioral Health Crisis Line The National Suicide Prevention Lifeline at 1-265.269.2762 or Text/Call 578           My Care Team Members  Patient Care Team         Relationship Specialty  Notifications Start End    Vi Metz MD PCP - General Internal Medicine  12/10/22     Merged    Phone: [phone] Fax: [phone] [address] [place] [address]    Vlad Leos MD Referring Physician Neurology  3/26/19     referring to neurol    Phone: [phone] Fax: [phone]         [facility] [address] [facility] [address]    Vi Metz MD Assigned PCP   12/23/21     Phone: [phone] Fax: [phone] [address] [place] [address]    Diane Lopez MD MD Gastroenterology  5/6/22     Phone: [phone] Fax: [phone]         [address] [facility] [address]    Diane Lopez MD MD Gastroenterology  5/6/22     Phone: [phone] Fax: [phone]         [address] [place] [address]    Vi Metz MD  Internal Medicine  12/10/22     Merged    Phone: [phone] Fax: [phone] [address] [facility] [place] [address]    Annette Walls MD Assigned OBGYN Provider   8/5/23     Phone: [phone] Fax: [phone] [address] [place] [address] [facility] [address]    Manan Quiles MD MD OB/Gyn  11/13/23     Phone: [phone] Fax: [phone]         [address] [place] [address]    Bere Gomez [facility] Lead Care Coordinator  Admissions 8/29/24     Phone: [phone]         Jason Tan MD Assigned Musculoskeletal Provider   11/23/24     Phone: [phone] Fax: [phone]         [facility] [address] [address]                My Care Plans  Self Management and Treatment Plan    Care Plan  Care Plan: Community Resources       Problem: Navigating mobility needs       Note:     Goal Statement: Ventura will continue working with Care Coordination to ensure her needs are met for overall health and wellbeing.  Date Goal Set: 9/4/24  Barriers: Very high costs of home renovations and w/c van  Strengths: Strong support system  Date to Achieve By:  9/4/25  Patient expressed understanding of goal: yes  Action steps to achieve this goal:  1. I will continue to follow up with medical team as needed  2. I will look into resources  sends me for home renovations  3. I will look into resources  sends me for wheelchair accessible van grants/loans  4. I will explore therapy options if needed, SW can assist/support  5. I will outreach to Care Coordination  for further questions or concerns          Goal: Establish adequate home support                             Action Plans on File:                       Advance Care Plans/Directives:   Advanced Care Plan/Directives on file: No    Discussed with patient/caregiver(s): No data recorded             My Medical and Care Information  Problem List   Patient Active Problem List   Diagnosis    Myopathy, mitochondrial    Overactive bladder    Dry eyes    Encounter for monitoring of systemic steroid therapy    Need for vaccination    Impetigo    Secondary hypertension    On prednisone therapy    Vitamin B 12 deficiency    Need for pneumocystis prophylaxis    Anemia, unspecified type    Visit for screening mammogram    Seasonal allergic rhinitis, unspecified trigger    Acute respiratory failure with hypoxia (H)    Other myositis, unspecified site    Facial paresthesia    Weight gain    Vaginal dryness    Annual physical exam    Methemoglobinemia    Cyst of right ovary    Hepatic steatosis    Enlarged lymph nodes    Atopic dermatitis of scalp    Closed disp fracture of condyle of right femur with routine healing    Pulmonary nodules    Acute pain of right knee    Closed fracture of right femur, unspecified fracture morphology, unspecified portion of femur, initial encounter (H)    Anemia      Current Medications:  Please refer to the most recent medication list provided to you by your medical team and reach out to your provider with any questions or to make any corrections.    Care Coordination Start Date:  8/29/2024   Frequency of Care Coordination: monthly, more frequently as needed     Form Last Updated: 12/02/2024

## 2024-12-03 ENCOUNTER — OFFICE VISIT (OUTPATIENT)
Dept: FAMILY MEDICINE | Facility: CLINIC | Age: 54
End: 2024-12-03
Payer: COMMERCIAL

## 2024-12-03 VITALS
SYSTOLIC BLOOD PRESSURE: 116 MMHG | DIASTOLIC BLOOD PRESSURE: 70 MMHG | OXYGEN SATURATION: 99 % | TEMPERATURE: 97.9 F | HEART RATE: 85 BPM | RESPIRATION RATE: 20 BRPM

## 2024-12-03 DIAGNOSIS — K31.819: Primary | ICD-10-CM

## 2024-12-03 DIAGNOSIS — M79.89 LEFT UPPER EXTREMITY SWELLING: ICD-10-CM

## 2024-12-03 DIAGNOSIS — R30.0 DYSURIA: ICD-10-CM

## 2024-12-03 DIAGNOSIS — K31.819: ICD-10-CM

## 2024-12-03 DIAGNOSIS — D62 ANEMIA DUE TO BLOOD LOSS, ACUTE: ICD-10-CM

## 2024-12-03 DIAGNOSIS — M25.50 MULTIPLE JOINT PAIN: ICD-10-CM

## 2024-12-03 DIAGNOSIS — Z09 HOSPITAL DISCHARGE FOLLOW-UP: Primary | ICD-10-CM

## 2024-12-03 LAB
ALBUMIN UR-MCNC: NEGATIVE MG/DL
APPEARANCE UR: CLEAR
BACTERIA #/AREA URNS HPF: ABNORMAL /HPF
BILIRUB UR QL STRIP: NEGATIVE
COLOR UR AUTO: YELLOW
ERYTHROCYTE [DISTWIDTH] IN BLOOD BY AUTOMATED COUNT: 23.7 % (ref 10–15)
GLUCOSE UR STRIP-MCNC: NEGATIVE MG/DL
HCT VFR BLD AUTO: 35.2 % (ref 35–47)
HGB BLD-MCNC: 9.7 G/DL (ref 11.7–15.7)
HGB UR QL STRIP: NEGATIVE
KETONES UR STRIP-MCNC: NEGATIVE MG/DL
LEUKOCYTE ESTERASE UR QL STRIP: NEGATIVE
MCH RBC QN AUTO: 22.4 PG (ref 26.5–33)
MCHC RBC AUTO-ENTMCNC: 27.6 G/DL (ref 31.5–36.5)
MCV RBC AUTO: 81 FL (ref 78–100)
NITRATE UR QL: NEGATIVE
PH UR STRIP: 8 [PH] (ref 5–7)
PLATELET # BLD AUTO: 391 10E3/UL (ref 150–450)
RBC # BLD AUTO: 4.34 10E6/UL (ref 3.8–5.2)
RBC #/AREA URNS AUTO: ABNORMAL /HPF
SP GR UR STRIP: 1.01 (ref 1–1.03)
SQUAMOUS #/AREA URNS AUTO: ABNORMAL /LPF
UROBILINOGEN UR STRIP-ACNC: 0.2 E.U./DL
WBC # BLD AUTO: 6.5 10E3/UL (ref 4–11)
WBC #/AREA URNS AUTO: ABNORMAL /HPF

## 2024-12-03 PROCEDURE — 36415 COLL VENOUS BLD VENIPUNCTURE: CPT | Performed by: PHYSICIAN ASSISTANT

## 2024-12-03 PROCEDURE — 90471 IMMUNIZATION ADMIN: CPT | Performed by: PHYSICIAN ASSISTANT

## 2024-12-03 PROCEDURE — 91320 SARSCV2 VAC 30MCG TRS-SUC IM: CPT | Performed by: PHYSICIAN ASSISTANT

## 2024-12-03 PROCEDURE — 99215 OFFICE O/P EST HI 40 MIN: CPT | Mod: 25 | Performed by: PHYSICIAN ASSISTANT

## 2024-12-03 PROCEDURE — 90480 ADMN SARSCOV2 VAC 1/ONLY CMP: CPT | Performed by: PHYSICIAN ASSISTANT

## 2024-12-03 PROCEDURE — 90673 RIV3 VACCINE NO PRESERV IM: CPT | Performed by: PHYSICIAN ASSISTANT

## 2024-12-03 PROCEDURE — 81001 URINALYSIS AUTO W/SCOPE: CPT | Performed by: PHYSICIAN ASSISTANT

## 2024-12-03 PROCEDURE — 85027 COMPLETE CBC AUTOMATED: CPT | Performed by: PHYSICIAN ASSISTANT

## 2024-12-03 NOTE — PATIENT INSTRUCTIONS
Labs, urine check and CBC today    Right upper extremity ultrasound today    Rheumatology referral    Call [facility] to follow-up: [phone]

## 2024-12-03 NOTE — RESULT ENCOUNTER NOTE
Wilbert Whitehead,     Here are my comments about the recent results: your urine is negative for signs of infection.  Also, I looked through the notes from your hospital stay and I don't see any mention of a henry catheter.  If you continue to have increased urinary symptoms I'd discuss with your urologist.    Your hgb is stable at 9.7.  Let's recheck in two weeks.  You'll just need a lab visit for this.    I forgot to give you the patient relations # at the end of our visit: [phone]    Please let us know if you have any questions or concerns.    Regards,  Marlena Driscoll PA-C

## 2024-12-03 NOTE — PROGRESS NOTES
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  Comment: hgb 5.6 on admission, given 2 unit(s) PRBC, venofer and oral iron, hgb 9.5 on discharge  Plan: CBC with platelets        Cont protonix  Follow-up with [facility] as noted above    (D62) Anemia due to blood loss, acute  Comment:   Plan: CBC with platelets            (M79.89) Left upper extremity swelling  Comment: just proximal to IV site, looks like superficial thrombophlebitis  Plan: US Upper Extremity Venous Duplex Left        Will r/o DVT  Warm compress  She cannot use NSAIDs    (R30.0) Dysuria  Comment: has had some dysuria, chills since discharge, also has had worsening of chronic incontinence, suspects she had a henry placed with colonoscopy, I don't see this noted with the exam   Plan: UA with Microscopic reflex to Culture - lab         collect        Push fluids    (M25.50) Multiple joint pain  Comment: x at least one month, has had small joint pain, morning stiffness, her [place] neurologist recommended she see rheum  Plan: Adult Rheumatology  Referral            ALFREDA Isaac Same Day Provider   40 minutes on the day of the encounter doing chart review, history and exam, documentation and further activities as noted above.        Catrina Whitehead is a 54 year old, presenting for the following health issues:  Hospital F/U (Patient is here for a hospital follow up.  Date of service 11/27/2024-11/28/2024.)    History of Present Illness       Reason for  visit:  Hospital recheck, admitted due to very low hemoglobin   She is taking medications regularly.       Here for hospital follow-up  The following is from her discharge summary dated 11/27/24-11/29/24:    Britt aPrk is a 54 year old female admitted on 11/27/2024. 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 Endoscopy and colonoscopy performed notable for GAVE, suspected source of iron def anemia. Areas of concerned clipped, biopsies taken. Patient will discharge with her  and continue on her PPI. Ferrous sulfate and vit c prescribed. She will follow up with her PCP      Severe iron deficient anemia secondary to gastric antral vascular ectasia   Hemoglobin low at 5.5. without any sign of obvious bleeding, denies black/bloody stools  Iron level low at 13, iron saturation index at 3.  TIBC at 386.  MCV low at 72  Ferritin, folate and B12 levels are currently pending  Admitted to the hospital under observation status  2 unit PRBCs transfused with improved hgb 9.1 as of 11/28 am   IV Protonix twice daily ordered  No NSAID use.  No alcohol or tobacco use  Hemoglobin every 6 hours ordered  Conditional unit to give if hemoglobin less than 7  [place] [facility] consultation will be requested regarding further evaluation management (has previously seen Rajesh but reports her insurance no longer covers).   N.p.o. after midnight  Venofer for iron 13  Plan on oral iron supplement with Vitron-C daily upon discharge  -[facility] endoscopy, colonoscopy at 1400 11/29/2024. 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 Biopsy's taken both endoscopy as well as colonoscopy. Will follow up with PCP    -continue PPI at discharge      History of hypertension;  Blood pressure is soft systolics in the 90s  Hold losartan     History of hyperlipidemia;  Continue Lipitor     History of granulomatous myositis  Uses walker to ambulate  Follows with the neurology at [facility] they are looking at treating her with IVIG in the future  Previously treated with prednisone and methotrexate which were not helpful as per the patient and was stopped in April 2023     Left arm swollen and tender just proximal to IV site  Onset since discharge    She thinks she had a catheter and was not told (for colonoscopy)  She is now having a increased frequency and incontinence   She has also had dysuria and chills     She denies bloody/black stool or abdominal pain    She also notes one month of bilat hand pain, mornining stiffness, [place] recommends she see rheum         Objective    /70 (BP Location: Right arm, Patient Position: Sitting, Cuff Size: Adult Regular)   Pulse 85   Temp 97.9  F (36.6  C) (Temporal)   Resp 20   LMP  (LMP Unknown)   SpO2 99%   There is no height or weight on file to calculate BMI.    Physical Exam     GENERAL: in NAD, pale  RESP: lungs clear to auscultation - no rales, no rhonchi, no wheezes  CV: regular rates and rhythm, normal S1 S2, no S3 or S4 and no murmur, no click or rub   ABD: soft, NT  MS: extremities- left upper extremity with swelling/tenderness just proximal to AC IV site, most c/w with superficial thrombophlebitis  Bilat lower ext no edema       Signed Electronically by: Marlena Driscoll PA-C

## 2024-12-04 ENCOUNTER — MYC MEDICAL ADVICE (OUTPATIENT)
Dept: FAMILY MEDICINE | Facility: CLINIC | Age: 54
End: 2024-12-04

## 2024-12-04 ENCOUNTER — ANCILLARY PROCEDURE (OUTPATIENT)
Dept: ULTRASOUND IMAGING | Facility: CLINIC | Age: 54
End: 2024-12-04
Attending: PHYSICIAN ASSISTANT
Payer: COMMERCIAL

## 2024-12-04 ENCOUNTER — NURSE TRIAGE (OUTPATIENT)
Dept: NURSING | Facility: CLINIC | Age: 54
End: 2024-12-04

## 2024-12-04 ENCOUNTER — TELEPHONE (OUTPATIENT)
Dept: FAMILY MEDICINE | Facility: CLINIC | Age: 54
End: 2024-12-04

## 2024-12-04 DIAGNOSIS — I80.8 SUPERFICIAL THROMBOPHLEBITIS OF LEFT UPPER EXTREMITY: Primary | ICD-10-CM

## 2024-12-04 LAB
PATH REPORT.COMMENTS IMP SPEC: NORMAL
PATH REPORT.COMMENTS IMP SPEC: NORMAL
PATH REPORT.FINAL DX SPEC: NORMAL
PATH REPORT.GROSS SPEC: NORMAL
PATH REPORT.MICROSCOPIC SPEC OTHER STN: NORMAL
PATH REPORT.RELEVANT HX SPEC: NORMAL
PHOTO IMAGE: NORMAL
RADIOLOGIST FLAGS: ABNORMAL

## 2024-12-04 PROCEDURE — 88305 TISSUE EXAM BY PATHOLOGIST: CPT | Mod: 26 | Performed by: PATHOLOGY

## 2024-12-04 PROCEDURE — 93971 EXTREMITY STUDY: CPT | Mod: LT

## 2024-12-04 PROCEDURE — 88342 IMHCHEM/IMCYTCHM 1ST ANTB: CPT | Mod: 26 | Performed by: PATHOLOGY

## 2024-12-04 NOTE — TELEPHONE ENCOUNTER
Triage Patient Outreach    Attempt # 1    Was call answered?  No.  Left voicemail to return call to Triage at Primary Clinic    Ondina Valentin RN

## 2024-12-04 NOTE — TELEPHONE ENCOUNTER
"Katharine from radiology is calling to report critical results for upper extremity ultrasound: Showing short segment cephalic superficial vein thrombus.     Routing to PCP, and supporting clinic

## 2024-12-04 NOTE — RESULT ENCOUNTER NOTE
Team - please call patient  I discussed results with her   Could you please schedule her with me for a follow-up video visit next week?  Thanks

## 2024-12-04 NOTE — TELEPHONE ENCOUNTER
Please call patient for test results:   Ultrasound shows superficial vein thrombus and negative for deep vein thrombus or clot.   Anticoagulation is not required.   Continue warm compresses and arm elevation.   Follow up with team in one week.

## 2024-12-05 ENCOUNTER — VIRTUAL VISIT (OUTPATIENT)
Dept: FAMILY MEDICINE | Facility: CLINIC | Age: 54
End: 2024-12-05
Payer: COMMERCIAL

## 2024-12-05 ENCOUNTER — TELEPHONE (OUTPATIENT)
Dept: FAMILY MEDICINE | Facility: CLINIC | Age: 54
End: 2024-12-05

## 2024-12-05 DIAGNOSIS — M25.50 MULTIPLE JOINT PAIN: ICD-10-CM

## 2024-12-05 DIAGNOSIS — M25.511 ACUTE PAIN OF RIGHT SHOULDER: ICD-10-CM

## 2024-12-05 DIAGNOSIS — M85.80 OSTEOPENIA, UNSPECIFIED LOCATION: Primary | ICD-10-CM

## 2024-12-05 DIAGNOSIS — G71.3 MYOPATHY, MITOCHONDRIAL: ICD-10-CM

## 2024-12-05 DIAGNOSIS — Z78.0 MENOPAUSE: ICD-10-CM

## 2024-12-05 PROCEDURE — 99215 OFFICE O/P EST HI 40 MIN: CPT | Mod: 95 | Performed by: NURSE PRACTITIONER

## 2024-12-05 NOTE — TELEPHONE ENCOUNTER
PATIENT with blood clot in arm . Not appropriate for virtual visit . Please have her contact her home clinic to see if they can accommodate her or she will need to go to urgent care for the shoulder pain. Bianca Encarnacion M.D.

## 2024-12-05 NOTE — TELEPHONE ENCOUNTER
"  Nurse Triage SBAR    Is this a 2nd Level Triage? NO    Situation: Pt calling to report some right shoulder pain since she was in the hospital recently    Background and Assessment: Pt was admitted to the hospital on 11/27/2024 for Anemia and discharged 11/29/2024. She has been experiencing right shoulder pain since then. She has pain rated 4/10 even at rest, but the pain gets \"really bad\" with movement. Doing arm circles increases the pain. She is taking tylenol and applying heat for her pain. Pt also reports general weakness all over since her hospitalization, but has noticed weakness in her hands; needing help opening jars. She is also seen at [place]. She reported hand pain with sleeping and did an xray of her hands and did blood work. She is waiting for her provider to call her to interpret the results.     Pt was seen yesterday in person for a hospital follow up appt and was given a referral to Rheumatology. She reports heightened pain sensitivity. Pt was given the phone number to call and schedule.     Protocol Recommended Disposition:   See HCP Within 4 Hours (Or PCP Triage)    Recommendation: Care advice given. Pt would like to do a virtual appt instead of in person, so she was transferred to scheduling to make an appt.      Reason for Disposition   Weakness (i.e., loss of strength) in hand or fingers  (Exception: Not truly weak; hand feels weak because of pain.)    Additional Information   Negative: Passed out (i.e., lost consciousness, collapsed and was not responding)   Negative: [1] Similar pain previously AND [2] it was from \"angina\" AND [3] not relieved by nitroglycerin   Negative: [1] Similar pain previously AND [2] it was from \"heart attack\"   Negative: Shock suspected (e.g., cold/pale/clammy skin, too weak to stand, low BP, rapid pulse)   Negative: Sounds like a life-threatening emergency to the triager   Negative: Followed a shoulder injury   Negative: Chest pain   Negative: Difficulty breathing or " Fwd to Infusion Therapy department    Patient is needing oCRElizumab (OCREVUS) infusion.   Current neurologist is Dr. Munir Chavis.    Last Infusion was completed at [facility] on March 26, 2020 and next is due September 2020.    Please advise how to proceed with infusion therapy order?       unusual sweating (e.g., sweating without exertion)   Negative: [1] Pain lasting > 5 minutes AND [2] pain also present in chest  (Exception: Pain is clearly made worse by movement.)   Negative: [1] Age > 40 AND [2] no obvious cause AND [3] pain even when not moving the arm  (Exception: Pain is clearly made worse by moving arm or bending neck.)   Negative: [1] SEVERE pain AND [2] not improved 2 hours after pain medicine   Negative: [1] Red area or streak AND [2] fever   Negative: [1] Swollen joint AND [2] fever   Negative: Patient sounds very sick or weak to the triager   Negative: Entire arm is swollen   Negative: [1] Shoulder pains with exertion (e.g., walking) AND [2] pain goes away on resting AND [3] not present now    Protocols used: Shoulder Pain-A-  Peggy Nunez RN   Triage Nurse Advisor on 12/4/2024 at 6:58 PM

## 2024-12-05 NOTE — TELEPHONE ENCOUNTER
Pt was called with providers message she verbalized agreement with plan.  She has future appt scheduled next week and appt with AMELIA Grier today.

## 2024-12-05 NOTE — TELEPHONE ENCOUNTER
-Patient declines recommendation to visit Urgent Care, adding that she does not drive and that she cannot take off work.    -Patient insists to be seen virtually.    -Patient wants to clarify that shoulder pain is right-sided and that thrombus was found in left upper extremity.    -Patient denies chest pain, jaw pain, difficulty breathing/shortness of breath      Reference:

## 2024-12-05 NOTE — PROGRESS NOTES
Ventura is a 54 year old who is being evaluated via a billable video visit.    How would you like to obtain your AVS? MyChart  If the video visit is dropped, the invitation should be resent by: Text to cell phone: [phone]  Will anyone else be joining your video visit? No  {If patient encounters technical issues they should call [phone] :[id number]}    {PROVIDER CHARTING PREFERENCE:[id number]}    Subjective   Ventura is a 54 year old, presenting for the following health issues:  Musculoskeletal Problem (Right shoulder pain, does not recall any injuries, has taken Tylenol )    HPI     BAd pain in shoulder  Therapeutic pool 2x weekly   Hospital recently   Hands are still and painful especially with sleep. For 1-2 months.    Previously did Neuro PT   GRaduated May 2019 with [employer].   Myositis started in 2019. Started not being able to got up a flight of stairs. Started work at [employer] at that time   Neg genetic testing       1 year ago had 1 ovary removed and both fallopian tubes   Has a       Review of Systems  Detailed as above         Objective    Vitals - Patient Reported  Pain Score: Moderate Pain (4)  Pain Loc: Shoulder      Vitals:  No vitals were obtained today due to virtual visit.    Physical Exam   GENERAL: alert and no distress  EYES: Eyes grossly normal to inspection.  No discharge or erythema, or obvious scleral/conjunctival abnormalities.  RESP: No audible wheeze, cough, or visible cyanosis.    SKIN: Visible skin clear. No significant rash, abnormal pigmentation or lesions.  NEURO: Cranial nerves grossly intact.  Mentation and speech appropriate for age.  PSYCH: Appropriate affect, tone, and pace of words          Video-Visit Details    Type of service:  Video Visit   Originating Location (pt. Location): Home  {PROVIDER LOCATION On-site should be selected for visits conducted from your clinic location or adjoining [facility] hospital, academic office, or other nearby [facility] building. Off-site should be selected for  all other provider locations, including home:[id number]}  Distant Location (provider location):  On-site  Platform used for Video Visit: Doximity  Signed Electronically by: ROSANNE Babcock CNP  {Email feedback regarding this note to [email or web address]   :[id number]}

## 2024-12-10 ENCOUNTER — VIRTUAL VISIT (OUTPATIENT)
Dept: FAMILY MEDICINE | Facility: CLINIC | Age: 54
End: 2024-12-10
Payer: COMMERCIAL

## 2024-12-10 DIAGNOSIS — I80.8 SUPERFICIAL THROMBOPHLEBITIS OF LEFT UPPER EXTREMITY: Primary | ICD-10-CM

## 2024-12-10 DIAGNOSIS — E61.1 IRON DEFICIENCY: ICD-10-CM

## 2024-12-10 PROCEDURE — 99213 OFFICE O/P EST LOW 20 MIN: CPT | Mod: 95 | Performed by: PHYSICIAN ASSISTANT

## 2024-12-10 RX ORDER — FERROUS SULFATE 325(65) MG
325 TABLET ORAL EVERY OTHER DAY
Qty: 90 TABLET | Refills: 0 | Status: SHIPPED | OUTPATIENT
Start: 2024-12-10

## 2024-12-10 NOTE — PROGRESS NOTES
Ventura is a 54 year old who is being evaluated via a billable video visit.    How would you like to obtain your AVS? MyChart  If the video visit is dropped, the invitation should be resent by: Text to cell phone: [phone]  Will anyone else be joining your video visit? No      Assessment and Plan:     (I80.8) Superficial thrombophlebitis of left upper extremity  (primary encounter diagnosis)  Comment: catheter-associated, here for follow-up, swelling and erythema almost completely resolved, no pain  Plan: continue warm compress    (E61.1) Iron deficiency  Comment: needs refill on iron  Plan: ferrous sulfate (FEROSUL) 325 (65 Fe) MG         tablet, vitamin C w/SABRA HIPS 500 MG tablet        Check hgb around 12/18/24 as previously discussed     ALFREDA Isaac Same Day Provider     Subjective   Ventura is a 54 year old, presenting for the following health issues:  Follow Up (Patient is having a virtual visit for a follow up on DVT in [facility] and for Iron supplements.)    HPI     Ventura is seeing me in follow-up for superficial thrombophlebitis in her left arm after venous catheter  She notes the swelling and pain has decreased significantly  She is applying       Objective           Vitals:  No vitals were obtained today due to virtual visit.    Physical Exam   GENERAL: alert and no distress  EYES: Eyes grossly normal to inspection.  No discharge or erythema, or obvious scleral/conjunctival abnormalities.  RESP: No audible wheeze, cough, or visible cyanosis.    SKIN: Visible skin clear. No significant rash, abnormal pigmentation or lesions.  NEURO: Cranial nerves grossly intact.  Mentation and speech appropriate for age.  PSYCH: Appropriate affect, tone, and pace of words  MSK: left upper extremity swelling significantly better, almost completely resolved, no erythema visible on video       Video-Visit Details    Type of service:  Video Visit 7 minues  Originating Location (pt. Location): Home    Distant  Location (provider location):  On-site  Platform used for Video Visit: Doximtorsten  Signed Electronically by: Marlena Driscoll PA-C

## 2024-12-12 ENCOUNTER — MYC MEDICAL ADVICE (OUTPATIENT)
Dept: FAMILY MEDICINE | Facility: CLINIC | Age: 54
End: 2024-12-12
Payer: COMMERCIAL

## 2024-12-12 RX ORDER — PROGESTERONE 100 MG/1
100 CAPSULE ORAL AT BEDTIME
Qty: 30 CAPSULE | Refills: 11 | Status: SHIPPED | OUTPATIENT
Start: 2024-12-12

## 2024-12-12 RX ORDER — PROGESTERONE 100 MG/1
100 CAPSULE ORAL DAILY
Qty: 30 CAPSULE | Refills: 11 | Status: SHIPPED | OUTPATIENT
Start: 2024-12-12 | End: 2024-12-12

## 2024-12-12 RX ORDER — ESTRADIOL 0.03 MG/D
1 FILM, EXTENDED RELEASE TRANSDERMAL
Qty: 8 PATCH | Refills: 11 | Status: SHIPPED | OUTPATIENT
Start: 2024-12-12

## 2024-12-13 DIAGNOSIS — G71.3 MYOPATHY, MITOCHONDRIAL: ICD-10-CM

## 2024-12-16 RX ORDER — FOLIC ACID 1 MG/1
1 TABLET ORAL DAILY
Qty: 90 TABLET | Refills: 3 | Status: SHIPPED | OUTPATIENT
Start: 2024-12-16

## 2024-12-18 ENCOUNTER — OFFICE VISIT (OUTPATIENT)
Dept: URGENT CARE | Facility: URGENT CARE | Age: 54
End: 2024-12-18
Payer: COMMERCIAL

## 2024-12-18 ENCOUNTER — LAB (OUTPATIENT)
Dept: LAB | Facility: CLINIC | Age: 54
End: 2024-12-18
Payer: COMMERCIAL

## 2024-12-18 VITALS
OXYGEN SATURATION: 98 % | SYSTOLIC BLOOD PRESSURE: 106 MMHG | BODY MASS INDEX: 20.49 KG/M2 | WEIGHT: 112 LBS | DIASTOLIC BLOOD PRESSURE: 71 MMHG | RESPIRATION RATE: 18 BRPM | TEMPERATURE: 98.5 F | HEART RATE: 95 BPM

## 2024-12-18 DIAGNOSIS — K31.819: ICD-10-CM

## 2024-12-18 DIAGNOSIS — R00.2 PALPITATIONS: Primary | ICD-10-CM

## 2024-12-18 DIAGNOSIS — D62 ANEMIA DUE TO BLOOD LOSS, ACUTE: ICD-10-CM

## 2024-12-18 DIAGNOSIS — D64.9 ANEMIA, UNSPECIFIED TYPE: ICD-10-CM

## 2024-12-18 LAB — HGB BLD-MCNC: 10.8 G/DL (ref 11.7–15.7)

## 2024-12-18 PROCEDURE — 93000 ELECTROCARDIOGRAM COMPLETE: CPT | Performed by: NURSE PRACTITIONER

## 2024-12-18 PROCEDURE — 80048 BASIC METABOLIC PNL TOTAL CA: CPT | Performed by: NURSE PRACTITIONER

## 2024-12-18 PROCEDURE — 36415 COLL VENOUS BLD VENIPUNCTURE: CPT | Performed by: NURSE PRACTITIONER

## 2024-12-18 PROCEDURE — 85018 HEMOGLOBIN: CPT

## 2024-12-18 PROCEDURE — 99214 OFFICE O/P EST MOD 30 MIN: CPT | Performed by: NURSE PRACTITIONER

## 2024-12-18 NOTE — PROGRESS NOTES
Chief Complaint   Patient presents with    Urgent Care     Heart racing, SOB, lightheaded, drowsiness   x couple hours          ICD-10-CM    1. Palpitations  R00.2 EKG 12-lead complete w/read - Clinics     Basic metabolic panel  (Ca, Cl, CO2, Creat, Gluc, K, Na, BUN)      2. Anemia, unspecified type  D64.9       Provided reassurance to patient.  Hemoglobin is maintaining no signs of active bleeding.  Suggested if she has similar symptoms she tried deep breathing techniques to see if she can slow the heart rate.  This could be anxiety related.  Basic metabolic panel is pending at time of discharge.    Red flag warning signs and when to go to the emergency room discussed.  Reviewed potential adverse reactions to medications.    34 minutes spent by me on the date of the encounter doing chart review, history and exam, documentation and further activities per the note    EKG - Reviewed and interpreted by me appears normal, NSR, normal axis, normal intervals, no acute ST/T changes c/w ischemia, no LVH by voltage criteria    Results for orders placed or performed in visit on 12/18/24 (from the past 24 hours)   Hemoglobin   Result Value Ref Range    Hemoglobin 10.8 (L) 11.7 - 15.7 g/dL       Subjective     Britt Park is an 54 year old female who presents to clinic today for an episode earlier today where she felt like her heart rate was racing.  Her watch reported pulse at 110.  She was simply sitting at her desk at the time.  She thought she might of felt a little bit lightheaded or dizzy but is not certain.  She denies any chest pain or shortness of breath at the time.    She was hospitalized for severe anemia with a hemoglobin of 5.6 on 11/27/2024.  She received transfusions at that time and underwent upper and lower endoscopies.  There was no active bleeding but there was a small area of ectasia in the stomach that they cauterized and put clips in to prevent bleeding.  She is due to have her hemoglobin  rechecked today.    ROS: 10 point ROS neg other than the symptoms noted above in the HPI.       Objective    /71   Pulse 95   Temp 98.5  F (36.9  C) (Tympanic)   Resp 18   Wt 50.8 kg (112 lb)   LMP  (LMP Unknown)   SpO2 98%   BMI 20.49 kg/m    Nurses notes and VS have been reviewed.    Physical Exam       GENERAL APPEARANCE: healthy appearing, alert     EYES: PERRL, EOMI, sclera non-icteric     HENT: oral exam benign, mucus membranes intact, without ulcers or lesions     NECK: no adenopathy or asymmetry, thyroid normal to palpation     RESP: lungs clear to auscultation - no rales, rhonchi or wheezes     CV: regular rates and rhythm, no murmurs, rubs, or gallop     ABDOMEN:  soft, nontender, no HSM or masses and bowel sounds normal     MS: extremities normal- no gross deformities noted; normal muscle tone.     SKIN: no suspicious lesions or rashes     NEURO: Normal strength and tone, mentation intact and speech normal     PSYCH: normal thought process; no significant mood disturbance      ROSNANE Sparks, CNP  [facility] Provider    The use of Dragon/[employer] dictation services may have been used to construct the content in this note; any grammatical or spelling errors are non-intentional. Please contact the author of this note directly if you are in need of any clarification.

## 2024-12-19 LAB
ANION GAP SERPL CALCULATED.3IONS-SCNC: 12 MMOL/L (ref 7–15)
BUN SERPL-MCNC: 17.2 MG/DL (ref 6–20)
CALCIUM SERPL-MCNC: 9.7 MG/DL (ref 8.8–10.4)
CHLORIDE SERPL-SCNC: 102 MMOL/L (ref 98–107)
CREAT SERPL-MCNC: 0.28 MG/DL (ref 0.51–0.95)
EGFRCR SERPLBLD CKD-EPI 2021: >90 ML/MIN/1.73M2
GLUCOSE SERPL-MCNC: 81 MG/DL (ref 70–99)
HCO3 SERPL-SCNC: 26 MMOL/L (ref 22–29)
POTASSIUM SERPL-SCNC: 4.2 MMOL/L (ref 3.4–5.3)
SODIUM SERPL-SCNC: 140 MMOL/L (ref 135–145)

## 2024-12-29 ENCOUNTER — MYC REFILL (OUTPATIENT)
Dept: FAMILY MEDICINE | Facility: CLINIC | Age: 54
End: 2024-12-29
Payer: COMMERCIAL

## 2024-12-29 DIAGNOSIS — E61.1 IRON DEFICIENCY: ICD-10-CM

## 2025-01-13 ENCOUNTER — OFFICE VISIT (OUTPATIENT)
Dept: FAMILY MEDICINE | Facility: CLINIC | Age: 55
End: 2025-01-13
Payer: COMMERCIAL

## 2025-01-13 VITALS
BODY MASS INDEX: 20.61 KG/M2 | DIASTOLIC BLOOD PRESSURE: 66 MMHG | HEART RATE: 95 BPM | RESPIRATION RATE: 17 BRPM | HEIGHT: 62 IN | WEIGHT: 112 LBS | TEMPERATURE: 99.4 F | SYSTOLIC BLOOD PRESSURE: 109 MMHG | OXYGEN SATURATION: 100 %

## 2025-01-13 DIAGNOSIS — M25.50 MULTIPLE JOINT PAIN: Primary | ICD-10-CM

## 2025-01-13 DIAGNOSIS — E55.9 VITAMIN D DEFICIENCY: ICD-10-CM

## 2025-01-13 DIAGNOSIS — E78.5 HYPERLIPIDEMIA LDL GOAL <100: ICD-10-CM

## 2025-01-13 DIAGNOSIS — K31.819 GAVE (GASTRIC ANTRAL VASCULAR ECTASIA): ICD-10-CM

## 2025-01-13 LAB
CHOLEST SERPL-MCNC: 167 MG/DL
FASTING STATUS PATIENT QL REPORTED: NO
HDLC SERPL-MCNC: 50 MG/DL
LDLC SERPL CALC-MCNC: 86 MG/DL
NONHDLC SERPL-MCNC: 117 MG/DL
TRIGL SERPL-MCNC: 157 MG/DL
VIT D+METAB SERPL-MCNC: 24 NG/ML (ref 20–50)

## 2025-01-13 PROCEDURE — 80061 LIPID PANEL: CPT | Performed by: INTERNAL MEDICINE

## 2025-01-13 PROCEDURE — 82306 VITAMIN D 25 HYDROXY: CPT | Performed by: INTERNAL MEDICINE

## 2025-01-13 PROCEDURE — 36415 COLL VENOUS BLD VENIPUNCTURE: CPT | Performed by: INTERNAL MEDICINE

## 2025-01-13 PROCEDURE — 99214 OFFICE O/P EST MOD 30 MIN: CPT | Performed by: INTERNAL MEDICINE

## 2025-01-13 NOTE — PROGRESS NOTES
"  {PROVIDER CHARTING PREFERENCE:886584}    Subjective   Ventura is a 54 year old, presenting for the following health issues:  RECHECK    HPI     Follow up    Walker at home   Wheelchair outside   Joint pain  Low pain threshold   Anemia - hospital stay AVM   She is on iron supplements every other day   HRT  Right shoulder left knee both hands         Objective    /66 (BP Location: Right arm, Patient Position: Sitting, Cuff Size: Adult Regular)   Pulse 95   Temp 99.4  F (37.4  C)   Resp 17   Ht 1.575 m (5' 2\")   Wt 50.8 kg (112 lb)   LMP  (LMP Unknown)   SpO2 100%   BMI 20.49 kg/m    Body mass index is 20.49 kg/m .  Physical Exam           Signed Electronically by: Vi Metz MD  {Email feedback regarding this note to [email or web address]   :[id number]}  " "Adult Regular)   Pulse 95   Temp 99.4  F (37.4  C)   Resp 17   Ht 1.575 m (5' 2\")   Wt 50.8 kg (112 lb)   LMP  (LMP Unknown)   SpO2 100%   BMI 20.49 kg/m    Body mass index is 20.49 kg/m .  Physical Exam           Signed Electronically by: Vi Metz MD    "

## 2025-01-14 ENCOUNTER — PATIENT OUTREACH (OUTPATIENT)
Dept: CARE COORDINATION | Facility: CLINIC | Age: 55
End: 2025-01-14
Payer: COMMERCIAL

## 2025-01-16 ENCOUNTER — PATIENT OUTREACH (OUTPATIENT)
Dept: CARE COORDINATION | Facility: CLINIC | Age: 55
End: 2025-01-16
Payer: COMMERCIAL

## 2025-01-16 ENCOUNTER — TELEPHONE (OUTPATIENT)
Dept: ORTHOPEDICS | Facility: CLINIC | Age: 55
End: 2025-01-16
Payer: COMMERCIAL

## 2025-01-16 NOTE — TELEPHONE ENCOUNTER
"Upcoming appointment notes:    BILATERAL hand pain, pain during sleep, wearing compression gloves at night, pt's hands and wrists \"fall asleep\" and become numb, discuss muscle and joint pain / Vi Metz MD in [facility] / [facility] / Imaging done at [facility], along with blood work to rule out Arthritis (done through [facility]) / Pt is seeing Rheumatology at [facility] on 1.28.25     Pt is wondering if MD Mariah may order additional blood work and if pt should make time, after her appt, to have lab work done.    Please CALL pt to follow up and discuss. Thank you.  "

## 2025-01-18 ENCOUNTER — ANCILLARY PROCEDURE (OUTPATIENT)
Dept: GENERAL RADIOLOGY | Facility: CLINIC | Age: 55
End: 2025-01-18
Attending: FAMILY MEDICINE
Payer: COMMERCIAL

## 2025-01-18 ENCOUNTER — OFFICE VISIT (OUTPATIENT)
Dept: URGENT CARE | Facility: URGENT CARE | Age: 55
End: 2025-01-18
Payer: COMMERCIAL

## 2025-01-18 VITALS
HEART RATE: 89 BPM | WEIGHT: 113 LBS | DIASTOLIC BLOOD PRESSURE: 77 MMHG | BODY MASS INDEX: 20.67 KG/M2 | SYSTOLIC BLOOD PRESSURE: 109 MMHG | OXYGEN SATURATION: 99 % | TEMPERATURE: 98.7 F

## 2025-01-18 DIAGNOSIS — T14.8XXA BRUISE: ICD-10-CM

## 2025-01-18 DIAGNOSIS — R19.00 ABDOMINAL SWELLING: ICD-10-CM

## 2025-01-18 DIAGNOSIS — M25.571 PAIN IN JOINT, ANKLE AND FOOT, RIGHT: Primary | ICD-10-CM

## 2025-01-18 PROCEDURE — 99214 OFFICE O/P EST MOD 30 MIN: CPT | Performed by: FAMILY MEDICINE

## 2025-01-18 PROCEDURE — 73610 X-RAY EXAM OF ANKLE: CPT | Mod: TC | Performed by: RADIOLOGY

## 2025-01-19 PROBLEM — E78.5 HYPERLIPIDEMIA LDL GOAL <100: Status: ACTIVE | Noted: 2025-01-19

## 2025-01-19 PROBLEM — K31.819 GAVE (GASTRIC ANTRAL VASCULAR ECTASIA): Status: ACTIVE | Noted: 2025-01-19

## 2025-01-19 NOTE — PROGRESS NOTES
SUBJECTIVE: Britt Park is a 54 year old female presenting with a chief complaint of rt ankle swelling/bruising with possible injury as she is at computer and rolling around, few days ago.  No obvious injury.  Of note she later also mentioned that she is having abd bloating/swelling as well  No pain, normal BM, passing gas, no constipation    Past Medical History:   Diagnosis Date    Acute respiratory failure with hypoxia (H)     Anemia     Facial paresthesia     Hepatic vein stenosis     Impetigo     Methemoglobinemia     Ovarian cyst     Overactive bladder     Secondary hypertension 11/02/2021    Sleep apnea      Allergies   Allergen Reactions    Dapsone Shortness Of Breath     Oxygen levels went to 79 %.    Influenza Vaccines Hives     2/24/23 Patient states she's been able to have a flu shot the past couple of years and has done well with it.    Benadryl [Diphenhydramine]     Diphenhydramine Other (See Comments)     SHAKY      Sulfa Antibiotics Swelling    Sulfa Antibiotics      Social History     Tobacco Use    Smoking status: Never    Smokeless tobacco: Never   Substance Use Topics    Alcohol use: Yes     Comment: glass of wine once a week.         OBJECTIVE:  /77 (BP Location: Left arm, Patient Position: Sitting, Cuff Size: Adult Regular)   Pulse 89   Temp 98.7  F (37.1  C) (Tympanic)   Wt 51.3 kg (113 lb)   LMP  (LMP Unknown)   SpO2 99%   BMI 20.67 kg/m  GENERAL APPEARANCE: healthy, alert and no distress  EYES: EOMI,  PERRL, conjunctiva clear  ABDOMEN:  soft, nontender, no guarding/rigidity  SKIN: bruise lateral malleolus  Ext no calf pain/swelling/redness, slight tenderness lateral ankle with rom    Xray without acute findings, no fx read by Mando Campbell D.O.      ICD-10-CM    1. Pain in joint, ankle and foot, right  M25.571 XR Ankle Right G/E 3 Views     Ankle/Foot Bracing Supplies Order Air Ankle Stirrup Brace; Right      2. Bruise  T14.8XXA Ankle/Foot Bracing Supplies Order Air Ankle  [place] Brace; Right      3. Abdominal swelling  R19.00         Pt curious as to the possibility of the abd bloating and ankle swelling/bruising being related.  I thought it would be worth while visiting the ED for w/up and r/o as this w/up would be outside the scope of practice for Urgent Care  Pt will go through ED for w/up of abd swelling and rt ankle swelling/bruising

## 2025-01-20 ENCOUNTER — APPOINTMENT (OUTPATIENT)
Dept: ULTRASOUND IMAGING | Facility: CLINIC | Age: 55
End: 2025-01-20
Attending: EMERGENCY MEDICINE
Payer: COMMERCIAL

## 2025-01-20 ENCOUNTER — APPOINTMENT (OUTPATIENT)
Dept: GENERAL RADIOLOGY | Facility: CLINIC | Age: 55
End: 2025-01-20
Attending: EMERGENCY MEDICINE
Payer: COMMERCIAL

## 2025-01-20 ENCOUNTER — NURSE TRIAGE (OUTPATIENT)
Dept: FAMILY MEDICINE | Facility: CLINIC | Age: 55
End: 2025-01-20
Payer: COMMERCIAL

## 2025-01-20 ENCOUNTER — HOSPITAL ENCOUNTER (EMERGENCY)
Facility: CLINIC | Age: 55
Discharge: HOME | End: 2025-01-20
Attending: EMERGENCY MEDICINE | Admitting: EMERGENCY MEDICINE
Payer: COMMERCIAL

## 2025-01-20 VITALS
TEMPERATURE: 97.6 F | BODY MASS INDEX: 20.61 KG/M2 | RESPIRATION RATE: 18 BRPM | WEIGHT: 112 LBS | HEIGHT: 62 IN | OXYGEN SATURATION: 100 % | HEART RATE: 84 BPM | DIASTOLIC BLOOD PRESSURE: 69 MMHG | SYSTOLIC BLOOD PRESSURE: 103 MMHG

## 2025-01-20 DIAGNOSIS — M25.471 EDEMA OF RIGHT ANKLE: ICD-10-CM

## 2025-01-20 LAB
ALBUMIN SERPL BCG-MCNC: 4.3 G/DL (ref 3.5–5.2)
ALP SERPL-CCNC: 74 U/L (ref 40–150)
ALT SERPL W P-5'-P-CCNC: 11 U/L (ref 0–50)
ANION GAP SERPL CALCULATED.3IONS-SCNC: 10 MMOL/L (ref 7–15)
AST SERPL W P-5'-P-CCNC: 23 U/L (ref 0–45)
BASOPHILS # BLD AUTO: 0.1 10E3/UL (ref 0–0.2)
BASOPHILS NFR BLD AUTO: 1 %
BILIRUB SERPL-MCNC: 0.3 MG/DL
BUN SERPL-MCNC: 10.9 MG/DL (ref 6–20)
CALCIUM SERPL-MCNC: 8.9 MG/DL (ref 8.8–10.4)
CHLORIDE SERPL-SCNC: 106 MMOL/L (ref 98–107)
CREAT SERPL-MCNC: 0.35 MG/DL (ref 0.51–0.95)
D DIMER PPP FEU-MCNC: 0.27 UG/ML FEU (ref 0–0.5)
EGFRCR SERPLBLD CKD-EPI 2021: >90 ML/MIN/1.73M2
EOSINOPHIL # BLD AUTO: 0.1 10E3/UL (ref 0–0.7)
EOSINOPHIL NFR BLD AUTO: 2 %
ERYTHROCYTE [DISTWIDTH] IN BLOOD BY AUTOMATED COUNT: 25.1 % (ref 10–15)
GLUCOSE SERPL-MCNC: 84 MG/DL (ref 70–99)
HCO3 SERPL-SCNC: 27 MMOL/L (ref 22–29)
HCT VFR BLD AUTO: 37.5 % (ref 35–47)
HGB BLD-MCNC: 11.6 G/DL (ref 11.7–15.7)
IMM GRANULOCYTES # BLD: 0 10E3/UL
IMM GRANULOCYTES NFR BLD: 0 %
LYMPHOCYTES # BLD AUTO: 2 10E3/UL (ref 0.8–5.3)
LYMPHOCYTES NFR BLD AUTO: 40 %
MCH RBC QN AUTO: 28.2 PG (ref 26.5–33)
MCHC RBC AUTO-ENTMCNC: 30.9 G/DL (ref 31.5–36.5)
MCV RBC AUTO: 91 FL (ref 78–100)
MONOCYTES # BLD AUTO: 0.4 10E3/UL (ref 0–1.3)
MONOCYTES NFR BLD AUTO: 8 %
NEUTROPHILS # BLD AUTO: 2.4 10E3/UL (ref 1.6–8.3)
NEUTROPHILS NFR BLD AUTO: 49 %
NRBC # BLD AUTO: 0 10E3/UL
NRBC BLD AUTO-RTO: 0 /100
NT-PROBNP SERPL-MCNC: 70 PG/ML (ref 0–900)
PLATELET # BLD AUTO: 319 10E3/UL (ref 150–450)
POTASSIUM SERPL-SCNC: 4 MMOL/L (ref 3.4–5.3)
PROT SERPL-MCNC: 7.2 G/DL (ref 6.4–8.3)
RBC # BLD AUTO: 4.11 10E6/UL (ref 3.8–5.2)
SODIUM SERPL-SCNC: 143 MMOL/L (ref 135–145)
TROPONIN T SERPL HS-MCNC: 27 NG/L
TROPONIN T SERPL HS-MCNC: 29 NG/L
WBC # BLD AUTO: 5 10E3/UL (ref 4–11)

## 2025-01-20 PROCEDURE — 99285 EMERGENCY DEPT VISIT HI MDM: CPT | Mod: 25

## 2025-01-20 PROCEDURE — 93971 EXTREMITY STUDY: CPT | Mod: RT

## 2025-01-20 PROCEDURE — 36415 COLL VENOUS BLD VENIPUNCTURE: CPT | Performed by: EMERGENCY MEDICINE

## 2025-01-20 PROCEDURE — 84520 ASSAY OF UREA NITROGEN: CPT | Performed by: EMERGENCY MEDICINE

## 2025-01-20 PROCEDURE — 74019 RADEX ABDOMEN 2 VIEWS: CPT

## 2025-01-20 PROCEDURE — 84484 ASSAY OF TROPONIN QUANT: CPT | Performed by: EMERGENCY MEDICINE

## 2025-01-20 PROCEDURE — 83880 ASSAY OF NATRIURETIC PEPTIDE: CPT | Performed by: EMERGENCY MEDICINE

## 2025-01-20 PROCEDURE — 85379 FIBRIN DEGRADATION QUANT: CPT | Performed by: EMERGENCY MEDICINE

## 2025-01-20 PROCEDURE — 84155 ASSAY OF PROTEIN SERUM: CPT | Performed by: EMERGENCY MEDICINE

## 2025-01-20 PROCEDURE — 71046 X-RAY EXAM CHEST 2 VIEWS: CPT

## 2025-01-20 PROCEDURE — 84450 TRANSFERASE (AST) (SGOT): CPT | Performed by: EMERGENCY MEDICINE

## 2025-01-20 PROCEDURE — 85004 AUTOMATED DIFF WBC COUNT: CPT | Performed by: EMERGENCY MEDICINE

## 2025-01-20 RX ORDER — POLYETHYLENE GLYCOL 3350 17 G/17G
1 POWDER, FOR SOLUTION ORAL DAILY
Qty: 527 G | Refills: 0 | Status: SHIPPED | OUTPATIENT
Start: 2025-01-20 | End: 2025-02-19

## 2025-01-20 ASSESSMENT — ACTIVITIES OF DAILY LIVING (ADL)
ADLS_ACUITY_SCORE: 61

## 2025-01-20 ASSESSMENT — COLUMBIA-SUICIDE SEVERITY RATING SCALE - C-SSRS
2. HAVE YOU ACTUALLY HAD ANY THOUGHTS OF KILLING YOURSELF IN THE PAST MONTH?: NO
1. IN THE PAST MONTH, HAVE YOU WISHED YOU WERE DEAD OR WISHED YOU COULD GO TO SLEEP AND NOT WAKE UP?: NO
6. HAVE YOU EVER DONE ANYTHING, STARTED TO DO ANYTHING, OR PREPARED TO DO ANYTHING TO END YOUR LIFE?: NO

## 2025-01-20 NOTE — DISCHARGE INSTRUCTIONS
We have done an extensive evaluation for swelling of both heart kidneys liver and leg.  There is no acute findings.  Please use compression and elevation of your right ankle.  Use laxatives as your x-rays confirm constipation which may be a cause of swelling in the lower abdomen.  Please follow-up with your regular doctor return to the emergency room with fever or severe increase in abdominal pain chest pain or worsening shortness of breath.  Thanks for your patience today.  
2022

## 2025-01-20 NOTE — TELEPHONE ENCOUNTER
Nurse Triage SBAR    Is this a 2nd Level Triage? YES, LICENSED PRACTITIONER REVIEW IS REQUIRED    Situation: pt calls with right ankle swelling and abdominal swelling. Pt seen in  1/18 for this.     Background: pt states she did not do anything to injure the right ankle. States it was tender on Friday and unable to walk on it at that time. xray negative for fracture.  recommended pt go to ER, pt declined.     Assessment: pt denies SOB, fever, chest pain, dizziness. Per pt swelling in abdomen is about 30% larger than normal and is medium firmness. Swelling in ankle is about the size of an apple (see picture in MyChart encounter from 1/17)    Protocol Recommended Disposition:   Go To Office Now    Recommendation: Per protocol, pt should be seen in office now. OK for pt to schedule an appointment or should pt go to ER for this?    Routed to provider    Does the patient meet one of the following criteria for ADS visit consideration? 16+ years old, with an [facility] PCP     TIP  Providers, please consider if this condition is appropriate for management at one of our Acute and Diagnostic Services sites.     If patient is a good candidate, please use dotphrase <dot>triageresponse and select Refer to ADS to document.      Swollen R ankle and abdominal pain   Reason for Disposition   Thigh, calf, or ankle swelling and only 1 side  (Exceptions: Itchy, localized swelling; swelling is chronic.)    Additional Information   Negative: Chest Pain   Negative: Followed an ankle injury   Negative: Followed a bee sting and has localized swelling (e.g., small area of puffy or swollen skin)   Negative: Followed an insect bite and has localized swelling (e.g., small area of puffy or swollen skin)   Negative: Ankle pain is main symptom   Negative: Swelling of calf or leg is main symptom   Negative: Leg swelling (e.g., ankle swelling extends up to shins or knees)   Negative: Difficulty breathing   Negative: Entire foot is cool or blue in  "comparison to other side   Negative: Ankle pain and fever   Negative: Ankle redness and fever   Negative: Patient sounds very sick or weak to the triager    Answer Assessment - Initial Assessment Questions  1. LOCATION: \"Which ankle is swollen?\" \"Where is the swelling?\"      right  2. ONSET: \"When did the swelling start?\"      Thursday, about the same or worse from when it started  3. SWELLING: \"How bad is the swelling?\" Or, \"How large is it?\" (e.g., mild, moderate, severe; size of localized swelling)       Size of small apple  4. PAIN: \"Is there any pain?\" If Yes, ask: \"How bad is it?\" (Scale 0-10; or none, mild, moderate, severe)      Tender, barely could put weight on it  5. CAUSE: \"What do you think caused the ankle swelling?\"      *No Answer*  6. OTHER SYMPTOMS: \"Do you have any other symptoms?\" (e.g., fever, chest pain, difficulty breathing, calf pain)      Abdominal swelling      Abdominal swelling, 30% bigger than normal, medium firmness    Protocols used: Ankle Swelling-A-OH    "

## 2025-01-20 NOTE — ED PROVIDER NOTES
Emergency Department Note      History of Present Illness     Chief Complaint   Shortness of breath and Joint Swelling     HPI   Britt Park is a 54 year old female with history of mitochondrial myopathy, granulomatous myositis, muscular dystrophy, chronic pain syndrome, osteoporosis and hyperlipidemia, who presents to the ED for evaluation of shortness of breath and joint swelling. Patient reports right ankle swelling and shortness of breath for the past five days. She denies any recent injury or trauma to her right ankle. Patient reports worsened shortness of breath and pain while taking deep breaths. She also reports abdominal bloating. She was seen at Urgent Care two days ago, where she had an X-ray done on her right ankle with no acute findings. Urgent Care referred her to ED. She notes recently stopping progesterone and taking her last dose 1/17/25. Patient has history of granulomatous myositis and notes treatments have not helped. Due to this condition she has difficulty getting around and standing up at baseline. She uses a wheelchair to move around the house. Two years ago patient fractured her right femur and has not used the stairs in her house since. Denies chest pain or coughing.     Independent Historian   None    Review of External Notes       Past Medical History     Medical History and Problem List   Granulomatous myositis  Acute respiratory failure with hypoxia  Anemia  Facial paresthesia  Hepatic vein stenosis  Impetigo  Methemoglobinemia  Ovarian cyst  Sleep apnea  Seasonal allergic rhinitis  Hepatic steatosis  Atopic dermatitis of scalp  Hyperlipidemia  GAVE  Muscular dystrophy  Idiopathic peripheral neuropathy  Anxiety  Depression  Cervicalgia  Obesity  Osteoporosis  Polypharmacy  Hyperplasia lymphoid  Bilateral dry eye syndrome  Cervical spondylosis  Gestational hypertension  Hyperlipidemia  Osteopenia    Medications   Atorvasatin  Estradiol  Folic  "acid  Losartan  Pantoprazole  Solifenacin  Alendronate  Cyanocobalamin    Surgical History    x2  Right bicep biopsy  Left tricep biopsy  Colonoscopy x3  EGD combined  Laparoscopic oophorectomy  Open reduction internal fixation of right femur fracture  C4-C5 and C5-C6 facet joint injection  Salpingectomy    Physical Exam     Patient Vitals for the past 24 hrs:   BP Temp Temp src Pulse Resp SpO2 Height Weight   25 1613 -- -- -- -- -- 100 % -- --   25 1558 103/69 -- -- -- -- 100 % -- --   25 1300 97/65 -- -- 84 -- 100 % -- --   25 1140 106/51 97.6  F (36.4  C) Temporal 93 18 99 % 1.575 m (5' 2\") 50.8 kg (112 lb)     Physical Exam  Vitals reviewed.   Cardiovascular:      Rate and Rhythm: Normal rate and regular rhythm.   Pulmonary:      Effort: Pulmonary effort is normal.      Breath sounds: Normal breath sounds.   Musculoskeletal:         General: Normal range of motion.      Comments: There is slight swelling around the right ankle.  There is no ecchymosis.  There is normal DP and PT pulse.  There is no effusion in the ankle joint.  There is no redness or warmth   Skin:     Capillary Refill: Capillary refill takes less than 2 seconds.   Neurological:      General: No focal deficit present.      Mental Status: She is alert and oriented to person, place, and time.   Psychiatric:         Mood and Affect: Mood normal.           Lab Results   Labs Ordered and Resulted from Time of ED Arrival to Time of ED Departure   COMPREHENSIVE METABOLIC PANEL - Abnormal       Result Value    Sodium 143      Potassium 4.0      Carbon Dioxide (CO2) 27      Anion Gap 10      Urea Nitrogen 10.9      Creatinine 0.35 (*)     GFR Estimate >90      Calcium 8.9      Chloride 106      Glucose 84      Alkaline Phosphatase 74      AST 23      ALT 11      Protein Total 7.2      Albumin 4.3      Bilirubin Total 0.3     TROPONIN T, HIGH SENSITIVITY - Abnormal    Troponin T, High Sensitivity 29 (*)    CBC WITH " PLATELETS AND DIFFERENTIAL - Abnormal    WBC Count 5.0      RBC Count 4.11      Hemoglobin 11.6 (*)     Hematocrit 37.5      MCV 91      MCH 28.2      MCHC 30.9 (*)     RDW 25.1 (*)     Platelet Count 319      % Neutrophils 49      % Lymphocytes 40      % Monocytes 8      % Eosinophils 2      % Basophils 1      % Immature Granulocytes 0      NRBCs per 100 WBC 0      Absolute Neutrophils 2.4      Absolute Lymphocytes 2.0      Absolute Monocytes 0.4      Absolute Eosinophils 0.1      Absolute Basophils 0.1      Absolute Immature Granulocytes 0.0      Absolute NRBCs 0.0     TROPONIN T, HIGH SENSITIVITY - Abnormal    Troponin T, High Sensitivity 27 (*)    NT PROBNP INPATIENT - Normal    N terminal Pro BNP Inpatient 70     D DIMER QUANTITATIVE - Normal    D-Dimer Quantitative 0.27         Imaging   Chest XR,  PA & LAT   Final Result   IMPRESSION: Negative chest.      Abdomen XR, 2 vw, flat and upright   Final Result   IMPRESSION: Moderate stool. Nonobstructive bowel gas pattern. No free air.      US Lower Extremity Venous Duplex Right   Final Result   IMPRESSION:   1.  No deep venous thrombosis in the right lower extremity.          Independent Interpretation   None    ED Course      Medications Administered   Medications - No data to display    Procedures   Procedures     Discussion of Management   None    ED Course   ED Course as of 01/20/25 1706   Mon Jan 20, 2025   1205 I obtained history and examined the patient as noted above.    1432 I rechecked and updated the patient.        Additional Documentation  None    Medical Decision Making / Diagnosis     CMS Diagnoses: None    MIPS       None    MDM   Britt Park is a 54 year old female who presents with multiple complaints including concerns for swelling in the ankle and swelling in her lower abdomen.  There is no real signs of edema noted.  It is only a focal to the right ankle.  Already with x-rays of the ankle that have been negative.  DVT assessment was  performed using ultrasound and negative for DVT.  D-dimer sent due to shortness of breath and ankle swelling and negative.  Chest x-ray negative for causes of shortness of breath no CHF BNP is normal.  Troponin is borderline elevated at 21 9 without increase.  Without history of chest pain doubt this is significant.  Care was discussed with the patient because of her symptoms are unclear there is no signs of anasarca no signs of hypoalbuminemia no clinical signs of acute renal insufficiency.  X-rays do suggest constipation as a possible cause of lower abdominal distention.  Offered laxatives and follow-up with primary care patient was offered reassurance and discharged home.    Disposition   The patient was discharged.     Diagnosis     ICD-10-CM    1. Edema of right ankle  M25.471              Scribe Disclosure:  I, Chasity Albright, am serving as a scribe at 1:54 PM on 1/20/2025 to document services personally performed by Oscar Martinez MD based on my observations and the provider's statements to me.   Scribe Disclosure:  I, Christy Rowland, am serving as a scribe at 5:01 PM on 1/20/2025 to document services personally performed by Oscar Martinez MD based on my observations and the provider's statements to me.      Oscar Martinez MD  01/20/25 2040

## 2025-01-20 NOTE — ED TRIAGE NOTES
Pt report right ankle swelling for 5 days along with feeling some abdominal bloating and shortness of breath that is worse with activity.      Triage Assessment (Adult)       Row Name 01/20/25 1142          Triage Assessment    Airway WDL WDL        Respiratory WDL    Respiratory WDL X  sob        Skin Circulation/Temperature WDL    Skin Circulation/Temperature WDL WDL        Cardiac WDL    Cardiac WDL WDL        Peripheral/Neurovascular WDL    Peripheral Neurovascular WDL WDL        Cognitive/Neuro/Behavioral WDL    Cognitive/Neuro/Behavioral WDL WDL

## 2025-01-20 NOTE — ED NOTES
who presents to the ED for evaluation of shortness of breath and joint swelling. Patient reports right ankle swelling for the past five days. She was seen for her swollen right ankle and abdominal swelling at Urgent Care two days ago, where she had an X-ray done on her ankle. Urgent Care referred her to ED. Patient states worsened shortness of breath and pain while taking deep breaths for the past five days. She notes recently stopping progesterone and taking her last dose 1/17/25. Patient has history of myopathy and notes treatments have not helped. She reports difficulty walking up the stairs due myopathy. Two years ago patient fractured her right femur and has not used the stairs in her house since. Denies chest pain or coughing.

## 2025-01-20 NOTE — TELEPHONE ENCOUNTER
Outgoing call to Patient.  RN reviewed Provider's recommendations with Patient. Patient stated it will be difficult for her to go to the ED given the cost. RN discussed with Patient that this is the safest course of care given her symptoms at this point. Patient agreeable, and agreeable with plan of care. Patient to go to the ED.    Kyung DIAZ,  Triage RN  [facility]

## 2025-01-21 DIAGNOSIS — K21.9 GASTROESOPHAGEAL REFLUX DISEASE WITHOUT ESOPHAGITIS: ICD-10-CM

## 2025-01-21 RX ORDER — PANTOPRAZOLE SODIUM 40 MG/1
TABLET, DELAYED RELEASE ORAL
Qty: 90 TABLET | Refills: 1 | Status: SHIPPED | OUTPATIENT
Start: 2025-01-21

## 2025-02-18 ENCOUNTER — MYC MEDICAL ADVICE (OUTPATIENT)
Dept: ORTHOPEDICS | Facility: CLINIC | Age: 55
End: 2025-02-18
Payer: COMMERCIAL

## 2025-02-24 ENCOUNTER — THERAPY VISIT (OUTPATIENT)
Dept: PHYSICAL THERAPY | Facility: CLINIC | Age: 55
End: 2025-02-24
Attending: STUDENT IN AN ORGANIZED HEALTH CARE EDUCATION/TRAINING PROGRAM
Payer: COMMERCIAL

## 2025-02-24 DIAGNOSIS — S93.491D HIGH ANKLE SPRAIN OF RIGHT LOWER EXTREMITY, SUBSEQUENT ENCOUNTER: ICD-10-CM

## 2025-02-24 DIAGNOSIS — M60.89 OTHER MYOSITIS OF MULTIPLE SITES: ICD-10-CM

## 2025-02-24 DIAGNOSIS — M25.373 CHRONIC INSTABILITY OF ANKLE: ICD-10-CM

## 2025-02-24 DIAGNOSIS — R32 INCONTINENCE IN FEMALE: ICD-10-CM

## 2025-02-24 PROCEDURE — 97161 PT EVAL LOW COMPLEX 20 MIN: CPT | Mod: GP

## 2025-02-24 PROCEDURE — 97110 THERAPEUTIC EXERCISES: CPT | Mod: GP

## 2025-02-24 PROCEDURE — 97530 THERAPEUTIC ACTIVITIES: CPT | Mod: GP

## 2025-02-24 ASSESSMENT — ACTIVITIES OF DAILY LIVING (ADL)
MAKING_SHARP_TURNS_WHILE_RUNNING_FAST: EXTREME DIFFICULTY OR UNABLE TO PERFORM ACTIVITY
GETTING_INTO_OR_OUT_OF_A_CAR: QUITE A BIT OF DIFFICULTY
LEFS_SCORE(%): 0
SHOPPING: EXTREME DIFFICULTY OR UNABLE TO PERFORM ACTIVITY
WALKING_A_MILE: EXTREME DIFFICULTY OR UNABLE TO PERFORM ACTIVITY
LEFS_RAW_SCORE: 0
STANDING_FOR_1_HOUR: MODERATE DIFFICULTY
PLEASE_INDICATE_YOR_PRIMARY_REASON_FOR_REFERRAL_TO_THERAPY:: FOOT AND/OR ANKLE
GETTING_INTO_AND_OUT_OF_A_BATH: EXTREME DIFFICULTY OR UNABLE TO PERFORM ACTIVITY
PERFORMING_HEAVY_ACTIVITIES_AROUND_YOUR_HOME: EXTREME DIFFICULTY OR UNABLE TO PERFORM ACTIVITY
ROLLING_OVER_IN_BED: EXTREME DIFFICULTY OR UNABLE TO PERFORM ACTIVITY
RUNNING_ON_UNEVEN_GROUND: EXTREME DIFFICULTY OR UNABLE TO PERFORM ACTIVITY
LIFTING_AN_OBJECT,_LIKE_A_BAG_OF_GROCERIES_FROM_THE_FLOOR: EXTREME DIFFICULTY OR UNABLE TO PERFORM ACTIVITY
GOING_UP_OR_DOWN_10_STAIRS: EXTREME DIFFICULTY OR UNABLE TO PERFORM ACTIVITY
SITTING_FOR_1_HOUR: MODERATE DIFFICULTY
PUTTING_ON_YOUR_SHOES_OR_SOCKS: EXTREME DIFFICULTY OR UNABLE TO PERFORM ACTIVITY
SQUATTING: EXTREME DIFFICULTY OR UNABLE TO PERFORM ACTIVITY
YOUR_USUAL_HOBBIES,_RECREATIONAL_OR_SPORTING_ACTIVITIES: QUITE A BIT OF DIFFICULTY
ANY_OF_YOUR_USUAL_WORK,_HOUSEWORK_OR_SCHOOL_ACTIVITIES: A LITTLE BIT OF DIFFICULTY
WALKING_BETWEEN_ROOMS: MODERATE DIFFICULTY
PERFORMING_LIGHT_ACTIVITIES_AROUND_YOUR_HOME: QUITE A BIT OF DIFFICULTY
WALKING_2_BLOCKS: EXTREME DIFFICULTY OR UNABLE TO PERFORM ACTIVITY
RUNNING_ON_EVEN_GROUND: EXTREME DIFFICULTY OR UNABLE TO PERFORM ACTIVITY

## 2025-02-24 NOTE — PROGRESS NOTES
PHYSICAL THERAPY EVALUATION  Type of Visit: Evaluation       Fall Risk Screen:  Fall screen completed by: PT  Have you fallen 2 or more times in the past year?: No  Have you fallen and had an injury in the past year?: No  Is patient a fall risk?: Yes    Subjective         Presenting condition or subjective complaint: Upper Right ankle sprain, partially torn ligament  Date of onset: 01/10/25    Relevant medical history: High blood pressure; Incontinence; Pain at night or rest; Progressive neurological deficits; Significant weakness; Sleep disorder like apnea   Dates & types of surgery: 07: , : ronda in femur, screws in knee, : knee [id number]: ovary & fallopian tube removal    Prior diagnostic imaging/testing results: MRI; X-ray     Prior therapy history for the same diagnosis, illness or injury: No      Prior Level of Function  Transfers:   Ambulation:   ADL:   IADL:     Living Environment  Social support: With a significant other or spouse   Type of home: House; 2-story   Stairs to enter the home: Yes 3 Is there a railing: Yes     Ramp: Yes   Stairs inside the home: Yes 12 Is there a railing: Yes     Help at home: Self Cares (home health aide/personal care attendant, family, etc); Home management tasks (cooking, cleaning); Home and Yard maintenance tasks; Assist for driving and community activities  Equipment owned: Straight Cane; Walker; Walker with wheels; Standard wheelchair; Power wheelchair or scooter; Bedrail; Grab bars; Commode; Raised toilet seat     Employment: Yes   Hobbies/Interests: Walking, reading    Patient goals for therapy: Walk & range of motion w/o pain    Physical Therapist Assessment    KEY PT FINDINGS:  1) Wheelchair mobility in clinic  2) Limited AROM in all directions secondary to pain  3) Pain with isometric testing    Signs and symptoms are consistent with high ankle sprain.      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  It is reassuring that she reports some improvement with no pain with weight bearing at this time.     PLAN  The following was discussed with the patient:  Activity Modification: RICE while swelling persists.   Activity as tolerated, avoiding any a  Imaging/testing: Ankle MRI reviewed as noted above, upper extremity EMG scheduled for April  Rehabilitation: Physical therapy referral placed  Bracing/Orthotics: Continue ankle brace with any weight-bearing  Medication: No changes.  Continue topical medications such as verasome as needed  Interventions: None recommended at this time.  Referral: Psychology appointment scheduled for April  Follow-up Plan: After EMG  Resources Provided: Written and verbal information detailing above findings and plan provided including after visit summary.    PT Subjective:   Patient is a 54 year old female presenting to outpatient physical therapy with right high ankle sprain. Patient uses 4WW for home mobility, presents to clinic today in  due to fatigue and weakness. Went through PT last year for a femur fracture, was then referred to Neuro PT/OT due to MS. Currently deals with a myositis diagnosis which complicates things. Her right ankle has been bothering her  about 5 weeks ago, does not recall a specific injury. She works from home and noticed that at the end of the day one day that she had a ton of swelling in her right ankle. Since then she could hardly put any weight on it. Tried icing and elevating and pushing on with daily activities, but it did not really alleviate in her symptoms. She went to ED and UC, ruled out any DVT or fracture. Recently saw Sports Medicine who did an MRI and discovered a high ankle sprain. Needs assistance with dressing and self cares at home, transfers and ambulation. She was prescribed an ankle brace that she wears when she is doing weight-bearing activities which seems to help a little bit. The swelling has slightly improved.   Pain Level at Rest: 0/10  Pain Level with Use: 5/10  Pain Location: ankle  Pain Quality: Sharp  Pain Frequency: intermittent  Pain is Worst: depending on activity  Pain is Exacerbated By: Plantarflexion, eversion, weight bearing activity  Pain is Relieved By: cold and rest  Pain Progression: Improved     PMH:   Patient Active Problem List   Diagnosis    Myopathy, mitochondrial    Overactive bladder    Dry eyes    Encounter for monitoring of systemic steroid therapy    Need for vaccination    Impetigo    Secondary hypertension    On prednisone therapy    Vitamin B 12 deficiency    Need for pneumocystis prophylaxis    Anemia, unspecified type    Visit for screening mammogram    Seasonal allergic rhinitis, unspecified trigger    Acute respiratory failure with hypoxia (H)    Other myositis, unspecified site    Facial paresthesia    Weight gain    Vaginal dryness    Annual physical exam    Methemoglobinemia    Cyst of right ovary    Hepatic steatosis    Enlarged lymph nodes    Atopic dermatitis of scalp    Closed disp fracture of condyle of right femur with routine healing    Pulmonary nodules    Acute pain of right knee    Closed fracture of right femur, unspecified fracture morphology, unspecified portion of femur,  initial encounter (H)    Anemia    Multiple joint pain    Vitamin D deficiency    Hyperlipidemia LDL goal <100    GAVE (gastric antral vascular ectasia)     Medications:   Current Outpatient Medications   Medication Sig Dispense Refill    acetaminophen (TYLENOL) 325 MG tablet Take 2 tablets (650 mg) by mouth every 6 hours as needed for other (For optimal non-opioid multimodal pain management to improve pain control.) 100 tablet 0    alendronate (FOSAMAX) 70 MG tablet Take 70 mg by mouth every 7 days. On Wednesdays      atorvastatin (LIPITOR) 20 MG tablet TAKE 1 TABLET(20 MG) BY MOUTH DAILY AT 2 PM 90 tablet 3    calcium carbonate (TUMS) 500 MG chewable tablet Take 1 tablet (500 mg) by mouth 2 times daily as needed for heartburn      Cyanocobalamin (VITAMIN B-12 PO) Take 1 tablet by mouth daily.      estradiol (VAGIFEM) 10 MCG TABS vaginal tablet Place 1 tablet (10 mcg) vaginally twice a week 24 tablet 3    ferrous sulfate (FEROSUL) 325 (65 Fe) MG tablet Take 1 tablet (325 mg) by mouth every other day. 90 tablet 0    folic acid (FOLVITE) 1 MG tablet TAKE 1 TABLET(1 MG) BY MOUTH DAILY 90 tablet 3    gabapentin 8 % in PLO cream Apply 1 click (0.25 g) topically every 8 hours. 7.5 g 3    ketamine 5%-gabapentin 8%-lidocaine 2.5% in PLO cream Apply 1 click (0.25 g) topically nightly as needed (for pain). Apply topically to hands at night while pain persists 40 g 3    losartan (COZAAR) 25 MG tablet TAKE 1 TABLET(25 MG) BY MOUTH DAILY 90 tablet 3    Misc. Devices (NOVA CUSHION GEL SEAT PAD) MISC 1 Units daily 1 each 0    mupirocin (BACTROBAN) 2 % external ointment Apply topically 3 times daily 15 g 3    pantoprazole (PROTONIX) 40 MG EC tablet TAKE 1 TABLET BY MOUTH DAILY 30 MINUTES BEFORE BREAKFAST 90 tablet 1    solifenacin (VESICARE) 5 MG tablet Take 1 tablet (5 mg) by mouth daily. 90 tablet 1    valACYclovir (VALTREX) 1000 mg tablet Take 2,000 mg by mouth as needed      vitamin C w/SABRA HIPS 500 MG tablet Take 1 tablet  "(500 mg) by mouth daily. 90 tablet 0    Vitamin D3 (CHOLECALCIFEROL) 25 mcg (1000 units) tablet Take 25 mcg by mouth       No current facility-administered medications for this visit.           Imaging: MR Ankle Right without and with IV Contrast  Order: [id number]  Impression    1. Partial tearing of the anterior inferior tibiofibular ligament and mild edema/fluid in the distal tibiofibular syndesmosis. Findings can be seen in the clinical setting of high ankle sprain. Recommend correlation.  2. Small tibiotalar and subtalar joint effusions and mild to moderate tenosynovial fluid involving the posterior compartment tendons and extensor digitorum anteriorly without significant synovial enhancement.  3. No erosive change.  4. Nonspecific edema in the visualized anterior compartment musculature of the right lower leg could be seen in the setting of myositis as suspected clinically.  Red Flag Screening: None  Prior Treatment Includes: NT  Lifestyle History:  Occupation: Works from home  Experience with physical activity: Daily activities  General Health Assessment: NT.  Referral recommended? No  Additional Considerations: PMH significant for hx of right femur fracture, myopathy, myositis, and MS     Patient Goals for Physical Therapy: \"Strengthen ankle to heal and not re-injure it\"         Objective   Objective Examination    Observation (Foot structure/appearance):   Ankle Brace on today in clinic    Gait (shoes off):  Wheelchair mobility    Functional Tests:   NT  SL Heel Raise (NORMS: Healthy >25, Pathological >17 reps): NT  Knee to Wall Test (CKC DF; Norms 12.5 cm): NT  Y-Balance: NT    Flexibility: Decreased gastroc R, Decreased soleus R    Lumbar Spine Screen: Not Tested    ROM/Strength (Blank if not assessed)   ROM L ROM R MMT L MMT R   Dorsiflexion WNL Mod limit 5/5 4/5   Knee bent       Knee straight       Plantarflexion WNL Mod limit* 5/5 4/5   Inversion WNL Mod limit 5/5 4-/5*   Eversion WNL Mod limit 5/5 " 4-/5*   G Toe Ext       G Toe Flex       Digit Flex X X       Joint Mobility (Blank if not assessed)   Left Right Pain Endfeel   Talocrural       Subtalar       Calcaneonavicular       Calcaneocuboid       Cuneonavicular       Ray mobility       Distal TibFib       Proximal TibFib         Special Tests (Blank if not assessed)   L R   Anterior Drawer     Posterior Drawer     Talar Tilt     Eversion Talar Tilt     Kleiger External Rotation     Purvis's (Achilles)     Windlass     Malik's     Squeeze Test     Tinel's Sign             Assessment & Plan   CLINICAL IMPRESSIONS  Medical Diagnosis:  (High ankle sprain of right lower extremity, subsequent encounter (S93.201D), Chronic instability of ankle (M25.373), Other myositis of multiple sites (M60.89))    Treatment Diagnosis: R High Ankle Sprain   Impression/Assessment: Patient is a 54 year old female with right ankle complaints.  The following significant findings have been identified: Pain, Decreased ROM/flexibility, Decreased joint mobility, Decreased strength, Impaired balance, Inflammation, Edema, Impaired gait, Impaired muscle performance, and Decreased activity tolerance. These impairments interfere with their ability to perform self care tasks, work tasks, recreational activities, household chores, driving , household mobility, and community mobility as compared to previous level of function.     Clinical Decision Making (Complexity):  Clinical Presentation: Stable/Uncomplicated  Clinical Presentation Rationale: based on medical and personal factors listed in PT evaluation  Clinical Decision Making (Complexity): Low complexity    PLAN OF CARE  Treatment Interventions:  Interventions: Gait Training, Manual Therapy, Neuromuscular Re-education, Therapeutic Activity, Therapeutic Exercise, Self-Care/Home Management, Aquatic Therapy    Long Term Goals     PT Goal 1  Goal Identifier: LTG1  Goal Description: Patient will be able to have normal ankle ROM with 0/10  right ankle pain  Rationale: to maximize safety and independence with performance of ADLs and functional tasks;to maximize safety and independence within the home;to maximize safety and independence with transportation;to maximize safety and independence within the community;to maximize safety and independence with self cares  Target Date: 05/19/25  PT Goal 2  Goal Identifier: LTG2  Goal Description: Patient will report an improvement of at least 9 on the LEFS to demonstrate MCID  Rationale: to maximize safety and independence with performance of ADLs and functional tasks;to maximize safety and independence within the home;to maximize safety and independence within the community;to maximize safety and independence with transportation;to maximize safety and independence with self cares  Target Date: 05/19/25      Frequency of Treatment: 1x/2wks  Duration of Treatment: 12 weeks    Recommended Referrals to Other Professionals:   Education Assessment:   Learner/Method: Patient    Risks and benefits of evaluation/treatment have been explained.   Patient/Family/caregiver agrees with Plan of Care.     Evaluation Time:     PT Eval, Low Complexity Minutes (26492): 15       Signing Clinician: Richard Ulrich PT

## 2025-02-26 ENCOUNTER — PATIENT OUTREACH (OUTPATIENT)
Dept: CARE COORDINATION | Facility: CLINIC | Age: 55
End: 2025-02-26
Payer: COMMERCIAL

## 2025-02-26 NOTE — PROGRESS NOTES
Clinic Care Coordination Contact  [facility]/Voicemail    Clinical Data: Care Coordinator Outreach    Outreach Documentation Number of Outreach Attempt   2/26/2025   4:28 PM 1       Left message on patient's voicemail with call back information and requested return call.      Plan: Care Coordinator will try to reach patient again in 10 business days.    No active isolations

## 2025-02-27 RX ORDER — SOLIFENACIN SUCCINATE 5 MG/1
5 TABLET, FILM COATED ORAL DAILY
Qty: 90 TABLET | Refills: 0 | Status: SHIPPED | OUTPATIENT
Start: 2025-02-27

## 2025-02-27 NOTE — TELEPHONE ENCOUNTER
Last Written Prescription:     solifenacin (VESICARE) 5 MG tablet 90 tablet 1 11/14/2024 -- No   Sig - Route: Take 1 tablet (5 mg) by mouth daily. - Oral     ----------------------  Last Visit Date: 3/6/24   Future Visit Date: None  ----------------------      Refill decision: Medication refilled per  Medication Refill in Ambulatory Care  policy.           Request from pharmacy:  Requested Prescriptions   Pending Prescriptions Disp Refills    solifenacin (VESICARE) 5 MG tablet 90 tablet 1     Sig: Take 1 tablet (5 mg) by mouth daily.       Muscarinic Antagonists (Urinary Incontinence Agents) Passed - 2/27/2025  7:09 AM        Passed - Patient does not have a diagnosis of glaucoma on the problem list     If glaucoma diagnosis is new, refer refill to physician.          Passed - Medication is active on med list and the sig matches. RN to manually verify dose and sig if red X/fail.     If the protocol passes (green check), you do not need to verify med dose and sig.    A prescription matches if they are the same clinical intention.    For Example: once daily and every morning are the same.    For all fails (red x), verify dose and sig.    If the refill does match what is on file, the RN can still proceed to approve the refill request.     If they do not match, route to the appropriate provider.             Passed - Recent (12 mo) or future (90 days) visit within the authorizing provider's specialty     The patient must have completed an in-person or virtual visit within the past 12 months or has a future visit scheduled within the next 90 days with the authorizing provider s specialty.  Urgent care and e-visits do not qualify as an office visit for this protocol.          Passed - Medication indicated for associated diagnosis     Medication is associated with one or more of the following diagnoses:  Bladder pain  Disorder of the GI tract  Hyperhidrosis  Neurogenic bladder  Neurogenic detrusor overactivity  Overactive  Bladder  Urge incontinence  Urgent desire to urinate  Incontinence  Spasm of urinary bladder          Passed - Patient is 18 years of age or older

## 2025-03-04 ASSESSMENT — ANXIETY QUESTIONNAIRES
7. FEELING AFRAID AS IF SOMETHING AWFUL MIGHT HAPPEN: NOT AT ALL
1. FEELING NERVOUS, ANXIOUS, OR ON EDGE: SEVERAL DAYS
GAD7 TOTAL SCORE: 3
3. WORRYING TOO MUCH ABOUT DIFFERENT THINGS: SEVERAL DAYS
6. BECOMING EASILY ANNOYED OR IRRITABLE: SEVERAL DAYS
4. TROUBLE RELAXING: NOT AT ALL
GAD7 TOTAL SCORE: 3
2. NOT BEING ABLE TO STOP OR CONTROL WORRYING: NOT AT ALL
GAD7 TOTAL SCORE: 3
5. BEING SO RESTLESS THAT IT IS HARD TO SIT STILL: NOT AT ALL
7. FEELING AFRAID AS IF SOMETHING AWFUL MIGHT HAPPEN: NOT AT ALL
IF YOU CHECKED OFF ANY PROBLEMS ON THIS QUESTIONNAIRE, HOW DIFFICULT HAVE THESE PROBLEMS MADE IT FOR YOU TO DO YOUR WORK, TAKE CARE OF THINGS AT HOME, OR GET ALONG WITH OTHER PEOPLE: SOMEWHAT DIFFICULT
8. IF YOU CHECKED OFF ANY PROBLEMS, HOW DIFFICULT HAVE THESE MADE IT FOR YOU TO DO YOUR WORK, TAKE CARE OF THINGS AT HOME, OR GET ALONG WITH OTHER PEOPLE?: SOMEWHAT DIFFICULT

## 2025-03-04 ASSESSMENT — PATIENT HEALTH QUESTIONNAIRE - PHQ9
SUM OF ALL RESPONSES TO PHQ QUESTIONS 1-9: 2
SUM OF ALL RESPONSES TO PHQ QUESTIONS 1-9: 2
10. IF YOU CHECKED OFF ANY PROBLEMS, HOW DIFFICULT HAVE THESE PROBLEMS MADE IT FOR YOU TO DO YOUR WORK, TAKE CARE OF THINGS AT HOME, OR GET ALONG WITH OTHER PEOPLE: SOMEWHAT DIFFICULT

## 2025-03-05 ENCOUNTER — VIRTUAL VISIT (OUTPATIENT)
Dept: PSYCHOLOGY | Facility: CLINIC | Age: 55
End: 2025-03-05
Attending: STUDENT IN AN ORGANIZED HEALTH CARE EDUCATION/TRAINING PROGRAM
Payer: COMMERCIAL

## 2025-03-05 DIAGNOSIS — R20.2 PARESTHESIA AND PAIN OF BOTH UPPER EXTREMITIES: ICD-10-CM

## 2025-03-05 DIAGNOSIS — M79.601 PARESTHESIA AND PAIN OF BOTH UPPER EXTREMITIES: ICD-10-CM

## 2025-03-05 DIAGNOSIS — M79.602 PARESTHESIA AND PAIN OF BOTH UPPER EXTREMITIES: ICD-10-CM

## 2025-03-05 DIAGNOSIS — F06.4 ANXIETY DISORDER DUE TO GENERAL MEDICAL CONDITION: Primary | ICD-10-CM

## 2025-03-05 DIAGNOSIS — M25.50 MULTIPLE JOINT PAIN: ICD-10-CM

## 2025-03-05 DIAGNOSIS — M25.471 RIGHT ANKLE SWELLING: ICD-10-CM

## 2025-03-05 NOTE — LETTER
"3/5/2025       RE: Britt Park  [address] [facility] [address]     Dear Colleague,    Thank you for referring your patient, Britt Park, to the [facility] PRIMARY CARE CLINIC [place] at [facility]. Please see a copy of my visit note below.        Health Psychology                      Department of Medicine                     [facility] Mail Code [address] [place] [address]              Adelina Lepe, Ph.D., L.P [phone]  Stephanie Benson, Ph.D., L.P. [phone]  Joel Shields, Ph.D. [phone]  Al Cee, Ph.D., A.B.P.P., L.P. [phone]         Baylee Shoemaker, Ph.D., L.P. [phone]   Demetria Montesinos, Ph.D., A.B.P.P., L.P. [phone]    [facility]  Clinics and Surgery Center  3rd Floor  [address]   [address]    The patient has been notified of following:    \"This video visit will be conducted via a call between you and your physician/provider. We have found that certain health care needs can be provided without the need for an in-person physical exam.  This service lets us provide the care you need with a video conversation.  If a prescription is necessary we can send it directly to your pharmacy.  If lab work is needed we can place an order for that and you can then stop by our lab to have the test done at a later time. If during the course of the call the physician/provider feels a video visit is not appropriate, you will not be charged for this service.\"    Patient has given verbal consent for Video visit? YES    Reason that telemedicine is appropriate: Patient request/deemed appropriate for this session.  Patient has given verbal consent for video visit: Yes  Mode of transmission: Secure real time interactive audio and visual telecommunication system via Kin[employer]  Location of originating " and distant sites:  Originating site (patient location): patient's home in [place]  Distant site (provider site): home office of provider  *Patient endorsed strong preference for video sessions due to limited mobility and transportation.     Start time: 1:01  End time: 1:57    Confidential Summary of Health Psychology Evaluation*  Patient was provided information about Health Psychology Services, including billing and limits to confidentiality and documentation in electronic medical records. Provided opportunity to ask questions and obtained verbal consent for treatment.     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 She has some mobility and relies on a walker to ambulate at home and a wheelchair when traveling. This condition impacts her arm mobility as well. She is unable to sit and get up from seats that are too low, which impacts her ability to use public toilets. Her current job is remote but that is with accommodations made via her medical provider. At times, she struggles to get enough breath due to her medical condition and this can be related to symptoms of anxiety/ exacerbated by anxiety as well. She experiences worry that her medical condition may negatively impact her ability to maintain/find employment. She does not drive. She previously took steroids that were ceased as they were not helpful but she gained weight while taking them. She has since lost this weight and more without trying. She described typically weighing around 130 pounds but currently weights 115 pounds.   Her loss of mobility and other impacts of her health have resulted in feelings of loss,sadness, grief, and criticism of herself.     Medical History:  Patient Active Problem List   Diagnosis      Myopathy, mitochondrial     Overactive bladder     Dry eyes     Encounter for monitoring of systemic steroid therapy     Need for vaccination     Impetigo     Secondary hypertension     On prednisone therapy     Vitamin B 12 deficiency     Need for pneumocystis prophylaxis     Anemia, unspecified type     Visit for screening mammogram     Seasonal allergic rhinitis, unspecified trigger     Acute respiratory failure with hypoxia (H)     Other myositis, unspecified site     Facial paresthesia     Weight gain     Vaginal dryness     Annual physical exam     Methemoglobinemia     Cyst of right ovary     Hepatic steatosis     Enlarged lymph nodes     Atopic dermatitis of scalp     Closed disp fracture of condyle of right femur with routine healing     Pulmonary nodules     Acute pain of right knee     Closed fracture of right femur, unspecified fracture morphology, unspecified portion of femur, initial encounter (H)     Anemia     Multiple joint pain     Vitamin D deficiency     Hyperlipidemia LDL goal <100     GAVE (gastric antral vascular ectasia)      Past Medical History:   Diagnosis Date     Acute respiratory failure with hypoxia (H)      Anemia      Facial paresthesia      Hepatic vein stenosis      Impetigo      Methemoglobinemia      Ovarian cyst      Overactive bladder      Secondary hypertension 2021     Sleep apnea      Past Surgical History:   Procedure Laterality Date     ABDOMEN SURGERY  2007         BIOPSY  2019 & 2021    Right bicep, result abnormal results, & left tricep biopsy     COLONOSCOPY  21     COLONOSCOPY N/A 2022    Procedure: COLONOSCOPY, FLEXIBLE, WITH LESION REMOVAL USING SNARE;  Surgeon: Dorie Santos MD;  Location:  GI     COLONOSCOPY N/A 2024    Procedure: Colonoscopy;  Surgeon: Melanie Jin MD;  Location:  GI     ESOPHAGOSCOPY, GASTROSCOPY, DUODENOSCOPY (EGD), COMBINED N/A 2024    Procedure: Esophagoscopy,  gastroscopy, duodenoscopy (EGD), combined;  Surgeon: Melanie Jin MD;  Location:  GI     GYN SURGERY  07     for twins     LAPAROSCOPIC OOPHORECTOMY Right 2023    Procedure: LAPAROSCOPIC RIGHT OOPHORECTOMY AND SALPINGECTOMY, LEFT SALPINGECTOMY AND LEFT OVARIAN CYST DRAINAGE;  Surgeon: Annette Walls MD;  Location:  OR     OPEN REDUCTION INTERNAL FIXATION RODDING INTRAMEDULLARY FEMUR Right 2022    Procedure: Open reduction internal fixation of right femur fracture;  Surgeon: Al Larson MD;  Location:  OR     Current Outpatient Medications   Medication Sig Dispense Refill     acetaminophen (TYLENOL) 325 MG tablet Take 2 tablets (650 mg) by mouth every 6 hours as needed for other (For optimal non-opioid multimodal pain management to improve pain control.) 100 tablet 0     alendronate (FOSAMAX) 70 MG tablet Take 70 mg by mouth every 7 days. On        atorvastatin (LIPITOR) 20 MG tablet TAKE 1 TABLET(20 MG) BY MOUTH DAILY AT 2 PM 90 tablet 3     calcium carbonate (TUMS) 500 MG chewable tablet Take 1 tablet (500 mg) by mouth 2 times daily as needed for heartburn       Cyanocobalamin (VITAMIN B-12 PO) Take 1 tablet by mouth daily.       estradiol (VAGIFEM) 10 MCG TABS vaginal tablet Place 1 tablet (10 mcg) vaginally twice a week 24 tablet 3     ferrous sulfate (FEROSUL) 325 (65 Fe) MG tablet Take 1 tablet (325 mg) by mouth every other day. 90 tablet 0     folic acid (FOLVITE) 1 MG tablet TAKE 1 TABLET(1 MG) BY MOUTH DAILY 90 tablet 3     gabapentin 8 % in PLO cream Apply 1 click (0.25 g) topically every 8 hours. 7.5 g 3     ketamine 5%-gabapentin 8%-lidocaine 2.5% in PLO cream Apply 1 click (0.25 g) topically nightly as needed (for pain). Apply topically to hands at night while pain persists 40 g 3     losartan (COZAAR) 25 MG tablet TAKE 1 TABLET(25 MG) BY MOUTH DAILY 90 tablet 3     Misc. Devices (NOVA CUSHION GEL SEAT PAD) MISC 1 Units daily 1  each 0     mupirocin (BACTROBAN) 2 % external ointment Apply topically 3 times daily 15 g 3     pantoprazole (PROTONIX) 40 MG EC tablet TAKE 1 TABLET BY MOUTH DAILY 30 MINUTES BEFORE BREAKFAST 90 tablet 1     solifenacin (VESICARE) 5 MG tablet Take 1 tablet (5 mg) by mouth daily. For additional refills, please schedule a follow-up appointment. 90 tablet 0     valACYclovir (VALTREX) 1000 mg tablet Take 2,000 mg by mouth as needed       vitamin C w/SABRA HIPS 500 MG tablet Take 1 tablet (500 mg) by mouth daily. 90 tablet 0     Vitamin D3 (CHOLECALCIFEROL) 25 mcg (1000 units) tablet Take 25 mcg by mouth        Current Outpatient Medications   Medication Sig Dispense Refill     acetaminophen (TYLENOL) 325 MG tablet Take 2 tablets (650 mg) by mouth every 6 hours as needed for other (For optimal non-opioid multimodal pain management to improve pain control.) 100 tablet 0     alendronate (FOSAMAX) 70 MG tablet Take 70 mg by mouth every 7 days. On Wednesdays       atorvastatin (LIPITOR) 20 MG tablet TAKE 1 TABLET(20 MG) BY MOUTH DAILY AT 2 PM 90 tablet 3     calcium carbonate (TUMS) 500 MG chewable tablet Take 1 tablet (500 mg) by mouth 2 times daily as needed for heartburn       Cyanocobalamin (VITAMIN B-12 PO) Take 1 tablet by mouth daily.       estradiol (VAGIFEM) 10 MCG TABS vaginal tablet Place 1 tablet (10 mcg) vaginally twice a week 24 tablet 3     ferrous sulfate (FEROSUL) 325 (65 Fe) MG tablet Take 1 tablet (325 mg) by mouth every other day. 90 tablet 0     folic acid (FOLVITE) 1 MG tablet TAKE 1 TABLET(1 MG) BY MOUTH DAILY 90 tablet 3     gabapentin 8 % in PLO cream Apply 1 click (0.25 g) topically every 8 hours. 7.5 g 3     ketamine 5%-gabapentin 8%-lidocaine 2.5% in PLO cream Apply 1 click (0.25 g) topically nightly as needed (for pain). Apply topically to hands at night while pain persists 40 g 3     losartan (COZAAR) 25 MG tablet TAKE 1 TABLET(25 MG) BY MOUTH DAILY 90 tablet 3     Misc. Devices (NOVA CUSHION  "GEL SEAT PAD) MISC 1 Units daily 1 each 0     mupirocin (BACTROBAN) 2 % external ointment Apply topically 3 times daily 15 g 3     pantoprazole (PROTONIX) 40 MG EC tablet TAKE 1 TABLET BY MOUTH DAILY 30 MINUTES BEFORE BREAKFAST 90 tablet 1     solifenacin (VESICARE) 5 MG tablet Take 1 tablet (5 mg) by mouth daily. For additional refills, please schedule a follow-up appointment. 90 tablet 0     valACYclovir (VALTREX) 1000 mg tablet Take 2,000 mg by mouth as needed       vitamin C w/SABRA HIPS 500 MG tablet Take 1 tablet (500 mg) by mouth daily. 90 tablet 0     Vitamin D3 (CHOLECALCIFEROL) 25 mcg (1000 units) tablet Take 25 mcg by mouth       No current facility-administered medications for this visit.     Wt Readings from Last 4 Encounters:   01/20/25 50.8 kg (112 lb)   01/18/25 51.3 kg (113 lb)   01/13/25 50.8 kg (112 lb)   12/18/24 50.8 kg (112 lb)     Estimated body mass index is 20.49 kg/m  as calculated from the following:    Height as of 1/20/25: 1.575 m (5' 2\").    Weight as of 1/20/25: 50.8 kg (112 lb).    Patient's treatment team at the [facility] is:   Patient Care Team         Relationship Specialty Notifications Start End    Vi Metz MD PCP - General Internal Medicine  12/10/22     Merged    Phone: [phone] Fax: [phone] [address] [address]    Vlad Leos MD Referring Physician Neurology  3/26/19     referring to neurol    Phone: [phone] Fax: [phone]         [facility]UR[facility] [address] [facility] [place] [address]    Vi Metz MD Assigned PCP   12/23/21     Phone: [phone] Fax: [phone] [address] [address]    Diane Lopez MD MD Gastroenterology  5/6/22     Phone: [phone] Fax: [phone] [address] [facility] [address]    Diane Lopez MD MD Gastroenterology  5/6/22     Phone: [phone] Fax: [phone] [address] [facility] " [address]    Vi Metz MD  Internal Medicine  12/10/22     Merged    Phone: [phone] Fax: [phone] [address] [facility] [address]    Annette Walls MD Assigned OBGYN Provider   8/5/23     Phone: [phone] Fax: [phone] [address] [facility] [address]    Manan Quiles MD MD OB/Gyn  11/13/23     Phone: [phone] Fax: [phone]         [address] [facility] [address]    Bere Gomez [facility] Lead Care Coordinator  Admissions 8/29/24     Phone: [phone]         Jason Tan MD Assigned Musculoskeletal Provider   11/23/24     Phone: [phone] Fax: [phone]         [address] [facility] [address]    Eleonora Lemus MD Assigned Neuroscience Provider   2/23/25     Phone: [phone] Fax: [phone] [address] [facility] [address]        Social History:  Ms. Park was adopted by  parents from [place] at 11 months old. She was 10 years old when her mother had her brother. Her father owned his own business, mom stayed home through the patient's time in elementary school. Ms Park described moving around frequently as a child and teenage. At one point, a move was triggered by her family's house being foreclosed because her dad's business went under. At 17, her family moved from [place] to [place], [place] for her father's job and then to [place]. She earned her undergraduate degree at [employer] and eventually earned a master's degree. Upon graduating from undergraduate, she moved in with roommates and worked with [employer] in customer service.     Ms. Park met her now  in college, and they started dating in 1993. Her parents  after she graduated from college, this resulted in the patient being reticent to get  herself. She and her now  dated for 10 years before getting  over 20 years ago. She has 3 children, the oldest daughter is 19 and lives in [place],  "[place]. Her twin boys are 17, graduating from high school this year, and turn 18 next month. She referenced the transition to possibly becoming \"empty nesters.\"     Both of Ms. Park's parents  from cancer, her mother 11 yeas ago and her father shortly after. She had a good relationship with her mom, she called her mother her \"best friend.\" She was less close with her father who remarried following her parents divorce, moved to [place], and would spend holidays with his stepchildren. She expressed feeling grateful that she was able to spent time with her father before he  and have important conversations. She is hoping to write stories about her parents to honor them.    She described her relationship with brother is somewhat distant. Her brother has 2 children and lives in [place], [place]. She expressed recent efforts to improve this relationship, however, appear to be working.     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 She described that this job can be stressful and she currently is considering other employment. She is part of a small group of professionals for career development and finds this to be a supportive/helpful group.     Health Behaviors:  Caffeine use: She drinks 12 ounces of coffee most mornings.   Alcohol use: She drinks a glass of wine about 2x/week.  Substance use: Patient denied.   Smoking: Patient denied.   Exercise: She completes stretching and PT exercises at home. She tries to go to therapeutic pool 2x/week and strength trains 1x/week.  Sleep: \"Pretty good\" She uses an bed and the Calm clint, estimating 7-8 hours per night.    Medication adherence: She denied any problems with remembering medications.     Psychiatric History:  Ms. Park's personal psychiatric history includes attending a health psychologist previously when she was experiencing neck pain. She also saw therapists for medical issues " through her employer ([employer]), using all 8 sessions allotted to her in 2023 and 2024. She referenced potentially completing EMDR with one of the therapists but was uncertain the name of the type of therapy. She denied any use of medications for mood/anxiety or a history of seeing a psychiatrist.   Symptoms of Depression: Patient denied.   Symptoms of Anxiety (BERTO, OCD, PTSD, phobia, panic attacks):  She denied symptoms of BERTO, OCD, PTSD, phobia, or panic attacks. She experiences worry related to employment as she fears her ability to maintain employment may be negatively impacted by her medical condition/s. She is able to currently work remotely but described working in an office would be virtually impossible due to her limited mobility and inability to independently use a toilet. These worries prevent her from applying for new jobs. At times when she feel anxious, she struggles to get a deep breath and may also experience urgency to use the bathroom.   Symptoms of Psychosis (hallucinations, delusions): Patient denied.   Symptoms of Shameka: Patient denied.   Disordered eating: Patient denied.   History of self-harm or suicide attempts: Patient denied.   History of abuse: Patient denied.   Psychiatric Hospitalizations: Patient denied    Psychological Assessments:  Depression Symptoms (Patient Health Questionnaire 9; PHQ-9)  The PHQ-9 is a measure of depressive symptoms.  Scores on this measure range from 0 to 27 with higher scores reflecting greater levels and frequency of depressive symptoms. Typical symptom ranges include: 0-4 minimal, 5-9 mild, 10-14 moderate, 15-19 moderately severe, 20-27 severe.       3/13/2024     7:09 AM 12/2/2024     3:52 PM 3/4/2025     1:27 PM   PHQ-9 SCORE   PHQ-9 Total Score MyChart 4 (Minimal depression) 5 (Mild depression) 2 (Minimal depression)   PHQ-9 Total Score 4 5  2        Patient-reported     Anxiety Symptoms (Generalized Anxiety Disorder 7; BERTO-7)  The BERTO-7 is a measure of anxiety  and panic symptoms.  Scores on this measure range from 0 to 21 with higher scores reflecting greater levels and frequency of anxiety symptoms. Typical symptom ranges include: 0-4 minimal, 5-9 mild, 10-14 moderate, 15-21 severe.       8/3/2023     3:28 PM 3/4/2025     1:28 PM   BERTO-7 SCORE   Total Score  3 (minimal anxiety)   Total Score 6 3        Patient-reported     Alcohol and Drug Abuse (CAGE - Adapted to Include Drugs; CAGE-AID)  The CAGE-AID is a screening tool to assess for symptoms of alcohol or drug abuse or dependence. Scores on this measure range from 0 to 4, with higher scores reflecting experiences consistent with problem use.  CAGE-AID score  > 1 is a positive screen, suggesting further discussion is needed to determine if evaluation for alcohol or substance abuse is appropriate.  A score > 2 is considered clinically significant, suggesting further evaluation of alcohol or substance-related problems is indicated.      3/4/2025     1:29 PM   CAGE-AID Total Score   Total Score 0    Total Score MyChart 0 (A total score of 2 or greater is considered clinically significant)       Patient-reported     Global Health (Patient-Reported Outcomes Measurement Information System; PROMIS-10)  The PROMIS-10 is a measure of global physical and mental health. Total scores on this measure range from 8 to 40, with higher scores reflecting greater levels of perceived health.       4/30/2023     3:58 PM 11/30/2023     6:46 PM 3/4/2025     1:29 PM   PROMIS-10 Scores Only   Global Mental Health Score 9 8 8    Global Physical Health Score 9 8 11    PROMIS TOTAL - SUBSCORES 18 16 19        Patient-reported     Suicidality ([place] Suicide Severity Rating Scale Screener Past Month)      1/20/2025    11:42 AM   [place] Suicide Severity Rating (Short Version)   Q1 Wished to be Dead (Past Month) 0-->no   Q2 Suicidal Thoughts (Past Month) 0-->no   Q6 Suicide Behavior (Lifetime) 0-->no   Level of Risk per Screen no risks indicated      Mental Status/Interview:   Appearance: Patient was appropriately groomed and dressed. The patient is 54 year old years old and appears her age.    Orientation: The patient arrived promptly. Oriented to person, plan, time and situation. Alert individual showing no signs of excessive distractibility.   Behavior: Unremarkable, eye contract good. There was no evidence of psychomotor agitation or retardation.   Cooperation: Patient appeared open and cooperative throughout the session.  Reliability: Patient appeared to be a reliable historian.  Speech/Language: Clear  Thought Process/content: Clear, logical and linear, coherent and goal directed. There was no evidence of delusions, paranoia, or visual/auditory hallucinations. The patient tracked the conversation well.  Behavior during interview suggested that patient s memory functioning is intact for both remote and immediate recall.  Mood:  Affect:mood congruent, within the normal range and appropriate to content.  Insight: good  Judgement: good  Motivation: good  Suicidal ideation: denied  Homicidal ideation: denied    Impression  Based on this interview and results from the assessments administered on this date, her recent self-report measures of anxiety and depression appear to fall within the minimal range. Anxiety related to maintaining/finding employment, however, prevent her from applying for a new job as she has concerns about any job (her current or potential future job) requiring her to work in office. Per her report, her limited mobility and inability to drive both make the idea of working in an office challenging if not impossible. She has special risers on her toilet at home with allow her to use it independently but she is unable to get on and off a chair or toilet if it is too low as she has weakness in both upper and lower extremities. Strengths include current employment, engagement in medical care, advanced education, and a supportive spouse. She  is likely to benefit from cognitive-behavioral therapy to address self criticism, stress, and anxiety with particular focus on employment and adjusting to her children moving out of her home.     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  Shared with patient that this clinician will be on medical leave 4/16-5/27 and that a colleague will be available to see her should she wish or that we can take a break from sessions.     Adelina Lepe, , LP   Clinical Psychologist    *In accordance with the Rules of the [place] Board of Psychology, it is noted that psychological descriptions and scientific procedures underlying psychological evaluations have limitations.  Absolute predictions cannot be made based on information in this report.      Again, thank you for allowing me to participate in the care of your patient.      Sincerely,    Adelina Lepe, PhD

## 2025-03-05 NOTE — PROGRESS NOTES
"    Health Psychology                      Department of Medicine                     [facility] Mail Code [address] [place] [address]              Adelina Lepe, Ph.D., L.P [phone]  Stephanie Benson, Ph.D., L.P. [phone]  Joel Shields, Ph.D. [phone]  Al Cee, Ph.D., A.B.P.P., L.P. [phone]         Baylee Shoemaker, Ph.D., L.P. [phone]   Demetria Montesinos, Ph.D., A.B.P.P., L.P. [phone]    [facility] [place]  3rd Floor  [address]   [address]    The patient has been notified of following:    \"This video visit will be conducted via a call between you and your physician/provider. We have found that certain health care needs can be provided without the need for an in-person physical exam.  This service lets us provide the care you need with a video conversation.  If a prescription is necessary we can send it directly to your pharmacy.  If lab work is needed we can place an order for that and you can then stop by our lab to have the test done at a later time. If during the course of the call the physician/provider feels a video visit is not appropriate, you will not be charged for this service.\"    Patient has given verbal consent for Video visit? YES    Reason that telemedicine is appropriate: Patient request/deemed appropriate for this session.  Patient has given verbal consent for video visit: Yes  Mode of transmission: Secure real time interactive audio and visual telecommunication system via [employer]  Location of originating and distant sites:  Originating site (patient location): patient's home in [place]  Distant site (provider site): home office of provider  *Patient endorsed strong preference for video sessions due to limited mobility and transportation.     Start time: 1:01  End time: 1:57    Confidential Summary of Health Psychology " Evaluation*  Patient was provided information about Health Psychology Services, including billing and limits to confidentiality and documentation in electronic medical records. Provided opportunity to ask questions and obtained verbal consent for treatment.     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 She has some mobility and relies on a walker to ambulate at home and a wheelchair when traveling. This condition impacts her arm mobility as well. She is unable to sit and get up from seats that are too low, which impacts her ability to use public toilets. Her current job is remote but that is with accommodations made via her medical provider. At times, she struggles to get enough breath due to her medical condition and this can be related to symptoms of anxiety/ exacerbated by anxiety as well. She experiences worry that her medical condition may negatively impact her ability to maintain/find employment. She does not drive. She previously took steroids that were ceased as they were not helpful but she gained weight while taking them. She has since lost this weight and more without trying. She described typically weighing around 130 pounds but currently weights 115 pounds.   Her loss of mobility and other impacts of her health have resulted in feelings of loss,sadness, grief, and criticism of herself.     Medical History:  Patient Active Problem List   Diagnosis    Myopathy, mitochondrial    Overactive bladder    Dry eyes    Encounter for monitoring of systemic steroid therapy    Need for vaccination    Impetigo    Secondary hypertension    On prednisone therapy    Vitamin B 12 deficiency    Need for pneumocystis prophylaxis    Anemia, unspecified type    Visit for screening  mammogram    Seasonal allergic rhinitis, unspecified trigger    Acute respiratory failure with hypoxia (H)    Other myositis, unspecified site    Facial paresthesia    Weight gain    Vaginal dryness    Annual physical exam    Methemoglobinemia    Cyst of right ovary    Hepatic steatosis    Enlarged lymph nodes    Atopic dermatitis of scalp    Closed disp fracture of condyle of right femur with routine healing    Pulmonary nodules    Acute pain of right knee    Closed fracture of right femur, unspecified fracture morphology, unspecified portion of femur, initial encounter (H)    Anemia    Multiple joint pain    Vitamin D deficiency    Hyperlipidemia LDL goal <100    GAVE (gastric antral vascular ectasia)      Past Medical History:   Diagnosis Date    Acute respiratory failure with hypoxia (H)     Anemia     Facial paresthesia     Hepatic vein stenosis     Impetigo     Methemoglobinemia     Ovarian cyst     Overactive bladder     Secondary hypertension 2021    Sleep apnea      Past Surgical History:   Procedure Laterality Date    ABDOMEN SURGERY  2007        BIOPSY  2019 & 2021    Right bicep, result abnormal results, & left tricep biopsy    COLONOSCOPY  21    COLONOSCOPY N/A 2022    Procedure: COLONOSCOPY, FLEXIBLE, WITH LESION REMOVAL USING SNARE;  Surgeon: Dorie Santos MD;  Location:  GI    COLONOSCOPY N/A 2024    Procedure: Colonoscopy;  Surgeon: Melanie Jin MD;  Location:  GI    ESOPHAGOSCOPY, GASTROSCOPY, DUODENOSCOPY (EGD), COMBINED N/A 2024    Procedure: Esophagoscopy, gastroscopy, duodenoscopy (EGD), combined;  Surgeon: Melanie Jin MD;  Location:  GI    GYN SURGERY  07     for twins    LAPAROSCOPIC OOPHORECTOMY Right 2023    Procedure: LAPAROSCOPIC RIGHT OOPHORECTOMY AND SALPINGECTOMY, LEFT SALPINGECTOMY AND LEFT OVARIAN CYST DRAINAGE;  Surgeon: Annette Walls MD;  Location:  OR     OPEN REDUCTION INTERNAL FIXATION RODDING INTRAMEDULLARY FEMUR Right 12/11/2022    Procedure: Open reduction internal fixation of right femur fracture;  Surgeon: Al Larson MD;  Location:  OR     Current Outpatient Medications   Medication Sig Dispense Refill    acetaminophen (TYLENOL) 325 MG tablet Take 2 tablets (650 mg) by mouth every 6 hours as needed for other (For optimal non-opioid multimodal pain management to improve pain control.) 100 tablet 0    alendronate (FOSAMAX) 70 MG tablet Take 70 mg by mouth every 7 days. On Wednesdays      atorvastatin (LIPITOR) 20 MG tablet TAKE 1 TABLET(20 MG) BY MOUTH DAILY AT 2 PM 90 tablet 3    calcium carbonate (TUMS) 500 MG chewable tablet Take 1 tablet (500 mg) by mouth 2 times daily as needed for heartburn      Cyanocobalamin (VITAMIN B-12 PO) Take 1 tablet by mouth daily.      estradiol (VAGIFEM) 10 MCG TABS vaginal tablet Place 1 tablet (10 mcg) vaginally twice a week 24 tablet 3    ferrous sulfate (FEROSUL) 325 (65 Fe) MG tablet Take 1 tablet (325 mg) by mouth every other day. 90 tablet 0    folic acid (FOLVITE) 1 MG tablet TAKE 1 TABLET(1 MG) BY MOUTH DAILY 90 tablet 3    gabapentin 8 % in PLO cream Apply 1 click (0.25 g) topically every 8 hours. 7.5 g 3    ketamine 5%-gabapentin 8%-lidocaine 2.5% in PLO cream Apply 1 click (0.25 g) topically nightly as needed (for pain). Apply topically to hands at night while pain persists 40 g 3    losartan (COZAAR) 25 MG tablet TAKE 1 TABLET(25 MG) BY MOUTH DAILY 90 tablet 3    Misc. Devices (NOVA CUSHION GEL SEAT PAD) MISC 1 Units daily 1 each 0    mupirocin (BACTROBAN) 2 % external ointment Apply topically 3 times daily 15 g 3    pantoprazole (PROTONIX) 40 MG EC tablet TAKE 1 TABLET BY MOUTH DAILY 30 MINUTES BEFORE BREAKFAST 90 tablet 1    solifenacin (VESICARE) 5 MG tablet Take 1 tablet (5 mg) by mouth daily. For additional refills, please schedule a follow-up appointment. 90 tablet 0    valACYclovir (VALTREX) 1000  mg tablet Take 2,000 mg by mouth as needed      vitamin C w/SABRA HIPS 500 MG tablet Take 1 tablet (500 mg) by mouth daily. 90 tablet 0    Vitamin D3 (CHOLECALCIFEROL) 25 mcg (1000 units) tablet Take 25 mcg by mouth        Current Outpatient Medications   Medication Sig Dispense Refill    acetaminophen (TYLENOL) 325 MG tablet Take 2 tablets (650 mg) by mouth every 6 hours as needed for other (For optimal non-opioid multimodal pain management to improve pain control.) 100 tablet 0    alendronate (FOSAMAX) 70 MG tablet Take 70 mg by mouth every 7 days. On Wednesdays      atorvastatin (LIPITOR) 20 MG tablet TAKE 1 TABLET(20 MG) BY MOUTH DAILY AT 2 PM 90 tablet 3    calcium carbonate (TUMS) 500 MG chewable tablet Take 1 tablet (500 mg) by mouth 2 times daily as needed for heartburn      Cyanocobalamin (VITAMIN B-12 PO) Take 1 tablet by mouth daily.      estradiol (VAGIFEM) 10 MCG TABS vaginal tablet Place 1 tablet (10 mcg) vaginally twice a week 24 tablet 3    ferrous sulfate (FEROSUL) 325 (65 Fe) MG tablet Take 1 tablet (325 mg) by mouth every other day. 90 tablet 0    folic acid (FOLVITE) 1 MG tablet TAKE 1 TABLET(1 MG) BY MOUTH DAILY 90 tablet 3    gabapentin 8 % in PLO cream Apply 1 click (0.25 g) topically every 8 hours. 7.5 g 3    ketamine 5%-gabapentin 8%-lidocaine 2.5% in PLO cream Apply 1 click (0.25 g) topically nightly as needed (for pain). Apply topically to hands at night while pain persists 40 g 3    losartan (COZAAR) 25 MG tablet TAKE 1 TABLET(25 MG) BY MOUTH DAILY 90 tablet 3    Misc. Devices (NOVA CUSHION GEL SEAT PAD) MISC 1 Units daily 1 each 0    mupirocin (BACTROBAN) 2 % external ointment Apply topically 3 times daily 15 g 3    pantoprazole (PROTONIX) 40 MG EC tablet TAKE 1 TABLET BY MOUTH DAILY 30 MINUTES BEFORE BREAKFAST 90 tablet 1    solifenacin (VESICARE) 5 MG tablet Take 1 tablet (5 mg) by mouth daily. For additional refills, please schedule a follow-up appointment. 90 tablet 0    valACYclovir  "(VALTREX) 1000 mg tablet Take 2,000 mg by mouth as needed      vitamin C w/SABRA HIPS 500 MG tablet Take 1 tablet (500 mg) by mouth daily. 90 tablet 0    Vitamin D3 (CHOLECALCIFEROL) 25 mcg (1000 units) tablet Take 25 mcg by mouth       No current facility-administered medications for this visit.     Wt Readings from Last 4 Encounters:   01/20/25 50.8 kg (112 lb)   01/18/25 51.3 kg (113 lb)   01/13/25 50.8 kg (112 lb)   12/18/24 50.8 kg (112 lb)     Estimated body mass index is 20.49 kg/m  as calculated from the following:    Height as of 1/20/25: 1.575 m (5' 2\").    Weight as of 1/20/25: 50.8 kg (112 lb).    Patient's treatment team at the [facility] is:   Patient Care Team         Relationship Specialty Notifications Start End    Vi Metz MD PCP - General Internal Medicine  12/10/22     Merged    Phone: [phone] Fax: [phone] [address] [place] [address]    Vlad Leos MD Referring Physician Neurology  3/26/19     referring to neurol    Phone: [phone] Fax: [phone]         [facility] [address] [facility] [address]    Vi Metz MD Assigned PCP   12/23/21     Phone: [phone] Fax: [phone] [address] [place] [address]    Diane Lopez MD MD Gastroenterology  5/6/22     Phone: [phone] Fax: [phone]         [address] [facility] [address]    Diane Lopez MD MD Gastroenterology  5/6/22     Phone: [phone] Fax: [phone]         [address] [facility] [address]    Vi Metz MD  Internal Medicine  12/10/22     Merged    Phone: [phone] Fax: [phone] [address] [place] [address]    Annette Walls MD Assigned OBGYN Provider   8/5/23     Phone: [phone] Fax: [phone] [address] [place] [address]    Manan Quiles MD MD OB/Gyn  11/13/23     Phone: [phone] Fax: [phone]         [address] [facility] [address]    Bere Gomez, [facility] Lead Care Coordinator  Admissions 24     Phone: [phone]         Jason Tan MD Assigned Musculoskeletal Provider   24     Phone: [phone] Fax: [phone]         [address] [facility] [address]    Eleonora Lemus MD Assigned Neuroscience Provider   25     Phone: [phone] Fax: [phone] [address] [facility] [address]        Social History:  Ms. Park was adopted by  parents from [place] at 11 months old. She was 10 years old when her mother had her brother. Her father owned his own business, mom stayed home through the patient's time in elementary school. Ms Park described moving around frequently as a child and teenage. At one point, a move was triggered by her family's house being foreclosed because her dad's business went under. At 17, her family moved from [place] to [place], [place] for her father's job and then to [place]. She earned her undergraduate degree at [employer] and eventually earned a master's degree. Upon graduating from undergraduate, she moved in with roommates and worked with [employer] in customer service.     Ms. Park met her now  in college, and they started dating in . Her parents  after she graduated from college, this resulted in the patient being reticent to get  herself. She and her now  dated for 10 years before getting  over 20 years ago. She has 3 children, the oldest daughter is 19 and lives in [place], [place]. Her twin boys are 17, graduating from high school this year, and turn 18 next month. She referenced the transition to possibly becoming \"empty nesters.\"     Both of Ms. Park's parents  from cancer, her mother 11 yeas ago and her father shortly after. She had a good relationship with her mom, she called her mother her \"best friend.\" She was less close with her father who remarried following her " "parents divorce, moved to [place], and would spend holidays with his stepchildren. She expressed feeling grateful that she was able to spent time with her father before he  and have important conversations. She is hoping to write stories about her parents to honor them.    She described her relationship with brother is somewhat distant. Her brother has 2 children and lives in [place], [place]. She expressed recent efforts to improve this relationship, however, appear to be working.     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 She described that this job can be stressful and she currently is considering other employment. She is part of a small group of professionals for career development and finds this to be a supportive/helpful group.     Health Behaviors:  Caffeine use: She drinks 12 ounces of coffee most mornings.   Alcohol use: She drinks a glass of wine about 2x/week.  Substance use: Patient denied.   Smoking: Patient denied.   Exercise: She completes stretching and PT exercises at home. She tries to go to therapeutic pool 2x/week and strength trains 1x/week.  Sleep: \"Pretty good\" She uses an bed and the Calm clint, estimating 7-8 hours per night.    Medication adherence: She denied any problems with remembering medications.     Psychiatric History:  Ms. Park's personal psychiatric history includes attending a health psychologist previously when she was experiencing neck pain. She also saw therapists for medical issues through her employer ([employer]), using all 8 sessions allotted to her in  and . She referenced potentially completing EMDR with one of the therapists but was uncertain the name of the type of therapy. She denied any use of medications for mood/anxiety or a history of seeing a psychiatrist.   Symptoms of Depression: Patient denied.   Symptoms of Anxiety (BERTO, OCD, PTSD, phobia, panic attacks):  She denied " symptoms of BERTO, OCD, PTSD, phobia, or panic attacks. She experiences worry related to employment as she fears her ability to maintain employment may be negatively impacted by her medical condition/s. She is able to currently work remotely but described working in an office would be virtually impossible due to her limited mobility and inability to independently use a toilet. These worries prevent her from applying for new jobs. At times when she feel anxious, she struggles to get a deep breath and may also experience urgency to use the bathroom.   Symptoms of Psychosis (hallucinations, delusions): Patient denied.   Symptoms of Shameka: Patient denied.   Disordered eating: Patient denied.   History of self-harm or suicide attempts: Patient denied.   History of abuse: Patient denied.   Psychiatric Hospitalizations: Patient denied    Psychological Assessments:  Depression Symptoms (Patient Health Questionnaire 9; PHQ-9)  The PHQ-9 is a measure of depressive symptoms.  Scores on this measure range from 0 to 27 with higher scores reflecting greater levels and frequency of depressive symptoms. Typical symptom ranges include: 0-4 minimal, 5-9 mild, 10-14 moderate, 15-19 moderately severe, 20-27 severe.       3/13/2024     7:09 AM 12/2/2024     3:52 PM 3/4/2025     1:27 PM   PHQ-9 SCORE   PHQ-9 Total Score MyChart 4 (Minimal depression) 5 (Mild depression) 2 (Minimal depression)   PHQ-9 Total Score 4 5  2        Patient-reported     Anxiety Symptoms (Generalized Anxiety Disorder 7; BERTO-7)  The BERTO-7 is a measure of anxiety and panic symptoms.  Scores on this measure range from 0 to 21 with higher scores reflecting greater levels and frequency of anxiety symptoms. Typical symptom ranges include: 0-4 minimal, 5-9 mild, 10-14 moderate, 15-21 severe.       8/3/2023     3:28 PM 3/4/2025     1:28 PM   BERTO-7 SCORE   Total Score  3 (minimal anxiety)   Total Score 6 3        Patient-reported     Alcohol and Drug Abuse (CAGE - Adapted  to Include Drugs; CAGE-AID)  The CAGE-AID is a screening tool to assess for symptoms of alcohol or drug abuse or dependence. Scores on this measure range from 0 to 4, with higher scores reflecting experiences consistent with problem use.  CAGE-AID score  > 1 is a positive screen, suggesting further discussion is needed to determine if evaluation for alcohol or substance abuse is appropriate.  A score > 2 is considered clinically significant, suggesting further evaluation of alcohol or substance-related problems is indicated.      3/4/2025     1:29 PM   CAGE-AID Total Score   Total Score 0    Total Score MyChart 0 (A total score of 2 or greater is considered clinically significant)       Patient-reported     Global Health (Patient-Reported Outcomes Measurement Information System; PROMIS-10)  The PROMIS-10 is a measure of global physical and mental health. Total scores on this measure range from 8 to 40, with higher scores reflecting greater levels of perceived health.       4/30/2023     3:58 PM 11/30/2023     6:46 PM 3/4/2025     1:29 PM   PROMIS-10 Scores Only   Global Mental Health Score 9 8 8    Global Physical Health Score 9 8 11    PROMIS TOTAL - SUBSCORES 18 16 19        Patient-reported     Suicidality ([place] Suicide Severity Rating Scale Screener Past Month)      1/20/2025    11:42 AM   [place] Suicide Severity Rating (Short Version)   Q1 Wished to be Dead (Past Month) 0-->no   Q2 Suicidal Thoughts (Past Month) 0-->no   Q6 Suicide Behavior (Lifetime) 0-->no   Level of Risk per Screen no risks indicated     Mental Status/Interview:   Appearance: Patient was appropriately groomed and dressed. The patient is 54 year old years old and appears her age.    Orientation: The patient arrived promptly. Oriented to person, plan, time and situation. Alert individual showing no signs of excessive distractibility.   Behavior: Unremarkable, eye contract good. There was no evidence of psychomotor agitation or  retardation.   Cooperation: Patient appeared open and cooperative throughout the session.  Reliability: Patient appeared to be a reliable historian.  Speech/Language: Clear  Thought Process/content: Clear, logical and linear, coherent and goal directed. There was no evidence of delusions, paranoia, or visual/auditory hallucinations. The patient tracked the conversation well.  Behavior during interview suggested that patient s memory functioning is intact for both remote and immediate recall.  Mood:  Affect:mood congruent, within the normal range and appropriate to content.  Insight: good  Judgement: good  Motivation: good  Suicidal ideation: denied  Homicidal ideation: denied    Impression  Based on this interview and results from the assessments administered on this date, her recent self-report measures of anxiety and depression appear to fall within the minimal range. Anxiety related to maintaining/finding employment, however, prevent her from applying for a new job as she has concerns about any job (her current or potential future job) requiring her to work in office. Per her report, her limited mobility and inability to drive both make the idea of working in an office challenging if not impossible. She has special risers on her toilet at home with allow her to use it independently but she is unable to get on and off a chair or toilet if it is too low as she has weakness in both upper and lower extremities. Strengths include current employment, engagement in medical care, advanced education, and a supportive spouse. She is likely to benefit from cognitive-behavioral therapy to address self criticism, stress, and anxiety with particular focus on employment and adjusting to her children moving out of her home.     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  Shared with patient  that this clinician will be on medical leave 4/16-5/27 and that a colleague will be available to see her should she wish or that we can take a break from sessions.     Adelina Lepe, PhD,    Clinical Psychologist    *In accordance with the Rules of the [place] Board of [employer], it is noted that psychological descriptions and scientific procedures underlying psychological evaluations have limitations.  Absolute predictions cannot be made based on information in this report.

## 2025-03-06 ENCOUNTER — THERAPY VISIT (OUTPATIENT)
Dept: PHYSICAL THERAPY | Facility: CLINIC | Age: 55
End: 2025-03-06
Attending: STUDENT IN AN ORGANIZED HEALTH CARE EDUCATION/TRAINING PROGRAM
Payer: COMMERCIAL

## 2025-03-06 DIAGNOSIS — M60.89 OTHER MYOSITIS OF MULTIPLE SITES: ICD-10-CM

## 2025-03-06 DIAGNOSIS — R26.89 IMPAIRED GAIT AND MOBILITY: ICD-10-CM

## 2025-03-06 DIAGNOSIS — S93.491D HIGH ANKLE SPRAIN OF RIGHT LOWER EXTREMITY, SUBSEQUENT ENCOUNTER: Primary | ICD-10-CM

## 2025-03-06 DIAGNOSIS — M25.373 CHRONIC INSTABILITY OF ANKLE: ICD-10-CM

## 2025-03-06 DIAGNOSIS — G71.3 MYOPATHY, MITOCHONDRIAL: ICD-10-CM

## 2025-03-10 ENCOUNTER — VIRTUAL VISIT (OUTPATIENT)
Dept: PSYCHOLOGY | Facility: CLINIC | Age: 55
End: 2025-03-10
Payer: COMMERCIAL

## 2025-03-10 DIAGNOSIS — F06.4 ANXIETY DISORDER DUE TO GENERAL MEDICAL CONDITION: Primary | ICD-10-CM

## 2025-03-10 NOTE — PROGRESS NOTES
"    Health Psychology                      Department of Medicine                     [facility] Mail Code [address] [place] [address]              Adelina Lepe, Ph.D., L.P [phone]  Stephanie Benson, Ph.D., L.P. [phone]  Joel Shields, Ph.D. [phone]  Tiffany Huff, Ph.D., A.B.P.P., L.P. [phone]  Al Cee, Ph.D., A.B.P.P., L.P. [phone]         Baylee Shoemaker, Ph.D., L.P. [phone]   Demetria Montesinos, Ph.D., A.B.P.P., L.P. [phone]    [facility] [place]  3rd Floor  [address]   [address]    Health Psychology Follow-Up Note    Telemental health (video) visit due to mitigation of COVID-19 pandemic, confirmed with patient. The patient has been notified of following:    \"This video visit will be conducted via a call between you and your physician/provider. We have found that certain health care needs can be provided without the need for an in-person physical exam.  This service lets us provide the care you need with a video conversation.  If a prescription is necessary we can send it directly to your pharmacy.  If lab work is needed we can place an order for that and you can then stop by our lab to have the test done at a later time. If during the course of the call the physician/provider feels a video visit is not appropriate, you will not be charged for this service.\"    Patient has given verbal consent for Video visit? YES    Reason that telemedicine is appropriate: Patient request as does not drive/limited mobility/deemed appropriate for this session.  Patient has given verbal consent for video visit: Yes  Mode of transmission: Secure real time interactive audio and visual telecommunication system via [email or web address]  Location of originating and distant sites:  Originating site (patient location): patient's home in [place]  Distant site " "(provider site): home office of provider    Start time:  2:58  End time:  3:53  Extended session due to complexity of case and length of interval.      SUBJECTIVE  Patient has agreed to receiving telehealth services and was provided written information regarding telehealth/video sessions. Reviewed updates to physical and emotional health since previous session. Reviewed treatment plan and goals, she was in verbal agreement and has access to treatment plan via [employer].    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 She took a 20% pay cut for this job, she did not try to negotiate salary. Been in HR since 2008. She has active FMLA for medical condition, if she were to leave [employer] is deterrent because she would have to accumulate a year before applying for FMLA. Current manager is flexible. She has worries about physical limitations impacting interview and she prefers job remote as well due to not driving/limited mobility. She took the job to get a specific experience and would like to take it and get into another role that allows for additional growth and increased salary.     Exercise/strength/mobility  Attending PT in person every 3 weeks for partially torn ligament in her ankle.  Feeling like struggling to incorporate all home PT exercises. Concerned she will lose strength if she stops exercising too much/limits due to ankle injury.    Genetic condition/Health  Relatively slow progression. There is no specific prognosis regarding the course of the disease. Struggles with feeling resentful of friends who are physically healthy. 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 Mobility has declined significantly over past five years. Fell in 2022 at [employer] led to broken bones and resulted in mobility \"set back.\" Feels she has been hesitant to work on acceptance of this condition in the past/does not feel comfortable with relying on " /children for help/care. Health insurance through her 's job and she currently works full time.     OBJECTIVE  Appearance/Behavior/Orientation: Patient was casually groomed and dressed. Oriented to person, plan, time and situation.   Cooperation/Reliability: Patient was open and cooperative throughout the session.    Speech/Language: Speech was clear, coherent, and of normal rate, rhythm and volume.   Thought Process: Clear, linear  Mood/Affect: Mood somewhat dysphoric; affect mood congruent  Insight/Motivation: Good/good    ASSESSMENT  Patient's mood and anxiety within minimal levels. She does, however, experience anxiety related to her medical condition and the impact that it has on her functioning in daily life (at home, with work).  The patient was engaged and interactive throughout the session and they appear likely to benefit from continued sessions.          8/3/2023     3:28 PM 3/4/2025     1:28 PM   BERTO-7 SCORE   Total Score  3 (minimal anxiety)   Total Score 6 3        Patient-reported            3/13/2024     7:09 AM 12/2/2024     3:52 PM 3/4/2025     1:27 PM   PHQ   PHQ-9 Total Score 4 5  2    Q9: Thoughts of better off dead/self-harm past 2 weeks Not at all Not at all Not at all       Patient-reported        DIAGNOSIS  Anxiety disorder due to general medical condition [F06.4]     PLAN/GOALS  RTC for continued psychotherapy. 3/20 4:00, 3/24 3:00, 4/9 4:00., 4/14 11:00  3/5/25- Shared with patient that this clinician will be on medical leave 4/16-5/27 and that a colleague will be available to see her should she wish or that we can take a break from sessions.     Goals: Speak with PT about how best to maintain her strength while letting ankle injury heal, setting aside time to consider what type of job she may want to apply for.     Adelina Lepe, PhD, LP   Clinical Psychologist     Tx plan completed:             03/10/25  Tx plan due:                        03/10/26    OUTPATIENT TREATMENT  PLAN SUMMARY    Date of Treatment Plan:   03/10/25   90-Day Review Date:  N/A  Date of Initial Service: 3/5/25      DSM-V Diagnosis (include numeric code)  Anxiety disorder due to general medical condition [F06.4]     Current symptoms and circumstances that substantiate the diagnosis:  See diagnostic assessment    How symptoms and/or behaviors are affecting level of function:   See diagnostic assessment    Risk Assessment:  Suicide:  Assessed Level of Immediate Risk: None  Ideation: No   Plan:  No  Means: No  Intent: No    Homicide/Violence:  Assessed Level of Immediate Risk: None  Ideation: No  Plan:  No  Means: No  Intent: No  If on a medication, please include name and dosage:  See chart    Symptom/Problem Measurable Goals Interventions Gains Made   Limited mobility/muscle weakness  1. Maintain/increase physical activity/home PT exercises/strengthen legs so can walk easier.  CBT 1. N/A   2.Impact of health on career. Been at current position for almost 3 years.  2. Increased confidence and job applications. 2. CBT 2. N/A   3. Genetic disease- struggling with accepting 3. Increased acceptance/  decreased feelings of resentment  3. CBT 3. N/A     Frequency of Sessions: 1-4x/month    Discharge and Aftercare Goals: TBD    Expected duration of treatment:  TBD    Participants in therapy plan (family, friends, support network): TBD    Discussed and created this plan with the patient who has access to this treatment plan via [employer].    Regulatory Guidelines for Updating Treatment Plan  [place] Medical Assistance: Reviewed & signed at least every 90days  Medicare:  Update per policy

## 2025-03-11 ENCOUNTER — PATIENT OUTREACH (OUTPATIENT)
Dept: CARE COORDINATION | Facility: CLINIC | Age: 55
End: 2025-03-11
Payer: COMMERCIAL

## 2025-03-11 NOTE — PROGRESS NOTES
Clinic Care Coordination Contact  [facility]/Voicemail    Clinical Data: Care Coordinator Outreach    Outreach Documentation Number of Outreach Attempt   2/26/2025   4:28 PM 1   3/11/2025   4:25 PM 2       Left message on patient's voicemail with call back information and requested return call.      Plan: Care Coordinator will do no further outreaches at this time.    Bere Gomez,  [facility]  Clinic Care Coordinator  [facility]  [phone]  [email or web address]

## 2025-03-16 ASSESSMENT — ANXIETY QUESTIONNAIRES
4. TROUBLE RELAXING: NOT AT ALL
IF YOU CHECKED OFF ANY PROBLEMS ON THIS QUESTIONNAIRE, HOW DIFFICULT HAVE THESE PROBLEMS MADE IT FOR YOU TO DO YOUR WORK, TAKE CARE OF THINGS AT HOME, OR GET ALONG WITH OTHER PEOPLE: SOMEWHAT DIFFICULT
1. FEELING NERVOUS, ANXIOUS, OR ON EDGE: SEVERAL DAYS
7. FEELING AFRAID AS IF SOMETHING AWFUL MIGHT HAPPEN: NOT AT ALL
2. NOT BEING ABLE TO STOP OR CONTROL WORRYING: NOT AT ALL
GAD7 TOTAL SCORE: 3
7. FEELING AFRAID AS IF SOMETHING AWFUL MIGHT HAPPEN: NOT AT ALL
3. WORRYING TOO MUCH ABOUT DIFFERENT THINGS: SEVERAL DAYS
5. BEING SO RESTLESS THAT IT IS HARD TO SIT STILL: NOT AT ALL
6. BECOMING EASILY ANNOYED OR IRRITABLE: SEVERAL DAYS
8. IF YOU CHECKED OFF ANY PROBLEMS, HOW DIFFICULT HAVE THESE MADE IT FOR YOU TO DO YOUR WORK, TAKE CARE OF THINGS AT HOME, OR GET ALONG WITH OTHER PEOPLE?: SOMEWHAT DIFFICULT

## 2025-03-17 ENCOUNTER — PATIENT OUTREACH (OUTPATIENT)
Dept: CARE COORDINATION | Facility: CLINIC | Age: 55
End: 2025-03-17
Payer: COMMERCIAL

## 2025-03-19 ENCOUNTER — TRANSFERRED RECORDS (OUTPATIENT)
Dept: HEALTH INFORMATION MANAGEMENT | Facility: CLINIC | Age: 55
End: 2025-03-19
Payer: COMMERCIAL

## 2025-03-20 ENCOUNTER — VIRTUAL VISIT (OUTPATIENT)
Dept: PSYCHOLOGY | Facility: CLINIC | Age: 55
End: 2025-03-20
Payer: COMMERCIAL

## 2025-03-20 DIAGNOSIS — F06.4 ANXIETY DISORDER DUE TO GENERAL MEDICAL CONDITION: Primary | ICD-10-CM

## 2025-03-20 NOTE — PROGRESS NOTES
"    Health Psychology                      Department of Medicine                     [facility] Mail Code [address] [place] [address]              Adelina Lepe, Ph.D., L.P [phone]  Stephanie Benson, Ph.D., L.P. [phone]  Joel Shields, Ph.D. [phone]  Tiffany Huff, Ph.D., A.B.P.P., L.P. [phone]  Al Cee, Ph.D., A.B.P.P., L.P. [phone]         Baylee Shoemaker, Ph.D., L.P. [phone]   Demetria Montesinos, Ph.D., A.B.P.P., L.P. [phone]    [facility]  3rd Floor  [address]   [address]    Health Psychology Follow-Up Note    The patient has been notified of following:    \"This video visit will be conducted via a call between you and your physician/provider. We have found that certain health care needs can be provided without the need for an in-person physical exam.  This service lets us provide the care you need with a video conversation.  If a prescription is necessary we can send it directly to your pharmacy.  If lab work is needed we can place an order for that and you can then stop by our lab to have the test done at a later time. If during the course of the call the physician/provider feels a video visit is not appropriate, you will not be charged for this service.\"    Patient has given verbal consent for Video visit? YES    Reason that telemedicine is appropriate: Patient request as does not drive/limited mobility/deemed appropriate for this session.  Patient has given verbal consent for video visit: Yes  Mode of transmission: Secure real time interactive audio and visual telecommunication system via [email or web address]  Location of originating and distant sites:  Originating site (patient location): patient's home in [place]  Distant site (provider site): home office of provider    Start time:  3:58  End time:  4:54  Extended session due " to onset of treatment.    SUBJECTIVE  Patient has agreed to receiving telehealth services and was provided written information regarding telehealth/video sessions. Reviewed updates to physical and emotional health since previous session.     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 She took a 20% pay cut for this job, she did not try to negotiate salary. Been in HR since 2008. She has active FMLA for medical condition, if she were to leave [employer] is deterrent because she would have to accumulate a year before applying for FMLA. Current manager is flexible. She has worries about physical limitations impacting interview and she prefers job remote as well due to not driving/limited mobility. She took the job to get a specific experience and would like to take it and get into another role that allows for additional growth and increased salary.     Decided not to apply for job that she had been considering. Has done a lot of work to consider job options and her strengths. She started reviewing the materials. Still needs work on confidence to apply for jobs.She has been looking into remote job options. Met goal of reviewing for about 30 minutes and joined group on [employer] for people seeking remote jobs. Engaged her in processing emotions related to confidence related to applying for new job.    Exercise/strength/mobility  Attending PT in person every 3 weeks for partially torn ligament in her ankle.  Feeling like struggling to incorporate all home PT exercises. Concerned she will lose strength if she stops exercising too much/limits due to ankle injury.    Genetic condition/Health  Relatively slow progression. There is no specific prognosis regarding the course of the disease. Struggles with feeling resentful of friends who are physically healthy. 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 Mobility has declined significantly over past five  "years. Fell in 2022 at [facility] led to broken bones and resulted in mobility \"set back.\" Feels she has been hesitant to work on acceptance of this condition in the past/does not feel comfortable with relying on /children for help/care. Health insurance through her 's job and she currently works full time.     Her hands and arms are going numb overnight and ache. Had EMG recently. Has been impacting her sleep.   Whole genome sequencing- looking for new developments in past few years. They couldn't find underlying genetic cause previously, diagnosis was through muscle biopsy. Is going to do more genetic testing but will take about 3 months to get results. Planning to meet with Neurologist and Rheumatologist at [place]. Sports medicine doctor at [facility] ordered EMG, seeing her for numbness/ankles. Has added ankle PT to help since her ankle sprain, but this has resulted in decreasing PT for other areas. Has more energy in AM so uses this time to do PT. The pool she goes to has been closed for 2 weeks. She misses it, helpful mentally and physically.     Other stressors  Daughter was home recently from [place]. Busy with her twin boys who are seniors.     OBJECTIVE  Appearance/Behavior/Orientation: Patient was casually groomed and dressed. Oriented to person, plan, time and situation.   Cooperation/Reliability: Patient was open and cooperative throughout the session.    Speech/Language: Speech was clear, coherent, and of normal rate, rhythm and volume.   Thought Process: Clear, linear  Mood/Affect: Mood euthymic; affect mood congruent  Insight/Motivation: Good/good    ASSESSMENT  Patient's mood and anxiety within minimal levels. She does, however, experience anxiety related to her medical condition and the impact that it has on her functioning in daily life (at home, with work).  The patient was engaged and interactive throughout the session and they appear likely to benefit from continued sessions.          " 8/3/2023     3:28 PM 3/4/2025     1:28 PM 3/16/2025     1:13 PM   BERTO-7 SCORE   Total Score  3 (minimal anxiety) 3 (minimal anxiety)   Total Score 6 3  3        Patient-reported            3/13/2024     7:09 AM 12/2/2024     3:52 PM 3/4/2025     1:27 PM   PHQ   PHQ-9 Total Score 4 5  2    Q9: Thoughts of better off dead/self-harm past 2 weeks Not at all Not at all Not at all       Patient-reported        DIAGNOSIS  Anxiety disorder due to general medical condition [F06.4]     PLAN/GOALS  RTC for continued psychotherapy. 3/24 3:00, 4/9 4:00., 4/14 11:00, 5/28 4:00  3/5/25- Shared with patient that this clinician will be on medical leave 4/16-5/27 and that a colleague will be available to see her should she wish or that we can take a break from sessions.   3/20/25- Revisited plan for this clinician's leave, patient wishes to take a break from sessions but was open to getting contact information of a colleague in the case she changes her mind while I am away.     Goals: consider applying for 1 job and spend time looking for additional remote jobs (future goal? reach out to network).    Adelina Lepe, PhD,    Clinical Psychologist     Tx plan completed:             03/10/25  Tx plan due:                        03/10/26    OUTPATIENT TREATMENT PLAN SUMMARY    Date of Treatment Plan:   03/10/25   90-Day Review Date:  N/A  Date of Initial Service: 3/5/25      DSM-V Diagnosis (include numeric code)  Anxiety disorder due to general medical condition [F06.4]     Current symptoms and circumstances that substantiate the diagnosis:  See diagnostic assessment    How symptoms and/or behaviors are affecting level of function:   See diagnostic assessment    Risk Assessment:  Suicide:  Assessed Level of Immediate Risk: None  Ideation: No   Plan:  No  Means: No  Intent: No    Homicide/Violence:  Assessed Level of Immediate Risk: None  Ideation: No  Plan:  No  Means: No  Intent: No  If on a medication, please include name and dosage:   See chart    Symptom/Problem Measurable Goals Interventions Gains Made   Limited mobility/muscle weakness  1. Maintain/increase physical activity/home PT exercises/strengthen legs so can walk easier.  CBT 1. N/A   2.Impact of health on career. Been at current position for almost 3 years.  2. Increased confidence and job applications. 2. CBT 2. N/A   3. Genetic disease- struggling with accepting 3. Increased acceptance/  decreased feelings of resentment  3. CBT 3. N/A     Frequency of Sessions: 1-4x/month    Discharge and Aftercare Goals: TBD    Expected duration of treatment:  TBD    Participants in therapy plan (family, friends, support network): TBD    Discussed and created this plan with the patient who has access to this treatment plan via [employer].    Regulatory Guidelines for Updating Treatment Plan  [place] Medical Assistance: Reviewed & signed at least every 90days  Medicare:  Update per policy

## 2025-03-23 ENCOUNTER — NURSE TRIAGE (OUTPATIENT)
Dept: NURSING | Facility: CLINIC | Age: 55
End: 2025-03-23
Payer: COMMERCIAL

## 2025-03-23 ENCOUNTER — OFFICE VISIT (OUTPATIENT)
Dept: URGENT CARE | Facility: URGENT CARE | Age: 55
End: 2025-03-23
Payer: COMMERCIAL

## 2025-03-23 VITALS
RESPIRATION RATE: 18 BRPM | OXYGEN SATURATION: 99 % | SYSTOLIC BLOOD PRESSURE: 117 MMHG | HEART RATE: 88 BPM | TEMPERATURE: 98.3 F | DIASTOLIC BLOOD PRESSURE: 73 MMHG

## 2025-03-23 DIAGNOSIS — R30.0 DYSURIA: Primary | ICD-10-CM

## 2025-03-23 DIAGNOSIS — R31.9 URINARY TRACT INFECTION WITH HEMATURIA, SITE UNSPECIFIED: ICD-10-CM

## 2025-03-23 DIAGNOSIS — N94.9 VAGINAL DISCOMFORT: ICD-10-CM

## 2025-03-23 DIAGNOSIS — N39.0 URINARY TRACT INFECTION WITH HEMATURIA, SITE UNSPECIFIED: ICD-10-CM

## 2025-03-23 LAB
ALBUMIN UR-MCNC: NEGATIVE MG/DL
APPEARANCE UR: ABNORMAL
BACTERIA #/AREA URNS HPF: ABNORMAL /HPF
BILIRUB UR QL STRIP: NEGATIVE
CLUE CELLS: ABNORMAL
COLOR UR AUTO: YELLOW
GLUCOSE UR STRIP-MCNC: NEGATIVE MG/DL
HGB UR QL STRIP: ABNORMAL
KETONES UR STRIP-MCNC: ABNORMAL MG/DL
LEUKOCYTE ESTERASE UR QL STRIP: ABNORMAL
MUCOUS THREADS #/AREA URNS LPF: PRESENT /LPF
NITRATE UR QL: NEGATIVE
PH UR STRIP: 6 [PH] (ref 5–7)
RBC #/AREA URNS AUTO: ABNORMAL /HPF
SP GR UR STRIP: >=1.03 (ref 1–1.03)
SQUAMOUS #/AREA URNS AUTO: ABNORMAL /LPF
TRICHOMONAS, WET PREP: ABNORMAL
UROBILINOGEN UR STRIP-ACNC: 0.2 E.U./DL
WBC #/AREA URNS AUTO: ABNORMAL /HPF
WBC'S/HIGH POWER FIELD, WET PREP: ABNORMAL
YEAST, WET PREP: ABNORMAL

## 2025-03-23 PROCEDURE — 3078F DIAST BP <80 MM HG: CPT | Performed by: FAMILY MEDICINE

## 2025-03-23 PROCEDURE — 87210 SMEAR WET MOUNT SALINE/INK: CPT | Performed by: FAMILY MEDICINE

## 2025-03-23 PROCEDURE — 87086 URINE CULTURE/COLONY COUNT: CPT | Performed by: FAMILY MEDICINE

## 2025-03-23 PROCEDURE — 3074F SYST BP LT 130 MM HG: CPT | Performed by: FAMILY MEDICINE

## 2025-03-23 PROCEDURE — 81001 URINALYSIS AUTO W/SCOPE: CPT | Performed by: FAMILY MEDICINE

## 2025-03-23 PROCEDURE — 99213 OFFICE O/P EST LOW 20 MIN: CPT | Performed by: FAMILY MEDICINE

## 2025-03-23 PROCEDURE — 87186 SC STD MICRODIL/AGAR DIL: CPT | Performed by: FAMILY MEDICINE

## 2025-03-23 RX ORDER — PROGESTERONE 100 MG/1
CAPSULE ORAL DAILY
COMMUNITY
Start: 2025-01-05

## 2025-03-23 RX ORDER — FLUCONAZOLE 150 MG/1
TABLET ORAL
COMMUNITY
Start: 2025-03-14

## 2025-03-23 RX ORDER — CEPHALEXIN 500 MG/1
500 CAPSULE ORAL 2 TIMES DAILY
Qty: 10 CAPSULE | Refills: 0 | Status: SHIPPED | OUTPATIENT
Start: 2025-03-23 | End: 2025-03-28

## 2025-03-23 NOTE — TELEPHONE ENCOUNTER
I'm having burning when I urinate. Just noticed this afternoon.  Just finished an abx for UTI.    Per the protocol, I recommended she be seen within 24 hrs.  Caller stated understanding and agreement.  She'll go to [facility] now, today.      Reason for Disposition   [1] Pain or burning with passing urine (urination) AND [2] female   Age > 50 years    Additional Information   Negative: Shock suspected (e.g., cold/pale/clammy skin, too weak to stand, low BP, rapid pulse)   Negative: Sounds like a life-threatening emergency to the triager   Negative: Followed a female genital area injury (e.g., labia, vagina, vulva)   Negative: Followed a male genital area injury (e.g., penis, scrotum)   Negative: Vaginal discharge   Negative: Pus (white, yellow) or bloody discharge from end of penis   Negative: [1] Taking antibiotic for urinary tract infection (UTI) AND [2] male   Negative: [1] Pain or burning with passing urine (urination) AND [2] pregnant   Negative: [1] Pain or burning with passing urine (urination) AND [2] postpartum (from 0 to 6 weeks after delivery)   Negative: Shock suspected (e.g., cold/pale/clammy skin, too weak to stand, low BP, rapid pulse)   Negative: Sounds like a life-threatening emergency to the triager   Negative: Followed a genital area injury (e.g., vagina, vulva)   Negative: Taking antibiotic for urinary tract infection (UTI)   Negative: Pregnant   Negative: Postpartum (from 0 to 6 weeks after delivery)   Negative: [1] Unable to urinate (or only a few drops) > 4 hours AND [2] bladder feels very full (e.g., palpable bladder or strong urge to urinate)   Negative: Vomiting   Negative: Patient sounds very sick or weak to the triager   Negative: [1] SEVERE pain with urination (e.g., excruciating) AND [2] not improved after 2 hours of pain medicine and Sitz bath   Negative: Fever > 100.4 F (38.0 C)   Negative: Side (flank) or lower back pain present   Negative: Diabetes mellitus or weak immune system (e.g.,  HIV positive, cancer chemo, splenectomy, organ transplant, chronic steroids)   Negative: Bedridden (e.g., CVA, chronic illness, recovering from surgery)   Negative: Artificial heart valve or artificial joint   Negative: Unusual vaginal discharge (e.g., bad smelling, yellow, green, or foamy-white)   Negative: Patient is worried they have a sexually transmitted infection (STI)   Negative: Possibility of pregnancy   Negative: Blood in urine (red, pink, or tea-colored)    Protocols used: Urinary Symptoms-A-AH, Urination Pain - Female-A-AH  Cleopatra HAYES RN [facility]

## 2025-03-23 NOTE — PROGRESS NOTES
Chief Complaint   Patient presents with    Urgent Care     Pt presents with dysuria onset today, pt was dignosed with uti couple days ago, just completed 5 days course of antibiotics but the symptoms have reoccurred.       [place] was seen today for urgent care.    Diagnoses and all orders for this visit:    Dysuria  -     UA Macroscopic with reflex to Microscopic and Culture - Clinic Collect  -     UA Microscopic with Reflex to Culture  -     Urine Culture    Vaginal discomfort  -     Wet prep - Clinic Collect    Urinary tract infection with hematuria, site unspecified  -     cephALEXin (KEFLEX) 500 MG capsule; Take 1 capsule (500 mg) by mouth 2 times daily for 5 days.          ASSESSMENT: UTI uncomplicated without evidence of pyelonephritis    PLAN: Treatment per orders - also push fluids, may use Pyridium OTC prn. Call or return to clinic prn if these symptoms worsen or fail to improve as anticipated.  Last GFR was WNL   To continue pushing enough fluids urine culture is pending symptoms do not get better over the next 3 days should follow-up.    Differential Diagnosis UTI/pyelonephritis/kidney stone/appendicitis/diverticulitis      SUBJECTIVE: Britt Park is a 54 year old female who complains of urinary frequency, urgency and dysuria x 1 days, without flank pain, fever, chills, or abnormal vaginal discharge or bleeding.   Just finished antibiotic course with macrobid started noticing symptoms     OBJECTIVE: Appears well, in no apparent distress.  Vital signs are normal. The abdomen is soft without tenderness, guarding, mass, rebound or organomegaly. No CVA tenderness  noted.     Urine   Results for orders placed or performed in visit on 03/23/25   UA Macroscopic with reflex to Microscopic and Culture - Clinic Collect     Status: Abnormal    Specimen: Urine, Midstream   Result Value Ref Range    Color Urine Yellow Colorless, Straw, Light Yellow, Yellow    Appearance Urine Slightly Cloudy (A) Clear     Glucose Urine Negative Negative mg/dL    Bilirubin Urine Negative Negative    Ketones Urine Trace (A) Negative mg/dL    Specific Gravity Urine >=1.030 1.003 - 1.035    Blood Urine Moderate (A) Negative    pH Urine 6.0 5.0 - 7.0    Protein Albumin Urine Negative Negative mg/dL    Urobilinogen Urine 0.2 0.2, 1.0 E.U./dL    Nitrite Urine Negative Negative    Leukocyte Esterase Urine Small (A) Negative   UA Microscopic with Reflex to Culture     Status: Abnormal   Result Value Ref Range    Bacteria Urine Many (A) None Seen /HPF    RBC Urine 10-25 (A) 0-2 /HPF /HPF    WBC Urine 10-25 (A) 0-5 /HPF /HPF    Squamous Epithelials Urine Few (A) None Seen /LPF    Mucus Urine Present (A) None Seen /LPF   Wet prep - Clinic Collect     Status: Abnormal    Specimen: Vagina; Swab   Result Value Ref Range    Trichomonas Absent Absent    Yeast Absent Absent    Clue Cells Absent Absent    WBCs/high power field 3+ (A) None       Mackenzie Bergman MD

## 2025-03-23 NOTE — PATIENT INSTRUCTIONS
You are seen today for urinary tract infection   Please finish the antibiotic even if you are feeling better.  Do take more fluids, you  can take pyridium for pain   Watch for worsening signs of infection such as increasing fever above 102, worsening abdominal pain, worsening fatigue ,worsening flank pain .       Mackenzie Bergman MD

## 2025-03-24 ENCOUNTER — VIRTUAL VISIT (OUTPATIENT)
Dept: PSYCHOLOGY | Facility: CLINIC | Age: 55
End: 2025-03-24
Payer: COMMERCIAL

## 2025-03-24 DIAGNOSIS — F06.4 ANXIETY DISORDER DUE TO GENERAL MEDICAL CONDITION: Primary | ICD-10-CM

## 2025-03-24 NOTE — PROGRESS NOTES
"    Health Psychology                      Department of Medicine                     [facility] [address] [place] [address]              Adelina Lepe, Ph.D., L.P [phone]  Stephanie Benson, Ph.D., L.P. [phone]  Joel Shields, Ph.D. [phone]  Tiffany Huff, Ph.D., A.B.P.P., L.P. [phone]  Al Cee, Ph.D., A.B.P.P., L.P. [phone]         Baylee Shoemaker, Ph.D., L.P. [phone]   Demetria Montesinos, Ph.D., A.B.P.P., L.P. [phone]    [facility]  3rd Floor  [address]   [id number]    Health Psychology Follow-Up Note    The patient has been notified of following:    \"This video visit will be conducted via a call between you and your physician/provider. We have found that certain health care needs can be provided without the need for an in-person physical exam.  This service lets us provide the care you need with a video conversation.  If a prescription is necessary we can send it directly to your pharmacy.  If lab work is needed we can place an order for that and you can then stop by our lab to have the test done at a later time. If during the course of the call the physician/provider feels a video visit is not appropriate, you will not be charged for this service.\"    Patient has given verbal consent for Video visit? YES    Reason that telemedicine is appropriate: Patient request as does not drive/limited mobility/deemed appropriate for this session.  Patient has given verbal consent for video visit: Yes  Mode of transmission: Secure real time interactive audio and visual telecommunication system via [email or web address]  Location of originating and distant sites:  Originating site (patient location): patient's home in [place]  Distant site (provider site): home office of provider    Start time:  3:00  End time:  3:53  Extended session due " to onset of treatment.    SUBJECTIVE  Patient has agreed to receiving telehealth services and was provided written information regarding telehealth/video sessions. Reviewed updates to physical and emotional health since previous session.     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 She took a 20% pay cut for this job, she did not try to negotiate salary. Been in HR since 2008. She has active FMLA for medical condition, if she were to leave [employer] is deterrent because she would have to accumulate a year before applying for FMLA. Current manager is flexible. She has worries about physical limitations impacting interview and she prefers job remote as well due to not driving/limited mobility. She took the job to get a specific experience and would like to take it and get into another role that allows for additional growth and increased salary.     Decided not to apply for job that she had been considering. Has done a lot of work to consider job options and her strengths. She started reviewing the materials. Still needs work on confidence to apply for jobs.She has been looking into remote job options. Met goal of reviewing for about 30 minutes and joined group on [employer] for people seeking remote jobs. Engaged her in processing emotions related to confidence related to applying for new job.    Applied to new job that would be remote only in a similar position that she is currently in. Is considering applying for a higher/leadership role as well. Updated resume/cover letter and is in the process of applying for new positions.  Discussed work-related stressor and engaged her in processing via cognitive challenging of fortune-telling and mind-reading. Reinforced her use of coping skills such as praying and speaking with a friend.     Exercise/strength/mobility  Attending PT in person every 3 weeks for partially torn ligament in her ankle.  Feeling like struggling  "to incorporate all home PT exercises. Concerned she will lose strength if she stops exercising too much/limits due to ankle injury.    Genetic condition/Health  Relatively slow progression. There is no specific prognosis regarding the course of the disease. Struggles with feeling resentful of friends who are physically healthy. 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 Mobility has declined significantly over past five years. Fell in 2022 at [employer] led to broken bones and resulted in mobility \"set back.\" Feels she has been hesitant to work on acceptance of this condition in the past/does not feel comfortable with relying on /children for help/care. Health insurance through her 's job and she currently works full time.     Her hands and arms are going numb overnight and ache. Had EMG recently. Has been impacting her sleep.   Whole genome sequencing- looking for new developments in past few years. They couldn't find underlying genetic cause previously, diagnosis was through muscle biopsy. Is going to do more genetic testing but will take about 3 months to get results. Planning to meet with Neurologist and Rheumatologist at [facility]. Sports medicine doctor at [facility] ordered EMG, seeing her for numbness/ankles. Has added ankle PT to help since her ankle sprain, but this has resulted in decreasing PT for other areas. Has more energy in AM so uses this time to do PT. The pool she goes to has been closed for 2 weeks. She misses it, helpful mentally and physically.     Other stressors  Daughter was home recently from [place]. Busy with her twin boys who are seniors.               OBJECTIVE  Appearance/Behavior/Orientation: Patient was casually groomed and dressed. Oriented to person, plan, time and situation.   Cooperation/Reliability: Patient was open and cooperative throughout the session.    Speech/Language: Speech was clear, coherent, and of normal rate, rhythm and volume.   Thought " Process: Clear, linear  Mood/Affect: Mood euthymic-somewhat anxious; affect mood congruent  Insight/Motivation: Good/good    ASSESSMENT  Patient's mood and anxiety within minimal levels. She does, however, experience anxiety related to her medical condition and the impact that it has on her functioning in daily life (at home, with work).  The patient was engaged and interactive throughout the session and they appear likely to benefit from continued sessions.          8/3/2023     3:28 PM 3/4/2025     1:28 PM 3/16/2025     1:13 PM   BERTO-7 SCORE   Total Score  3 (minimal anxiety) 3 (minimal anxiety)   Total Score 6 3  3        Patient-reported            3/13/2024     7:09 AM 12/2/2024     3:52 PM 3/4/2025     1:27 PM   PHQ   PHQ-9 Total Score 4 5  2    Q9: Thoughts of better off dead/self-harm past 2 weeks Not at all Not at all Not at all       Patient-reported        DIAGNOSIS  Anxiety disorder due to general medical condition [F06.4]     PLAN/GOALS  RTC for continued psychotherapy. 4/9 4:00., 4/14 11:00, 5/28 4:00  3/5/25- Shared with patient that this clinician will be on medical leave 4/16-5/27 and that a colleague will be available to see her should she wish or that we can take a break from sessions.   3/20/25- Revisited plan for this clinician's leave, patient wishes to take a break from sessions but was open to getting contact information of a colleague in the case she changes her mind while I am away.     Goals: consider applying for 1 more job, incorporate additional meditation sessions from [employer].     Adelina Lepe, PhD,    Clinical Psychologist     Tx plan completed:             03/10/25  Tx plan due:                        03/10/26    OUTPATIENT TREATMENT PLAN SUMMARY    Date of Treatment Plan:   03/10/25   90-Day Review Date:  N/A  Date of Initial Service: 3/5/25      DSM-V Diagnosis (include numeric code)  Anxiety disorder due to general medical condition [F06.4]     Current symptoms and  circumstances that substantiate the diagnosis:  See diagnostic assessment    How symptoms and/or behaviors are affecting level of function:   See diagnostic assessment    Risk Assessment:  Suicide:  Assessed Level of Immediate Risk: None  Ideation: No   Plan:  No  Means: No  Intent: No    Homicide/Violence:  Assessed Level of Immediate Risk: None  Ideation: No  Plan:  No  Means: No  Intent: No  If on a medication, please include name and dosage:  See chart    Symptom/Problem Measurable Goals Interventions Gains Made   Limited mobility/muscle weakness  1. Maintain/increase physical activity/home PT exercises/strengthen legs so can walk easier.  CBT 1. N/A   2.Impact of health on career. Been at current position for almost 3 years.  2. Increased confidence and job applications. 2. CBT 2. N/A   3. Genetic disease- struggling with accepting 3. Increased acceptance/  decreased feelings of resentment  3. CBT 3. N/A     Frequency of Sessions: 1-4x/month    Discharge and Aftercare Goals: TBD    Expected duration of treatment:  TBD    Participants in therapy plan (family, friends, support network): TBD    Discussed and created this plan with the patient who has access to this treatment plan via [employer].    Regulatory Guidelines for Updating Treatment Plan  [place] Medical Assistance: Reviewed & signed at least every 90days  Medicare:  Update per policy

## 2025-03-25 LAB — BACTERIA UR CULT: ABNORMAL

## 2025-03-27 ENCOUNTER — THERAPY VISIT (OUTPATIENT)
Dept: PHYSICAL THERAPY | Facility: CLINIC | Age: 55
End: 2025-03-27
Payer: COMMERCIAL

## 2025-03-27 DIAGNOSIS — M25.571 PAIN IN JOINT INVOLVING ANKLE AND FOOT, RIGHT: Primary | ICD-10-CM

## 2025-03-31 ENCOUNTER — PATIENT OUTREACH (OUTPATIENT)
Dept: CARE COORDINATION | Facility: CLINIC | Age: 55
End: 2025-03-31
Payer: COMMERCIAL

## 2025-04-01 DIAGNOSIS — E61.1 IRON DEFICIENCY: ICD-10-CM

## 2025-04-01 NOTE — TELEPHONE ENCOUNTER
LOV 1- Isaías    Appointments in Next Year        Jun 23, 2025 8:30 AM  (Arrive by 8:10 AM)  Provider Visit with Vi Metz MD  [facility] ([facility] ) [phone]            RT Tyrell (R)

## 2025-04-03 ENCOUNTER — OFFICE VISIT (OUTPATIENT)
Dept: PHYSICAL MEDICINE AND REHAB | Facility: CLINIC | Age: 55
End: 2025-04-03
Attending: STUDENT IN AN ORGANIZED HEALTH CARE EDUCATION/TRAINING PROGRAM
Payer: COMMERCIAL

## 2025-04-03 VITALS
OXYGEN SATURATION: 95 % | RESPIRATION RATE: 16 BRPM | DIASTOLIC BLOOD PRESSURE: 67 MMHG | SYSTOLIC BLOOD PRESSURE: 100 MMHG | HEART RATE: 91 BPM

## 2025-04-03 DIAGNOSIS — R20.2 POSITIVE PHALEN MANEUVER: ICD-10-CM

## 2025-04-03 DIAGNOSIS — M79.601 PARESTHESIA AND PAIN OF BOTH UPPER EXTREMITIES: ICD-10-CM

## 2025-04-03 DIAGNOSIS — M54.9 UPPER BACK PAIN: ICD-10-CM

## 2025-04-03 DIAGNOSIS — M62.81 MUSCLE WEAKNESS (GENERALIZED): ICD-10-CM

## 2025-04-03 DIAGNOSIS — M79.602 PARESTHESIA AND PAIN OF BOTH UPPER EXTREMITIES: ICD-10-CM

## 2025-04-03 DIAGNOSIS — M54.12 CERVICAL RADICULOPATHY AT C8: Primary | ICD-10-CM

## 2025-04-03 DIAGNOSIS — M54.2 NECK PAIN: ICD-10-CM

## 2025-04-03 DIAGNOSIS — R20.2 PARESTHESIA AND PAIN OF BOTH UPPER EXTREMITIES: ICD-10-CM

## 2025-04-03 DIAGNOSIS — R20.2 PARESTHESIAS: ICD-10-CM

## 2025-04-03 PROCEDURE — 3074F SYST BP LT 130 MM HG: CPT | Performed by: NURSE PRACTITIONER

## 2025-04-03 PROCEDURE — 99205 OFFICE O/P NEW HI 60 MIN: CPT | Performed by: NURSE PRACTITIONER

## 2025-04-03 PROCEDURE — 3078F DIAST BP <80 MM HG: CPT | Performed by: NURSE PRACTITIONER

## 2025-04-03 NOTE — NURSING NOTE
Chief Complaint   Patient presents with    New Patient     New Med Spine Tara     /67 (BP Location: Right arm, Patient Position: Sitting, Cuff Size: Adult Regular)   Pulse 91   Resp 16   LMP  (LMP Unknown)   SpO2 95%   Amanda Arreaga

## 2025-04-03 NOTE — PATIENT INSTRUCTIONS
It was a pleasure seeing you in the clinic today.  As discussed, we made the following updates to your plan of care:     Home health referral added for home PT. You will receive a call to schedule the appointment.    Please try wearing over-the-counter wrist splints at night/while you are sleeping for possible carpal tunnel.    A MRI order was added today, which you can schedule after today's visit.  Otherwise, Radiology will call you to schedule. You may also call [facility] Imaging Scheduling directly if you do not hear from them in the next couple of days.    Imaging scheduling  [facility] [phone] Clinics and Surgery Center [facility] ([facility] Clinics, including [facility], [facility] [phone] [facility], [facility] Clinics including [facility], and [facility] [phone] [facility], [facility], [facility], [facility] clinics, including [place], [facility], and [facility]  [facility] [phone] [facility], [facility], [facility] Clinics, including [place], [facility], and [place]       Please schedule follow up with me after MRI. (Please schedule the follow up at least 1 day after the MRI to give radiology time to do the report as well.) once you have the MRI scheduled, please call our clinic phone number below to schedule follow-up visit.  Virtual visit would be fine.    Thank you,  Carmel Telles NP  Physical Medicine and Rehabilitation, Medical Spine  PM&R clinic Phone: [phone]  PM&R clinic Fax: [phone]

## 2025-04-03 NOTE — PROGRESS NOTES
"Today's Date: 4/3/2025     Patient seen at the request of Eleonora Lemus for an opinion and evaluation of ***.      HISTORY OF PRESENT ILLNESS:  Britt Park is a 54 year old female who presents with a chief complaint of ***.    C8 cervical radiculopathy per EMG, with history of chronic neck pain, previously seeing pain/spine specialist Dr. Scott at [facility] 3/19/2025 - C8 radiculopathy     Has tried PT and OT and neurofatigue  Limited by fatigue      She was seen today in the clinic. She describes    She describes neck and upper extremity pain which has been going on for at least 6 months or more.  That night she sleeps on her back.  She says that when she is laying down she feels like something is \"cut off\" in both of her arms.  She describes numbness, tingling, and pins-and-needles sensation affecting both upper extremities diffusely, without any particular pattern.  At night she wears fingerless compression sleeves, but finds the sensation is extremely uncomfortable.  She describes her hands aching and says that she wakes up with a lot of pain.  The discomfort is most worse at night, which she does feel uncomfortable during the day as well.    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    She is able to perform ADLs, but requires assistance from family with some upper body dressing.  Activities like taking her coat off or putting her coat on are very difficult for her.    She describes neck discomfort.  Her head feels heavy.  She describes her upper back as feeling \"very tight\".    She follows regularly with [facility] in regards to granulomatous myositis  which was diagnosed in 2021.  She has tried various treatments in that regard including a year-long trial of prednisone (which did help with neck pain) and methotrexate.  Unfortunately neither made a significant impact on her chronic weakness and currently she is not on any medication " for this issue.    She attributes baseline upper extremity and hip flexor weakness to her diagnosis of granulomatous myositis.  She describes baseline difficulty with  weakness such as opening jars.  She has significant difficulty with activities requiring her to lift her arms and hands.  She typically uses 1 arm to lift up the other arm.  Sitting on (or getting up from) low chairs or toilets is very difficult for her.  She does a lot of typing for work and feels dexterity is mostly preserved.    She does have a history of falls.  She notes a mechanical slip and fall at the [facility] in December 2022 and unfortunately fractured her knee and femur and ultimately underwent surgery.  She reports a prolonged recovery over 1 year after that accident.    She can walk for 5 to 10 minutes within her house using a walker, but feels unsteady.  Today in the clinic she is using wheelchair.    She did PT and OT in the community previously.  She states getting to the appointments was extremely exhausting for her.  By the time she would get back home she had a lot of difficulty getting out of her chair even to get up to use the toilet because of severe fatigue after the PT.  She says that she was referred for home PT in the past, but says the agency told her they did not have any therapist who could work with her particular diagnosis.      She is currently doing PT for high ankle sprain.      Aggravating factors include: Lying down, standing, sitting, walking  Relieving factors include: Standing, walking, exercise, thinking about something else    She describes functional bladder incontinence but no overt incontinence.  She denies balance problems, difficulty with fine motor tasks such as manipulating buttons or zippers, falls, weakness, bowel or bladder incontinence, saddle anesthesia, unintentional weight loss, fevers/chills, or night sweats.         PRIOR INJURIES/TREATMENT:   Ice/Heat:   Brace: Uses braces on her  wrist at times during the day to help with getting up and out of a chair  Physical Therapy:   Current PT for high ankle sprain.  She did PT and OT as well as a neuro specific PT and OT, which ultimately were not well-tolerated due to fatigue     - Current Pain Medications -   Tylenol    - Prior/Trialed Pain Medications -   Lidoderm patch  Gabapentin   tramadol  Ibuprofen   Naproxen  Methocarbamol  Prednisone      Prior Procedures:  Date    Procedure   Improvement (%)  ***Years ago cervical level steroid injections at [facility] were helpful               Prior Related Surgery: None  Other (acupuncture, OMT, CMM, TENS, DME, etc.):   + Acupuncture    Specialists Seen - (with most recent, available notes and clinic visits reviewed)   1. ***Neurology-dysphagia  2.  Sports medicine-right ankle sprain  3.  Rheumatology-myositis, multiple joint pain  4.  Orthopedics-right femur fracture  5.  Neurosurgery-cervical spine tumor  6. [facility]    IMAGING - reviewed   ***MRI CERVICAL SPINE WITHOUT AND WITH CONTRAST  1/24/2023 2:29 PM      HISTORY: Cervical spinal mass (H).      TECHNIQUE: Multiplanar, multisequence MRI of the cervical spine  without and with 7 mL Gadavist.      COMPARISON: Soft tissue neck CT 1/7/2023.      FINDINGS:  Corresponding to the previously demonstrated calcified mass  centered in the left lateral paraspinous soft tissues at the C2-C3  level, there is a lobulated T1 and T2 hypointense, STIR hypointense  lesion with multiple small ovoid calcified components that  demonstrates thin enhancement peripherally and thin linear enhancement  interposed between the areas of lobulated calcification (series 12  images 11-12). There also appears to be a thin component of this  lesion that extends through the region of the left C2-C3 neural  foramen into the left posterolateral epidural space at the level of  C2-C3, as better delineated on the previous CT exam. The paraspinous  component of the mass measures  approximately 2.3 x 2.1 cm in the axial  plane, unchanged from most recent CT. The mass at least abuts the  adjacent left lateral paraspinous musculature.     Normal vertebral body heights. Sagittal alignment of the cervical  spine is unremarkable. Bone marrow signal appears within normal  limits. No abnormal spinal cord signal identified. The visualized  paraspinal soft tissues otherwise appear unremarkable.     Segmental analysis:  Craniovertebral Junction/C1-C2: Mild degenerative change of the median  atlantoaxial joint. Otherwise unremarkable.     C2-C3: Normal disc height. Shallow central disc protrusion. Mild to  moderate left facet arthropathy. No spinal canal stenosis. Minimal  left neural foraminal narrowing. The right neural foramen appears  patent.     C3-C4: Normal disc height. Small central disc protrusion. Mild to  moderate left and mild right facet arthropathy. No spinal canal  stenosis. No significant neural foraminal stenosis.     C4-C5: Normal disc height. Symmetric disc bulge with a shallow  superimposed central protrusion. Mild bilateral facet arthropathy. No  spinal canal or neural foraminal stenosis.     C5-C6: Normal disc height. Shallow symmetric disc bulge. Mild facet  arthropathy. No spinal canal or neural foraminal stenosis.     C6-C7: Normal disc height. Tiny central disc protrusion. Normal  facets. No spinal canal or neural foraminal stenosis.     C7-T1: Normal disc height. No herniation. Normal facets. No spinal  canal or neural foraminal stenosis.                                                                      IMPRESSION:  1. Lobulated calcified mass with areas of apparent thin linear  internal and peripheral enhancement centered in the left lateral  paraspinous soft tissues at the level of C2-C3, as described. There  also appears to be a small component that extends through the  posterior aspect of the left C2-C3 neural foramen into the left  posterolateral epidural space.  Findings are nonspecific. While benign  entities such as tumoral calcinosis, atypical synovial chondromatosis  or other nonspecific dystrophic calcification are possible, a  calcified neoplasm is also possible.   2. Multilevel degenerative changes, as described, without evidence for  high-grade spinal canal or neural foraminal stenosis    Review Of Systems:  I am responding to those symptoms which are directly relevant to the specific indication for my consultation. I recommend that the patient follow up with their primary or referring provider to pursue any other symptoms which may be of concern.       Medical History:  She  has a past medical history of Acute respiratory failure with hypoxia (H), Anemia, Facial paresthesia, Hepatic vein stenosis, Impetigo, Methemoglobinemia, Ovarian cyst, Overactive bladder, Secondary hypertension (11/02/2021), and Sleep apnea.     She  has a past surgical history that includes biopsy (03/01/2019 & 08/06/2021); colonoscopy (7/21/21); GYN surgery (4/11/07); Abdomen surgery (04/11/2007); Colonoscopy (N/A, 5/9/2022); Open reduction internal fixation rodding intramedullary femur (Right, 12/11/2022); Laparoscopic oophorectomy (Right, 12/20/2023); Esophagoscopy, gastroscopy, duodenoscopy (EGD), combined (N/A, 11/29/2024); and Colonoscopy (N/A, 11/29/2024).    Family History  Her family history includes Unknown/Adopted in an other family member. She was adopted.     Social History:  Work: Works from home, involves computer work  Current living situation: Lives with family.  Family assists with ADLs, especially upper body dressing.  She uses a walker at home.  She  reports that she has never smoked. She has never used smokeless tobacco. She reports current alcohol use. She reports that she does not use drugs.        Current Medications:   She has a current medication list which includes the following prescription(s): acetaminophen, alendronate, atorvastatin, calcium carbonate,  cyanocobalamin, estradiol, ferrous sulfate, fluconazole, folic acid, losartan, nova cushion gel seat pad, mupirocin, nitrofurantoin macrocrystal-monohydrate, pantoprazole, progesterone, solifenacin, valacyclovir, vitamin c w/shahriar hips, and vitamin d3.     Allergies:    -- Dapsone -- Shortness Of Breath    --  Oxygen levels went to 79 %.   -- Influenza Vaccines -- Hives    --  2/24/23 Patient states she's been able to have a             flu shot the past couple of years and has done             well with it.   -- Benadryl [Diphenhydramine]    -- Diphenhydramine -- Other (See Comments)    --  SHAKY   -- Sulfa Antibiotics -- Swelling   -- Sulfa Antibiotics     PHYSICAL EXAMINATION:  /67 (BP Location: Right arm, Patient Position: Sitting, Cuff Size: Adult Regular)   Pulse 91   Resp 16   LMP  (LMP Unknown)   SpO2 95%    --CONSTITUTIONAL: Vital signs as above. No acute distress. The patient is well nourished and well groomed.  --PSYCHIATRIC: The patient is awake, alert, oriented to person, place, time and answering questions appropriately with clear speech. Appropriate mood and affect   --HEENT: Sclera are non-injected. Extraocular muscles are intact. Moist oral mucosa.  --SKIN: observable skin is clean, dry, intact without rashes.  --RESPIRATORY: Normal rhythm and effort. No abnormal accessory muscle breathing patterns noted.   --GROSS MOTOR: Easily arises from a seated position. Gait is non-antalgic.Tandem gait normal.  --CERVICAL SPINE: Inspection reveals no evidence of deformity. Range of motion is not limited in cervical flexion, extension, lateral rotation. No tenderness to palpation cervical spine.  Spurling maneuver negative bilaterally.  --SHOULDERS: No pain with palpation of shoulders. Full painless range of motion bilaterally: abduction, flexion, cross chest movement internal/external rotation. Negative empty can.  Negative liftoff test.  --UPPER EXTREMITY MOTOR TESTING:  Wrist flexion left 5/5, right  5/5  Wrist extension left 5/5, right 5/5  Pronators left 5/5, right 5/5  Biceps left 5/5, right 5/5   Triceps left 5/5, right 5/5   Shoulder abduction left 5/5, right 5/5   left 5/5, right 5/5  Finger abduction left 5/5, right 5/5  --TINEL's negative at radial and ulnar sides of wrist and medial epicondyle  --PHALEN's negative bilaterally  --NEUROLOGIC: CN III-XII are grossly intact.   2/4 symmetric biceps & brachioradialis reflexes bilaterally. Sensation to upper extremities is ***.  Negative Starks's bilaterally.    --VASCULAR: Warm upper limbs bilaterally. 2/4 radial pulses bilaterally. Capillary refill in the upper extremities is less than 1 second.       ASSESSMENT:  Britt Park is a pleasant 54 year old female who presents with ***    Complicating comorbities include:  # ***Osteoporosis on alendronate  granulomatous myositis, positive SARAH, SSA 52 and Ku, followed by [facility] rheumatology and Genetics (in process of getting whole axome sequencing   Romeo genetic counselor did whole axome sequencing they found variance of uncertain significance     PLAN:  -Update MRI of the cervical spine  -Reviewed the recent EMG findings of bilateral chronic inactive C8 radiculopathy  -Considering a diagnosis of overlapping carpal tunnel.  Discussed a trial of counter wrist splints (which she already has but has not been using overnight  -Add a referral for home PT.  She has a significant difficulty leaving her home and requires assistance from others.  She experienced severe fatigue with attempting outpatient PT/OT in the past and home PT is most appropriate in this situation  -No medication changes made today  -She follows with [facility] neurology  -I asked her to make an appointment to follow-up with me at least 1 day after the MRI to review the findings and discuss next steps -virtual visit is okay  -RTC for review of MRI      Ready to learn, no apparent learning barriers.  Education provided on treatment  plan according to patient's preferred learning style.  Patient verbalizes understanding.   __________________________________  Carmel Telles NP  Physical Medicine & Rehabilitation        *** minutes spent by me on the date of the encounter doing chart review, history and exam, documentation and further activities per the note

## 2025-04-03 NOTE — LETTER
"4/3/2025       RE: Britt Park  [address] [address]     Dear Colleague,    Thank you for referring your patient, Britt Park, to the [facility] PHYSICAL MEDICINE AND REHABILITATION CLINIC [place] at [facility]. Please see a copy of my visit note below.    Today's Date: 4/3/2025     Patient seen at the request of Eleonora Lemus for an opinion and evaluation of Cervical radiculopathy at C8.      HISTORY OF PRESENT ILLNESS:  Britt Park is a 54 year old female who presents with a chief complaint of pain and paresthesias.      She was seen today in the clinic.      She describes neck and upper extremity pain which has been going on for at least 6 months.  She has neck discomfort.  Her head feels \"heavy\".  She describes her upper back as feeling \"very tight\".  At night she sleeps supine; when laying down she feels like something is \"cut off\" in both of her arms.  She describes numbness, tingling, and pins-and-needles sensation affecting both upper extremities diffusely, without any particular pattern. Her hands ache.  She wears fingerless compression sleeves at night, but finds the sensation is extremely uncomfortable.  She wakes up with pain.  The discomfort is worst at night, but she does have daytime pain as well.    She follows regularly with [facility] in regards to granulomatous myositis  which was diagnosed in 2021.  She has tried various treatments in that regard including a year-long trial of prednisone (which did help with neck pain) and methotrexate.  Unfortunately neither made a significant impact on her chronic weakness and currently she is not taking any medication for this issue.    She reports baseline upper extremity and hip flexor weakness, she attributes to the granulomatous myositis.  She describes baseline difficulty with  weakness such as opening jars. She does a lot of typing for work and feels " "dexterity is mostly preserved. She has significant difficulty with activities requiring her to lift her arms and hands.  She typically uses 1 arm to assisting with lift up the other arm.  She is able to perform ADLs, but requires assistance from family with some upper body dressing.  Activities like taking her coat off or putting her coat on are very difficult for her. Sitting on (or getting up from) low chairs or toilets is very difficult for her.     Aggravating factors include: Lying down, standing, sitting, walking  Relieving factors include: Standing, walking, exercise, thinking about something else  She describes functional bladder incontinence but no overt incontinence.  She denies bowel incontinence, saddle anesthesia, unintentional weight loss, fevers/chills, or night sweats.     She does have a history of falls.  She notes a mechanical slip and fall at the [employer] in December 2022 and unfortunately fractured her knee and femur and ultimately underwent surgery.  She reports a prolonged recovery over 1 year after that accident.    She can walk for 5 to 10 minutes within her house using a walker, but feels unsteady.  Today in the clinic she is using a wheelchair.    She did PT and OT in the community previously but getting to the appointments was extremely exhausting for her.  By the time she would get back home she had a lot of difficulty getting out of her chair even to get up to use the toilet because of severe fatigue after the PT.  She says that she was referred for home PT in the past, but says the agency told her they did not have any therapist who could work with her particular diagnosis. She is currently doing PT for high ankle sprain.    Finally, patient's history includes MRI of the cervical spine from 2023 which showed a left lateral paraspinous soft tissues at the C2-C3 level. Subsequent CT-guided biopsy was done at [facility] 3/2/2023.  Per [facility] progress note 3/15/2023: \"We " "reviewed the results of outside CT and MRI which revealed a lobulated calcified mass in the left lateral paraspinous soft tissue at the level of C2-C3. Biopsy of this lesion revealed extensive dystrophic acellular calcifications. No concerns for malignancy. We reviewed that these findings seem to be nonspecific. Will plan to discuss this further with Dr. Arriaga. There was no evidence of cervical lymphadenopathy. \"            PRIOR INJURIES/TREATMENT:   Ice/Heat:   Brace: Intermittently uses stabilizing wrist braces during the day to help with getting up and out of a chair    Physical Therapy:   Current PT for high ankle sprain.  She did PT and OT (as well as a neuro specific PT and OT), not well-tolerated due to fatigue     - Current Pain Medications -   Tylenol    - Prior/Trialed Pain Medications -   Lidoderm patch  Gabapentin   tramadol  Ibuprofen   Naproxen  Methocarbamol  Prednisone      Prior Procedures:  Date    Procedure   Improvement (%)  Years ago cervical level steroid injections at [facility] were helpful               Prior Related Surgery: None  Other (acupuncture, OMT, CMM, TENS, DME, etc.):   + Acupuncture    Specialists Seen - (with most recent, available notes and clinic visits reviewed)   1. Neurology-dysphagia  2.  Sports medicine-right ankle sprain  3.  Rheumatology-myositis, multiple joint pain  4.  Orthopedics-right femur fracture  5.  Neurosurgery-cervical spine tumor  6. [facility]  7. pain/spine specialist Dr. Scott at [facility]  8.  [place]    IMAGING - reviewed   EMG -3/19/2025, [facility]  Impression:  This electrodiagnostic study reveals complex polyphasic motor unit potentials in many muscles of both upper extremities.  Given the patient's history, this most consistent with residual of previously diagnosed myopathy.  In comparison to prior EMG report from 2021 at [facility], the previously described fibrillation potentials and rapid recruitment are no " longer present.  Complex polyphasic motor unit potentials and increased insertional activity in some muscles continues to be noted.  Overall this suggest that active denervation/changes related to inflammatory or necrotic myopathic process are less active.  The current study does reveal chronic and active denervation in several muscles of both upper extremities suggesting bilateral chronic inactive C8 cervical radiculopathy.  This was not previously described on the previous EMG in 2021.  Otherwise nerve conduction study does not reveal evidence of large fiber peripheral neuropathy.        MRI CERVICAL SPINE WITHOUT AND WITH CONTRAST  1/24/2023 2:29 PM      HISTORY: Cervical spinal mass (H).      TECHNIQUE: Multiplanar, multisequence MRI of the cervical spine  without and with 7 mL Gadavist.      COMPARISON: Soft tissue neck CT 1/7/2023.      FINDINGS:  Corresponding to the previously demonstrated calcified mass  centered in the left lateral paraspinous soft tissues at the C2-C3  level, there is a lobulated T1 and T2 hypointense, STIR hypointense  lesion with multiple small ovoid calcified components that  demonstrates thin enhancement peripherally and thin linear enhancement  interposed between the areas of lobulated calcification (series 12  images 11-12). There also appears to be a thin component of this  lesion that extends through the region of the left C2-C3 neural  foramen into the left posterolateral epidural space at the level of  C2-C3, as better delineated on the previous CT exam. The paraspinous  component of the mass measures approximately 2.3 x 2.1 cm in the axial  plane, unchanged from most recent CT. The mass at least abuts the  adjacent left lateral paraspinous musculature.     Normal vertebral body heights. Sagittal alignment of the cervical  spine is unremarkable. Bone marrow signal appears within normal  limits. No abnormal spinal cord signal identified. The visualized  paraspinal soft tissues  otherwise appear unremarkable.     Segmental analysis:  Craniovertebral Junction/C1-C2: Mild degenerative change of the median  atlantoaxial joint. Otherwise unremarkable.     C2-C3: Normal disc height. Shallow central disc protrusion. Mild to  moderate left facet arthropathy. No spinal canal stenosis. Minimal  left neural foraminal narrowing. The right neural foramen appears  patent.     C3-C4: Normal disc height. Small central disc protrusion. Mild to  moderate left and mild right facet arthropathy. No spinal canal  stenosis. No significant neural foraminal stenosis.     C4-C5: Normal disc height. Symmetric disc bulge with a shallow  superimposed central protrusion. Mild bilateral facet arthropathy. No  spinal canal or neural foraminal stenosis.     C5-C6: Normal disc height. Shallow symmetric disc bulge. Mild facet  arthropathy. No spinal canal or neural foraminal stenosis.     C6-C7: Normal disc height. Tiny central disc protrusion. Normal  facets. No spinal canal or neural foraminal stenosis.     C7-T1: Normal disc height. No herniation. Normal facets. No spinal  canal or neural foraminal stenosis.                                                                      IMPRESSION:  1. Lobulated calcified mass with areas of apparent thin linear  internal and peripheral enhancement centered in the left lateral  paraspinous soft tissues at the level of C2-C3, as described. There  also appears to be a small component that extends through the  posterior aspect of the left C2-C3 neural foramen into the left  posterolateral epidural space. Findings are nonspecific. While benign  entities such as tumoral calcinosis, atypical synovial chondromatosis  or other nonspecific dystrophic calcification are possible, a  calcified neoplasm is also possible.   2. Multilevel degenerative changes, as described, without evidence for  high-grade spinal canal or neural foraminal stenosis    Review Of Systems:  I am responding to those  symptoms which are directly relevant to the specific indication for my consultation. I recommend that the patient follow up with their primary or referring provider to pursue any other symptoms which may be of concern.       Medical History:  She  has a past medical history of Acute respiratory failure with hypoxia (H), Anemia, Facial paresthesia, Hepatic vein stenosis, Impetigo, Methemoglobinemia, Ovarian cyst, Overactive bladder, Secondary hypertension (11/02/2021), and Sleep apnea.     She  has a past surgical history that includes biopsy (03/01/2019 & 08/06/2021); colonoscopy (7/21/21); GYN surgery (4/11/07); Abdomen surgery (04/11/2007); Colonoscopy (N/A, 5/9/2022); Open reduction internal fixation rodding intramedullary femur (Right, 12/11/2022); Laparoscopic oophorectomy (Right, 12/20/2023); Esophagoscopy, gastroscopy, duodenoscopy (EGD), combined (N/A, 11/29/2024); and Colonoscopy (N/A, 11/29/2024).    Family History  Her family history includes Unknown/Adopted in an other family member. She was adopted.     Social History:  Work: Works from home, involves computer work / typing  Current living situation: Lives with family.  Family assists with ADLs, especially upper body dressing.  She uses a walker at home.  She  reports that she has never smoked. She has never used smokeless tobacco. She reports current alcohol use. She reports that she does not use drugs.        Current Medications:   She has a current medication list which includes the following prescription(s): acetaminophen, alendronate, atorvastatin, calcium carbonate, cyanocobalamin, estradiol, ferrous sulfate, fluconazole, folic acid, losartan, nova cushion gel seat pad, mupirocin, nitrofurantoin macrocrystal-monohydrate, pantoprazole, progesterone, solifenacin, valacyclovir, vitamin c w/shahriar hips, and vitamin d3.     Allergies:    -- Dapsone -- Shortness Of Breath    --  Oxygen levels went to 79 %.   -- Influenza Vaccines -- Hives    --  2/24/23  Patient states she's been able to have a             flu shot the past couple of years and has done             well with it.   -- Benadryl [Diphenhydramine]    -- Diphenhydramine -- Other (See Comments)    --  SHAKY   -- Sulfa Antibiotics -- Swelling   -- Sulfa Antibiotics     PHYSICAL EXAMINATION:  /67 (BP Location: Right arm, Patient Position: Sitting, Cuff Size: Adult Regular)   Pulse 91   Resp 16   LMP  (LMP Unknown)   SpO2 95%    --CONSTITUTIONAL: Vital signs as above. No acute distress. The patient is well nourished and well groomed.  --PSYCHIATRIC: The patient is awake, alert, oriented to person, place, time and answering questions appropriately with clear speech. Appropriate mood and affect   --HEENT: Sclera are non-injected. Extraocular muscles are intact. Moist oral mucosa.  --SKIN: observable skin is clean, dry, intact without rashes.  --RESPIRATORY: Normal rhythm and effort. No abnormal accessory muscle breathing patterns noted.   --GROSS MOTOR: examination performed with pt seated in wheelchair due to weakness/fall risk.   --CERVICAL SPINE: Inspection reveals no evidence of deformity. Range of motion is not limited in cervical flexion, extension, lateral rotation. No tenderness to palpation cervical spine.  Spurling maneuver negative bilaterally.  --UPPER EXTREMITY MOTOR TESTING: antigravity, but diffuse weakness noted ranging 4- to 4/5  --NEUROLOGIC: CN III-XII are grossly intact.   2/4 symmetric biceps & brachioradialis reflexes bilaterally. Sensation to upper extremities is intact.  Negative Starks's bilaterally.    --VASCULAR: Warm upper limbs bilaterally.       ASSESSMENT:  Britt Park is a pleasant 54 year old female who presents with   -chronic 6+ month history of neck and upper extremity pain  -BUE diffuse pain and paresthesias  -Baseline difficulty with ADLs and IADLs  - History of mechanical falls  - Need for assistive device (uses a walker at home)        Complicating  comorbities include:  # Osteoporosis on alendronate  # granulomatous myositis with baseline weakness followed by [facility] rheumatology and Genetics, previously on chronic prednisone, and trialed methotrexate, not currently on treatment      PLAN:  -Update MRI of the cervical spine  -Reviewed the recent EMG findings of bilateral chronic inactive C8 radiculopathy with the patient today  -Consider clinical diagnosis of carpal tunnel.  Discussed a trial of over the counter wrist splints (which she already has but has not been using overnight)  -Add a referral for home PT.  She has a significant difficulty leaving her home and requires assistance from others.  She experienced severe fatigue with attempting outpatient PT/OT in the past and home PT is most appropriate in this situation  -No medication changes made today  -She follows with [facility] neurology  -I asked her to make an appointment to follow-up with me at least 1 day after the MRI to review the findings and discuss next steps -virtual visit is okay  -RTC for review of MRI      Ready to learn, no apparent learning barriers.  Education provided on treatment plan according to patient's preferred learning style.  Patient verbalizes understanding.   __________________________________  Carmel Telles NP  Physical Medicine & Rehabilitation        67 minutes spent by me on the date of the encounter doing chart review, history and exam, documentation and further activities per the note         Again, thank you for allowing me to participate in the care of your patient.      Sincerely,    ROSANNE Calles CNP

## 2025-04-04 ENCOUNTER — MYC REFILL (OUTPATIENT)
Dept: FAMILY MEDICINE | Facility: CLINIC | Age: 55
End: 2025-04-04

## 2025-04-04 DIAGNOSIS — E61.1 IRON DEFICIENCY: ICD-10-CM

## 2025-04-04 ASSESSMENT — ANXIETY QUESTIONNAIRES
7. FEELING AFRAID AS IF SOMETHING AWFUL MIGHT HAPPEN: NOT AT ALL
4. TROUBLE RELAXING: SEVERAL DAYS
6. BECOMING EASILY ANNOYED OR IRRITABLE: SEVERAL DAYS
3. WORRYING TOO MUCH ABOUT DIFFERENT THINGS: SEVERAL DAYS
GAD7 TOTAL SCORE: 4
GAD7 TOTAL SCORE: 4
1. FEELING NERVOUS, ANXIOUS, OR ON EDGE: SEVERAL DAYS
IF YOU CHECKED OFF ANY PROBLEMS ON THIS QUESTIONNAIRE, HOW DIFFICULT HAVE THESE PROBLEMS MADE IT FOR YOU TO DO YOUR WORK, TAKE CARE OF THINGS AT HOME, OR GET ALONG WITH OTHER PEOPLE: SOMEWHAT DIFFICULT
8. IF YOU CHECKED OFF ANY PROBLEMS, HOW DIFFICULT HAVE THESE MADE IT FOR YOU TO DO YOUR WORK, TAKE CARE OF THINGS AT HOME, OR GET ALONG WITH OTHER PEOPLE?: SOMEWHAT DIFFICULT
5. BEING SO RESTLESS THAT IT IS HARD TO SIT STILL: NOT AT ALL
GAD7 TOTAL SCORE: 4
7. FEELING AFRAID AS IF SOMETHING AWFUL MIGHT HAPPEN: NOT AT ALL
2. NOT BEING ABLE TO STOP OR CONTROL WORRYING: NOT AT ALL

## 2025-04-09 ENCOUNTER — VIRTUAL VISIT (OUTPATIENT)
Dept: PSYCHOLOGY | Facility: CLINIC | Age: 55
End: 2025-04-09
Payer: COMMERCIAL

## 2025-04-09 ENCOUNTER — TELEPHONE (OUTPATIENT)
Dept: FAMILY MEDICINE | Facility: CLINIC | Age: 55
End: 2025-04-09
Payer: COMMERCIAL

## 2025-04-09 DIAGNOSIS — F06.4 ANXIETY DISORDER DUE TO GENERAL MEDICAL CONDITION: Primary | ICD-10-CM

## 2025-04-09 NOTE — PROGRESS NOTES
"    Health Psychology                      Department of Medicine                     [facility] Mail Code [address] [place] [address]              Adelina Lepe, Ph.D., L.P [phone]  Stephanie Benson, Ph.D., L.P. [phone]  Joel Shields, Ph.D. [phone]  Tiffany Huff, Ph.D., A.B.P.P., L.P. [phone]  Al Cee, Ph.D., A.B.P.P., L.P. [phone]         Baylee Shoemaker, Ph.D., L.P. [phone]   Demetria Montesinos, Ph.D., A.B.P.P., L.P. [phone]    [facility]  3rd Floor  [address]   [address]    Health Psychology Follow-Up Note    The patient has been notified of following:    \"This video visit will be conducted via a call between you and your physician/provider. We have found that certain health care needs can be provided without the need for an in-person physical exam.  This service lets us provide the care you need with a video conversation.  If a prescription is necessary we can send it directly to your pharmacy.  If lab work is needed we can place an order for that and you can then stop by our lab to have the test done at a later time. If during the course of the call the physician/provider feels a video visit is not appropriate, you will not be charged for this service.\"    Patient has given verbal consent for Video visit? YES    Reason that telemedicine is appropriate: Patient request as does not drive/limited mobility/deemed appropriate for this session.  Patient has given verbal consent for video visit: Yes  Mode of transmission: Secure real time interactive audio and visual telecommunication system via [email or web address]  Location of originating and distant sites:  Originating site (patient location): patient's home in [place]  Distant site (provider site): home office of provider    Start time:  4:00  End time:  4:55  Extended session due " "to onset of treatment.    SUBJECTIVE  Patient has agreed to receiving telehealth services and was provided written information regarding telehealth/video sessions. Reviewed updates to physical and emotional health since previous session.     Job  4/9/25- Gets to continue in current position. She is proud of herself for her self-advocacy. No updates on jobs that she applied for. She \"knows\" she needs to reach out to her network about looking for new jobs. Discussed barriers.    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 She took a 20% pay cut for this job, she did not try to negotiate salary. Been in HR since 2008. She has active FMLA for medical condition, if she were to leave [employer] is deterrent because she would have to accumulate a year before applying for FMLA. Current manager is flexible. She has worries about physical limitations impacting interview and she prefers job remote as well due to not driving/limited mobility. She took the job to get a specific experience and would like to take it and get into another role that allows for additional growth and increased salary.     Decided not to apply for job that she had been considering. Has done a lot of work to consider job options and her strengths. She started reviewing the materials. Still needs work on confidence to apply for jobs.She has been looking into remote job options. Met goal of reviewing for about 30 minutes and joined group on [employer] for people seeking remote jobs. Engaged her in processing emotions related to confidence related to applying for new job.    Applied to new job that would be remote only in a similar position that she is currently in. Is considering applying for a higher/leadership role as well. Updated resume/cover letter and is in the process of applying for new positions.  Discussed work-related stressor and engaged her in processing via cognitive challenging of fortune-telling " "and mind-reading. Reinforced her use of coping skills such as praying and speaking with a friend.     Exercise/strength/mobility  4/9/25- Qualified for home health care. Going to get PT at home to help with numbness/tingling in hands that makes it difficult to fall asleep. OT will also be visiting. MRI 4/16/25 of neck, ordered by spine provider.    Attending PT in person every 3 weeks for partially torn ligament in her ankle.  Feeling like struggling to incorporate all home PT exercises. Concerned she will lose strength if she stops exercising too much/limits due to ankle injury.    Genetic condition/Health  Relatively slow progression. There is no specific prognosis regarding the course of the disease. Struggles with feeling resentful of friends who are physically healthy. 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 Mobility has declined significantly over past five years. Fell in 2022 at [place] led to broken bones and resulted in mobility \"set back.\" Feels she has been hesitant to work on acceptance of this condition in the past/does not feel comfortable with relying on /children for help/care. Health insurance through her 's job and she currently works full time.     Her hands and arms are going numb overnight and ache. Had EMG recently. Has been impacting her sleep.   Whole genome sequencing- looking for new developments in past few years. They couldn't find underlying genetic cause previously, diagnosis was through muscle biopsy. Is going to do more genetic testing but will take about 3 months to get results. Planning to meet with Neurologist and Rheumatologist at [place]. Sports medicine doctor at [facility] ordered EMG, seeing her for numbness/ankles. Has added ankle PT to help since her ankle sprain, but this has resulted in decreasing PT for other areas. Has more energy in AM so uses this time to do PT. The pool she goes to has been closed for 2 weeks. She misses it, helpful " mentally and physically.     Other stressors  4/9/25- Will be traveling to [facility] with her two sons and  to visit their daughter. Has been feeling anxious about traveling. Engaged in problem solving related to worries for her upcoming trip.     OBJECTIVE  Appearance/Behavior/Orientation: Patient was casually groomed and dressed. Oriented to person, plan, time and situation.   Cooperation/Reliability: Patient was open and cooperative throughout the session.    Speech/Language: Speech was clear, coherent, and of normal rate, rhythm and volume.   Thought Process: Clear, linear  Mood/Affect: Mood euthymic-somewhat anxious; affect mood congruent  Insight/Motivation: Good/good    ASSESSMENT  Patient's mood and anxiety within minimal levels. She does, however, experience anxiety related to her medical condition and the impact that it has on her functioning in daily life (at home, with work).  The patient was engaged and interactive throughout the session and they appear likely to benefit from continued sessions.          3/4/2025     1:28 PM 3/16/2025     1:13 PM 4/4/2025    10:05 AM   BERTO-7 SCORE   Total Score 3 (minimal anxiety) 3 (minimal anxiety) 4 (minimal anxiety)   Total Score 3  3  4        Patient-reported            3/13/2024     7:09 AM 12/2/2024     3:52 PM 3/4/2025     1:27 PM   PHQ   PHQ-9 Total Score 4 5  2    Q9: Thoughts of better off dead/self-harm past 2 weeks Not at all Not at all Not at all       Patient-reported      DIAGNOSIS  Anxiety disorder due to general medical condition [F06.4]     PLAN/GOALS  RTC for continued psychotherapy. 4/14 11:00, 5/28 4:00  3/5/25- Shared with patient that this clinician will be on medical leave 4/16-5/27 and that a colleague will be available to see her should she wish or that we can take a break from sessions.   3/20/25- Revisited plan for this clinician's leave, patient wishes to take a break from sessions but was open to getting contact information of  a colleague in the case she changes her mind while I am away.     Goals: consider reaching out to previous colleague to network, consider messaging to group regarding concerns related to upcoming travel, and  incorporate additional meditation sessions from [employer].     Adelina Lepe, PhD,    Clinical Psychologist     Tx plan completed:             03/10/25  Tx plan due:                        03/10/26    OUTPATIENT TREATMENT PLAN SUMMARY    Date of Treatment Plan:   03/10/25   90-Day Review Date:  N/A  Date of Initial Service: 3/5/25      DSM-V Diagnosis (include numeric code)  Anxiety disorder due to general medical condition [F06.4]     Current symptoms and circumstances that substantiate the diagnosis:  See diagnostic assessment    How symptoms and/or behaviors are affecting level of function:   See diagnostic assessment    Risk Assessment:  Suicide:  Assessed Level of Immediate Risk: None  Ideation: No   Plan:  No  Means: No  Intent: No    Homicide/Violence:  Assessed Level of Immediate Risk: None  Ideation: No  Plan:  No  Means: No  Intent: No  If on a medication, please include name and dosage:  See chart    Symptom/Problem Measurable Goals Interventions Gains Made   Limited mobility/muscle weakness  1. Maintain/increase physical activity/home PT exercises/strengthen legs so can walk easier.  CBT 1. N/A   2.Impact of health on career. Been at current position for almost 3 years.  2. Increased confidence and job applications. 2. CBT 2. N/A   3. Genetic disease- struggling with accepting 3. Increased acceptance/  decreased feelings of resentment  3. CBT 3. N/A     Frequency of Sessions: 1-4x/month    Discharge and Aftercare Goals: TBD    Expected duration of treatment:  TBD    Participants in therapy plan (family, friends, support network): TBD    Discussed and created this plan with the patient who has access to this treatment plan via [employer].    Regulatory Guidelines for Updating Treatment Plan  [place]  Medical Assistance: Reviewed & signed at least every 90days  Medicare:  Update per policy

## 2025-04-09 NOTE — TELEPHONE ENCOUNTER
Home Care is calling regarding an established patient with [facility].  Requesting orders from: Vi Metz RN APPROVED: RN able to provide verbal orders.  Home Care will send orders for signature.  RN will close encounter.  Is this a request for a temporary pause in the home care episode?  No    Orders Requested    Physical Therapy  Request for continuation of care with no increase or decrease in frequency  Frequency: 1w4, 1 eow4   RN gave verbal order: Yes              Contacts       Contact Date/Time Type Contact Phone/Fax    04/09/2025 03:08 PM CDT Phone (Incoming) Kamran RIOS [facility] care (Home Care) [phone]          Kimmy Johnson RN

## 2025-04-10 ENCOUNTER — TELEPHONE (OUTPATIENT)
Dept: FAMILY MEDICINE | Facility: CLINIC | Age: 55
End: 2025-04-10
Payer: COMMERCIAL

## 2025-04-10 NOTE — TELEPHONE ENCOUNTER
.  Home Care is calling regarding an established patient with [facility].  Requesting orders from: Vi Metz  RN APPROVED: RN able to provide verbal orders.  Home Care will send orders for signature.  RN will close encounter.  Is this a request for a temporary pause in the home care episode?  No    Orders Requested    Occupational Therapy  Request for initial evaluation and treatment (one time)   RN gave verbal order: Yes          Contacts       Contact Date/Time Type Contact Phone/Fax    04/10/2025 01:22 PM CDT Phone (Incoming) Kenji [phone]     [facility]          Diane Vallejo RN

## 2025-04-14 ENCOUNTER — VIRTUAL VISIT (OUTPATIENT)
Dept: PSYCHOLOGY | Facility: CLINIC | Age: 55
End: 2025-04-14
Payer: COMMERCIAL

## 2025-04-14 DIAGNOSIS — F06.4 ANXIETY DISORDER DUE TO GENERAL MEDICAL CONDITION: Primary | ICD-10-CM

## 2025-04-14 PROCEDURE — 90837 PSYTX W PT 60 MINUTES: CPT | Mod: 95 | Performed by: PSYCHOLOGIST

## 2025-04-14 NOTE — PROGRESS NOTES
"    Health Psychology                      Department of Medicine                     [facility] Mail Code [address] [place] [address]              Adelina Lepe, Ph.D., L.P [phone]  Stephanie Benson, Ph.D., L.P. [phone]  Joel Shields, Ph.D. [phone]  Tiffany Huff, Ph.D., A.B.P.P., L.P. [phone]  Al Cee, Ph.D., A.B.P.P., L.P. [phone]         Baylee Shoemaker, Ph.D., L.P. [phone]   Demetria Montesinos, Ph.D., A.B.P.P., L.P. [phone]    [facility]  3rd Floor  [address]   [address]    Health Psychology Follow-Up Note    The patient has been notified of following:    \"This video visit will be conducted via a call between you and your physician/provider. We have found that certain health care needs can be provided without the need for an in-person physical exam.  This service lets us provide the care you need with a video conversation.  If a prescription is necessary we can send it directly to your pharmacy.  If lab work is needed we can place an order for that and you can then stop by our lab to have the test done at a later time. If during the course of the call the physician/provider feels a video visit is not appropriate, you will not be charged for this service.\"    Patient has given verbal consent for Video visit? YES    Reason that telemedicine is appropriate: Patient request as does not drive/limited mobility/deemed appropriate for this session.  Patient has given verbal consent for video visit: Yes  Mode of transmission: Secure real time interactive audio and visual telecommunication system via [email or web address]  Location of originating and distant sites:  Originating site (patient location): patient's home in [place]  Distant site (provider site): home office of provider    Start time:  11:00  End time:  11:57  Extended session due " "to onset of treatment.    SUBJECTIVE  Patient has agreed to receiving telehealth services and was provided written information regarding telehealth/video sessions. Reviewed updates to physical and emotional health since previous session.     Job  4/14/25-Paid hourly, has been generally managing to stay within 40 hours a week. Rarely does overtime. Still in process of job searching. Discussed \"limiting beliefs\" about herself/mobility and how they have impacted her willingness to apply for jobs.     4/9/25- Gets to continue in current position. She is proud of herself for her self-advocacy. No updates on jobs that she applied for. She \"knows\" she needs to reach out to her network about looking for new jobs. Discussed barriers.    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 She took a 20% pay cut for this job, she did not try to negotiate salary. Been in HR since 2008. She has active FMLA for medical condition, if she were to leave [employer] is deterrent because she would have to accumulate a year before applying for FMLA. Current manager is flexible. She has worries about physical limitations impacting interview and she prefers job remote as well due to not driving/limited mobility. She took the job to get a specific experience and would like to take it and get into another role that allows for additional growth and increased salary.     Decided not to apply for job that she had been considering. Has done a lot of work to consider job options and her strengths. She started reviewing the materials. Still needs work on confidence to apply for jobs.She has been looking into remote job options. Met goal of reviewing for about 30 minutes and joined group on [employer] for people seeking remote jobs. Engaged her in processing emotions related to confidence related to applying for new job.    Applied to new job that would be remote only in a similar position that she is " "currently in. Is considering applying for a higher/leadership role as well. Updated resume/cover letter and is in the process of applying for new positions.  Discussed work-related stressor and engaged her in processing via cognitive challenging of fortune-telling and mind-reading. Reinforced her use of coping skills such as praying and speaking with a friend.     Exercise/strength/mobility  4/9/25- Qualified for home health care. Going to get PT at home to help with numbness/tingling in hands that makes it difficult to fall asleep. OT will also be visiting. MRI 4/16/25 of neck, ordered by spine provider.    Attending PT in person every 3 weeks for partially torn ligament in her ankle.  Feeling like struggling to incorporate all home PT exercises. Concerned she will lose strength if she stops exercising too much/limits due to ankle injury.    Genetic condition/Health  4/14/25-Starting to feel more comfortable with her upcoming trip as she had concerns about how her health/limited mobility will impact the experience. Has concerns about using toilet, is it is too low, she needs her 's help and his back has been bothering him more. Rated previous anxiety about being in new environment/decreased independence as a 7-8/10 initially, and now down to a 4/10.  Engaged her in identifying and challenging negative automatic thoughts. Discussed barriers to asking for help and problem solved. Has been requiring help with getting up and down from seated positions for past year. Struggling with the \"what ifs\" of her health.    Relatively slow progression. There is no specific prognosis regarding the course of the disease. Struggles with feeling resentful of friends who are physically healthy. 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 Mobility has declined significantly over past five years. Fell in 2022 at [place] led to broken bones and resulted in mobility \"set back.\" Feels she has been hesitant " to work on acceptance of this condition in the past/does not feel comfortable with relying on /children for help/care. Health insurance through her 's job and she currently works full time.     Her hands and arms are going numb overnight and ache. Had EMG recently. Has been impacting her sleep.   Whole genome sequencing- looking for new developments in past few years. They couldn't find underlying genetic cause previously, diagnosis was through muscle biopsy. Is going to do more genetic testing but will take about 3 months to get results. Planning to meet with Neurologist and Rheumatologist at [place]. Sports medicine doctor at [facility] ordered EMG, seeing her for numbness/ankles. Has added ankle PT to help since her ankle sprain, but this has resulted in decreasing PT for other areas. Has more energy in AM so uses this time to do PT. The pool she goes to has been closed for 2 weeks. She misses it, helpful mentally and physically.     Other stressors  4/9/25- Will be traveling to [facility] with her two sons and  to visit their daughter. Has been feeling anxious about traveling. Engaged in problem solving related to worries for her upcoming trip.     OBJECTIVE  Appearance/Behavior/Orientation: Patient was casually groomed and dressed. Oriented to person, plan, time and situation.   Cooperation/Reliability: Patient was open and cooperative throughout the session.    Speech/Language: Speech was clear, coherent, and of normal rate, rhythm and volume.   Thought Process: Clear, linear  Mood/Affect: Mood euthymic-somewhat anxious; affect mood congruent  Insight/Motivation: Good/good    ASSESSMENT  Patient's mood and anxiety within minimal levels. She does, however, experience anxiety related to her medical condition and the impact that it has on her functioning in daily life (at home, with work).  The patient was engaged and interactive throughout the session and they appear likely to benefit from  continued sessions.          3/4/2025     1:28 PM 3/16/2025     1:13 PM 4/4/2025    10:05 AM   BERTO-7 SCORE   Total Score 3 (minimal anxiety) 3 (minimal anxiety) 4 (minimal anxiety)   Total Score 3  3  4        Patient-reported            3/13/2024     7:09 AM 12/2/2024     3:52 PM 3/4/2025     1:27 PM   PHQ   PHQ-9 Total Score 4 5  2    Q9: Thoughts of better off dead/self-harm past 2 weeks Not at all Not at all Not at all       Patient-reported      DIAGNOSIS  Anxiety disorder due to general medical condition [F06.4]     PLAN/GOALS  RTC for continued psychotherapy. 5/28 4:00  3/5/25- Shared with patient that this clinician will be on medical leave 4/16-5/27 and that a colleague will be available to see her should she wish or that we can take a break from sessions.   3/20/25- Revisited plan for this clinician's leave, patient wishes to take a break from sessions but was open to getting contact information of a colleague in the case she changes her mind while I am away.     Goals: consider applying for [employer] job, consider reaching out to previous colleague to network, consider messaging to group regarding concerns related to upcoming travel, and  incorporate additional meditation sessions from [employer].     Adelina Lepe, PhD,    Clinical Psychologist     Tx plan completed:             03/10/25  Tx plan due:                        03/10/26    OUTPATIENT TREATMENT PLAN SUMMARY    Date of Treatment Plan:   03/10/25   90-Day Review Date:  N/A  Date of Initial Service: 3/5/25      DSM-V Diagnosis (include numeric code)  Anxiety disorder due to general medical condition [F06.4]     Current symptoms and circumstances that substantiate the diagnosis:  See diagnostic assessment    How symptoms and/or behaviors are affecting level of function:   See diagnostic assessment    Risk Assessment:  Suicide:  Assessed Level of Immediate Risk: None  Ideation: No   Plan:  No  Means: No  Intent: No    Homicide/Violence:  Assessed  Level of Immediate Risk: None  Ideation: No  Plan:  No  Means: No  Intent: No  If on a medication, please include name and dosage:  See chart    Symptom/Problem Measurable Goals Interventions Gains Made   Limited mobility/muscle weakness  1. Maintain/increase physical activity/home PT exercises/strengthen legs so can walk easier.  CBT 1. N/A   2.Impact of health on career. Been at current position for almost 3 years.  2. Increased confidence and job applications. 2. CBT 2. N/A   3. Genetic disease- struggling with accepting 3. Increased acceptance/  decreased feelings of resentment  3. CBT 3. N/A     Frequency of Sessions: 1-4x/month    Discharge and Aftercare Goals: TBD    Expected duration of treatment:  TBD    Participants in therapy plan (family, friends, support network): TBD    Discussed and created this plan with the patient who has access to this treatment plan via [employer].    Regulatory Guidelines for Updating Treatment Plan  [place] Medical Assistance: Reviewed & signed at least every 90days  Medicare:  Update per policy

## 2025-04-16 ENCOUNTER — HOSPITAL ENCOUNTER (OUTPATIENT)
Dept: MAMMOGRAPHY | Facility: CLINIC | Age: 55
Discharge: HOME | End: 2025-04-16
Attending: PHYSICIAN ASSISTANT
Payer: COMMERCIAL

## 2025-04-16 ENCOUNTER — ANCILLARY PROCEDURE (OUTPATIENT)
Dept: MRI IMAGING | Facility: CLINIC | Age: 55
End: 2025-04-16
Attending: NURSE PRACTITIONER
Payer: COMMERCIAL

## 2025-04-16 DIAGNOSIS — M54.2 NECK PAIN: ICD-10-CM

## 2025-04-16 DIAGNOSIS — M79.602 PARESTHESIA AND PAIN OF BOTH UPPER EXTREMITIES: ICD-10-CM

## 2025-04-16 DIAGNOSIS — Z12.31 VISIT FOR SCREENING MAMMOGRAM: ICD-10-CM

## 2025-04-16 DIAGNOSIS — M79.601 PARESTHESIA AND PAIN OF BOTH UPPER EXTREMITIES: ICD-10-CM

## 2025-04-16 DIAGNOSIS — R20.2 PARESTHESIA AND PAIN OF BOTH UPPER EXTREMITIES: ICD-10-CM

## 2025-04-16 DIAGNOSIS — M54.12 CERVICAL RADICULOPATHY AT C8: ICD-10-CM

## 2025-04-16 DIAGNOSIS — R20.2 POSITIVE PHALEN MANEUVER: ICD-10-CM

## 2025-04-16 DIAGNOSIS — R20.2 PARESTHESIAS: ICD-10-CM

## 2025-04-16 DIAGNOSIS — M54.9 UPPER BACK PAIN: ICD-10-CM

## 2025-04-16 DIAGNOSIS — M62.81 MUSCLE WEAKNESS (GENERALIZED): ICD-10-CM

## 2025-04-16 PROCEDURE — 72156 MRI NECK SPINE W/O & W/DYE: CPT

## 2025-04-16 PROCEDURE — A9585 GADOBUTROL INJECTION: HCPCS | Performed by: NURSE PRACTITIONER

## 2025-04-16 PROCEDURE — 77063 BREAST TOMOSYNTHESIS BI: CPT

## 2025-04-16 PROCEDURE — 77067 SCR MAMMO BI INCL CAD: CPT

## 2025-04-16 PROCEDURE — 255N000002 HC RX 255 OP 636: Performed by: NURSE PRACTITIONER

## 2025-04-16 RX ORDER — GADOBUTROL 604.72 MG/ML
5 INJECTION INTRAVENOUS ONCE
Status: COMPLETED | OUTPATIENT
Start: 2025-04-16 | End: 2025-04-16

## 2025-04-16 RX ADMIN — GADOBUTROL 5 ML: 604.72 INJECTION INTRAVENOUS at 08:45

## 2025-04-21 NOTE — PROGRESS NOTES
Ventura is a 55 year old who is being evaluated via a billable video visit.      Video-Visit Details    Type of service:  Video Visit   Originating Location (pt. Location): Home  Joined the call at 4/23/2025, 10:47:26 am.  Left the call at 4/23/2025, 11:17:52 am.  You were on the call for 30 minutes 25 seconds.  Distant Location (provider location):  Off-site  Platform used for Video Visit: Laura        PM&R Follow-Up Visit -     Date of Initial Visit: 4/3/2025  LOV: 4/3/2025  TD: 4/23/2025     Recall: Britt Park is a 55 year old female who presents with a chief complaint of pain and paresthesias.     INTERVAL HISTORY:  Patient was last seen in clinic 4/3/2025. At that visit, the plan was to update MRI of the cervical spine, begin home PT, and trial over-the-counter wrist splints.    She was seen today for follow-up via telehealth and to review the MRI of the cervical spine.  At the last visit she described:  -Neck and upper extremity pain, worse when lying down (diffuse nondermatomal BUE numbness, tingling, pins-and-needles)  -History of upper extremity and flexor weakness and difficulty with ambulation and ADLs/IADLs in the setting of granulomatous myositis    Since last time, she did have a follow-up with [facility] rheumatology 4/15/2025.  Per review of that visit note, there was a consideration of trial of rituximab and plan to coordinate with Dr. Albarran in terms of further treatment options.    She also follows with Dr. Albarran, a neurologist with [facility], but does not have any follow-up currently scheduled.  She notes being somewhat overwhelmed by the number of medical appointments which has included rheumatology, sports medicine, neurology, a health psychologist, and this medical spine clinic.  She does also find her job to be an added stressor.    Since last time she was seen she did begin working with home physical therapy.  She has enjoyed the home PT and has found it very beneficial.  She  "says the home physical therapist had also offered the option of referring to occupational therapy once she is done with the PT.    Since last visit she did begin trying a right wrist splint overnight for possible carpal tunnel and did find it helpful.  She does have a splint for the left hand, too, but did not yet try it.  She does also remark that she tends to lean on her elbows a lot during the workday.  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          RECALL HISTORY OF PRESENT ILLNESS:  4/3/2025  Britt Park is a 54 year old female who presents with a chief complaint of pain and paresthesias.      She was seen today in the clinic.      She describes neck and upper extremity pain which has been going on for at least 6 months.  She has neck discomfort.  Her head feels \"heavy\".  She describes her upper back as feeling \"very tight\".  At night she sleeps supine; when laying down she feels like something is \"cut off\" in both of her arms.  She describes numbness, tingling, and pins-and-needles sensation affecting both upper extremities diffusely, without any particular pattern. Her hands ache.  She wears fingerless compression sleeves at night, but finds the sensation is extremely uncomfortable.  She wakes up with pain.  The discomfort is worst at night, but she does have daytime pain as well.    She follows regularly with [facility] in regards to granulomatous myositis  which was diagnosed in 2021.  She has tried various treatments in that regard including a year-long trial of prednisone (which did help with neck pain) and methotrexate.  Unfortunately neither made a significant impact on her chronic weakness and currently she is not taking any medication for this issue.    She reports baseline upper extremity and hip flexor weakness, she attributes to the granulomatous myositis.  She describes baseline difficulty with  weakness such as opening jars. She does a lot of typing for work and feels dexterity is " "mostly preserved. She has significant difficulty with activities requiring her to lift her arms and hands.  She typically uses 1 arm to assisting with lift up the other arm.  She is able to perform ADLs, but requires assistance from family with some upper body dressing.  Activities like taking her coat off or putting her coat on are very difficult for her. Sitting on (or getting up from) low chairs or toilets is very difficult for her.     Aggravating factors include: Lying down, standing, sitting, walking  Relieving factors include: Standing, walking, exercise, thinking about something else  She describes functional bladder incontinence but no overt incontinence.  She denies bowel incontinence, saddle anesthesia, unintentional weight loss, fevers/chills, or night sweats.     She does have a history of falls.  She notes a mechanical slip and fall at the [employer] in December 2022 and unfortunately fractured her knee and femur and ultimately underwent surgery.  She reports a prolonged recovery over 1 year after that accident.    She can walk for 5 to 10 minutes within her house using a walker, but feels unsteady.  Today in the clinic she is using a wheelchair.    She did PT and OT in the community previously but getting to the appointments was extremely exhausting for her.  By the time she would get back home she had a lot of difficulty getting out of her chair even to get up to use the toilet because of severe fatigue after the PT.  She says that she was referred for home PT in the past, but says the agency told her they did not have any therapist who could work with her particular diagnosis. She is currently doing PT for high ankle sprain.    Finally, patient's history includes MRI of the cervical spine from 2023 which showed a left lateral paraspinous soft tissues at the C2-C3 level. Subsequent CT-guided biopsy was done at [facility] 3/2/2023.  Per [facility] progress note 3/15/2023: \"We reviewed the " "results of outside CT and MRI which revealed a lobulated calcified mass in the left lateral paraspinous soft tissue at the level of C2-C3. Biopsy of this lesion revealed extensive dystrophic acellular calcifications. No concerns for malignancy. We reviewed that these findings seem to be nonspecific. Will plan to discuss this further with Dr. Arriaga. There was no evidence of cervical lymphadenopathy. \"        PRIOR INJURIES/TREATMENT:   Ice/Heat:   Brace: Intermittently uses stabilizing wrist braces during the day to help with getting up and out of a chair    Physical Therapy:   Current home PT  She did PT and OT (as well as a neuro specific PT and OT), not well-tolerated due to fatigue     - Current Pain Medications -   Tylenol    - Prior/Trialed Pain Medications -   Lidoderm patch  Gabapentin   tramadol  Ibuprofen   Naproxen  Methocarbamol  Prednisone      Prior Procedures:  Date    Procedure   Improvement (%)  Years ago cervical level steroid injections at [facility] were helpful               Prior Related Surgery: None  Other (acupuncture, OMT, CMM, TENS, DME, etc.):   + Acupuncture    Specialists Seen - (with most recent, available notes and clinic visits reviewed)   1. Neurology-dysphagia  2.  Sports medicine-right ankle sprain  3.  Rheumatology-myositis, multiple joint pain  4.  Orthopedics-right femur fracture  5.  Neurosurgery-cervical spine tumor  6. [facility]  7. pain/spine specialist Dr. Scott at [facility]  8.  [place]    IMAGING - reviewed   MR CERVICAL SPINE W/O and W CONTRAST  LOCATION: [facility]  DATE: 4/16/2025     INDICATION: C8 radiculopathy on EMG, bilateral upper extremity weakness, neck pain, paraspinal cervical mass level on prior MRI.  COMPARISON: None.  CONTRAST: 5ml Gadavist.  TECHNIQUE: MRI Cervical Spine without and with IV contrast.     FINDINGS:   Alignment is essentially within normal limits. Bone marrow demonstrates minimal degenerative endplate " change. No high-grade focal marrow edema. No abnormal cord signal or enhancement. No appreciable extraspinal abnormality.     Craniovertebral junction and C1-C2: Mild degenerative change without stenosis.     C2-C3: Disc height maintained. Central bulge. Mild bilateral facet arthropathy. No foraminal or spinal canal stenosis.      C3-C4: Mild disc height loss. Central bulge. Mild bilateral facet arthropathy. No foraminal or spinal canal stenosis.      C4-C5: Mild disc height loss. Disc bulge with central annular fissure. Mild bilateral facet arthropathy. No foraminal stenosis. Mild if any spinal canal stenosis.      C5-C6: Disc height maintained. Minimal bulge. Mild bilateral facet arthropathy. No foraminal stenosis. Mild if any spinal canal stenosis.      C6-C7: Disc height maintained. Minimal bulge. Mild bilateral facet arthropathy. No foraminal or spinal canal stenosis.      C7-T1: Disc height maintained. No herniation. Mild bilateral facet arthropathy. No foraminal or spinal canal stenosis.                                                                      IMPRESSION:  1.  Mild degenerative change.  2.  No high-grade stenoses.  3.  No abnormal cord signal or enhancement.        EMG -3/19/2025, [facility]  Impression:  This electrodiagnostic study reveals complex polyphasic motor unit potentials in many muscles of both upper extremities.  Given the patient's history, this most consistent with residual of previously diagnosed myopathy.  In comparison to prior EMG report from 2021 at [facility], the previously described fibrillation potentials and rapid recruitment are no longer present.  Complex polyphasic motor unit potentials and increased insertional activity in some muscles continues to be noted.  Overall this suggest that active denervation/changes related to inflammatory or necrotic myopathic process are less active.  The current study does reveal chronic and active denervation in  several muscles of both upper extremities suggesting bilateral chronic inactive C8 cervical radiculopathy.  This was not previously described on the previous EMG in 2021.  Otherwise nerve conduction study does not reveal evidence of large fiber peripheral neuropathy.        MRI CERVICAL SPINE WITHOUT AND WITH CONTRAST  1/24/2023 2:29 PM      HISTORY: Cervical spinal mass (H).      TECHNIQUE: Multiplanar, multisequence MRI of the cervical spine  without and with 7 mL Gadavist.      COMPARISON: Soft tissue neck CT 1/7/2023.      FINDINGS:  Corresponding to the previously demonstrated calcified mass  centered in the left lateral paraspinous soft tissues at the C2-C3  level, there is a lobulated T1 and T2 hypointense, STIR hypointense  lesion with multiple small ovoid calcified components that  demonstrates thin enhancement peripherally and thin linear enhancement  interposed between the areas of lobulated calcification (series 12  images 11-12). There also appears to be a thin component of this  lesion that extends through the region of the left C2-C3 neural  foramen into the left posterolateral epidural space at the level of  C2-C3, as better delineated on the previous CT exam. The paraspinous  component of the mass measures approximately 2.3 x 2.1 cm in the axial  plane, unchanged from most recent CT. The mass at least abuts the  adjacent left lateral paraspinous musculature.     Normal vertebral body heights. Sagittal alignment of the cervical  spine is unremarkable. Bone marrow signal appears within normal  limits. No abnormal spinal cord signal identified. The visualized  paraspinal soft tissues otherwise appear unremarkable.     Segmental analysis:  Craniovertebral Junction/C1-C2: Mild degenerative change of the median  atlantoaxial joint. Otherwise unremarkable.     C2-C3: Normal disc height. Shallow central disc protrusion. Mild to  moderate left facet arthropathy. No spinal canal stenosis. Minimal  left neural  foraminal narrowing. The right neural foramen appears  patent.     C3-C4: Normal disc height. Small central disc protrusion. Mild to  moderate left and mild right facet arthropathy. No spinal canal  stenosis. No significant neural foraminal stenosis.     C4-C5: Normal disc height. Symmetric disc bulge with a shallow  superimposed central protrusion. Mild bilateral facet arthropathy. No  spinal canal or neural foraminal stenosis.     C5-C6: Normal disc height. Shallow symmetric disc bulge. Mild facet  arthropathy. No spinal canal or neural foraminal stenosis.     C6-C7: Normal disc height. Tiny central disc protrusion. Normal  facets. No spinal canal or neural foraminal stenosis.     C7-T1: Normal disc height. No herniation. Normal facets. No spinal  canal or neural foraminal stenosis.                                                                      IMPRESSION:  1. Lobulated calcified mass with areas of apparent thin linear  internal and peripheral enhancement centered in the left lateral  paraspinous soft tissues at the level of C2-C3, as described. There  also appears to be a small component that extends through the  posterior aspect of the left C2-C3 neural foramen into the left  posterolateral epidural space. Findings are nonspecific. While benign  entities such as tumoral calcinosis, atypical synovial chondromatosis  or other nonspecific dystrophic calcification are possible, a  calcified neoplasm is also possible.   2. Multilevel degenerative changes, as described, without evidence for  high-grade spinal canal or neural foraminal stenosis    Review Of Systems:  I am responding to those symptoms which are directly relevant to the specific indication for my consultation. I recommend that the patient follow up with their primary or referring provider to pursue any other symptoms which may be of concern.       Medical History:  She  has a past medical history of Acute respiratory failure with hypoxia (H),  Anemia, Facial paresthesia, Hepatic vein stenosis, Impetigo, Methemoglobinemia, Ovarian cyst, Overactive bladder, Secondary hypertension (11/02/2021), and Sleep apnea.     She  has a past surgical history that includes biopsy (03/01/2019 & 08/06/2021); colonoscopy (7/21/21); GYN surgery (4/11/07); Abdomen surgery (04/11/2007); Colonoscopy (N/A, 5/9/2022); Open reduction internal fixation rodding intramedullary femur (Right, 12/11/2022); Laparoscopic oophorectomy (Right, 12/20/2023); Esophagoscopy, gastroscopy, duodenoscopy (EGD), combined (N/A, 11/29/2024); and Colonoscopy (N/A, 11/29/2024).    Family History  Her family history includes Unknown/Adopted in an other family member. She was adopted.     Social History:  Work: Works from home, involves computer work / typing  Current living situation: Lives with family.  Family assists with ADLs, especially upper body dressing.  She uses a walker at home.  She  reports that she has never smoked. She has never used smokeless tobacco. She reports current alcohol use. She reports that she does not use drugs.        Current Medications:   She has a current medication list which includes the following prescription(s): acetaminophen, alendronate, atorvastatin, calcium carbonate, cyanocobalamin, estradiol, ferrous sulfate, fluconazole, folic acid, losartan, nova cushion gel seat pad, mupirocin, nitrofurantoin macrocrystal-monohydrate, pantoprazole, progesterone, solifenacin, valacyclovir, vitamin c w/shahriar hips, and vitamin d3.     Allergies:    -- Dapsone -- Shortness Of Breath    --  Oxygen levels went to 79 %.   -- Influenza Vaccines -- Hives    --  2/24/23 Patient states she's been able to have a             flu shot the past couple of years and has done             well with it.   -- Benadryl [Diphenhydramine]    -- Diphenhydramine -- Other (See Comments)    --  SHAKY   -- Sulfa Antibiotics -- Swelling   -- Sulfa Antibiotics     PHYSICAL EXAMINATION: *limited by nature of  virtual visit   No vital signs taken for visit  --CONSTITUTIONAL: No acute distress.   --PSYCHIATRIC: The patient is awake, alert, oriented to person, place, time and answering questions appropriately with clear speech. Appropriate mood and affect   -- Via telehealth instructed regarding performing Tinel's at the medial epicondyles and ulnar sides of the wrist and she does have tenderness at both medial epicondyles      ASSESSMENT:  Britt Park is a pleasant 54 year old female who presents with   -chronic 6+ month history of neck and upper extremity pain  -BUE diffuse pain and paresthesias  -Baseline difficulty with ADLs and IADLs  - History of mechanical falls  - Need for assistive device (uses a walker at home)    Complicating comorbities include:  # Osteoporosis on alendronate  # granulomatous myositis with baseline weakness followed by [facility] rheumatology and Genetics, previously on chronic prednisone, and trialed methotrexate, not currently on treatment      PLAN:  - MRI of the cervical spine was reviewed in detail with the patient today.  There is no correlating finding on the MRI in regards to the bilateral chronic inactive C8 radiculopathy seen on the recent EMG  - She does have clinical signs of ulnar neuropathy at both elbows.  She does tend to lean on her elbows during work hours.  I advised her to avoid leaning on her elbows when she can and also try to keep her arm straight at the elbow overnight (whether by using an over-the-counter brace or by loosely wrapping a towel around her arms as a reminder) she is currently doing physical therapy but occupational therapy would also be a useful addition.  Her home physical therapist also suggested a referral for OT once the PT is complete. If conservative management in this regard fails, could consider surgical referral  - She is planning to have a follow up visit with Dr. Albarran in regards to the granulomatous myositis.  Given the lack of  convincing findings on the MRI of the cervical spine I do question if the diffuse upper extremity symptoms can be attributed to the granulomatous myositis, and appreciate their expertise in this area. Per last rheumatology visit note, trial of Rituximab was proposed  - RTC in 6 weeks.  If she is having ongoing pain related to ulnar neuropathy despite conservative interventions could consider surgical consultation if needed      Ready to learn, no apparent learning barriers.  Education provided on treatment plan according to patient's preferred learning style.  Patient verbalizes understanding.   __________________________________  Carmel Telles NP  Physical Medicine & Rehabilitation        62 minutes spent by me on the date of the encounter doing chart review, history and exam, documentation and further activities per the note

## 2025-04-23 ENCOUNTER — VIRTUAL VISIT (OUTPATIENT)
Dept: PHYSICAL MEDICINE AND REHAB | Facility: CLINIC | Age: 55
End: 2025-04-23
Payer: COMMERCIAL

## 2025-04-23 ENCOUNTER — TELEPHONE (OUTPATIENT)
Dept: PHYSICAL MEDICINE AND REHAB | Facility: CLINIC | Age: 55
End: 2025-04-23

## 2025-04-23 VITALS — HEIGHT: 62 IN | BODY MASS INDEX: 20.61 KG/M2 | WEIGHT: 112 LBS

## 2025-04-23 DIAGNOSIS — R20.2 PARESTHESIA AND PAIN OF BOTH UPPER EXTREMITIES: Primary | ICD-10-CM

## 2025-04-23 DIAGNOSIS — M54.9 UPPER BACK PAIN: ICD-10-CM

## 2025-04-23 DIAGNOSIS — M79.601 PARESTHESIA AND PAIN OF BOTH UPPER EXTREMITIES: Primary | ICD-10-CM

## 2025-04-23 DIAGNOSIS — M54.2 NECK PAIN: ICD-10-CM

## 2025-04-23 DIAGNOSIS — M79.602 PARESTHESIA AND PAIN OF BOTH UPPER EXTREMITIES: Primary | ICD-10-CM

## 2025-04-23 ASSESSMENT — PAIN SCALES - GENERAL: PAINLEVEL_OUTOF10: SEVERE PAIN (9)

## 2025-04-23 NOTE — NURSING NOTE
Current patient location: [address] [place] [id number]    Is the patient currently in the state of [place]? YES    Visit mode: VIDEO    If the visit is dropped, the patient can be reconnected by:VIDEO VISIT: Text to cell phone:   Telephone Information:   Mobile [phone]       Will anyone else be joining the visit? NO  (If patient encounters technical issues they should call [phone]    Are changes needed to the allergy or medication list? No    Are refills needed on medications prescribed by this physician? NO    Rooming Documentation:  Questionnaire(s) completed    Reason for visit: Follow Up    Karla LUNA

## 2025-04-23 NOTE — TELEPHONE ENCOUNTER
Patient confirmed scheduled appointment:  Date: 6/4/25  Time: 8:40  Visit type: Return Med Spine  Provider: Carmel Telles  Location: Virtual  Testing/imaging: NA  Additional notes: 6 week follow up    Bouchra Crooks on 4/23/2025 at 11:45 AM

## 2025-04-23 NOTE — LETTER
4/23/2025       RE: Britt Park  [address] [facility] [address] [facility] [address]     Dear Colleague,    Thank you for referring your patient, Britt Park, to the [facility] [place] [facility]. Please see a copy of my visit note below.    Ventura is a 55 year old who is being evaluated via a billable video visit.      Video-Visit Details    Type of service:  Video Visit   Originating Location (pt. Location): Home  Joined the call at 4/23/2025, 10:47:26 am.  Left the call at 4/23/2025, 11:17:52 am.  You were on the call for 30 minutes 25 seconds.  Distant Location (provider location):  Off-site  Platform used for Video Visit: [employer]        PM&R Follow-Up Visit -     Date of Initial Visit: 4/3/2025  LOV: 4/3/2025  TD: 4/23/2025     Recall: Britt Park is a 55 year old female who presents with a chief complaint of pain and paresthesias.     INTERVAL HISTORY:  Patient was last seen in clinic 4/3/2025. At that visit, the plan was to update MRI of the cervical spine, begin home PT, and trial over-the-counter wrist splints.    She was seen today for follow-up via telehealth and to review the MRI of the cervical spine.  At the last visit she described:  -Neck and upper extremity pain, worse when lying down (diffuse nondermatomal BUE numbness, tingling, pins-and-needles)  -History of upper extremity and flexor weakness and difficulty with ambulation and ADLs/IADLs in the setting of granulomatous myositis    Since last time, she did have a follow-up with [facility] rheumatology 4/15/2025.  Per review of that visit note, there was a consideration of trial of rituximab and plan to coordinate with Dr. Albarran in terms of further treatment options.    She also follows with Dr. Albarran, a neurologist with [facility], but does not have any follow-up currently scheduled.  She notes being somewhat overwhelmed by the number of  "medical appointments which has included rheumatology, sports medicine, neurology, a health psychologist, and this medical spine clinic.  She does also find her job to be an added stressor.    Since last time she was seen she did begin working with home physical therapy.  She has enjoyed the home PT and has found it very beneficial.  She says the home physical therapist had also offered the option of referring to occupational therapy once she is done with the PT.    Since last visit she did begin trying a right wrist splint overnight for possible carpal tunnel and did find it helpful.  She does have a splint for the left hand, too, but did not yet try it.  She does also remark that she tends to lean on her elbows a lot during the workday.  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          RECALL HISTORY OF PRESENT ILLNESS:  4/3/2025  Britt Park is a 54 year old female who presents with a chief complaint of pain and paresthesias.      She was seen today in the clinic.      She describes neck and upper extremity pain which has been going on for at least 6 months.  She has neck discomfort.  Her head feels \"heavy\".  She describes her upper back as feeling \"very tight\".  At night she sleeps supine; when laying down she feels like something is \"cut off\" in both of her arms.  She describes numbness, tingling, and pins-and-needles sensation affecting both upper extremities diffusely, without any particular pattern. Her hands ache.  She wears fingerless compression sleeves at night, but finds the sensation is extremely uncomfortable.  She wakes up with pain.  The discomfort is worst at night, but she does have daytime pain as well.    She follows regularly with [facility] in regards to granulomatous myositis  which was diagnosed in 2021.  She has tried various treatments in that regard including a year-long trial of prednisone (which did help with neck pain) and methotrexate.  Unfortunately neither made a significant " impact on her chronic weakness and currently she is not taking any medication for this issue.    She reports baseline upper extremity and hip flexor weakness, she attributes to the granulomatous myositis.  She describes baseline difficulty with  weakness such as opening jars. She does a lot of typing for work and feels dexterity is mostly preserved. She has significant difficulty with activities requiring her to lift her arms and hands.  She typically uses 1 arm to assisting with lift up the other arm.  She is able to perform ADLs, but requires assistance from family with some upper body dressing.  Activities like taking her coat off or putting her coat on are very difficult for her. Sitting on (or getting up from) low chairs or toilets is very difficult for her.     Aggravating factors include: Lying down, standing, sitting, walking  Relieving factors include: Standing, walking, exercise, thinking about something else  She describes functional bladder incontinence but no overt incontinence.  She denies bowel incontinence, saddle anesthesia, unintentional weight loss, fevers/chills, or night sweats.     She does have a history of falls.  She notes a mechanical slip and fall at the [employer] in December 2022 and unfortunately fractured her knee and femur and ultimately underwent surgery.  She reports a prolonged recovery over 1 year after that accident.    She can walk for 5 to 10 minutes within her house using a walker, but feels unsteady.  Today in the clinic she is using a wheelchair.    She did PT and OT in the community previously but getting to the appointments was extremely exhausting for her.  By the time she would get back home she had a lot of difficulty getting out of her chair even to get up to use the toilet because of severe fatigue after the PT.  She says that she was referred for home PT in the past, but says the agency told her they did not have any therapist who could work with her  "particular diagnosis. She is currently doing PT for high ankle sprain.    Finally, patient's history includes MRI of the cervical spine from 2023 which showed a left lateral paraspinous soft tissues at the C2-C3 level. Subsequent CT-guided biopsy was done at [facility] 3/2/2023.  Per [facility] progress note 3/15/2023: \"We reviewed the results of outside CT and MRI which revealed a lobulated calcified mass in the left lateral paraspinous soft tissue at the level of C2-C3. Biopsy of this lesion revealed extensive dystrophic acellular calcifications. No concerns for malignancy. We reviewed that these findings seem to be nonspecific. Will plan to discuss this further with Dr. Arriaga. There was no evidence of cervical lymphadenopathy. \"        PRIOR INJURIES/TREATMENT:   Ice/Heat:   Brace: Intermittently uses stabilizing wrist braces during the day to help with getting up and out of a chair    Physical Therapy:   Current home PT  She did PT and OT (as well as a neuro specific PT and OT), not well-tolerated due to fatigue     - Current Pain Medications -   Tylenol    - Prior/Trialed Pain Medications -   Lidoderm patch  Gabapentin   tramadol  Ibuprofen   Naproxen  Methocarbamol  Prednisone      Prior Procedures:  Date    Procedure   Improvement (%)  Years ago cervical level steroid injections at [facility] were helpful               Prior Related Surgery: None  Other (acupuncture, OMT, CMM, TENS, DME, etc.):   + Acupuncture    Specialists Seen - (with most recent, available notes and clinic visits reviewed)   1. Neurology-dysphagia  2.  Sports medicine-right ankle sprain  3.  Rheumatology-myositis, multiple joint pain  4.  Orthopedics-right femur fracture  5.  Neurosurgery-cervical spine tumor  6. [facility]  7. pain/spine specialist Dr. Scott at [facility]  8.  [place]    IMAGING - reviewed   MR CERVICAL SPINE W/O and W CONTRAST  LOCATION: [facility]  DATE: 4/16/2025     INDICATION: C8 " radiculopathy on EMG, bilateral upper extremity weakness, neck pain, paraspinal cervical mass level on prior MRI.  COMPARISON: None.  CONTRAST: 5ml Gadavist.  TECHNIQUE: MRI Cervical Spine without and with IV contrast.     FINDINGS:   Alignment is essentially within normal limits. Bone marrow demonstrates minimal degenerative endplate change. No high-grade focal marrow edema. No abnormal cord signal or enhancement. No appreciable extraspinal abnormality.     Craniovertebral junction and C1-C2: Mild degenerative change without stenosis.     C2-C3: Disc height maintained. Central bulge. Mild bilateral facet arthropathy. No foraminal or spinal canal stenosis.      C3-C4: Mild disc height loss. Central bulge. Mild bilateral facet arthropathy. No foraminal or spinal canal stenosis.      C4-C5: Mild disc height loss. Disc bulge with central annular fissure. Mild bilateral facet arthropathy. No foraminal stenosis. Mild if any spinal canal stenosis.      C5-C6: Disc height maintained. Minimal bulge. Mild bilateral facet arthropathy. No foraminal stenosis. Mild if any spinal canal stenosis.      C6-C7: Disc height maintained. Minimal bulge. Mild bilateral facet arthropathy. No foraminal or spinal canal stenosis.      C7-T1: Disc height maintained. No herniation. Mild bilateral facet arthropathy. No foraminal or spinal canal stenosis.                                                                      IMPRESSION:  1.  Mild degenerative change.  2.  No high-grade stenoses.  3.  No abnormal cord signal or enhancement.        EMG -3/19/2025, [facility]  Impression:  This electrodiagnostic study reveals complex polyphasic motor unit potentials in many muscles of both upper extremities.  Given the patient's history, this most consistent with residual of previously diagnosed myopathy.  In comparison to prior EMG report from 2021 at [facility], the previously described fibrillation potentials and rapid  recruitment are no longer present.  Complex polyphasic motor unit potentials and increased insertional activity in some muscles continues to be noted.  Overall this suggest that active denervation/changes related to inflammatory or necrotic myopathic process are less active.  The current study does reveal chronic and active denervation in several muscles of both upper extremities suggesting bilateral chronic inactive C8 cervical radiculopathy.  This was not previously described on the previous EMG in 2021.  Otherwise nerve conduction study does not reveal evidence of large fiber peripheral neuropathy.        MRI CERVICAL SPINE WITHOUT AND WITH CONTRAST  1/24/2023 2:29 PM      HISTORY: Cervical spinal mass (H).      TECHNIQUE: Multiplanar, multisequence MRI of the cervical spine  without and with 7 mL Gadavist.      COMPARISON: Soft tissue neck CT 1/7/2023.      FINDINGS:  Corresponding to the previously demonstrated calcified mass  centered in the left lateral paraspinous soft tissues at the C2-C3  level, there is a lobulated T1 and T2 hypointense, STIR hypointense  lesion with multiple small ovoid calcified components that  demonstrates thin enhancement peripherally and thin linear enhancement  interposed between the areas of lobulated calcification (series 12  images 11-12). There also appears to be a thin component of this  lesion that extends through the region of the left C2-C3 neural  foramen into the left posterolateral epidural space at the level of  C2-C3, as better delineated on the previous CT exam. The paraspinous  component of the mass measures approximately 2.3 x 2.1 cm in the axial  plane, unchanged from most recent CT. The mass at least abuts the  adjacent left lateral paraspinous musculature.     Normal vertebral body heights. Sagittal alignment of the cervical  spine is unremarkable. Bone marrow signal appears within normal  limits. No abnormal spinal cord signal identified. The  visualized  paraspinal soft tissues otherwise appear unremarkable.     Segmental analysis:  Craniovertebral Junction/C1-C2: Mild degenerative change of the median  atlantoaxial joint. Otherwise unremarkable.     C2-C3: Normal disc height. Shallow central disc protrusion. Mild to  moderate left facet arthropathy. No spinal canal stenosis. Minimal  left neural foraminal narrowing. The right neural foramen appears  patent.     C3-C4: Normal disc height. Small central disc protrusion. Mild to  moderate left and mild right facet arthropathy. No spinal canal  stenosis. No significant neural foraminal stenosis.     C4-C5: Normal disc height. Symmetric disc bulge with a shallow  superimposed central protrusion. Mild bilateral facet arthropathy. No  spinal canal or neural foraminal stenosis.     C5-C6: Normal disc height. Shallow symmetric disc bulge. Mild facet  arthropathy. No spinal canal or neural foraminal stenosis.     C6-C7: Normal disc height. Tiny central disc protrusion. Normal  facets. No spinal canal or neural foraminal stenosis.     C7-T1: Normal disc height. No herniation. Normal facets. No spinal  canal or neural foraminal stenosis.                                                                      IMPRESSION:  1. Lobulated calcified mass with areas of apparent thin linear  internal and peripheral enhancement centered in the left lateral  paraspinous soft tissues at the level of C2-C3, as described. There  also appears to be a small component that extends through the  posterior aspect of the left C2-C3 neural foramen into the left  posterolateral epidural space. Findings are nonspecific. While benign  entities such as tumoral calcinosis, atypical synovial chondromatosis  or other nonspecific dystrophic calcification are possible, a  calcified neoplasm is also possible.   2. Multilevel degenerative changes, as described, without evidence for  high-grade spinal canal or neural foraminal stenosis    Review Of  Systems:  I am responding to those symptoms which are directly relevant to the specific indication for my consultation. I recommend that the patient follow up with their primary or referring provider to pursue any other symptoms which may be of concern.       Medical History:  She  has a past medical history of Acute respiratory failure with hypoxia (H), Anemia, Facial paresthesia, Hepatic vein stenosis, Impetigo, Methemoglobinemia, Ovarian cyst, Overactive bladder, Secondary hypertension (11/02/2021), and Sleep apnea.     She  has a past surgical history that includes biopsy (03/01/2019 & 08/06/2021); colonoscopy (7/21/21); GYN surgery (4/11/07); Abdomen surgery (04/11/2007); Colonoscopy (N/A, 5/9/2022); Open reduction internal fixation rodding intramedullary femur (Right, 12/11/2022); Laparoscopic oophorectomy (Right, 12/20/2023); Esophagoscopy, gastroscopy, duodenoscopy (EGD), combined (N/A, 11/29/2024); and Colonoscopy (N/A, 11/29/2024).    Family History  Her family history includes Unknown/Adopted in an other family member. She was adopted.     Social History:  Work: Works from home, involves computer work / typing  Current living situation: Lives with family.  Family assists with ADLs, especially upper body dressing.  She uses a walker at home.  She  reports that she has never smoked. She has never used smokeless tobacco. She reports current alcohol use. She reports that she does not use drugs.        Current Medications:   She has a current medication list which includes the following prescription(s): acetaminophen, alendronate, atorvastatin, calcium carbonate, cyanocobalamin, estradiol, ferrous sulfate, fluconazole, folic acid, losartan, nova cushion gel seat pad, mupirocin, nitrofurantoin macrocrystal-monohydrate, pantoprazole, progesterone, solifenacin, valacyclovir, vitamin c w/shahriar hips, and vitamin d3.     Allergies:    -- Dapsone -- Shortness Of Breath    --  Oxygen levels went to 79 %.   -- Influenza  Vaccines -- Hives    --  2/24/23 Patient states she's been able to have a             flu shot the past couple of years and has done             well with it.   -- Benadryl [Diphenhydramine]    -- Diphenhydramine -- Other (See Comments)    --  SHAKY   -- Sulfa Antibiotics -- Swelling   -- Sulfa Antibiotics     PHYSICAL EXAMINATION: *limited by nature of virtual visit   No vital signs taken for visit  --CONSTITUTIONAL: No acute distress.   --PSYCHIATRIC: The patient is awake, alert, oriented to person, place, time and answering questions appropriately with clear speech. Appropriate mood and affect   -- Via telehealth instructed regarding performing Tinel's at the medial epicondyles and ulnar sides of the wrist and she does have tenderness at both medial epicondyles      ASSESSMENT:  Britt Park is a pleasant 54 year old female who presents with   -chronic 6+ month history of neck and upper extremity pain  -BUE diffuse pain and paresthesias  -Baseline difficulty with ADLs and IADLs  - History of mechanical falls  - Need for assistive device (uses a walker at home)    Complicating comorbities include:  # Osteoporosis on alendronate  # granulomatous myositis with baseline weakness followed by [facility] rheumatology and Genetics, previously on chronic prednisone, and trialed methotrexate, not currently on treatment      PLAN:  - MRI of the cervical spine was reviewed in detail with the patient today.  There is no correlating finding on the MRI in regards to the bilateral chronic inactive C8 radiculopathy seen on the recent EMG  - She does have clinical signs of ulnar neuropathy at both elbows.  She does tend to lean on her elbows during work hours.  I advised her to avoid leaning on her elbows when she can and also try to keep her arm straight at the elbow overnight (whether by using an over-the-counter brace or by loosely wrapping a towel around her arms as a reminder) she is currently doing physical therapy  but occupational therapy would also be a useful addition.  Her home physical therapist also suggested a referral for OT once the PT is complete. If conservative management in this regard fails, could consider surgical referral  - She is planning to have a follow up visit with Dr. Albarran in regards to the granulomatous myositis.  Given the lack of convincing findings on the MRI of the cervical spine I do question if the diffuse upper extremity symptoms can be attributed to the granulomatous myositis, and appreciate their expertise in this area. Per last rheumatology visit note, trial of Rituximab was proposed  - RTC in 6 weeks.  If she is having ongoing pain related to ulnar neuropathy despite conservative interventions could consider surgical consultation if needed      Ready to learn, no apparent learning barriers.  Education provided on treatment plan according to patient's preferred learning style.  Patient verbalizes understanding.   __________________________________  Carmel Telles NP  Physical Medicine & Rehabilitation        62 minutes spent by me on the date of the encounter doing chart review, history and exam, documentation and further activities per the note         Again, thank you for allowing me to participate in the care of your patient.      Sincerely,    ROSANNE Calles CNP

## 2025-05-06 ENCOUNTER — TELEPHONE (OUTPATIENT)
Dept: FAMILY MEDICINE | Facility: CLINIC | Age: 55
End: 2025-05-06
Payer: COMMERCIAL

## 2025-05-06 NOTE — TELEPHONE ENCOUNTER
Home Care is calling regarding an established patient with [facility].  Requesting orders from: Vi Metz RN APPROVED: RN able to provide verbal orders.  Home Care will send orders for signature.  RN will close encounter.  Is this a request for a temporary pause in the home care episode?  No    Orders Requested    Occupational Therapy  Request for initial certification (first set of orders)     RN gave verbal order: Yes        Phone number Home Care can be reached at: [phone]   Okay to leave a detailed message?: Yes    Contacts       Contact Date/Time Type Contact Phone/Fax    05/06/2025 01:30 PM CDT Phone (Incoming) Katharina (Home Care) [phone]     [facility]          Edel Qiu RN

## 2025-05-07 ENCOUNTER — OFFICE VISIT (OUTPATIENT)
Dept: URGENT CARE | Facility: URGENT CARE | Age: 55
End: 2025-05-07
Payer: COMMERCIAL

## 2025-05-07 ENCOUNTER — E-VISIT (OUTPATIENT)
Dept: URGENT CARE | Facility: CLINIC | Age: 55
End: 2025-05-07
Payer: COMMERCIAL

## 2025-05-07 ENCOUNTER — TELEPHONE (OUTPATIENT)
Dept: FAMILY MEDICINE | Facility: CLINIC | Age: 55
End: 2025-05-07
Payer: COMMERCIAL

## 2025-05-07 VITALS
DIASTOLIC BLOOD PRESSURE: 77 MMHG | HEART RATE: 87 BPM | SYSTOLIC BLOOD PRESSURE: 123 MMHG | WEIGHT: 113 LBS | TEMPERATURE: 97 F | OXYGEN SATURATION: 98 % | RESPIRATION RATE: 18 BRPM | BODY MASS INDEX: 20.66 KG/M2

## 2025-05-07 DIAGNOSIS — N39.0 URINARY TRACT INFECTION WITH HEMATURIA, SITE UNSPECIFIED: Primary | ICD-10-CM

## 2025-05-07 DIAGNOSIS — R30.0 DYSURIA: Primary | ICD-10-CM

## 2025-05-07 DIAGNOSIS — R31.9 URINARY TRACT INFECTION WITH HEMATURIA, SITE UNSPECIFIED: Primary | ICD-10-CM

## 2025-05-07 DIAGNOSIS — R30.0 DYSURIA: ICD-10-CM

## 2025-05-07 LAB
ALBUMIN UR-MCNC: NEGATIVE MG/DL
APPEARANCE UR: CLEAR
BACTERIA #/AREA URNS HPF: ABNORMAL /HPF
BILIRUB UR QL STRIP: NEGATIVE
COLOR UR AUTO: YELLOW
GLUCOSE UR STRIP-MCNC: NEGATIVE MG/DL
HGB UR QL STRIP: ABNORMAL
KETONES UR STRIP-MCNC: NEGATIVE MG/DL
LEUKOCYTE ESTERASE UR QL STRIP: ABNORMAL
NITRATE UR QL: NEGATIVE
PH UR STRIP: 6 [PH] (ref 5–7)
RBC #/AREA URNS AUTO: ABNORMAL /HPF
SP GR UR STRIP: 1.02 (ref 1–1.03)
SQUAMOUS #/AREA URNS AUTO: ABNORMAL /LPF
UROBILINOGEN UR STRIP-ACNC: 0.2 E.U./DL
WBC #/AREA URNS AUTO: ABNORMAL /HPF
WBC CLUMPS #/AREA URNS HPF: PRESENT /HPF

## 2025-05-07 PROCEDURE — 3078F DIAST BP <80 MM HG: CPT | Performed by: NURSE PRACTITIONER

## 2025-05-07 PROCEDURE — 81001 URINALYSIS AUTO W/SCOPE: CPT | Performed by: NURSE PRACTITIONER

## 2025-05-07 PROCEDURE — 3074F SYST BP LT 130 MM HG: CPT | Performed by: NURSE PRACTITIONER

## 2025-05-07 PROCEDURE — 99213 OFFICE O/P EST LOW 20 MIN: CPT | Performed by: NURSE PRACTITIONER

## 2025-05-07 RX ORDER — CEPHALEXIN 500 MG/1
500 CAPSULE ORAL 2 TIMES DAILY
Qty: 14 CAPSULE | Refills: 0 | Status: SHIPPED | OUTPATIENT
Start: 2025-05-07 | End: 2025-05-14

## 2025-05-07 ASSESSMENT — ENCOUNTER SYMPTOMS
FEVER: 0
FATIGUE: 0
CHILLS: 0
BACK PAIN: 0
ABDOMINAL PAIN: 0

## 2025-05-07 NOTE — PATIENT INSTRUCTIONS
Dear Britt Park,     After reviewing your responses, I would like you to come in for a urine test to make sure we treat you correctly. This urine test is to evaluate you for a possible urinary tract infection, and should be scheduled for today or tomorrow. Schedule a Lab Only appointment here.     Lab appointments are not available at most locations on the weekends. If no Lab Only appointment is available, you should be seen in any of our convenient Walk-in or Urgent Care Centers, which can be found on our website here.     You will receive instructions with your results in [facility] once they are available.     If your symptoms worsen, you develop pain in your back or stomach, develop fevers, or are not improving in 5 days, please contact your primary care provider for an appointment or visit a Walk-in or Urgent Care Center to be seen.     Thanks again for choosing us as your health care partner,     Gregoria Burrows PA-C

## 2025-05-07 NOTE — TELEPHONE ENCOUNTER
Patient contacts clinic expressing concern that she may have a urinary tract infection and is hoping for treatment.  Writer suggested that patient complete e-visit to which patient agreed.  Call ended.

## 2025-05-07 NOTE — PROGRESS NOTES
Urgent Care Clinic Visit    Chief Complaint   Patient presents with    Urgent Care    Urinary Problem     Pt presents burning, urgency and frequent urination, onset 2 days.                5/7/2025     6:42 PM   Additional Questions   Roomed by Rosalinda Cash   Accompanied by      Pre-Provider Visit Orders- Urinalysis UA/UC  Patient reports the following symptoms:  discomfort, pain or burning with urination  and frequent urination   Does the patient report any of the following symptoms: vaginal discharge, vaginal itching, possible yeast infection, has a urinary catheter in place, or unable to void in a specimen cup?  No

## 2025-05-08 NOTE — PATIENT INSTRUCTIONS
Take antibiotic as directed cephalexin 500 mg twice daily for 7 days. Adequate fluids. Monitor for new or worsening symptoms.     You will be treated with cephalexin until cultures return and will adjust as necessary. Side effects reviewed and discussed.     Any red flag symptoms go directly to the Emergency Department.

## 2025-05-08 NOTE — PROGRESS NOTES
Assessment & Plan       ICD-10-CM    1. Urinary tract infection with hematuria, site unspecified  N39.0 cephALEXin (KEFLEX) 500 MG capsule    R31.9       2. Dysuria  R30.0 UA Macroscopic with reflex to Microscopic and Culture - Clinic Collect     UA Microscopic with Reflex to Culture     Urine Culture           Patient Instructions   Take antibiotic as directed cephalexin 500 mg twice daily for 7 days. Adequate fluids. Monitor for new or worsening symptoms.     You will be treated with cephalexin until cultures return and will adjust as necessary. Side effects reviewed and discussed.     Any red flag symptoms go directly to the Emergency Department.    Patient agreed to the treatment plan and verbalized understanding.     Results for orders placed or performed in visit on 05/07/25 (from the past 24 hours)   UA Macroscopic with reflex to Microscopic and Culture - Clinic Collect    Specimen: Urine, Clean Catch   Result Value Ref Range    Color Urine Yellow Colorless, Straw, Light Yellow, Yellow    Appearance Urine Clear Clear    Glucose Urine Negative Negative mg/dL    Bilirubin Urine Negative Negative    Ketones Urine Negative Negative mg/dL    Specific Gravity Urine 1.025 1.003 - 1.035    Blood Urine Trace (A) Negative    pH Urine 6.0 5.0 - 7.0    Protein Albumin Urine Negative Negative mg/dL    Urobilinogen Urine 0.2 0.2, 1.0 E.U./dL    Nitrite Urine Negative Negative    Leukocyte Esterase Urine Trace (A) Negative   UA Microscopic with Reflex to Culture   Result Value Ref Range    Bacteria Urine Few (A) None Seen /HPF    RBC Urine 2-5 (A) 0-2 /HPF /HPF    WBC Urine 25-50 (A) 0-5 /HPF /HPF    Squamous Epithelials Urine Few (A) None Seen /LPF    WBC Clumps Urine Present (A) None Seen /HPF       Subjective     Ventura is a 55 year old female who presents to clinic today for the following health issues:  Chief Complaint   Patient presents with    Urgent Care    Urinary Problem     Pt presents burning, urgency and  frequent urination, onset 2 days.      HPI  Patient states approximately 2 days ago she started having dysuria, increased urinary frequency, and increased urinary urgency.  She states these are her typical symptoms for urinary tract infections.  She had her last urinary tract infection on 3/23/2025 which was treated with cephalexin.  She states this worked well for her.  Urine culture indicates that it was susceptible to the cephalexin.  Patient states she has not been taking any medications for her symptoms.  She denies any fevers, abdominal pain, back pain, or other systemic symptoms.      Review of Systems   Constitutional:  Negative for chills, fatigue and fever.   Gastrointestinal:  Negative for abdominal pain.   Musculoskeletal:  Negative for back pain.   All other systems reviewed and are negative.      Problem List:  2025-01: Hyperlipidemia LDL goal <100  2025-01: GAVE (gastric antral vascular ectasia)  2025-01: Multiple joint pain  2025-01: Vitamin D deficiency  2024-11: Anemia  2024-03: Closed fracture of right femur, unspecified fracture   morphology, unspecified portion of femur, initial encounter (H)  2024-01: Urge incontinence  2023-09: Acute pain of right knee  2023-09: Aftercare following surgery of the musculoskeletal system  2023-04: Closed disp fracture of condyle of right femur with routine   healing  2023-04: Pulmonary nodules  2023-02: Atopic dermatitis of scalp  2023-02: Pain of right lower leg  2023-01: Enlarged lymph nodes  2022-12: Closed fracture of right femur, unspecified fracture   morphology, unspecified portion of femur, initial encounter (H)  2022-09: Cyst of right ovary  2022-09: Hepatic steatosis  2022-06: Facial paresthesia  2022-06: Weight gain  2022-06: Vaginal dryness  2022-06: Annual physical exam  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  2022-06: Acute respiratory failure with hypoxia (H)  2022-06: Other myositis, unspecified site  2022-04: Anemia, unspecified type  2022-04: Visit for  screening mammogram  2022-04: Seasonal allergic rhinitis, unspecified trigger  2022-01: Need for pneumocystis prophylaxis  2021-11: Impetigo  2021-11: Secondary hypertension  2021-11: On prednisone therapy  2021-11: Vitamin B 12 deficiency  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, mitochondrial  2021-09: Overactive bladder  2021-09: Dry eyes  2021-09: Encounter for monitoring of systemic steroid therapy  2021-09: Need for vaccination  2020-09: Cervical pain  2020-08: Right hand weakness  2020-08: Myopathy      Past Medical History:   Diagnosis Date    Acute respiratory failure with hypoxia (H)     Anemia     Facial paresthesia     Hepatic vein stenosis     Impetigo     Methemoglobinemia     Ovarian cyst     Overactive bladder     Secondary hypertension 11/02/2021    Sleep apnea          Social History     Tobacco Use    Smoking status: Never    Smokeless tobacco: Never   Substance Use Topics    Alcohol use: Yes     Comment: glass of wine once a week.           Objective    /77   Pulse 87   Temp 97  F (36.1  C) (Oral)   Resp 18   Wt 51.3 kg (113 lb)   LMP  (LMP Unknown)   SpO2 98%   BMI 20.66 kg/m    Physical Exam  Vitals and nursing note reviewed.   Constitutional:       Appearance: Normal appearance.   HENT:      Head: Normocephalic and atraumatic.   Cardiovascular:      Rate and Rhythm: Normal rate and regular rhythm.      Heart sounds: Normal heart sounds.   Pulmonary:      Effort: Pulmonary effort is normal.      Breath sounds: Normal breath sounds.   Abdominal:      General: Abdomen is flat. Bowel sounds are normal.      Palpations: Abdomen is soft.      Tenderness: There is no abdominal tenderness. There is no right CVA tenderness or left CVA tenderness.   Neurological:      Mental Status: She is alert and oriented to person, place, and time.              Celine Hyman NP

## 2025-05-14 ENCOUNTER — MYC MEDICAL ADVICE (OUTPATIENT)
Dept: FAMILY MEDICINE | Facility: CLINIC | Age: 55
End: 2025-05-14
Payer: COMMERCIAL

## 2025-05-14 DIAGNOSIS — N39.0 URINARY TRACT INFECTION WITHOUT HEMATURIA, SITE UNSPECIFIED: Primary | ICD-10-CM

## 2025-05-15 RX ORDER — NITROFURANTOIN 25; 75 MG/1; MG/1
100 CAPSULE ORAL 2 TIMES DAILY
Qty: 10 CAPSULE | Refills: 0 | Status: SHIPPED | OUTPATIENT
Start: 2025-05-15 | End: 2025-05-20

## 2025-05-19 DIAGNOSIS — Z53.9 DIAGNOSIS NOT YET DEFINED: Primary | ICD-10-CM

## 2025-05-21 ENCOUNTER — TELEPHONE (OUTPATIENT)
Dept: FAMILY MEDICINE | Facility: CLINIC | Age: 55
End: 2025-05-21
Payer: COMMERCIAL

## 2025-05-21 NOTE — TELEPHONE ENCOUNTER
Home Care is calling regarding an established patient with [facility].  Requesting orders from: Vi Metz RN APPROVED: RN able to provide verbal orders.  Home Care will send orders for signature.  RN will close encounter.  Is this a request for a temporary pause in the home care episode?  No    Orders Requested    Skilled Nursing  Request for discontinuation of care   Goals have been met/progressing.  Frequency:   RN gave verbal order: Yes      Contacts       Contact Date/Time Type Contact Phone/Fax    05/21/2025 04:42 PM CDT Phone (Incoming) Gracia Don [facility] [phone]     detailed voicemail ok          Luz Krishna RN

## 2025-05-23 ASSESSMENT — ANXIETY QUESTIONNAIRES
2. NOT BEING ABLE TO STOP OR CONTROL WORRYING: SEVERAL DAYS
7. FEELING AFRAID AS IF SOMETHING AWFUL MIGHT HAPPEN: NOT AT ALL
1. FEELING NERVOUS, ANXIOUS, OR ON EDGE: SEVERAL DAYS
3. WORRYING TOO MUCH ABOUT DIFFERENT THINGS: SEVERAL DAYS
4. TROUBLE RELAXING: SEVERAL DAYS
5. BEING SO RESTLESS THAT IT IS HARD TO SIT STILL: NOT AT ALL
GAD7 TOTAL SCORE: 5
7. FEELING AFRAID AS IF SOMETHING AWFUL MIGHT HAPPEN: NOT AT ALL
GAD7 TOTAL SCORE: 5
GAD7 TOTAL SCORE: 5
IF YOU CHECKED OFF ANY PROBLEMS ON THIS QUESTIONNAIRE, HOW DIFFICULT HAVE THESE PROBLEMS MADE IT FOR YOU TO DO YOUR WORK, TAKE CARE OF THINGS AT HOME, OR GET ALONG WITH OTHER PEOPLE: NOT DIFFICULT AT ALL
6. BECOMING EASILY ANNOYED OR IRRITABLE: SEVERAL DAYS
8. IF YOU CHECKED OFF ANY PROBLEMS, HOW DIFFICULT HAVE THESE MADE IT FOR YOU TO DO YOUR WORK, TAKE CARE OF THINGS AT HOME, OR GET ALONG WITH OTHER PEOPLE?: NOT DIFFICULT AT ALL

## 2025-05-28 ENCOUNTER — VIRTUAL VISIT (OUTPATIENT)
Dept: PSYCHOLOGY | Facility: CLINIC | Age: 55
End: 2025-05-28
Payer: COMMERCIAL

## 2025-05-28 DIAGNOSIS — F06.4 ANXIETY DISORDER DUE TO GENERAL MEDICAL CONDITION: Primary | ICD-10-CM

## 2025-05-28 NOTE — PROGRESS NOTES
"    Health Psychology                      Department of Medicine                     [facility] Mail Code [id number] [place] [address]              Adelina Lepe, Ph.D., L.P [phone]  Stephanie Benson, Ph.D., L.P. [phone]  Joel Shields, Ph.D. [phone]  Tiffany Huff, Ph.D., A.B.P.P., L.P. [phone]  Al Cee, Ph.D., A.B.P.P., L.P. [phone]         Baylee Shoemaker, Ph.D., L.P. [phone]   Demetria Montesinos, Ph.D., A.B.P.P., L.P. [phone]    [facility] [place]  3rd Floor  [address]   [address]    Health Psychology Follow-Up Note    The patient has been notified of following:    \"This video visit will be conducted via a call between you and your physician/provider. We have found that certain health care needs can be provided without the need for an in-person physical exam.  This service lets us provide the care you need with a video conversation.  If a prescription is necessary we can send it directly to your pharmacy.  If lab work is needed we can place an order for that and you can then stop by our lab to have the test done at a later time. If during the course of the call the physician/provider feels a video visit is not appropriate, you will not be charged for this service.\"    Patient has given verbal consent for Video visit? YES    Reason that telemedicine is appropriate: Patient request as does not drive/limited mobility/deemed appropriate for this session.  Patient has given verbal consent for video visit: Yes  Mode of transmission: Secure real time interactive audio and visual telecommunication system via [email or web address]  Location of originating and distant sites:  Originating site (patient location): patient's home in [place]  Distant site (provider site): home office of provider    Start time: 3:55  End time:  4:50  Extended session due to " "onset of treatment.    SUBJECTIVE  Patient has agreed to receiving telehealth services and was provided written information regarding telehealth/video sessions. Reviewed updates to physical and emotional health since previous session.     Job  4/14/25-Paid hourly, has been generally managing to stay within 40 hours a week. Rarely does overtime. Still in process of job searching. Discussed \"limiting beliefs\" about herself/mobility and how they have impacted her willingness to apply for jobs.     4/9/25- Gets to continue in current position. She is proud of herself for her self-advocacy. No updates on jobs that she applied for. She \"knows\" she needs to reach out to her network about looking for new jobs. Discussed barriers.    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 She took a 20% pay cut for this job, she did not try to negotiate salary. Been in HR since 2008. She has active FMLA for medical condition, if she were to leave [employer] is deterrent because she would have to accumulate a year before applying for FMLA. Current manager is flexible. She has worries about physical limitations impacting interview and she prefers job remote as well due to not driving/limited mobility. She took the job to get a specific experience and would like to take it and get into another role that allows for additional growth and increased salary.     Decided not to apply for job that she had been considering. Has done a lot of work to consider job options and her strengths. She started reviewing the materials. Still needs work on confidence to apply for jobs.She has been looking into remote job options. Met goal of reviewing for about 30 minutes and joined group on [employer] for people seeking remote jobs. Engaged her in processing emotions related to confidence related to applying for new job.    Applied to new job that would be remote only in a similar position that she is currently " in. Is considering applying for a higher/leadership role as well. Updated resume/cover letter and is in the process of applying for new positions.  Discussed work-related stressor and engaged her in processing via cognitive challenging of fortune-telling and mind-reading. Reinforced her use of coping skills such as praying and speaking with a friend.     5/28/25- [place]hing back that a position she had applied for was filled. Decided not to apply for other HR job at [employer] due to requirements of needing 6 years experience and she has 4. 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 They have a 1-2 year timeframe in consideration.     Exercise/strength/mobility  4/9/25- Qualified for home health care. Going to get PT at home to help with numbness/tingling in hands that makes it difficult to fall asleep. OT will also be visiting. MRI 4/16/25 of neck, ordered by spine provider.    Attending PT in person every 3 weeks for partially torn ligament in her ankle.  Feeling like struggling to incorporate all home PT exercises. Concerned she will lose strength if she stops exercising too much/limits due to ankle injury.    5/28/25- Uncertain if will be able to continue getting PT/OT services at home. Waiting to find out.     Genetic condition/Health  4/14/25-Starting to feel more comfortable with her upcoming trip as she had concerns about how her health/limited mobility will impact the experience. Has concerns about using toilet, is it is too low, she needs her 's help and his back has been bothering him more. Rated previous anxiety about being in new environment/decreased independence as a 7-8/10 initially, and now down to a 4/10.  Engaged her in identifying and challenging negative automatic thoughts. Discussed barriers to asking for help and problem solved. Has been requiring help with getting up and down from seated positions for past year. Struggling with  "the \"what ifs\" of her health.    Relatively slow progression. There is no specific prognosis regarding the course of the disease. Struggles with feeling resentful of friends who are physically healthy. 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 Mobility has declined significantly over past five years. Fell in 2022 at [place] led to broken bones and resulted in mobility \"set back.\" Feels she has been hesitant to work on acceptance of this condition in the past/does not feel comfortable with relying on /children for help/care. Health insurance through her 's job and she currently works full time.     Her hands and arms are going numb overnight and ache. Had EMG recently. Has been impacting her sleep.   Whole genome sequencing- looking for new developments in past few years. They couldn't find underlying genetic cause previously, diagnosis was through muscle biopsy. Is going to do more genetic testing but will take about 3 months to get results. Planning to meet with Neurologist and Rheumatologist at [place]. Sports medicine doctor at [facility] ordered EMG, seeing her for numbness/ankles. Has added ankle PT to help since her ankle sprain, but this has resulted in decreasing PT for other areas. Has more energy in AM so uses this time to do PT. The pool she goes to has been closed for 2 weeks. She misses it, helpful mentally and physically.     5/28/25-Traveling to [place] went well. Feeling less afraid about traveling now. She hasn't travelled in three years. Discussed role of anxiety in travel and how she is now able to challenge those thoughts since her trip went better than expected.    Other stressors  4/9/25- Will be traveling to [facility] with her two sons and  to visit their daughter. Has been feeling anxious about traveling. Engaged in problem solving related to worries for her upcoming trip.     5/28/25-Visit to [place] to see her daughter went well, worries were manageable " and her planning helped it to go smoothly. Daughter will be moving back to [place] late summer and she will be living with friend. She and her  are struggling with how to support their 19-year old daughter who is reportedly experiencing PTSD from break in. Discussed how she can support her daughter.     OBJECTIVE  Appearance/Behavior/Orientation: Patient was casually groomed and dressed. Oriented to person, plan, time and situation.   Cooperation/Reliability: Patient was open and cooperative throughout the session.    Speech/Language: Speech was clear, coherent, and of normal rate, rhythm and volume.   Thought Process: Clear, linear  Mood/Affect: Mood euthymic-somewhat anxious; affect mood congruent  Insight/Motivation: Good/good    ASSESSMENT  Patient's mood and anxiety within minimal and mild levels. She does, however, experience anxiety related to her medical condition and the impact that it has on her functioning in daily life (at home, with work).  The patient was engaged and interactive throughout the session and they appear likely to benefit from continued sessions.          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 6/9 3:00, 6/23 3:00    Goals: consider reaching out to previous colleague to network and  incorporate additional meditation sessions from [employer].     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 Maintain/increase physical activity/home PT exercises/strengthen legs so can walk easier.  CBT 1. N/A   2.Impact of health on career. Been at current position for almost 3 years.  2. Increased confidence and job applications. 2. CBT 2. N/A   3. Genetic disease- struggling with accepting 3. Increased acceptance/  decreased feelings of resentment  3. CBT 3. N/A     Frequency of Sessions: 1-4x/month    Discharge and Aftercare Goals: TBD    Expected duration of treatment:  TBD    Participants in therapy plan (family, friends, support network): TBD    Discussed and created this plan with the patient who has access to this treatment plan via [employer].    Regulatory Guidelines for Updating Treatment Plan  [place] Medical Assistance: Reviewed & signed at least every 90days  Medicare:  Update per policy

## 2025-06-05 ASSESSMENT — ANXIETY QUESTIONNAIRES
7. FEELING AFRAID AS IF SOMETHING AWFUL MIGHT HAPPEN: NOT AT ALL
GAD7 TOTAL SCORE: 5
3. WORRYING TOO MUCH ABOUT DIFFERENT THINGS: SEVERAL DAYS
8. IF YOU CHECKED OFF ANY PROBLEMS, HOW DIFFICULT HAVE THESE MADE IT FOR YOU TO DO YOUR WORK, TAKE CARE OF THINGS AT HOME, OR GET ALONG WITH OTHER PEOPLE?: NOT DIFFICULT AT ALL
4. TROUBLE RELAXING: SEVERAL DAYS
6. BECOMING EASILY ANNOYED OR IRRITABLE: SEVERAL DAYS
GAD7 TOTAL SCORE: 5
IF YOU CHECKED OFF ANY PROBLEMS ON THIS QUESTIONNAIRE, HOW DIFFICULT HAVE THESE PROBLEMS MADE IT FOR YOU TO DO YOUR WORK, TAKE CARE OF THINGS AT HOME, OR GET ALONG WITH OTHER PEOPLE: NOT DIFFICULT AT ALL
GAD7 TOTAL SCORE: 5
2. NOT BEING ABLE TO STOP OR CONTROL WORRYING: SEVERAL DAYS
5. BEING SO RESTLESS THAT IT IS HARD TO SIT STILL: NOT AT ALL
7. FEELING AFRAID AS IF SOMETHING AWFUL MIGHT HAPPEN: NOT AT ALL
1. FEELING NERVOUS, ANXIOUS, OR ON EDGE: SEVERAL DAYS

## 2025-06-09 ENCOUNTER — VIRTUAL VISIT (OUTPATIENT)
Dept: PSYCHOLOGY | Facility: CLINIC | Age: 55
End: 2025-06-09
Payer: COMMERCIAL

## 2025-06-09 DIAGNOSIS — F06.4 ANXIETY DISORDER DUE TO GENERAL MEDICAL CONDITION: Primary | ICD-10-CM

## 2025-06-09 NOTE — PROGRESS NOTES
"    Health Psychology                      Department of Medicine                     [facility] Mail [address] [place] [address]              Adelina Lepe, Ph.D., L.P [phone]  Stephanie Benson, Ph.D., L.P. [phone]  Joel Shields, Ph.D. [phone]  Tiffany Huff, Ph.D., A.B.P.P., L.P. [phone]  Al Cee, Ph.D., A.B.P.P., L.P. [phone]         Baylee Shoemaker, Ph.D., L.P. [phone]   Demetria Montesinos, Ph.D., A.B.P.P., L.P. [phone]    [facility]  3rd Floor  [address]   [address]    Health Psychology Follow-Up Note    The patient has been notified of following:    \"This video visit will be conducted via a call between you and your physician/provider. We have found that certain health care needs can be provided without the need for an in-person physical exam.  This service lets us provide the care you need with a video conversation.  If a prescription is necessary we can send it directly to your pharmacy.  If lab work is needed we can place an order for that and you can then stop by our lab to have the test done at a later time. If during the course of the call the physician/provider feels a video visit is not appropriate, you will not be charged for this service.\"    Patient has given verbal consent for Video visit? YES    Reason that telemedicine is appropriate: Patient request as does not drive/limited mobility/deemed appropriate for this session.  Patient has given verbal consent for video visit: Yes  Mode of transmission: Secure real time interactive audio and visual telecommunication system via [email or web address]  Location of originating and distant sites:  Originating site (patient location): patient's home in [place]  Distant site (provider site): home office of provider    Start time: 3:01  End time:  3:58  Extended session due to " "onset of treatment.    SUBJECTIVE  Patient has agreed to receiving telehealth services and was provided written information regarding telehealth/video sessions. Reviewed updates to physical and emotional health since previous session.     Job  4/14/25-Paid hourly, has been generally managing to stay within 40 hours a week. Rarely does overtime. Still in process of job searching. Discussed \"limiting beliefs\" about herself/mobility and how they have impacted her willingness to apply for jobs.     4/9/25- Gets to continue in current position. She is proud of herself for her self-advocacy. No updates on jobs that she applied for. She \"knows\" she needs to reach out to her network about looking for new jobs. Discussed barriers.    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. Been in position for 3 years in 9/25. She took a 20% pay cut for this job, she did not try to negotiate salary. Been in HR since 2008. She has active FMLA for medical condition, if she were to leave [employer] is deterrent because she would have to accumulate a year before applying for FMLA. Current manager is flexible. She has worries about physical limitations impacting interview and she prefers job remote as well due to not driving/limited mobility. She took the job to get a specific experience and would like to take it and get into another role that allows for additional growth and increased salary.     Decided not to apply for job that she had been considering. Has done a lot of work to consider job options and her strengths. She started reviewing the materials. Still needs work on confidence to apply for jobs.She has been looking into remote job options. Met goal of reviewing for about 30 minutes and joined group on [employer] for people seeking remote jobs. Engaged her in processing emotions related to confidence related to applying for new job.    Applied to new job that would be remote only in a similar position that she is currently " in. Is considering applying for a higher/leadership role as well. Updated resume/cover letter and is in the process of applying for new positions.  Discussed work-related stressor and engaged her in processing via cognitive challenging of fortune-telling and mind-reading. Reinforced her use of coping skills such as praying and speaking with a friend.     5/28/25- [place] back that a position she had applied for was filled. Decided not to apply for other HR job at [employer] due to requirements of needing 6 years experience and she has 4. 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 They have a 1-2 year timeframe in consideration.     6/9/25- Decided wants to take break from looking for new jobs.     Exercise/strength/mobility  4/9/25- Qualified for home health care. Going to get PT at home to help with numbness/tingling in hands that makes it difficult to fall asleep. OT will also be visiting. MRI 4/16/25 of neck, ordered by spine provider.    Attending PT in person every 3 weeks for partially torn ligament in her ankle.  Feeling like struggling to incorporate all home PT exercises. Concerned she will lose strength if she stops exercising too much/limits due to ankle injury.    5/28/25- Uncertain if will be able to continue getting PT/OT services at home. Waiting to find out.     Genetic condition/Health  4/14/25-Starting to feel more comfortable with her upcoming trip as she had concerns about how her health/limited mobility will impact the experience. Has concerns about using toilet, is it is too low, she needs her 's help and his back has been bothering him more. Rated previous anxiety about being in new environment/decreased independence as a 7-8/10 initially, and now down to a 4/10.  Engaged her in identifying and challenging negative automatic thoughts. Discussed barriers to asking for help and problem solved. Has been requiring help with  "getting up and down from seated positions for past year. Struggling with the \"what ifs\" of her health.    Relatively slow progression. There is no specific prognosis regarding the course of the disease. Struggles with feeling resentful of friends who are physically healthy. 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 Mobility has declined significantly over past five years. Fell in 2022 at [employer] led to broken bones and resulted in mobility \"set back.\" Feels she has been hesitant to work on acceptance of this condition in the past/does not feel comfortable with relying on /children for help/care. Health insurance through her 's job and she currently works full time.     Her hands and arms are going numb overnight and ache. Had EMG recently. Has been impacting her sleep.   Whole genome sequencing- looking for new developments in past few years. They couldn't find underlying genetic cause previously, diagnosis was through muscle biopsy. Is going to do more genetic testing but will take about 3 months to get results. Planning to meet with Neurologist and Rheumatologist at [facility]. Sports medicine doctor at [facility] ordered EMG, seeing her for numbness/ankles. Has added ankle PT to help since her ankle sprain, but this has resulted in decreasing PT for other areas. Has more energy in AM so uses this time to do PT. The pool she goes to has been closed for 2 weeks. She misses it, helpful mentally and physically.     5/28/25-Traveling to [place] went well. Feeling less afraid about traveling now. She hasn't travelled in three years. Discussed role of anxiety in travel and how she is now able to challenge those thoughts since her trip went better than expected.    6/9/25- MRI on left this week and neurologist wanting additional testing (e.g., muscle biopsy). Not many treatment options left, could consider infusions.     Other stressors  4/9/25- Will be traveling to [facility] with her two " sons and  to visit their daughter. Has been feeling anxious about traveling. Engaged in problem solving related to worries for her upcoming trip.     5/28/25-Visit to [place] to see her daughter went well, worries were manageable and her planning helped it to go smoothly. Daughter will be moving back to [place] late summer and she will be living with friend. She and her  are struggling with how to support their 19-year old daughter who is reportedly experiencing PTSD from break in. Discussed how she can support her daughter.     6/9/25- Sons' graduation and party this past weekend. Felt it went well, realizing family is going to be transition over this summer. Discussed ways to be more intentional during this summer with her family.     OBJECTIVE  Appearance/Behavior/Orientation: Patient was casually groomed and dressed. Oriented to person, plan, time and situation.   Cooperation/Reliability: Patient was open and cooperative throughout the session.    Speech/Language: Speech was clear, coherent, and of normal rate, rhythm and volume.   Thought Process: Clear, linear  Mood/Affect: Mood euthymic-somewhat anxious; affect mood congruent  Insight/Motivation: Good/good    ASSESSMENT  Patient's mood and anxiety within minimal and mild levels. She does, however, experience anxiety related to her medical condition and the impact that it has on her functioning in daily life (at home, with work).  The patient was engaged and interactive throughout the session and they appear likely to benefit from continued sessions.          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 6/30 3:00, 7/21 3:00  6/9/25- additional testing MRI, EMG, biopsy testing to determine if infusion tx may help.    Goals: speak with family about summer activities before son moves to [place], consider moving muscle biopsy to the fall, [facility] neurologist re: questions about testing, taking more minibreaks from work/incorporate PT exercises, and  incorporate additional meditation sessions from [employer].     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 Maintain/increase physical activity/home PT exercises/strengthen legs so can walk easier.  CBT 1. N/A   2.Impact of health on career. Been at current position for almost 3 years.  2. Increased confidence and job applications. 2. CBT 2. N/A   3. Genetic disease- struggling with accepting 3. Increased acceptance/  decreased feelings of resentment  3. CBT  3. N/A     Frequency of Sessions: 1-4x/month    Discharge and Aftercare Goals: TBD    Expected duration of treatment:  TBD    Participants in therapy plan (family, friends, support network): TBD    Discussed and created this plan with the patient who has access to this treatment plan via [employer].    Regulatory Guidelines for Updating Treatment Plan  [place] Medical Assistance: Reviewed & signed at least every 90days  Medicare:  Update per policy

## 2025-06-11 ENCOUNTER — TELEPHONE (OUTPATIENT)
Dept: FAMILY MEDICINE | Facility: CLINIC | Age: 55
End: 2025-06-11
Payer: COMMERCIAL

## 2025-06-22 ASSESSMENT — PATIENT HEALTH QUESTIONNAIRE - PHQ9
SUM OF ALL RESPONSES TO PHQ QUESTIONS 1-9: 2
10. IF YOU CHECKED OFF ANY PROBLEMS, HOW DIFFICULT HAVE THESE PROBLEMS MADE IT FOR YOU TO DO YOUR WORK, TAKE CARE OF THINGS AT HOME, OR GET ALONG WITH OTHER PEOPLE: NOT DIFFICULT AT ALL
SUM OF ALL RESPONSES TO PHQ QUESTIONS 1-9: 2

## 2025-06-23 ENCOUNTER — OFFICE VISIT (OUTPATIENT)
Dept: FAMILY MEDICINE | Facility: CLINIC | Age: 55
End: 2025-06-23
Payer: COMMERCIAL

## 2025-06-23 VITALS
RESPIRATION RATE: 16 BRPM | HEIGHT: 62 IN | SYSTOLIC BLOOD PRESSURE: 120 MMHG | DIASTOLIC BLOOD PRESSURE: 82 MMHG | WEIGHT: 113 LBS | OXYGEN SATURATION: 100 % | TEMPERATURE: 98 F | HEART RATE: 83 BPM | BODY MASS INDEX: 20.8 KG/M2

## 2025-06-23 DIAGNOSIS — S72.91XA CLOSED FRACTURE OF RIGHT FEMUR, UNSPECIFIED FRACTURE MORPHOLOGY, UNSPECIFIED PORTION OF FEMUR, INITIAL ENCOUNTER (H): ICD-10-CM

## 2025-06-23 DIAGNOSIS — S72.91XA: Primary | ICD-10-CM

## 2025-06-23 DIAGNOSIS — R26.89 IMPAIRED GAIT AND MOBILITY: ICD-10-CM

## 2025-06-23 DIAGNOSIS — D64.9 ANEMIA, UNSPECIFIED TYPE: ICD-10-CM

## 2025-06-23 DIAGNOSIS — Z91.81 AT RISK FOR INJURY RELATED TO FALL: ICD-10-CM

## 2025-06-23 DIAGNOSIS — N95.2 VAGINAL ATROPHY: ICD-10-CM

## 2025-06-23 LAB
FERRITIN SERPL-MCNC: 56 NG/ML (ref 11–328)
HGB BLD-MCNC: 12.2 G/DL (ref 11.7–15.7)
IRON BINDING CAPACITY (ROCHE): 294 UG/DL (ref 240–430)
IRON SATN MFR SERPL: 55 % (ref 15–46)
IRON SERPL-MCNC: 163 UG/DL (ref 37–145)
MCV RBC AUTO: 100 FL (ref 78–100)

## 2025-06-23 PROCEDURE — 3079F DIAST BP 80-89 MM HG: CPT | Performed by: INTERNAL MEDICINE

## 2025-06-23 PROCEDURE — 85018 HEMOGLOBIN: CPT | Performed by: INTERNAL MEDICINE

## 2025-06-23 PROCEDURE — 1125F AMNT PAIN NOTED PAIN PRSNT: CPT | Performed by: INTERNAL MEDICINE

## 2025-06-23 PROCEDURE — 99214 OFFICE O/P EST MOD 30 MIN: CPT | Performed by: INTERNAL MEDICINE

## 2025-06-23 PROCEDURE — 83540 ASSAY OF IRON: CPT | Performed by: INTERNAL MEDICINE

## 2025-06-23 PROCEDURE — 83550 IRON BINDING TEST: CPT | Performed by: INTERNAL MEDICINE

## 2025-06-23 PROCEDURE — 36415 COLL VENOUS BLD VENIPUNCTURE: CPT | Performed by: INTERNAL MEDICINE

## 2025-06-23 PROCEDURE — 3074F SYST BP LT 130 MM HG: CPT | Performed by: INTERNAL MEDICINE

## 2025-06-23 PROCEDURE — 82728 ASSAY OF FERRITIN: CPT | Performed by: INTERNAL MEDICINE

## 2025-06-23 RX ORDER — ESTRADIOL 10 UG/1
10 TABLET, FILM COATED VAGINAL
Qty: 24 TABLET | Refills: 3 | Status: SHIPPED | OUTPATIENT
Start: 2025-06-23

## 2025-06-23 ASSESSMENT — PAIN SCALES - GENERAL: PAINLEVEL_OUTOF10: MILD PAIN (3)

## 2025-06-23 NOTE — PROGRESS NOTES
"  {PROVIDER CHARTING PREFERENCE:837827}    Catrina Whitehead is a 55 year old, presenting for the following health issues:  RECHECK    History of Present Illness       Reason for visit:  Fill out new [place] paperwork, discuss home health PT services, some Rx refills    She eats 2-3 servings of fruits and vegetables daily.She consumes 1 sweetened beverage(s) daily.She exercises with enough effort to increase her heart rate 20 to 29 minutes per day.  She exercises with enough effort to increase her heart rate 5 days per week.   She is taking medications regularly.        [place]    PT homecare orders     IVIG at [facility], needs  more testing before that     {ANJELICA Picklists (Optional):159480}      Objective    /82 (BP Location: Left arm, Patient Position: Sitting, Cuff Size: Adult Regular)   Pulse 83   Temp 98  F (36.7  C)   Resp 16   Ht 1.575 m (5' 2.01\")   Wt 51.3 kg (113 lb)   LMP  (LMP Unknown)   SpO2 100%   BMI 20.66 kg/m    Body mass index is 20.66 kg/m .  Physical Exam   {Exam List (Optional):498158}    {Diagnostic Test Results (Optional):610412}        Signed Electronically by: Vi Metz MD  {Email feedback regarding this note to [email or web address]   :[id number]}  "

## 2025-06-24 DIAGNOSIS — N95.2 VAGINAL ATROPHY: ICD-10-CM

## 2025-06-24 RX ORDER — ESTRADIOL 10 UG/1
10 TABLET, FILM COATED VAGINAL
Qty: 24 TABLET | Refills: 3 | OUTPATIENT
Start: 2025-06-26

## 2025-06-25 ASSESSMENT — ANXIETY QUESTIONNAIRES
3. WORRYING TOO MUCH ABOUT DIFFERENT THINGS: SEVERAL DAYS
2. NOT BEING ABLE TO STOP OR CONTROL WORRYING: NOT AT ALL
8. IF YOU CHECKED OFF ANY PROBLEMS, HOW DIFFICULT HAVE THESE MADE IT FOR YOU TO DO YOUR WORK, TAKE CARE OF THINGS AT HOME, OR GET ALONG WITH OTHER PEOPLE?: SOMEWHAT DIFFICULT
7. FEELING AFRAID AS IF SOMETHING AWFUL MIGHT HAPPEN: NOT AT ALL
7. FEELING AFRAID AS IF SOMETHING AWFUL MIGHT HAPPEN: NOT AT ALL
GAD7 TOTAL SCORE: 4
5. BEING SO RESTLESS THAT IT IS HARD TO SIT STILL: NOT AT ALL
6. BECOMING EASILY ANNOYED OR IRRITABLE: SEVERAL DAYS
GAD7 TOTAL SCORE: 4
IF YOU CHECKED OFF ANY PROBLEMS ON THIS QUESTIONNAIRE, HOW DIFFICULT HAVE THESE PROBLEMS MADE IT FOR YOU TO DO YOUR WORK, TAKE CARE OF THINGS AT HOME, OR GET ALONG WITH OTHER PEOPLE: SOMEWHAT DIFFICULT
1. FEELING NERVOUS, ANXIOUS, OR ON EDGE: SEVERAL DAYS
GAD7 TOTAL SCORE: 4
4. TROUBLE RELAXING: SEVERAL DAYS

## 2025-06-26 ENCOUNTER — RESULTS FOLLOW-UP (OUTPATIENT)
Dept: FAMILY MEDICINE | Facility: CLINIC | Age: 55
End: 2025-06-26

## 2025-06-29 DIAGNOSIS — I15.9 SECONDARY HYPERTENSION: ICD-10-CM

## 2025-06-29 PROBLEM — N95.2 VAGINAL ATROPHY: Status: ACTIVE | Noted: 2025-06-29

## 2025-06-29 PROBLEM — Z91.81 AT RISK FOR INJURY RELATED TO FALL: Status: ACTIVE | Noted: 2025-06-29

## 2025-06-30 ENCOUNTER — VIRTUAL VISIT (OUTPATIENT)
Dept: PSYCHOLOGY | Facility: CLINIC | Age: 55
End: 2025-06-30
Payer: COMMERCIAL

## 2025-06-30 DIAGNOSIS — F06.4 ANXIETY DISORDER DUE TO GENERAL MEDICAL CONDITION: Primary | ICD-10-CM

## 2025-06-30 RX ORDER — LOSARTAN POTASSIUM 25 MG/1
25 TABLET ORAL DAILY
Qty: 90 TABLET | Refills: 3 | Status: SHIPPED | OUTPATIENT
Start: 2025-06-30

## 2025-06-30 NOTE — PROGRESS NOTES
"    Health Psychology                      Department of Medicine                     [facility] Mail Code [address] [place] [address]              Adelina Lepe, Ph.D., L.P [phone]  Stephanie Benson, Ph.D., L.P. [phone]  Joel Shields, Ph.D. [phone]  Tiffany Huff, Ph.D., A.B.P.P., L.P. [phone]  Al Cee, Ph.D., A.B.P.P., L.P. [phone]         Baylee Shoemaker, Ph.D., L.P. [phone]   Demetria Montesinos, Ph.D., A.B.P.P., L.P. [phone]    [facility] [place]  3rd Floor  [address]   [address]    Health Psychology Follow-Up Note    The patient has been notified of following:    \"This video visit will be conducted via a call between you and your physician/provider. We have found that certain health care needs can be provided without the need for an in-person physical exam.  This service lets us provide the care you need with a video conversation.  If a prescription is necessary we can send it directly to your pharmacy.  If lab work is needed we can place an order for that and you can then stop by our lab to have the test done at a later time. If during the course of the call the physician/provider feels a video visit is not appropriate, you will not be charged for this service.\"    Patient has given verbal consent for Video visit? YES    Reason that telemedicine is appropriate: Patient request as does not drive/limited mobility/deemed appropriate for this session.  Patient has given verbal consent for video visit: Yes  Mode of transmission: Secure real time interactive audio and visual telecommunication system via [email or web address]  Location of originating and distant sites:  Originating site (patient location): patient's home in [place]  Distant site (provider site): home office of provider    Start time: 3:03  End time:  3:58  Extended session due to " "onset of treatment and length of interval    SUBJECTIVE  Patient has agreed to receiving telehealth services and was provided written information regarding telehealth/video sessions. Reviewed updates to physical and emotional health since previous session.     Job  4/14/25-Paid hourly, has been generally managing to stay within 40 hours a week. Rarely does overtime. Still in process of job searching. Discussed \"limiting beliefs\" about herself/mobility and how they have impacted her willingness to apply for jobs.     4/9/25- Gets to continue in current position. She is proud of herself for her self-advocacy. No updates on jobs that she applied for. She \"knows\" she needs to reach out to her network about looking for new jobs. Discussed barriers.    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 Been in position for 3 years in 9/25. She took a 20% pay cut for this job, she did not try to negotiate salary. Been in HR since 2008. She has active FMLA for medical condition, if she were to leave [employer] is deterrent because she would have to accumulate a year before applying for FMLA. Current manager is flexible. She has worries about physical limitations impacting interview and she prefers job remote as well due to not driving/limited mobility. She took the job to get a specific experience and would like to take it and get into another role that allows for additional growth and increased salary.     Decided not to apply for job that she had been considering. Has done a lot of work to consider job options and her strengths. She started reviewing the materials. Still needs work on confidence to apply for jobs.She has been looking into remote job options. Met goal of reviewing for about 30 minutes and joined group on [employer] for people seeking remote jobs. Engaged her in processing emotions related to confidence related to applying for new job.    Applied to new job that would be remote only in a similar position " that she is currently in. Is considering applying for a higher/leadership role as well. Updated resume/cover letter and is in the process of applying for new positions.  Discussed work-related stressor and engaged her in processing via cognitive challenging of fortune-telling and mind-reading. Reinforced her use of coping skills such as praying and speaking with a friend.     5/28/25- Cass back that a position she had applied for was filled. Decided not to apply for other HR job at [employer] due to requirements of needing 6 years experience and she has 4. 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 They have a 1-2 year timeframe in consideration.     6/9/25- Decided wants to take break from looking for new jobs.     Exercise/strength/mobility  4/9/25- Qualified for home health care. Going to get PT at home to help with numbness/tingling in hands that makes it difficult to fall asleep. OT will also be visiting. MRI 4/16/25 of neck, ordered by spine provider.    Attending PT in person every 3 weeks for partially torn ligament in her ankle.  Feeling like struggling to incorporate all home PT exercises. Concerned she will lose strength if she stops exercising too much/limits due to ankle injury.    5/28/25- Uncertain if will be able to continue getting PT/OT services at home. Waiting to find out.     Genetic condition/Health  4/14/25-Starting to feel more comfortable with her upcoming trip as she had concerns about how her health/limited mobility will impact the experience. Has concerns about using toilet, is it is too low, she needs her 's help and his back has been bothering him more. Rated previous anxiety about being in new environment/decreased independence as a 7-8/10 initially, and now down to a 4/10.  Engaged her in identifying and challenging negative automatic thoughts. Discussed barriers to asking for help and problem solved. Has been  "requiring help with getting up and down from seated positions for past year. Struggling with the \"what ifs\" of her health.    Relatively slow progression. There is no specific prognosis regarding the course of the disease. Struggles with feeling resentful of friends who are physically healthy. 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 Mobility has declined significantly over past five years. Fell in 2022 at [employer] led to broken bones and resulted in mobility \"set back.\" Feels she has been hesitant to work on acceptance of this condition in the past/does not feel comfortable with relying on /children for help/care. Health insurance through her 's job and she currently works full time.     Her hands and arms are going numb overnight and ache. Had EMG recently. Has been impacting her sleep.   Whole genome sequencing- looking for new developments in past few years. They couldn't find underlying genetic cause previously, diagnosis was through muscle biopsy. Is going to do more genetic testing but will take about 3 months to get results. Planning to meet with Neurologist and Rheumatologist at [place]. Sports medicine doctor at [facility] ordered EMG, seeing her for numbness/ankles. Has added ankle PT to help since her ankle sprain, but this has resulted in decreasing PT for other areas. Has more energy in AM so uses this time to do PT. The pool she goes to has been closed for 2 weeks. She misses it, helpful mentally and physically.     5/28/25-Traveling to [place] went well. Feeling less afraid about traveling now. She hasn't travelled in three years. Discussed role of anxiety in travel and how she is now able to challenge those thoughts since her trip went better than expected.    6/9/25- MRI on left this week and neurologist wanting additional testing (e.g., muscle biopsy). Not many treatment options left, could consider infusions.     6/30/25- unable to stand on her own, uncertain what may " "be prompting this. Worried and scared that she may be declining in this way. Completed mindful breathing session to help reduce anxiety and she found this helpful. New symptoms include pain in feet and numbness/tingling. MRI showed not to much of a change from a year ago. [facility] neurologist said they aren't going to do an EMG. Next step is the muscle biopsy in calf- will guide if worth it to do a treatment or \"conservative management\" which is \"nothing.\" Had to modify ceremony for her twin son's eagle  ceremony due to her mobility barriers. Discussed emotional impact of this experience, including communication with her . Her  is struggling with stress related to his care-taking role. Has been taking time during day to do exercise/movements, including habit stacking with movements. She identified that sleep may impact strength so will focus on this in future sessions.     Other stressors  4/9/25- Will be traveling to [facility] with her two sons and  to visit their daughter. Has been feeling anxious about traveling. Engaged in problem solving related to worries for her upcoming trip.     5/28/25-Visit to [place] to see her daughter went well, worries were manageable and her planning helped it to go smoothly. Daughter will be moving back to [place] late summer and she will be living with friend. She and her  are struggling with how to support their 19-year old daughter who is reportedly experiencing PTSD from break in. Discussed how she can support her daughter.     6/9/25- Sons' graduation and party this past weekend. Felt it went well, realizing family is going to be transition over this summer. Discussed ways to be more intentional during this summer with her family.     OBJECTIVE  Appearance/Behavior/Orientation: Patient was casually groomed and dressed. Oriented to person, plan, time and situation.   Cooperation/Reliability: Patient was open and cooperative throughout the session.  "   Speech/Language: Speech was clear, coherent, and of normal rate, rhythm and volume.   Thought Process: Clear, linear  Mood/Affect: Mood euthymic-somewhat anxious; affect mood congruent  Insight/Motivation: Good/good    ASSESSMENT  Patient's mood and anxiety within minimal and mild levels. She does, however, experience anxiety related to her medical condition and the impact that it has on her functioning in daily life (at home, with work).  The patient was engaged and interactive throughout the session and they appear likely to benefit from continued sessions.          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 7/21 3:00, 8/13 4:00  6/9/25- additional biopsy testing to determine if infusion tx may help.  6/30/35- considered referral for  for therapy and/or couples therapy.  6/30/25- sent message to social work to see if any other agencies for home PT but she has not yet heard back from Bere Gomez, is going to follow-up.    Goals: reach out to neurologist re: increased weakness, sent mychart message to , speak with family about summer activities before son moves to [place], consider moving muscle biopsy to the fall, taking more minibreaks/stack habits from work/incorporate PT exercises, and  incorporate additional meditation sessions from [employer].     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 Maintain/increase physical activity/home PT exercises/strengthen legs so can walk easier.  CBT 1. N/A   2.Impact of health on career. Been at current position for almost 3 years.  2. Increased confidence and job applications. 2. CBT 2. N/A   3. Genetic disease- struggling with accepting 3. Increased acceptance/  decreased feelings of resentment  3. CBT 3. N/A     Frequency of Sessions: 1-4x/month    Discharge and Aftercare Goals: TBD    Expected duration of treatment:  TBD    Participants in therapy plan (family, friends, support network): TBD    Discussed and created this plan with the patient who has access to this treatment plan via [employer].    Regulatory Guidelines for Updating Treatment Plan  [place] Medical Assistance: Reviewed & signed at least every 90days  Medicare:  Update per policy

## 2025-07-01 ENCOUNTER — PATIENT OUTREACH (OUTPATIENT)
Dept: CARE COORDINATION | Facility: CLINIC | Age: 55
End: 2025-07-01
Payer: COMMERCIAL

## 2025-07-01 NOTE — LETTER
Iman,  My name is Bere Gomez and I am a  at the [facility].   Please let me know if you can accept this wonderful patient for home PT.      Bere Gomez,  [facility]  Clinic Care Coordinator  [facility]  [phone]  [email or web address]          Electronically signed

## 2025-07-01 NOTE — LETTER
[facility] CARE COORDINATION  [address] [place] [address]    July 1, 2025    Britt Villaltatalia  [address]  [facility] [address]      Dear [place],    I am a clinic care coordinator who works with Vi Metz MD with the [facility]. I wanted to introduce myself and provide you with my contact information for you to be able to call me with any questions or concerns. Below is a description of clinic care coordination and how I can further assist you.       The clinic care coordination team is made up of a registered nurse, , financial resource worker and community health worker who understand the health care system. The goal of clinic care coordination is to help you manage your health and improve access to the health care system. Our team works alongside your provider to assist you in determining your health and social needs. We can help you obtain health care and community resources, providing you with necessary information and education. We can work with you through any barriers and develop a care plan that helps coordinate and strengthen the communication between you and your care team.  Our services are voluntary and are offered without charge to you personally.    Please feel free to contact me with any questions or concerns regarding care coordination and what we can offer.      We are focused on providing you with the highest-quality healthcare experience possible.    Sincerely,     Bere Gomez,  [facility]  Clinic Care Coordinator  [facility]  [phone]  [email or web address]

## 2025-07-01 NOTE — PROGRESS NOTES
Clinic Care Coordination Contact  Clinic Care Coordination Contact  OUTREACH    Referral Information:  SW received email from pt requesting assistance in finding home PT.  Pt shares that PCP ordered PT, but [facility] declined.  SW confirmed with pt that she is looking only for PT.  SW checked with [facility] liaison to determine whether other agencies were tried.  [facility] liaison said pt does not meet homebound criteria.  SW worked with pt in the past, and is familiar with pt being primarily homebound due to her mobility status.  SW offered to send referral for outpt PT that would work with pt in her home and she is interested in this.              Chief Complaint   Patient presents with    Clinic Care Coordination - Initial        Universal Utilization:      Utilization      No Show Count (past year)  0             ED Visits  2             Hospital Admissions  1                    Current as of: 7/1/2025 10:55 AM                Clinical Concerns:  Current Medical Concerns:  Psychosocial     Current Behavioral Concerns: N/A    Education Provided to patient: Home PT options       Health Maintenance Reviewed:    Clinical Pathway: None    Medication Management:  Medication review status:        Functional Status:       Living Situation:       Lifestyle & Psychosocial Needs:    Social Drivers of Health     Food Insecurity: Low Risk  (11/27/2024)    Food Insecurity     Within the past 12 months, did you worry that your food would run out before you got money to buy more?: No     Within the past 12 months, did the food you bought just not last and you didn t have money to get more?: No   Depression: Not at risk (6/23/2025)    PHQ-2     PHQ-2 Score: 1   Housing Stability: High Risk (11/27/2024)    Housing Stability     Do you have housing? : No     Are you worried about losing your housing?: No   Tobacco Use: Low Risk  (6/23/2025)    Patient History     Smoking Tobacco Use: Never     Smokeless Tobacco Use: Never     Passive Exposure:  Not on file   Financial Resource Strain: Low Risk  (11/27/2024)    Financial Resource Strain     Within the past 12 months, have you or your family members you live with been unable to get utilities (heat, electricity) when it was really needed?: No   Alcohol Use: Not At Risk (8/29/2024)    AUDIT-C     Frequency of Alcohol Consumption: Monthly or less     Average Number of Drinks: 1 or 2     Frequency of Binge Drinking: Never   Transportation Needs: Low Risk  (11/27/2024)    Transportation Needs     Within the past 12 months, has lack of transportation kept you from medical appointments, getting your medicines, non-medical meetings or appointments, work, or from getting things that you need?: No   Physical Activity: Insufficiently Active (11/25/2024)    Received from [facility]    Exercise Vital Sign     Days of Exercise per Week: 4 days     Minutes of Exercise per Session: 30 min   Interpersonal Safety: Low Risk  (2/24/2025)    Interpersonal Safety     Do you feel physically and emotionally safe where you currently live?: Yes     Within the past 12 months, have you been hit, slapped, kicked or otherwise physically hurt by someone?: No     Within the past 12 months, have you been humiliated or emotionally abused in other ways by your partner or ex-partner?: No   Stress: Stress Concern Present (8/29/2024)    [employer] - Occupational Stress Questionnaire     Feeling of Stress : To some extent   Social Connections: Unknown (8/29/2024)    Social Connection and Isolation Panel [NHANES]     Frequency of Communication with Friends and Family: Patient declined     Frequency of Social Gatherings with Friends and Family: Once a week     Attends Anabaptist Services: Patient declined     Active Member of Clubs or Organizations: No     Attends Club or Organization Meetings: Patient declined     Marital Status:    Health Literacy: Not on file                    Resources and  Interventions:  Current Resources:      Care Plan:      Patient/Caregiver understanding: yes       Future Appointments                In 2 weeks Adelina Lepe, PhD [facility]    In 1 month Adelina Lepe, PhD [facility]            Plan:  sent referral to [facility].      Bere Gomez,  [facility]  Clinic Care Coordinator  [facility]  [phone]  [email or web address]

## 2025-07-15 ENCOUNTER — MYC MEDICAL ADVICE (OUTPATIENT)
Dept: FAMILY MEDICINE | Facility: CLINIC | Age: 55
End: 2025-07-15
Payer: COMMERCIAL

## 2025-07-15 DIAGNOSIS — M85.80 OSTEOPENIA, UNSPECIFIED LOCATION: Primary | ICD-10-CM

## 2025-07-16 ASSESSMENT — ANXIETY QUESTIONNAIRES
1. FEELING NERVOUS, ANXIOUS, OR ON EDGE: SEVERAL DAYS
3. WORRYING TOO MUCH ABOUT DIFFERENT THINGS: SEVERAL DAYS
4. TROUBLE RELAXING: NOT AT ALL
GAD7 TOTAL SCORE: 3
5. BEING SO RESTLESS THAT IT IS HARD TO SIT STILL: NOT AT ALL
7. FEELING AFRAID AS IF SOMETHING AWFUL MIGHT HAPPEN: NOT AT ALL
6. BECOMING EASILY ANNOYED OR IRRITABLE: SEVERAL DAYS
7. FEELING AFRAID AS IF SOMETHING AWFUL MIGHT HAPPEN: NOT AT ALL
GAD7 TOTAL SCORE: 3
8. IF YOU CHECKED OFF ANY PROBLEMS, HOW DIFFICULT HAVE THESE MADE IT FOR YOU TO DO YOUR WORK, TAKE CARE OF THINGS AT HOME, OR GET ALONG WITH OTHER PEOPLE?: NOT DIFFICULT AT ALL
GAD7 TOTAL SCORE: 3
IF YOU CHECKED OFF ANY PROBLEMS ON THIS QUESTIONNAIRE, HOW DIFFICULT HAVE THESE PROBLEMS MADE IT FOR YOU TO DO YOUR WORK, TAKE CARE OF THINGS AT HOME, OR GET ALONG WITH OTHER PEOPLE: NOT DIFFICULT AT ALL
2. NOT BEING ABLE TO STOP OR CONTROL WORRYING: NOT AT ALL

## 2025-07-21 ENCOUNTER — VIRTUAL VISIT (OUTPATIENT)
Dept: PSYCHOLOGY | Facility: CLINIC | Age: 55
End: 2025-07-21
Payer: COMMERCIAL

## 2025-07-21 DIAGNOSIS — F06.4 ANXIETY DISORDER DUE TO GENERAL MEDICAL CONDITION: Primary | ICD-10-CM

## 2025-07-21 PROCEDURE — 90837 PSYTX W PT 60 MINUTES: CPT | Mod: 95 | Performed by: PSYCHOLOGIST

## 2025-07-21 NOTE — PROGRESS NOTES
"    Health Psychology                      Department of Medicine                     [facility] Mail [id number] [place] [address]              Adelina Lepe, Ph.D., L.P [phone]  Stephanie Benson, Ph.D., L.P. [phone]  Joel Shields, Ph.D. [phone]  Tiffany Huff, Ph.D., A.B.P.P., L.P. [phone]  Al Cee, Ph.D., A.B.P.P., L.P. [phone]         Baylee Shoemaker, Ph.D., L.P. [phone]   Demetria Montesinos, Ph.D., A.B.P.P., L.P. [phone]    [facility]  3rd Floor  [address]   [id number]    Health Psychology Follow-Up Note    The patient has been notified of following:    \"This video visit will be conducted via a call between you and your physician/provider. We have found that certain health care needs can be provided without the need for an in-person physical exam.  This service lets us provide the care you need with a video conversation.  If a prescription is necessary we can send it directly to your pharmacy.  If lab work is needed we can place an order for that and you can then stop by our lab to have the test done at a later time. If during the course of the call the physician/provider feels a video visit is not appropriate, you will not be charged for this service.\"    Patient has given verbal consent for Video visit? YES    Reason that telemedicine is appropriate: Patient request as does not drive/limited mobility/deemed appropriate for this session.  Patient has given verbal consent for video visit: Yes  Mode of transmission: Secure real time interactive audio and visual telecommunication system via [email or web address]  Location of originating and distant sites:  Originating site (patient location): patient's home in [place]  Distant site (provider site): home office of provider    Start time: 2:58  End time:  3:54  Extended session due to " "onset of treatment and length of interval    SUBJECTIVE  Patient has agreed to receiving telehealth services and was provided written information regarding telehealth/video sessions. Reviewed updates to physical and emotional health since previous session.     Job  4/14/25-Paid hourly, has been generally managing to stay within 40 hours a week. Rarely does overtime. Still in process of job searching. Discussed \"limiting beliefs\" about herself/mobility and how they have impacted her willingness to apply for jobs.     4/9/25- Gets to continue in current position. She is proud of herself for her self-advocacy. No updates on jobs that she applied for. She \"knows\" she needs to reach out to her network about looking for new jobs. Discussed barriers.    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 Been in position for 3 years in 9/25. She took a 20% pay cut for this job, she did not try to negotiate salary. Been in HR since 2008. She has active FMLA for medical condition, if she were to leave [employer] is deterrent because she would have to accumulate a year before applying for FMLA. Current manager is flexible. She has worries about physical limitations impacting interview and she prefers job remote as well due to not driving/limited mobility. She took the job to get a specific experience and would like to take it and get into another role that allows for additional growth and increased salary.     Decided not to apply for job that she had been considering. Has done a lot of work to consider job options and her strengths. She started reviewing the materials. Still needs work on confidence to apply for jobs.She has been looking into remote job options. Met goal of reviewing for about 30 minutes and joined group on [address] for people seeking remote jobs. Engaged her in processing emotions related to confidence related to applying for new job.    Applied to new job that would be remote only in a similar position " that she is currently in. Is considering applying for a higher/leadership role as well. Updated resume/cover letter and is in the process of applying for new positions.  Discussed work-related stressor and engaged her in processing via cognitive challenging of fortune-telling and mind-reading. Reinforced her use of coping skills such as praying and speaking with a friend.     5/28/25- Ware back that a position she had applied for was filled. Decided not to apply for other HR job at [employer] due to requirements of needing 6 years experience and she has 4. 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 They have a 1-2 year timeframe in consideration.     6/9/25- Decided wants to take break from looking for new jobs.     7/21/25- Work was slower recently.     Exercise/strength/mobility  4/9/25- Qualified for home health care. Going to get PT at home to help with numbness/tingling in hands that makes it difficult to fall asleep. OT will also be visiting. MRI 4/16/25 of neck, ordered by spine provider.    Attending PT in person every 3 weeks for partially torn ligament in her ankle.  Feeling like struggling to incorporate all home PT exercises. Concerned she will lose strength if she stops exercising too much/limits due to ankle injury.    5/28/25- Uncertain if will be able to continue getting PT/OT services at home. Waiting to find out.     7/21/25- Started new PT and that has been going well.     Genetic condition/Health  4/14/25-Starting to feel more comfortable with her upcoming trip as she had concerns about how her health/limited mobility will impact the experience. Has concerns about using toilet, is it is too low, she needs her 's help and his back has been bothering him more. Rated previous anxiety about being in new environment/decreased independence as a 7-8/10 initially, and now down to a 4/10.  Engaged her in identifying and challenging  "negative automatic thoughts. Discussed barriers to asking for help and problem solved. Has been requiring help with getting up and down from seated positions for past year. Struggling with the \"what ifs\" of her health.    Relatively slow progression. There is no specific prognosis regarding the course of the disease. Struggles with feeling resentful of friends who are physically healthy. 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 Mobility has declined significantly over past five years. Fell in 2022 at [facility] led to broken bones and resulted in mobility \"set back.\" Feels she has been hesitant to work on acceptance of this condition in the past/does not feel comfortable with relying on /children for help/care. Health insurance through her 's job and she currently works full time.     Her hands and arms are going numb overnight and ache. Had EMG recently. Has been impacting her sleep. Whole genome sequencing- looking for new developments in past few years. They couldn't find underlying genetic cause previously, diagnosis was through muscle biopsy. Is going to do more genetic testing but will take about 3 months to get results. Planning to meet with Neurologist and Rheumatologist at [place]. Sports medicine doctor at [facility] ordered EMG, seeing her for numbness/ankles. Has added ankle PT to help since her ankle sprain, but this has resulted in decreasing PT for other areas. Has more energy in AM so uses this time to do PT. The pool she goes to has been closed for 2 weeks. She misses it, helpful mentally and physically.     5/28/25-Traveling to [place] went well. Feeling less afraid about traveling now. She hasn't travelled in three years. Discussed role of anxiety in travel and how she is now able to challenge those thoughts since her trip went better than expected.    6/9/25- MRI on left this week and neurologist wanting additional testing (e.g., muscle biopsy). Not many treatment " "options left, could consider infusions.     6/30/25- unable to stand on her own, uncertain what may be prompting this. Worried and scared that she may be declining in this way. Completed mindful breathing session to help reduce anxiety and she found this helpful. New symptoms include pain in feet and numbness/tingling. MRI showed not to much of a change from a year ago. [place] neurologist said they aren't going to do an EMG. Next step is the muscle biopsy in calf- will guide if worth it to do a treatment or \"conservative management\" which is \"nothing.\" Had to modify ceremony for her twin son's eagle  ceremony due to her mobility barriers. Discussed emotional impact of this experience, including communication with her . Her  is struggling with stress related to his care-taking role. Has been taking time during day to do exercise/movements, including habit stacking with movements. She identified that sleep may impact strength so will focus on this in future sessions.     7/21/25- Feeling \"worried and afraid\" about recent increased difficulty standing. Working with PT to strengthen muscles to potentially help with standing on her own. Noticed that she \"berates\" herself for not keeping up with PT exercises as she believes she should. Scheduled muscle biopsy to determine next steps for treatment. Has been tracking sleep, reviewed results. Struggles most with going to bed early. Wakes at about 6:30. Reviewed behavior changes related to increased sleep. Tries not to look at her phone when she wakes up.    Other stressors  4/9/25- Will be traveling to [facility] with her two sons and  to visit their daughter. Has been feeling anxious about traveling. Engaged in problem solving related to worries for her upcoming trip.     5/28/25-Visit to [place] to see her daughter went well, worries were manageable and her planning helped it to go smoothly. Daughter will be moving back to [place] late summer and she will " be living with friend. She and her  are struggling with how to support their 19-year old daughter who is reportedly experiencing PTSD from break in. Discussed how she can support her daughter.     6/9/25- Sons' graduation and party this past weekend. Felt it went well, realizing family is going to be transition over this summer. Discussed ways to be more intentional during this summer with her family.     7/21/25- Youngest son moving in August to college, reportedly having a difficult time following a recent break-up. Has been increased conflict between her, her , and their son. Daughter moving back this week from [place]. The 5 members of her family will be in the area for about 2 weeks before he moves to college.    OBJECTIVE  Appearance/Behavior/Orientation: Patient was casually groomed and dressed. Oriented to person, plan, time and situation.   Cooperation/Reliability: Patient was open and cooperative throughout the session.    Speech/Language: Speech was clear, coherent, and of normal rate, rhythm and volume.   Thought Process: Clear, linear  Mood/Affect: Mood euthymic-somewhat anxious; affect mood congruent  Insight/Motivation: Good/good    ASSESSMENT  Patient's mood and anxiety within minimal and mild levels. She does, however, experience anxiety related to her medical condition and the impact that it has on her functioning in daily life (at home, with work).  The patient was engaged and interactive throughout the session and they appear likely to benefit from continued sessions.          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 8/13 4:00, 9/3 4:00  7/21/25- biopsy scheduled for September 2025 6/30/35- considered referral for  for therapy and/or couples therapy.  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 Son moving to college 8/17/25.     Goals: practice improving how she is speaking to herself, continue to try to achieve 7-8 hours/night sleep (winding down about 8:30), taking more minibreaks/stack habits from work/incorporate PT exercises, and  incorporate additional meditation sessions from [employer].     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 Maintain/increase physical activity/home PT exercises/strengthen legs so can walk easier.  CBT 1. N/A   2.Impact of health on career. Been at current  position for almost 3 years.  2. Increased confidence and job applications. 2. CBT 2. N/A   3. Genetic disease- struggling with accepting 3. Increased acceptance/  decreased feelings of resentment  3. CBT 3. N/A     Frequency of Sessions: 1-4x/month    Discharge and Aftercare Goals: TBD    Expected duration of treatment:  TBD    Participants in therapy plan (family, friends, support network): TBD    Discussed and created this plan with the patient who has access to this treatment plan via [employer].    Regulatory Guidelines for Updating Treatment Plan  [place] Medical Assistance: Reviewed & signed at least every 90days  Medicare:  Update per policy

## 2025-08-01 ENCOUNTER — HOSPITAL ENCOUNTER (OUTPATIENT)
Dept: BONE DENSITY | Facility: CLINIC | Age: 55
Discharge: HOME | End: 2025-08-01
Attending: INTERNAL MEDICINE | Admitting: INTERNAL MEDICINE
Payer: COMMERCIAL

## 2025-08-01 PROCEDURE — 77080 DXA BONE DENSITY AXIAL: CPT

## 2025-08-04 ENCOUNTER — MYC MEDICAL ADVICE (OUTPATIENT)
Dept: UROLOGY | Facility: CLINIC | Age: 55
End: 2025-08-04
Payer: COMMERCIAL

## 2025-08-04 DIAGNOSIS — R32 INCONTINENCE IN FEMALE: ICD-10-CM

## 2025-08-05 RX ORDER — SOLIFENACIN SUCCINATE 5 MG/1
5 TABLET, FILM COATED ORAL DAILY
Qty: 90 TABLET | Refills: 0 | Status: SHIPPED | OUTPATIENT
Start: 2025-08-05

## 2025-08-08 ENCOUNTER — RESULTS FOLLOW-UP (OUTPATIENT)
Dept: FAMILY MEDICINE | Facility: CLINIC | Age: 55
End: 2025-08-08
Payer: COMMERCIAL

## 2025-08-08 ENCOUNTER — MYC MEDICAL ADVICE (OUTPATIENT)
Dept: FAMILY MEDICINE | Facility: CLINIC | Age: 55
End: 2025-08-08
Payer: COMMERCIAL

## 2025-08-08 DIAGNOSIS — D74.9 METHEMOGLOBINEMIA: Primary | ICD-10-CM

## 2025-08-08 ASSESSMENT — ANXIETY QUESTIONNAIRES
3. WORRYING TOO MUCH ABOUT DIFFERENT THINGS: NOT AT ALL
6. BECOMING EASILY ANNOYED OR IRRITABLE: SEVERAL DAYS
2. NOT BEING ABLE TO STOP OR CONTROL WORRYING: NOT AT ALL
7. FEELING AFRAID AS IF SOMETHING AWFUL MIGHT HAPPEN: SEVERAL DAYS
GAD7 TOTAL SCORE: 3
IF YOU CHECKED OFF ANY PROBLEMS ON THIS QUESTIONNAIRE, HOW DIFFICULT HAVE THESE PROBLEMS MADE IT FOR YOU TO DO YOUR WORK, TAKE CARE OF THINGS AT HOME, OR GET ALONG WITH OTHER PEOPLE: SOMEWHAT DIFFICULT
4. TROUBLE RELAXING: NOT AT ALL
7. FEELING AFRAID AS IF SOMETHING AWFUL MIGHT HAPPEN: SEVERAL DAYS
1. FEELING NERVOUS, ANXIOUS, OR ON EDGE: SEVERAL DAYS
8. IF YOU CHECKED OFF ANY PROBLEMS, HOW DIFFICULT HAVE THESE MADE IT FOR YOU TO DO YOUR WORK, TAKE CARE OF THINGS AT HOME, OR GET ALONG WITH OTHER PEOPLE?: SOMEWHAT DIFFICULT
5. BEING SO RESTLESS THAT IT IS HARD TO SIT STILL: NOT AT ALL

## 2025-08-13 ENCOUNTER — VIRTUAL VISIT (OUTPATIENT)
Dept: PSYCHOLOGY | Facility: CLINIC | Age: 55
End: 2025-08-13
Payer: COMMERCIAL

## 2025-08-13 DIAGNOSIS — F06.4 ANXIETY DISORDER DUE TO GENERAL MEDICAL CONDITION: Primary | ICD-10-CM

## 2025-08-13 PROCEDURE — 90837 PSYTX W PT 60 MINUTES: CPT | Mod: 95 | Performed by: PSYCHOLOGIST

## 2025-08-26 ENCOUNTER — MYC MEDICAL ADVICE (OUTPATIENT)
Dept: UROLOGY | Facility: CLINIC | Age: 55
End: 2025-08-26

## 2025-08-26 ENCOUNTER — VIRTUAL VISIT (OUTPATIENT)
Dept: UROLOGY | Facility: CLINIC | Age: 55
End: 2025-08-26
Payer: COMMERCIAL

## 2025-08-26 DIAGNOSIS — N95.2 VAGINAL ATROPHY: ICD-10-CM

## 2025-08-26 DIAGNOSIS — R32 INCONTINENCE IN FEMALE: ICD-10-CM

## 2025-08-26 DIAGNOSIS — N39.41 URGE INCONTINENCE OF URINE: Primary | ICD-10-CM

## 2025-08-26 RX ORDER — SOLIFENACIN SUCCINATE 5 MG/1
5 TABLET, FILM COATED ORAL DAILY
Qty: 90 TABLET | Refills: 0 | Status: SHIPPED | OUTPATIENT
Start: 2025-08-26

## 2025-08-26 RX ORDER — ESTRADIOL 10 UG/1
10 TABLET, FILM COATED VAGINAL
Qty: 24 TABLET | Refills: 11 | Status: SHIPPED | OUTPATIENT
Start: 2025-08-26

## 2025-09-03 ENCOUNTER — VIRTUAL VISIT (OUTPATIENT)
Dept: PSYCHOLOGY | Facility: CLINIC | Age: 55
End: 2025-09-03
Payer: COMMERCIAL

## 2025-09-03 DIAGNOSIS — F06.4 ANXIETY DISORDER DUE TO GENERAL MEDICAL CONDITION: Primary | ICD-10-CM

## (undated) DEVICE — NDL 19GA 1.5"

## (undated) DEVICE — DRSG GAUZE 4X4" 3033

## (undated) DEVICE — PACK TOTAL KNEE SOP15TKFSD

## (undated) DEVICE — DRAPE SHEET REV FOLD 3/4 9349

## (undated) DEVICE — DRSG XEROFORM 1X8"

## (undated) DEVICE — DRILL BIT QUICK COUPLING 3 FLUTE 4.2MMX330/100MM CALIBRATE

## (undated) DEVICE — WIRE GUIDE 3.2X400MM  357.399

## (undated) DEVICE — BLADE CLIPPER 4406

## (undated) DEVICE — SUCTION IRR STRYKERFLOW II W/TIP 250-070-520

## (undated) DEVICE — ENDO TROCAR FIRST ENTRY KII FIOS ADV FIX 05X100MM CFF03

## (undated) DEVICE — LINEN TOWEL PACK X5 5464

## (undated) DEVICE — SOL NACL 0.9% INJ 1000ML BAG 2B1324X

## (undated) DEVICE — DRAPE C-ARM 60X42" 1013

## (undated) DEVICE — SU VICRYL 1 CT-1 27" J341H

## (undated) DEVICE — DRSG ADAPTIC 3X8" 6113

## (undated) DEVICE — GLOVE BIOGEL PI ULTRATOUCH SZ 7.5 41175

## (undated) DEVICE — DECANTER BAG 2002S

## (undated) DEVICE — DRILL BIT QUICK COUPLING 3 FLUTE 4.2MMX145MM NDL  POINT

## (undated) DEVICE — SOL WATER IRRIG 1000ML BOTTLE 2F7114

## (undated) DEVICE — DRSG KERLIX FLUFFS X5

## (undated) DEVICE — CAST PLASTER SPLINT 5X30" 7395

## (undated) DEVICE — BLADE KNIFE SURG 15 371115

## (undated) DEVICE — ENDO SCOPE WARMER LF TM500

## (undated) DEVICE — DRAPE COVER C-ARM SEAMLESS SNAP-KAP 03-KP26 LATEX FREE

## (undated) DEVICE — ESU GROUND PAD UNIVERSAL W/O CORD

## (undated) DEVICE — ESU HOLDER LAP INST DISP PURPLE LONG 330MM H-PRO-330

## (undated) DEVICE — EVAC SYSTEM CLEAR FLOW SC082500

## (undated) DEVICE — PACK HIP NAILING SOP15HNSB

## (undated) DEVICE — SOL NACL 0.9% IRRIG 1000ML BOTTLE 2F7124

## (undated) DEVICE — PREP SKIN SCRUB TRAY 4461A

## (undated) DEVICE — SU VICRYL 2-0 CT-2 27" UND J269H

## (undated) DEVICE — DRAPE IOBAN ISOLATION VERTICAL 6619

## (undated) DEVICE — GUIDE WIRE 1.6X150MM THREADED 292.72

## (undated) DEVICE — KIT PATIENT POSITIONING PIGAZZI LATEX FREE 40580

## (undated) DEVICE — Device

## (undated) DEVICE — PACK SET-UP STD 9102

## (undated) DEVICE — BNDG ELASTIC 6"X5YDS UNSTERILE 6611-60

## (undated) DEVICE — STPL SKIN 35W 6.9MM  PXW35

## (undated) DEVICE — DRAIN ROUND W/RESERV KIT JACKSON PRATT 10FR 400ML SU130-402D

## (undated) DEVICE — GLOVE BIOGEL PI MICRO INDICATOR UNDERGLOVE SZ 7.5 48975

## (undated) DEVICE — TUBING C02 INSUFFLATION LAP FILTER HEATER 6198

## (undated) DEVICE — SU VICRYL 0 UR-6 27" J603H

## (undated) DEVICE — SU MONOCRYL 4-0 PS-2 18" UND Y496G

## (undated) DEVICE — ENDO TROCAR SLEEVE KII ADV FIXATION 05X100MM CFS02

## (undated) DEVICE — SYR 10ML LL W/O NDL 302995

## (undated) DEVICE — CAST PADDING 6" UNSTERILE 9046

## (undated) DEVICE — ROD SYN REAMER BALL TIP 2.5X950MM  351.706S

## (undated) DEVICE — MANIFOLD NEPTUNE 4 PORT 700-20

## (undated) DEVICE — DRILL BIT MINI 170MM QC 3.2MM 310.65

## (undated) DEVICE — DRSG AQUACEL AG HYDROFIBER 3.5X6" 422604

## (undated) RX ORDER — ONDANSETRON 2 MG/ML
INJECTION INTRAMUSCULAR; INTRAVENOUS
Status: DISPENSED
Start: 2022-12-11

## (undated) RX ORDER — ACETAMINOPHEN 325 MG/1
TABLET ORAL
Status: DISPENSED
Start: 2023-12-20

## (undated) RX ORDER — GLYCOPYRROLATE 0.2 MG/ML
INJECTION, SOLUTION INTRAMUSCULAR; INTRAVENOUS
Status: DISPENSED
Start: 2022-12-11

## (undated) RX ORDER — FENTANYL CITRATE 50 UG/ML
INJECTION, SOLUTION INTRAMUSCULAR; INTRAVENOUS
Status: DISPENSED
Start: 2022-05-09

## (undated) RX ORDER — PROPOFOL 10 MG/ML
INJECTION, EMULSION INTRAVENOUS
Status: DISPENSED
Start: 2023-12-20

## (undated) RX ORDER — FENTANYL CITRATE 0.05 MG/ML
INJECTION, SOLUTION INTRAMUSCULAR; INTRAVENOUS
Status: DISPENSED
Start: 2023-12-20

## (undated) RX ORDER — LIDOCAINE HYDROCHLORIDE 10 MG/ML
INJECTION, SOLUTION EPIDURAL; INFILTRATION; INTRACAUDAL; PERINEURAL
Status: DISPENSED
Start: 2022-12-11

## (undated) RX ORDER — PROPOFOL 10 MG/ML
INJECTION, EMULSION INTRAVENOUS
Status: DISPENSED
Start: 2022-12-11

## (undated) RX ORDER — CEFAZOLIN SODIUM/WATER 2 G/20 ML
SYRINGE (ML) INTRAVENOUS
Status: DISPENSED
Start: 2023-12-20

## (undated) RX ORDER — OXYCODONE HYDROCHLORIDE 5 MG/1
TABLET ORAL
Status: DISPENSED
Start: 2023-12-20

## (undated) RX ORDER — KETOROLAC TROMETHAMINE 30 MG/ML
INJECTION, SOLUTION INTRAMUSCULAR; INTRAVENOUS
Status: DISPENSED
Start: 2023-12-20

## (undated) RX ORDER — CEPHALEXIN 500 MG/1
CAPSULE ORAL
Status: DISPENSED
Start: 2024-02-01

## (undated) RX ORDER — NEOSTIGMINE METHYLSULFATE 1 MG/ML
VIAL (ML) INJECTION
Status: DISPENSED
Start: 2022-12-11

## (undated) RX ORDER — ONDANSETRON 2 MG/ML
INJECTION INTRAMUSCULAR; INTRAVENOUS
Status: DISPENSED
Start: 2023-12-20

## (undated) RX ORDER — BUPIVACAINE HYDROCHLORIDE AND EPINEPHRINE 2.5; 5 MG/ML; UG/ML
INJECTION, SOLUTION EPIDURAL; INFILTRATION; INTRACAUDAL; PERINEURAL
Status: DISPENSED
Start: 2023-12-20

## (undated) RX ORDER — FENTANYL CITRATE 50 UG/ML
INJECTION, SOLUTION INTRAMUSCULAR; INTRAVENOUS
Status: DISPENSED
Start: 2023-12-20

## (undated) RX ORDER — FENTANYL CITRATE 50 UG/ML
INJECTION, SOLUTION INTRAMUSCULAR; INTRAVENOUS
Status: DISPENSED
Start: 2022-12-11